# Patient Record
Sex: FEMALE | Race: WHITE
[De-identification: names, ages, dates, MRNs, and addresses within clinical notes are randomized per-mention and may not be internally consistent; named-entity substitution may affect disease eponyms.]

---

## 2017-07-23 ENCOUNTER — HOSPITAL ENCOUNTER (EMERGENCY)
Dept: HOSPITAL 47 - EC | Age: 47
Discharge: HOME | End: 2017-07-23
Payer: MEDICARE

## 2017-07-23 VITALS — TEMPERATURE: 98.3 F | DIASTOLIC BLOOD PRESSURE: 73 MMHG | HEART RATE: 60 BPM | SYSTOLIC BLOOD PRESSURE: 152 MMHG

## 2017-07-23 VITALS — RESPIRATION RATE: 18 BRPM

## 2017-07-23 DIAGNOSIS — Z88.0: ICD-10-CM

## 2017-07-23 DIAGNOSIS — Z87.891: ICD-10-CM
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
## 2017-07-23 DIAGNOSIS — S03.41XA: Primary | ICD-10-CM

## 2017-07-23 LAB — AMYLASE SERPL-CCNC: 62 U/L (ref 30–110)

## 2017-07-23 PROCEDURE — 99284 EMERGENCY DEPT VISIT MOD MDM: CPT

## 2017-07-23 PROCEDURE — 36415 COLL VENOUS BLD VENIPUNCTURE: CPT

## 2017-07-23 PROCEDURE — 83690 ASSAY OF LIPASE: CPT

## 2017-07-23 PROCEDURE — 82150 ASSAY OF AMYLASE: CPT

## 2017-07-23 NOTE — ED
General Adult HPI





- General


Chief complaint: Dental/Oral


Stated complaint: JAW PAIN


Time Seen by Provider: 17 10:36


Source: patient, family, RN notes reviewed, old records reviewed


Mode of arrival: wheelchair


Limitations: no limitations





- History of Present Illness


Initial comments: 





Chief complaint history of present illness a 47-year-old female here with 

family.  The patient reports one month ago while yawning she felt pain to her 

right TMJ.  4 days ago she was at [facility] diagnosed with discomfort in 

that same area but on a muscle relaxant.  Patient reports she still has 

discomfort.  Yawning opening her mouth wide causes discomfort to the right TMJ.

  Mild pressure over that while yawning or opening her mouth can decrease the 

discomfort.  Denies any toothache denies fever.





- Related Data


 Previous Rx's











 Medication  Instructions  Recorded


 


Hydrocodone/Acetaminophen [Norco 1 each PO Q8H PRN #10 tab 17





5-325]  


 


Ibuprofen [Motrin] 600 mg PO Q6HR PRN #20 tab 17











 Allergies











Allergy/AdvReac Type Severity Reaction Status Date / Time


 


Penicillins Allergy  Itching Verified 17 10:24














Review of Systems


ROS Statement: 


Those systems with pertinent positive or pertinent negative responses have been 

documented in the HPI.


Review of systems no headache no visual acuity changes denies any tooth area 

pain.  Discomfort located on the right parotid area specifically toward the 

right TMJ.  No bruising no significant swelling.  Palpation of the area causes 

discomfort.  Opening and closing her mouth increases pain as well.  No 

complaint of chest pain shortness breath GI/ problems.  All systems are 

reviewed.  Past medical problems significant for angina, insulin-dependent 

diabetes mellitus, hypertension, patient is on renal dialysis, also thyroid 

disorder seizure disorder and bipolar disorder.  The patient's surgeries 

include , tonsils and a fistula for dialysis on her left bicep area.  

Family history mother at unknown type right of cancer.  The patient quit 

smoking 10 years ago rarely drinks alcohol.








ROS Other: All systems not noted in ROS Statement are negative.





Past Medical History


Past Medical History: Chest Pain / Angina, Diabetes Mellitus, Hypertension, 

Renal Disease, Seizure Disorder, Thyroid Disorder


Additional Past Medical History / Comment(s): dialysis bipolar


History of Any Multi-Drug Resistant Organisms: None Reported


Past Surgical History:  Section, Tonsillectomy


Additional Past Surgical History / Comment(s): fistula


Past Psychological History: Bipolar


Smoking Status: Former smoker


Past Alcohol Use History: Occasional


Past Drug Use History: None Reported





General Exam





- General Exam Comments


Initial Comments: 





General:


The patient is awake and alert, complains of pain to the right TMJ area vital 

signs temp 97.8 pulse 82 respiratory rate 18 pulse ox 90% room air blood 

pressure 131/77.


Eye:


Pupils are equal, round and reactive to light, extra-ocular movements are intact

; there is normal conjunctiva bilaterally. No signs of icterus. 


Ears, nose, mouth and throat:


There are moist mucous membranes and no oral lesions.  Tenderness to right TMJ 

with opening and closing her mouth and palpation over the area.  Parotid not 

enlarged as compared to the left side.  Tenderness in that general area though 

when palpated.  No bruising noted.  No anterior cervical lymphadenopathy.  

Teeth appear to be intact without evidence of any dental caries or she has had 

a lot of dental work done.


Neck:


The neck is supple, there is no tenderness , no carotid bruit.


Cardiovascular:


There is a regular rate and rhythm. No murmur, rub or gallop is appreciated.


Respiratory:


Lungs are clear to auscultation, respirations are non-labored, breath sounds 

are equal. No wheezes, stridor, rales, or rhonchi.


Gastrointestinal:


Abdomen benign no nausea no vomiting no masses.


 


Skin:


Skin is warm and dry and no rashes or lesions are noted. 


Psychiatric:


History of bipolar disorder.  No complaints of depression or suicidal thoughts.





Limitations: no limitations





Course


 Vital Signs











  17





  10:19


 


Temperature 97.8 F


 


Pulse Rate 82


 


Respiratory 18





Rate 


 


Blood Pressure 131/77


 


O2 Sat by Pulse 98





Oximetry 














Medical Decision Making





- Medical Decision Making





Medical decision making; patient's amylase 62 lipase 273.





History examination suggests right TMJ discomfort.  The patient will be placed 

on some pain pills and advised to follow-up with her family physician.





- Lab Data


 Lab Results











  17 Range/Units





  11:18 


 


Amylase  62  ()  U/L


 


Lipase  273  ()  U/L














Disposition


Clinical Impression: 


 Sprain of right temporomandibular joint





Disposition: HOME SELF-CARE


Condition: Fair


Instructions:  Temporomandibular Disorder (ED), Arthralgia (ED)


Additional Instructions: 


Eat easily chewed food.  Use pain medication as directed.  Also follow-up with 

family physician.


Prescriptions: 


Hydrocodone/Acetaminophen [Norco 5-325] 1 each PO Q8H PRN #10 tab


 PRN Reason: Pain


Ibuprofen [Motrin] 600 mg PO Q6HR PRN #20 tab


 PRN Reason: Pain


Referrals: 


Saturnino Castillo MD [Primary Care Provider] - 1-2 days


Time of Disposition: 12:02

## 2018-01-22 ENCOUNTER — HOSPITAL ENCOUNTER (INPATIENT)
Dept: HOSPITAL 47 - EC | Age: 48
LOS: 3 days | Discharge: HOME | DRG: 100 | End: 2018-01-25
Payer: MEDICARE

## 2018-01-22 DIAGNOSIS — E03.9: ICD-10-CM

## 2018-01-22 DIAGNOSIS — I12.0: ICD-10-CM
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
## 2018-01-22 DIAGNOSIS — E87.1: ICD-10-CM

## 2018-01-22 DIAGNOSIS — Z87.891: ICD-10-CM

## 2018-01-22 DIAGNOSIS — S62.305A: ICD-10-CM

## 2018-01-22 DIAGNOSIS — D64.9: ICD-10-CM

## 2018-01-22 DIAGNOSIS — E11.65: ICD-10-CM

## 2018-01-22 DIAGNOSIS — F31.9: ICD-10-CM

## 2018-01-22 DIAGNOSIS — Z79.4: ICD-10-CM

## 2018-01-22 DIAGNOSIS — Z79.899: ICD-10-CM

## 2018-01-22 DIAGNOSIS — G40.409: Primary | ICD-10-CM

## 2018-01-22 DIAGNOSIS — Z99.2: ICD-10-CM

## 2018-01-22 DIAGNOSIS — N18.6: ICD-10-CM

## 2018-01-22 DIAGNOSIS — M89.9: ICD-10-CM

## 2018-01-22 DIAGNOSIS — Z79.02: ICD-10-CM

## 2018-01-22 DIAGNOSIS — E11.22: ICD-10-CM

## 2018-01-22 DIAGNOSIS — F03.90: ICD-10-CM

## 2018-01-22 PROCEDURE — 80048 BASIC METABOLIC PNL TOTAL CA: CPT

## 2018-01-22 PROCEDURE — 99285 EMERGENCY DEPT VISIT HI MDM: CPT

## 2018-01-22 PROCEDURE — 90935 HEMODIALYSIS ONE EVALUATION: CPT

## 2018-01-22 PROCEDURE — 84100 ASSAY OF PHOSPHORUS: CPT

## 2018-01-22 PROCEDURE — 85025 COMPLETE CBC W/AUTO DIFF WBC: CPT

## 2018-01-22 PROCEDURE — 95819 EEG AWAKE AND ASLEEP: CPT

## 2018-01-22 NOTE — ED
Seizure HPI





- General


Chief Complaint: Seizure


Stated Complaint: Karlo Consult


Time Seen by Provider: 18 18:20


Source: patient, RN/MD, EMS, RN notes reviewed, old records reviewed


Mode of arrival: EMS


Limitations: no limitations





- History of Present Illness


Initial Comments: 





This is a 47-year-old female with a history of seizures who was sent here from 

[facility] for evaluation by neurology for recurrent seizures.  

Patient probably did have recurrent seizures today was seen in their emergency 

department they have no neurologist on staff patient was transferred here for 

further evaluation.  Patient was transported by EMS was apparently awake but 

nonverbal she was demonstrating evidence of a postictal state.  No trauma was 

reported.  I did review the charting separately other hospital.


MD Complaint: seizure





- Related Data


 Home Medications











 Medication  Instructions  Recorded  Confirmed


 


Citalopram Hydrobromide [CeleXA] 10 mg PO DAILY 17


 


Clopidogrel [Plavix] 75 mg PO DAILY 17


 


Ergocalciferol (Vitamin D2) 50,000 unit PO Q7D 17





[Vitamin D2]   


 


Metoprolol Tartrate 25 mg PO BID 17


 


Omeprazole 20 mg PO BID 17


 


amLODIPine [Norvasc] 10 mg PO DAILY 17


 


levETIRAcetam [Keppra] 1,000 mg PO BID 17


 


Atorvastatin [Lipitor] 40 mg PO HS 18


 


Hydrocodone/Acetaminophen [Norco 1 tab PO Q4H PRN 18





5-325]   


 


INSULIN LISPRO (humaLOG) [humaLOG] See Protocol SQ ACHS 18


 


Insulin Glargine [Lantus] 15 unit SQ DAILY 18


 


Ranitidine HCl 150 mg PO BID 18


 


Sevelamer [Renvela] 1 dose PO AS DIRECTED 18











 Allergies











Allergy/AdvReac Type Severity Reaction Status Date / Time


 


Penicillins Allergy  Itching Verified 18 20:10














Review of Systems


ROS Statement: 


Those systems with pertinent positive or pertinent negative responses have been 

documented in the HPI.





Limitations: ROS unobtainable due to patients medical condition





Past Medical History


Past Medical History: Chest Pain / Angina, Diabetes Mellitus, Hypertension, 

Renal Disease, Seizure Disorder, Thyroid Disorder


Additional Past Medical History / Comment(s): dialysis bipolar


History of Any Multi-Drug Resistant Organisms: None Reported


Past Surgical History:  Section, Tonsillectomy


Additional Past Surgical History / Comment(s): fistula


Past Psychological History: Bipolar


Smoking Status: Former smoker


Past Alcohol Use History: Occasional


Past Drug Use History: None Reported





General Exam





- General Exam Comments


Initial Comments: 





This a well-developed well-nourished awake alert but lethargic female


Limitations: no limitations


General appearance: lethargic


Head exam: Present: atraumatic, normocephalic, normal inspection


Eye exam: Present: normal appearance, PERRL, EOMI.  Absent: scleral icterus, 

conjunctival injection, periorbital swelling


ENT exam: Present: normal exam, mucous membranes moist


Neck exam: Present: normal inspection.  Absent: tenderness, meningismus, 

lymphadenopathy


Respiratory exam: Present: normal lung sounds bilaterally.  Absent: respiratory 

distress, wheezes, rales, rhonchi, stridor


Cardiovascular Exam: Present: regular rate, normal rhythm, normal heart sounds.

  Absent: systolic murmur, diastolic murmur, rubs, gallop, clicks


GI/Abdominal exam: Present: soft, normal bowel sounds.  Absent: distended, 

tenderness, guarding, rebound, rigid


Extremities exam: Present: normal inspection, full ROM, normal capillary 

refill.  Absent: tenderness, pedal edema, joint swelling, calf tenderness


Back exam: Present: normal inspection


Neurological exam: Present: alert, altered, CN II-XII intact


Psychiatric exam: Present: flat affect


Skin exam: Present: warm, dry, intact, normal color.  Absent: rash





Course





 Vital Signs











  18





  18:29 19:34 20:00


 


Temperature 98.5 F  


 


Pulse Rate 91 87 87


 


Respiratory 18 18 20





Rate   


 


Blood Pressure 128/67 117/69 129/74


 


O2 Sat by Pulse 98 97 97





Oximetry   














  18





  21:00


 


Temperature 


 


Pulse Rate 97


 


Respiratory 20





Rate 


 


Blood Pressure 133/76


 


O2 Sat by Pulse 97





Oximetry 














Medical Decision Making





- Medical Decision Making





I did discuss findings with the covering service for Dr. Castillo patient will be 

admitted with neurology consultation.  Patient is more responsive and initial 

arrival.





Disposition


Clinical Impression: 


 Generalized seizure, Postictal state





Disposition: ADMITTED AS IP TO THIS HOSP


Condition: Stable


Referrals: 


Satrunino Castillo MD [Primary Care Provider] - 1-2 days

## 2018-01-23 LAB
BASOPHILS # BLD AUTO: 0.1 K/UL (ref 0–0.2)
BASOPHILS NFR BLD AUTO: 1 %
EOSINOPHIL # BLD AUTO: 0.1 K/UL (ref 0–0.7)
EOSINOPHIL NFR BLD AUTO: 1 %
ERYTHROCYTE [DISTWIDTH] IN BLOOD BY AUTOMATED COUNT: 3.69 M/UL (ref 3.8–5.4)
ERYTHROCYTE [DISTWIDTH] IN BLOOD: 12.7 % (ref 11.5–15.5)
GLUCOSE BLD-MCNC: 129 MG/DL (ref 75–99)
GLUCOSE BLD-MCNC: 138 MG/DL (ref 75–99)
GLUCOSE BLD-MCNC: 152 MG/DL (ref 75–99)
GLUCOSE BLD-MCNC: 415 MG/DL (ref 75–99)
GLUCOSE BLD-MCNC: 91 MG/DL (ref 75–99)
HCT VFR BLD AUTO: 32.2 % (ref 34–46)
HGB BLD-MCNC: 11.1 GM/DL (ref 11.4–16)
LYMPHOCYTES # SPEC AUTO: 2.9 K/UL (ref 1–4.8)
LYMPHOCYTES NFR SPEC AUTO: 34 %
MCH RBC QN AUTO: 30.1 PG (ref 25–35)
MCHC RBC AUTO-ENTMCNC: 34.5 G/DL (ref 31–37)
MCV RBC AUTO: 87.4 FL (ref 80–100)
MONOCYTES # BLD AUTO: 0.5 K/UL (ref 0–1)
MONOCYTES NFR BLD AUTO: 6 %
NEUTROPHILS # BLD AUTO: 4.9 K/UL (ref 1.3–7.7)
NEUTROPHILS NFR BLD AUTO: 57 %
PLATELET # BLD AUTO: 238 K/UL (ref 150–450)
WBC # BLD AUTO: 8.6 K/UL (ref 3.8–10.6)

## 2018-01-23 RX ADMIN — LACOSAMIDE SCH MG: 50 TABLET, FILM COATED ORAL at 21:55

## 2018-01-23 RX ADMIN — HYDROCODONE BITARTRATE AND ACETAMINOPHEN PRN EACH: 5; 325 TABLET ORAL at 12:35

## 2018-01-23 RX ADMIN — METOPROLOL TARTRATE SCH MG: 25 TABLET, FILM COATED ORAL at 08:23

## 2018-01-23 RX ADMIN — HYDROCODONE BITARTRATE AND ACETAMINOPHEN PRN EACH: 5; 325 TABLET ORAL at 17:13

## 2018-01-23 RX ADMIN — METOPROLOL TARTRATE SCH MG: 25 TABLET, FILM COATED ORAL at 21:11

## 2018-01-23 RX ADMIN — CITALOPRAM HYDROBROMIDE SCH MG: 10 TABLET ORAL at 08:24

## 2018-01-23 RX ADMIN — HYDROCODONE BITARTRATE AND ACETAMINOPHEN PRN EACH: 5; 325 TABLET ORAL at 08:22

## 2018-01-23 RX ADMIN — FAMOTIDINE SCH MG: 20 TABLET, FILM COATED ORAL at 21:12

## 2018-01-23 RX ADMIN — INSULIN ASPART SCH UNIT: 100 INJECTION, SOLUTION INTRAVENOUS; SUBCUTANEOUS at 21:12

## 2018-01-23 RX ADMIN — ATORVASTATIN CALCIUM SCH MG: 40 TABLET, FILM COATED ORAL at 21:11

## 2018-01-23 RX ADMIN — INSULIN ASPART SCH: 100 INJECTION, SOLUTION INTRAVENOUS; SUBCUTANEOUS at 17:16

## 2018-01-23 RX ADMIN — INSULIN DETEMIR SCH UNIT: 100 INJECTION, SOLUTION SUBCUTANEOUS at 08:25

## 2018-01-23 RX ADMIN — PANTOPRAZOLE SODIUM SCH MG: 40 TABLET, DELAYED RELEASE ORAL at 08:23

## 2018-01-23 RX ADMIN — PANTOPRAZOLE SODIUM SCH MG: 40 TABLET, DELAYED RELEASE ORAL at 17:14

## 2018-01-23 RX ADMIN — INSULIN ASPART SCH UNIT: 100 INJECTION, SOLUTION INTRAVENOUS; SUBCUTANEOUS at 08:21

## 2018-01-23 RX ADMIN — INSULIN ASPART SCH: 100 INJECTION, SOLUTION INTRAVENOUS; SUBCUTANEOUS at 12:24

## 2018-01-23 RX ADMIN — CLOPIDOGREL BISULFATE SCH MG: 75 TABLET ORAL at 08:22

## 2018-01-23 NOTE — P.NPCON
History of Present Illness





- Reason for Consult


end stage renal disease





- History of Present Illness





Reason for consultation: End-stage renal disease





History of present illness:


Patient is a 47-year-old female seen in consultation for end-stage renal 

disease.  She is maintained on hemodialysis on a  

schedule.  Patient has history of seizures for which she has required multiple 

hospitalizations.  Patient states she sustained a fall and was noted to have a 

left wrist fracture for which she went to [facility].  She was 

subsequently discharged and then had a seizure while at home.  She returned 

back to [facility] and was subsequently transferred to [facility] to be seen by a neurologist.  She is currently resting in bed.  

Patient states she has been compliant with her medications.  She did receive 

hemodialysis yesterday.  No further seizures.  Currently resting in bed.  

Patient does have dementia and is not a very reliable historian.  No vomiting 

or diarrhea.  No fever or chills.  Denies cough.  No edema.  Hemodynamically 

stable.





Vital signs are stable.


General: The patient appeared well nourished and normally developed. 


HEENT: Head exam is unremarkable. Neck is without jugular venous distension.


LUNGS: Lungs are clear to auscultation and percussion. Breath sounds decreased.


HEART: Rate and Rhythm are regular. First and second heart sounds normal. No 

murmurs, rubs or gallops. 


ABDOMEN: Abdominal exam reveals normal bowel sounds. Non-tender and non-

distended. No evidence of peritonitis.


EXTREMITITES: No clubbing, cyanosis, or edema.





Past Medical History


Past Medical History: Chest Pain / Angina, Diabetes Mellitus, Hypertension, 

Renal Disease, Seizure Disorder, Thyroid Disorder


Additional Past Medical History / Comment(s): dialysis bipolar


History of Any Multi-Drug Resistant Organisms: None Reported


Past Surgical History:  Section, Tonsillectomy


Additional Past Surgical History / Comment(s): fistula


Past Psychological History: Bipolar


Smoking Status: Former smoker


Past Alcohol Use History: Occasional


Past Drug Use History: None Reported





Medications and Allergies


 Home Medications











 Medication  Instructions  Recorded  Confirmed  Type


 


Citalopram Hydrobromide [CeleXA] 10 mg PO DAILY 17 History


 


Clopidogrel [Plavix] 75 mg PO DAILY 17 History


 


Ergocalciferol (Vitamin D2) 50,000 unit PO Q7D 17 History





[Vitamin D2]    


 


Metoprolol Tartrate 25 mg PO BID 17 History


 


Omeprazole 20 mg PO BID 17 History


 


amLODIPine [Norvasc] 10 mg PO DAILY 17 History


 


levETIRAcetam [Keppra] 1,000 mg PO BID 17 History


 


Atorvastatin [Lipitor] 40 mg PO HS 18 History


 


Hydrocodone/Acetaminophen [Norco 1 tab PO Q4H PRN 18 History





5-325]    


 


INSULIN LISPRO (humaLOG) [humaLOG] See Protocol SQ ACHS 18 

History


 


Insulin Glargine [Lantus] 15 unit SQ DAILY 18 History


 


Ranitidine HCl 150 mg PO BID 18 History











 Allergies











Allergy/AdvReac Type Severity Reaction Status Date / Time


 


Penicillins Allergy  Itching Verified 18 20:10














Physical Exam


Vitals: 


 Vital Signs











  Temp Pulse Pulse Resp BP BP Pulse Ox


 


 18 07:00  97.8 F   67  18   114/64  95


 


 18 00:00    79  14   


 


 18 22:00  98.2 F  89  79  14  139/79  130/72  95


 


 18 21:00   97   20  133/76   97


 


 18 20:00   87   20  129/74   97


 


 18 19:34   87   18  117/69   97


 


 18 18:29  98.5 F  91   18  128/67   98








 Intake and Output











 18





 22:59 06:59 14:59


 


Other:   


 


  # Voids  0 


 


  Weight 68.039 kg 68.039 kg 














Assessment and Plan


Plan: 





Assessment:


#1.  End-stage renal disease maintained on hemodialysis on a  schedule.


#2.  Seizures requiring multiple hospitalizations.  Patient states she's been 

compliant with her medications.


#3.  Status post fall with left wrist fracture.  Currently in a cast.


#4.  Chronic kidney disease mineral bone disease maintained on Renvela.


#5.  Insulin-dependent diabetes mellitus.





Plan:


Hemodialysis tomorrow with goal 2 liters ultrafiltration.


Check phosphorus level.


Await neurology recommendations.





Thank you for the consultation.  I will continue to follow the patient with you 

during her hospital stay.

## 2018-01-23 NOTE — P.HPIM
History of Present Illness





Patient presented to [place] [address] first seizure disorder.  Was 

transferred to [facility] emergency room for neurological evaluation..  Patient 

resting in bed at this time awaiting consultation.  Has seen Dr. Higginbotham we'll 

continue with scheduled dialysis





Review of Systems


Constitutional: Reports fatigue, Reports weakness


Musculoskeletal: left: hand pain


Neurological: Reports seizures, Reports weakness





Past Medical History


Past Medical History: Chest Pain / Angina, Diabetes Mellitus, Hypertension, 

Renal Disease, Seizure Disorder, Thyroid Disorder


Additional Past Medical History / Comment(s): dialysis bipolar


History of Any Multi-Drug Resistant Organisms: None Reported


Past Surgical History:  Section, Tonsillectomy


Additional Past Surgical History / Comment(s): fistula


Past Psychological History: Bipolar


Smoking Status: Former smoker


Past Alcohol Use History: Occasional


Past Drug Use History: None Reported





Medications and Allergies


 Home Medications











 Medication  Instructions  Recorded  Confirmed  Type


 


Citalopram Hydrobromide [CeleXA] 10 mg PO DAILY 17 History


 


Clopidogrel [Plavix] 75 mg PO DAILY 17 History


 


Ergocalciferol (Vitamin D2) 50,000 unit PO Q7D 17 History





[Vitamin D2]    


 


Metoprolol Tartrate 25 mg PO BID 17 History


 


Omeprazole 20 mg PO BID 17 History


 


amLODIPine [Norvasc] 10 mg PO DAILY 17 History


 


levETIRAcetam [Keppra] 1,000 mg PO BID 17 History


 


Atorvastatin [Lipitor] 40 mg PO HS 18 History


 


Hydrocodone/Acetaminophen [Norco 1 tab PO Q4H PRN 18 History





5-325]    


 


INSULIN LISPRO (humaLOG) [humaLOG] See Protocol SQ ACHS 18 

History


 


Insulin Glargine [Lantus] 15 unit SQ DAILY 18 History


 


Ranitidine HCl 150 mg PO BID 18 History











 Allergies











Allergy/AdvReac Type Severity Reaction Status Date / Time


 


Penicillins Allergy  Itching Verified 18 20:10














Physical Exam


Vitals: 


 Vital Signs











  Temp Pulse Pulse Resp BP BP Pulse Ox


 


 18 07:00  97.8 F   67  18   114/64  95


 


 18 00:00    79  14   


 


 18 22:00  98.2 F  89  79  14  139/79  130/72  95


 


 18 21:00   97   20  133/76   97


 


 18 20:00   87   20  129/74   97


 


 18 19:34   87   18  117/69   97


 


 18 18:29  98.5 F  91   18  128/67   98








 Intake and Output











 18





 22:59 06:59 14:59


 


Other:   


 


  # Voids  0 


 


  Weight 68.039 kg 68.039 kg 














- Constitutional


General appearance: obese





- EENT


Eyes: PERRLA


Ears: bilateral: normal





- Neck


Neck: normal ROM





- Respiratory


Respiratory: bilateral: CTA





- Cardiovascular


Rhythm: regular





- Gastrointestinal


General gastrointestinal: soft





- Integumentary





Excessive facial hair


Integumentary: normal





- Neurologic


Neurologic: CNII-XII intact





- Musculoskeletal


Musculoskeletal: generalized weakness





- Psychiatric





Patient has flat affect and short-term memory problems


Psychiatric: A&O x's 3





Results


Labs: 


 Abnormal Lab Results - Last 24 Hours (Table)











  18 Range/Units





  01:44 07:37 12:03 


 


POC Glucose (mg/dL)  129 H  152 H  415 H  (75-99)  mg/dL














Thrombosis Risk Factor Assmnt





- Choose All That Apply


Any of the Below Risk Factors Present?: Yes


Each Factor Represents 1 point: Age 41-60 years, Obesity (BMI >25)


Thrombosis Risk Factor Assessment Total Risk Factor Score: 2


Thrombosis Risk Factor Assessment Level: Low Risk





Assessment and Plan


Plan: 





Assessment


Seizure disorder post ictal state


Bipolar


Diabetes type 2


End-stage renal failure with dialysis 


Hypertension


Hypothyroidism


Left hand fracture





Plan


Consultation with Dr. Higginbotham


[facility]


Neurology consultation for seizure disorder

## 2018-01-24 LAB
ANION GAP SERPL CALC-SCNC: 18 MMOL/L
BUN SERPL-SCNC: 55 MG/DL (ref 7–17)
CALCIUM SPEC-MCNC: 9.5 MG/DL (ref 8.4–10.2)
CHLORIDE SERPL-SCNC: 91 MMOL/L (ref 98–107)
CO2 SERPL-SCNC: 25 MMOL/L (ref 22–30)
GLUCOSE BLD-MCNC: 129 MG/DL (ref 75–99)
GLUCOSE BLD-MCNC: 129 MG/DL (ref 75–99)
GLUCOSE BLD-MCNC: 135 MG/DL (ref 75–99)
GLUCOSE BLD-MCNC: 259 MG/DL (ref 75–99)
GLUCOSE BLD-MCNC: 268 MG/DL (ref 75–99)
GLUCOSE SERPL-MCNC: 232 MG/DL (ref 74–99)
POTASSIUM SERPL-SCNC: 4.4 MMOL/L (ref 3.5–5.1)
SODIUM SERPL-SCNC: 134 MMOL/L (ref 137–145)

## 2018-01-24 PROCEDURE — 5A1D70Z PERFORMANCE OF URINARY FILTRATION, INTERMITTENT, LESS THAN 6 HOURS PER DAY: ICD-10-PCS

## 2018-01-24 RX ADMIN — PANTOPRAZOLE SODIUM SCH MG: 40 TABLET, DELAYED RELEASE ORAL at 18:14

## 2018-01-24 RX ADMIN — INSULIN ASPART SCH UNIT: 100 INJECTION, SOLUTION INTRAVENOUS; SUBCUTANEOUS at 12:53

## 2018-01-24 RX ADMIN — CITALOPRAM HYDROBROMIDE SCH MG: 10 TABLET ORAL at 08:36

## 2018-01-24 RX ADMIN — HYDROCODONE BITARTRATE AND ACETAMINOPHEN PRN EACH: 5; 325 TABLET ORAL at 20:33

## 2018-01-24 RX ADMIN — ATORVASTATIN CALCIUM SCH MG: 40 TABLET, FILM COATED ORAL at 20:34

## 2018-01-24 RX ADMIN — METOPROLOL TARTRATE SCH MG: 25 TABLET, FILM COATED ORAL at 20:34

## 2018-01-24 RX ADMIN — HYDROCODONE BITARTRATE AND ACETAMINOPHEN PRN EACH: 5; 325 TABLET ORAL at 05:27

## 2018-01-24 RX ADMIN — LACOSAMIDE SCH MG: 50 TABLET, FILM COATED ORAL at 08:38

## 2018-01-24 RX ADMIN — INSULIN ASPART SCH: 100 INJECTION, SOLUTION INTRAVENOUS; SUBCUTANEOUS at 08:33

## 2018-01-24 RX ADMIN — PANTOPRAZOLE SODIUM SCH MG: 40 TABLET, DELAYED RELEASE ORAL at 08:36

## 2018-01-24 RX ADMIN — INSULIN DETEMIR SCH UNIT: 100 INJECTION, SOLUTION SUBCUTANEOUS at 08:36

## 2018-01-24 RX ADMIN — METOPROLOL TARTRATE SCH MG: 25 TABLET, FILM COATED ORAL at 08:37

## 2018-01-24 RX ADMIN — INSULIN ASPART SCH UNIT: 100 INJECTION, SOLUTION INTRAVENOUS; SUBCUTANEOUS at 20:34

## 2018-01-24 RX ADMIN — INSULIN ASPART SCH: 100 INJECTION, SOLUTION INTRAVENOUS; SUBCUTANEOUS at 17:22

## 2018-01-24 RX ADMIN — CLOPIDOGREL BISULFATE SCH MG: 75 TABLET ORAL at 08:36

## 2018-01-24 RX ADMIN — LACOSAMIDE SCH MG: 50 TABLET, FILM COATED ORAL at 20:36

## 2018-01-24 NOTE — P.CNOR
History of Present Illness





- HPI


Consult date: 18


History of present illness: 


this is a 47-year-old female admitted for seizures. Orthopedics is consulted 

due to left hand injury. Patient states that 2 days ago she fell and injured 

the left hand.  Patient states she was seen at [facility] for 

this injury and x-rays were done.  Patient states she was treated with a 

splint. Patient states that her pain is well controlled and she is tolerating 

the splint. Patient denies any numbness, weakness, or tingling.





Review of Systems


See HPI.








Past Medical History


Past Medical History: Chest Pain / Angina, Diabetes Mellitus, Hypertension, 

Renal Disease, Seizure Disorder, Thyroid Disorder


Additional Past Medical History / Comment(s): dialysis bipolar


History of Any Multi-Drug Resistant Organisms: None Reported


Past Surgical History:  Section, Tonsillectomy


Additional Past Surgical History / Comment(s): fistula


Past Psychological History: Bipolar


Smoking Status: Former smoker


Past Alcohol Use History: Occasional


Past Drug Use History: None Reported





Medications and Allergies


 Home Medications











 Medication  Instructions  Recorded  Confirmed  Type


 


Citalopram Hydrobromide [CeleXA] 10 mg PO DAILY 17 History


 


Clopidogrel [Plavix] 75 mg PO DAILY 17 History


 


Ergocalciferol (Vitamin D2) 50,000 unit PO Q7D 17 History





[Vitamin D2]    


 


Metoprolol Tartrate 25 mg PO BID 17 History


 


Omeprazole 20 mg PO BID 17 History


 


amLODIPine [Norvasc] 10 mg PO DAILY 17 History


 


levETIRAcetam [Keppra] 1,000 mg PO BID 17 History


 


Atorvastatin [Lipitor] 40 mg PO HS 18 History


 


Hydrocodone/Acetaminophen [Norco 1 tab PO Q4H PRN 18 History





5-325]    


 


INSULIN LISPRO (humaLOG) [humaLOG] See Protocol SQ ACHS 18 

History


 


Insulin Glargine [Lantus] 15 unit SQ DAILY 18 History


 


Ranitidine HCl 150 mg PO BID 18 History











 Allergies











Allergy/AdvReac Type Severity Reaction Status Date / Time


 


Penicillins Allergy  Itching Verified 18 20:10














Physical Examination


On exam patient is alert and oriented 3 and in no acute distress. Left upper 

extremity with mild swelling and ecchymosis over the lateral aspect of the left 

hand. There is tenderness over the base of the fifth metacarpal.  Patient has 

limited range of motion of the left wrist due to pain.  Patient is unable to 

make a fist due to pain and swelling in left hand.  Capillary refill is normal 

at less than 2 seconds.  Sensation intact.  Neurovascular status and 

circulatory status are intact.








Results


X-rays of the left wrist and left hand are pending.








- Labs


Labs: 


 Abnormal Lab Results - Last 24 Hours (Table)











  18 Range/Units





  12:03 12:38 17:08 


 


RBC   3.69 L   (3.80-5.40)  m/uL


 


Hgb   11.1 L   (11.4-16.0)  gm/dL


 


Hct   32.2 L   (34.0-46.0)  %


 


POC Glucose (mg/dL)  415 H   59 L  (75-99)  mg/dL














  18 Range/Units





  20:45 02:44 06:53 


 


RBC     (3.80-5.40)  m/uL


 


Hgb     (11.4-16.0)  gm/dL


 


Hct     (34.0-46.0)  %


 


POC Glucose (mg/dL)  138 H  135 H  129 H  (75-99)  mg/dL








 H & H











  18 Range/Units





  12:38 


 


Hgb  11.1 L  (11.4-16.0)  gm/dL


 


Hct  32.2 L  (34.0-46.0)  %











Result Diagrams: 


 18 12:38








Assessment and Plan


(1) Left hand pain


Current Visit: Yes   Status: Acute   Code(s): M79.642 - PAIN IN LEFT HAND   

SNOMED Code(s): 92920676


   


Plan: 


#1.  Continue splint, rest, ice and elevation of the left upper extremity.


#2.  X-rays of the left hand and wrist are pending. Further recommendations to 

follow.


#3.  Will continue to follow the patient closely.

## 2018-01-24 NOTE — EEG
ELECTROENCEPHALOGRAM REPORT



DATE OF SERVICE:

01/24/2018



REASON FOR TESTING:

Seizures.



CURRENT ANTIEPILEPTIC MEDICATIONS:

Keppra and Vimpat.



DESCRIPTION OF THE PROCEDURE:

This EEG was performed using a 21 channel digital electroencephalograph, following

international 10-20 system.



DESCRIPTION OF THE RECORDING:

From the beginning of the tracing, and with patient's eyes closed, the background

rhythm was mostly consisting of 8 Hz alpha frequency in the posterior occipital leads.

No obvious asymmetry is seen.  Photic stimulation was performed with a minimal driving

response seen.  No pathological waves were elicited.  Occasional dysregulation is seen.

Frequent muscle and movement artifacts are noticed.  Hyperventilation was not

performed.  The patient remains awake throughout the tracing.  No obvious epileptiform

discharges were seen.



INTERPRETATION:

This awake EEG is abnormal due to presence of dysregulation.  This is consistent with a

reduced seizure threshold.  Clinical correlation is recommended.





MMODL / IJN: [id number] / Job#: [id number]

## 2018-01-24 NOTE — P.PN
Subjective





Patient is seen in follow-up for end-stage renal disease.  She is maintained on 

hemodialysis on a Monday Wednesday Friday schedule.  He fistula.  Patient 

presented to the hospital after sustaining a fall and is noted to have a 

fracture of the left metacarpal.  She has a splint in place.  Patient also had 

a seizure prior to admission and is being followed by neurology.  No further 

seizure activity while in the hospital.  She was maintained on Keppra and 

Vimpat was added this admission.  Denies chest pain or shortness of breath.  

Oral intake is good.  No active complaints at this time.





Vital signs are stable.


General: The patient appeared well nourished and normally developed. 


HEENT: Head exam is unremarkable. Neck is without jugular venous distension.


LUNGS: Lungs are clear to auscultation and percussion. Breath sounds decreased.


HEART: Rate and Rhythm are regular. First and second heart sounds normal. No 

murmurs, rubs or gallops. 


ABDOMEN: Abdominal exam reveals normal bowel sounds. Non-tender and non-

distended. No evidence of peritonitis.


EXTREMITITES: No clubbing, cyanosis, or edema.





Objective





- Vital Signs


Vital signs: 


 Vital Signs











Temp  97.7 F   01/24/18 07:00


 


Pulse  72   01/24/18 07:00


 


Resp  18   01/24/18 07:00


 


BP  120/68   01/24/18 07:00


 


Pulse Ox  97   01/24/18 07:00








 Intake & Output











 01/23/18 01/24/18 01/24/18





 18:59 06:59 18:59


 


Intake Total   500


 


Balance   500


 


Intake:   


 


  Oral   500


 


Other:   


 


  Voiding Method Incontinent  Incontinent


 


  # Voids 0 2 


 


  # Bowel Movements 0 0 














- Labs


CBC & Chem 7: 


 01/23/18 12:38





Labs: 


 Abnormal Lab Results - Last 24 Hours (Table)











  01/23/18 01/23/18 01/23/18 Range/Units





  12:03 12:38 17:08 


 


RBC   3.69 L   (3.80-5.40)  m/uL


 


Hgb   11.1 L   (11.4-16.0)  gm/dL


 


Hct   32.2 L   (34.0-46.0)  %


 


POC Glucose (mg/dL)  415 H   59 L  (75-99)  mg/dL














  01/23/18 01/24/18 01/24/18 Range/Units





  20:45 02:44 06:53 


 


RBC     (3.80-5.40)  m/uL


 


Hgb     (11.4-16.0)  gm/dL


 


Hct     (34.0-46.0)  %


 


POC Glucose (mg/dL)  138 H  135 H  129 H  (75-99)  mg/dL














Assessment and Plan


Plan: 





Assessment:


#1.  End-stage renal disease maintained on hemodialysis on a Monday Wednesday Friday schedule.


#2.  Seizures requiring multiple hospitalizations.  Patient states she's been 

compliant with her medications.  Neurology following.  Maintained on Keppra and 

Vimpat added this admission.


#3.  Status post fall with left wrist fracture.  Currently in a splint.  Noted 

to have left fourth metacarpal fracture.


#4.  Chronic kidney disease mineral bone disease maintained on Renvela.


#5.  Insulin-dependent diabetes mellitus.





Plan:


Hemodialysis today with goal 2 liters ultrafiltration.


Follow-up phosphorus level.

## 2018-01-24 NOTE — CONS
CONSULTATION



DATE OF CONSULTATION:

2018.



CHIEF COMPLAINT:

Seizures.



HISTORY OF PRESENT ILLNESS:

Mrs. Thompson is a 47-year-old  female, who is being evaluated by the neurology

service per the request of Dr. Saturnino Castillo for uncontrolled seizures.  The patient has

history of seizure disorder and is on Keppra 1000 mg b.i.d. at home.  She has been

having frequent breakthrough seizures described as generalized tonic colonic seizures

over the past week.  She denies missing any doses.  She was initially taken to [facility] but then transferred to [facility]

for neurological evaluation.  According to the chart, a CT scan of the brain was done

at [facility], which showed no acute intracranial abnormalities.  Her

comprehensive metabolic profile at that institution showed mild hyponatremia at 134,

hyperglycemia at 255, and elevated BUN and creatinine at 66 and 10.1, respectively.

The patient does have history of end-stage renal disease and is on dialysis.

Nephrology has been consulted.  At the time of my evaluation, the patient is lying in

her bed and appears to be in no acute distress.  She has not had any further seizures

since her admission.  She is on seizure precautions.  Her CBC here showed anemia with a

hemoglobin of 11.1 and hematocrit of 32%.



PAST MEDICAL HISTORY:

Seizure disorder, diabetes, hypertension, end-stage renal disease, hypothyroidism,

history of , tonsillectomy, history of bipolar disorder.



SOCIAL HISTORY:

The patient is a former smoker.  She rarely drinks alcohol.  She denies any drug use.



FAMILY HISTORY:

Noncontributory.



HOME MEDICATIONS:

Reviewed in the chart.



ALLERGIES:

PENICILLIN.



REVIEW OF SYSTEM:

CONSTITUTIONAL:  Positive for fatigue.

EYES:  Negative.

ENT:  Negative.

CARDIOVASCULAR:  Negative.

RESPIRATORY:  Negative.

NEUROLOGICAL:  As mentioned above.

GASTROINTESTINAL:  Positive for occasional heartburn.

GENITOURINARY:  As mentioned above.

PSYCHIATRIC:  Positive for history of bipolar disorder.

ENDOCRINE:  Positive for diabetes.  Also positive for hypothyroidism.

DERMATOLOGICAL:  Negative.

MUSCULOSKELETAL:  Positive for occasional joint pain.



PHYSICAL EXAM:

Vital signs show a temperature of 98.2, pulse 73, respiration 16, blood pressure

113/69.

GENERAL APPEARANCE:  The patient is a well-developed  female, who appears to

be in no acute distress.

HEENT: Normocephalic, atraumatic, no facial asymmetry is seen. Facial hair is present.

NECK:  Supple with no masses felt.

CARDIOVASCULAR:  Regular rate and rhythm.

ABDOMEN:  Nontender nondistended.

Extremities showed no edema or clubbing.

NEUROLOGICAL EXAM: The patient is awake and oriented to person and place.  She answered

2001 for the year.  No facial asymmetry is seen on cranial nerve testing.  No

lateralizing weakness is noticed.  No tremors or seizure-like activity is seen.

Sensory exam showed diminished light touch sensation in bilateral distal lower

extremities.



IMPRESSION:

1. Uncontrolled seizures, generalized tonic-clonic type.

2. End-stage renal disease, on hemodialysis.

3. Anemia.

4. Diabetes.



RECOMMENDATION:

The patient has been having frequent breakthrough seizures as mentioned above.  She

denies missing any doses of Keppra.  I had a lengthy discussion with her regarding her

antiepileptic medication regimen.  Given her end-stage renal disease, she is already on

a high dose of Keppra, although there is no concern for toxicity.  The patient will

need to be placed on a second antiepileptic medication.  I will start her on Vimpat 50

mg b.i.d.  This is usually a starting dose, but given her end-stage renal disease, this

will be her maintenance dose as well.  No further adjustment of this medication will be

needed.  An EEG has been ordered.  Continue seizure precautions and neuro checks.

Nephrology has been consulted for her hemodialysis.  I will continue to follow with

you.  Further recommendations to follow.



Thank you, Dr. Castillo for allowing me to participate in the care of your patient.  If

you have any questions, please feel free to contact me.





ANDREW / TIM: [id number] / Job#: [id number]

## 2018-01-24 NOTE — XR
Left hand and left wrist

 

HISTORY: Pain, wrist fracture

 

3 views of the left and correlated to 4 views of the left wrist same date

 

There is an overlying splint present. Bone mineralization is mildly reduced. There is negative ulnar 
variance present. Alignment is maintained. Oblique fracture through the fourth metacarpal with minima
l displacement is noted.

 

IMPRESSION: Fracture in splint

## 2018-01-24 NOTE — P.PN
Subjective





Patient resting in bed no complaints at this time.  The patient more alert and 

responsive than yesterday.  Patient has  had consultation with neurology 

regarding seizures medication will be adjusted.  Patient had dialysis on this 

morning.  Patient being consult and with the orthopedics regarding left hand 

fractured to metacarpal





Objective





- Vital Signs


Vital signs: 


 Vital Signs











Temp  97.7 F   01/24/18 07:00


 


Pulse  72   01/24/18 07:00


 


Resp  18   01/24/18 07:00


 


BP  120/68   01/24/18 07:00


 


Pulse Ox  97   01/24/18 07:00








 Intake & Output











 01/23/18 01/24/18 01/24/18





 18:59 06:59 18:59


 


Intake Total   500


 


Balance   500


 


Intake:   


 


  Oral   500


 


Other:   


 


  Voiding Method Incontinent  Incontinent


 


  # Voids 0 2 


 


  # Bowel Movements 0 0 














- Constitutional


General appearance: Present: obese





- EENT


Eyes: Present: PERRLA


Ears: bilateral: normal





- Neck


Neck: Present: normal ROM





- Respiratory


Respiratory: bilateral: CTA





- Cardiovascular


Rhythm: regular





- Gastrointestinal


General gastrointestinal: Present: soft





- Integumentary


Integumentary: Present: normal





- Neurologic


Neurologic: Present: CNII-XII intact





- Musculoskeletal


Musculoskeletal: Present: generalized weakness





- Psychiatric


Psychiatric: Present: A&O x's 3, appropriate affect, intact judgment & insight





- Labs


CBC & Chem 7: 


 01/23/18 12:38





 01/24/18 09:37


Labs: 


 Abnormal Lab Results - Last 24 Hours (Table)











  01/23/18 01/23/18 01/23/18 Range/Units





  12:03 12:38 17:08 


 


RBC   3.69 L   (3.80-5.40)  m/uL


 


Hgb   11.1 L   (11.4-16.0)  gm/dL


 


Hct   32.2 L   (34.0-46.0)  %


 


Sodium     (137-145)  mmol/L


 


Chloride     ()  mmol/L


 


BUN     (7-17)  mg/dL


 


Creatinine     (0.52-1.04)  mg/dL


 


Glucose     (74-99)  mg/dL


 


POC Glucose (mg/dL)  415 H   59 L  (75-99)  mg/dL


 


Phosphorus     (2.5-4.5)  mg/dL














  01/23/18 01/24/18 01/24/18 Range/Units





  20:45 02:44 06:53 


 


RBC     (3.80-5.40)  m/uL


 


Hgb     (11.4-16.0)  gm/dL


 


Hct     (34.0-46.0)  %


 


Sodium     (137-145)  mmol/L


 


Chloride     ()  mmol/L


 


BUN     (7-17)  mg/dL


 


Creatinine     (0.52-1.04)  mg/dL


 


Glucose     (74-99)  mg/dL


 


POC Glucose (mg/dL)  138 H  135 H  129 H  (75-99)  mg/dL


 


Phosphorus     (2.5-4.5)  mg/dL














  01/24/18 Range/Units





  09:37 


 


RBC   (3.80-5.40)  m/uL


 


Hgb   (11.4-16.0)  gm/dL


 


Hct   (34.0-46.0)  %


 


Sodium  134 L  (137-145)  mmol/L


 


Chloride  91 L  ()  mmol/L


 


BUN  55 H  (7-17)  mg/dL


 


Creatinine  9.01 H*  (0.52-1.04)  mg/dL


 


Glucose  232 H  (74-99)  mg/dL


 


POC Glucose (mg/dL)   (75-99)  mg/dL


 


Phosphorus  6.4 H  (2.5-4.5)  mg/dL














Assessment and Plan


Plan: 





Assessment


Generalized seizure disorder post ictal state


Left hand fracture


Diabetes type 2


End-stage renal failure on dialysis Monday Wednesday and Friday


Hypertension


Bipolar


Hypothyroidism





Plan


Continue consultation with neurology regarding seizures


Continue consultation with  dialysis


Continue consultation with orthopedics regarding left hand fracture

## 2018-01-24 NOTE — P.PN
Subjective


Progress Note Date: 01/24/18


Principal diagnosis: 


seizure, generalized tonic








Neurology is following a 47-year-old female for uncontrolled seizure. Patient 

has a history of seizure and is on Keppra 1000 mg, bid daily. Patient 

originally presented at [facility] but was transferred to [facility] 

for neurological care and workup. Patient was placed on a second AED, Vimpat 50 

mg, bid. This is going to be her daily dose if tolerated and no adverse effects 

are reported. Patient's EEG was found to be abnormal with occasional 

dysregulation, consistent with reduced seizure threshold. Patient and nursing 

staff report no new seizure activity and the patient is adjusting to the added 

medication without complication at this time. 





On contact, the patient was AOx3, resting in bed in no acute distress.   





Objective





- Vital Signs


Vital signs: 


 Vital Signs











Temp  97.8 F   01/24/18 15:00


 


Pulse  72   01/24/18 15:00


 


Resp  18   01/24/18 15:00


 


BP  131/70   01/24/18 15:00


 


Pulse Ox  97   01/24/18 15:00








 Intake & Output











 01/24/18 01/24/18 01/25/18





 06:59 18:59 06:59


 


Intake Total  500 


 


Balance  500 


 


Intake:   


 


  Oral  500 


 


Other:   


 


  Voiding Method  Incontinent 


 


  # Voids 2 0 


 


  # Bowel Movements 0 0 














- Exam





Constitutional: AOx3, cooperative


HEENT: NC/AT, no facial asymmetry is seen. Throat: Supple, no masses


Respiratory: No increased work of breathing


Cardiac: Regular rate and Rhythm 


GI: non tender, non distended


Musculoskeletal:  strengths are equal bilaterally 5/5, Lower extremity 

strengths are equal bilaterally at 5/5. 


Neurological: CN II-XII in tact, patient was AOx3, speech and language are 

normal, no unilateralizing weakness, no seizure activity note on physical exam. 

Sensation was normal.


Integementary: no rash, no erythema


Psychiatric: mood and affect appropriate








- Labs


CBC & Chem 7: 


 01/23/18 12:38





 01/24/18 09:37


Labs: 


 Abnormal Lab Results - Last 24 Hours (Table)











  01/23/18 01/24/18 01/24/18 Range/Units





  20:45 02:44 06:53 


 


Sodium     (137-145)  mmol/L


 


Chloride     ()  mmol/L


 


BUN     (7-17)  mg/dL


 


Creatinine     (0.52-1.04)  mg/dL


 


Glucose     (74-99)  mg/dL


 


POC Glucose (mg/dL)  138 H  135 H  129 H  (75-99)  mg/dL


 


Phosphorus     (2.5-4.5)  mg/dL














  01/24/18 01/24/18 01/24/18 Range/Units





  09:37 12:17 17:06 


 


Sodium  134 L    (137-145)  mmol/L


 


Chloride  91 L    ()  mmol/L


 


BUN  55 H    (7-17)  mg/dL


 


Creatinine  9.01 H*    (0.52-1.04)  mg/dL


 


Glucose  232 H    (74-99)  mg/dL


 


POC Glucose (mg/dL)   259 H  129 H  (75-99)  mg/dL


 


Phosphorus  6.4 H    (2.5-4.5)  mg/dL














Assessment and Plan


(1) End stage renal disease


Current Visit: Yes   Status: Acute   Code(s): N18.6 - END STAGE RENAL DISEASE   

SNOMED Code(s): 88694281


   





(2) Diabetes


Current Visit: Yes   Status: Acute   Code(s): E11.9 - TYPE 2 DIABETES MELLITUS 

WITHOUT COMPLICATIONS   SNOMED Code(s): 40952980


   





(3) Generalized seizure


Current Visit: Yes   Status: Acute   Code(s): R56.9 - UNSPECIFIED CONVULSIONS   

SNOMED Code(s): 822825407


   


Plan: 





1. Seizures, generalized tonic clonic


2. Anemia


3. Diabetes





Patient appears to be well controlled with the addition of Vimpat. The EEG 

findings are consistent with reduced seizure threshold which is consistent with 

new pharmacological regimen and adding the second agent to assist with seizure 

coverage.





Based on the patient's imaging, testing and other pertinent medical information

, it does appear that the patient's seizures are now managed with the dose of 

Keppra and vimpat concurrently used in this patient. Vimpat current dose will 

be the routine dose for this patient at discharge and for home use. Keppra will 

also be continued. 





Status: Neurology will continue to follow until 1/25/18, if the patient remains 

seizure free and tolerating the medication, the patient will be cleared for 

discharge from a neurological standpoint. 





Any questions, contact our office.





Jonah Jane, MICP-C


Neurology 


For Dr. Tommie Nelson





I discussed the patient's pertinent medical information with Dr. Nelson. He 

agrees with the plan of care as implemented.

## 2018-01-25 VITALS
RESPIRATION RATE: 16 BRPM | TEMPERATURE: 98.3 F | DIASTOLIC BLOOD PRESSURE: 67 MMHG | SYSTOLIC BLOOD PRESSURE: 119 MMHG | HEART RATE: 77 BPM

## 2018-01-25 LAB
GLUCOSE BLD-MCNC: 168 MG/DL (ref 75–99)
GLUCOSE BLD-MCNC: 256 MG/DL (ref 75–99)

## 2018-01-25 RX ADMIN — LACOSAMIDE SCH MG: 50 TABLET, FILM COATED ORAL at 08:03

## 2018-01-25 RX ADMIN — METOPROLOL TARTRATE SCH MG: 25 TABLET, FILM COATED ORAL at 08:01

## 2018-01-25 RX ADMIN — FAMOTIDINE SCH MG: 20 TABLET, FILM COATED ORAL at 08:01

## 2018-01-25 RX ADMIN — CITALOPRAM HYDROBROMIDE SCH MG: 10 TABLET ORAL at 08:01

## 2018-01-25 RX ADMIN — INSULIN ASPART SCH UNIT: 100 INJECTION, SOLUTION INTRAVENOUS; SUBCUTANEOUS at 08:00

## 2018-01-25 RX ADMIN — HYDROCODONE BITARTRATE AND ACETAMINOPHEN PRN EACH: 5; 325 TABLET ORAL at 00:14

## 2018-01-25 RX ADMIN — INSULIN ASPART SCH UNIT: 100 INJECTION, SOLUTION INTRAVENOUS; SUBCUTANEOUS at 13:30

## 2018-01-25 RX ADMIN — INSULIN DETEMIR SCH UNIT: 100 INJECTION, SOLUTION SUBCUTANEOUS at 08:00

## 2018-01-25 RX ADMIN — PANTOPRAZOLE SODIUM SCH MG: 40 TABLET, DELAYED RELEASE ORAL at 08:01

## 2018-01-25 RX ADMIN — CLOPIDOGREL BISULFATE SCH MG: 75 TABLET ORAL at 08:00

## 2018-01-25 NOTE — P.PN
Subjective





Patient is seen in follow-up for end-stage renal disease.  She is maintained on 

hemodialysis on a Monday Wednesday Friday schedule via AV fistula.  Patient 

presented to the hospital after sustaining a fall and is noted to have a 

fracture of the left metacarpal.  She has a splint in place.  Patient also had 

a seizure prior to admission and is being followed by neurology.  No further 

seizure activity while in the hospital.  She was maintained on Keppra and 

Vimpat was added this admission.  Denies chest pain or shortness of breath.  

Oral intake is good.  No active complaints at this time.  Tolerated 

hemodialysis well yesterday.





Vital signs are stable.


General: The patient appeared well nourished and normally developed. 


HEENT: Head exam is unremarkable. Neck is without jugular venous distension.


LUNGS: Lungs are clear to auscultation and percussion. Breath sounds decreased.


HEART: Rate and Rhythm are regular. First and second heart sounds normal. No 

murmurs, rubs or gallops. 


ABDOMEN: Abdominal exam reveals normal bowel sounds. Non-tender and non-

distended. No evidence of peritonitis.


EXTREMITITES: No clubbing, cyanosis, or edema.





Objective





- Vital Signs


Vital signs: 


 Vital Signs











Temp  97.2 F L  01/25/18 07:00


 


Pulse  76   01/25/18 07:00


 


Resp  20   01/25/18 07:00


 


BP  140/72   01/25/18 07:00


 


Pulse Ox  97   01/25/18 07:00








 Intake & Output











 01/24/18 01/25/18 01/25/18





 18:59 06:59 18:59


 


Intake Total 500  240


 


Balance 500  240


 


Intake:   


 


  Oral 500  240


 


Other:   


 


  Voiding Method Incontinent Bedpan 


 


  # Voids 0 1 


 


  # Bowel Movements 0  














- Labs


CBC & Chem 7: 


 01/23/18 12:38





 01/24/18 09:37


Labs: 


 Abnormal Lab Results - Last 24 Hours (Table)











  01/24/18 01/24/18 01/24/18 Range/Units





  09:37 12:17 17:06 


 


Sodium  134 L    (137-145)  mmol/L


 


Chloride  91 L    ()  mmol/L


 


BUN  55 H    (7-17)  mg/dL


 


Creatinine  9.01 H*    (0.52-1.04)  mg/dL


 


Glucose  232 H    (74-99)  mg/dL


 


POC Glucose (mg/dL)   259 H  129 H  (75-99)  mg/dL


 


Phosphorus  6.4 H    (2.5-4.5)  mg/dL














  01/24/18 01/25/18 Range/Units





  20:26 07:49 


 


Sodium    (137-145)  mmol/L


 


Chloride    ()  mmol/L


 


BUN    (7-17)  mg/dL


 


Creatinine    (0.52-1.04)  mg/dL


 


Glucose    (74-99)  mg/dL


 


POC Glucose (mg/dL)  268 H  168 H  (75-99)  mg/dL


 


Phosphorus    (2.5-4.5)  mg/dL














Assessment and Plan


Plan: 





Assessment:


#1.  End-stage renal disease maintained on hemodialysis on a Monday Wednesday Friday schedule.


#2.  Seizures requiring multiple hospitalizations.  Patient states she's been 

compliant with her medications.  Neurology following.  Maintained on Keppra and 

Vimpat added this admission.


#3.  Status post fall with left wrist fracture.  Currently in a splint.  Noted 

to have left fourth metacarpal fracture.


#4.  Chronic kidney disease mineral bone disease maintained on Renvela.


#5.  Insulin-dependent diabetes mellitus.





Plan:


Hemodialysis tomorrow with goal 2 liters ultrafiltration.


Stable for discharge from nephrology standpoint.

## 2018-01-25 NOTE — P.PN
Subjective


Progress Note Date: 01/25/18


This is a 47-year-old female who was admitted for seizures.  Orthopedics is 

following the patient for fracture of the left hand.  Patient states her splint 

feels comfortable.  Patient states her pain has improved since yesterday.  

Patient denies any new complaints today.








Objective





- Vital Signs


Vital signs: 


 Vital Signs











Temp  97.2 F L  01/25/18 07:00


 


Pulse  76   01/25/18 07:00


 


Resp  20   01/25/18 07:00


 


BP  140/72   01/25/18 07:00


 


Pulse Ox  97   01/25/18 07:00








 Intake & Output











 01/24/18 01/25/18 01/25/18





 18:59 06:59 18:59


 


Intake Total 500  


 


Balance 500  


 


Intake:   


 


  Oral 500  


 


Other:   


 


  Voiding Method Incontinent Bedpan 


 


  # Voids 0 1 


 


  # Bowel Movements 0  














- Exam


Vital signs are stable.  Patient is in no acute distress and is alert and 

oriented 3.  Splint is clean, dry, and intact.  Sensation intact. 

Neurovascular status and circulatory status are intact.  








- Labs


CBC & Chem 7: 


 01/23/18 12:38





 01/24/18 09:37


Labs: 


 Abnormal Lab Results - Last 24 Hours (Table)











  01/24/18 01/24/18 01/24/18 Range/Units





  09:37 12:17 17:06 


 


Sodium  134 L    (137-145)  mmol/L


 


Chloride  91 L    ()  mmol/L


 


BUN  55 H    (7-17)  mg/dL


 


Creatinine  9.01 H*    (0.52-1.04)  mg/dL


 


Glucose  232 H    (74-99)  mg/dL


 


POC Glucose (mg/dL)   259 H  129 H  (75-99)  mg/dL


 


Phosphorus  6.4 H    (2.5-4.5)  mg/dL














  01/24/18 01/25/18 Range/Units





  20:26 07:49 


 


Sodium    (137-145)  mmol/L


 


Chloride    ()  mmol/L


 


BUN    (7-17)  mg/dL


 


Creatinine    (0.52-1.04)  mg/dL


 


Glucose    (74-99)  mg/dL


 


POC Glucose (mg/dL)  268 H  168 H  (75-99)  mg/dL


 


Phosphorus    (2.5-4.5)  mg/dL














Assessment and Plan


(1) Left hand pain


Current Visit: Yes   Status: Acute   Code(s): M79.642 - PAIN IN LEFT HAND   

SNOMED Code(s): 66412372


   





(2) Fracture of fourth metacarpal bone of left hand


Current Visit: Yes   Status: Acute   Code(s): S62.305A - UNSP FRACTURE OF 

FOURTH METACARPAL BONE, LEFT HAND, INIT   SNOMED Code(s): 063201412


   


Plan: 


#1.  Continue splint at all times.  X-rays of the left hand show nondisplaced 

fracture of the fourth metacarpal.  X-rays of left wrist are negative for any 

fracture dislocation.


#2. Rest, ice and elevate the left upper extremity.


#3.  Will continue to follow the patient closely.  Patient may follow up as an 

outpatient in 1 week.

## 2018-01-25 NOTE — P.DS
Providers


Date of admission: 


01/22/18 21:30





Expected date of discharge: 01/25/18


Attending physician: 


Saturnino Castillo





Consults: 





 





01/22/18 21:32


Consult Physician Routine 


   Consulting Provider: Tommie Nelson


   Consult Reason/Comments: Multiple seizures, postictal state


   Do you want consulting provider notified?: Yes





01/23/18 12:10


Consult Physician Urgent 


   Consulting Provider: Brennen Higginbotham


   Consult Reason/Comments: renal failure


   Do you want consulting provider notified?: Already Contacted





01/23/18 12:14


Consult Physician Urgent 


   Consulting Provider: Schuyler Garcia


   Consult Reason/Comments: left hand fx  xray at [facility]


   Do you want consulting provider notified?: Yes











Primary care physician: 


Saturnino Castillo





[facility] [place] [facility] Course: 





47-year-old female presented to the emergency room from [facility] for evaluation after recurrent seizures. Patient was evaluated by 

neurology and started on Vimpat the milligrams b.i.d. patient has stabilized. 

Patient was evaluated by nephrology and had one course of dialysis





Assessment


generally seizure postictal state


renal failure and stage with dialysis


bipolar


diabetes type II


hypertension


hypothyroidism


left hand fracture





Plan


follow up with neurology nephrology and orthopedics and family physician


Patient Condition at Discharge: Stable





Plan - Discharge Summary


New Discharge Prescriptions: 


New


   Lacosamide [Vimpat] 50 mg PO BID 30 Days #60 tablet


   Sevelamer [Renvela] 800 mg PO TID-W/MEALS  tab





Continue


   amLODIPine [Norvasc] 10 mg PO DAILY


   Metoprolol Tartrate 25 mg PO BID


   Omeprazole 20 mg PO BID


   Ergocalciferol (Vitamin D2) [Vitamin D2] 50,000 unit PO Q7D


   Clopidogrel [Plavix] 75 mg PO DAILY


   Citalopram Hydrobromide [CeleXA] 10 mg PO DAILY


   levETIRAcetam [Keppra] 1,000 mg PO BID


   Insulin Glargine [Lantus] 15 unit SQ DAILY


   INSULIN LISPRO (humaLOG) [humaLOG] See Protocol SQ ACHS


   Hydrocodone/Acetaminophen [Norco 5-325] 1 tab PO Q4H PRN


     PRN Reason: Pain


   Atorvastatin [Lipitor] 40 mg PO HS





Discontinued


   Ranitidine HCl 150 mg PO BID


Discharge Medication List





Citalopram Hydrobromide [CeleXA] 10 mg PO DAILY 07/23/17 [History]


Clopidogrel [Plavix] 75 mg PO DAILY 07/23/17 [History]


Ergocalciferol (Vitamin D2) [Vitamin D2] 50,000 unit PO Q7D 07/23/17 [History]


Metoprolol Tartrate 25 mg PO BID 07/23/17 [History]


Omeprazole 20 mg PO BID 07/23/17 [History]


amLODIPine [Norvasc] 10 mg PO DAILY 07/23/17 [History]


levETIRAcetam [Keppra] 1,000 mg PO BID 07/23/17 [History]


Atorvastatin [Lipitor] 40 mg PO HS 01/22/18 [History]


Hydrocodone/Acetaminophen [Norco 5-325] 1 tab PO Q4H PRN 01/22/18 [History]


INSULIN LISPRO (humaLOG) [humaLOG] See Protocol SQ ACHS 01/22/18 [History]


Insulin Glargine [Lantus] 15 unit SQ DAILY 01/22/18 [History]


Lacosamide [Vimpat] 50 mg PO BID 30 Days #60 tablet 01/25/18 [Rx]


Sevelamer [Renvela] 800 mg PO TID-W/MEALS  tab 01/25/18 [Rx]








Follow up Appointment(s)/Referral(s): 


Saturnino Castillo MD [Primary Care Provider] - 1-2 days


Tommie Nelson MD [STAFF PHYSICIAN] - 1 Week


Tigre Torres DO [Doctor of Osteopathic Medicine] - 1 Week


Patient Instructions/Handouts:  Recurrent Seizures in Adults (DC)


Activity/Diet/Wound Care/Special Instructions: 


Maintain splint to left upper extremity at all times. 


Rest, ice and elevate the left upper extremity.


Follow up with [facility] in one week. Any questions please call [phone].

## 2018-01-25 NOTE — P.PN
Subjective


Principal diagnosis: 


seizure, generalized tonic








Neurology is following a 47-year-old female for uncontrolled seizure. Patient 

has a history of seizure and is on Keppra 1000 mg, bid daily. Patient 

originally presented at [facility] but was transferred to [facility] 

for neurological care and workup. Patient was placed on a second AED, Vimpat 50 

mg, bid. This is going to be her daily dose if tolerated and no adverse effects 

are reported. Patient's EEG was found to be abnormal with occasional 

dysregulation, consistent with reduced seizure threshold. Patient and nursing 

staff report no new seizure activity and the patient is adjusting to the added 

medication without complication at this time. 





On contact, the patient was AOx3, resting in bed in no acute distress.   





Objective





- Vital Signs


Vital signs: 


 Vital Signs











Temp  98.3 F   01/25/18 15:00


 


Pulse  77   01/25/18 15:00


 


Resp  16   01/25/18 15:00


 


BP  119/67   01/25/18 15:00


 


Pulse Ox  98   01/25/18 15:00








 Intake & Output











 01/24/18 01/25/18 01/25/18





 18:59 06:59 18:59


 


Intake Total 500  440


 


Balance 500  440


 


Intake:   


 


  Oral 500  440


 


Other:   


 


  Voiding Method Incontinent Bedpan 


 


  # Voids 0 1 2


 


  # Bowel Movements 0  














- Exam





Constitutional: AOx3, cooperative


HEENT: NC/AT, no facial asymmetry is seen. Throat: Supple, no masses


Respiratory: No increased work of breathing


Cardiac: Regular rate and Rhythm 


GI: non tender, non distended


Musculoskeletal:  strengths are equal bilaterally 5/5, Lower extremity 

strengths are equal bilaterally at 5/5. 


Neurological: CN II-XII in tact, patient was AOx3, speech and language are 

normal, no unilateralizing weakness, no seizure activity note on physical exam. 

Sensation was normal.


Integementary: no rash, no erythema


Psychiatric: mood and affect appropriate








- Labs


CBC & Chem 7: 


 01/23/18 12:38





 01/24/18 09:37


Labs: 


 Abnormal Lab Results - Last 24 Hours (Table)











  01/24/18 01/25/18 01/25/18 Range/Units





  20:26 07:49 11:53 


 


POC Glucose (mg/dL)  268 H  168 H  256 H  (75-99)  mg/dL














Assessment and Plan


(1) End stage renal disease


Status: Acute   Code(s): N18.6 - END STAGE RENAL DISEASE   SNOMED Code(s): 

19865712


   





(2) Diabetes


Status: Acute   Code(s): E11.9 - TYPE 2 DIABETES MELLITUS WITHOUT COMPLICATIONS

   SNOMED Code(s): 73042150


   





(3) Generalized seizure


Status: Acute   Code(s): R56.9 - UNSPECIFIED CONVULSIONS   SNOMED Code(s): 

197211205


   


Plan: 





1. Seizures, generalized tonic clonic


2. Anemia


3. Diabetes





Patient appears to be well controlled with the addition of Vimpat. The EEG 

findings are consistent with reduced seizure threshold which is consistent with 

new pharmacological regimen and adding the second agent to assist with seizure 

coverage.





Based on the patient's imaging, testing and other pertinent medical information

, it does appear that the patient's seizures are now managed with the dose of 

Keppra and vimpat concurrently used in this patient. Vimpat current dose will 

be the routine dose for this patient at discharge and for home use. Keppra will 

also be continued. 





Status: Neurology will clear the patient for discharge from a neurological 

standpoint.





Any questions, contact our office.





Jonah Jane, FNP-C


Neurology 


For Dr. Tommie Nelson





I discussed the patient's pertinent medical information with Dr. Nelson. He 

agrees with the plan of care as implemented.

## 2018-01-25 NOTE — P.PN
Subjective


Principal diagnosis: 





Patient resting in bed without complaint patient had consultation with 

nephrology and neurology no further seizures.  Orthopedics plan to cast the 

left arm for metacarpal fracture





Objective





- Vital Signs


Vital signs: 


 Vital Signs











Temp  97.2 F L  01/25/18 07:00


 


Pulse  76   01/25/18 07:00


 


Resp  20   01/25/18 07:00


 


BP  140/72   01/25/18 07:00


 


Pulse Ox  97   01/25/18 07:00








 Intake & Output











 01/24/18 01/25/18 01/25/18





 18:59 06:59 18:59


 


Intake Total 500  240


 


Balance 500  240


 


Intake:   


 


  Oral 500  240


 


Other:   


 


  Voiding Method Incontinent Bedpan 


 


  # Voids 0 1 


 


  # Bowel Movements 0  














- Constitutional


General appearance: Present: obese





- EENT


Eyes: Present: PERRLA


Ears: bilateral: normal





- Neck


Neck: Present: normal ROM





- Respiratory


Respiratory: bilateral: CTA





- Cardiovascular


Rhythm: regular





- Integumentary


Integumentary: Present: normal





- Neurologic


Neurologic: Present: CNII-XII intact





- Musculoskeletal


Musculoskeletal: Present: generalized weakness





- Psychiatric


Psychiatric: Present: A&O x's 3, appropriate affect, intact judgment & insight





- Labs


CBC & Chem 7: 


 01/23/18 12:38





 01/24/18 09:37


Labs: 


 Abnormal Lab Results - Last 24 Hours (Table)











  01/24/18 01/24/18 01/24/18 Range/Units





  12:17 17:06 20:26 


 


POC Glucose (mg/dL)  259 H  129 H  268 H  (75-99)  mg/dL














  01/25/18 01/25/18 Range/Units





  07:49 11:53 


 


POC Glucose (mg/dL)  168 H  256 H  (75-99)  mg/dL














Assessment and Plan


Plan: 





Assessment generalized seizure postictal state


Renal failure end-stage with dialysis Monday Wednesday Friday


Bipolar


Diabetes type 2


Hypothyroidism


Fracture of left hand





Plan


We'll follow-up with a nephrology for dialysis


We'll follow up with neurology regarding seizures


We'll follow-up with orthopedics regarding hand fracture


1 cleared by neurology patient will be discharged home

## 2018-12-18 ENCOUNTER — HOSPITAL ENCOUNTER (OUTPATIENT)
Dept: HOSPITAL 47 - LABWHC1 | Age: 48
Discharge: HOME | End: 2018-12-18
Attending: PSYCHIATRY & NEUROLOGY
Payer: MEDICARE

## 2018-12-18 DIAGNOSIS — G40.909: Primary | ICD-10-CM

## 2018-12-18 PROCEDURE — 36415 COLL VENOUS BLD VENIPUNCTURE: CPT

## 2018-12-18 PROCEDURE — 80177 DRUG SCRN QUAN LEVETIRACETAM: CPT

## 2019-04-26 ENCOUNTER — HOSPITAL ENCOUNTER (OUTPATIENT)
Dept: HOSPITAL 47 - EC | Age: 49
Setting detail: OBSERVATION
LOS: 1 days | Discharge: OTHER INSTITUTION | End: 2019-04-27
Attending: INTERNAL MEDICINE | Admitting: INTERNAL MEDICINE
Payer: MEDICARE

## 2019-04-26 VITALS — BODY MASS INDEX: 32.9 KG/M2

## 2019-04-26 DIAGNOSIS — I12.0: ICD-10-CM

## 2019-04-26 DIAGNOSIS — N18.6: ICD-10-CM

## 2019-04-26 DIAGNOSIS — Z88.0: ICD-10-CM

## 2019-04-26 DIAGNOSIS — E11.22: ICD-10-CM

## 2019-04-26 DIAGNOSIS — G40.909: Primary | ICD-10-CM

## 2019-04-26 DIAGNOSIS — Z79.02: ICD-10-CM

## 2019-04-26 DIAGNOSIS — Z99.2: ICD-10-CM

## 2019-04-26 DIAGNOSIS — Z79.899: ICD-10-CM

## 2019-04-26 DIAGNOSIS — F31.9: ICD-10-CM

## 2019-04-26 DIAGNOSIS — E11.42: ICD-10-CM

## 2019-04-26 DIAGNOSIS — Z87.891: ICD-10-CM

## 2019-04-26 DIAGNOSIS — E03.9: ICD-10-CM

## 2019-04-26 DIAGNOSIS — E11.21: ICD-10-CM

## 2019-04-26 DIAGNOSIS — Z79.4: ICD-10-CM

## 2019-04-26 LAB
ALBUMIN SERPL-MCNC: 4.8 G/DL (ref 3.5–5)
ALP SERPL-CCNC: 115 U/L (ref 38–126)
ALT SERPL-CCNC: 22 U/L (ref 9–52)
ANION GAP SERPL CALC-SCNC: 19 MMOL/L
AST SERPL-CCNC: 22 U/L (ref 14–36)
BUN SERPL-SCNC: 13 MG/DL (ref 7–17)
CALCIUM SPEC-MCNC: 9.1 MG/DL (ref 8.4–10.2)
CHLORIDE SERPL-SCNC: 96 MMOL/L (ref 98–107)
CO2 SERPL-SCNC: 25 MMOL/L (ref 22–30)
GLUCOSE SERPL-MCNC: 173 MG/DL (ref 74–99)
POTASSIUM SERPL-SCNC: 3.4 MMOL/L (ref 3.5–5.1)
PROT SERPL-MCNC: 8.1 G/DL (ref 6.3–8.2)
SODIUM SERPL-SCNC: 140 MMOL/L (ref 137–145)

## 2019-04-26 PROCEDURE — 99285 EMERGENCY DEPT VISIT HI MDM: CPT

## 2019-04-26 PROCEDURE — 80053 COMPREHEN METABOLIC PANEL: CPT

## 2019-04-26 PROCEDURE — 96374 THER/PROPH/DIAG INJ IV PUSH: CPT

## 2019-04-26 PROCEDURE — 36415 COLL VENOUS BLD VENIPUNCTURE: CPT

## 2019-04-26 RX ADMIN — PANTOPRAZOLE SODIUM SCH: 40 TABLET, DELAYED RELEASE ORAL at 23:23

## 2019-04-26 RX ADMIN — FAMOTIDINE SCH: 20 TABLET, FILM COATED ORAL at 23:22

## 2019-04-26 RX ADMIN — METOPROLOL TARTRATE SCH: 25 TABLET, FILM COATED ORAL at 23:22

## 2019-04-26 RX ADMIN — LACOSAMIDE SCH MLS/HR: 10 INJECTION INTRAVENOUS at 23:51

## 2019-04-26 NOTE — ED
Seizure HPI





- General


Chief Complaint: Seizure


Stated Complaint: Seizure


Time Seen by Provider: 19 18:27


Source: family


Mode of arrival: ambulatory


Limitations: no limitations





- History of Present Illness


Initial Comments: 





Patient presented after having multiple seizures today.  Information is provided

by her son.  The patient has postictal state.  She does not answer questions or 

provide further details.





- Related Data


                                Home Medications











 Medication  Instructions  Recorded  Confirmed


 


Citalopram Hydrobromide [CeleXA] 10 mg PO DAILY 17


 


Clopidogrel [Plavix] 75 mg PO DAILY 17


 


Ergocalciferol (Vitamin D2) 50,000 unit PO Q7D 17





[Vitamin D2]   


 


Metoprolol Tartrate 25 mg PO BID 17


 


Omeprazole 20 mg PO BID 17


 


amLODIPine [Norvasc] 10 mg PO DAILY 17


 


Atorvastatin [Lipitor] 40 mg PO HS 18


 


INSULIN LISPRO (humaLOG) [humaLOG] See Protocol SQ ACHS 18


 


Insulin Glargine [Lantus] 15 unit SQ DAILY 18


 


Albuterol Sulfate [Proair Hfa] 2 puff INHALATION RT-Q4H PRN 19


 


Lacosamide [Vimpat] 100 mg PO AS DIRECTED 19


 


Lacosamide [Vimpat] 100 mg PO BID 19


 


Ranitidine HCl 150 mg PO BID 19


 


levETIRAcetam [Keppra] 500 mg PO AS DIRECTED 19


 


levETIRAcetam [Keppra] 500 mg PO Q12HR 19








                                  Previous Rx's











 Medication  Instructions  Recorded


 


Sevelamer [Renvela] 800 mg PO TID-W/MEALS  tab 18











                                    Allergies











Allergy/AdvReac Type Severity Reaction Status Date / Time


 


Penicillins Allergy  Itching Verified 19 19:05














Review of Systems


ROS Statement: 


Those systems with pertinent positive or pertinent negative responses have been 

documented in the HPI.





ROS Other: All systems not noted in ROS Statement are negative.





Past Medical History


Past Medical History: Chest Pain / Angina, Diabetes Mellitus, Hypertension, 

Renal Disease, Seizure Disorder, Thyroid Disorder


Additional Past Medical History / Comment(s): dialysis bipolar


History of Any Multi-Drug Resistant Organisms: None Reported


Past Surgical History:  Section, Tonsillectomy


Additional Past Surgical History / Comment(s): fistula


Past Psychological History: Bipolar


Smoking Status: Former smoker


Past Alcohol Use History: Occasional


Past Drug Use History: None Reported





General Exam


Limitations: altered mental status


General appearance: lethargic, obtunded


Head exam: Present: atraumatic


Eye exam: Present: normal appearance, EOMI


ENT exam: Present: normal exam


Neck exam: Present: normal inspection


Respiratory exam: Present: normal lung sounds bilaterally


Cardiovascular Exam: Present: regular rate, normal rhythm


GI/Abdominal exam: Present: soft.  Absent: tenderness


Extremities exam: Present: normal inspection.  Absent: tenderness


Back exam: Present: normal inspection


Neurological exam: Present: other (Altered mental status, prolonged postictal 

state)


Psychiatric exam: Present: normal affect


Skin exam: Present: warm, dry





Course





                                   Vital Signs











  19





  18:20 19:12


 


Temperature 97.9 F 


 


Pulse Rate 80 92


 


Respiratory 20 16





Rate  


 


Blood Pressure 175/87 207/113


 


O2 Sat by Pulse 99 96





Oximetry  














Medical Decision Making





- Medical Decision Making





Patient presents after a seizure.  I ordered her seizure medication.  She had 

recurrent seizures in the emerge department.  She has prolonged postictal.  She 

will be admitted to the hospital.





- Lab Data


Result diagrams: 


                                 19 18:56





                                   Lab Results











  19 Range/Units





  18:56 


 


Sodium  140  (137-145)  mmol/L


 


Potassium  3.4 L  (3.5-5.1)  mmol/L


 


Chloride  96 L  ()  mmol/L


 


Carbon Dioxide  25  (22-30)  mmol/L


 


Anion Gap  19  mmol/L


 


BUN  13  (7-17)  mg/dL


 


Creatinine  3.13 H  (0.52-1.04)  mg/dL


 


Est GFR (CKD-EPI)AfAm  19  (>60 ml/min/1.73 sqM)  


 


Est GFR (CKD-EPI)NonAf  17  (>60 ml/min/1.73 sqM)  


 


Glucose  173 H  (74-99)  mg/dL


 


Calcium  9.1  (8.4-10.2)  mg/dL


 


Total Bilirubin  0.7  (0.2-1.3)  mg/dL


 


AST  22  (14-36)  U/L


 


ALT  22  (9-52)  U/L


 


Alkaline Phosphatase  115  ()  U/L


 


Total Protein  8.1  (6.3-8.2)  g/dL


 


Albumin  4.8  (3.5-5.0)  g/dL














Disposition


Clinical Impression: 


 Epileptic seizure





Disposition: ADMITTED AS IP TO THIS HOSP


Condition: Fair


Instructions (If sedation given, give patient instructions):  Recurrent Seizures

 in Adults (ED)


Is patient prescribed a controlled substance at d/c from ED?: No


Referrals: 


Saturnino Castillo MD [Primary Care Provider] - 1-2 days

## 2019-04-27 VITALS — TEMPERATURE: 97.7 F | RESPIRATION RATE: 16 BRPM

## 2019-04-27 VITALS — HEART RATE: 60 BPM

## 2019-04-27 VITALS — SYSTOLIC BLOOD PRESSURE: 165 MMHG | DIASTOLIC BLOOD PRESSURE: 81 MMHG

## 2019-04-27 LAB
GLUCOSE BLD-MCNC: 116 MG/DL (ref 75–99)
GLUCOSE BLD-MCNC: 286 MG/DL (ref 75–99)
GLUCOSE BLD-MCNC: 345 MG/DL (ref 75–99)

## 2019-04-27 RX ADMIN — SEVELAMER CARBONATE SCH MG: 800 TABLET, FILM COATED ORAL at 13:38

## 2019-04-27 RX ADMIN — SEVELAMER CARBONATE SCH MG: 800 TABLET, FILM COATED ORAL at 08:57

## 2019-04-27 RX ADMIN — PANTOPRAZOLE SODIUM SCH MG: 40 TABLET, DELAYED RELEASE ORAL at 09:00

## 2019-04-27 RX ADMIN — METOPROLOL TARTRATE SCH MG: 25 TABLET, FILM COATED ORAL at 09:00

## 2019-04-27 RX ADMIN — LACOSAMIDE SCH MLS/HR: 10 INJECTION INTRAVENOUS at 09:43

## 2019-04-27 RX ADMIN — FAMOTIDINE SCH MG: 20 TABLET, FILM COATED ORAL at 08:56

## 2019-04-27 NOTE — P.HPIM
History of Present Illness


48-year-old female came in for multiple seizures witnessed by son.  Patient does

have history of seizures was seen recently at [facility] for similar 

complaints and it took a while for her seizures to be under control.  Patient is

presently on Vimpat and Keppra.  Patient had a vagal similar to that was placed 

recently for seizures.  In spite of which patient had multiple seizures with 

postictal confusion.  Patient had a witnesses seizure here in the hospital 

details of which are not clearly available at this time he appears to have 

postictal confusion after that patient when I evaluated is alert oriented 3 not

confused this seizure apparently happened at the 9:30 today.  Patient is end-

stage renal disease on hemodialysis denied any fever chills nausea vomiting 

dysuria no evidence of sepsis at this time.





Patient was admitted last night but unfortunately we do not have neurology 

service and had seizure management will require more expert neurological 

management because of which patient will be transferred to [facility].  

Discussed the with Q on transfer team who discussed her case with Dr. Morejon who

is agreeable for transfer and patient will be admitted there under my service





Review of Systems








REVIEW OF SYSTEMS: 


CONSTITUTIONAL: No fever, no malaise, no fatigue. 


HEENT: No recent visual problems or hearing problems. Denied any sore throat. 


CARDIOVASCULAR: No chest pain, orthopnea, PND, no palpitations, no syncope. 


PULMONARY: No shortness of breath, no cough, no hemoptysis. 


GASTROINTESTINAL: No diarrhea, no nausea, no vomiting, no abdominal pain. 


NEUROLOGICAL: No headaches, no weakness, no numbness. 


HEMATOLOGICAL: Denies any bleeding or petechiae. 


GENITOURINARY: Denies any burning micturition, frequency, or urgency. 


MUSCULOSKELETAL/RHEUMATOLOGICAL: Denies any joint pain, swelling, or any muscle 

pain. 


ENDOCRINE: Denies any polyuria or polydipsia. 





The rest of the 14-point review of systems is negative.











Past Medical History


Past Medical History: Chest Pain / Angina, Diabetes Mellitus, Hypertension, 

Renal Disease, Seizure Disorder, Thyroid Disorder


Additional Past Medical History / Comment(s): dialysis bipolar


History of Any Multi-Drug Resistant Organisms: None Reported


Past Surgical History:  Section, Tonsillectomy


Additional Past Surgical History / Comment(s): fistula


Past Psychological History: Bipolar


Smoking Status: Former smoker


Past Alcohol Use History: Occasional


Past Drug Use History: None Reported





Medications and Allergies


                                Home Medications











 Medication  Instructions  Recorded  Confirmed  Type


 


Citalopram Hydrobromide [CeleXA] 10 mg PO DAILY 17 History


 


Clopidogrel [Plavix] 75 mg PO DAILY 17 History


 


Ergocalciferol (Vitamin D2) 50,000 unit PO Q7D 17 History





[Vitamin D2]    


 


Metoprolol Tartrate 25 mg PO BID 17 History


 


Omeprazole 20 mg PO BID 17 History


 


amLODIPine [Norvasc] 10 mg PO DAILY 17 History


 


Atorvastatin [Lipitor] 40 mg PO HS 18 History


 


INSULIN LISPRO (humaLOG) [humaLOG] See Protocol SQ ACHS 18 

History


 


Insulin Glargine [Lantus] 15 unit SQ DAILY 18 History


 


Sevelamer [Renvela] 800 mg PO TID-W/MEALS  tab 18 Rx


 


Albuterol Sulfate [Proair Hfa] 2 puff INHALATION RT-Q4H PRN 19 

History


 


Lacosamide [Vimpat] 100 mg PO AS DIRECTED 19 History


 


Lacosamide [Vimpat] 100 mg PO BID 19 History


 


Ranitidine HCl 150 mg PO BID 19 History


 


levETIRAcetam [Keppra] 500 mg PO AS DIRECTED 19 History


 


levETIRAcetam [Keppra] 500 mg PO Q12HR 19 History








                                    Allergies











Allergy/AdvReac Type Severity Reaction Status Date / Time


 


Penicillins Allergy  Itching Verified 19 19:05














Physical Exam


Vitals: 


                                   Vital Signs











  Temp Pulse Pulse Resp BP BP Pulse Ox


 


 19 07:00  97.7 F   87  16   167/106  98


 


 19 22:53  98.1 F   85  20   161/81  97


 


 19 21:44  98.2 F  87   16  176/99   99


 


 19 19:12   92   16  207/113   96


 


 19 18:20  97.9 F  80   20  175/87   99








                                Intake and Output











 19





 22:59 06:59 14:59


 


Other:   


 


  # Voids  1 3


 


  # Bowel Movements   0


 


  Weight 81.647 kg  




















PHYSICAL EXAMINATION: 





GENERAL: The patient is alert and oriented x3, not in any acute distress. Well 

developed, well nourished. 


HEENT: Pupils are round and equally reacting to light. EOMI. No scleral icterus.

 No conjunctival pallor. Normocephalic, atraumatic. No pharyngeal erythema. No 

thyromegaly. 


CARDIOVASCULAR: S1 and S2 present. No murmurs, rubs, or gallops. 


PULMONARY: Chest is clear to auscultation, no wheezing or crackles. 


ABDOMEN: Soft, nontender, nondistended, normoactive bowel sounds. No palpable 

organomegaly. 


MUSCULOSKELETAL: No joint swelling or deformity.


EXTREMITIES: No cyanosis, clubbing, or pedal edema. 


NEUROLOGICAL: Gross neurological examination did not reveal any focal deficits. 


SKIN: No rashes. 














Results


CBC & Chem 7: 


                                 19 18:56


Labs: 


                  Abnormal Lab Results - Last 24 Hours (Table)











  19 Range/Units





  18:56 07:02 09:45 


 


Potassium  3.4 L    (3.5-5.1)  mmol/L


 


Chloride  96 L    ()  mmol/L


 


Creatinine  3.13 H    (0.52-1.04)  mg/dL


 


Glucose  173 H    (74-99)  mg/dL


 


POC Glucose (mg/dL)   116 H  286 H  (75-99)  mg/dL














  19 Range/Units





  11:33 


 


Potassium   (3.5-5.1)  mmol/L


 


Chloride   ()  mmol/L


 


Creatinine   (0.52-1.04)  mg/dL


 


Glucose   (74-99)  mg/dL


 


POC Glucose (mg/dL)  345 H  (75-99)  mg/dL














Thrombosis Risk Factor Assmnt





- Choose All That Apply


Any of the Below Risk Factors Present?: Yes


Each Factor Represents 1 point: Age 41-60 years


Other Risk Factors: No


Other congenital or acquired thrombophilia - If yes, enter type in comment: No


Thrombosis Risk Factor Assessment Total Risk Factor Score: 1


Thrombosis Risk Factor Assessment Level: Low Risk





Assessment and Plan


Plan: 


-Breakthrough uncontrolled seizures: Patient will be continued on present 

medication patient may need the levels of her antiseizure medications which will

 be obtained at [facility].  Patient will be a on a telemetry bed.  I 

do not have a CAT scan here.  Patient has a known seizures history was recently 

treated for this.


-End-stage renal disease, dialysis dependent


-Diabetes mellitus 2


-Hypothyroidism


-Hypertension


-Diabetic peripheral neuropathy and nephropathy





Patient will be transferred to [facility] for neurological evaluation

## 2019-04-27 NOTE — P.DS
Providers


Date of admission: 


04/26/19 20:42





Attending physician: 


Shobha Santos





Primary care physician: 


Saturnino Castillo





[facility] Course: 





Please refer to my HPI for further details


Patient Condition at Discharge: Fair





Plan - Discharge Summary


Discharge Rx Participant: Yes


New Discharge Prescriptions: 


No Action


   amLODIPine [Norvasc] 10 mg PO DAILY


   Metoprolol Tartrate 25 mg PO BID


   Omeprazole 20 mg PO BID


   Ergocalciferol (Vitamin D2) [Vitamin D2] 50,000 unit PO Q7D


   Clopidogrel [Plavix] 75 mg PO DAILY


   Citalopram Hydrobromide [CeleXA] 10 mg PO DAILY


   Insulin Glargine [Lantus] 15 unit SQ DAILY


   INSULIN LISPRO (humaLOG) [humaLOG] See Protocol SQ ACHS


   Atorvastatin [Lipitor] 40 mg PO HS


   Sevelamer [Renvela] 800 mg PO TID-W/MEALS  tab


   Lacosamide [Vimpat] 100 mg PO BID


   Ranitidine HCl 150 mg PO BID


   Albuterol Sulfate [Proair Hfa] 2 puff INHALATION RT-Q4H PRN


     PRN Reason: Shortness Of Breath


   levETIRAcetam [Keppra] 500 mg PO Q12HR


   levETIRAcetam [Keppra] 500 mg PO AS DIRECTED


   Lacosamide [Vimpat] 100 mg PO AS DIRECTED


Discharge Medication List





Citalopram Hydrobromide [CeleXA] 10 mg PO DAILY 07/23/17 [History]


Clopidogrel [Plavix] 75 mg PO DAILY 07/23/17 [History]


Ergocalciferol (Vitamin D2) [Vitamin D2] 50,000 unit PO Q7D 07/23/17 [History]


Metoprolol Tartrate 25 mg PO BID 07/23/17 [History]


Omeprazole 20 mg PO BID 07/23/17 [History]


amLODIPine [Norvasc] 10 mg PO DAILY 07/23/17 [History]


Atorvastatin [Lipitor] 40 mg PO HS 01/22/18 [History]


INSULIN LISPRO (humaLOG) [humaLOG] See Protocol SQ ACHS 01/22/18 [History]


Insulin Glargine [Lantus] 15 unit SQ DAILY 01/22/18 [History]


Sevelamer [Renvela] 800 mg PO TID-W/MEALS  tab 01/25/18 [Rx]


Albuterol Sulfate [Proair Hfa] 2 puff INHALATION RT-Q4H PRN 04/26/19 [History]


Lacosamide [Vimpat] 100 mg PO AS DIRECTED 04/26/19 [History]


Lacosamide [Vimpat] 100 mg PO BID 04/26/19 [History]


Ranitidine HCl 150 mg PO BID 04/26/19 [History]


levETIRAcetam [Keppra] 500 mg PO AS DIRECTED 04/26/19 [History]


levETIRAcetam [Keppra] 500 mg PO Q12HR 04/26/19 [History]








Follow up Appointment(s)/Referral(s): 


Saturnino Castillo MD [Primary Care Provider] - 1-2 days


Patient Instructions/Handouts:  Recurrent Seizures in Adults (ED)

## 2019-08-06 ENCOUNTER — HOSPITAL ENCOUNTER (INPATIENT)
Dept: HOSPITAL 47 - 4SSUR | Age: 49
LOS: 2 days | Discharge: HOME | DRG: 100 | End: 2019-08-08
Payer: MEDICARE

## 2019-08-06 DIAGNOSIS — E11.22: ICD-10-CM

## 2019-08-06 DIAGNOSIS — F31.9: ICD-10-CM

## 2019-08-06 DIAGNOSIS — M89.8X9: ICD-10-CM

## 2019-08-06 DIAGNOSIS — Z88.0: ICD-10-CM

## 2019-08-06 DIAGNOSIS — Z99.2: ICD-10-CM

## 2019-08-06 DIAGNOSIS — N18.6: ICD-10-CM

## 2019-08-06 DIAGNOSIS — Z87.891: ICD-10-CM

## 2019-08-06 DIAGNOSIS — Z79.899: ICD-10-CM

## 2019-08-06 DIAGNOSIS — E03.9: ICD-10-CM

## 2019-08-06 DIAGNOSIS — Z79.02: ICD-10-CM

## 2019-08-06 DIAGNOSIS — Z79.4: ICD-10-CM

## 2019-08-06 DIAGNOSIS — Z99.3: ICD-10-CM

## 2019-08-06 DIAGNOSIS — I12.0: ICD-10-CM

## 2019-08-06 DIAGNOSIS — Z98.2: ICD-10-CM

## 2019-08-06 DIAGNOSIS — E78.5: ICD-10-CM

## 2019-08-06 DIAGNOSIS — I69.354: ICD-10-CM

## 2019-08-06 DIAGNOSIS — G40.909: Primary | ICD-10-CM

## 2019-08-06 LAB
GLUCOSE BLD-MCNC: 244 MG/DL (ref 75–99)
GLUCOSE BLD-MCNC: 288 MG/DL (ref 75–99)
GLUCOSE BLD-MCNC: 373 MG/DL (ref 75–99)

## 2019-08-06 PROCEDURE — 90935 HEMODIALYSIS ONE EVALUATION: CPT

## 2019-08-06 PROCEDURE — 85025 COMPLETE CBC W/AUTO DIFF WBC: CPT

## 2019-08-06 PROCEDURE — 95816 EEG AWAKE AND DROWSY: CPT

## 2019-08-06 PROCEDURE — 80048 BASIC METABOLIC PNL TOTAL CA: CPT

## 2019-08-06 PROCEDURE — 5A1D70Z PERFORMANCE OF URINARY FILTRATION, INTERMITTENT, LESS THAN 6 HOURS PER DAY: ICD-10-PCS

## 2019-08-06 PROCEDURE — 94640 AIRWAY INHALATION TREATMENT: CPT

## 2019-08-06 RX ADMIN — SEVELAMER CARBONATE SCH MG: 800 TABLET, FILM COATED ORAL at 16:43

## 2019-08-06 RX ADMIN — METOPROLOL TARTRATE SCH MG: 25 TABLET, FILM COATED ORAL at 22:38

## 2019-08-06 RX ADMIN — LACOSAMIDE SCH MG: 50 TABLET, FILM COATED ORAL at 22:38

## 2019-08-06 RX ADMIN — INSULIN ASPART SCH UNIT: 100 INJECTION, SOLUTION INTRAVENOUS; SUBCUTANEOUS at 16:43

## 2019-08-06 RX ADMIN — ATORVASTATIN CALCIUM SCH MG: 40 TABLET, FILM COATED ORAL at 22:35

## 2019-08-06 RX ADMIN — INSULIN ASPART SCH UNIT: 100 INJECTION, SOLUTION INTRAVENOUS; SUBCUTANEOUS at 22:35

## 2019-08-06 RX ADMIN — HEPARIN SODIUM SCH UNIT: 5000 INJECTION, SOLUTION INTRAVENOUS; SUBCUTANEOUS at 22:35

## 2019-08-06 NOTE — P.NPCON
History of Present Illness





- Reason for Consult


end stage renal disease





- History of Present Illness





Reason for consultation: End-stage renal disease





History of present illness:


Patient is a 49-year-old female seen in renal consultation for end-stage renal 

disease.  She is maintained on hemodialysis on a  

schedule.  Patient has a history of seizures and has required multiple 

hospitalizations for the same.  Agents that she had another seizure yesterday 

and went to [facility].  Due to no neurology service, she was 

transferred here.  She is currently awake and alert.  Her mentation is currently

at baseline.  She denies any chest shortness of breath.  No vomiting or 

diarrhea.  No abdominal pain.  No syncopal episodes.  She did complete 

hemodialysis yesterday.





Vital signs are stable.


General: The patient appeared well nourished and normally developed. 


HEENT: Head exam is unremarkable. Neck is without jugular venous distension.


LUNGS: Lungs are clear to auscultation and percussion. Breath sounds decreased.


HEART: Rate and Rhythm are regular. First and second heart sounds normal. No 

murmurs, rubs or gallops. 


ABDOMEN: Abdominal exam reveals normal bowel sounds. Non-tender and non-

distended. No evidence of peritonitis.


EXTREMITITES: No clubbing, cyanosis, or edema.





Past Medical History


Past Medical History: Chest Pain / Angina, Diabetes Mellitus, Hypertension, 

Renal Disease, Seizure Disorder, Thyroid Disorder


Additional Past Medical History / Comment(s): dialysis bipolar


History of Any Multi-Drug Resistant Organisms: None Reported


Past Surgical History:  Section, Tonsillectomy


Additional Past Surgical History / Comment(s): fistula


Past Psychological History: Bipolar


Smoking Status: Former smoker


Past Alcohol Use History: Occasional


Past Drug Use History: None Reported





Medications and Allergies


                                Home Medications











 Medication  Instructions  Recorded  Confirmed  Type


 


Citalopram Hydrobromide [CeleXA] 10 mg PO DAILY 17 History


 


Clopidogrel [Plavix] 75 mg PO DAILY 17 History


 


Ergocalciferol (Vitamin D2) 50,000 unit PO Q7D 17 History





[Vitamin D2]    


 


Metoprolol Tartrate 25 mg PO BID 17 History


 


Omeprazole 20 mg PO BID 17 History


 


amLODIPine [Norvasc] 10 mg PO DAILY 17 History


 


Atorvastatin [Lipitor] 40 mg PO HS 18 History


 


INSULIN LISPRO (humaLOG) [humaLOG] See Protocol SQ ACHS 18 

History


 


Insulin Glargine [Lantus] 15 unit SQ DAILY 18 History


 


Sevelamer [Renvela] 800 mg PO TID-W/MEALS  tab 18 Rx


 


Albuterol Sulfate [Proair Hfa] 2 puff INHALATION RT-Q4H PRN 19 

History


 


Lacosamide [Vimpat] 100 mg PO AS DIRECTED 19 History


 


Lacosamide [Vimpat] 100 mg PO BID 19 History


 


Ranitidine HCl 150 mg PO BID 19 History


 


levETIRAcetam [Keppra] 500 mg PO AS DIRECTED 19 History


 


levETIRAcetam [Keppra] 500 mg PO Q12HR 19 History








                                    Allergies











Allergy/AdvReac Type Severity Reaction Status Date / Time


 


Penicillins Allergy  Itching Verified 19 19:05














Assessment and Plan


Plan: 





Assessment:


1.  End-stage renal disease maintained on hemodialysis on a  schedule.


2.  Seizure disorder.


3.  Chronic kidney disease mineral bone disease.


4.  Insulin-dependent diabetes mellitus.


5.  Hyperlipidemia.


6.  Hypertension with chronic kidney disease.





Plan:


Hemodialysis tomorrow.


Home medications including antihypertensives and phosphate binders to be 

resumed.


Neurology consultation pending.





Thank you for the consultation.  I will continue to follow the patient with you 

during her hospital stay.

## 2019-08-07 LAB
ANION GAP SERPL CALC-SCNC: 14 MMOL/L
BASOPHILS # BLD AUTO: 0.1 K/UL (ref 0–0.2)
BASOPHILS NFR BLD AUTO: 1 %
BUN SERPL-SCNC: 24 MG/DL (ref 7–17)
CALCIUM SPEC-MCNC: 8.7 MG/DL (ref 8.4–10.2)
CHLORIDE SERPL-SCNC: 97 MMOL/L (ref 98–107)
CO2 SERPL-SCNC: 24 MMOL/L (ref 22–30)
EOSINOPHIL # BLD AUTO: 0.3 K/UL (ref 0–0.7)
EOSINOPHIL NFR BLD AUTO: 3 %
ERYTHROCYTE [DISTWIDTH] IN BLOOD BY AUTOMATED COUNT: 4.21 M/UL (ref 3.8–5.4)
ERYTHROCYTE [DISTWIDTH] IN BLOOD: 14.1 % (ref 11.5–15.5)
GLUCOSE BLD-MCNC: 145 MG/DL (ref 75–99)
GLUCOSE BLD-MCNC: 156 MG/DL (ref 75–99)
GLUCOSE BLD-MCNC: 171 MG/DL (ref 75–99)
GLUCOSE BLD-MCNC: 198 MG/DL (ref 75–99)
GLUCOSE BLD-MCNC: 249 MG/DL (ref 75–99)
GLUCOSE SERPL-MCNC: 192 MG/DL (ref 74–99)
HCT VFR BLD AUTO: 35.8 % (ref 34–46)
HGB BLD-MCNC: 12.2 GM/DL (ref 11.4–16)
LYMPHOCYTES # SPEC AUTO: 2 K/UL (ref 1–4.8)
LYMPHOCYTES NFR SPEC AUTO: 26 %
MCH RBC QN AUTO: 29 PG (ref 25–35)
MCHC RBC AUTO-ENTMCNC: 34.1 G/DL (ref 31–37)
MCV RBC AUTO: 85.1 FL (ref 80–100)
MONOCYTES # BLD AUTO: 0.4 K/UL (ref 0–1)
MONOCYTES NFR BLD AUTO: 6 %
NEUTROPHILS # BLD AUTO: 4.9 K/UL (ref 1.3–7.7)
NEUTROPHILS NFR BLD AUTO: 64 %
PLATELET # BLD AUTO: 256 K/UL (ref 150–450)
POTASSIUM SERPL-SCNC: 3.9 MMOL/L (ref 3.5–5.1)
SODIUM SERPL-SCNC: 135 MMOL/L (ref 137–145)
WBC # BLD AUTO: 7.6 K/UL (ref 3.8–10.6)

## 2019-08-07 RX ADMIN — INSULIN ASPART SCH UNIT: 100 INJECTION, SOLUTION INTRAVENOUS; SUBCUTANEOUS at 20:38

## 2019-08-07 RX ADMIN — SEVELAMER CARBONATE SCH MG: 800 TABLET, FILM COATED ORAL at 07:19

## 2019-08-07 RX ADMIN — INSULIN ASPART SCH UNIT: 100 INJECTION, SOLUTION INTRAVENOUS; SUBCUTANEOUS at 07:15

## 2019-08-07 RX ADMIN — LACOSAMIDE SCH MG: 50 TABLET, FILM COATED ORAL at 17:23

## 2019-08-07 RX ADMIN — ATORVASTATIN CALCIUM SCH MG: 40 TABLET, FILM COATED ORAL at 20:37

## 2019-08-07 RX ADMIN — CITALOPRAM HYDROBROMIDE SCH MG: 10 TABLET ORAL at 07:18

## 2019-08-07 RX ADMIN — SEVELAMER CARBONATE SCH MG: 800 TABLET, FILM COATED ORAL at 17:23

## 2019-08-07 RX ADMIN — FAMOTIDINE SCH MG: 10 INJECTION, SOLUTION INTRAVENOUS at 20:37

## 2019-08-07 RX ADMIN — SEVELAMER CARBONATE SCH MG: 800 TABLET, FILM COATED ORAL at 13:00

## 2019-08-07 RX ADMIN — HEPARIN SODIUM SCH UNIT: 5000 INJECTION, SOLUTION INTRAVENOUS; SUBCUTANEOUS at 07:18

## 2019-08-07 RX ADMIN — LACOSAMIDE SCH MG: 50 TABLET, FILM COATED ORAL at 07:18

## 2019-08-07 RX ADMIN — INSULIN ASPART SCH UNIT: 100 INJECTION, SOLUTION INTRAVENOUS; SUBCUTANEOUS at 17:23

## 2019-08-07 RX ADMIN — PANTOPRAZOLE SODIUM SCH MG: 40 TABLET, DELAYED RELEASE ORAL at 07:18

## 2019-08-07 RX ADMIN — HEPARIN SODIUM SCH UNIT: 5000 INJECTION, SOLUTION INTRAVENOUS; SUBCUTANEOUS at 20:37

## 2019-08-07 RX ADMIN — INSULIN ASPART SCH UNIT: 100 INJECTION, SOLUTION INTRAVENOUS; SUBCUTANEOUS at 13:00

## 2019-08-07 RX ADMIN — CLOPIDOGREL BISULFATE SCH MG: 75 TABLET ORAL at 07:19

## 2019-08-07 RX ADMIN — METOPROLOL TARTRATE SCH MG: 25 TABLET, FILM COATED ORAL at 20:37

## 2019-08-07 RX ADMIN — INSULIN DETEMIR SCH: 100 INJECTION, SOLUTION SUBCUTANEOUS at 07:17

## 2019-08-07 RX ADMIN — METOPROLOL TARTRATE SCH MG: 25 TABLET, FILM COATED ORAL at 07:27

## 2019-08-07 NOTE — P.CNNES
History of Present Illness


Consult date: 19


Reason for Consult: breakthrough seizure


Chief complaint: breakthrough seizure


History of Present Illness: 





REFERRING PHYSICIAN: 


Dr. Alfredo Shepherd





HISTORY OF PRESENT ILLNESS: 


Thank you for allowing me to evaluate Ms. Norma Thompson.


Ms. Norma Thompson is a 49-year-old woman with past medical history of epilepsy s/p

VNS (placed early this year), diabetes, hypertension, end-stage renal disease, 

hypothyroidism, bipolar disorder, presenting with breakthrough seizures.  No 

family is at bedside at this time.  Patient is a poor historian as patient also 

has a history of poor memory.  She states that she had a seizure when she was in

the clinic yesterday.  Patient reports urinary incontinence and tongue biting 

during seizures.  Denies any bowel incontinence.  Patient states that she has 

seizures since she was a child.  Her mother kept the etiology of seizures hidden

from the patient.  Of note patient is adopted and does not know much of family 

or birth history.  Patient states that she saw her primary neurologist just 

about a week ago, and dose for one of her medications was increased from 750 mg 

twice a day to 1000 mg twice a day.  Patient has had any further seizures since 

admission.  Patient underwent her dialysis this morning.  Patient states that 

she actually had a stroke that caused weakness and numbness and tingling in her 

left upper and lower extremities about a month ago.  Patient was admitted at 

[facility].  Otherwise patient denies any recent sickness, 

fever, nausea, vomiting, worsening weakness, numbness or tingling.  Also denies 

diarrhea or constipation.





Spoke to her , Steve. States that her neurologist did not change her 

medication dose but changed the setting of VNS. 





PAST MEDICAL HISTORY:


epilepsy, diabetes, hypertension, end-stage renal disease, hypothyroidism, 

bipolar disorder





PAST SURGICAL HISTORY:


Tonsillectomy, C-sections





HOME MEDICATIONS: 


Amlodipine 10 daily, metoprolol 25 mg twice a day omeprazole 20 mg twice a day 

vitamin D 2 Plavix 75 mg daily citalopram 10 mg daily Lantus 15 units daily 

atorvastatin 40 mg daily at bedtime Sevelamer Keppra 500 mg (F 9am/4pm), 

vimpat 100mg ([facility] 9am/4pm)





ALLERGIES:


Penicillins





SOCIAL HISTORY:


Per previous record, patient is a former smoker.  Rarely drinks alcohol.  Denies

any drug use





FAMILY HISTORY:


Patient status she and her brother were adopted





REVIEW OF SYSTEMS: 


The 14 systems are reviewed and no additional points are identified compared to 

the review of systems documented history and physical





PHYSICAL EXAMINATION:


VITAL SIGNS: Temperature 90.9 pulse rate 79 respiratory rate 18 blood pressure 

115/65 O2 saturation 97% on nasal cannula 2 L


GEN.: NAD, pleasant and cooperative, patient with difficulty with memory.


HEENT: NCAT, sclera icterus


NECK: Supple


SKIN AND EXTREMITIES: Warm to touch, no edema





NEURO:


MENTAL STATUS: Patient alert and oriented to self, place, time.  Unable to name 

the current president.  Speech fluent, able to name and repeat, following all 

commands with time.





CRANIAL NERVES II THROUGH XII: II: Pupils are equal and reactive to light 

symmetrically.  No afferent pupillary defect.  Visual fields are intact. III, 

IV, VI:  No ptosis.  Extraocular movements full.  No nystagmus. V: Facial 

sensation intact from V1-3. VII. No clear facial asymmetry. VIII: Hearing intact

to finger rub bilaterally.  IX, X: Symmetric palate elevation. XII: Shoulder 

shrug intact. XII: Tongue midline without fasciculation or atrophy.  





MOTOR: Increased tone in the left upper and left lower extremities.  More in the

left lower extremity.  5/5 strength in right upper and lower extremities.  4/5 

in left upper extremity and 4-/5 in left lower extremity. 





SENSORY: Intact to light touch, temperature, pinprick in all 4 extremities.  

Romberg is negative.





REFLEXES: 2+ in the right brachii, triceps, patella..  Brisk in left brachii 

patella.  Toes are downgoing.  Clonus is present with small section of left 

ankle.  Adonis's is absent





COORDINATION:  Finger to nose intact.  Difficulty with heel-to-shin due to 

increased tone in the left lower extremity.  No dysmetria.





GAIT: Deferred as patient just underwent dialysis.





DIAGNOSTIC TESTING:





LABORATORY:


WBC 7.6 hemoglobin 12.2 platelets 256 sodium 135 potassium 3.9 chloride 97 BUN 

24 creatinine 4.21 glucose 192





IMAGING:


EEG 2018:


This awake EEG is abnormal due to presence of dysregulation.  This is consistent

with a reduced seizure threshold.





ASSESSMENT:


Ms. Norma Thompson is a 49-year-old woman with past medical history of epilepsy s/p

VNS, diabetes, hypertension, end-stage renal disease, hypothyroidism, bipolar 

disorder, presenting with breakthrough seizures.  Unclear etiology of seizures 

at this time.  No imaging available at this time with us at [facility]. 

Patient had a recent stroke for which patient is on Plavix and atorvastatin.  

Patient's seizure threshold can decrease if patient had a stroke involving the 

cortex.  However it appears that patient has had multiple recurrent seizures 

even before her recent stroke episode.  Patient is also on dialysis.  I will 

continue patient's home dose of antiepileptics at this time.  On EMR, patient is

on Keppra 500 mg  twice a day and Vimpat 100 mg  twice a day.  Patient reported that there was a change in her 

seizure medication.  Called  Steve Thompson at [phone]. Keppra 750mg MWF

TID TThursSatSunday BID. Vimpat 100mg MWF TID TThursSatSunday BID. Pt has 

breakthrough seizures at baseline, dependent on medication compliance as she may

sometimes forget to take it but  helps the patient.





RECOMMENDATIONS:


1. Continue her home meds. Keppra 750mg MWF TID TThursSatSunday BID. Vimpat 

100mg MWF TID TThursSatSunday BID.


2. Routine EEG obtained this morning. Do not need to wait for EEG results before

discharging patient


3. Patient needs to follow up with her primary neurologist within 2-3 weeks of 

discharge. 


4. Neurology will sign off at this time. Please feel free to contact Neurology 

again if with additional questions or concerns.





Past Medical History


Past Medical History: Chest Pain / Angina, Diabetes Mellitus, Hypertension, 

Renal Disease, Seizure Disorder, Thyroid Disorder


Additional Past Medical History / Comment(s): dialysis bipolar


History of Any Multi-Drug Resistant Organisms: None Reported


Past Surgical History:  Section, Tonsillectomy


Additional Past Surgical History / Comment(s): fistula


Past Anesthesia/Blood Transfusion Reactions: No Reported Reaction


Past Psychological History: Bipolar


Smoking Status: Former smoker


Past Alcohol Use History: Occasional


Past Drug Use History: None Reported





Medications and Allergies


                                Home Medications











 Medication  Instructions  Recorded  Confirmed  Type


 


Citalopram Hydrobromide [CeleXA] 10 mg PO DAILY 17 History


 


Clopidogrel [Plavix] 75 mg PO DAILY 17 History


 


Ergocalciferol (Vitamin D2) 50,000 unit PO CARRERA 17 History





[Vitamin D2]    


 


Metoprolol Tartrate 25 mg PO BID 17 History


 


Omeprazole 20 mg PO BID 17 History


 


amLODIPine [Norvasc] 10 mg PO DAILY 17 History


 


Atorvastatin [Lipitor] 40 mg PO HS 18 History


 


INSULIN LISPRO (humaLOG) [humaLOG] See Protocol SQ ACHS 18 

History


 


Insulin Glargine [Lantus] 15 unit SQ DAILY 18 History


 


Sevelamer [Renvela] 800 mg PO TID-W/MEALS  tab 18 Rx


 


Albuterol Sulfate [Proair Hfa] 2 puff INHALATION RT-Q4H PRN 19 

History


 


Lacosamide [Vimpat] 100 mg PO AS DIRECTED 19 History


 


Ranitidine HCl 150 mg PO BID 19 History


 


levETIRAcetam [Keppra] 500 mg PO AS DIRECTED 19 History








                                    Allergies











Allergy/AdvReac Type Severity Reaction Status Date / Time


 


Penicillins Allergy  Itching Verified 19 12:30














Physical Examination





- Vital Signs


Vital Signs: 


                                   Vital Signs











  Temp Pulse Pulse Resp BP BP Pulse Ox


 


 19 07:28       184/87 


 


 19 06:57  98.0 F  99   16  197/102   99


 


 19 23:00  96.8 F L   78  16  168/84   98


 


 19 20:34  98.3 F   79  16  169/89   100


 


 19 17:41     16   


 


 19 15:00  98.5 F   84  16  178/94   100


 


 19 11:20  98.4 F   87  16  171/73   99








                                Intake and Output











 19





 22:59 06:59 14:59


 


Intake Total 150 590 222


 


Balance 150 590 222


 


Intake:   


 


  Oral 150 590 222


 


Other:   


 


  Voiding Method Toilet  


 


  # Voids 1  














Results





- Laboratory Findings


CBC and BMP: 


                                 19 12:12





                                 19 12:12


Abnormal Lab Findings: 


                                  Abnormal Labs











  19





  11:32 15:52 21:15


 


POC Glucose (mg/dL)  373 H  288 H  244 H














  19





  03:57 06:37


 


POC Glucose (mg/dL)  156 H  198 H

## 2019-08-07 NOTE — EEG
ELECTROENCEPHALOGRAM REPORT



PROCEDURE DATE:

August 7, 2019.



ELECTROENCEPHALOGRAM (EEG) REPORT:



TECHNIQUE:

A routine 18 channel EEG was performed with video using the 10/20 international

placement system.



HISTORY:

The patient transferred from [facility] after having a had a 40
second

seizure during dialysis.



PATIENT'S MEDICAL HISTORY:

Includes end-stage renal disease, seizures.



CURRENT MEDICATIONS:

Protonix, metoprolol, Ativan, Keppra, Vimpat, Renvela.



STUDY DURATION:

25 minutes.



FINDINGS:



BACKGROUND:

The background activity consisted of unsustained 7-8 hertz rhythmic waveforms 
with

intermixed delta range slowing.



ACTIVATION:

Hyperventilation:  Not performed.

Photic stimulation:  No driving seen.

Sleep:  Drowsy.



ABNORMALITIES:

1. Frequent high voltage frontally predominant triphasic waves were seen.

2. Intermixed with the triphasic waves was frequent frontal intermittent 
rhythmic

    delta activity (FIRDA).



3. Diffuse 4-6 hertz theta range slowing was seen.



IMPRESSION:

Abnormal EEG.  The triphasic waves mentioned above are not epileptiform in 
nature.

Triphasic waves can be seen in the setting of a metabolic encephalopathy.  The 
frontal

intermittent rhythmic delta activity (FIRDA) as well as the diffuse theta range 
slowing

mentioned above is not epileptiform in nature.  In combination, these findings 
indicate

moderate diffuse cerebral dysfunction as may be seen in a toxic metabolic

encephalopathy.  No seizures were recorded.  No epileptiform activity was 
present.





MMODL / IJN: [id number] / Job#: [id number]

[facility]

## 2019-08-07 NOTE — P.PN
Subjective





Patient is seen in follow-up for end-stage renal disease.  She is maintained on 

hemodialysis on a Monday Wednesday Friday schedule.  Currently seen while 

undergoing hemodialysis.  No further seizures.  No active complaints at this 

time.





Vital signs are stable.


General: The patient appeared well nourished and normally developed. 


HEENT: Head exam is unremarkable. Neck is without jugular venous distension.


LUNGS: Lungs are clear to auscultation and percussion. Breath sounds decreased.


HEART: Rate and Rhythm are regular. First and second heart sounds normal. No 

murmurs, rubs or gallops. 


ABDOMEN: Abdominal exam reveals normal bowel sounds. Non-tender and non-

distended. No evidence of peritonitis.


EXTREMITITES: No clubbing, cyanosis, or edema.





Objective





- Vital Signs


Vital signs: 


                                   Vital Signs











Temp  98.0 F   08/07/19 06:57


 


Pulse  99   08/07/19 06:57


 


Resp  16   08/07/19 06:57


 


BP  184/87   08/07/19 07:28


 


Pulse Ox  99   08/07/19 06:57








                                 Intake & Output











 08/06/19 08/07/19 08/07/19





 18:59 06:59 18:59


 


Intake Total  740 222


 


Balance  740 222


 


Weight 80.739 kg  


 


Intake:   


 


  Oral  740 222


 


Other:   


 


  Voiding Method Toilet Toilet 





 Diaper  


 


  # Voids 2 1 














- Labs


Labs: 


                  Abnormal Lab Results - Last 24 Hours (Table)











  08/06/19 08/06/19 08/06/19 Range/Units





  11:32 15:52 21:15 


 


POC Glucose (mg/dL)  373 H  288 H  244 H  (75-99)  mg/dL














  08/07/19 08/07/19 Range/Units





  03:57 06:37 


 


POC Glucose (mg/dL)  156 H  198 H  (75-99)  mg/dL














Assessment and Plan


Plan: 





Assessment:


1.  End-stage renal disease maintained on hemodialysis on a Monday Wednesday Friday schedule.


2.  Seizure disorder.


3.  Chronic kidney disease mineral bone disease maintained on Renvela.


4.  Insulin-dependent diabetes mellitus.


5.  Hyperlipidemia.


6.  Hypertension with chronic kidney disease.  Currently controlled.





Plan:


Currently seen while undergoing hemodialysis.


Next hemodialysis on Friday.


Await neurology recommendations.


Maintain current antihypertensives.


I will add hydralazine 10 mg every 4 hours if needed for systolic blood pressure

greater than 160.

## 2019-08-07 NOTE — P.HPIM
History of Present Illness





Norma Bender a 49 y.o.femalewith past medical history of seizure disorder 

who follows up with Dr. Zavala, stroke,subdural hematoma,hypertension, diabetes 

mellitus, end-stage renal disease on dialysis. He presents because of seizure. 

Patient was on the Vimpat and Keppra at home. She was on dialysis center when 

she developed tonic-clonic seizure-like activity. Patient stated that she gets 

the seizure attacks frequently and the last time she had features seizure was 

about 3 weeks ago. She notes she takes 2 medication and her  help her 

given her medication.


Patient at baseline has left hemiparesis due to previous stroke and she uses 

wheelchair and a walker for short distance. Also patient has chronic dysarthria


On admission vitals look stable, her blood pressure slightly elevated at 168/90.

Electrolytes within normal limits, creatinine. INR is 1.0 5.7, liver enzymes not

elevated, hemoglobin A1c is 8.2. CBC is unremarkable. Sugars controlled. CT of 

the head: No acute event, age-related atrophy. EKG shows sinus tachycardia at 

100 BPM, with no significant ST-T changes. On admission patient was started on 

Ativan when necessary


Eventually patient was transferred to [facility] to be evaluated by 

neurologist.


discussed the case with the neurologist and the recommended EGD.  Patient today 

looks calm with no more seizures aren't no more new complaints.  She is getting 

hemodialysis as per nephrology recommendation.  Patient is getting EEG now.











Review of Systems





CONSTITUTIONAL: No fever, no malaise, no fatigue. 


HEENT: No recent visual problems or hearing problems. Denied any sore throat. 


CARDIOVASCULAR: No  orthopnea, PND, no palpitations, no syncope. 


PULMONARY: No shortness of breath, no cough, no hemoptysis. 


GASTROINTESTINAL: No diarrhea, no nausea, no vomiting, no abdominal pain. 

Normoactive bowel sounds. 


NEUROLOGICAL: No headaches, no weakness, no numbness. 


HEMATOLOGICAL: Denies any bleeding or petechiae. 


GENITOURINARY: Denies any burning micturition, frequency, or urgency. 


MUSCULOSKELETAL/RHEUMATOLOGICAL: Denies any joint pain, swelling, or any muscle 

pain. 


ENDOCRINE: Denies any polyuria or polydipsia.











Past Medical History


Past Medical History: Chest Pain / Angina, Diabetes Mellitus, Hypertension, Suyapa

l Disease, Seizure Disorder, Thyroid Disorder


Additional Past Medical History / Comment(s): dialysis bipolar


History of Any Multi-Drug Resistant Organisms: None Reported


Past Surgical History:  Section, Tonsillectomy


Additional Past Surgical History / Comment(s): fistula


Past Anesthesia/Blood Transfusion Reactions: No Reported Reaction


Past Psychological History: Bipolar


Smoking Status: Former smoker


Past Alcohol Use History: Occasional


Past Drug Use History: None Reported





Medications and Allergies


                                Home Medications











 Medication  Instructions  Recorded  Confirmed  Type


 


Citalopram Hydrobromide [CeleXA] 10 mg PO DAILY 17 History


 


Clopidogrel [Plavix] 75 mg PO DAILY 17 History


 


Ergocalciferol (Vitamin D2) 50,000 unit PO CARRERA 17 History





[Vitamin D2]    


 


Metoprolol Tartrate 25 mg PO BID 17 History


 


Omeprazole 20 mg PO BID 17 History


 


amLODIPine [Norvasc] 10 mg PO DAILY 17 History


 


Atorvastatin [Lipitor] 40 mg PO HS 18 History


 


INSULIN LISPRO (humaLOG) [humaLOG] See Protocol SQ ACHS 18 H

istory


 


Insulin Glargine [Lantus] 15 unit SQ DAILY 18 History


 


Sevelamer [Renvela] 800 mg PO TID-W/MEALS  tab 18 Rx


 


Albuterol Sulfate [Proair Hfa] 2 puff INHALATION RT-Q4H PRN 19 

History


 


Lacosamide [Vimpat] 100 mg PO AS DIRECTED 19 History


 


Ranitidine HCl 150 mg PO BID 19 History


 


levETIRAcetam [Keppra] 500 mg PO AS DIRECTED 19 History








                                    Allergies











Allergy/AdvReac Type Severity Reaction Status Date / Time


 


Penicillins Allergy  Itching Verified 19 12:30














Physical Exam


Vitals: 


                                   Vital Signs











  Temp Pulse Pulse Resp BP BP Pulse Ox


 


 19 07:28       184/87 


 


 19 06:57  98.0 F  99   16  197/102   99


 


 19 23:00  96.8 F L   78  16  168/84   98


 


 19 20:34  98.3 F   79  16  169/89   100


 


 19 17:41     16   


 


 19 15:00  98.5 F   84  16  178/94   100








                                Intake and Output











 19





 22:59 06:59 14:59


 


Intake Total 150 590 222


 


Balance 150 590 222


 


Intake:   


 


  Oral 150 590 222


 


Other:   


 


  Voiding Method Toilet  


 


  # Voids 1  














GENERAL: The patient is alert and oriented x3, not in any acute distress. Well 

developed, well nourished. 


HEENT: Pupils are round and equally reacting to light. EOMI. No scleral icterus.

 No conjunctival pallor. Normocephalic, atraumatic. No pharyngeal erythema. No 

thyromegaly. 


CARDIOVASCULAR: S1 and S2 present. No murmurs, rubs, or gallops. 


PULMONARY: Chest is clear to auscultation, no wheezing or crackles. 


ABDOMEN: Soft, nontender, nondistended, normoactive bowel sounds. No palpable 

organomegaly. 


MUSCULOSKELETAL: No joint swelling or deformity. 


EXTREMITIES: No cyanosis, clubbing, or pedal edema. 


NEUROLOGICAL: Gross neurological examination did not reveal any focal deficits. 


SKIN: No rashes.











Results


CBC & Chem 7: 


                                 19 12:12





Labs: 


                  Abnormal Lab Results - Last 24 Hours (Table)











  19 Range/Units





  15:52 21:15 03:57 


 


POC Glucose (mg/dL)  288 H  244 H  156 H  (75-99)  mg/dL














  19 Range/Units





  06:37 11:58 


 


POC Glucose (mg/dL)  198 H  249 H  (75-99)  mg/dL














Thrombosis Risk Factor Assmnt





- Choose All That Apply


Each Factor Represents 1 point: Age 41-60 years, Obesity (BMI >25)


Thrombosis Risk Factor Assessment Total Risk Factor Score: 2


Thrombosis Risk Factor Assessment Level: Low Risk





Assessment and Plan


Assessment: 





breakthrough seizure


History of seizure disorder


History of stroke and subdural hematoma, with residual left hemiparesis and 

dysarthria


Diabetes mellitus


Essential hypertension


End-stage renal disease on dialysis











Plan: 





This is a pleasant 49 years old female who presents with seizure activity. She 

has history of seizure. Going to consult nephrology for patient is hemodialysis.

 Continue with her seizure medication with Ativan when necessary. neurology 

consult is appreciated and we'll follow their recommendation, they recommended 

EEG..Labs and medication were reviewed, and he was same treatment, continue 

symptomatic treatment, resume home medication. Monitor lytes and vitals. DVT and

 GI prophylaxis. Further recommendation is based on the clinical course of the 

patient


DVT prophylaxis: Subcutaneous heparin


GI prophylaxis: Pepcid


Prognosis is guarded





The patient was admitted to 'Inpatient status' with anticipation of 2 or more 

midnight stay.

## 2019-08-08 VITALS
TEMPERATURE: 98.3 F | RESPIRATION RATE: 17 BRPM | SYSTOLIC BLOOD PRESSURE: 172 MMHG | HEART RATE: 78 BPM | DIASTOLIC BLOOD PRESSURE: 93 MMHG

## 2019-08-08 LAB
GLUCOSE BLD-MCNC: 191 MG/DL (ref 75–99)
GLUCOSE BLD-MCNC: 202 MG/DL (ref 75–99)
GLUCOSE BLD-MCNC: 236 MG/DL (ref 75–99)

## 2019-08-08 RX ADMIN — METOPROLOL TARTRATE SCH MG: 25 TABLET, FILM COATED ORAL at 08:52

## 2019-08-08 RX ADMIN — LACOSAMIDE SCH MG: 50 TABLET, FILM COATED ORAL at 08:54

## 2019-08-08 RX ADMIN — CLOPIDOGREL BISULFATE SCH MG: 75 TABLET ORAL at 08:52

## 2019-08-08 RX ADMIN — SEVELAMER CARBONATE SCH MG: 800 TABLET, FILM COATED ORAL at 12:35

## 2019-08-08 RX ADMIN — CITALOPRAM HYDROBROMIDE SCH MG: 10 TABLET ORAL at 08:52

## 2019-08-08 RX ADMIN — SEVELAMER CARBONATE SCH MG: 800 TABLET, FILM COATED ORAL at 08:52

## 2019-08-08 RX ADMIN — INSULIN ASPART SCH UNIT: 100 INJECTION, SOLUTION INTRAVENOUS; SUBCUTANEOUS at 12:35

## 2019-08-08 RX ADMIN — HEPARIN SODIUM SCH UNIT: 5000 INJECTION, SOLUTION INTRAVENOUS; SUBCUTANEOUS at 08:52

## 2019-08-08 RX ADMIN — LACOSAMIDE SCH MG: 50 TABLET, FILM COATED ORAL at 08:51

## 2019-08-08 RX ADMIN — INSULIN ASPART SCH UNIT: 100 INJECTION, SOLUTION INTRAVENOUS; SUBCUTANEOUS at 08:53

## 2019-08-08 RX ADMIN — INSULIN DETEMIR SCH UNIT: 100 INJECTION, SOLUTION SUBCUTANEOUS at 08:53

## 2019-08-08 RX ADMIN — FAMOTIDINE SCH MG: 10 INJECTION, SOLUTION INTRAVENOUS at 08:52

## 2019-08-08 RX ADMIN — PANTOPRAZOLE SODIUM SCH MG: 40 TABLET, DELAYED RELEASE ORAL at 08:52

## 2019-08-08 NOTE — P.PN
Subjective





Patient is seen in follow-up for end-stage renal disease.  She is maintained on 

hemodialysis on a Monday Wednesday Friday schedule.  Tolerated hemodialysis well

yesterday.  No further seizures.  No active complaints at this time.





Vital signs are stable.


General: The patient appeared well nourished and normally developed. 


HEENT: Head exam is unremarkable. Neck is without jugular venous distension.


LUNGS: Lungs are clear to auscultation and percussion. Breath sounds decreased.


HEART: Rate and Rhythm are regular. First and second heart sounds normal. No 

murmurs, rubs or gallops. 


ABDOMEN: Abdominal exam reveals normal bowel sounds. Non-tender and non-

distended. No evidence of peritonitis.


EXTREMITITES: No clubbing, cyanosis, or edema.





Objective





- Vital Signs


Vital signs: 


                                   Vital Signs











Temp  98.6 F   08/08/19 07:00


 


Pulse  80   08/08/19 12:05


 


Resp  16   08/08/19 07:00


 


BP  158/94   08/08/19 07:00


 


Pulse Ox  100   08/08/19 07:00








                                 Intake & Output











 08/07/19 08/08/19 08/08/19





 18:59 06:59 18:59


 


Intake Total 624 580 


 


Output Total 1000  


 


Balance -376 580 


 


Intake:   


 


  Oral 624 580 


 


Output:   


 


  Hemodialysis 1000  


 


Other:   


 


  Voiding Method  Toilet 


 


  # Voids 1 1 














- Labs


CBC & Chem 7: 


                                 08/07/19 12:12





                                 08/07/19 12:12


Labs: 


                  Abnormal Lab Results - Last 24 Hours (Table)











  08/07/19 08/07/19 08/07/19 Range/Units





  11:58 12:12 16:42 


 


Sodium   135 L   (137-145)  mmol/L


 


Chloride   97 L   ()  mmol/L


 


BUN   24 H   (7-17)  mg/dL


 


Creatinine   4.21 H   (0.52-1.04)  mg/dL


 


Glucose   192 H   (74-99)  mg/dL


 


POC Glucose (mg/dL)  249 H   171 H  (75-99)  mg/dL














  08/07/19 08/08/19 08/08/19 Range/Units





  20:20 02:35 06:58 


 


Sodium     (137-145)  mmol/L


 


Chloride     ()  mmol/L


 


BUN     (7-17)  mg/dL


 


Creatinine     (0.52-1.04)  mg/dL


 


Glucose     (74-99)  mg/dL


 


POC Glucose (mg/dL)  145 H  191 H  202 H  (75-99)  mg/dL














Assessment and Plan


Plan: 





Assessment:


1.  End-stage renal disease maintained on hemodialysis on a Monday Wednesday Friday schedule.


2.  Seizure disorder.  Evaluated by neurology.


3.  Chronic kidney disease mineral bone disease maintained on Renvela.


4.  Insulin-dependent diabetes mellitus.


5.  Hyperlipidemia.


6.  Hypertension with chronic kidney disease.  Currently controlled.





Plan:


Hemodialysis tomorrow.


Maintain current antihypertensives.


Stable to be discharged home from nephrology standpoint.

## 2019-12-04 ENCOUNTER — HOSPITAL ENCOUNTER (OUTPATIENT)
Dept: HOSPITAL 47 - 4SSUR | Age: 49
Setting detail: OBSERVATION
LOS: 2 days | Discharge: HOME | End: 2019-12-06
Attending: FAMILY MEDICINE | Admitting: FAMILY MEDICINE
Payer: MEDICARE

## 2019-12-04 DIAGNOSIS — E11.65: ICD-10-CM

## 2019-12-04 DIAGNOSIS — E11.22: ICD-10-CM

## 2019-12-04 DIAGNOSIS — F31.9: ICD-10-CM

## 2019-12-04 DIAGNOSIS — Z99.2: ICD-10-CM

## 2019-12-04 DIAGNOSIS — Z79.4: ICD-10-CM

## 2019-12-04 DIAGNOSIS — Z96.82: ICD-10-CM

## 2019-12-04 DIAGNOSIS — Z79.02: ICD-10-CM

## 2019-12-04 DIAGNOSIS — Z88.0: ICD-10-CM

## 2019-12-04 DIAGNOSIS — E03.9: ICD-10-CM

## 2019-12-04 DIAGNOSIS — E83.89: ICD-10-CM

## 2019-12-04 DIAGNOSIS — Z86.73: ICD-10-CM

## 2019-12-04 DIAGNOSIS — Z79.899: ICD-10-CM

## 2019-12-04 DIAGNOSIS — N18.6: ICD-10-CM

## 2019-12-04 DIAGNOSIS — E11.40: ICD-10-CM

## 2019-12-04 DIAGNOSIS — Z87.891: ICD-10-CM

## 2019-12-04 DIAGNOSIS — M89.8X9: ICD-10-CM

## 2019-12-04 DIAGNOSIS — I12.0: ICD-10-CM

## 2019-12-04 DIAGNOSIS — G40.909: Primary | ICD-10-CM

## 2019-12-04 DIAGNOSIS — Z90.89: ICD-10-CM

## 2019-12-04 DIAGNOSIS — Z98.890: ICD-10-CM

## 2019-12-04 DIAGNOSIS — Z49.31: ICD-10-CM

## 2019-12-04 LAB — GLUCOSE BLD-MCNC: 127 MG/DL (ref 75–99)

## 2019-12-04 PROCEDURE — 85025 COMPLETE CBC W/AUTO DIFF WBC: CPT

## 2019-12-04 PROCEDURE — 36410 VNPNXR 3YR/> PHY/QHP DX/THER: CPT

## 2019-12-04 PROCEDURE — 80048 BASIC METABOLIC PNL TOTAL CA: CPT

## 2019-12-04 PROCEDURE — 80177 DRUG SCRN QUAN LEVETIRACETAM: CPT

## 2019-12-04 PROCEDURE — 90935 HEMODIALYSIS ONE EVALUATION: CPT

## 2019-12-04 PROCEDURE — 76937 US GUIDE VASCULAR ACCESS: CPT

## 2019-12-05 LAB
ANION GAP SERPL CALC-SCNC: 16 MMOL/L
BASOPHILS # BLD AUTO: 0.1 K/UL (ref 0–0.2)
BASOPHILS NFR BLD AUTO: 1 %
BUN SERPL-SCNC: 43 MG/DL (ref 7–17)
CALCIUM SPEC-MCNC: 8.7 MG/DL (ref 8.4–10.2)
CHLORIDE SERPL-SCNC: 97 MMOL/L (ref 98–107)
CO2 SERPL-SCNC: 23 MMOL/L (ref 22–30)
EOSINOPHIL # BLD AUTO: 0.1 K/UL (ref 0–0.7)
EOSINOPHIL NFR BLD AUTO: 1 %
ERYTHROCYTE [DISTWIDTH] IN BLOOD BY AUTOMATED COUNT: 3.91 M/UL (ref 3.8–5.4)
ERYTHROCYTE [DISTWIDTH] IN BLOOD: 14.3 % (ref 11.5–15.5)
GLUCOSE BLD-MCNC: 111 MG/DL (ref 75–99)
GLUCOSE BLD-MCNC: 167 MG/DL (ref 75–99)
GLUCOSE BLD-MCNC: 224 MG/DL (ref 75–99)
GLUCOSE BLD-MCNC: 290 MG/DL (ref 75–99)
GLUCOSE SERPL-MCNC: 185 MG/DL (ref 74–99)
HCT VFR BLD AUTO: 35 % (ref 34–46)
HGB BLD-MCNC: 11.6 GM/DL (ref 11.4–16)
LYMPHOCYTES # SPEC AUTO: 1.5 K/UL (ref 1–4.8)
LYMPHOCYTES NFR SPEC AUTO: 19 %
MCH RBC QN AUTO: 29.6 PG (ref 25–35)
MCHC RBC AUTO-ENTMCNC: 33.1 G/DL (ref 31–37)
MCV RBC AUTO: 89.5 FL (ref 80–100)
MONOCYTES # BLD AUTO: 0.4 K/UL (ref 0–1)
MONOCYTES NFR BLD AUTO: 5 %
NEUTROPHILS # BLD AUTO: 5.6 K/UL (ref 1.3–7.7)
NEUTROPHILS NFR BLD AUTO: 73 %
PLATELET # BLD AUTO: 268 K/UL (ref 150–450)
POTASSIUM SERPL-SCNC: 4.6 MMOL/L (ref 3.5–5.1)
SODIUM SERPL-SCNC: 136 MMOL/L (ref 137–145)
WBC # BLD AUTO: 7.6 K/UL (ref 3.8–10.6)

## 2019-12-05 RX ADMIN — INSULIN ASPART SCH UNIT: 100 INJECTION, SOLUTION INTRAVENOUS; SUBCUTANEOUS at 12:06

## 2019-12-05 RX ADMIN — METOPROLOL TARTRATE SCH MG: 25 TABLET, FILM COATED ORAL at 20:38

## 2019-12-05 RX ADMIN — INSULIN ASPART SCH: 100 INJECTION, SOLUTION INTRAVENOUS; SUBCUTANEOUS at 17:31

## 2019-12-05 RX ADMIN — FAMOTIDINE SCH MG: 20 TABLET, FILM COATED ORAL at 20:38

## 2019-12-05 RX ADMIN — INSULIN ASPART SCH UNIT: 100 INJECTION, SOLUTION INTRAVENOUS; SUBCUTANEOUS at 08:00

## 2019-12-05 RX ADMIN — LACOSAMIDE SCH MG: 50 TABLET, FILM COATED ORAL at 20:38

## 2019-12-05 RX ADMIN — METOPROLOL TARTRATE SCH MG: 25 TABLET, FILM COATED ORAL at 16:12

## 2019-12-05 RX ADMIN — INSULIN ASPART SCH UNIT: 100 INJECTION, SOLUTION INTRAVENOUS; SUBCUTANEOUS at 20:37

## 2019-12-05 NOTE — P.NPCON
History of Present Illness





- Reason for Consult


end stage renal disease





- History of Present Illness





Reason for consultation: End-stage renal disease





History of present illness:


Patient is a 49-year-old female seen in renal consultation for end-stage renal 

disease.  She is maintained on hemodialysis on  schedule.

 Patient went to hemodialysis yesterday and had a grand mal seizure within 10 

minutes of the treatment.  Patient was sent to the ER and subsequently 

discharged.  Patient resumed outpatient hemodialysis and in about 1 hour had 

another episode of seizure.  She was subsequently again sent to the hospital and

is now admitted.  Patient had seizure early this morning as well.  Currently the

patient is resting in bed.  She is awake and alert.  Denies chest pain or shortn

ess of breath.  No fever or chills.  No vomiting or diarrhea.  Patient has 

history of seizures and states she has been compliant with her medications.  

Hemodynamically stable.  No edema.





Vital signs are stable.


General: The patient appeared well nourished and normally developed. 


HEENT: Head exam is unremarkable. Neck is without jugular venous distension.


LUNGS: Lungs are clear to auscultation and percussion. Breath sounds decreased.


HEART: Rate and Rhythm are regular. First and second heart sounds normal. No 

murmurs, rubs or gallops. 


ABDOMEN: Abdominal exam reveals normal bowel sounds. Non-tender and non-

distended. No evidence of peritonitis.


EXTREMITITES: No clubbing, cyanosis, or edema.





Past Medical History


Past Medical History: Chest Pain / Angina, Diabetes Mellitus, Hypertension, 

Renal Disease, Seizure Disorder, Thyroid Disorder


Additional Past Medical History / Comment(s): dialysis bipolar.  Neuropathy


History of Any Multi-Drug Resistant Organisms: None Reported


Past Surgical History:  Section, Tonsillectomy


Additional Past Surgical History / Comment(s): fistula


Past Anesthesia/Blood Transfusion Reactions: No Reported Reaction


Past Psychological History: Bipolar


Smoking Status: Former smoker


Past Alcohol Use History: Occasional


Past Drug Use History: None Reported





- Past Family History


  ** Father


Family Medical History: Unable to Obtain





Medications and Allergies


                                Home Medications











 Medication  Instructions  Recorded  Confirmed  Type


 


Citalopram Hydrobromide [CeleXA] 10 mg PO DAILY 17 History


 


Clopidogrel [Plavix] 75 mg PO DAILY 17 History


 


Ergocalciferol (Vitamin D2) 50,000 unit PO CARRERA 17 History





[Vitamin D2]    


 


Omeprazole 20 mg PO BID 17 History


 


amLODIPine [Norvasc] 10 mg PO DAILY 17 History


 


Atorvastatin [Lipitor] 40 mg PO HS 18 History


 


INSULIN LISPRO (humaLOG) [humaLOG] See Protocol SQ AC-TID 18 

History


 


Insulin Glargine [Lantus] 15 unit SQ DAILY 18 History


 


Albuterol Sulfate [Proair Hfa] 2 puff INHALATION RT-Q4H PRN 19 

History


 


Lacosamide [Vimpat] 100 mg PO BID 19 History


 


Ranitidine HCl 150 mg PO BID 19 History


 


Metoprolol Tartrate [Lopressor] 25 mg PO TID #90 tab 19 Rx


 


levETIRAcetam [Keppra] 750 mg PO BID 19 History








                                    Allergies











Allergy/AdvReac Type Severity Reaction Status Date / Time


 


Penicillins Allergy  Itching Verified 19 12:30














Physical Exam


Vitals: 


                                   Vital Signs











  Temp Pulse Resp BP Pulse Ox


 


 19 07:20  98.1 F  95  16  139/83  96


 


 19 05:58   99    96


 


 19 05:40   99  14   97


 


 19 05:30  98.3 F  100  14  138/82  93 L


 


 19 04:35  98.0 F  87  18  149/81  98


 


 19 23:30    18  


 


 19 23:20     155/95 


 


 19 22:20  98.2 F  90  16  170/84  100








                                Intake and Output











 19





 22:59 06:59 14:59


 


Intake Total  150 


 


Balance  150 


 


Intake:   


 


  Oral  150 


 


Other:   


 


  Voiding Method   Bedpan





   Diaper


 


  # Voids  1 


 


  Weight 88 kg  














Results





- Lab Results


                             Most recent lab results











Calcium  8.7 mg/dL (8.4-10.2)   19  07:35    














                                 19 07:35





                                 19 07:35





Assessment and Plan


Plan: 





Assessment:


1.  End-stage renal disease maintained on hemodialysis on  schedule.


2.  Seizure disorder.


3.  Hypertension with chronic kidney disease.  Controlled.


4.  Insulin-dependent diabetes mellitus.


5.  Chronic kidney disease mineral bone disease.





Plan:


2 hour hemodialysis treatment today.


Full treatment tomorrow per her outpatient schedule.


Neurology recommendations pending.


Seizure precautions.





Thank you for the consultation.  I will continue to follow the patient with you 

during her hospital stay.

## 2019-12-05 NOTE — P.CNNES
History of Present Illness


Consult date: 19


Reason for Consult: Seizure


Chief complaint: Seizure


History of Present Illness: 





HISTORY OF PRESENT ILLNESS: 


Thank you for allowing me to evaluate Ms. Norma Thompson.





Ms. Norma Thompson is a 49-year-old woman with past medical history of epilepsy s/p

VNS (placed early this year), diabetes, hypertension, end-stage renal disease, 

hypothyroidism, bipolar disorder, presenting with breakthrough seizures. Spoke 

to  Steve Thompson. Patient follows with Dr. Arevalo, last appointment was in 

2019.  states that patient is still having breakthrough seizures, 

cannot remember exactly how many he's had since her last admission in 2019. 

Denies any recent sickness.





From Neuro Consult note on 19:


Ms. Norma Thompson is a 49-year-old woman with past medical history of epilepsy s/p

VNS (placed early this year), diabetes, hypertension, end-stage renal disease, 

hypothyroidism, bipolar disorder, presenting with breakthrough seizures.  No 

family is at bedside at this time.  Patient is a poor historian as patient also 

has a history of poor memory.  She states that she had a seizure when she was in

the clinic yesterday.  Patient reports urinary incontinence and tongue biting d

uring seizures.  Denies any bowel incontinence.  Patient states that she has 

seizures since she was a child.  Her mother kept the etiology of seizures hidden

from the patient.  Of note patient is adopted and does not know much of family 

or birth history.  Patient states that she saw her primary neurologist just 

about a week ago, and dose for one of her medications was increased from 750 mg 

twice a day to 1000 mg twice a day.  Patient has had any further seizures since 

admission.  Patient underwent her dialysis this morning.  Patient states that 

she actually had a stroke that caused weakness and numbness and tingling in her 

left upper and lower extremities about a month ago.  Patient was admitted at 

[facility].  Otherwise patient denies any recent sickness, 

fever, nausea, vomiting, worsening weakness, numbness or tingling.  Also denies 

diarrhea or constipation.





Spoke to her , Steve. States that her neurologist did not change her 

medication dose but changed the setting of VNS. 





PAST MEDICAL HISTORY:


epilepsy, diabetes, hypertension, end-stage renal disease, hypothyroidism, 

bipolar disorder





PAST SURGICAL HISTORY:


Tonsillectomy, C-sections





HOME MEDICATIONS: 


Amlodipine 10 daily, metoprolol 25 mg twice a day omeprazole 20 mg twice a day 

vitamin D 2 Plavix 75 mg daily citalopram 10 mg daily Lantus 15 units daily 

atorvastatin 40 mg daily at bedtime Sevelamer Keppra 500 mg (MWF 9am/4pm), vim

pat 100mg (MW 9am/4pm)





ALLERGIES:


Penicillins





SOCIAL HISTORY:


Per previous record, patient is a former smoker.  Rarely drinks alcohol.  Denies

any drug use





FAMILY HISTORY:


Patient status she and her brother were adopted





REVIEW OF SYSTEMS: 


The 14 systems are reviewed and no additional points are identified compared to 

the review of systems documented history and physical





PHYSICAL EXAMINATION:


VITAL SIGNS: T 98.1 HR 95 RR 16 /83 O2 sat 96% on 2L of O2 via NC


GEN.: NAD, pleasant and cooperative, patient with difficulty with memory.


HEENT: NCAT, sclera icterus


NECK: Supple


SKIN AND EXTREMITIES: Warm to touch, no edema





NEURO:


MENTAL STATUS: Patient alert and oriented to self, place, time.  Unable to name 

the current president.  Speech fluent, able to name and repeat, following all 

commands with time.





CRANIAL NERVES II THROUGH XII: II: Pupils are equal and reactive to light 

symmetrically.  No afferent pupillary defect.  Visual fields are intact. III, 

IV, VI:  No ptosis.  Extraocular movements full.  No nystagmus. V: Facial 

sensation intact from V1-3. VII. No clear facial asymmetry. VIII: Hearing intact

to finger rub bilaterally.  IX, X: Symmetric palate elevation. XII: Shoulder 

shrug intact. XII: Tongue midline without fasciculation or atrophy.  





MOTOR: Increased tone in the left upper and left lower extremities.  More in the

left lower extremity.  5/5 strength in right upper and lower extremities.  4/5 

in left upper extremity and 4-/5 in left lower extremity. 





SENSORY: Intact to light touch, temperature, pinprick in all 4 extremities.  

Romberg is negative.





REFLEXES: 2+ in the right brachii, triceps, patella..  Brisk in left brachii p

atella.  Toes are downgoing.  Clonus is present with small section of left 

ankle.  Adonis's is absent





COORDINATION:  Finger to nose intact.  Difficulty with heel-to-shin due to 

increased tone in the left lower extremity.  No dysmetria.





GAIT: Deferred as patient just underwent dialysis.





DIAGNOSTIC TESTING:





LABORATORY:


WBC 7.6 hemoglobin 11.6 platelet 268 sodium 136 potassium 4.6 chloride 97 bicarb

23 BUN 43 creatinine 8.11 glucose 185 calcium 8.7 





IMAGING:


EEG 2018:


This awake EEG is abnormal due to presence of dysregulation.  This is consistent

with a reduced seizure threshold.





ASSESSMENT:


Ms. Norma Thompson is a 49-year-old woman with past medical history of epilepsy s/p

VNS, diabetes, hypertension, end-stage renal disease, hypothyroidism, bipolar 

disorder, presenting with breakthrough seizures.  Unclear etiology of seizures 

at this time.  No imaging available at this time with us at [facility]. 

Patient had a recent stroke for which patient is on Plavix and atorvastatin.  

Patient's seizure threshold can decrease if patient had a stroke involving the 

cortex.  However it appears that patient has had multiple recurrent seizures 

even before her recent stroke episode.  Patient is also on dialysis.  I will 

continue patient's home dose of antiepileptics at this time.  On EMR, patient is

on Keppra 500 mg  twice a day and Vimpat 100 mg  twice a day.  No changes have been made to her meds. Patient 

still taking Keppra 500mg dosing as above. Called  Steve Thompson at 

[phone]. Will increase AED dose of Keppra only: Keppra 750mg MWF TID (days 

of dialysis) TThursSatSunday BID. Continue Vimpat 100mg MWF TID TThursSatSunday 

BID. Pt has breakthrough seizures at baseline, dependent on medication 

compliance as she may sometimes forget to take it but  helps the patient.





RECOMMENDATIONS:


1. Continue her home meds. Keppra 750mg MWF TID TThursSatSunday BID. Vimpat 

100mg MWF TID TThursSatSunday BID. 


2. Patient needs to follow up with her primary neurologist within 2-3 weeks of 

discharge. 


3. Neurology will sign off at this time. Please feel free to contact Neurology 

again if with additional questions or concerns.





Past Medical History


Past Medical History: Chest Pain / Angina, Diabetes Mellitus, Hypertension, 

Renal Disease, Seizure Disorder, Thyroid Disorder


Additional Past Medical History / Comment(s): dialysis bipolar.  Neuropathy


History of Any Multi-Drug Resistant Organisms: None Reported


Past Surgical History:  Section, Tonsillectomy


Additional Past Surgical History / Comment(s): fistula


Past Anesthesia/Blood Transfusion Reactions: No Reported Reaction


Past Psychological History: Bipolar


Smoking Status: Former smoker


Past Alcohol Use History: Occasional


Past Drug Use History: None Reported





- Past Family History


  ** Father


Family Medical History: Unable to Obtain





Medications and Allergies


                                Home Medications











 Medication  Instructions  Recorded  Confirmed  Type


 


Citalopram Hydrobromide [CeleXA] 10 mg PO DAILY 17 History


 


Clopidogrel [Plavix] 75 mg PO DAILY 17 History


 


Ergocalciferol (Vitamin D2) 50,000 unit PO CARRERA 17 History





[Vitamin D2]    


 


Omeprazole 20 mg PO BID 17 History


 


amLODIPine [Norvasc] 10 mg PO DAILY 17 History


 


Atorvastatin [Lipitor] 40 mg PO HS 18 History


 


INSULIN LISPRO (humaLOG) [humaLOG] See Protocol SQ AC-TID 18 

History


 


Insulin Glargine [Lantus] 15 unit SQ HS 18 History


 


Albuterol Sulfate [Proair Hfa] 2 puff INHALATION RT-Q4H PRN 19 

History


 


Lacosamide [Vimpat] 100 mg PO BID 19 History


 


Ranitidine HCl 150 mg PO BID 19 History


 


Metoprolol Tartrate [Lopressor] 25 mg PO TID #90 tab 19 Rx


 


Lacosamide [Vimpat] 100 mg PO BID 19 History


 


levETIRAcetam [Keppra] 750 mg PO BID 19 History








                                    Allergies











Allergy/AdvReac Type Severity Reaction Status Date / Time


 


Penicillins Allergy  Itching Verified 19 12:30














Physical Examination





- Vital Signs


Vital Signs: 


                                   Vital Signs











  Temp Pulse Resp BP Pulse Ox


 


 19 07:20  98.1 F  95  16  139/83  96


 


 19 05:58   99    96


 


 19 05:40   99  14   97


 


 19 05:30  98.3 F  100  14  138/82  93 L


 


 19 04:35  98.0 F  87  18  149/81  98


 


 19 23:30    18  


 


 19 23:20     155/95 


 


 19 22:20  98.2 F  90  16  170/84  100








                                Intake and Output











 19





 22:59 06:59 14:59


 


Intake Total  150 


 


Balance  150 


 


Intake:   


 


  Oral  150 


 


Other:   


 


  # Voids  1 


 


  Weight 88 kg  














Results





- Laboratory Findings


CBC and BMP: 


                                 19 07:35





                                 19 07:35


Abnormal Lab Findings: 


                                  Abnormal Labs











  19





  22:46 06:59 07:35


 


Sodium    136 L


 


Chloride    97 L


 


BUN    43 H


 


Creatinine    8.11 H*


 


Glucose    185 H


 


POC Glucose (mg/dL)  127 H  167 H

## 2019-12-05 NOTE — P.HPIM
History of Present Illness





49-year-old female was in transferred from [place] on emergency room a direct 

admit to [facility].  Patient had seizures 2 during dialysis.  Patient has a 

history of seizure disorder renal failure on dialysis stage V diabetes type 2 

with A1c of 10 hypertension history of CVA and lites of walker.  CT the brain 

was negative at [facility].  Patient was evaluated by nephrology and dialysis 

orders provided.  Patient needs to see neurologist





Review of Systems


Constitutional: Reports weakness


Neurological: Reports seizures, Reports weakness





Past Medical History


Past Medical History: Chest Pain / Angina, Diabetes Mellitus, Hypertension, 

Renal Disease, Seizure Disorder, Thyroid Disorder


Additional Past Medical History / Comment(s): dialysis bipolar.  Neuropathy


History of Any Multi-Drug Resistant Organisms: None Reported


Past Surgical History:  Section, Tonsillectomy


Additional Past Surgical History / Comment(s): fistula


Past Anesthesia/Blood Transfusion Reactions: No Reported Reaction


Past Psychological History: Bipolar


Smoking Status: Former smoker


Past Alcohol Use History: Occasional


Past Drug Use History: None Reported





- Past Family History


  ** Father


Family Medical History: Unable to Obtain





Medications and Allergies


                                Home Medications











 Medication  Instructions  Recorded  Confirmed  Type


 


Citalopram Hydrobromide [CeleXA] 10 mg PO DAILY 17 History


 


Clopidogrel [Plavix] 75 mg PO DAILY 17 History


 


Ergocalciferol (Vitamin D2) 50,000 unit PO CARRERA 17 History





[Vitamin D2]    


 


Omeprazole 20 mg PO BID 17 History


 


amLODIPine [Norvasc] 10 mg PO DAILY 17 History


 


Atorvastatin [Lipitor] 40 mg PO HS 18 History


 


INSULIN LISPRO (humaLOG) [humaLOG] See Protocol SQ AC-TID 18 

History


 


Insulin Glargine [Lantus] 15 unit SQ DAILY 18 History


 


Albuterol Sulfate [Proair Hfa] 2 puff INHALATION RT-Q4H PRN 19 

History


 


Lacosamide [Vimpat] 100 mg PO BID 19 History


 


Ranitidine HCl 150 mg PO BID 19 History


 


Metoprolol Tartrate [Lopressor] 25 mg PO TID #90 tab 19 Rx


 


levETIRAcetam [Keppra] 750 mg PO BID 19 History








                                    Allergies











Allergy/AdvReac Type Severity Reaction Status Date / Time


 


Penicillins Allergy  Itching Verified 19 12:30














Physical Exam


Vitals: 


                                   Vital Signs











  Temp Pulse Resp BP Pulse Ox


 


 19 07:20  98.1 F  95  16  139/83  96


 


 19 05:58   99    96


 


 19 05:40   99  14   97


 


 19 05:30  98.3 F  100  14  138/82  93 L


 


 19 04:35  98.0 F  87  18  149/81  98


 


 19 23:30    18  


 


 19 23:20     155/95 


 


 19 22:20  98.2 F  90  16  170/84  100








                                Intake and Output











 19





 22:59 06:59 14:59


 


Intake Total  150 


 


Output Total   100


 


Balance  150 -100


 


Intake:   


 


  Oral  150 


 


Output:   


 


  Urine   100


 


Other:   


 


  Voiding Method   Bedpan





   Diaper


 


  # Voids  1 1


 


  Weight 88 kg  














- Constitutional


General appearance: mild distress





- EENT


Eyes: PERRLA


Ears: bilateral: normal





- Neck


Neck: normal ROM





- Respiratory


Respiratory: bilateral: CTA





- Cardiovascular


Rhythm: regular





- Gastrointestinal


General gastrointestinal: soft





- Integumentary





Facial hair


Integumentary: normal





- Neurologic


Neurologic: CNII-XII intact





- Musculoskeletal


Musculoskeletal: generalized weakness





- Psychiatric


Psychiatric: A&O x's 3, appropriate affect, intact judgment & insight





Results


CBC & Chem 7: 


                                 19 07:35





                                 19 07:35


Labs: 


                  Abnormal Lab Results - Last 24 Hours (Table)











  19 Range/Units





  22:46 06:59 07:35 


 


Sodium    136 L  (137-145)  mmol/L


 


Chloride    97 L  ()  mmol/L


 


BUN    43 H  (7-17)  mg/dL


 


Creatinine    8.11 H*  (0.52-1.04)  mg/dL


 


Glucose    185 H  (74-99)  mg/dL


 


POC Glucose (mg/dL)  127 H  167 H   (75-99)  mg/dL











CT Scan - head: report reviewed





Assessment and Plan


Plan: 





Assessment


Seizure activity 2 history of seizure disorder


Stage V renal failure on dialysis


Diabetes type 2 A1c 10


Hypertension


History of CVA


Hypothyroidism


Bipolar





Plan


Continued consultation with nephrology for dialysis


Needs neurology assessment for seizures

## 2019-12-06 VITALS
RESPIRATION RATE: 18 BRPM | TEMPERATURE: 98 F | SYSTOLIC BLOOD PRESSURE: 122 MMHG | HEART RATE: 78 BPM | DIASTOLIC BLOOD PRESSURE: 72 MMHG

## 2019-12-06 LAB
GLUCOSE BLD-MCNC: 134 MG/DL (ref 75–99)
GLUCOSE BLD-MCNC: 262 MG/DL (ref 75–99)

## 2019-12-06 RX ADMIN — FAMOTIDINE SCH MG: 20 TABLET, FILM COATED ORAL at 08:30

## 2019-12-06 RX ADMIN — METOPROLOL TARTRATE SCH MG: 25 TABLET, FILM COATED ORAL at 08:30

## 2019-12-06 RX ADMIN — INSULIN ASPART SCH UNIT: 100 INJECTION, SOLUTION INTRAVENOUS; SUBCUTANEOUS at 08:30

## 2019-12-06 RX ADMIN — INSULIN ASPART SCH UNIT: 100 INJECTION, SOLUTION INTRAVENOUS; SUBCUTANEOUS at 11:58

## 2019-12-06 RX ADMIN — LACOSAMIDE SCH MG: 50 TABLET, FILM COATED ORAL at 08:30

## 2019-12-06 NOTE — PN
PROGRESS NOTE



Patient is seen for followup for end-stage renal disease.  She is maintained on a

Monday, Wednesday, Friday schedule.  Patient was admitted to the hospital with a grand

mal seizure within 10 minutes into dialysis treatment.  She has not had any further

seizures.  She has been evaluated by Neurology.  She is due for hemodialysis today,

after which there are plans for discharge.



PHYSICAL EXAMINATION:

On examination, blood pressure is 118/80, heart rate 74 per minute. Patient is

afebrile.

EXAMINATION OF THE HEART: S1 and S2.

EXAMINATION OF LUNGS: Decreased breath sounds at bases.

ABDOMEN:  Soft, non-tender.

Examination of lower extremities shows no significant edema.

CNS exam is grossly intact.



LABS:

Potassium 4.6 yesterday. Hemoglobin 11.6 on 12/5/2019.



ASSESSMENT:

1. End-stage renal disease, on hemodialysis on a Monday, Wednesday, Friday schedule.

    Scheduled for hemodialysis today.

2. Seizures with breakthrough seizure, status post evaluation by Neurology.

3. Type 2 diabetes, insulin-dependent.

4. Chronic kidney disease mineral bone disorder.



PLAN:

Patient can be discharged from nephrology standpoint after hemodialysis today.

Continue with neurology recommendations as for antiseizure medications.





MMODL / IJN: [id number] / Job#: [id number]

## 2019-12-06 NOTE — DS
DISCHARGE SUMMARY



.



FINAL DIAGNOSES:

1. Seizure disorder and as well as breakthrough seizures.

2. Stage 5 renal failure on hemodialysis.

3. Diabetes mellitus type 2.  A1c 10 uncontrolled with hyperglycemia.

4. Hypertension.

5. History of cerebrovascular accident.

6. Hypothyroidism.

7. Bipolar.



DISCHARGE DISPOSITION:

The patient is being discharged in stable condition with guarded prognosis.



Total time taken 35 minutes.



HISTORY OF PRESENT ILLNESS:

This 49-year-old woman with a past medical history of multiple medical problems was

admitted with seizure disorder and breakthrough seizures.  Neurology saw the patient.

Medications adjusted.  Patient improved significantly.  Nephrology also saw the

patient.  The patient will be discharged in stable condition with guarded prognosis.



On exam, vitals are stable.  Cardiovascular: S1, S2.  Abdomen soft.  Nervous system: No

focal deficits.



Total time taken 35 minutes.



DISCHARGE ADVICE AND MEDICATIONS:

1. Diet is cardiac diet.

2. Activity limited until followup.

3. Continue the hemodialysis.

4. Follow with Dr. Saturnino Castillo 2-3 days.

5. Follow with Neurology as recommended.



DISCHARGE MEDICATIONS:

1. Celexa 10 mg p.o. daily.

2. Lispro a.c. t.i.d.

3. Keppra 750 p.o. b.i.d.

4. Lantus 50 units subcu q.h.s.

5. Lipitor 40 mg q.h.s.

6. Norvasc 10 mg p.o. daily.

7. Omeprazole 20 mg b.i.d.

8. Plavix 75 mg p.o. daily.

9. ProAir HFA 2 puffs q.4 p.r.n.

10.Ranitidine 150 mg p.o. b.i.d.

11.Vimpat 100 mg p.o. b.i.d.

12.Vitamin D2 50,000 p.o. Sunday.

13.Keppra 750 p.o. b.i.d.

14.Lopressor 25 mg p.o. t.i.d.





MMODL / IJN: [id number] / Job#: [id number]

## 2020-01-17 NOTE — CDI
Date: 01.17.19



CDS/ Name: Cinthya Murray

Phone: If any questions, call Ashley Falcon  at [phone]



Patient Name: Norma Thompson

Account Number: [id number]

Admit Date:  12.04.19   

Discharge Date: 12.06.19

   

ATTENTION: The [facility] Staff appreciate your assistance in clarifying 
documentation. Please respond to the clarification below the line at the bottom 
and electronically sign. The [facility] staff will review the response and 
follow-up if needed. Please note: Queries are made part of the [facility] 
Record. If you have any questions, please contact the .



Dear Dr. Sanches



In the Discharge summary and H&P it is documented that the patient has stage 5 
CKD, but in both the consults it is documented ESRD.

Please specify which stage of CKD.





Thank you for your kind consideration.

ESRD.

MTDD

## 2020-04-19 ENCOUNTER — HOSPITAL ENCOUNTER (INPATIENT)
Dept: HOSPITAL 47 - EC | Age: 50
LOS: 5 days | Discharge: HOME HEALTH | DRG: 100 | End: 2020-04-24
Attending: INTERNAL MEDICINE | Admitting: INTERNAL MEDICINE
Payer: MEDICARE

## 2020-04-19 VITALS — BODY MASS INDEX: 34.7 KG/M2

## 2020-04-19 DIAGNOSIS — Z71.3: ICD-10-CM

## 2020-04-19 DIAGNOSIS — R40.2363: ICD-10-CM

## 2020-04-19 DIAGNOSIS — Z88.0: ICD-10-CM

## 2020-04-19 DIAGNOSIS — I69.354: ICD-10-CM

## 2020-04-19 DIAGNOSIS — R32: ICD-10-CM

## 2020-04-19 DIAGNOSIS — F31.9: ICD-10-CM

## 2020-04-19 DIAGNOSIS — E11.40: ICD-10-CM

## 2020-04-19 DIAGNOSIS — G40.409: Primary | ICD-10-CM
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
## 2020-04-19 DIAGNOSIS — N18.6: ICD-10-CM

## 2020-04-19 DIAGNOSIS — R40.2253: ICD-10-CM

## 2020-04-19 DIAGNOSIS — E11.22: ICD-10-CM

## 2020-04-19 DIAGNOSIS — I69.322: ICD-10-CM

## 2020-04-19 DIAGNOSIS — E03.9: ICD-10-CM

## 2020-04-19 DIAGNOSIS — Z98.890: ICD-10-CM

## 2020-04-19 DIAGNOSIS — I12.0: ICD-10-CM

## 2020-04-19 DIAGNOSIS — E87.6: ICD-10-CM

## 2020-04-19 DIAGNOSIS — E66.01: ICD-10-CM

## 2020-04-19 DIAGNOSIS — L68.0: ICD-10-CM

## 2020-04-19 DIAGNOSIS — Z98.891: ICD-10-CM

## 2020-04-19 DIAGNOSIS — Z79.02: ICD-10-CM

## 2020-04-19 DIAGNOSIS — Z79.899: ICD-10-CM

## 2020-04-19 DIAGNOSIS — T42.76XA: ICD-10-CM

## 2020-04-19 DIAGNOSIS — R40.2143: ICD-10-CM

## 2020-04-19 DIAGNOSIS — Z99.2: ICD-10-CM

## 2020-04-19 DIAGNOSIS — Z87.891: ICD-10-CM

## 2020-04-19 DIAGNOSIS — E83.9: ICD-10-CM

## 2020-04-19 PROCEDURE — 95816 EEG AWAKE AND DROWSY: CPT

## 2020-04-19 PROCEDURE — 80235 DRUG ASSAY LACOSAMIDE: CPT

## 2020-04-19 PROCEDURE — 83036 HEMOGLOBIN GLYCOSYLATED A1C: CPT

## 2020-04-19 PROCEDURE — 99285 EMERGENCY DEPT VISIT HI MDM: CPT

## 2020-04-19 PROCEDURE — 80177 DRUG SCRN QUAN LEVETIRACETAM: CPT

## 2020-04-19 PROCEDURE — 80061 LIPID PANEL: CPT

## 2020-04-19 PROCEDURE — 83970 ASSAY OF PARATHORMONE: CPT

## 2020-04-19 PROCEDURE — 82306 VITAMIN D 25 HYDROXY: CPT

## 2020-04-19 PROCEDURE — 85025 COMPLETE CBC W/AUTO DIFF WBC: CPT

## 2020-04-19 PROCEDURE — 93270 REMOTE 30 DAY ECG REV/REPORT: CPT

## 2020-04-19 PROCEDURE — 82728 ASSAY OF FERRITIN: CPT

## 2020-04-19 PROCEDURE — 90935 HEMODIALYSIS ONE EVALUATION: CPT

## 2020-04-19 PROCEDURE — 80053 COMPREHEN METABOLIC PANEL: CPT

## 2020-04-19 PROCEDURE — 80048 BASIC METABOLIC PNL TOTAL CA: CPT

## 2020-04-19 PROCEDURE — 84443 ASSAY THYROID STIM HORMONE: CPT

## 2020-04-19 NOTE — ED
Seizure HPI





- General


Source: patient, EMS


Mode of arrival: EMS


Limitations: altered mental status





<Isaura Cordoba - Last Filed: 20 23:43>





<Asha Britton - Last Filed: 20 23:26>





- General


Chief Complaint: Seizure


Stated Complaint: Seizure


Time Seen by Provider: 20 23:11





- History of Present Illness


Initial Comments: 


49-year-old female patient with past medical history significant for seizure 

disorder, end-stage renal disease with dialysis, and CVA presents to the 

emergency department today as a transfer from [facility].  

Patient had a seizure earlier in the day and had a prolonged postictal state.  

States that she was at her emergency department for around 5 hours and was not 

improving so they sent her here for further evaluation by neurology.  Upon 

arrival here patient states she is feeling better.  She does have difficulty 

with speech but states this is residual from her stroke.  She denies any 

headache, blurred vision, double vision, nausea, or vomiting.  She denies any 

chest pain or shortness of breath.  Denies numbness or tingling to her 

extremities.   Patient denies any recent rash, fever, chills, cough, abdominal 

pain, diarrhea, constipation, back pain, dizziness, weakness, hematuria, 

dysuria, urinary urgency, urinary frequency, or any other complaints. 

(Isaura Cordoba)





- Related Data


                                Home Medications











 Medication  Instructions  Recorded  Confirmed


 


Clopidogrel [Plavix] 75 mg PO DAILY 17


 


Ergocalciferol (Vitamin D2) 50,000 unit PO Q7D 17





[Vitamin D2]   


 


Lacosamide [Vimpat] 100 mg PO BID 19


 


Calcium Acetate 2,001 mg PO TID 20








                                  Previous Rx's











 Medication  Instructions  Recorded


 


Metoprolol Tartrate [Lopressor] 25 mg PO TID #90 tab 19


 


levETIRAcetam [Keppra] 750 mg PO BID #60 tab 19











                                    Allergies











Allergy/AdvReac Type Severity Reaction Status Date / Time


 


Penicillins Allergy  Itching Verified 20 09:34














Review of Systems


ROS Other: All systems not noted in ROS Statement are negative.





<Isaura Cordoba - Last Filed: 20 23:43>


ROS Other: All systems not noted in ROS Statement are negative.





<Asha Britton EFRAIN - Last Filed: 20 23:26>


ROS Statement: 


Those systems with pertinent positive or pertinent negative responses have been 

documented in the HPI.








Past Medical History


Past Medical History: Chest Pain / Angina, Diabetes Mellitus, Hypertension, 

Renal Disease, Seizure Disorder, Thyroid Disorder


Additional Past Medical History / Comment(s): dialysis bipolar.  Neuropathy


History of Any Multi-Drug Resistant Organisms: None Reported


Past Surgical History:  Section, Tonsillectomy


Additional Past Surgical History / Comment(s): fistula


Past Anesthesia/Blood Transfusion Reactions: No Reported Reaction


Past Psychological History: Bipolar


Smoking Status: Former smoker


Past Alcohol Use History: Occasional


Past Drug Use History: None Reported





- Past Family History


  ** Father


Family Medical History: Unable to Obtain





<Isaura Cordoba - Last Filed: 20 23:43>





General Exam


Limitations: altered mental status


General appearance: alert, in no apparent distress, other (This is a well-devel

oped, well-nourished adult female patient in no acute distress.  Vital signs 

upon presentation are temperature 98.4F, pulse 91, respirations 16, blood 

pressure 166/99, pulse ox 97% on room air.)


Eye exam: Present: normal appearance, PERRL, EOMI.  Absent: scleral icterus, co

njunctival injection, periorbital swelling


ENT exam: Present: normal exam, normal oropharynx, mucous membranes moist


Respiratory exam: Present: normal lung sounds bilaterally.  Absent: respiratory 

distress, wheezes, rales, rhonchi, stridor


Cardiovascular Exam: Present: regular rate, normal rhythm, normal heart sounds. 

 Absent: systolic murmur, diastolic murmur, rubs, gallop, clicks


GI/Abdominal exam: Present: soft, normal bowel sounds.  Absent: distended, 

tenderness, guarding, rebound, rigid


Neurological exam: Present: alert, oriented X3, CN II-XII intact


Psychiatric exam: Present: normal affect, normal mood


Skin exam: Present: warm, dry, intact, normal color.  Absent: rash





<Isaura Cordoba - Last Filed: 20 23:43>





Course





                                   Vital Signs











  20





  22:50 00:00 00:22


 


Temperature 98.4 F  98.5 F


 


Pulse Rate 91 86 85


 


Respiratory 16 16 16





Rate   


 


Blood Pressure 166/99 141/78 156/96


 


O2 Sat by Pulse 97  





Oximetry   














Medical Decision Making





<Isaura Cordoba - Last Filed: 20 23:43>





- Lab Data


Result diagrams: 


                                 20 05:45





                                 20 05:45





<Asha Britton - Last Filed: 20 23:26>





- Medical Decision Making


49-year-old female patient presents to the emergency department today as a 

transfer from [facility] for neurology consultation.  Patient had 

a seizure today and had a prolonged postictal state so they sent her here for 

further evaluation.  Physical exam here was relatively unremarkable.  She does 

have difficulty with speech but states this is residual from her previous 

stroke.  I did review patient's lab work from [facility], troponin

 negative, magnesium 2.27, BUN is 35, creatinine 8.3 glucose elevated at 173, 

white blood cells are 9.4.  She did have a CT of the head without contrast which

 showed mild cerebral atrophy no mass effect or midline shift no sign of 

intracranial hemorrhage.  The calvarium is intact.  There is a 1 cm irregular 

area of hypodensity in the anterior medial left thumb is consistent with old 

lacunar infarct.  Chest x-ray showed no acute lung disease.  She'll be admitted 

to the hospital for further evaluation by neurology.  Nephrology has been 

consulted for dialysis.  Patient is in agreement with this plan. 

(Isaura Cordoba)


I was available for consultation in the emergency department.  The history and 

physical exam were done by the midlevel provider. I was consulted for this 

patients care. I reviewed the case with the midlevel provider and based on 

their presentation of the patient, I agree with the assessment, medical decision

 making and plan of care as documented. 


Chart was dictated using Dragon dictation software.  Attempts were made to 

correct any dictation errors however some typographical errors may persist. 


Patient was seen during a national state of emergency due to the Covid-19 

pandemic. 


 (Asha Britton)





Disposition


Decision to Admit Reason: Admit from EC


Decision Date: 20


Decision Time: 23:48





<Isaura Cordoba - Last Filed: 20 23:43>





<Asha Britton - Last Filed: 20 23:26>


Clinical Impression: 


 Seizure





Disposition: ADMITTED AS IP TO THIS HOSP


Condition: Serious

## 2020-04-20 LAB
ALBUMIN SERPL-MCNC: 3.4 G/DL (ref 3.5–5)
ALP SERPL-CCNC: 59 U/L (ref 38–126)
ALT SERPL-CCNC: 10 U/L (ref 4–34)
ANION GAP SERPL CALC-SCNC: 14 MMOL/L
AST SERPL-CCNC: 17 U/L (ref 14–36)
BASOPHILS # BLD AUTO: 0.1 K/UL (ref 0–0.2)
BASOPHILS NFR BLD AUTO: 1 %
BUN SERPL-SCNC: 38 MG/DL (ref 7–17)
CALCIUM SPEC-MCNC: 8.5 MG/DL (ref 8.4–10.2)
CHLORIDE SERPL-SCNC: 99 MMOL/L (ref 98–107)
CO2 SERPL-SCNC: 26 MMOL/L (ref 22–30)
EOSINOPHIL # BLD AUTO: 0.1 K/UL (ref 0–0.7)
EOSINOPHIL NFR BLD AUTO: 1 %
ERYTHROCYTE [DISTWIDTH] IN BLOOD BY AUTOMATED COUNT: 3.32 M/UL (ref 3.8–5.4)
ERYTHROCYTE [DISTWIDTH] IN BLOOD: 13.5 % (ref 11.5–15.5)
GLUCOSE BLD-MCNC: 156 MG/DL (ref 75–99)
GLUCOSE BLD-MCNC: 177 MG/DL (ref 75–99)
GLUCOSE BLD-MCNC: 213 MG/DL (ref 75–99)
GLUCOSE BLD-MCNC: 63 MG/DL (ref 75–99)
GLUCOSE BLD-MCNC: 93 MG/DL (ref 75–99)
GLUCOSE SERPL-MCNC: 65 MG/DL (ref 74–99)
HCT VFR BLD AUTO: 29.4 % (ref 34–46)
HGB BLD-MCNC: 9.9 GM/DL (ref 11.4–16)
LYMPHOCYTES # SPEC AUTO: 2.2 K/UL (ref 1–4.8)
LYMPHOCYTES NFR SPEC AUTO: 30 %
MCH RBC QN AUTO: 29.7 PG (ref 25–35)
MCHC RBC AUTO-ENTMCNC: 33.6 G/DL (ref 31–37)
MCV RBC AUTO: 88.5 FL (ref 80–100)
MONOCYTES # BLD AUTO: 0.4 K/UL (ref 0–1)
MONOCYTES NFR BLD AUTO: 5 %
NEUTROPHILS # BLD AUTO: 4.5 K/UL (ref 1.3–7.7)
NEUTROPHILS NFR BLD AUTO: 61 %
PLATELET # BLD AUTO: 258 K/UL (ref 150–450)
POTASSIUM SERPL-SCNC: 3.2 MMOL/L (ref 3.5–5.1)
PROT SERPL-MCNC: 6.2 G/DL (ref 6.3–8.2)
SODIUM SERPL-SCNC: 139 MMOL/L (ref 137–145)
WBC # BLD AUTO: 7.4 K/UL (ref 3.8–10.6)

## 2020-04-20 PROCEDURE — 5A1D70Z PERFORMANCE OF URINARY FILTRATION, INTERMITTENT, LESS THAN 6 HOURS PER DAY: ICD-10-PCS

## 2020-04-20 RX ADMIN — INSULIN ASPART SCH UNIT: 100 INJECTION, SOLUTION INTRAVENOUS; SUBCUTANEOUS at 21:20

## 2020-04-20 RX ADMIN — LACOSAMIDE SCH MLS/HR: 10 INJECTION INTRAVENOUS at 21:15

## 2020-04-20 RX ADMIN — INSULIN ASPART SCH UNIT: 100 INJECTION, SOLUTION INTRAVENOUS; SUBCUTANEOUS at 18:14

## 2020-04-20 RX ADMIN — HEPARIN SODIUM SCH UNIT: 5000 INJECTION, SOLUTION INTRAVENOUS; SUBCUTANEOUS at 16:40

## 2020-04-20 RX ADMIN — LEVETIRACETAM SCH: 100 INJECTION, SOLUTION, CONCENTRATE INTRAVENOUS at 20:25

## 2020-04-20 RX ADMIN — METOPROLOL TARTRATE SCH MG: 25 TABLET, FILM COATED ORAL at 21:16

## 2020-04-20 RX ADMIN — PANTOPRAZOLE SODIUM SCH MG: 40 TABLET, DELAYED RELEASE ORAL at 21:16

## 2020-04-20 RX ADMIN — INSULIN ASPART SCH UNIT: 100 INJECTION, SOLUTION INTRAVENOUS; SUBCUTANEOUS at 13:10

## 2020-04-20 RX ADMIN — METOPROLOL TARTRATE SCH MG: 25 TABLET, FILM COATED ORAL at 06:23

## 2020-04-20 RX ADMIN — HEPARIN SODIUM SCH UNIT: 5000 INJECTION, SOLUTION INTRAVENOUS; SUBCUTANEOUS at 23:58

## 2020-04-20 RX ADMIN — INSULIN ASPART SCH: 100 INJECTION, SOLUTION INTRAVENOUS; SUBCUTANEOUS at 07:49

## 2020-04-20 RX ADMIN — METOPROLOL TARTRATE SCH MG: 25 TABLET, FILM COATED ORAL at 16:40

## 2020-04-20 RX ADMIN — CLOPIDOGREL BISULFATE SCH MG: 75 TABLET ORAL at 08:41

## 2020-04-20 RX ADMIN — CITALOPRAM HYDROBROMIDE SCH MG: 10 TABLET ORAL at 08:41

## 2020-04-20 RX ADMIN — PANTOPRAZOLE SODIUM SCH MG: 40 TABLET, DELAYED RELEASE ORAL at 08:41

## 2020-04-20 NOTE — CONS
CONSULTATION



REASON FOR CONSULT:

End-stage renal disease.



HISTORY OF PRESENT ILLNESS:

Patient is a 49-year-old female with end-stage renal disease, on hemodialysis on a

Monday, Wednesday, Friday schedule.  She was admitted to the hospital with history of

seizure this morning.  The patient does have history of seizures with previous

breakthrough seizures.  However, this has been quite stable recently.  Patient denies

having missed any of her medications.  She has been evaluated by Neurology.  She is

maintained on her usual medications.  No further seizures since admission.  Currently

maintained on IV _____.



PAST MEDICAL HISTORY:

End-stage renal disease, CKD mineral bone disorder, type 2 diabetes, hypertension,

hypothyroidism, bipolar disorder, neuropathy.



PAST SURGICAL HISTORY:

, tonsillectomy, AV fistula.



SOCIAL HISTORY:

Patient is a former smoker.  No history of drug abuse or alcohol abuse.



MEDICATIONS:

At home prior to admission included Plavix, vitamin D, Lopressor, Keppra, _____ and

PhosLo.



ALLERGIES:

Include PENICILLIN, which causes itching.



PHYSICAL EXAMINATION:

Patient is comfortable, awake, not in any acute distress.  Alert and oriented x3.

Blood pressure this morning was 167/92, heart rate 81 per minute.

She is afebrile.  Examination of the heart S1, S2.  Examination of the lungs, bilateral

breath sounds are heard.  Abdomen is soft, nontender.  Examination of the lower

extremities shows no evidence of edema. CNS exam grossly intact.



LABS:

Show hemoglobin 9.9, sodium 139, potassium 3.2.



ASSESSMENT:

1. End-stage renal disease, on hemodialysis on a Monday, Wednesday, Friday schedule.

    We will arrange for hemodialysis today.

2. Hypokalemia.  Patient will be dialyzed on a low-potassium bath.

3. Hypertension.

4. Breakthrough seizures.

5. CKD mineral bone disorder, maintained on PhosLo.



PLAN:

Hemodialysis today.  Once patient is cleared from Neurology she can be discharged.





MMODL / IJN: [id number] / Job#: [id number]

## 2020-04-20 NOTE — P.CNNES
History of Present Illness


Consult date: 20


Reason for Consult: Break through seziures


Chief complaint: Increased seizures


History of Present Illness: 





The new neurology consult requested for further advice and recommendations for a

49-year-old female who has a known history for epilepsy she reports beginning 

during early childhood shortly after birth.  She is been followed by a 

neurologist in the community for many years and has been on multiple 

anticonvulsant medications.  The patient reports that she believes her current 

seizure breakthrough has been didn't potential triggers involving lack of sleep 

stress poor eating.





Current medication she is on include Keppra and glucose MI.  The patient also 

has known kidney disease and is currently on dialysis.  The current dose of 

Keppra of 1500 mg per day is above the recommended renal dose for he will with e

nd-stage renal disease.





Patient reports her prior anticonvulsant history included Depakote as a zachary

nager.  During this time she developed hirsutism and has never been able to 

resolve that.  Patient has also been on Dilantin, she believes both 

carbamazepine and phenobarbital as well.  The best combination has been however 

the Keppra and Vimpat.  The patient does however admit that she has been non

compliant and has been missing doses.  She reports that her  is in charge

putting her pillbox together and he may be forgetting to put the appropriate 

doses daily.





This patient does have a history of a stroke which has left her with a 

significant neurologic deficit involving left-sided upper hemiplegia of the arm 

greater than leg.





Recently the patient reports having a fall last week prior to her seizure.  She 

believes that her foot may be broken she is difficulty bearing weight on it.





The patient describes her seizures have always been generalized tonic-clonic.  

She occasionally will get an aura for some seizures but most the time will occur

without any aura.





Since admission, the patient has had one seizure breakthrough and has had her 

medications restarted.  Over the last 12 hours there have been no further report

of seizure activity.





Review of Systems





A 10 point review of systems was obtained with positive pertinent negatives 

related to history of present illness.  The patient does report she has been 

advised to have a sleep test to rule out sleep apnea.  She has not followed 

through with this recommendation.  She does have reported poor sleep continuity 

which leads to sleep deprivation.  Witnessed snoring by her .





Past Medical History


Past Medical History: Chest Pain / Angina, Diabetes Mellitus, Hypertension, 

Renal Disease, Seizure Disorder, Thyroid Disorder


Additional Past Medical History / Comment(s): dialysis bipolar.  Neuropathy


History of Any Multi-Drug Resistant Organisms: None Reported


Past Surgical History:  Section, Tonsillectomy


Additional Past Surgical History / Comment(s): fistula


Past Anesthesia/Blood Transfusion Reactions: No Reported Reaction


Past Psychological History: Bipolar


Smoking Status: Former smoker


Past Alcohol Use History: Occasional


Past Drug Use History: None Reported





- Past Family History


  ** Father


Family Medical History: Unable to Obtain





Medications and Allergies


                                Home Medications











 Medication  Instructions  Recorded  Confirmed  Type


 


Clopidogrel [Plavix] 75 mg PO DAILY 17 History


 


Ergocalciferol (Vitamin D2) 50,000 unit PO Q7D 17 History





[Vitamin D2]    


 


Metoprolol Tartrate [Lopressor] 25 mg PO TID #90 tab 19 Rx


 


Lacosamide [Vimpat] 100 mg PO BID 19 History


 


levETIRAcetam [Keppra] 750 mg PO BID #60 tab 19 Rx


 


Calcium Acetate 2,001 mg PO TID 20 History








                                    Allergies











Allergy/AdvReac Type Severity Reaction Status Date / Time


 


Penicillins Allergy  Itching Verified 20 09:34














Physical Examination





- Vital Signs


Vital Signs: 


                                   Vital Signs











  Temp Pulse Pulse Resp BP BP Pulse Ox


 


 20 05:07  97.8 F   81  16   167/92  94 L


 


 20 02:10       190/88 


 


 20 01:29       188/85 


 


 20 01:08  97.2 F L   86  16   204/100  91 L


 


 20 00:22  98.5 F  85   16  156/96  


 


 20 00:00   86   16  141/78  


 


 20 22:50  98.4 F  91   16  166/99   97








                                Intake and Output











 20





 22:59 06:59 14:59


 


Intake Total  590 


 


Balance  590 


 


Intake:   


 


  Oral  590 


 


Other:   


 


  Voiding Method  Incontinent Incontinent


 


  # Voids  3 1


 


  Weight 81.647 kg 86 kg 














General examination:


Obese no acute distress.


HEENT: Clear sclera.  Oropharynx clear.  No tongue biting noted.  Large broad-

based time.  Chantel Watkins grade 3.  Significant hirsutism over the chin and 

neck.


Pulses: Radial pedal pulses are equal and symmetric.


Extremities: No edema noted in the hands.  Mild edema noted in the right ankle. 

 Nonpitting.





Neurological examination


Mental status: Patient is drowsy but able to follow commands and provide some 

history.  She is a very poor historian overall.  Speech is mildly dysarthric.


Pupils: 2 mm equally reactive to light and accommodation.


Cranial nerves: Extraocular movements are full.  No nystagmus noted on vertical 

horizontal gaze.  Face appears symmetric.  Palate elevates symmetrically.  Gag 

reflex is intact.  V1 through V3 sensory intact.  Shoulder shrug symmetric.  

Tongue midline without fasciculations deviation.





Motor examination patient has give way weakness -5 over 5 in the right upper and

 lower extremity.  There is a dense left hemiparesis versus involving the left 

arm and hand.  There is also hemiparesis involving the left lower extremity.  

Primarily proximal weakness is noted.  No fasciculations or tremor noted.





Deep tendon reflexes are trace over biceps brachial radialis bilaterally.  

Patellar reflexes are absent bilaterally.  Ankle jerks are absent bilaterally.  

Plantar responses are withdrawal bilaterally.





Sensory examination grossly intact to light touch and pinprick throughout.





Gait examination deferred





Results





- Laboratory Findings


CBC and BMP: 


                                 20 05:45





                                 20 05:45


Abnormal Lab Findings: 


                                  Abnormal Labs











  20





  05:45 05:45 06:57


 


RBC  3.32 L  


 


Hgb  9.9 L  


 


Hct  29.4 L  


 


Potassium   3.2 L 


 


BUN   38 H 


 


Creatinine   8.54 H* 


 


Glucose   65 L 


 


POC Glucose (mg/dL)    63 L


 


Total Protein   6.2 L 


 


Albumin   3.4 L 














  20





  11:22


 


RBC 


 


Hgb 


 


Hct 


 


Potassium 


 


BUN 


 


Creatinine 


 


Glucose 


 


POC Glucose (mg/dL)  213 H


 


Total Protein 


 


Albumin 














- Diagnostic Findings


EKG: report reviewed


Chest x-ray: report reviewed





Assessment and Plan


Assessment: 





This is a 49-year-old female with a known history of epilepsy onset during 

infancy.  The patient had increased seizure breakthrough primarily due to 

noncompliance.  There is concern that her current caregiver her  has not 

been appropriately administering her doses for her.  There is also concern that 

this patient's renal function is worsening potentially due to her not being on a

 renal dosage for both glucose might and Keppra.  This patient also has a known 

history of a stroke which would warrant further evaluation Tischer this patient 

has not had any new stroke findings.





Her examination is significant for lethargy most likely due to medication 

effects from the anticonvulsants.  She also has a dense hemiparesis from her 

prior stroke.  Review of her labs also indicates this patient is at increased 

risk for anemia as well as endocrine disturbance with the profound hirsutism.


Plan: 





Recommendations:


1..  Lacosamide has been adjusted to her renal creatinine clearance to 75 mg 

every 12.  This will be given IV over the next 72 hours in order to achieve 

steady state.  The patient should receive immediately after dialysis an 

additional 75 mgIV  


     This should only occur on dialysis days.


2.  Keppra to be renally adjusted to 500 mg IV every 12.  Immediately after 

dialysis she should receive 250 mg IV.  She will continue on IV dosing for 72 

hours.


    Once the patient has been seizure-free over 72 hours we will switch her over

 to oral dosing form.


3.  MRI of the brain without contrast.


4.  EEG.


5.  Anemia workup.


6.  Case management to further investigate spouse's ability to provide adequate 

care for the patient at home.  This patient may require home health care to come

 in and properly supervise medications.


7.  On discharge this patient should have available rectal Valium 10 mg 

suppository for breakthrough seizure.  Her spouse will need to be educated on 

how to administer this during a prolonged seizure.  


     Due to this patient's significant history for epilepsy this patient is at 

increased risk for sudden death phenomena that can occur in epileptics.  


     Furthermore both of the medications she is currently on Lacosamide and 

Keppra have increased arrhythmogenic potential,  therefore this patient should 

be on Holter monitor during this admission.


8.  Aspiration precautions in place per nursing protocol


9.  Seizure precautions: Ativan 1 mg IV may be given for any prolonged seizure 

breakthrough lasting more than 3 minutes.  On call neurology should be notified 

immediately.


10.  Lipid panel in a.m.


11.  MRA of the head and neck to rule out any high-grade stenosis or large 

vessel occlusion.





This patient's prognosis remains guarded.  Further recommendations will be made 

as this case evolves.  Thank you for allowing me to participate in the care of 

your patient and for this consultation.

## 2020-04-20 NOTE — P.HPIM
History of Present Illness


Patient is a pleasant 49-year-old female with end-stage renal disease on 

hemodialysis and history of CVA in the past came in because of seizure and for 

prolonged postictal.  Patient's seizure was witnessed by the  patient has

an aura which actually to get the dog to bring her  patient didn't have 

any loss of bowel or bladder continence denied any tongue biting.  Patient had a

extensive history of seizures patient's seizures are finally under control.  I 

did discuss with neurology at length patient normally gets her antiseizure 

medications after dialysis.  We are getting the levels of Keppra and vimpat.  

Patient was started on IV Vimpat.  Patient to regular hemodialysis is  and nephrology was consulted patient will undergo hemodialysis 

today.  Patient apparently had a prolonged postictal period of 5 hours





Review of Systems








REVIEW OF SYSTEMS: 


CONSTITUTIONAL: No fever, no malaise, no fatigue. 


HEENT: No recent visual problems or hearing problems. Denied any sore throat. 


CARDIOVASCULAR: No chest pain, orthopnea, PND, no palpitations, no syncope. 


PULMONARY: No shortness of breath, no cough, no hemoptysis. 


GASTROINTESTINAL: No diarrhea, no nausea, no vomiting, no abdominal pain. 


NEUROLOGICAL: No headaches, no weakness, no numbness. 


HEMATOLOGICAL: Denies any bleeding or petechiae. 


GENITOURINARY: Denies any burning micturition, frequency, or urgency. 


MUSCULOSKELETAL/RHEUMATOLOGICAL: Denies any joint pain, swelling, or any muscle 

pain. 


ENDOCRINE: Denies any polyuria or polydipsia. 





The rest of the 14-point review of systems is negative.











Past Medical History


Past Medical History: Chest Pain / Angina, Diabetes Mellitus, Hypertension, 

Renal Disease, Seizure Disorder, Thyroid Disorder


Additional Past Medical History / Comment(s): dialysis bipolar.  Neuropathy


History of Any Multi-Drug Resistant Organisms: None Reported


Past Surgical History:  Section, Tonsillectomy


Additional Past Surgical History / Comment(s): fistula


Past Anesthesia/Blood Transfusion Reactions: No Reported Reaction


Past Psychological History: Bipolar


Smoking Status: Former smoker


Past Alcohol Use History: Occasional


Past Drug Use History: None Reported





- Past Family History


  ** Father


Family Medical History: Unable to Obtain





Medications and Allergies


                                Home Medications











 Medication  Instructions  Recorded  Confirmed  Type


 


Clopidogrel [Plavix] 75 mg PO DAILY 17 History


 


Ergocalciferol (Vitamin D2) 50,000 unit PO Q7D 17 History





[Vitamin D2]    


 


Metoprolol Tartrate [Lopressor] 25 mg PO TID #90 tab 19 Rx


 


Lacosamide [Vimpat] 100 mg PO BID 19 History


 


levETIRAcetam [Keppra] 750 mg PO BID #60 tab 19 Rx


 


Calcium Acetate 2,001 mg PO TID 20 History








                                    Allergies











Allergy/AdvReac Type Severity Reaction Status Date / Time


 


Penicillins Allergy  Itching Verified 20 09:34














Physical Exam


Vitals: 


                                   Vital Signs











  Temp Pulse Pulse Resp BP BP Pulse Ox


 


 20 11:19  96.5 F L   76  16   147/77  97


 


 20 05:07  97.8 F   81  16   167/92  94 L


 


 20 02:10       190/88 


 


 20 01:29       188/85 


 


 20 01:08  97.2 F L   86  16   204/100  91 L


 


 20 00:22  98.5 F  85   16  156/96  


 


 20 00:00   86   16  141/78  


 


 20 22:50  98.4 F  91   16  166/99   97








                                Intake and Output











 20





 22:59 06:59 14:59


 


Intake Total  590 


 


Balance  590 


 


Intake:   


 


  Oral  590 


 


Other:   


 


  Voiding Method  Incontinent Incontinent


 


  # Voids  3 1


 


  Weight 81.647 kg 86 kg 

















PHYSICAL EXAMINATION: 





GENERAL: The patient is alert and oriented x3, not in any acute distress. Well 

developed, well nourished. 


HEENT: Pupils are round and equally reacting to light. EOMI. No scleral icterus.

 No conjunctival pallor. Normocephalic, atraumatic. No pharyngeal erythema. No 

thyromegaly. 


CARDIOVASCULAR: S1 and S2 present. No murmurs, rubs, or gallops. 


PULMONARY: Chest is clear to auscultation, no wheezing or crackles. 


ABDOMEN: Soft, nontender, nondistended, normoactive bowel sounds. No palpable 

organomegaly. 


MUSCULOSKELETAL: No joint swelling or deformity.


EXTREMITIES: No cyanosis, clubbing, or pedal edema. 


NEUROLOGICAL: Gross neurological examination did not reveal any focal deficits. 


SKIN: No rashes. 

















Results


CBC & Chem 7: 


                                 20 05:45





                                 20 05:45


Labs: 


                  Abnormal Lab Results - Last 24 Hours (Table)











  20 Range/Units





  05:45 05:45 06:57 


 


RBC  3.32 L    (3.80-5.40)  m/uL


 


Hgb  9.9 L    (11.4-16.0)  gm/dL


 


Hct  29.4 L    (34.0-46.0)  %


 


Potassium   3.2 L   (3.5-5.1)  mmol/L


 


BUN   38 H   (7-17)  mg/dL


 


Creatinine   8.54 H*   (0.52-1.04)  mg/dL


 


Glucose   65 L   (74-99)  mg/dL


 


POC Glucose (mg/dL)    63 L  (75-99)  mg/dL


 


Total Protein   6.2 L   (6.3-8.2)  g/dL


 


Albumin   3.4 L   (3.5-5.0)  g/dL














  20 Range/Units





  11:22 


 


RBC   (3.80-5.40)  m/uL


 


Hgb   (11.4-16.0)  gm/dL


 


Hct   (34.0-46.0)  %


 


Potassium   (3.5-5.1)  mmol/L


 


BUN   (7-17)  mg/dL


 


Creatinine   (0.52-1.04)  mg/dL


 


Glucose   (74-99)  mg/dL


 


POC Glucose (mg/dL)  213 H  (75-99)  mg/dL


 


Total Protein   (6.3-8.2)  g/dL


 


Albumin   (3.5-5.0)  g/dL














Thrombosis Risk Factor Assmnt





- Choose All That Apply


Any of the Below Risk Factors Present?: Yes


Each Factor Represents 1 point: Age 41-60 years, Obesity (BMI >25)


Other Risk Factors: No


Other congenital or acquired thrombophilia - If yes, enter type in comment: No


Thrombosis Risk Factor Assessment Total Risk Factor Score: 2


Thrombosis Risk Factor Assessment Level: Low Risk





Assessment and Plan


Plan: 


-Breakthrough seizures most probably noncompliance with medications levels of 

her antiseizure medications are being obtained and Vimpat was switched to IV.


-End-stage renal disease, dialysis dependent secondary to diabetic nephropathy 

patient will undergo hemodialysis today nephrology was consulted


-Type 2 diabetes mellitus patient will be resumed on her home medications and 

will titrate depending on her requirements of insulin


-Hypertension 


-hypothyroidism


-Bipolar disorder


-DVT prophylaxis with subcutaneous heparin

## 2020-04-21 LAB
ANION GAP SERPL CALC-SCNC: 8 MMOL/L
BUN SERPL-SCNC: 26 MG/DL (ref 7–17)
CALCIUM SPEC-MCNC: 8.2 MG/DL (ref 8.4–10.2)
CHLORIDE SERPL-SCNC: 101 MMOL/L (ref 98–107)
CHOLEST SERPL-MCNC: 219 MG/DL (ref ?–200)
CO2 SERPL-SCNC: 24 MMOL/L (ref 22–30)
FERRITIN SERPL-MCNC: 793.8 NG/ML (ref 10–291)
GLUCOSE BLD-MCNC: 124 MG/DL (ref 75–99)
GLUCOSE BLD-MCNC: 129 MG/DL (ref 75–99)
GLUCOSE BLD-MCNC: 176 MG/DL (ref 75–99)
GLUCOSE BLD-MCNC: 182 MG/DL (ref 75–99)
GLUCOSE BLD-MCNC: 243 MG/DL (ref 75–99)
GLUCOSE BLD-MCNC: 62 MG/DL (ref 75–99)
GLUCOSE SERPL-MCNC: 69 MG/DL (ref 74–99)
HDLC SERPL-MCNC: 32 MG/DL (ref 40–60)
LDLC SERPL CALC-MCNC: 133 MG/DL (ref 0–99)
POTASSIUM SERPL-SCNC: 3.4 MMOL/L (ref 3.5–5.1)
SODIUM SERPL-SCNC: 133 MMOL/L (ref 137–145)
TRIGL SERPL-MCNC: 271 MG/DL (ref ?–150)

## 2020-04-21 RX ADMIN — PANTOPRAZOLE SODIUM SCH MG: 40 TABLET, DELAYED RELEASE ORAL at 21:03

## 2020-04-21 RX ADMIN — METOPROLOL TARTRATE SCH MG: 25 TABLET, FILM COATED ORAL at 09:35

## 2020-04-21 RX ADMIN — ATORVASTATIN CALCIUM SCH MG: 20 TABLET, FILM COATED ORAL at 21:02

## 2020-04-21 RX ADMIN — HEPARIN SODIUM SCH UNIT: 5000 INJECTION, SOLUTION INTRAVENOUS; SUBCUTANEOUS at 17:45

## 2020-04-21 RX ADMIN — INSULIN ASPART SCH: 100 INJECTION, SOLUTION INTRAVENOUS; SUBCUTANEOUS at 07:54

## 2020-04-21 RX ADMIN — INSULIN DETEMIR SCH UNIT: 100 INJECTION, SOLUTION SUBCUTANEOUS at 21:03

## 2020-04-21 RX ADMIN — CLOPIDOGREL BISULFATE SCH MG: 75 TABLET ORAL at 09:35

## 2020-04-21 RX ADMIN — INSULIN ASPART SCH UNIT: 100 INJECTION, SOLUTION INTRAVENOUS; SUBCUTANEOUS at 21:03

## 2020-04-21 RX ADMIN — PANTOPRAZOLE SODIUM SCH MG: 40 TABLET, DELAYED RELEASE ORAL at 09:35

## 2020-04-21 RX ADMIN — CITALOPRAM HYDROBROMIDE SCH MG: 10 TABLET ORAL at 09:35

## 2020-04-21 RX ADMIN — INSULIN ASPART SCH UNIT: 100 INJECTION, SOLUTION INTRAVENOUS; SUBCUTANEOUS at 12:27

## 2020-04-21 RX ADMIN — METOPROLOL TARTRATE SCH MG: 25 TABLET, FILM COATED ORAL at 22:23

## 2020-04-21 RX ADMIN — LEVETIRACETAM SCH MLS/HR: 100 INJECTION, SOLUTION, CONCENTRATE INTRAVENOUS at 09:34

## 2020-04-21 RX ADMIN — METOPROLOL TARTRATE SCH MG: 25 TABLET, FILM COATED ORAL at 17:46

## 2020-04-21 RX ADMIN — LACOSAMIDE SCH MLS/HR: 10 INJECTION INTRAVENOUS at 10:36

## 2020-04-21 RX ADMIN — LACOSAMIDE SCH MLS/HR: 10 INJECTION INTRAVENOUS at 21:03

## 2020-04-21 RX ADMIN — HEPARIN SODIUM SCH UNIT: 5000 INJECTION, SOLUTION INTRAVENOUS; SUBCUTANEOUS at 23:53

## 2020-04-21 RX ADMIN — LEVETIRACETAM SCH MLS/HR: 100 INJECTION, SOLUTION, CONCENTRATE INTRAVENOUS at 22:23

## 2020-04-21 RX ADMIN — HEPARIN SODIUM SCH UNIT: 5000 INJECTION, SOLUTION INTRAVENOUS; SUBCUTANEOUS at 09:34

## 2020-04-21 RX ADMIN — INSULIN ASPART SCH UNIT: 100 INJECTION, SOLUTION INTRAVENOUS; SUBCUTANEOUS at 17:45

## 2020-04-21 NOTE — P.PN
Subjective





From the records:


Patient is a pleasant 49-year-old female with end-stage renal disease on 

hemodialysis and history of CVA in the past came in because of seizure and for 

prolonged postictal.  Patient's seizure was witnessed by the  patient has

an aura which actually to get the dog to bring her  patient didn't have 

any loss of bowel or bladder continence denied any tongue biting.  Patient had a

extensive history of seizures patient's seizures are finally under control.  I 

did discuss with neurology at length patient normally gets her antiseizure 

medications after dialysis.  We are getting the levels of Keppra and vimpat.  

Patient was started on IV Vimpat.  Patient to regular hemodialysis is Monday Wednesday Friday and nephrology was consulted patient will undergo hemodialysis 

today.  Patient apparently had a prolonged postictal period of 5 hours





Subjective:


Patient is a pleasant 49 years old female with past medical history of end-stage

renal disease, diabetes mellitus on 15 units of insulin Levemir at bedtime and 

history of seizure medication on Keppra 750 mg twice daily and Vimpat 100 mg 

twice daily, patient presents with breakthrough seizure.  Patient is alert and 

awake today and states that maybe she missed her dose, and that her  

helped her with medication but was not sure what happened.  Also patient noticed

to be slow to respond and answer questions with possible some memory problems 

and difficulties


On admission CT of the brain was unremarkable, neurologist recommended MRI 

however could not be done because of possible nerve stent later that she has, 

we'll follow-up with the neurologist about further recommendation.  Also patient

was started on Vimpat IV 75 mg twice daily at Keppra  mg twice daily while

the levels are still pending.  Also EEG is pending


Patient herself denies other complaints this morning.  Vital stable.  Labs noted

with sodium 133 and potassium 3.4, LDL is elevated at 133.  Her sugar was 62 

this morning and her insulin lowered from 15 down to 12 units at bedtime


Her neurologist as an outpatient is Dr. Arevalo











Review of systems


CONSTITUTIONAL: No fever, no malaise, no fatigue. 


HEENT: No recent visual problems or hearing problems. Denied any sore throat. 


CARDIOVASCULAR: No  orthopnea, PND, no palpitations, no syncope. 


PULMONARY: No shortness of breath, no cough, no hemoptysis. 


GASTROINTESTINAL: No diarrhea, no nausea, no vomiting, no abdominal pain. No

rmoactive bowel sounds. 


NEUROLOGICAL: No headaches, no weakness, no numbness. 


HEMATOLOGICAL: Denies any bleeding or petechiae. 


GENITOURINARY: Denies any burning micturition, frequency, or urgency. 


MUSCULOSKELETAL/RHEUMATOLOGICAL: Denies any joint pain, swelling, or any muscle 

pain. 


ENDOCRINE: Denies any polyuria or polydipsia.





                               Active Medications











Generic Name Dose Route Start Last Admin





  Trade Name Freq  PRN Reason Stop Dose Admin


 


Acetaminophen  650 mg  04/19/20 23:34 





  Tylenol Tab  PO  





  Q6HR PRN  





  Mild Pain or Fever > 100.5  


 


Albuterol Sulfate  2.5 mg  04/20/20 06:03 





  Ventolin Nebulized  INHALATION  





  RT-Q4H PRN  





  Shortness Of Breath  


 


Amlodipine Besylate  10 mg  04/20/20 09:00  04/20/20 06:23





  Norvasc  PO   10 mg





  DAILY ALICIA   Administration


 


Atorvastatin Calcium  60 mg  04/21/20 21:00 





  Lipitor  PO  





  HS ALICIA  


 


Citalopram Hydrobromide  10 mg  04/20/20 09:00  04/20/20 08:41





  Celexa  PO   10 mg





  DAILY ALICIA   Administration


 


Clopidogrel Bisulfate  75 mg  04/20/20 09:00  04/20/20 08:41





  Plavix  PO   75 mg





  DAILY ALICIA   Administration


 


Ergocalciferol  50,000 unit  04/26/20 09:00 





  Vitamin D2  PO  





  CARRERA ALICIA  


 


Heparin Sodium (Porcine)  5,000 unit  04/20/20 16:00  04/20/20 23:58





  Heparin  SQ   5,000 unit





  Q8HR ALICIA   Administration


 


Lacosamide 75 mg/ Sodium  57.5 mls @ 115 mls/hr  04/20/20 21:00  04/20/20 21:15





  Chloride  IVPB   115 mls/hr





  BID ALICIA   Administration


 


Lacosamide 75 mg/ Sodium  57.5 mls @ 115 mls/hr  04/20/20 14:00  04/20/20 19:45





  Chloride  IVPB   Not Given





  MoWeFr@1400 ALICIA  


 


Levetiracetam 500 mg/ Sodium  105 mls @ 400 mls/hr  04/20/20 22:00  04/20/20 

20:25





  Chloride  IVPB   Not Given





  Q12H ALICIA  


 


Levetiracetam 250 mg/ Sodium  102.5 mls @ 400 mls/hr  04/20/20 15:00  04/20/20 

19:31





  Chloride  IVPB   400 mls/hr





  MoWeFr@1500 [facility]   Administration


 


Insulin Aspart  0 unit  04/20/20 07:30  04/21/20 07:54





  Novolog  SQ   Not Given





  [facility]  





  Protocol  


 


Insulin Detemir  12 unit  04/21/20 21:00 





  Levemir  SQ  





  HS [facility]  


 


Lorazepam  1 mg  04/20/20 00:32 





  Ativan  IV  





  Q4HR PRN  





  Seizures  


 


Metoprolol Tartrate  25 mg  04/20/20 09:00  04/20/20 21:16





  Lopressor  PO   25 mg





  TID [facility]   Administration


 


Naloxone HCl  0.2 mg  04/19/20 23:34 





  Narcan  IV  





  Q2M PRN  





  Opioid Reversal  


 


Ondansetron HCl  4 mg  04/19/20 23:34 





  Zofran  IVP  





  Q8HR PRN  





  Nausea And Vomiting  


 


Pantoprazole Sodium  40 mg  04/20/20 09:00  04/20/20 21:16





  Protonix  PO   40 mg





  BID [facility]   Administration














Objective





- Vital Signs


Vital signs: 


                                   Vital Signs











Temp  98.3 F   04/21/20 04:37


 


Pulse  73   04/21/20 04:37


 


Resp  18   04/21/20 04:37


 


BP  161/80   04/21/20 04:37


 


Pulse Ox  96   04/21/20 04:37








                                 Intake & Output











 04/20/20 04/21/20 04/21/20





 18:59 06:59 18:59


 


Intake Total  1050 


 


Balance  1050 


 


Intake:   


 


  Intake, IV Titration  150 





  Amount   


 


    Lacosamide IV 75 mg In  50 





    Sodium Chloride 0.9% 50   





    ml @ 115 mls/hr IVPB   





    MoWeFr@1400 [facility] Rx#:   





    [id number]   


 


    levETIRAcetam  mg  100 





    In Sodium Chloride 0.9%   





    100 ml @ 400 mls/hr IVPB   





    MoWeFr@1500 [facility] Rx#:   





    [id number]   


 


  Oral  900 


 


Other:   


 


  Voiding Method Bedpan Diaper 





 Incontinent Incontinent 


 


  # Voids 1 3 














- Exam





-GENERAL: The patient is alert and oriented x3, but she answers to slowly not in

any acute distress. Well developed, well nourished. 


HEENT: Pupils are round and equally reacting to light. EOMI. No scleral icterus.

No conjunctival pallor. Normocephalic, atraumatic. No pharyngeal erythema. No 

thyromegaly. 


CARDIOVASCULAR: S1 and S2 present. No murmurs, rubs, or gallops. 


PULMONARY: Chest is clear to auscultation, no wheezing or crackles. 


ABDOMEN: Soft, nontender, nondistended, normoactive bowel sounds. No palpable 

organomegaly. 


MUSCULOSKELETAL: No joint swelling or deformity.


EXTREMITIES: No cyanosis, clubbing, or pedal edema. 


NEUROLOGICAL: Gross neurological examination did not reveal any focal deficits. 


SKIN: No rashes. 





- Labs


CBC & Chem 7: 


                                 04/20/20 05:45





                                 04/21/20 05:16


Labs: 


                  Abnormal Lab Results - Last 24 Hours (Table)











  04/20/20 04/20/20 04/20/20 Range/Units





  08:56 11:22 17:08 


 


Sodium     (137-145)  mmol/L


 


Potassium     (3.5-5.1)  mmol/L


 


BUN     (7-17)  mg/dL


 


Creatinine     (0.52-1.04)  mg/dL


 


Glucose     (74-99)  mg/dL


 


POC Glucose (mg/dL)   213 H  177 H  (75-99)  mg/dL


 


Calcium     (8.4-10.2)  mg/dL


 


Triglycerides     (<150)  mg/dL


 


Cholesterol     (<200)  mg/dL


 


LDL Cholesterol, Calc     (0-99)  mg/dL


 


HDL Cholesterol     (40-60)  mg/dL


 


Levetiracetam  64.2 H    (3.0-60.0)  ug/mL














  04/20/20 04/21/20 04/21/20 Range/Units





  20:06 02:27 05:16 


 


Sodium    133 L  (137-145)  mmol/L


 


Potassium    3.4 L  (3.5-5.1)  mmol/L


 


BUN    26 H  (7-17)  mg/dL


 


Creatinine    6.35 H  (0.52-1.04)  mg/dL


 


Glucose    69 L  (74-99)  mg/dL


 


POC Glucose (mg/dL)  156 H  124 H   (75-99)  mg/dL


 


Calcium    8.2 L  (8.4-10.2)  mg/dL


 


Triglycerides    271 H  (<150)  mg/dL


 


Cholesterol    219 H  (<200)  mg/dL


 


LDL Cholesterol, Calc    133 H  (0-99)  mg/dL


 


HDL Cholesterol    32 L  (40-60)  mg/dL


 


Levetiracetam     (3.0-60.0)  ug/mL














  04/21/20 04/21/20 Range/Units





  07:03 07:21 


 


Sodium    (137-145)  mmol/L


 


Potassium    (3.5-5.1)  mmol/L


 


BUN    (7-17)  mg/dL


 


Creatinine    (0.52-1.04)  mg/dL


 


Glucose    (74-99)  mg/dL


 


POC Glucose (mg/dL)  62 L  129 H  (75-99)  mg/dL


 


Calcium    (8.4-10.2)  mg/dL


 


Triglycerides    (<150)  mg/dL


 


Cholesterol    (<200)  mg/dL


 


LDL Cholesterol, Calc    (0-99)  mg/dL


 


HDL Cholesterol    (40-60)  mg/dL


 


Levetiracetam    (3.0-60.0)  ug/mL














Assessment and Plan


Assessment: 





-Breakthrough seizures most probably noncompliance with medications levels of 

her antiseizure medications are being obtained and Vimpat was switched to IV.  

Follow-up Keppra and Vimpat several, follow-up EEG.   consult to 

help with her taking medication


-End-stage renal disease, dialysis dependent secondary to diabetic nephropathy 

patient will undergo hemodialysis today nephrology was consulted


-Type 2 diabetes mellitus patient will be resumed on her home medications and 

will titrate depending on her requirements of insulin.  4 insulin down to 12 

units at bedtime


-Hypertension 


-hypothyroidism


-Bipolar disorder


-DVT prophylaxis with subcutaneous heparin


Prognosis is guarded

## 2020-04-21 NOTE — EEG
ELECTROENCEPHALOGRAM REPORT



DATE OF SERVICE:

04/21/2020.



REASON FOR EEG:

Breakthrough seizures in known epileptic.



HISTORY:

This is an inpatient EEG performed on a 49-year-old  female with a 
history of

seizure onset early after birth. The patient has medically refracted epilepsy 
and has a

vagal nerve stimulator. Current medications are lacosamide and levetiracetam. No
prior

EEG is available for review.



The patient was admitted for increased breakthrough seizures. Potential risk 
factors

include noncompliance, sleep deprivation and social stress.



The patient's past medical history is also significant for a prior stroke with 
the

patient having a dense hemiparesis involving the left upper extremity. Left 
lower

extremity weakness as well.



TECHNICAL REPORT:

This is an inpatient EEG performed on the [employer] EEG monitor with electrodes 
placed

according to the international 10 - 20 system and a single EKG channel.  
Simultaneous

video EEG monitoring was performed.  This EEG was reviewed in both longitudinal

bipolar, common average referential and transverse montages. Photic stimulation 
was

performed. Hyperventilation was not performed due to the patient's prior history
of

stroke.



The recording begins with the patient awake with eyes open. A moderate- to high-

amplitude background is present with frequencies ranging from 7 to maximum 10 
Hz. Low-

amplitude beta activity is prominent over the anterior and central head regions.

Intermittently throughout the study several seconds of high-amplitude 
polymorphic delta

slowing would be noted over the left frontal central and temporal head region.



At 10:08:42, four seconds of high-amplitude bifrontal slowing was noted with 
some

extension into the temporal lobe.



The patient briefly transitions between wide wakefulness and drowsiness. 
Drowsiness is

associated with a further attenuation of the background rhythm with an increase 
in beta

activity anteriorly and centrally. Slow rolling eye movements appeared and the

background frequency slowed to 7 to 8 Hz.



During the study, the technologist asks the patient various questions, and she 
is able

to respond appropriately, except without knowing who our current president is. 
The

background remains fully awake with an 8 to maximum 10 Hz posterior-dominant 
rhythm

that appears well-modulated and symmetric.



At 10:15:13, a single high-amplitude spike and slow-wave discharge appears over 
the

midline vertex. This lasts for one second and consists of 1 cycle/second.



Photic stimulation was performed at various flash frequencies and failed to 
elicit a

consistent driving response.



At 10:15:17, again high-amplitude single spike and slow-wave discharge appears 
over the

midline vertex, and this time extended to the left central head region. When 
reviewing

this phenomena in the referential montage, maximum negativity appears to be 
bifrontal,

with maximum negativity over the left frontal head region. F3, F7 electrode 
placement.



Deeper stages of sleep were not achieved.



IMPRESSION:

This is an abnormal EEG due to the rare epileptiform discharges described that 
appear

to be primarily lateralized over the midline vertex and frontal head region.



In addition, polymorphic delta slowing was noted, which is considered an 
abnormal

finding. This again was lateralized to the frontal central and temporal head 
regions.



No abnormalities were noted in the EKG.



No abnormalities were noted during photic stimulation.



Deeper stages of sleep were not achieved.



CLINICAL CORRELATION:

This EEG does suggest the patient does have an increased risk for seizures, and 
there

appears to be a potential epileptogenic focus in the midline frontal head 
region. This

may be due to early birth trauma or encephalomalacia involving her stroke area.



Serial EEGs are recommended & if clinically indicated more prolonged overnight 
study. This EEG did not contain any active clinical or electrographic seizure 
activity.





MMODL / IJN: [id number] / Job#: [id number]

RODERICK

## 2020-04-21 NOTE — PN
PROGRESS NOTE



_____/breakthrough seizures.  Patient has not had any further seizures since admission.

She was dialyzed yesterday, tolerated her treatment well, and patient will be dialyzed

again tomorrow.  She wants to go home today.



On examination, blood pressure is 161/80, heart rate 73 per minute. She is afebrile.

Exam shows patient is euvolemic, with no evidence of edema in bilateral lower

extremities. Abdomen is soft, non-tender.



Labs show sodium 133, potassium 3.4 today. CO2 is 24.



ASSESSMENT:

1. End-stage renal disease, on hemodialysis on a Monday, Wednesday, Friday schedule.

    Patient will be dialyzed again tomorrow.

2. Hypokalemia, status post replacement.  We will dialyze her on a 3K bath tomorrow.

3. Breakthrough seizures, being followed by Neurology, status post IV lacosamide;

    maintained on IV Keppra as well.



PLAN:

Hemodialysis in a.m.





MMODL / PATRICIAN: [id number] / Job#: [id number]

## 2020-04-22 LAB
GLUCOSE BLD-MCNC: 237 MG/DL (ref 75–99)
GLUCOSE BLD-MCNC: 241 MG/DL (ref 75–99)
GLUCOSE BLD-MCNC: 279 MG/DL (ref 75–99)
GLUCOSE BLD-MCNC: 69 MG/DL (ref 75–99)
GLUCOSE BLD-MCNC: 91 MG/DL (ref 75–99)
GLUCOSE BLD-MCNC: 97 MG/DL (ref 75–99)

## 2020-04-22 RX ADMIN — HEPARIN SODIUM SCH UNIT: 5000 INJECTION, SOLUTION INTRAVENOUS; SUBCUTANEOUS at 15:59

## 2020-04-22 RX ADMIN — HEPARIN SODIUM SCH UNIT: 5000 INJECTION, SOLUTION INTRAVENOUS; SUBCUTANEOUS at 09:13

## 2020-04-22 RX ADMIN — CITALOPRAM HYDROBROMIDE SCH MG: 10 TABLET ORAL at 09:13

## 2020-04-22 RX ADMIN — METOPROLOL TARTRATE SCH MG: 25 TABLET, FILM COATED ORAL at 22:13

## 2020-04-22 RX ADMIN — LACOSAMIDE SCH MG: 50 TABLET, FILM COATED ORAL at 09:57

## 2020-04-22 RX ADMIN — METOPROLOL TARTRATE SCH MG: 25 TABLET, FILM COATED ORAL at 15:59

## 2020-04-22 RX ADMIN — CLOPIDOGREL BISULFATE SCH MG: 75 TABLET ORAL at 09:13

## 2020-04-22 RX ADMIN — LACOSAMIDE SCH MG: 50 TABLET, FILM COATED ORAL at 20:59

## 2020-04-22 RX ADMIN — INSULIN ASPART SCH UNIT: 100 INJECTION, SOLUTION INTRAVENOUS; SUBCUTANEOUS at 13:29

## 2020-04-22 RX ADMIN — INSULIN ASPART SCH: 100 INJECTION, SOLUTION INTRAVENOUS; SUBCUTANEOUS at 07:32

## 2020-04-22 RX ADMIN — LACOSAMIDE SCH MG: 50 TABLET, FILM COATED ORAL at 15:59

## 2020-04-22 RX ADMIN — PANTOPRAZOLE SODIUM SCH MG: 40 TABLET, DELAYED RELEASE ORAL at 20:59

## 2020-04-22 RX ADMIN — INSULIN ASPART SCH UNIT: 100 INJECTION, SOLUTION INTRAVENOUS; SUBCUTANEOUS at 20:59

## 2020-04-22 RX ADMIN — LACOSAMIDE SCH: 10 INJECTION INTRAVENOUS at 09:12

## 2020-04-22 RX ADMIN — METOPROLOL TARTRATE SCH MG: 25 TABLET, FILM COATED ORAL at 09:13

## 2020-04-22 RX ADMIN — ATORVASTATIN CALCIUM SCH MG: 20 TABLET, FILM COATED ORAL at 20:59

## 2020-04-22 RX ADMIN — INSULIN ASPART SCH UNIT: 100 INJECTION, SOLUTION INTRAVENOUS; SUBCUTANEOUS at 17:29

## 2020-04-22 RX ADMIN — PANTOPRAZOLE SODIUM SCH MG: 40 TABLET, DELAYED RELEASE ORAL at 09:13

## 2020-04-22 RX ADMIN — INSULIN DETEMIR SCH UNIT: 100 INJECTION, SOLUTION SUBCUTANEOUS at 20:59

## 2020-04-22 NOTE — P.PN
Subjective





From the records:


Patient is a pleasant 49-year-old female with end-stage renal disease on 

hemodialysis and history of CVA in the past came in because of seizure and for 

prolonged postictal.  Patient's seizure was witnessed by the  patient has

an aura which actually to get the dog to bring her  patient didn't have 

any loss of bowel or bladder continence denied any tongue biting.  Patient had a

extensive history of seizures patient's seizures are finally under control.  I 

did discuss with neurology at length patient normally gets her antiseizure 

medications after dialysis.  We are getting the levels of Keppra and vimpat.  

Patient was started on IV Vimpat.  Patient to regular hemodialysis is Monday Wednesday Friday and nephrology was consulted patient will undergo hemodialysis 

today.  Patient apparently had a prolonged postictal period of 5 hours





Subjective:


Patient is a pleasant 49 years old female with past medical history of end-stage

renal disease, diabetes mellitus on 15 units of insulin Levemir at bedtime and 

history of seizure medication on Keppra 750 mg twice daily and Vimpat 100 mg 

twice daily, patient presents with breakthrough seizure.  Patient is alert and 

awake today and states that maybe she missed her dose, and that her  

helped her with medication but was not sure what happened.  Also patient noticed

to be slow to respond and answer questions with possible some memory problems 

and difficulties


On admission CT of the brain was unremarkable, neurologist recommended MRI 

however could not be done because of possible nerve stent later that she has, 

we'll follow-up with the neurologist about further recommendation.  Also patient

was started on Vimpat IV 75 mg twice daily at Keppra  mg twice daily while

the levels are still pending.  Also EEG is pending


Patient herself denies other complaints this morning.  Vital stable.  Labs noted

with sodium 133 and potassium 3.4, LDL is elevated at 133.  Her sugar was 62 

this morning and her insulin lowered from 15 down to 12 units at bedtime


Her neurologist as an outpatient is Dr. Arevalo





04/22/2020


Patient is awake and alert.  No other new complaints.  No dyspnea.  Patient says

that she had a stroke about 2 weeks ago with residual left hemiparesis and 

dysarthria.  No more seizure today.  EEG showing increased risk of seizure and 

there is a potential epileptogenic focus in the midline frontal region


 Patient is followed by neurology and switched to Vimpat 150 mg daily plus extra

10 mg on Monday, Wednesday and Friday.  Also she switched to Keppra 750 mg twice

daily.  Also she is undergoing hemodialysis today


Vitals are stable.  Sugar is controlled.








Review of systems


CONSTITUTIONAL: No fever, no malaise, no fatigue. 


HEENT: No recent visual problems or hearing problems. Denied any sore throat. 


CARDIOVASCULAR: No  orthopnea, PND, no palpitations, no syncope. 


PULMONARY: No shortness of breath, no cough, no hemoptysis. 


GASTROINTESTINAL: No diarrhea, no nausea, no vomiting, no abdominal pain. 

Normoactive bowel sounds. 


NEUROLOGICAL: No headaches, no weakness, no numbness. 


HEMATOLOGICAL: Denies any bleeding or petechiae. 


GENITOURINARY: Denies any burning micturition, frequency, or urgency. 


MUSCULOSKELETAL/RHEUMATOLOGICAL: Denies any joint pain, swelling, or any muscle 

pain. 


ENDOCRINE: Denies any polyuria or polydipsia.





                               Active Medications











Generic Name Dose Route Start Last Admin





  Trade Name Freq  PRN Reason Stop Dose Admin


 


Acetaminophen  650 mg  04/19/20 23:34 





  Tylenol Tab  PO  





  Q6HR PRN  





  Mild Pain or Fever > 100.5  


 


Albuterol Sulfate  2.5 mg  04/20/20 06:03 





  Ventolin Nebulized  INHALATION  





  RT-Q4H PRN  





  Shortness Of Breath  


 


Amlodipine Besylate  10 mg  04/20/20 09:00  04/22/20 09:13





  Norvasc  PO   10 mg





  DAILY ALICIA   Administration


 


Atorvastatin Calcium  60 mg  04/21/20 21:00  04/21/20 21:02





  Lipitor  PO   60 mg





  HS ALICIA   Administration


 


Citalopram Hydrobromide  10 mg  04/20/20 09:00  04/22/20 09:13





  Celexa  PO   10 mg





  DAILY ALICIA   Administration


 


Clopidogrel Bisulfate  75 mg  04/20/20 09:00  04/22/20 09:13





  Plavix  PO   75 mg





  DAILY ALICIA   Administration


 


Ergocalciferol  50,000 unit  04/26/20 09:00 





  Vitamin D2  PO  





  CARRERA ALICIA  


 


Heparin Sodium (Porcine)  5,000 unit  04/20/20 16:00  04/22/20 09:13





  Heparin  SQ   5,000 unit





  Q8HR ALICIA   Administration


 


Insulin Aspart  0 unit  04/20/20 07:30  04/22/20 13:29





  Novolog  SQ   4 unit





  ACHS ALICIA   Administration





  Protocol  


 


Insulin Detemir  12 unit  04/21/20 21:00  04/21/20 21:03





  Levemir  SQ   12 unit





  HS ALICIA   Administration


 


Lacosamide  100 mg  04/22/20 09:30  04/22/20 09:57





  Vimpat  PO   100 mg





  DAILY ALICIA   Administration


 


Lacosamide  50 mg  04/22/20 21:00 





  Vimpat  PO  





  HS ALICIA  


 


Lacosamide  100 mg  04/22/20 14:00 





  Vimpat  PO  





  MoWeFr ALICIA  


 


Levetiracetam  500 mg  04/22/20 10:00  04/22/20 11:05





  Keppra  PO   500 mg





  Q12H ALICIA   Administration


 


Levetiracetam  250 mg  04/22/20 15:00 





  Keppra  PO  





  MoWeFr ALICIA  


 


Lorazepam  1 mg  04/20/20 00:32 





  Ativan  IV  





  Q4HR PRN  





  Seizures  


 


Metoprolol Tartrate  25 mg  04/20/20 09:00  04/22/20 09:13





  Lopressor  PO   25 mg





  TID ALICIA   Administration


 


Naloxone HCl  0.2 mg  04/19/20 23:34 





  Narcan  IV  





  Q2M PRN  





  Opioid Reversal  


 


Ondansetron HCl  4 mg  04/19/20 23:34 





  Zofran  IVP  





  Q8HR PRN  





  Nausea And Vomiting  


 


Pantoprazole Sodium  40 mg  04/20/20 09:00  04/22/20 09:13





  Protonix  PO   40 mg





  BID ALICIA   Administration














Objective





- Vital Signs


Vital signs: 


                                   Vital Signs











Temp  98.3 F   04/22/20 11:15


 


Pulse  81   04/22/20 11:15


 


Resp  16   04/22/20 11:15


 


BP  148/94   04/22/20 11:15


 


Pulse Ox  97   04/22/20 11:15








                                 Intake & Output











 04/21/20 04/22/20 04/22/20





 18:59 06:59 18:59


 


Intake Total 700 590 


 


Balance 700 590 


 


Intake:   


 


  Intake, IV Titration 150  





  Amount   


 


    Lacosamide IV 75 mg In 50  





    Sodium Chloride 0.9% 50   





    ml @ 115 mls/hr IVPB   





    [facility] Rx#:   





    [id number]   


 


    levETIRAcetam  mg 100  





    In Sodium Chloride 0.9%   





    100 ml @ 400 mls/hr IVPB   





    [facility] Rx#:   





    [id number]   


 


  Oral 550 590 


 


Other:   


 


  Voiding Method Toilet Bedside Commode Bedside Commode





 Diaper Diaper Diaper


 


  # Voids 1 2 1


 


  # Bowel Movements  1 1














- Exam





-GENERAL: The patient is alert and oriented x3, but she answers to slowly not in

any acute distress. Well developed, well nourished. 


HEENT: Pupils are round and equally reacting to light. EOMI. No scleral icterus.

No conjunctival pallor. Normocephalic, atraumatic. No pharyngeal erythema. No 

thyromegaly. 


CARDIOVASCULAR: S1 and S2 present. No murmurs, rubs, or gallops. 


PULMONARY: Chest is clear to auscultation, no wheezing or crackles. 


ABDOMEN: Soft, nontender, nondistended, normoactive bowel sounds. No palpable 

organomegaly. 


MUSCULOSKELETAL: No joint swelling or deformity.


EXTREMITIES: No cyanosis, clubbing, or pedal edema. 


NEUROLOGICAL: Gross neurological examination did not reveal any focal deficits. 


SKIN: No rashes. 





- Labs


CBC & Chem 7: 


                                 04/20/20 05:45





                                 04/21/20 05:16


Labs: 


                  Abnormal Lab Results - Last 24 Hours (Table)











  04/21/20 04/21/20 04/22/20 Range/Units





  17:02 20:11 02:24 


 


POC Glucose (mg/dL)  243 H  182 H  69 L  (75-99)  mg/dL














  04/22/20 Range/Units





  11:19 


 


POC Glucose (mg/dL)  279 H  (75-99)  mg/dL














Assessment and Plan


Assessment: 





-Breakthrough seizures most probably noncompliance with medications levels of 

her antiseizure medications are being obtained and Vimpat was switched to IV.  

Follow-up Keppra and Vimpat several, follow-up EEG.   consult to 

help with her taking medication


-End-stage renal disease, dialysis dependent secondary to diabetic nephropathy 

patient will undergo hemodialysis today nephrology was consulted


-Type 2 diabetes mellitus patient will be resumed on her home medications and 

will titrate depending on her requirements of insulin.  4 insulin down to 12 

units at bedtime


-Hypertension 


-hypothyroidism


-Bipolar disorder


-DVT prophylaxis with subcutaneous heparin


Prognosis is guarded

## 2020-04-22 NOTE — PN
PROGRESS NOTE



Patient is seen for followup for end-stage renal disease.  She is scheduled for

hemodialysis today.  Patient denies any significant complaints.  Blood pressure was

148/94, heart rate 81 per minute. She is afebrile.  There is no evidence of edema noted

in the lower extremities.

Abdomen is soft, nontender.



Labs show sodium 133 from yesterday, potassium 3.4.



ASSESSMENT:

1. End-stage renal disease, on hemodialysis on a Monday, Wednesday, Friday schedule.

    Scheduled for hemodialysis today.

2. Chronic kidney disease mineral bone disorder, currently stable.  PTH at goal.

3. Breakthrough seizures, being followed by Neurology.

4. Hypokalemia, status post replacement.



PLAN:

Hemodialysis today; goal UF 1 to 2 L.  We will use a higher potassium bath.





MMODL / IJN: [id number] / Job#: [id number]

## 2020-04-23 LAB
GLUCOSE BLD-MCNC: 182 MG/DL (ref 75–99)
GLUCOSE BLD-MCNC: 185 MG/DL (ref 75–99)
GLUCOSE BLD-MCNC: 209 MG/DL (ref 75–99)
GLUCOSE BLD-MCNC: 218 MG/DL (ref 75–99)
GLUCOSE BLD-MCNC: 233 MG/DL (ref 75–99)
HBA1C MFR BLD: 9.5 % (ref 4–6)

## 2020-04-23 RX ADMIN — INSULIN ASPART SCH UNIT: 100 INJECTION, SOLUTION INTRAVENOUS; SUBCUTANEOUS at 08:02

## 2020-04-23 RX ADMIN — PANTOPRAZOLE SODIUM SCH MG: 40 TABLET, DELAYED RELEASE ORAL at 07:59

## 2020-04-23 RX ADMIN — METOPROLOL TARTRATE SCH MG: 25 TABLET, FILM COATED ORAL at 21:06

## 2020-04-23 RX ADMIN — HEPARIN SODIUM SCH UNIT: 5000 INJECTION, SOLUTION INTRAVENOUS; SUBCUTANEOUS at 07:59

## 2020-04-23 RX ADMIN — HEPARIN SODIUM SCH UNIT: 5000 INJECTION, SOLUTION INTRAVENOUS; SUBCUTANEOUS at 21:05

## 2020-04-23 RX ADMIN — PANTOPRAZOLE SODIUM SCH MG: 40 TABLET, DELAYED RELEASE ORAL at 21:05

## 2020-04-23 RX ADMIN — INSULIN DETEMIR SCH UNIT: 100 INJECTION, SOLUTION SUBCUTANEOUS at 21:05

## 2020-04-23 RX ADMIN — INSULIN ASPART SCH UNIT: 100 INJECTION, SOLUTION INTRAVENOUS; SUBCUTANEOUS at 21:05

## 2020-04-23 RX ADMIN — INSULIN ASPART SCH UNIT: 100 INJECTION, SOLUTION INTRAVENOUS; SUBCUTANEOUS at 17:21

## 2020-04-23 RX ADMIN — LACOSAMIDE SCH MG: 50 TABLET, FILM COATED ORAL at 21:06

## 2020-04-23 RX ADMIN — LACOSAMIDE SCH MG: 50 TABLET, FILM COATED ORAL at 07:58

## 2020-04-23 RX ADMIN — CITALOPRAM HYDROBROMIDE SCH MG: 10 TABLET ORAL at 07:58

## 2020-04-23 RX ADMIN — CLOPIDOGREL BISULFATE SCH MG: 75 TABLET ORAL at 07:59

## 2020-04-23 RX ADMIN — METOPROLOL TARTRATE SCH MG: 25 TABLET, FILM COATED ORAL at 17:21

## 2020-04-23 RX ADMIN — ATORVASTATIN CALCIUM SCH MG: 20 TABLET, FILM COATED ORAL at 21:05

## 2020-04-23 RX ADMIN — METOPROLOL TARTRATE SCH MG: 25 TABLET, FILM COATED ORAL at 07:59

## 2020-04-23 RX ADMIN — INSULIN ASPART SCH UNIT: 100 INJECTION, SOLUTION INTRAVENOUS; SUBCUTANEOUS at 12:42

## 2020-04-23 NOTE — P.PN
Subjective





From the records:


Patient is a pleasant 49-year-old female with end-stage renal disease on 

hemodialysis and history of CVA in the past came in because of seizure and for 

prolonged postictal.  Patient's seizure was witnessed by the  patient has

an aura which actually to get the dog to bring her  patient didn't have 

any loss of bowel or bladder continence denied any tongue biting.  Patient had a

extensive history of seizures patient's seizures are finally under control.  I 

did discuss with neurology at length patient normally gets her antiseizure 

medications after dialysis.  We are getting the levels of Keppra and vimpat.  

Patient was started on IV Vimpat.  Patient to regular hemodialysis is Monday Wednesday Friday and nephrology was consulted patient will undergo hemodialysis 

today.  Patient apparently had a prolonged postictal period of 5 hours





Subjective:


Patient is a pleasant 49 years old female with past medical history of end-stage

renal disease, diabetes mellitus on 15 units of insulin Levemir at bedtime and 

history of seizure medication on Keppra 750 mg twice daily and Vimpat 100 mg 

twice daily, patient presents with breakthrough seizure.  Patient is alert and 

awake today and states that maybe she missed her dose, and that her  

helped her with medication but was not sure what happened.  Also patient noticed

to be slow to respond and answer questions with possible some memory problems 

and difficulties


On admission CT of the brain was unremarkable, neurologist recommended MRI 

however could not be done because of possible nerve stent later that she has, 

we'll follow-up with the neurologist about further recommendation.  Also patient

was started on Vimpat IV 75 mg twice daily at Keppra  mg twice daily while

the levels are still pending.  Also EEG is pending


Patient herself denies other complaints this morning.  Vital stable.  Labs noted

with sodium 133 and potassium 3.4, LDL is elevated at 133.  Her sugar was 62 

this morning and her insulin lowered from 15 down to 12 units at bedtime


Her neurologist as an outpatient is Dr. Arevalo





04/22/2020


Patient is awake and alert.  No other new complaints.  No dyspnea.  Patient says

that she had a stroke about 2 weeks ago with residual left hemiparesis and 

dysarthria.  No more seizure today.  EEG showing increased risk of seizure and 

there is a potential epileptogenic focus in the midline frontal region


 Patient is followed by neurology and switched to Vimpat 150 mg daily plus extra

10 mg on Monday, Wednesday and Friday.  Also she switched to Keppra 750 mg twice

daily.  Also she is undergoing hemodialysis today


Vitals are stable.  Sugar is controlled.





04/23/2020


Patient has no more seizure, neurology evaluated the patient with recommendation

for follow-up upon discharge.  Patient will be discharged on Vimpat and Keppra, 

she will need extra doses of Vimpat 100 mg and Keppra 250 mg about 20 minutes 

after hemodialysis.  Also patient will need to follow-up with her neurologist at

her primary care doctor.  Holter monitor is recommended for her as well as to 

check for sleep studies.


Home health care has been arranged for the patient.


Possible discharge in 24-48 hours





Objective





- Vital Signs


Vital signs: 


                                   Vital Signs











Temp  98.1 F   04/23/20 12:48


 


Pulse  75   04/23/20 12:48


 


Resp  16   04/23/20 12:48


 


BP  129/73   04/23/20 12:48


 


Pulse Ox  99   04/23/20 12:48








                                 Intake & Output











 04/22/20 04/23/20 04/23/20





 18:59 06:59 18:59


 


Output Total 4200  


 


Balance -4200  


 


Output:   


 


  Hemodialysis 2200  


 


  Other 2000  


 


Other:   


 


  Voiding Method Bedside Commode Bedside Commode 





 Diaper Diaper 


 


  # Voids 1 1 


 


  # Bowel Movements 1  














- Exam





-GENERAL: The patient is alert and oriented x3, but she answers to slowly not in

any acute distress. Well developed, well nourished. 


HEENT: Pupils are round and equally reacting to light. EOMI. No scleral icterus.

No conjunctival pallor. Normocephalic, atraumatic. No pharyngeal erythema. No 

thyromegaly. 


CARDIOVASCULAR: S1 and S2 present. No murmurs, rubs, or gallops. 


PULMONARY: Chest is clear to auscultation, no wheezing or crackles. 


ABDOMEN: Soft, nontender, nondistended, normoactive bowel sounds. No palpable 

organomegaly. 


MUSCULOSKELETAL: No joint swelling or deformity.


EXTREMITIES: No cyanosis, clubbing, or pedal edema. 


NEUROLOGICAL: Gross neurological examination did not reveal any focal deficits. 


SKIN: No rashes. 





- Labs


CBC & Chem 7: 


                                 04/20/20 05:45





                                 04/21/20 05:16


Labs: 


                  Abnormal Lab Results - Last 24 Hours (Table)











  04/20/20 04/22/20 04/22/20 Range/Units





  05:45 16:58 20:49 


 


POC Glucose (mg/dL)   237 H  241 H  (75-99)  mg/dL


 


Hemoglobin A1c  9.5 H    (4.0-6.0)  %














  04/23/20 04/23/20 04/23/20 Range/Units





  02:17 07:15 11:20 


 


POC Glucose (mg/dL)  233 H  182 H  209 H  (75-99)  mg/dL


 


Hemoglobin A1c     (4.0-6.0)  %














Assessment and Plan


Assessment: 





-Breakthrough seizures most probably noncompliance with medications levels of 

her antiseizure medications are being obtained and Vimpat was switched to IV.  

Follow-up Keppra and Vimpat several, follow-up EEG.   consult to 

help with her taking medication


-End-stage renal disease, dialysis dependent secondary to diabetic nephropathy 

patient will undergo hemodialysis today nephrology was consulted


-Type 2 diabetes mellitus patient will be resumed on her home medications and 

will titrate depending on her requirements of insulin.  4 insulin down to 12 

units at bedtime


-Hypertension 


-hypothyroidism


-Bipolar disorder


-DVT prophylaxis with subcutaneous heparin


Prognosis is guarded

## 2020-04-23 NOTE — PN
PROGRESS NOTE



Patient is seen for end-stage renal disease.  She has not had any further seizures.

She is comfortable. Patient denies any significant complaints.  She states she will

possibly be going home today.



PHYSICAL EXAMINATION:

On examination, blood pressure 142/64, heart rate 75 per minute. Patient is afebrile.

EXAMINATION OF THE HEART: S1 and S2.

EXAMINATION OF LUNGS: Bilateral breath sounds are heard.

ABDOMEN:  Soft, non-tender.

Examination of lower extremities shows no evidence of edema.



ASSESSMENT:

1. End-stage renal disease, on hemodialysis on a Monday, Wednesday, Friday schedule.

2. Breakthrough seizures, being followed by Neurology, status post IV lacosamide.

3. Chronic kidney disease mineral bone disorder.

4. Hypertension, currently stable.



PLAN:

Hemodialysis in a.m. if patient is still here.  Otherwise she can be dialyzed as

outpatient at her outpatient unit.





MMODL / IJN: [id number] / Job#: [id number]

## 2020-04-23 NOTE — PN
PROGRESS NOTE



NEUROLOGY PROGRESS NOTE:



DATE OF SERVICE:

04/22/2020



SUBJECTIVE:

No reports of any further seizure activity per nursing staff.  Patient has 
remained

stable today after dialysis.  Patient was unable to undergo MRI testing due to 
vagal

nerve stimulator.  Patient did not disclose that she had this on admission.



Patient reports that she is feeling better and is anxious to go home.  She 
reports

feeling better on the current dosing on her seizure medications.



OBJECTIVE:

EEG completed on April 21st showing this study was abnormal, consistent with the

patient with epilepsy.



EXAMINATION:

Patient is awake, alert, oriented to time, place, and person.  Her speech is 
fluent.

Patient is more alert compared to her admission.



Pupils are 2 mm, equally reactive to light and accommodation.



Cranial nerves:  Extraocular movements are full.  No nystagmus is noted on 
vertical or

horizontal gaze.  Face appears symmetric.  Gag reflex is intact.  Cranial nerve 
8 is

intact by clinical observation.  Shoulder shrug symmetric.



Motor examination: Dense hemiparesis of the left arm and leg.  Give-way weakness
is

present still in the right upper and lower extremity.  Pronator drift positive 
on the

left.



Coordination testing: Finger-to-nose testing on the right is intact without 
dysmetria.

Finger-to-nose testing on the left is limited due to hemiparesis.



Sensory examination:  Grossly intact to light touch throughout.



Gait examination: Deferred.



ASSESSMENT AND RECOMMENDATIONS:

This is a 49-year-old female who was admitted for breakthrough seizure activity.
 She

has a history of infancy onset epilepsy and is currently on both Vimpat and 
Keppra.

Her dosages have now been adjusted for her renal clearance since this patient is
in end-

stage renal disease on dialysis.  I have discussed this evening with the patient
the

reason for the adjustment of her doses as well as the requirement for her to 
have

immediate dosing of these medications immediately after dialysis.  We also 
discussed

the importance of home health care, helping to supervise her medication regimen.



RECOMMENDATIONS:

1. This patient can be prepared for discharge home.  The patient should have 
home

    health care in place along with proper instructions to dialysis unit for her
to

    receive immediately after dialysis 250 mg of Keppra and 100 mg of Vimpat.

2. Another potential trigger for this patient's lowered seizure threshold is 
increased

    risk for obstructive sleep apnea.  This patient is morbidly obese and does 
have a

    history of poor sleep continuity and excessive daytime sleepiness.  Please 
arrange

    for this to occur as an outpatient.

3. Patient should have arranged follow up with her neurologist, , within 
the

    next 2 weeks or sooner if there is any clinical change.

4. Anticonvulsant levels are still pending.  I will be here through next week 
and will

    follow up on these and assure that these level reports will get to her 
primary care

    doctor.

5. This patient will be going home on an EKG monitor.  The EKG monitor is 
recommended

    due to her being on 2 medications that have high potential for causing 
arrhythmias.

    With this patient's history of stroke and stroke risk factors, it would be

    beneficial to assure that these current medications are appropriate from a 
cardiac

    standpoint.  Instructions have been given to the cardiology service that the
report

    of this study will go to her primary care physician.

Thank you for allowing me to participate in the care of your patient.  
Anticipate

discharge for Thursday, April 23, 2020.





ANDREW / TIM: [id number] / Job#: [id number]

MTDD

## 2020-04-24 VITALS — HEART RATE: 73 BPM

## 2020-04-24 VITALS — TEMPERATURE: 98.3 F | RESPIRATION RATE: 20 BRPM | SYSTOLIC BLOOD PRESSURE: 123 MMHG | DIASTOLIC BLOOD PRESSURE: 57 MMHG

## 2020-04-24 LAB
GLUCOSE BLD-MCNC: 176 MG/DL (ref 75–99)
GLUCOSE BLD-MCNC: 92 MG/DL (ref 75–99)
GLUCOSE BLD-MCNC: 99 MG/DL (ref 75–99)

## 2020-04-24 RX ADMIN — INSULIN ASPART SCH: 100 INJECTION, SOLUTION INTRAVENOUS; SUBCUTANEOUS at 09:36

## 2020-04-24 RX ADMIN — METOPROLOL TARTRATE SCH MG: 25 TABLET, FILM COATED ORAL at 09:35

## 2020-04-24 RX ADMIN — PANTOPRAZOLE SODIUM SCH MG: 40 TABLET, DELAYED RELEASE ORAL at 09:36

## 2020-04-24 RX ADMIN — LACOSAMIDE SCH MG: 50 TABLET, FILM COATED ORAL at 09:35

## 2020-04-24 RX ADMIN — HEPARIN SODIUM SCH UNIT: 5000 INJECTION, SOLUTION INTRAVENOUS; SUBCUTANEOUS at 09:36

## 2020-04-24 RX ADMIN — INSULIN ASPART SCH UNIT: 100 INJECTION, SOLUTION INTRAVENOUS; SUBCUTANEOUS at 13:25

## 2020-04-24 RX ADMIN — CITALOPRAM HYDROBROMIDE SCH MG: 10 TABLET ORAL at 09:36

## 2020-04-24 RX ADMIN — LACOSAMIDE SCH MG: 50 TABLET, FILM COATED ORAL at 13:26

## 2020-04-24 RX ADMIN — CLOPIDOGREL BISULFATE SCH MG: 75 TABLET ORAL at 09:35

## 2020-04-24 NOTE — P.DS
Providers


Date of admission: 


04/19/20 23:49





Attending physician: 


Indu Multani MD





Consults: 





                                        





04/19/20 23:36


Consult Physician Routine 


   Consulting Provider: Vanessa Copeland


   Consult Reason/Comments: Seizure; prolonged post-ictal state


   Do you want consulting provider notified?: Yes


Consult Physician Routine 


   Consulting Provider: Esther Erickson


   Consult Reason/Comments: Dialysis


   Do you want consulting provider notified?: Yes











Primary care physician: 


Saturnino Castillo





Hospital Course: 





Diagnoses:


-Breakthrough seizures most probably noncompliance 


-End-stage renal disease, dialysis dependent secondary to diabetic nephropathy 

patient on hemodialysis Monday, Wednesday and Friday


-Type 2 diabetes mellitus on insulin 12 units at bedtime


-History of stroke with left-sided hemiparesis, using her walker


-Hypertension 


-hypothyroidism


-Bipolar disorder, not in active issue











Hospital course:


Patient is a pleasant 49-year-old female with end-stage renal disease on 

hemodialysis and history of CVA in the past came in because of seizure and for 

prolonged postictal.  Patient states that she forgets to take medication.  

Patient's seizure was witnessed by the .  Neurology evaluated the 

patient, EEG showing increased risk of seizure and there is a potential 

epileptogenic focus in the midline frontal region


At home she was on Keppra 750 mg twice daily and Vimpat 100 mg twice daily, this

has been adjusted per neurologist and level II Keppra 500 mg twice daily and 

Vimpat 100 mg in the morning and 50 mg at bedtime, on the top of that she was 

prescribed Keppra 250 mg extra doses after each dialysis and Vimpat 100 mg extra

doses after each hemodialysis on Monday, Wednesday and Friday.  I explained to 

the patient extensively her Medication schedule and she agrees with that


Also her sugar was on the low side and her home dose of insulin 15 units was 

adjusted to 12 units at bedtime


Patient Doing well with no more seizure activity.


Because of her history of left hemiparesis and weakness, she will need help.  

Physical therapy recommended rehab for her however she declined and she was 

adamant to go home with home health care and her  to help her To remind 

her taking her medication.


MRI/A of the brain was recommended by neurologist but it could not be done 

because patient has neuro stimulator, eventually patient has been cleared by 

neurologist for discharge with the recommendation she undergo sleep study and 

Holter monitor which was installed as an inpatient and mid to follow-up as an 

outpatient.  I called the patient PCP Dr. Castillo and discussed the case with him 

and this recommendation for sleep study, Holter monitor, and also to follow-up 

the level of Keppra and Vimpat and he kindly took note of these.  Patient 

informed and she agrees with her appointment with Dr. Castillo on 4/28


Also patient agrees with her appointment with Dr. Arevalo, her neurologist 

appointment on 4/30 states she will follow up.  I called the office for Dr. Arevalo

however he was not available to discuss the case, however I left a message with 

the staff because they told me he has excess to [facility] and he will look 

into the chart.


Patient was eager to go home for the last 2 days.  She denies any other sym

ptoms.  No chest pain or dyspnea, no abdominal pain, no nausea vomiting.  No 

fever


Patient was cleared for discharge by neurologist


Problems and management plan were discussed with the patient and he verbalized 

understanding and acceptance


Patient was found stable and can be discharged home however he needs follow-up 

as an outpatient. Patient was instructed to follow up with PCP Dr. Castillo and her

neurologist Dr. Arevalo as above and she agrees with the appointments.  I spoke 

with Dr. Castillo and discussed the case with him with recommendation for follow-up

Keppra and Vimpat, urology follow-up with Dr. Arevalo 4/29, need sleep study, 

follow-up Holter monitor and he currently took note of these.  Patient agrees 

with her appointment with Dr. Castillo on 4/28 and Dr. Arevalo on 4/30 at states she 

will follow-up.





NB: Pulse prescription could be related from recurrent pharmacy downstairs 

except for Vimpat, paper prescription is provided for the patient and I talked 

to the patient and her  and they want the patient to be discharged today 

and the  will get Vimpat pills filled up for her today.  Risks of 

noncompliance to medication were explained to the patient including but not 

limited to recurrence seizure Desi organ dysfunction and/or death and she 

verbalized understanding and acceptance





Gen: patient is a AAOx3, no distress


CVS: S1-S2, RRR, no murmur


Lungs: B/L CTA, no wheezing


Abdomen: soft, no distention, no tenderness, positive bowel sounds


Extremity: no leg edema or induration


-Neuro: Alert awake and oriented 3, chronic left hemiparesis





Time spent more than 35 minutes








Patient Condition at Discharge: Serious





Plan - Discharge Summary


Discharge Rx Participant: No


New Discharge Prescriptions: 


New


   Citalopram Hydrobromide [CeleXA] 10 mg PO DAILY #30 tab


   levETIRAcetam [Keppra] 250 mg PO MoWeFr #40 tab


   levETIRAcetam [Keppra] 500 mg PO Q12H #60 tab


   Atorvastatin [Lipitor] 60 mg PO HS #30 tab


   amLODIPine [Norvasc] 10 mg PO DAILY #30 tab


   Pantoprazole [Protonix] 40 mg PO DAILY #30 tablet.


   Acetaminophen Tab [Tylenol] 650 mg PO Q6HR PRN  tab


     PRN Reason: Mild Pain Or Fever > 100.5


   Lacosamide [Vimpat] 50 mg PO HS #30 tablet


   Lacosamide [Vimpat] 100 mg PO MoWeFr #40 tablet


   Lacosamide [Vimpat] 100 mg PO DAILY #60 tablet


   Insulin Detemir (Levemir) [Levemir] 12 unit SQ HS #1 vial





Continue


   Ergocalciferol (Vitamin D2) [Vitamin D2] 50,000 unit PO Q7D


   Clopidogrel [Plavix] 75 mg PO DAILY


   Metoprolol Tartrate [Lopressor] 25 mg PO TID #90 tab


   levETIRAcetam [Keppra] 750 mg PO BID #60 tab


   Calcium Acetate 2,001 mg PO TID





Discontinued


   Lacosamide [Vimpat] 100 mg PO BID


Discharge Medication List





Clopidogrel [Plavix] 75 mg PO DAILY 07/23/17 [History]


Ergocalciferol (Vitamin D2) [Vitamin D2] 50,000 unit PO Q7D 07/23/17 [History]


Metoprolol Tartrate [Lopressor] 25 mg PO TID #90 tab 08/08/19 [Rx]


levETIRAcetam [Keppra] 750 mg PO BID #60 tab 12/06/19 [Rx]


Calcium Acetate 2,001 mg PO TID 04/20/20 [History]


Acetaminophen Tab [Tylenol] 650 mg PO Q6HR PRN  tab 04/24/20 [Rx]


Atorvastatin [Lipitor] 60 mg PO HS #30 tab 04/24/20 [Rx]


Citalopram Hydrobromide [CeleXA] 10 mg PO DAILY #30 tab 04/24/20 [Rx]


Insulin Detemir (Levemir) [Levemir] 12 unit SQ HS #1 vial 04/24/20 [Rx]


Lacosamide [Vimpat] 50 mg PO HS #30 tablet 04/24/20 [Rx]


Lacosamide [Vimpat] 100 mg PO DAILY #60 tablet 04/24/20 [Rx]


Lacosamide [Vimpat] 100 mg PO MoWeFr #40 tablet 04/24/20 [Rx]


Pantoprazole [Protonix] 40 mg PO DAILY #30 tablet.dr 04/24/20 [Rx]


amLODIPine [Norvasc] 10 mg PO DAILY #30 tab 04/24/20 [Rx]


levETIRAcetam [Keppra] 250 mg PO MoWeFr #40 tab 04/24/20 [Rx]


levETIRAcetam [Keppra] 500 mg PO Q12H #60 tab 04/24/20 [Rx]








Follow up Appointment(s)/Referral(s): 


Saturnino Castillo MD [Primary Care Provider] - 04/28/20 1:00 pm (Telehealth via 

your cell phone .)


[facility], [NON-STAFF] - As Needed


Michele Arevalo MD [STAFF PHYSICIAN] - 04/30/20 12:10 pm


Lisa Franco MD [STAFF PHYSICIAN] - 1 Week (Make appointment with [facility] for follow-up regarding possible sleep apnea - Phone number for 

sleep center is [phone] - Address: [address] [place] [address])


Patient Instructions/Handouts:  Epilepsy (DC)


Activity/Diet/Wound Care/Special Instructions: 


call [facility] med at d/c to apply holter monitor [id number]


Discharge Disposition: HOME SELF-CARE

## 2020-04-24 NOTE — PN
PROGRESS NOTE



Patient is seen for followup for end-stage renal disease.  She is currently seen on

dialysis.  Patient is tolerating her treatment well.



Blood pressure this morning 136/64, heart rate 73 per minute, she is afebrile.

Examination shows no evidence of edema bilateral lower extremities.  Patient is awake,

alert, oriented x3.  CNS exam is grossly intact.



I do not have any current labs available.



ASSESSMENT:

1. End-stage renal disease, on hemodialysis on a Monday, Wednesday, Friday schedule.

2. Breakthrough seizures, being followed by Neurology status post IV Lacosamide.

3. CKD mineral bone disorder.



PLAN:

Patient is stable for discharge from nephrology standpoint if cleared by Neurology.





MMODL / IJN: [id number] / Job#: [id number]

## 2020-05-27 ENCOUNTER — HOSPITAL ENCOUNTER (OUTPATIENT)
Dept: HOSPITAL 47 - EC | Age: 50
Setting detail: OBSERVATION
LOS: 2 days | Discharge: HOME | End: 2020-05-29
Attending: HOSPITALIST | Admitting: HOSPITALIST
Payer: MEDICARE

## 2020-05-27 DIAGNOSIS — E11.22: ICD-10-CM

## 2020-05-27 DIAGNOSIS — F31.9: ICD-10-CM

## 2020-05-27 DIAGNOSIS — R41.82: ICD-10-CM

## 2020-05-27 DIAGNOSIS — E11.40: ICD-10-CM

## 2020-05-27 DIAGNOSIS — I69.351: ICD-10-CM

## 2020-05-27 DIAGNOSIS — I12.0: ICD-10-CM

## 2020-05-27 DIAGNOSIS — E03.9: ICD-10-CM

## 2020-05-27 DIAGNOSIS — G40.409: Primary | ICD-10-CM

## 2020-05-27 DIAGNOSIS — L68.0: ICD-10-CM

## 2020-05-27 DIAGNOSIS — Z99.2: ICD-10-CM

## 2020-05-27 DIAGNOSIS — Z03.818: ICD-10-CM

## 2020-05-27 DIAGNOSIS — N18.6: ICD-10-CM

## 2020-05-27 DIAGNOSIS — Z87.891: ICD-10-CM

## 2020-05-27 DIAGNOSIS — Z88.0: ICD-10-CM

## 2020-05-27 DIAGNOSIS — Z79.02: ICD-10-CM

## 2020-05-27 DIAGNOSIS — E66.9: ICD-10-CM

## 2020-05-27 DIAGNOSIS — Z79.4: ICD-10-CM

## 2020-05-27 DIAGNOSIS — Z79.899: ICD-10-CM

## 2020-05-27 LAB — GLUCOSE BLD-MCNC: 320 MG/DL (ref 75–99)

## 2020-05-27 PROCEDURE — 36415 COLL VENOUS BLD VENIPUNCTURE: CPT

## 2020-05-27 PROCEDURE — 85025 COMPLETE CBC W/AUTO DIFF WBC: CPT

## 2020-05-27 PROCEDURE — 80320 DRUG SCREEN QUANTALCOHOLS: CPT

## 2020-05-27 PROCEDURE — 93005 ELECTROCARDIOGRAM TRACING: CPT

## 2020-05-27 PROCEDURE — 99285 EMERGENCY DEPT VISIT HI MDM: CPT

## 2020-05-27 PROCEDURE — 80177 DRUG SCRN QUAN LEVETIRACETAM: CPT

## 2020-05-27 PROCEDURE — 96374 THER/PROPH/DIAG INJ IV PUSH: CPT

## 2020-05-27 PROCEDURE — 80053 COMPREHEN METABOLIC PANEL: CPT

## 2020-05-27 PROCEDURE — 95816 EEG AWAKE AND DROWSY: CPT

## 2020-05-27 PROCEDURE — 96372 THER/PROPH/DIAG INJ SC/IM: CPT

## 2020-05-27 PROCEDURE — 70450 CT HEAD/BRAIN W/O DYE: CPT

## 2020-05-27 NOTE — ED
General Adult HPI





- General


Stated complaint: Seizure,diabetic


Time Seen by Provider: 20 21:52


Source: patient, EMS, RN notes reviewed


Mode of arrival: EMS


Limitations: altered mental status





- History of Present Illness


Initial comments: 





Patient is a pleasant 49-year-old female presenting to the emergency Department 

with reported to seizures.  Further history is limited.  Unclear how long it 

lasted.  Patient reportedly is on Keppra for seizures.  Patient has difficulty 

providing further history.





- Related Data


                                Home Medications











 Medication  Instructions  Recorded  Confirmed


 


Clopidogrel [Plavix] 75 mg PO DAILY 17


 


Ergocalciferol (Vitamin D2) 50,000 unit PO Q7D 17





[Vitamin D2]   


 


Calcium Acetate 2,001 mg PO TID 20








                                  Previous Rx's











 Medication  Instructions  Recorded


 


Metoprolol Tartrate [Lopressor] 25 mg PO TID #90 tab 19


 


Acetaminophen Tab [Tylenol] 650 mg PO Q6HR PRN  tab 20


 


Atorvastatin [Lipitor] 60 mg PO HS #30 tab 20


 


Citalopram Hydrobromide [CeleXA] 10 mg PO DAILY #30 tab 20


 


Insulin Detemir (Levemir) [Levemir] 12 unit SQ HS #1 vial 20


 


Lacosamide [Vimpat] 50 mg PO HS #30 tablet 20


 


Lacosamide [Vimpat] 100 mg PO DAILY #60 tablet 20


 


Lacosamide [Vimpat] 100 mg PO MoWeFr #40 tablet 20


 


Pantoprazole [Protonix] 40 mg PO DAILY #30 tablet.dr 20


 


amLODIPine [Norvasc] 10 mg PO DAILY #30 tab 20


 


levETIRAcetam [Keppra] 500 mg PO Q12H #60 tab 20











                                    Allergies











Allergy/AdvReac Type Severity Reaction Status Date / Time


 


Penicillins Allergy  Itching Verified 20 22:07














Review of Systems


ROS Statement: 


Those systems with pertinent positive or pertinent negative responses have been 

documented in the HPI.





ROS Other: All systems not noted in ROS Statement are negative.


Limitations: ROS unobtainable due to patients medical condition





Past Medical History


Past Medical History: Chest Pain / Angina, Diabetes Mellitus, Hypertension, 

Renal Disease, Seizure Disorder, Thyroid Disorder


Additional Past Medical History / Comment(s): dialysis bipolar.  Neuropathy


History of Any Multi-Drug Resistant Organisms: None Reported


Past Surgical History:  Section, Tonsillectomy


Additional Past Surgical History / Comment(s): fistula


Past Anesthesia/Blood Transfusion Reactions: No Reported Reaction


Past Psychological History: Bipolar


Smoking Status: Former smoker


Past Alcohol Use History: Occasional


Past Drug Use History: None Reported





- Past Family History


  ** Father


Family Medical History: Unable to Obtain





General Exam


Limitations: altered mental status


General appearance: other (Drowsy)


Head exam: Present: atraumatic


Eye exam: Present: normal appearance, PERRL


Neck exam: Present: normal inspection


Respiratory exam: Present: normal lung sounds bilaterally


Cardiovascular Exam: Present: regular rate, normal rhythm


GI/Abdominal exam: Present: soft.  Absent: tenderness


Extremities exam: Present: normal inspection


Neurological exam: Present: other (Drowsy.)


  ** Expanded


Neurological exam: Present: protecting the airway


Patient oriented to: Present: person, place


Motor strength exam: RUE: 4, LUE: 4, RLE: 4, LLE: 4


Eye Response: (4) open spontaneously


Motor Response: (6) obeys commands


Verbal Response: (4) confused conversation


Psychiatric exam: Present: flat affect


Skin exam: Present: normal color





Course


                                   Vital Signs











  20





  22:02 02:51 04:15


 


Temperature 97.1 F L  98.1 F


 


Pulse Rate 96 77 78


 


Respiratory 18 16 19





Rate   


 


Blood Pressure 153/97 147/88 152/77


 


O2 Sat by Pulse 99 99 97





Oximetry   














  20





  06:19 07:55


 


Temperature  


 


Pulse Rate 85 84


 


Respiratory 17 18





Rate  


 


Blood Pressure 156/86 146/90


 


O2 Sat by Pulse 98 100





Oximetry  














EKG Findings





- EKG Comments:


EKG Findings:: Normal sinus rhythm 95.  .  QRS 92.  .  .  

Normal axis.  Normal QRS.  No acute ST change.





Medical Decision Making





- Lab Data


Result diagrams: 


                                 20 00:10





                                 20 00:10


                                   Lab Results











  20 Range/Units





  22:09 00:10 00:10 


 


WBC   9.0   (3.8-10.6)  k/uL


 


RBC   4.20   (3.80-5.40)  m/uL


 


Hgb   12.9  D   (11.4-16.0)  gm/dL


 


Hct   37.4   (34.0-46.0)  %


 


MCV   89.0   (80.0-100.0)  fL


 


MCH   30.6   (25.0-35.0)  pg


 


MCHC   34.4   (31.0-37.0)  g/dL


 


RDW   14.0   (11.5-15.5)  %


 


Plt Count   200   (150-450)  k/uL


 


Neutrophils %   75   %


 


Lymphocytes %   17   %


 


Monocytes %   5   %


 


Eosinophils %   1   %


 


Basophils %   1   %


 


Neutrophils #   6.8   (1.3-7.7)  k/uL


 


Lymphocytes #   1.5   (1.0-4.8)  k/uL


 


Monocytes #   0.5   (0-1.0)  k/uL


 


Eosinophils #   0.1   (0-0.7)  k/uL


 


Basophils #   0.1   (0-0.2)  k/uL


 


Sodium    133 L  (137-145)  mmol/L


 


Potassium    4.3  (3.5-5.1)  mmol/L


 


Chloride    90 L  ()  mmol/L


 


Carbon Dioxide    30  (22-30)  mmol/L


 


Anion Gap    13  mmol/L


 


BUN    16  (7-17)  mg/dL


 


Creatinine    4.65 H  (0.52-1.04)  mg/dL


 


Est GFR (CKD-EPI)AfAm    12  (>60 ml/min/1.73 sqM)  


 


Est GFR (CKD-EPI)NonAf    10  (>60 ml/min/1.73 sqM)  


 


Glucose    311 H  (74-99)  mg/dL


 


POC Glucose (mg/dL)  320 H    (75-99)  mg/dL


 


POC Glu Operater ID  Ig[place]ia Sebastian A    


 


Calcium    9.3  (8.4-10.2)  mg/dL


 


Total Bilirubin    0.6  (0.2-1.3)  mg/dL


 


AST    30  (14-36)  U/L


 


ALT    16  (4-34)  U/L


 


Alkaline Phosphatase    94  ()  U/L


 


Total Protein    7.7  (6.3-8.2)  g/dL


 


Albumin    4.5  (3.5-5.0)  g/dL


 


Levetiracetam     (3.0-60.0)  ug/mL


 


Serum Alcohol    <10  mg/dL














  20 Range/Units





  00:10 


 


WBC   (3.8-10.6)  k/uL


 


RBC   (3.80-5.40)  m/uL


 


Hgb   (11.4-16.0)  gm/dL


 


Hct   (34.0-46.0)  %


 


MCV   (80.0-100.0)  fL


 


MCH   (25.0-35.0)  pg


 


MCHC   (31.0-37.0)  g/dL


 


RDW   (11.5-15.5)  %


 


Plt Count   (150-450)  k/uL


 


Neutrophils %   %


 


Lymphocytes %   %


 


Monocytes %   %


 


Eosinophils %   %


 


Basophils %   %


 


Neutrophils #   (1.3-7.7)  k/uL


 


Lymphocytes #   (1.0-4.8)  k/uL


 


Monocytes #   (0-1.0)  k/uL


 


Eosinophils #   (0-0.7)  k/uL


 


Basophils #   (0-0.2)  k/uL


 


Sodium   (137-145)  mmol/L


 


Potassium   (3.5-5.1)  mmol/L


 


Chloride   ()  mmol/L


 


Carbon Dioxide   (22-30)  mmol/L


 


Anion Gap   mmol/L


 


BUN   (7-17)  mg/dL


 


Creatinine   (0.52-1.04)  mg/dL


 


Est GFR (CKD-EPI)AfAm   (>60 ml/min/1.73 sqM)  


 


Est GFR (CKD-EPI)NonAf   (>60 ml/min/1.73 sqM)  


 


Glucose   (74-99)  mg/dL


 


POC Glucose (mg/dL)   (75-99)  mg/dL


 


POC Glu Operater ID   


 


Calcium   (8.4-10.2)  mg/dL


 


Total Bilirubin   (0.2-1.3)  mg/dL


 


AST   (14-36)  U/L


 


ALT   (4-34)  U/L


 


Alkaline Phosphatase   ()  U/L


 


Total Protein   (6.3-8.2)  g/dL


 


Albumin   (3.5-5.0)  g/dL


 


Levetiracetam  53.4  (3.0-60.0)  ug/mL


 


Serum Alcohol   mg/dL














Disposition


Clinical Impression: 


 Seizure





Disposition: ADMITTED AS IP TO THIS HOSP


Condition: Stable

## 2020-05-28 LAB
ALBUMIN SERPL-MCNC: 4.5 G/DL (ref 3.5–5)
ALP SERPL-CCNC: 94 U/L (ref 38–126)
ALT SERPL-CCNC: 16 U/L (ref 4–34)
ANION GAP SERPL CALC-SCNC: 13 MMOL/L
AST SERPL-CCNC: 30 U/L (ref 14–36)
BASOPHILS # BLD AUTO: 0.1 K/UL (ref 0–0.2)
BASOPHILS NFR BLD AUTO: 1 %
BUN SERPL-SCNC: 16 MG/DL (ref 7–17)
CALCIUM SPEC-MCNC: 9.3 MG/DL (ref 8.4–10.2)
CHLORIDE SERPL-SCNC: 90 MMOL/L (ref 98–107)
CO2 SERPL-SCNC: 30 MMOL/L (ref 22–30)
EOSINOPHIL # BLD AUTO: 0.1 K/UL (ref 0–0.7)
EOSINOPHIL NFR BLD AUTO: 1 %
ERYTHROCYTE [DISTWIDTH] IN BLOOD BY AUTOMATED COUNT: 4.2 M/UL (ref 3.8–5.4)
ERYTHROCYTE [DISTWIDTH] IN BLOOD: 14 % (ref 11.5–15.5)
GLUCOSE BLD-MCNC: 143 MG/DL (ref 75–99)
GLUCOSE BLD-MCNC: 382 MG/DL (ref 75–99)
GLUCOSE SERPL-MCNC: 311 MG/DL (ref 74–99)
HCT VFR BLD AUTO: 37.4 % (ref 34–46)
HGB BLD-MCNC: 12.9 GM/DL (ref 11.4–16)
LYMPHOCYTES # SPEC AUTO: 1.5 K/UL (ref 1–4.8)
LYMPHOCYTES NFR SPEC AUTO: 17 %
MCH RBC QN AUTO: 30.6 PG (ref 25–35)
MCHC RBC AUTO-ENTMCNC: 34.4 G/DL (ref 31–37)
MCV RBC AUTO: 89 FL (ref 80–100)
MONOCYTES # BLD AUTO: 0.5 K/UL (ref 0–1)
MONOCYTES NFR BLD AUTO: 5 %
NEUTROPHILS # BLD AUTO: 6.8 K/UL (ref 1.3–7.7)
NEUTROPHILS NFR BLD AUTO: 75 %
PLATELET # BLD AUTO: 200 K/UL (ref 150–450)
POTASSIUM SERPL-SCNC: 4.3 MMOL/L (ref 3.5–5.1)
PROT SERPL-MCNC: 7.7 G/DL (ref 6.3–8.2)
SODIUM SERPL-SCNC: 133 MMOL/L (ref 137–145)
WBC # BLD AUTO: 9 K/UL (ref 3.8–10.6)

## 2020-05-28 RX ADMIN — Medication SCH MG: at 08:40

## 2020-05-28 RX ADMIN — PANTOPRAZOLE SODIUM SCH MG: 40 TABLET, DELAYED RELEASE ORAL at 08:41

## 2020-05-28 RX ADMIN — DIVALPROEX SODIUM SCH MG: 500 TABLET, FILM COATED, EXTENDED RELEASE ORAL at 23:49

## 2020-05-28 RX ADMIN — METOPROLOL TARTRATE SCH MG: 25 TABLET, FILM COATED ORAL at 21:08

## 2020-05-28 RX ADMIN — Medication SCH MG: at 17:01

## 2020-05-28 RX ADMIN — INSULIN ASPART SCH UNIT: 100 INJECTION, SOLUTION INTRAVENOUS; SUBCUTANEOUS at 21:08

## 2020-05-28 RX ADMIN — METOPROLOL TARTRATE SCH MG: 25 TABLET, FILM COATED ORAL at 17:01

## 2020-05-28 RX ADMIN — METOPROLOL TARTRATE SCH MG: 25 TABLET, FILM COATED ORAL at 08:41

## 2020-05-28 RX ADMIN — CEFAZOLIN SCH MLS/HR: 330 INJECTION, POWDER, FOR SOLUTION INTRAMUSCULAR; INTRAVENOUS at 06:17

## 2020-05-28 RX ADMIN — HEPARIN SODIUM SCH UNIT: 5000 INJECTION, SOLUTION INTRAVENOUS; SUBCUTANEOUS at 20:20

## 2020-05-28 RX ADMIN — INSULIN ASPART SCH UNIT: 100 INJECTION, SOLUTION INTRAVENOUS; SUBCUTANEOUS at 17:00

## 2020-05-28 RX ADMIN — CEFAZOLIN SCH: 330 INJECTION, POWDER, FOR SOLUTION INTRAMUSCULAR; INTRAVENOUS at 13:27

## 2020-05-28 RX ADMIN — LACOSAMIDE SCH MG: 50 TABLET, FILM COATED ORAL at 08:51

## 2020-05-28 RX ADMIN — Medication SCH: at 14:14

## 2020-05-28 RX ADMIN — CLOPIDOGREL BISULFATE SCH MG: 75 TABLET ORAL at 08:41

## 2020-05-28 NOTE — EEG
ELECTROENCEPHALOGRAM REPORT



DATE OF STUDY:

05/28/2020.



HISTORY:

This is an inpatient EEG performed on a 49-year-old female with a known history of

childhood-onset epilepsy who again was admitted for frequent breakthrough seizures.

She was just last hospitalized in April, at which time her Keppra dose was lowered due

to her elevated creatinine.  This patient is a hemodialysis patient.



This patient's EEGs are primarily generalized tonic-clonic seizures.  Current

medications are Vimpat and Keppra.



TECHNICAL REPORT:

This is an inpatient EEG performed on the Mult[employer]ig EEG monitor with electrodes placed

according to the international 10-20 system and a single EKG channel.  Simultaneous

video EEG monitoring was performed.  This EEG was reviewed in both longitudinal bipolar

common average referential and transverse montages.  Photic stimulation was performed.

Hyperventilation was not performed.



The recording begins with the patient drowsy.  Frequently throughout the recording she

waxes and wanes between quiet wakefulness which is associated with a low voltage 8-9 Hz

posterior-dominant rhythm with low amplitude beta activity anteriorly and centrally.

Drowsiness is associated with an attenuation of the background rhythm and rare vertex

waves.  The patient did achieve stage II sleep, which was associated with sleep

spindles and frequent vertex sharp transients.  Both during wakefulness, drowsiness and

sleep, there were frequent bursts of 2-3 seconds of generalized high-amplitude

polyspike and single-spike and slow wave discharges.  These occurred at a rate of 1-2

cycles per second.  They were not associated with any clinical movement.



Photic stimulation was performed at various flash frequencies and failed to elicit a

consistent driving response.



IMPRESSION:

This is an abnormal EEG due to the frequent epileptiform activity noted occurring both

during wakefulness and drowsiness.  This EEG is not unsimilar to prior EEG studies.  No

clinical seizures occurred.  No abnormalities were noted in the EKG.



Further medical management is recommended along serial EEGs and/or if clinically

indicated, a more prolonged overnight study.





MMODL / IJN: [id number] / Job#: [id number]

## 2020-05-28 NOTE — P.HPIM
History of Present Illness


Patient is a very pleasant 57-year-old female with the history of refractory 

epilepsy in the end-stage renal disease and right-sided hemiplegia came in with 

complaints of seizure patient has multiple last physician for breakthrough s

eizures multiple medication changes were made patient follows up with the 

neurologist as as an outpatient hasn't been following with him for last 6 

months.  Patient the Prior dose was readjusted and patient and it is getting 

wrong dose of Keppra and taking that medication.  Patient the is hemodialysis 

dependent makes her situation even more complicated leading to breakthrough 

seizures as some of the antiseizure medications either accumulate are 

dialyzable.  Neurology evaluated the patient and that repeating EEG and cutting 

down the Keppra dose planning on weaning this off completely and starting the 

patient on valproic acid. 








Review of Systems











REVIEW OF SYSTEMS: 


CONSTITUTIONAL: No fever, no malaise, no fatigue. 


HEENT: No recent visual problems or hearing problems. Denied any sore throat. 


CARDIOVASCULAR: No chest pain, orthopnea, PND, no palpitations, no syncope. 


PULMONARY: No shortness of breath, no cough, no hemoptysis. 


GASTROINTESTINAL: No diarrhea, no nausea, no vomiting, no abdominal pain. 


NEUROLOGICAL: No headaches, no weakness, no numbness. 


HEMATOLOGICAL: Denies any bleeding or petechiae. 


GENITOURINARY: Denies any burning micturition, frequency, or urgency. 


MUSCULOSKELETAL/RHEUMATOLOGICAL: Denies any joint pain, swelling, or any muscle 

pain. 


ENDOCRINE: Denies any polyuria or polydipsia. 





The rest of the 14-point review of systems is negative.








Past Medical History


Past Medical History: Chest Pain / Angina, Diabetes Mellitus, Hypertension, 

Renal Disease, Seizure Disorder, Thyroid Disorder


Additional Past Medical History / Comment(s): dialysis bipolar.  Neuropathy


History of Any Multi-Drug Resistant Organisms: None Reported


Past Surgical History:  Section, Tonsillectomy


Additional Past Surgical History / Comment(s): fistula


Past Anesthesia/Blood Transfusion Reactions: No Reported Reaction


Past Psychological History: Bipolar


Smoking Status: Former smoker


Past Alcohol Use History: Occasional


Past Drug Use History: None Reported





- Past Family History


  ** Father


Family Medical History: Unable to Obtain





Medications and Allergies


                                Home Medications











 Medication  Instructions  Recorded  Confirmed  Type


 


Clopidogrel [Plavix] 75 mg PO DAILY 17 History


 


Ergocalciferol (Vitamin D2) 50,000 unit PO Q7D 17 History





[Vitamin D2]    


 


Metoprolol Tartrate [Lopressor] 25 mg PO TID #90 tab 19 Rx


 


levETIRAcetam [Keppra] 750 mg PO BID #60 tab 19 Rx


 


Calcium Acetate 2,001 mg PO TID 20 History


 


Acetaminophen Tab [Tylenol] 650 mg PO Q6HR PRN  tab 20  Rx


 


Atorvastatin [Lipitor] 60 mg PO HS #30 tab 20  Rx


 


Citalopram Hydrobromide [CeleXA] 10 mg PO DAILY #30 tab 20  Rx


 


Insulin Detemir (Levemir) [Levemir] 12 unit SQ HS #1 vial 20  Rx


 


Lacosamide [Vimpat] 50 mg PO HS #30 tablet 20  Rx


 


Lacosamide [Vimpat] 100 mg PO DAILY #60 tablet 20  Rx


 


Lacosamide [Vimpat] 100 mg PO MoWeFr #40 tablet 20  Rx


 


Pantoprazole [Protonix] 40 mg PO DAILY #30 tablet.dr 20  Rx


 


amLODIPine [Norvasc] 10 mg PO DAILY #30 tab 20  Rx


 


levETIRAcetam [Keppra] 250 mg PO MoWeFr #40 tab 20  Rx


 


levETIRAcetam [Keppra] 500 mg PO Q12H #60 tab 20  Rx








                                    Allergies











Allergy/AdvReac Type Severity Reaction Status Date / Time


 


Penicillins Allergy  Itching Verified 20 22:07














Physical Exam


Vitals: 


                                   Vital Signs











  Temp Pulse Pulse Resp BP BP Pulse Ox


 


 20 11:11  98 F   79  18   171/73  99


 


 20 09:33  98.1 F  84   18  146/90   100


 


 20 07:55   84   18  146/90   100


 


 20 06:19   85   17  156/86   98


 


 20 04:15  98.1 F  78   19  152/77   97


 


 20 02:51   77   16  147/88   99


 


 20 22:02  97.1 F L  96   18  153/97   99








                                Intake and Output











 20





 22:59 06:59 14:59


 


Intake Total   0


 


Output Total   0


 


Balance   0


 


Intake:   


 


  Blood Product   0


 


Output:   


 


  Urine   0


 


Other:   


 


  Weight 72.575 kg  72.575 kg




















PHYSICAL EXAMINATION: 





GENERAL: The patient is alert and oriented x3, not in any acute distress. Well 

developed, well nourished.  sHe does have hirsutism


HEENT: Pupils are round and equally reacting to light. EOMI. No scleral icterus.

 No conjunctival pallor. Normocephalic, atraumatic. No pharyngeal erythema. No 

thyromegaly. 


CARDIOVASCULAR: S1 and S2 present. No murmurs, rubs, or gallops. 


PULMONARY: Chest is clear to auscultation, no wheezing or crackles. 


ABDOMEN: Soft, nontender, nondistended, normoactive bowel sounds. No palpable 

organomegaly. 


MUSCULOSKELETAL: No joint swelling or deformity.


EXTREMITIES: No cyanosis, clubbing, or pedal edema. 


NEUROLOGICAL: Gross neurological examination did not reveal any focal deficits. 


SKIN: No rashes. 














Results


CBC & Chem 7: 


                                 20 00:10





                                 20 00:10


Labs: 


                  Abnormal Lab Results - Last 24 Hours (Table)











  20 Range/Units





  22:09 00:10 


 


Sodium   133 L  (137-145)  mmol/L


 


Chloride   90 L  ()  mmol/L


 


Creatinine   4.65 H  (0.52-1.04)  mg/dL


 


Glucose   311 H  (74-99)  mg/dL


 


POC Glucose (mg/dL)  320 H   (75-99)  mg/dL














Thrombosis Risk Factor Assmnt





- Choose All That Apply


Any of the Below Risk Factors Present?: Yes


Each Factor Represents 1 point: Age 41-60 years, Obesity (BMI >25)


Other Risk Factors: No


Thrombosis Risk Factor Assessment Total Risk Factor Score: 2


Thrombosis Risk Factor Assessment Level: Low Risk





Assessment and Plan


Plan: 


Breakthrough seizures: Patient will be continued on Vimpat valproic acid is 

being added and patient is being weaned weaned off Right this time.


-End-stage renal disease hemodialysis dependent ration to will undergo hemodialy

sis as scheduled


-Type 2 diabetes mellitus


-Hypertension


-Hypothyroidism


-Bipolar disorder


-DVT prophylaxis with subcutaneous heparin

## 2020-05-28 NOTE — P.CNNES
History of Present Illness


Consult date: 20


Reason for Consult: Breakthrough seizures


Chief complaint: Increasing seizure activity


History of Present Illness: 





This is a new neurology consult for Norma a 49-year-old female with a history 

of childhood onset medically refractory epilepsy and history of stroke involving

residual deficits: Dense left upper extremity hemiplegia and hemiparesis of the 

left leg.  The patient presented with increasing seizure activity.  The patient 

was recently admitted here and had medications adjusted.  She is again back on a

older dosage of Keppra 750 twice a day.  This was adjusted to 500 twice a day 

prior to her last discharged due to her kidney function.  As seen again her 

kidney function is elevated creatinine 4.65.  Patient is on hemodialysis.  We 

are not sure why she ended up going back to this higher dosage but pharmacy was 

contacted today and we readjusted the Keppra back to her 500 every 12.  Patient 

is also on Vimpat as well.  This patient has multifactorial reasons for lowered 

seizure threshold.  Patient has significant stroke risk factors and is currently

on Plavix vitamin D3 metoprolol and atorvastatin.  She also has an underlying 

endocrine disorder and thyroid disorder.  She has the body habitus charac

teristic of polycystic ovary disease.  Obesity significant hirsutism.  To date 

because of the hand MA she still has not been able to be tested for obstructive 

sleep apnea.  We suspected on her last visit that obstructive sleep apnea could 

be lowering her seizure threshold leading to chronic sleep deprivation.





I was at the bedside reviewing preliminary recording of her EEG area the patient

appears to have generalized polyspike and slow-wave discharges.  This pattern is

characteristic for primary generalized epilepsy.





The patient currently follows Dr. Arevalo outpatient neurologist.  I will plan to 

be in contact with him today to discuss valproic acid is a better medication can

Keppra due to her kidney function.  Valproic acid is only clear to the liver and

is an excellent anticonvulsant for primary generalized epilepsy.  This is also 

used frequently and bipolar disease which this patient has a history of.





The patient denies having any recent falls or strokelike symptoms.  She has been

very tired and sleep deprived which again probably lowered her seizure 

threshold.





On admission her sodium was noted to be low along with her chloride next





Computed tomography scan of the head shows hypodensities with multiple 

cerebellar infarcts up to 12 mm.





Past Medical History


Past Medical History: Chest Pain / Angina, Diabetes Mellitus, Hypertension, Carlos

al Disease, Seizure Disorder, Thyroid Disorder


Additional Past Medical History / Comment(s): dialysis bipolar.  Neuropathy


History of Any Multi-Drug Resistant Organisms: None Reported


Past Surgical History:  Section, Tonsillectomy


Additional Past Surgical History / Comment(s): fistula


Past Anesthesia/Blood Transfusion Reactions: No Reported Reaction


Past Psychological History: Bipolar


Smoking Status: Former smoker


Past Alcohol Use History: Occasional


Past Drug Use History: None Reported





- Past Family History


  ** Father


Family Medical History: Unable to Obtain





Medications and Allergies


                                Home Medications











 Medication  Instructions  Recorded  Confirmed  Type


 


Clopidogrel [Plavix] 75 mg PO DAILY 17 History


 


Ergocalciferol (Vitamin D2) 50,000 unit PO Q7D 17 History





[Vitamin D2]    


 


Metoprolol Tartrate [Lopressor] 25 mg PO TID #90 tab 19 Rx


 


levETIRAcetam [Keppra] 750 mg PO BID #60 tab 19 Rx


 


Calcium Acetate 2,001 mg PO TID 20 History


 


Acetaminophen Tab [Tylenol] 650 mg PO Q6HR PRN  tab 20  Rx


 


Atorvastatin [Lipitor] 60 mg PO HS #30 tab 20  Rx


 


Citalopram Hydrobromide [CeleXA] 10 mg PO DAILY #30 tab 20  Rx


 


Insulin Detemir (Levemir) [Levemir] 12 unit SQ HS #1 vial 20  Rx


 


Lacosamide [Vimpat] 50 mg PO HS #30 tablet 20  Rx


 


Lacosamide [Vimpat] 100 mg PO DAILY #60 tablet 20  Rx


 


Lacosamide [Vimpat] 100 mg PO MoWeFr #40 tablet 20  Rx


 


Pantoprazole [Protonix] 40 mg PO DAILY #30 tablet.dr 20  Rx


 


amLODIPine [Norvasc] 10 mg PO DAILY #30 tab 20  Rx


 


levETIRAcetam [Keppra] 250 mg PO MoWeFr #40 tab 20  Rx


 


levETIRAcetam [Keppra] 500 mg PO Q12H #60 tab 20  Rx








                                    Allergies











Allergy/AdvReac Type Severity Reaction Status Date / Time


 


Penicillins Allergy  Itching Verified 20 22:07














Physical Examination





- Vital Signs


Vital Signs: 


                                   Vital Signs











  Temp Pulse Pulse Resp BP BP Pulse Ox


 


 20 11:11  98 F   79  18   171/73  99


 


 20 09:33  98.1 F  84   18  146/90   100


 


 20 07:55   84   18  146/90   100


 


 20 06:19   85   17  156/86   98


 


 20 04:15  98.1 F  78   19  152/77   97


 


 20 02:51   77   16  147/88   99


 


 20 22:02  97.1 F L  96   18  153/97   99








                                Intake and Output











 20





 22:59 06:59 14:59


 


Other:   


 


  Weight 72.575 kg  72.575 kg














Gen. exam: Patient drowsy no acute distress next line HEENT clear sclera clear 

oropharynx neck supple.


Pulses radial pedal pulses are equal and symmetric


Extremities no edema noted in the hands or feet 








neurologic exam mental status: Awake speech is slow but fluent.  Patient is 

oriented to time place person and situation.


Pupils: 2 mm equally react to light and accommodation


Cranial nerve exam: Cranial nerves III through XII are intact.


Motor examination: Strength is 5 out of 5 in the right upper and lower 

extremity.  There is left upper extremity hemiparesis and left lower extremity 

hemiparesis.


Sensory examination grossly intact to light touch throughout.


Coordination testing: Intact to finger to nose testing on the right hand.  

Patient has significant hemiparesis of left hand that she is unable to execute 

Movement.





Gait examination deferred





Results





- Laboratory Findings


CBC and BMP: 


                                 20 00:10





                                 20 00:10


Abnormal Lab Findings: 


                                  Abnormal Labs











  20





  22:09 00:10


 


Sodium   133 L


 


Chloride   90 L


 


Creatinine   4.65 H


 


Glucose   311 H


 


POC Glucose (mg/dL)  320 H 














Assessment and Plan


Assessment: 





Assessment: This is a 49-year-old female who has had frequent admissions to the 

hospital this past year for breakthrough seizures.  She has complex medical 

history that involves prior stroke, childhood onset medically refractive 

epilepsy, hypertension diabetes end-stage renal disease on hemodialysis and 

thyroid disease.





On her last admission her medications were adjusted to lower her Keppra down to 

an appropriate dose for a renal patient on dialysis and Vimpat was adjusted as 

well.  This patient accidentally got put back on the higher dose of Keppra which

 is not appropriate for patient with end-stage renal disease.  This is now been 

adjusted with pharmacy back to 500 every 12.  A brief preliminary review of her 

EEG shows that while she is awake she has 2-3 second bursts of generalized 

polyspike and slow-wave discharges.  This is seen commonly in primary epilepsy. 

 I will be reaching out to her neurologist today to discuss starting valproic 

acid which is cleared by the liver.  I strongly suggest that I he works with her

 tapering her off Keppra.  At this stage of her renal disease she should not be 

on Keppra.





Plan


1.  Follow-up with EEG completed today


2.  Adjust Keppra back to 500 twice a day.  Patient will receive only 250 mg 

tonight since she artery receive 750 mg this morning.


3.  Reach out to Dr. Arevalo neurologist.


4.  Continue with home meds


5.  Maintain aspiration precautions and seizure precautions in place.


6.  Ativan 1 mg IV for any seizure lasting more than 3 minutes.  Contact 

hospitalist and neurologist if this occurs.


7.  No need for MRI studies as this patient has had prior studies on prior 

admissions.


8.  Continue with Vimpat at current home doesn't dosing.  Follow-up with glucose

 a mild level.





This patient's prognosis remains guarded.  Further recommendations will be made 

as this case evolves.  Please note there will not be neurology coverage on 

Saturday or .  If there is any anticipation for need for transfer please 

contact Dr. Bacon discussed this as soon as possible.

## 2020-05-28 NOTE — CT
EXAMINATION TYPE: CT brain wo con

 

DATE OF EXAM: 5/28/2020

 

COMPARISON: 4/3/2013 and 8/5/2019

 

HISTORY: Seizure.

 

CT DLP: 1106.4 mGycm

Automated exposure control for dose reduction was used.

 

Exam limited slightly by motion. There is no mass effect nor midline shift. There is no sign of intra
cranial hemorrhage. Calvarium appears intact. The skull base is intact. There is no evidence of cereb
ral edema. There are multiple areas of hypodensity in the cortex in both cerebellar hemispheres.

 

IMPRESSION:

Hypodensities consistent with multiple cerebellar infarcts that measure up to 12 mm. No acute intracr
anial abnormality. No change.

## 2020-05-29 VITALS — DIASTOLIC BLOOD PRESSURE: 79 MMHG | TEMPERATURE: 98.1 F | SYSTOLIC BLOOD PRESSURE: 149 MMHG

## 2020-05-29 VITALS — HEART RATE: 73 BPM

## 2020-05-29 VITALS — RESPIRATION RATE: 18 BRPM

## 2020-05-29 LAB
GLUCOSE BLD-MCNC: 103 MG/DL (ref 75–99)
GLUCOSE BLD-MCNC: 303 MG/DL (ref 75–99)

## 2020-05-29 RX ADMIN — DIVALPROEX SODIUM SCH: 500 TABLET, FILM COATED, EXTENDED RELEASE ORAL at 08:44

## 2020-05-29 RX ADMIN — HEPARIN SODIUM SCH UNIT: 5000 INJECTION, SOLUTION INTRAVENOUS; SUBCUTANEOUS at 08:28

## 2020-05-29 RX ADMIN — Medication SCH MG: at 11:55

## 2020-05-29 RX ADMIN — LACOSAMIDE SCH MG: 50 TABLET, FILM COATED ORAL at 08:28

## 2020-05-29 RX ADMIN — METOPROLOL TARTRATE SCH MG: 25 TABLET, FILM COATED ORAL at 08:28

## 2020-05-29 RX ADMIN — Medication SCH MG: at 08:27

## 2020-05-29 RX ADMIN — CLOPIDOGREL BISULFATE SCH MG: 75 TABLET ORAL at 08:28

## 2020-05-29 RX ADMIN — INSULIN ASPART SCH: 100 INJECTION, SOLUTION INTRAVENOUS; SUBCUTANEOUS at 08:20

## 2020-05-29 RX ADMIN — PANTOPRAZOLE SODIUM SCH MG: 40 TABLET, DELAYED RELEASE ORAL at 08:28

## 2020-05-29 RX ADMIN — INSULIN ASPART SCH UNIT: 100 INJECTION, SOLUTION INTRAVENOUS; SUBCUTANEOUS at 13:07

## 2020-05-29 NOTE — P.DS
Providers


Date of admission: 


05/28/20 01:48





Attending physician: 


Gabby Sanches





Consults: 





                                        





05/28/20 01:49


Consult Physician Routine 


   Consulting Provider: Leigh Bacon


   Consult Reason/Comments: Seizure with prolonged post-ictal period


   Do you want consulting provider notified?: Yes











Primary care physician: 


Saturnino Castillo





[facility] Course: 


57-year-old female with the history of refractory epilepsy in the end-stage 

renal disease and right-sided hemiplegia came in with complaints of seizure 

patient has multiple last physician for breakthrough seizures multiple 

medication changes were made patient follows up with the neurologist as as an 

outpatient hasn't been following with him for last 6 months.  Patient the Prior 

dose was readjusted and patient and it is getting wrong dose of Keppra and 

taking that medication.  Patient the is hemodialysis dependent makes her 

situation even more complicated leading to breakthrough seizures as some of the 

antiseizure medications either accumulate are dialyzable.  Neurology evaluated 

the patient and that repeating EEG and cutting down the Keppra dose planning on 

weaning this off completely and starting the patient on valproic acid. 





05/29/2020


Patient is seizure free neurologist to try to convince her to take valproic acid

patient is not agreeable to take valproic acid and patient is willing to take 

rectal Valium as needed for seizures patient will follow up closely with her 

neurologist Dr. Vasquez and will be discharged today.





PHYSICAL EXAMINATION: 





GENERAL: The patient is alert and oriented x3, not in any acute distress. Well 

developed, well nourished.  sHe does have hirsutism


HEENT: Pupils are round and equally reacting to light. EOMI. No scleral icterus.

No conjunctival pallor. Normocephalic, atraumatic. No pharyngeal erythema. No 

thyromegaly. 


CARDIOVASCULAR: S1 and S2 present. No murmurs, rubs, or gallops. 


PULMONARY: Chest is clear to auscultation, no wheezing or crackles. 


ABDOMEN: Soft, nontender, nondistended, normoactive bowel sounds. No palpable 

organomegaly. 


MUSCULOSKELETAL: No joint swelling or deformity.


EXTREMITIES: No cyanosis, clubbing, or pedal edema. 


NEUROLOGICAL: Gross neurological examination did not reveal any focal deficits. 


SKIN: No rashes. 





Assessment and Plan


Plan: 


Breakthrough seizures: Management as mentioned above


-End-stage renal disease hemodialysis dependent ration to will undergo 

hemodialysis as scheduled


-Type 2 diabetes mellitus


-Hypertension


-Hypothyroidism


-Bipolar disorder


-DVT prophylaxis with subcutaneous heparin





Patient Condition at Discharge: Stable





Plan - Discharge Summary


Discharge Rx Participant: No


New Discharge Prescriptions: 


Continue


   Ergocalciferol (Vitamin D2) [Vitamin D2] 50,000 unit PO Q7D


   Clopidogrel [Plavix] 75 mg PO DAILY


   Metoprolol Tartrate [Lopressor] 25 mg PO TID #90 tab


   Calcium Acetate 2,001 mg PO TID


   Citalopram Hydrobromide [CeleXA] 10 mg PO DAILY #30 tab


   levETIRAcetam [Keppra] 500 mg PO Q12H #60 tab


   Atorvastatin [Lipitor] 60 mg PO HS #30 tab


   amLODIPine [Norvasc] 10 mg PO DAILY #30 tab


   Pantoprazole [Protonix] 40 mg PO DAILY #30 tablet.


   Acetaminophen Tab [Tylenol] 650 mg PO Q6HR PRN  tab


     PRN Reason: Mild Pain Or Fever > 100.5


   Lacosamide [Vimpat] 50 mg PO HS #30 tablet


   Lacosamide [Vimpat] 100 mg PO MoWeFr #40 tablet


   Lacosamide [Vimpat] 100 mg PO DAILY #60 tablet


   Insulin Detemir (Levemir) [Levemir] 12 unit SQ HS #1 vial





Discontinued


   levETIRAcetam [Keppra] 750 mg PO BID #60 tab


   levETIRAcetam [Keppra] 250 mg PO MoWeFr #40 tab


Discharge Medication List





Clopidogrel [Plavix] 75 mg PO DAILY 07/23/17 [History]


Ergocalciferol (Vitamin D2) [Vitamin D2] 50,000 unit PO Q7D 07/23/17 [History]


Metoprolol Tartrate [Lopressor] 25 mg PO TID #90 tab 08/08/19 [Rx]


Calcium Acetate 2,001 mg PO TID 04/20/20 [History]


Acetaminophen Tab [Tylenol] 650 mg PO Q6HR PRN  tab 04/24/20 [Rx]


Atorvastatin [Lipitor] 60 mg PO HS #30 tab 04/24/20 [Rx]


Citalopram Hydrobromide [CeleXA] 10 mg PO DAILY #30 tab 04/24/20 [Rx]


Insulin Detemir (Levemir) [Levemir] 12 unit SQ HS #1 vial 04/24/20 [Rx]


Lacosamide [Vimpat] 50 mg PO HS #30 tablet 04/24/20 [Rx]


Lacosamide [Vimpat] 100 mg PO DAILY #60 tablet 04/24/20 [Rx]


Lacosamide [Vimpat] 100 mg PO MoWeFr #40 tablet 04/24/20 [Rx]


Pantoprazole [Protonix] 40 mg PO DAILY #30 tablet. 04/24/20 [Rx]


amLODIPine [Norvasc] 10 mg PO DAILY #30 tab 04/24/20 [Rx]


levETIRAcetam [Keppra] 500 mg PO Q12H #60 tab 04/24/20 [Rx]








Follow up Appointment(s)/Referral(s): 


Saturnino Castillo MD [Primary Care Provider] - 06/03/20 10:30 am


Michele Arevalo MD [STAFF PHYSICIAN] - 06/02/20 1:20 pm


Activity/Diet/Wound Care/Special Instructions: 


Please make sure to keep follow up appointment with Dr. Arevalo. He will see you 

next week. However, per him, if appointment is NOT kept, patient will be 

discharged from physicians practice.


Discharge Disposition: HOME SELF-CARE

## 2020-05-29 NOTE — P.CEMON
Event monitor shows sinus rhythm


Sinus tachycardia


Occasional premature beats


No bradycardia arrhythmias


tachyarrhythmias


No specific symptoms reported

## 2020-06-02 NOTE — CDI
Outpatient Documentation Clarification Form





Date:  6/2/20



CDS/ Name: Lubna Cruz

Phone: If any questions, call Ashley Falcon  at [phone]



Patient Name:  Norma Thompson

Account Number:  [id number]

Admit Date:  5/28/20

Discharge: Date 5/29/20

   

ATTENTION: The [facility] Coding Staff appreciate your assistance in clarifying 
documentation. Please respond to the clarification below the line at the bottom 
and electronically sign. The [facility] Coding staff will review the response and 
follow-up if needed. Please note: Queries are made part of the [employer] 
Record. If you have any questions, please contact the .



Dear Dr. Santos:



Please provide clarification as to the laterality of the patient's hemiplegia. H
& P states right-sided hemiplegia and Consult documents left-sided hemplegia. In
order to code to the most specificity, please clarify. 





Thank you for your kind consideration.  
________________________________________________________________________________

______________

No residual weakness

MTDD

## 2020-06-04 ENCOUNTER — HOSPITAL ENCOUNTER (INPATIENT)
Dept: HOSPITAL 47 - EC | Age: 50
LOS: 2 days | Discharge: HOME | DRG: 100 | End: 2020-06-06
Attending: INTERNAL MEDICINE | Admitting: INTERNAL MEDICINE
Payer: MEDICARE

## 2020-06-04 DIAGNOSIS — Z91.14: ICD-10-CM

## 2020-06-04 DIAGNOSIS — Z79.899: ICD-10-CM

## 2020-06-04 DIAGNOSIS — E87.5: ICD-10-CM

## 2020-06-04 DIAGNOSIS — E87.6: ICD-10-CM

## 2020-06-04 DIAGNOSIS — E11.22: ICD-10-CM

## 2020-06-04 DIAGNOSIS — Z11.59: ICD-10-CM

## 2020-06-04 DIAGNOSIS — K21.9: ICD-10-CM

## 2020-06-04 DIAGNOSIS — Z79.02: ICD-10-CM

## 2020-06-04 DIAGNOSIS — I69.354: ICD-10-CM

## 2020-06-04 DIAGNOSIS — Z79.4: ICD-10-CM

## 2020-06-04 DIAGNOSIS — E83.9: ICD-10-CM

## 2020-06-04 DIAGNOSIS — Z88.0: ICD-10-CM

## 2020-06-04 DIAGNOSIS — R15.9: ICD-10-CM

## 2020-06-04 DIAGNOSIS — Z87.891: ICD-10-CM

## 2020-06-04 DIAGNOSIS — F31.9: ICD-10-CM

## 2020-06-04 DIAGNOSIS — R32: ICD-10-CM

## 2020-06-04 DIAGNOSIS — I12.0: ICD-10-CM

## 2020-06-04 DIAGNOSIS — G40.919: Primary | ICD-10-CM

## 2020-06-04 DIAGNOSIS — Z99.2: ICD-10-CM

## 2020-06-04 DIAGNOSIS — N18.6: ICD-10-CM

## 2020-06-04 DIAGNOSIS — E78.5: ICD-10-CM

## 2020-06-04 DIAGNOSIS — E03.9: ICD-10-CM

## 2020-06-04 LAB
ALBUMIN SERPL-MCNC: 4.4 G/DL (ref 3.5–5)
ALP SERPL-CCNC: 88 U/L (ref 38–126)
ALT SERPL-CCNC: 19 U/L (ref 4–34)
ANION GAP SERPL CALC-SCNC: 11 MMOL/L
AST SERPL-CCNC: 25 U/L (ref 14–36)
BASOPHILS # BLD AUTO: 0.1 K/UL (ref 0–0.2)
BASOPHILS NFR BLD AUTO: 1 %
BUN SERPL-SCNC: 29 MG/DL (ref 7–17)
CALCIUM SPEC-MCNC: 9.9 MG/DL (ref 8.4–10.2)
CHLORIDE SERPL-SCNC: 95 MMOL/L (ref 98–107)
CO2 SERPL-SCNC: 29 MMOL/L (ref 22–30)
EOSINOPHIL # BLD AUTO: 0 K/UL (ref 0–0.7)
EOSINOPHIL NFR BLD AUTO: 1 %
ERYTHROCYTE [DISTWIDTH] IN BLOOD BY AUTOMATED COUNT: 4.11 M/UL (ref 3.8–5.4)
ERYTHROCYTE [DISTWIDTH] IN BLOOD: 13.4 % (ref 11.5–15.5)
GLUCOSE BLD-MCNC: 162 MG/DL (ref 75–99)
GLUCOSE BLD-MCNC: 232 MG/DL (ref 75–99)
GLUCOSE SERPL-MCNC: 211 MG/DL (ref 74–99)
HCT VFR BLD AUTO: 36.9 % (ref 34–46)
HGB BLD-MCNC: 12.5 GM/DL (ref 11.4–16)
LYMPHOCYTES # SPEC AUTO: 1.3 K/UL (ref 1–4.8)
LYMPHOCYTES NFR SPEC AUTO: 28 %
MCH RBC QN AUTO: 30.3 PG (ref 25–35)
MCHC RBC AUTO-ENTMCNC: 33.8 G/DL (ref 31–37)
MCV RBC AUTO: 89.7 FL (ref 80–100)
MONOCYTES # BLD AUTO: 0.2 K/UL (ref 0–1)
MONOCYTES NFR BLD AUTO: 5 %
NEUTROPHILS # BLD AUTO: 3 K/UL (ref 1.3–7.7)
NEUTROPHILS NFR BLD AUTO: 64 %
PLATELET # BLD AUTO: 217 K/UL (ref 150–450)
POTASSIUM SERPL-SCNC: 6 MMOL/L (ref 3.5–5.1)
PROT SERPL-MCNC: 7.5 G/DL (ref 6.3–8.2)
SODIUM SERPL-SCNC: 135 MMOL/L (ref 137–145)
WBC # BLD AUTO: 4.7 K/UL (ref 3.8–10.6)

## 2020-06-04 PROCEDURE — 94640 AIRWAY INHALATION TREATMENT: CPT

## 2020-06-04 PROCEDURE — 93005 ELECTROCARDIOGRAM TRACING: CPT

## 2020-06-04 PROCEDURE — 80053 COMPREHEN METABOLIC PANEL: CPT

## 2020-06-04 PROCEDURE — 96375 TX/PRO/DX INJ NEW DRUG ADDON: CPT

## 2020-06-04 PROCEDURE — 99285 EMERGENCY DEPT VISIT HI MDM: CPT

## 2020-06-04 PROCEDURE — 90935 HEMODIALYSIS ONE EVALUATION: CPT

## 2020-06-04 PROCEDURE — 80177 DRUG SCRN QUAN LEVETIRACETAM: CPT

## 2020-06-04 PROCEDURE — 85025 COMPLETE CBC W/AUTO DIFF WBC: CPT

## 2020-06-04 PROCEDURE — 36415 COLL VENOUS BLD VENIPUNCTURE: CPT

## 2020-06-04 PROCEDURE — 96374 THER/PROPH/DIAG INJ IV PUSH: CPT

## 2020-06-04 PROCEDURE — 80048 BASIC METABOLIC PNL TOTAL CA: CPT

## 2020-06-04 PROCEDURE — 80235 DRUG ASSAY LACOSAMIDE: CPT

## 2020-06-04 RX ADMIN — LACOSAMIDE SCH MG: 50 TABLET, FILM COATED ORAL at 21:30

## 2020-06-04 RX ADMIN — METOPROLOL TARTRATE SCH MG: 25 TABLET, FILM COATED ORAL at 21:30

## 2020-06-04 RX ADMIN — METOPROLOL TARTRATE SCH: 25 TABLET, FILM COATED ORAL at 22:53

## 2020-06-04 RX ADMIN — HEPARIN SODIUM SCH UNIT: 5000 INJECTION, SOLUTION INTRAVENOUS; SUBCUTANEOUS at 23:33

## 2020-06-04 RX ADMIN — SODIUM POLYSTYRENE SULFONATE ONE GM: 15 SUSPENSION ORAL; RECTAL at 16:21

## 2020-06-04 RX ADMIN — INSULIN ASPART SCH UNIT: 100 INJECTION, SOLUTION INTRAVENOUS; SUBCUTANEOUS at 21:30

## 2020-06-04 RX ADMIN — CEFAZOLIN SCH: 330 INJECTION, POWDER, FOR SOLUTION INTRAMUSCULAR; INTRAVENOUS at 19:22

## 2020-06-04 RX ADMIN — INSULIN DETEMIR SCH UNIT: 100 INJECTION, SOLUTION SUBCUTANEOUS at 21:31

## 2020-06-04 RX ADMIN — Medication SCH: at 21:03

## 2020-06-04 RX ADMIN — SODIUM POLYSTYRENE SULFONATE ONE: 15 SUSPENSION ORAL; RECTAL at 16:30

## 2020-06-04 RX ADMIN — INSULIN ASPART SCH UNIT: 100 INJECTION, SOLUTION INTRAVENOUS; SUBCUTANEOUS at 18:08

## 2020-06-04 RX ADMIN — LEVETIRACETAM SCH MLS/HR: 100 INJECTION, SOLUTION, CONCENTRATE INTRAVENOUS at 23:33

## 2020-06-04 RX ADMIN — ATORVASTATIN CALCIUM SCH MG: 20 TABLET, FILM COATED ORAL at 21:30

## 2020-06-04 NOTE — P.HPIM
History of Present Illness


patient is a pleasant 49-year-old female well-known to me from her previous 

hospitalization came in with an episode of seizure patient is still appears to 

be post ictal patient did not have any loss of bowel or bladder incontinence p

atient has multiple breakthrough seizures in the past multiple medication 

changes were made patient was suggested to have valproic acid in the past but 

patient the wanted to follow with PCP.  Patient is presently on Vimpat and 

Keppra.she is hemodialysis dependent which makes thr timing of antiseizure 

medications even more complicated patient is hypokalemic today.she is unable to 

provide much of history from me as patient appears to be still the postictal 

state





Review of Systems





unable to obtain due to her clinical condition





Past Medical History


Past Medical History: Chest Pain / Angina, Diabetes Mellitus, Hypertension, 

Renal Disease, Seizure Disorder, Thyroid Disorder


Additional Past Medical History / Comment(s): dialysis bipolar.  Neuropathy


History of Any Multi-Drug Resistant Organisms: None Reported


Past Surgical History:  Section, Tonsillectomy


Additional Past Surgical History / Comment(s): fistula


Past Anesthesia/Blood Transfusion Reactions: No Reported Reaction


Past Psychological History: Bipolar


Smoking Status: Former smoker


Past Alcohol Use History: Occasional


Past Drug Use History: None Reported





- Past Family History


  ** Father


Family Medical History: Unable to Obtain





Medications and Allergies


                                Home Medications











 Medication  Instructions  Recorded  Confirmed  Type


 


Clopidogrel [Plavix] 75 mg PO DAILY 17 History


 


Ergocalciferol (Vitamin D2) 50,000 unit PO Q7D 17 History





[Vitamin D2]    


 


Metoprolol Tartrate [Lopressor] 25 mg PO TID #90 tab 19 Rx


 


Calcium Acetate 2,001 mg PO TID 20 History


 


Acetaminophen Tab [Tylenol] 650 mg PO Q6HR PRN  tab 20  Rx


 


Atorvastatin [Lipitor] 60 mg PO HS #30 tab 20  Rx


 


Citalopram Hydrobromide [CeleXA] 10 mg PO DAILY #30 tab 20  Rx


 


Insulin Detemir (Levemir) [Levemir] 12 unit SQ HS #1 vial 20  Rx


 


Lacosamide [Vimpat] 50 mg PO HS #30 tablet 20  Rx


 


Lacosamide [Vimpat] 100 mg PO DAILY #60 tablet 20  Rx


 


Lacosamide [Vimpat] 100 mg PO MoWeFr #40 tablet 20  Rx


 


Pantoprazole [Protonix] 40 mg PO DAILY #30 tablet. 20  Rx


 


amLODIPine [Norvasc] 10 mg PO DAILY #30 tab 20  Rx


 


levETIRAcetam [Keppra] 500 mg PO Q12H #60 tab 20  Rx








                                    Allergies











Allergy/AdvReac Type Severity Reaction Status Date / Time


 


Penicillins Allergy  Itching Verified 20 11:54














Physical Exam


Vitals: 


                                   Vital Signs











  Temp Pulse Resp BP Pulse Ox


 


 20 16:52  97.5 F L  93  18  138/89  96


 


 20 15:50   93   


 


 20 15:37   82   


 


 20 13:00  98.6 F  87  15  152/96  98


 


 20 11:40  97.0 F L  106 H  15  161/100  99








                                Intake and Output











 20





 06:59 14:59 22:59


 


Other:   


 


  Weight  72.57 kg 

















PHYSICAL EXAMINATION: 





GENERAL:  is drowsy lethargic not in any acute distress. Well developed, 

well nourished. 


HEENT: Pupils are round and equally reacting to light. EOMI. No scleral icterus.

 No conjunctival pallor. Normocephalic, atraumatic. No pharyngeal erythema. No 

thyromegaly. 


CARDIOVASCULAR: S1 and S2 present. No murmurs, rubs, or gallops. 


PULMONARY: Chest is clear to auscultation, no wheezing or crackles. 


ABDOMEN: Soft, nontender, nondistended, normoactive bowel sounds. No palpable 

organomegaly. 


MUSCULOSKELETAL: No joint swelling or deformity.


EXTREMITIES: No cyanosis, clubbing, or pedal edema. 


NEUROLOGICAL: unable to assess due to her clinical condition patient is bit 

drowsy lethargic able to answer questions but significantly so slowly appears to

 be postictal


SKIN: No rashes. 

















Results


CBC & Chem 7: 


                                 20 12:39





                                 20 12:39


Labs: 


                  Abnormal Lab Results - Last 24 Hours (Table)











  20 Range/Units





  12:39 


 


Sodium  135 L  (137-145)  mmol/L


 


Potassium  6.0 H  (3.5-5.1)  mmol/L


 


Chloride  95 L  ()  mmol/L


 


BUN  29 H  (7-17)  mg/dL


 


Creatinine  6.74 H  (0.52-1.04)  mg/dL


 


Glucose  211 H  (74-99)  mg/dL














Assessment and Plan


Plan: 


-breakthrough seizures: Patient has a complicated history of uncontrolled s

eizures and patient is an end-stage renal disease which complicates the timing 

of antiseizure medication patient will be resumed on home when antiseizure 

medication neurology will be consulted patient is recently discharged from the 

hospital patient was advised to use valproic acid and discontinue Keppra but 

patient declined to follow the recommendation at that time


-End-stage renal disease secondary to diabetic nephropathy: Patient the will 

continue her outpatient hemodialysis schedule as an inpatient here nephrology 

will be consulted patient appears to be  hemodialysis


-Type 2 diabetes mellitus patient resumed on home regimen along with sliding 

scale insulin


-Hypertension


-Hyperlipidemia


-Gastro- esophageal reflux disease


-hypothyroidism





For above-mentioned chronic renal problems patient will be resumed on 

appropriate home medications


DVT prophylaxis with subcutaneous heparin

## 2020-06-04 NOTE — ED
General Adult HPI





- General


Source: RN notes reviewed





<Chuck Sheth - Last Filed: 20 16:02>





<Asha Britton - Last Filed: 20 16:32>





- General


Stated complaint: Seizure


Time Seen by Provider: 20 11:42





- History of Present Illness


Initial comments: 





49-year-old female with a past medical history of diabetes mellitus, dialysis, 

type or disorder, hypertension, seizure disorder presents to the emergency 

department for a chief complaint of seizures.  Patient has a past medical 

history of seizures and currently takes Keppra and Vimpat.  According doesn't 

patient has been taking these as directed.  Patient was at her primary care 

provider's office when she had a seizure.  A neurostimulator was used to open 

the seizure.  PCP reported to EMS the patient usually remains post ictal from 

anywhere between 2-4 hours.Patient has no other complaints at this time 

including shortness of breath, chest pain, abdominal pain, nausea or vomiting, 

headache, or visual changes. (Chuck Sheth)





- Related Data


                                Home Medications











 Medication  Instructions  Recorded  Confirmed


 


Clopidogrel [Plavix] 75 mg PO DAILY 17


 


Ergocalciferol (Vitamin D2) 50,000 unit PO Q7D 17





[Vitamin D2]   


 


Calcium Acetate 2,001 mg PO AC-TID 20


 


Atorvastatin [Lipitor] 20 mg PO PC-LUNCH 20


 


Atorvastatin [Lipitor] 40 mg PO HS 20


 


Insulin Glargine [Lantus] 12 unit INJ HS 20


 


Metoprolol Tartrate 25 mg PO BID 20








                                  Previous Rx's











 Medication  Instructions  Recorded


 


Acetaminophen Tab [Tylenol] 650 mg PO Q6HR PRN  tab 20


 


Citalopram Hydrobromide [CeleXA] 10 mg PO DAILY #30 tab 20


 


Pantoprazole [Protonix] 40 mg PO DAILY #30 tablet. 20


 


amLODIPine [Norvasc] 10 mg PO DAILY #30 tab 20


 


Lacosamide [Vimpat] 100 mg PO BID  tablet 20


 


Lacosamide [Vimpat] 100 mg PO MoWeFr@1400  tablet 20


 


levETIRAcetam [Keppra] 250 mg PO MoWeFr@1400  tab 20


 


levETIRAcetam [Keppra] 500 mg PO Q12HR  tab 20











                                    Allergies











Allergy/AdvReac Type Severity Reaction Status Date / Time


 


Penicillins Allergy  Itching Verified 20 22:57














Review of Systems


ROS Other: All systems not noted in ROS Statement are negative.





<Chuck Sheth P - Last Filed: 20 16:02>


ROS Other: All systems not noted in ROS Statement are negative.





<Asha Britton - Last Filed: 20 16:32>


ROS Statement: 


Those systems with pertinent positive or pertinent negative responses have been 

documented in the HPI.








Past Medical History


Past Medical History: Chest Pain / Angina, Diabetes Mellitus, Hypertension, 

Renal Disease, Seizure Disorder, Thyroid Disorder


Additional Past Medical History / Comment(s): dialysis bipolar.  Neuropathy


History of Any Multi-Drug Resistant Organisms: None Reported


Past Surgical History:  Section, Tonsillectomy


Additional Past Surgical History / Comment(s): fistula


Past Anesthesia/Blood Transfusion Reactions: No Reported Reaction


Past Psychological History: Bipolar


Smoking Status: Former smoker


Past Alcohol Use History: Occasional


Past Drug Use History: None Reported





- Past Family History


  ** Father


Family Medical History: Unable to Obtain





<Chuck Sheth P - Last Filed: 20 16:02>





General Exam


General appearance: alert, in no apparent distress


Head exam: Present: atraumatic, normocephalic, normal inspection


Eye exam: Present: normal appearance, PERRL, EOMI.  Absent: scleral icterus, 

conjunctival injection, periorbital swelling


ENT exam: Present: normal exam, mucous membranes moist


Neck exam: Present: normal inspection, full ROM.  Absent: tenderness, 

meningismus, lymphadenopathy


Respiratory exam: Present: normal lung sounds bilaterally.  Absent: respiratory 

distress, wheezes, rales, rhonchi, stridor


Cardiovascular Exam: Present: regular rate, normal rhythm, normal heart sounds. 

Absent: systolic murmur, diastolic murmur, rubs, gallop, clicks


GI/Abdominal exam: Present: soft, normal bowel sounds.  Absent: distended, 

tenderness, guarding, rebound, rigid


Neurological exam: Present: alert





<Chuck Sheth - Last Filed: 20 16:02>





Course





                                   Vital Signs











  20





  11:40 13:00 15:37


 


Temperature 97.0 F L 98.6 F 


 


Pulse Rate 106 H 87 82


 


Respiratory 15 15 





Rate   


 


Blood Pressure 161/100 152/96 


 


O2 Sat by Pulse 99 98 





Oximetry   














  20





  15:50 16:52


 


Temperature  97.5 F L


 


Pulse Rate 93 93


 


Respiratory  18





Rate  


 


Blood Pressure  138/89


 


O2 Sat by Pulse  96





Oximetry  














EKG Findings





- EKG Comments:


EKG Findings:: Normal sinus rhythm, ventricular rate 88, NE interval 166, QTc 

462





<Chuck Sheth - Last Filed: 20 16:02>





Medical Decision Making





- Lab Data


Result diagrams: 


                                 20 12:39





                                 20 12:39





<Chuck Sheth - Last Filed: 20 16:02>





- Lab Data


Result diagrams: 


                                 20 09:30





                                 20 06:07





<Asha Britton - Last Filed: 20 16:32>





- Medical Decision Making





Patient was recently admitted for refractory epilepsy.  Keppra dosing was 

changed to 500 mg twice a day.  No need for MRI studies at that time as patient 

has had prior studies on admission. Patient presents for seizure activity.  

Patient was found to have hyperkalemia of 6.0 with increase in creatinine.  No 

EKG changes.  Patient was given insulin, dextrose, albuterol, Kayexalate.  P

atient was also given a gram of Keppra for seizure activity.  She did not 

continue to have any seizures in the hospital.  Patient will be admitted for 

hyperkalemia and dialysis.  Nephrology will be consulted. (Chuck Sheth)


I was available for consultation in the emergency department.  The history and 

physical exam were done by the midlevel provider. I was consulted for this 

patients care. I reviewed the case with the midlevel provider and based on 

their presentation of the patient, I agree with the assessment, medical decision

making and plan of care as documented. Patient evaluated by myself. Patient was 

a difficult stick therefore I did perform ultrasound guided IV placement on the 

patient. 


Chart was dictated using Dragon dictation software.  Attempts were made to 

correct any dictation errors however some typographical errors may persist. 


Patient was seen during a national state of emergency due to the Covid-19 

pandemic. 


 (Asha Britton)





- Lab Data





                                   Lab Results











  20 Range/Units





  12:39 12:39 12:39 


 


WBC  4.7    (3.8-10.6)  k/uL


 


RBC  4.11    (3.80-5.40)  m/uL


 


Hgb  12.5    (11.4-16.0)  gm/dL


 


Hct  36.9    (34.0-46.0)  %


 


MCV  89.7    (80.0-100.0)  fL


 


MCH  30.3    (25.0-35.0)  pg


 


MCHC  33.8    (31.0-37.0)  g/dL


 


RDW  13.4    (11.5-15.5)  %


 


Plt Count  217    (150-450)  k/uL


 


Neutrophils %  64    %


 


Lymphocytes %  28    %


 


Monocytes %  5    %


 


Eosinophils %  1    %


 


Basophils %  1    %


 


Neutrophils #  3.0    (1.3-7.7)  k/uL


 


Lymphocytes #  1.3    (1.0-4.8)  k/uL


 


Monocytes #  0.2    (0-1.0)  k/uL


 


Eosinophils #  0.0    (0-0.7)  k/uL


 


Basophils #  0.1    (0-0.2)  k/uL


 


Sodium   135 L   (137-145)  mmol/L


 


Potassium   6.0 H   (3.5-5.1)  mmol/L


 


Chloride   95 L   ()  mmol/L


 


Carbon Dioxide   29   (22-30)  mmol/L


 


Anion Gap   11   mmol/L


 


BUN   29 H   (7-17)  mg/dL


 


Creatinine   6.74 H   (0.52-1.04)  mg/dL


 


Est GFR (CKD-EPI)AfAm   8   (>60 ml/min/1.73 sqM)  


 


Est GFR (CKD-EPI)NonAf   7   (>60 ml/min/1.73 sqM)  


 


Glucose   211 H   (74-99)  mg/dL


 


Calcium   9.9   (8.4-10.2)  mg/dL


 


Total Bilirubin   0.5   (0.2-1.3)  mg/dL


 


AST   25   (14-36)  U/L


 


ALT   19   (4-34)  U/L


 


Alkaline Phosphatase   88   ()  U/L


 


Total Protein   7.5   (6.3-8.2)  g/dL


 


Albumin   4.4   (3.5-5.0)  g/dL


 


Levetiracetam    62.5 H  (3.0-60.0)  ug/mL














Disposition


Is patient prescribed a controlled substance at d/c from ED?: No


Time of Disposition: 16:06





<Chuck Sheth P - Last Filed: 20 16:02>





<Asha Britton - Last Filed: 20 16:32>


Clinical Impression: 


 Intractable seizure disorder, Hyperkalemia, Chronic renal failure





Disposition: ADMITTED AS IP TO THIS HOSP


Condition: Fair

## 2020-06-05 LAB
ANION GAP SERPL CALC-SCNC: 11 MMOL/L
BASOPHILS # BLD AUTO: 0 K/UL (ref 0–0.2)
BASOPHILS NFR BLD AUTO: 1 %
BUN SERPL-SCNC: 37 MG/DL (ref 7–17)
CALCIUM SPEC-MCNC: 9.4 MG/DL (ref 8.4–10.2)
CHLORIDE SERPL-SCNC: 98 MMOL/L (ref 98–107)
CO2 SERPL-SCNC: 28 MMOL/L (ref 22–30)
EOSINOPHIL # BLD AUTO: 0.1 K/UL (ref 0–0.7)
EOSINOPHIL NFR BLD AUTO: 1 %
ERYTHROCYTE [DISTWIDTH] IN BLOOD BY AUTOMATED COUNT: 4.03 M/UL (ref 3.8–5.4)
ERYTHROCYTE [DISTWIDTH] IN BLOOD: 13.6 % (ref 11.5–15.5)
GLUCOSE BLD-MCNC: 130 MG/DL (ref 75–99)
GLUCOSE BLD-MCNC: 143 MG/DL (ref 75–99)
GLUCOSE BLD-MCNC: 263 MG/DL (ref 75–99)
GLUCOSE BLD-MCNC: 97 MG/DL (ref 75–99)
GLUCOSE SERPL-MCNC: 82 MG/DL (ref 74–99)
HCT VFR BLD AUTO: 36.1 % (ref 34–46)
HGB BLD-MCNC: 12.1 GM/DL (ref 11.4–16)
LYMPHOCYTES # SPEC AUTO: 1.5 K/UL (ref 1–4.8)
LYMPHOCYTES NFR SPEC AUTO: 29 %
MCH RBC QN AUTO: 30.1 PG (ref 25–35)
MCHC RBC AUTO-ENTMCNC: 33.6 G/DL (ref 31–37)
MCV RBC AUTO: 89.6 FL (ref 80–100)
MONOCYTES # BLD AUTO: 0.3 K/UL (ref 0–1)
MONOCYTES NFR BLD AUTO: 5 %
NEUTROPHILS # BLD AUTO: 3.2 K/UL (ref 1.3–7.7)
NEUTROPHILS NFR BLD AUTO: 63 %
PLATELET # BLD AUTO: 174 K/UL (ref 150–450)
POTASSIUM SERPL-SCNC: 4.2 MMOL/L (ref 3.5–5.1)
SODIUM SERPL-SCNC: 137 MMOL/L (ref 137–145)
WBC # BLD AUTO: 5.1 K/UL (ref 3.8–10.6)

## 2020-06-05 RX ADMIN — PANTOPRAZOLE SODIUM SCH MG: 40 TABLET, DELAYED RELEASE ORAL at 08:38

## 2020-06-05 RX ADMIN — HEPARIN SODIUM SCH UNIT: 5000 INJECTION, SOLUTION INTRAVENOUS; SUBCUTANEOUS at 23:46

## 2020-06-05 RX ADMIN — Medication SCH MG: at 18:12

## 2020-06-05 RX ADMIN — LACOSAMIDE SCH MG: 50 TABLET, FILM COATED ORAL at 22:18

## 2020-06-05 RX ADMIN — INSULIN DETEMIR SCH UNIT: 100 INJECTION, SOLUTION SUBCUTANEOUS at 22:19

## 2020-06-05 RX ADMIN — HEPARIN SODIUM SCH UNIT: 5000 INJECTION, SOLUTION INTRAVENOUS; SUBCUTANEOUS at 18:12

## 2020-06-05 RX ADMIN — CEFAZOLIN SCH: 330 INJECTION, POWDER, FOR SOLUTION INTRAMUSCULAR; INTRAVENOUS at 19:26

## 2020-06-05 RX ADMIN — INSULIN ASPART SCH: 100 INJECTION, SOLUTION INTRAVENOUS; SUBCUTANEOUS at 12:19

## 2020-06-05 RX ADMIN — METOPROLOL TARTRATE SCH MG: 25 TABLET, FILM COATED ORAL at 22:18

## 2020-06-05 RX ADMIN — CLOPIDOGREL BISULFATE SCH MG: 75 TABLET ORAL at 08:38

## 2020-06-05 RX ADMIN — Medication SCH MG: at 08:37

## 2020-06-05 RX ADMIN — Medication SCH: at 22:22

## 2020-06-05 RX ADMIN — INSULIN ASPART SCH: 100 INJECTION, SOLUTION INTRAVENOUS; SUBCUTANEOUS at 08:04

## 2020-06-05 RX ADMIN — HEPARIN SODIUM SCH UNIT: 5000 INJECTION, SOLUTION INTRAVENOUS; SUBCUTANEOUS at 08:38

## 2020-06-05 RX ADMIN — ATORVASTATIN CALCIUM SCH MG: 20 TABLET, FILM COATED ORAL at 22:18

## 2020-06-05 RX ADMIN — INSULIN ASPART SCH: 100 INJECTION, SOLUTION INTRAVENOUS; SUBCUTANEOUS at 22:19

## 2020-06-05 RX ADMIN — CITALOPRAM HYDROBROMIDE SCH MG: 10 TABLET ORAL at 08:38

## 2020-06-05 RX ADMIN — METOPROLOL TARTRATE SCH MG: 25 TABLET, FILM COATED ORAL at 08:38

## 2020-06-05 RX ADMIN — INSULIN ASPART SCH UNIT: 100 INJECTION, SOLUTION INTRAVENOUS; SUBCUTANEOUS at 18:12

## 2020-06-05 RX ADMIN — LEVETIRACETAM SCH MLS/HR: 100 INJECTION, SOLUTION, CONCENTRATE INTRAVENOUS at 22:15

## 2020-06-05 RX ADMIN — LEVETIRACETAM SCH MLS/HR: 100 INJECTION, SOLUTION, CONCENTRATE INTRAVENOUS at 08:37

## 2020-06-05 NOTE — CONS
CONSULTATION



REASON FOR CONSULT:

End-stage renal disease.



HISTORY OF PRESENT ILLNESS:

Patient is a 49-year-old female with end-stage renal disease, on hemodialysis on a

Monday, Wednesday, Friday schedule.  She also has a history of seizures and was

admitted to the hospital with a seizure at home.  The patient states that she had been

taking her medications regularly and does not remember having missed any of her

medications.  She is being followed by Neurology as an outpatient.  No further seizures

since admission.



PAST MEDICAL HISTORY:

Type 2 diabetes, hypertension, end-stage renal disease, seizure disorder,

hypothyroidism.



PAST SURGICAL HISTORY:

, tonsillectomy, AV fistula in left arm, history of bipolar disorder.



SOCIAL HISTORY:

Positive for patient being a former smoker.  No history of drug abuse or alcohol abuse.



MEDICATIONS:

Medications at home prior to admission included Plavix, vitamin D2, Lopressor, PhosLo,

Tylenol, Lipitor, insulin, Celexa, lacosamide, Protonix, Norvasc, Keppra.



ALLERGIES:

ALLERGIES include PENICILLIN, which causes itching.



REVIEW OF SYSTEMS:

As per HPI.  Other systems negative.



PHYSICAL EXAMINATION:

Patient is comfortable, awake. She is not in any acute distress.  Alert and oriented

x3, although poor historian.

EXAMINATION OF THE HEART: S1 and S2.

EXAMINATION OF LUNGS: Bilateral breath sounds are heard.

ABDOMEN:  Soft, non-tender.

Examination of lower extremities shows no evidence of edema.

CNS exam is grossly intact.



LABS:

Labs show sodium 137, potassium 4.2, chloride 98, BUN 37, serum creatinine 7.76,

hemoglobin 12.1 g/dL.



ASSESSMENT:

1. End-stage renal disease, on hemodialysis on a Monday, Wednesday, Friday schedule.

    We will arrange for hemodialysis today.

2. Breakthrough seizures in a patient with history of seizures.  Patient has been

    evaluated by Neurology several times.  Timing of her anti-seizure medications with

    dialysis has been discussed as well.  At this time she states that her medications

    are usually set up by her , and she takes them regularly.

3. Type 2 diabetes.

4. Chronic kidney disease mineral bone disorder.



PLAN:

Discontinue IV fluids.  Hemodialysis today.  Awaiting neurology input.



Thank you for this consultation.  Will continue to follow the patient with you during

her hospitalization.





MMODL / PATRICIAN: [id number] / Job#: [id number]

## 2020-06-05 NOTE — P.PN
Subjective


Progress Note Date: 06/05/20


Principal diagnosis: 





patient is a pleasant 49-year-old female well-known to me from her previous 

hospitalization came in with an episode of seizure patient is still appears to 

be post ictal patient did not have any loss of bowel or bladder incontinence 

patient has multiple breakthrough seizures in the past multiple medication 

changes were made patient was suggested to have valproic acid in the past but 

patient the wanted to follow with PCP.  Patient is presently on Vimpat and 

Keppra.she is hemodialysis dependent which makes the timing of antiseizure 

medications even more complicated patient is hypokalemic today.she is unable to 

provide much of history from me as patient appears to be still the postictal 

state





06/05/2020


Patient is seen and evaluated in follow-up currently receiving hemodialysis as 

she normally does on Monday/Wednesday/Friday.  Creatinine today was 7.76.  

Neurology consulted for seizures and currently pending at this time.  Patient 

was to follow-up with Dr. Arevalo in the outpatient setting and has not done so.  

Patient is presently on Vimpat and Keppra although being a dialysis patient 

Keppra is contraindicated.  Currently no reports of chest pain, shortness of 

breath, or palpitations.  Patient is afebrile.  No reports of nausea or vomiting

and patient is tolerating diet.








Objective





- Vital Signs


Vital signs: 


                                   Vital Signs











Temp  99.2 F   06/05/20 14:12


 


Pulse  80   06/05/20 14:12


 


Resp  16   06/05/20 14:12


 


BP  136/81   06/05/20 14:12


 


Pulse Ox  98   06/05/20 14:12








                                 Intake & Output











 06/04/20 06/05/20 06/05/20





 18:59 06:59 18:59


 


Intake Total   650


 


Output Total   1118


 


Balance   -468


 


Weight 72.57 kg  


 


Intake:   


 


  Intake, IV Titration   100





  Amount   


 


    levETIRAcetam  mg   100





    In Sodium Chloride 0.9%   





    100 ml @ 400 mls/hr IVPB   





    Q12HR [facility] Rx#:[id number]   


 


  Oral   550


 


Output:   


 


  Hemodialysis   1118


 


Other:   


 


  Voiding Method  Diaper Diaper





  Incontinent Incontinent


 


  # Voids  3 3














- Exam





GENERAL: Patient is lethargic although arousable, alert and oriented 3.  not in

any acute distress. Well developed, well nourished. 


HEENT: Pupils are round and equally reacting to light. EOMI. No scleral icterus.

No conjunctival pallor. Normocephalic, atraumatic. No pharyngeal erythema. No 

thyromegaly. 


CARDIOVASCULAR: S1 and S2 present. No murmurs, rubs, or gallops. 


PULMONARY: Chest is clear to auscultation, no wheezing or crackles. 


ABDOMEN: Soft, nontender, nondistended, normoactive bowel sounds. No palpable 

organomegaly. 


MUSCULOSKELETAL: No joint swelling or deformity.


EXTREMITIES: No cyanosis, clubbing, or pedal edema. 


NEUROLOGICAL: Continues to be slow to respond always responding to questions 

appropriately, fatigues easily


SKIN: No rashes. 





- Labs


CBC & Chem 7: 


                                 06/05/20 09:30





                                 06/05/20 06:07


Labs: 


                  Abnormal Lab Results - Last 24 Hours (Table)











  06/04/20 06/04/20 06/04/20 Range/Units





  12:39 17:48 20:37 


 


BUN     (7-17)  mg/dL


 


Creatinine     (0.52-1.04)  mg/dL


 


POC Glucose (mg/dL)   162 H  232 H  (75-99)  mg/dL


 


Levetiracetam  62.5 H    (3.0-60.0)  ug/mL














  06/05/20 06/05/20 Range/Units





  06:07 12:06 


 


BUN  37 H   (7-17)  mg/dL


 


Creatinine  7.76 H*   (0.52-1.04)  mg/dL


 


POC Glucose (mg/dL)   143 H  (75-99)  mg/dL


 


Levetiracetam    (3.0-60.0)  ug/mL














Assessment and Plan


Assessment: 





-breakthrough seizures: Patient has a complicated history of uncontrolled 

seizures and patient is also end-stage renal disease which complicates the 

timing of antiseizure medication patient will be resumed on home  antiseizure 

medication,  patient is recently discharged from the hospital patient was 

advised to use valproic acid and discontinue Keppra but patient declined to 

follow the recommendation at that time.  Patient also admits to never following 

up with Dr. Arevalo in the outpatient setting, neurology consult pending at this 

time.


-End-stage renal disease secondary to diabetic nephropathy: Patient the will 

continue her outpatient hemodialysis schedule, nephrology following.  To 

maintain Monday/Wednesday/Friday schedule


-Type 2 diabetes mellitus patient resumed on home regimen along with sliding 

scale insulin


-Hypertension


-Hyperlipidemia


-Gastroesophageal reflux disease


-hypothyroidism


-DVT prophylaxis: Subq heparin

## 2020-06-06 VITALS — DIASTOLIC BLOOD PRESSURE: 79 MMHG | HEART RATE: 76 BPM | SYSTOLIC BLOOD PRESSURE: 150 MMHG

## 2020-06-06 VITALS — TEMPERATURE: 98.2 F

## 2020-06-06 VITALS — RESPIRATION RATE: 16 BRPM

## 2020-06-06 LAB
GLUCOSE BLD-MCNC: 102 MG/DL (ref 75–99)
GLUCOSE BLD-MCNC: 251 MG/DL (ref 75–99)

## 2020-06-06 RX ADMIN — Medication SCH MG: at 08:01

## 2020-06-06 RX ADMIN — METOPROLOL TARTRATE SCH MG: 25 TABLET, FILM COATED ORAL at 08:02

## 2020-06-06 RX ADMIN — Medication SCH MG: at 12:16

## 2020-06-06 RX ADMIN — HEPARIN SODIUM SCH UNIT: 5000 INJECTION, SOLUTION INTRAVENOUS; SUBCUTANEOUS at 08:03

## 2020-06-06 RX ADMIN — CLOPIDOGREL BISULFATE SCH MG: 75 TABLET ORAL at 08:02

## 2020-06-06 RX ADMIN — INSULIN ASPART SCH UNIT: 100 INJECTION, SOLUTION INTRAVENOUS; SUBCUTANEOUS at 12:15

## 2020-06-06 RX ADMIN — CITALOPRAM HYDROBROMIDE SCH MG: 10 TABLET ORAL at 08:02

## 2020-06-06 RX ADMIN — HEPARIN SODIUM SCH: 5000 INJECTION, SOLUTION INTRAVENOUS; SUBCUTANEOUS at 16:26

## 2020-06-06 RX ADMIN — PANTOPRAZOLE SODIUM SCH MG: 40 TABLET, DELAYED RELEASE ORAL at 08:02

## 2020-06-06 RX ADMIN — INSULIN ASPART SCH: 100 INJECTION, SOLUTION INTRAVENOUS; SUBCUTANEOUS at 07:34

## 2020-06-06 NOTE — P.DS
Providers


Date of admission: 


06/04/20 16:05





Attending physician: 


Shobha Santos





Consults: 





                                        





06/04/20 16:05


Consult Physician Urgent 


   Consulting Provider: Dominik Fischer


   Consult Reason/Comments: breakthrough seizure


   Do you want consulting provider notified?: Yes





06/04/20 16:07


Consult Physician Routine 


   Consulting Provider: Esther Erickson


   Consult Reason/Comments: hyper kalemia, dialysis


   Do you want consulting provider notified?: Yes











Primary care physician: 


Saturnino Castillo





Hospital Course: 





49-year-old female well-known to me from her previous hospitalization came in 

with an episode of seizure patient is still appears to be post ictal patient did

not have any loss of bowel or bladder incontinence patient has multiple 

breakthrough seizures in the past multiple medication changes were made patient 

was suggested to have valproic acid in the past but patient the wanted to follow

with PCP.  Patient is presently on Vimpat and Keppra.she is hemodialysis 

dependent which makes the timing of antiseizure medications even more 

complicated patient is hypokalemic today.she is unable to provide much of 

history from me as patient appears to be still the postictal state





06/05/2020


Patient is seen and evaluated in follow-up currently receiving hemodialysis as 

she normally does on Monday/Wednesday/Friday.  Creatinine today was 7.76.  

Neurology consulted for seizures and currently pending at this time.  Patient 

was to follow-up with Dr. Arevalo in the outpatient setting and has not done so.  

Patient is presently on Vimpat and Keppra although being a dialysis patient 

Keppra is contraindicated.  Currently no reports of chest pain, shortness of 

breath, or palpitations.  Patient is afebrile.  No reports of nausea or vomiting

and patient is tolerating diet.





06/06/2020


Patient is a fever 2 days discussed with the neurologist at length recommended 

the Vimpat 100 mg twice a day every day along with an additional dose of 100 mg 

on nondialysis day after dialysis and Keppra find it twice a day every day and 

on dialysis days she need to use Keppra 250 after hemodialysis.  Patient will 

follow-up with her neurologist Dr. Arevalo.











PHYSICAL EXAMINATION: 





GENERAL: The patient is alert and oriented x3, not in any acute distress. Well 

developed, well nourished. 


HEENT: Pupils are round and equally reacting to light. EOMI. No scleral icterus.

No conjunctival pallor. Normocephalic, atraumatic. No pharyngeal erythema. No 

thyromegaly. 


CARDIOVASCULAR: S1 and S2 present. No murmurs, rubs, or gallops. 


PULMONARY: Chest is clear to auscultation, no wheezing or crackles. 


ABDOMEN: Soft, nontender, nondistended, normoactive bowel sounds. No palpable 

organomegaly. 


MUSCULOSKELETAL: No joint swelling or deformity.


EXTREMITIES: No cyanosis, clubbing, or pedal edema. 


NEUROLOGICAL: Gross neurological examination did not reveal any focal deficits. 


SKIN: No rashes. 














Assessment and Plan


Assessment: 





-breakthrough seizures: 


-End-stage renal disease secondary to diabetic nephropathy: 

Monday/Wednesday/Friday schedule for dialysis


-Type 2 diabetes mellitus patient resumed on home regimen along with sliding 

scale insulin


-Hypertension


-Hyperlipidemia


-Gastroesophageal reflux disease


-hypothyroidism





Patient Condition at Discharge: Fair





Plan - Discharge Summary


New Discharge Prescriptions: 


New


   levETIRAcetam [Keppra] 250 mg PO MoWeFr@1400  tab


   levETIRAcetam [Keppra] 500 mg PO Q12HR  tab


   Lacosamide [Vimpat] 100 mg PO BID  tablet


   Lacosamide [Vimpat] 100 mg PO MoWeFr@1400  tablet





Continue


   Ergocalciferol (Vitamin D2) [Vitamin D2] 50,000 unit PO Q7D


   Clopidogrel [Plavix] 75 mg PO DAILY


   Calcium Acetate 2,001 mg PO AC-TID


   Citalopram Hydrobromide [CeleXA] 10 mg PO DAILY #30 tab


   amLODIPine [Norvasc] 10 mg PO DAILY #30 tab


   Pantoprazole [Protonix] 40 mg PO DAILY #30 tablet.


   Acetaminophen Tab [Tylenol] 650 mg PO Q6HR PRN  tab


     PRN Reason: Mild Pain Or Fever > 100.5


   Atorvastatin [Lipitor] 40 mg PO HS


   Atorvastatin [Lipitor] 20 mg PO PC-LUNCH


   Insulin Glargine [Lantus] 12 unit INJ HS


   Metoprolol Tartrate 25 mg PO BID





Discontinued


   levETIRAcetam [Keppra] 500 mg PO Q12H #60 tab


   Lacosamide [Vimpat] 50 mg PO HS #30 tablet


   Lacosamide [Vimpat] 100 mg PO MOWEFR


   Lacosamide [Vimpat] 100 mg PO DAILY


   levETIRAcetam [Keppra] 500 mg PO MOWEFR


Discharge Medication List





Clopidogrel [Plavix] 75 mg PO DAILY 07/23/17 [History]


Ergocalciferol (Vitamin D2) [Vitamin D2] 50,000 unit PO Q7D 07/23/17 [History]


Calcium Acetate 2,001 mg PO AC-TID 04/20/20 [History]


Acetaminophen Tab [Tylenol] 650 mg PO Q6HR PRN  tab 04/24/20 [Rx]


Citalopram Hydrobromide [CeleXA] 10 mg PO DAILY #30 tab 04/24/20 [Rx]


Pantoprazole [Protonix] 40 mg PO DAILY #30 tablet.dr 04/24/20 [Rx]


amLODIPine [Norvasc] 10 mg PO DAILY #30 tab 04/24/20 [Rx]


Atorvastatin [Lipitor] 20 mg PO PC-LUNCH 06/04/20 [History]


Atorvastatin [Lipitor] 40 mg PO HS 06/04/20 [History]


Insulin Glargine [Lantus] 12 unit INJ HS 06/04/20 [History]


Metoprolol Tartrate 25 mg PO BID 06/05/20 [History]


Lacosamide [Vimpat] 100 mg PO BID  tablet 06/06/20 [Rx]


Lacosamide [Vimpat] 100 mg PO MoWeFr@1400  tablet 06/06/20 [Rx]


levETIRAcetam [Keppra] 250 mg PO MoWeFr@1400  tab 06/06/20 [Rx]


levETIRAcetam [Keppra] 500 mg PO Q12HR  tab 06/06/20 [Rx]








Follow up Appointment(s)/Referral(s): 


[facility], [NON-STAFF] - 


Saturnino Castillo MD [Primary Care Provider] - 3 Days


Discharge Disposition: HOME SELF-CARE

## 2020-06-06 NOTE — P.CNNES
History of Present Illness


Consult date: 20


Requesting physician: Asha Britton


Reason for Consult: Breakthrough seizure


History of Present Illness: 





Patient is a 49-year-old female with history of epilepsy, status post VNS, 

diabetes, hypertension, ESRD on hemodialysis, previous CVA, hypothyroidism, 

bipolar disorder, some report of noncompliance with medication came to the 

hospital because of breakthrough seizures.  Patient has been seen by 

neurologists recently multiple times.  Reviewed their notes.  Patient was at her

primary care physician office when she had a seizure.  Patient had a prolonged 

postictal state.  She was transferred to the hospital.  Patient is a dialysis 

patient.  Patient had a seizure last night also.  VNS magnet was swiped and she 

did not have as long postictal state.  The nurse witnessed the seizure, in which

patient was taking her medication, when she suddenly stared off in space.  

Brought her hands close to the chest and had slight clonic activity.  She was 

foaming from the mouth.  No tongue bite.





Patient is currently on Vimpat 100 mg daily, and an extra 100 mg every  postdialysis.  She is also on Keppra 500 mg every 12 hours, and

Keppra 500 mg every  and Friday postdialysis.  Patient is also 

on Plavix 75 mg daily.


EKG shows normal sinus rhythm.  Patient's previous EEG from 2020 showed 

abnormal EEG due to frequent epileptiform activity noted occurring both during 

wakefulness and drowsiness.  This consisted of frequent burst of 2-3 seconds of 

generalized high amplitude polyspike and single spike and slow wave discharges. 

These occurred at a rate of 1-2 cps.  They were not associated with any clinical

movement.  This EEG is not a similar to prior EEG studies.  No clinical seizure 

occurred.





Patient has history of seizure disorder since early childhood shortly after 

birth.  She follows up with Dr. Michele Arevalo.  Patient previously has been on 

Depakote, Tegretol, phenobarbital and Dilantin.  Patient has history of stroke 

with left-sided weakness in the past.





Review of Systems





Patient denies headache any problem with vision.  Patient has very slow 

mentation.  Patient denies any chest pain shortness of breath wheezing or cough.

 Patient has left hemiparesis.  Denies neck or back pain.  All other review of 

systems unremarkable except as per HPI.





Past Medical History


Past Medical History: Chest Pain / Angina, CVA/TIA, Diabetes Mellitus, 

Hypertension, Renal Disease, Seizure Disorder, Thyroid Disorder


Additional Past Medical History / Comment(s): dialysis bipolar.  Neuropathy


History of Any Multi-Drug Resistant Organisms: None Reported


Past Surgical History:  Section, Tonsillectomy


Additional Past Surgical History / Comment(s): fistula


Past Anesthesia/Blood Transfusion Reactions: No Reported Reaction


Past Psychological History: Bipolar


Smoking Status: Former smoker


Past Alcohol Use History: Occasional


Past Drug Use History: None Reported





- Past Family History


  ** Father


Family Medical History: Unable to Obtain





Medications and Allergies


                                Home Medications











 Medication  Instructions  Recorded  Confirmed  Type


 


Clopidogrel [Plavix] 75 mg PO DAILY 17 History


 


Ergocalciferol (Vitamin D2) 50,000 unit PO Q7D 17 History





[Vitamin D2]    


 


Calcium Acetate 2,001 mg PO AC-TID 20 History


 


Acetaminophen Tab [Tylenol] 650 mg PO Q6HR PRN  tab 20 Rx


 


Citalopram Hydrobromide [CeleXA] 10 mg PO DAILY #30 tab 20 Rx


 


Lacosamide [Vimpat] 50 mg PO HS #30 tablet 20 Rx


 


Pantoprazole [Protonix] 40 mg PO DAILY #30 tablet. 20 Rx


 


amLODIPine [Norvasc] 10 mg PO DAILY #30 tab 20 Rx


 


levETIRAcetam [Keppra] 500 mg PO Q12H #60 tab 20 Rx


 


Atorvastatin [Lipitor] 20 mg PO PC-LUNCH 20 History


 


Atorvastatin [Lipitor] 40 mg PO HS 20 History


 


Insulin Glargine [Lantus] 12 unit INJ HS 20 History


 


Lacosamide [Vimpat] 100 mg PO DAILY 20 History


 


Lacosamide [Vimpat] 100 mg PO MOWEFR 20 History


 


levETIRAcetam [Keppra] 500 mg PO MOWEFR 20 History


 


Metoprolol Tartrate 25 mg PO BID 20 History








                                    Allergies











Allergy/AdvReac Type Severity Reaction Status Date / Time


 


Penicillins Allergy  Itching Verified 20 22:57














Physical Examination





- Vital Signs


Vital Signs: 


                                   Vital Signs











  Temp Pulse Pulse Pulse Resp BP BP


 


 20 13:01  98.4 F    87  18   128/66


 


 20 08:00    87   16  


 


 20 07:13  98.4 F   87   16   163/82


 


 20 04:14      16  


 


 20 01:55  98.3 F   97   16   137/86


 


 20 23:33      18  


 


 20 21:57    107 H     154/84


 


 20 19:38        194/76


 


 20 19:12      18  


 


 20 18:50  98.6 F   103 H   16   156/91


 


 20 16:52  97.5 F L  93    18  138/89 


 


 20 15:50   93     


 


 20 15:37   82     














  Pulse Ox


 


 20 13:01 


 


 20 08:00 


 


 20 07:13  99


 


 20 04:14 


 


 20 01:55  98


 


 20 23:33 


 


 20 21:57  98


 


 20 19:38 


 


 20 19:12 


 


 20 18:50  98


 


 20 16:52  96


 


 20 15:50 


 


 20 15:37 








                                Intake and Output











 20





 22:59 06:59 14:59


 


Output Total   1118


 


Balance   -1118


 


Output:   


 


  Hemodialysis   1118


 


Other:   


 


  Voiding Method Diaper Diaper Diaper





 Incontinent Incontinent Incontinent


 


  # Voids 1 3 3


 


  Weight 72.57 kg  














On examination patient is a middle aged  female, who appears older than

 her stated age.  She is laying comfortably in the bed.  Her speech and language

 functions appears normal.  Mild dysarthria.  Patient has very slow mentation, 

prolonged latency time to answer questions.  Patient knows her name, date of 

birth and that it is , could not tell the current year or what hospital she 

is in.  She has extremely slow mentation.  On cranial exam her pupils are round 

and reacting to light, visual fields are full, face has mild left facial 

asymmetry.  Tongue protrudes the midline.  Palatal elevation sensation normal.  

Hearing is slightly decreased.  Shoulder shrug normal.  On muscle strength 

testing the strength is normal in the right arm and right leg.  On the left 

side, her left upper extremity is diffuse 5-.  Her left lower extremity hip fl

exion is about 3+ to 4-, ankle dorsiflexion 2.  Patient has brisk reflexes on 

the left.  Babinski bilaterally.





Results





Patient's hemoglobin A1c is 9.5 on 2020, total cholesterol 219, , 

HDL 32, triglycerides 271.  TSH is normal.





- Laboratory Findings


CBC and BMP: 


                                 20 09:30





                                 20 06:07


Abnormal Lab Findings: 


                                  Abnormal Labs











  20





  12:39 12:39 17:48


 


Sodium  135 L  


 


Potassium  6.0 H  


 


Chloride  95 L  


 


BUN  29 H  


 


Creatinine  6.74 H  


 


Glucose  211 H  


 


POC Glucose (mg/dL)    162 H


 


Levetiracetam   62.5 H 














  20





  20:37 06:07 12:06


 


Sodium   


 


Potassium   


 


Chloride   


 


BUN   37 H 


 


Creatinine   7.76 H* 


 


Glucose   


 


POC Glucose (mg/dL)  232 H   143 H


 


Levetiracetam   














Assessment and Plan


Assessment: 





* Long-standing history of seizure disorder, came with breakthrough seizures.


* History of CVA with left hemiparesis.


* ESRD on hemodialysis


* Diabetes


* Hypertension


Plan: 





* We will increase her dose of Vimpat to 100 mg twice a day.  She will take an 

  extra 100 mg post dialysis every .


* Patient will continue Keppra 500 mg twice a day.  She will be given Keppra 250

   mg post dialysis every .


* Follow-up with her neurologist.


* Continue Plavix 75 mg and Lipitor 60 mg for stroke prevention.


* Neurology coverage not available on the weekend.  Dr. Beavers will follow up 

  on Monday.

## 2020-07-19 ENCOUNTER — HOSPITAL ENCOUNTER (EMERGENCY)
Dept: HOSPITAL 47 - EC | Age: 50
Discharge: HOME | End: 2020-07-19
Payer: MEDICARE

## 2020-07-19 VITALS — DIASTOLIC BLOOD PRESSURE: 74 MMHG | SYSTOLIC BLOOD PRESSURE: 151 MMHG | TEMPERATURE: 98.3 F | HEART RATE: 78 BPM

## 2020-07-19 VITALS — RESPIRATION RATE: 17 BRPM
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
## 2020-07-19 DIAGNOSIS — I25.2: ICD-10-CM

## 2020-07-19 DIAGNOSIS — I10: ICD-10-CM

## 2020-07-19 DIAGNOSIS — M54.5: ICD-10-CM
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
## 2020-07-19 DIAGNOSIS — Z79.899: ICD-10-CM
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
## 2020-07-19 DIAGNOSIS — Z86.73: ICD-10-CM

## 2020-07-19 DIAGNOSIS — Z79.4: ICD-10-CM

## 2020-07-19 DIAGNOSIS — E11.40: ICD-10-CM

## 2020-07-19 DIAGNOSIS — Z79.02: ICD-10-CM

## 2020-07-19 DIAGNOSIS — S70.01XA: Primary | ICD-10-CM

## 2020-07-19 DIAGNOSIS — Z88.0: ICD-10-CM

## 2020-07-19 PROCEDURE — 99284 EMERGENCY DEPT VISIT MOD MDM: CPT

## 2020-07-19 PROCEDURE — 96372 THER/PROPH/DIAG INJ SC/IM: CPT

## 2020-07-19 PROCEDURE — 73590 X-RAY EXAM OF LOWER LEG: CPT

## 2020-07-19 PROCEDURE — 73502 X-RAY EXAM HIP UNI 2-3 VIEWS: CPT

## 2020-07-19 PROCEDURE — 72110 X-RAY EXAM L-2 SPINE 4/>VWS: CPT

## 2020-07-19 NOTE — ED
Fall HPI





- General


Chief Complaint: Fall


Stated Complaint: Leg And Back Pain


Time Seen by Provider: 20 19:08


Source: patient, EMS


Mode of arrival: EMS





- History of Present Illness


Initial Comments: 


50-year-old female patient presents to the emergency department today for 

evaluation of right leg pain after a fall.  Patient states approximately an hour

prior to arrival she was walking back from the bathroom to her wheelchair when 

she lost her balance and fell landing on the right side.  Patient states she is 

having pain in her right hip and all down her right leg.  She is also reporting 

increased low back pain.  She denies numbness or tingling to the lower 

extremities.  Denies saddle anesthesia or loss of bowel or bladder control.  

Patient states she is unable to get herself up so she called ambulance to bring 

her in.  Patient does have extensive past medical history with multiple 

comorbidities and has difficulty ambulating.  States she uses a wheelchair 

mostly.  Patient denies any headache, neck pain, chest pain, shortness of 

breath, dizziness, weakness, abdominal pain, nausea, vomiting, or difficulties 

with bowel movements or urination.





- Related Data


                                Home Medications











 Medication  Instructions  Recorded  Confirmed


 


Clopidogrel [Plavix] 75 mg PO DAILY 17


 


Ergocalciferol (Vitamin D2) 50,000 unit PO Q7D 17





[Vitamin D2]   


 


Calcium Acetate 2,001 mg PO AC-TID 20


 


Atorvastatin [Lipitor] 20 mg PO PC-LUNCH 20


 


Atorvastatin [Lipitor] 40 mg PO HS 20


 


Insulin Glargine [Lantus] 12 unit INJ HS 20


 


Metoprolol Tartrate 25 mg PO BID 20








                                  Previous Rx's











 Medication  Instructions  Recorded


 


Acetaminophen Tab [Tylenol] 650 mg PO Q6HR PRN  tab 20


 


Citalopram Hydrobromide [CeleXA] 10 mg PO DAILY #30 tab 20


 


Pantoprazole [Protonix] 40 mg PO DAILY #30 tablet. 20


 


amLODIPine [Norvasc] 10 mg PO DAILY #30 tab 20


 


Lacosamide [Vimpat] 100 mg PO BID  tablet 20


 


Lacosamide [Vimpat] 100 mg PO MoWeFr@1400  tablet 20


 


levETIRAcetam [Keppra] 250 mg PO MoWeFr@1400  tab 20


 


levETIRAcetam [Keppra] 500 mg PO Q12HR  tab 20











                                    Allergies











Allergy/AdvReac Type Severity Reaction Status Date / Time


 


Penicillins Allergy  Itching Verified 20 22:57














Review of Systems


ROS Statement: 


Those systems with pertinent positive or pertinent negative responses have been 

documented in the HPI.





ROS Other: All systems not noted in ROS Statement are negative.





Past Medical History


Past Medical History: Chest Pain / Angina, CVA/TIA, Diabetes Mellitus, 

Hypertension, Renal Disease, Seizure Disorder, Thyroid Disorder


Additional Past Medical History / Comment(s): dialysis bipolar.  Neuropathy


History of Any Multi-Drug Resistant Organisms: None Reported


Past Surgical History:  Section, Tonsillectomy


Additional Past Surgical History / Comment(s): fistula


Past Anesthesia/Blood Transfusion Reactions: No Reported Reaction


Past Psychological History: Bipolar


Past Alcohol Use History: Occasional


Past Drug Use History: None Reported





- Past Family History


  ** Father


Family Medical History: Unable to Obtain





General Exam


Limitations: no limitations


General appearance: alert, in no apparent distress, other (This is a well-

developed, well-nourished adult female patient in no acute distress.  Vital 

signs upon presentation are temperature 98.2F, pulse 80, respirations 17, blood

 pressure 167/95, pulse ox 97% on room air.)


Eye exam: Present: normal appearance, PERRL, EOMI.  Absent: scleral icterus, 

conjunctival injection, periorbital swelling


ENT exam: Present: normal exam, normal oropharynx, mucous membranes moist


Neck exam: Present: normal inspection, full ROM, other (Nontender, no step-off, 

no deformity to firm midline palpation of the posterior cervical spine.  Full 

range of motion without pain or limitation.).  Absent: tenderness, meningismus, 

lymphadenopathy


Respiratory exam: Present: normal lung sounds bilaterally.  Absent: respiratory 

distress, wheezes, rales, rhonchi, stridor


Cardiovascular Exam: Present: regular rate, normal rhythm, normal heart sounds. 

 Absent: systolic murmur, diastolic murmur, rubs, gallop, clicks


GI/Abdominal exam: Present: soft, normal bowel sounds.  Absent: distended, 

tenderness, guarding, rebound, rigid


Extremities exam: Present: full ROM, tenderness (Right lateral hip, right mid 

tib-fib), normal capillary refill, other (There is ecchymosis and soft tissue 

swelling noted to the right lateral hip.  There is tenderness over the mid tib-

fib region.  Skin is otherwise pink, warm, dry.  Cap refills less than 3 

seconds.  Pedal pulses 1+.).  Absent: normal inspection, pedal edema, joint 

swelling, calf tenderness


Back exam: Present: normal inspection, vertebral tenderness (Lumbar vertebral 

tenderness)


Neurological exam: Present: alert, oriented X3, CN II-XII intact


Psychiatric exam: Present: normal affect, normal mood


Skin exam: Present: warm, dry, intact, normal color.  Absent: rash





Course


                                   Vital Signs











  20





  19:00


 


Temperature 98.2 F


 


Pulse Rate 80


 


Respiratory 17





Rate 


 


Blood Pressure 167/95


 


O2 Sat by Pulse 97





Oximetry 














Medical Decision Making





- Medical Decision Making


50-year-old female patient presents to the emergency department today for eval

uation of right leg pain after fall.  Physical examination did reveal contusion 

to the right hip with tenderness over the right hip and right mid tib-fib.  

Neurovascular status intact of the right leg.  She denied hitting her head or 

losing consciousness.  X-rays of the lumbar spine, right hip and pelvis, and 

tib-fib are negative.  I did discuss findings results with the patient.  She'll 

be discharged follow-up with her primary care physician for recheck in 1-2 days.

  Return parameters discussed in detail.  She verbalizes understanding and 

agrees with this plan.





- Radiology Data


Radiology results: report reviewed, image reviewed


5 views of lumbosacral spine are obtained.  Report reviewed in its entirety.  

Impression by Dr. De La Torre shows left L5 mari-sacralization.  Slight degenerative 

levoconvex curvature of the lumbar spine.  Past arthropathy mid to lower lumbar 

spine with multilevel mild degenerative disc disease, more mild to moderate L3 

to 4.  No vertebral compression collapse or malalignment.





2 views of the right hip and one view of the AP pelvis was obtained.  Report was

 reviewed in its entirety.  Impression by Dr. De La Torre shows osteopenia.  No 

displaced fracture seen.  Incidental left L5 hemisacralization.  Mild bilateral 

hip OA.





2 views of the right tib-fib are obtained.  Report was reviewed in its entirety.

  Impression by Dr. De La Torre shows distal Achilles thickening suggesting 

tendinopathy.  Concern for acute injury to the Achilles tendon, MRI could be 

performed.  Otherwise, tib-fib without acute osseous abnormalities seen.





Disposition


Clinical Impression: 


 Contusion of right hip, Right leg pain





Disposition: HOME SELF-CARE


Condition: Good


Instructions (If sedation given, give patient instructions):  Fall Prevention 

for Older Adults (ED), Contusion in Adults (ED)


Additional Instructions: 


Apply ice to the painful areas.  Follow-up through primary care physician for 

recheck in 1-2 days.  Return to the emergency department immediately for any 

new, worsening, or concerning symptoms.


Is patient prescribed a controlled substance at d/c from ED?: No


Referrals: 


Saturnino Castillo MD [Primary Care Provider] - 1-2 days


Time of Disposition: 20:44

## 2020-07-19 NOTE — XR
EXAMINATION TYPE: XR lumbosacral spine min 4V

 

DATE OF EXAM: 7/19/2020

 

COMPARISON: NONE

 

HISTORY: 50-year-old female fall and low back pain

 

TECHNIQUE: 5 views

 

FINDINGS: 

Small T12 ribs. Left L5 hemisacralization. Slight degenerative levoconvex curvature. Facet arthropath
y mid to lower lumbar spine. Mild to moderate endplate spondylosis at L3-L4 and mild degenerative dis
c disease at additional levels. Vertebral body heights are preserved and alignment is maintained. 

 

IMPRESSION: 

1. Left L5 hemisacralization. Slight degenerative levoconvex curvature of the lumbar spine.

2. Facet arthropathy mid to lower lumbar spine with multilevel mild degenerative disc disease, more m
ild to moderate at L3-L4.

3. No vertebral compression collapse or malalignment.

## 2020-07-19 NOTE — XR
EXAMINATION TYPE: AP view pelvis and 2 views right hip

 

DATE OF EXAM: 7/19/2020

 

COMPARISON: NONE

 

HISTORY: 50-year-old female with fall and right leg pain

 

 

FINDINGS: 

There is mild degenerative spurring of both hips. SI joints and pubic symphysis appear symmetric and 
intact. There is left L5 hemisacralization incidentally noted. No acute fracture, subluxation, disloc
ation. Mild osteopenia.

 

IMPRESSION:

Osteopenia. No displaced fracture seen. Incidental left L5 hemisacralization. Mild bilateral hip OA.

## 2020-07-19 NOTE — XR
EXAMINATION TYPE: XR tibia fibula RT

 

DATE OF EXAM: 7/19/2020

 

COMPARISON: NONE

 

HISTORY: 50-year-old female with fall and right leg pain

 

TECHNIQUE: 2 views

 

FINDINGS: 

No acute fracture. Knee and ankle articulations appear grossly intact. There is thickening of the Ach
illes insertion and some chronic-appearing fragmentation of the posterior calcaneus. Small plantar ca
lcaneal spur. Os supra naviculare. Vascular calcifications.

 

IMPRESSION: 

1. Distal Achilles thickening suggests tendinopathy. If concern for acute injury to the Achilles tend
on, MRI can be performed.

2. Otherwise, tibia/fibula without acute osseous abnormality seen.

## 2020-07-30 ENCOUNTER — HOSPITAL ENCOUNTER (EMERGENCY)
Dept: HOSPITAL 47 - EC | Age: 50
Discharge: HOME | End: 2020-07-30
Payer: MEDICARE

## 2020-07-30 VITALS
SYSTOLIC BLOOD PRESSURE: 131 MMHG | RESPIRATION RATE: 20 BRPM | TEMPERATURE: 98.9 F | DIASTOLIC BLOOD PRESSURE: 82 MMHG | HEART RATE: 87 BPM

## 2020-07-30 DIAGNOSIS — M25.552: ICD-10-CM

## 2020-07-30 DIAGNOSIS — Z79.82: ICD-10-CM

## 2020-07-30 DIAGNOSIS — M25.551: ICD-10-CM

## 2020-07-30 DIAGNOSIS — Z79.899: ICD-10-CM

## 2020-07-30 DIAGNOSIS — Z99.2: ICD-10-CM

## 2020-07-30 DIAGNOSIS — E07.9: ICD-10-CM

## 2020-07-30 DIAGNOSIS — E11.40: ICD-10-CM

## 2020-07-30 DIAGNOSIS — I25.10: ICD-10-CM

## 2020-07-30 DIAGNOSIS — G40.909: ICD-10-CM

## 2020-07-30 DIAGNOSIS — I10: ICD-10-CM

## 2020-07-30 DIAGNOSIS — Z79.02: ICD-10-CM

## 2020-07-30 DIAGNOSIS — Z88.0: ICD-10-CM

## 2020-07-30 DIAGNOSIS — M47.816: Primary | ICD-10-CM

## 2020-07-30 DIAGNOSIS — F31.9: ICD-10-CM

## 2020-07-30 DIAGNOSIS — Z79.4: ICD-10-CM

## 2020-07-30 LAB
ALBUMIN SERPL-MCNC: 4.1 G/DL (ref 3.5–5)
ALP SERPL-CCNC: 122 U/L (ref 38–126)
ALT SERPL-CCNC: 15 U/L (ref 4–34)
ANION GAP SERPL CALC-SCNC: 12 MMOL/L
APTT BLD: 22.6 SEC (ref 22–30)
AST SERPL-CCNC: 19 U/L (ref 14–36)
BASOPHILS # BLD AUTO: 0.1 K/UL (ref 0–0.2)
BASOPHILS NFR BLD AUTO: 1 %
BUN SERPL-SCNC: 22 MG/DL (ref 7–17)
CALCIUM SPEC-MCNC: 9.5 MG/DL (ref 8.4–10.2)
CHLORIDE SERPL-SCNC: 92 MMOL/L (ref 98–107)
CO2 SERPL-SCNC: 31 MMOL/L (ref 22–30)
EOSINOPHIL # BLD AUTO: 0.2 K/UL (ref 0–0.7)
EOSINOPHIL NFR BLD AUTO: 3 %
ERYTHROCYTE [DISTWIDTH] IN BLOOD BY AUTOMATED COUNT: 3.76 M/UL (ref 3.8–5.4)
ERYTHROCYTE [DISTWIDTH] IN BLOOD: 14.6 % (ref 11.5–15.5)
GLUCOSE SERPL-MCNC: 127 MG/DL (ref 74–99)
HCT VFR BLD AUTO: 33 % (ref 34–46)
HGB BLD-MCNC: 11.6 GM/DL (ref 11.4–16)
INR PPP: 0.9 (ref ?–1.2)
LYMPHOCYTES # SPEC AUTO: 3.1 K/UL (ref 1–4.8)
LYMPHOCYTES NFR SPEC AUTO: 42 %
MCH RBC QN AUTO: 31 PG (ref 25–35)
MCHC RBC AUTO-ENTMCNC: 35.3 G/DL (ref 31–37)
MCV RBC AUTO: 87.8 FL (ref 80–100)
MONOCYTES # BLD AUTO: 0.4 K/UL (ref 0–1)
MONOCYTES NFR BLD AUTO: 6 %
NEUTROPHILS # BLD AUTO: 3.3 K/UL (ref 1.3–7.7)
NEUTROPHILS NFR BLD AUTO: 46 %
PLATELET # BLD AUTO: 241 K/UL (ref 150–450)
POTASSIUM SERPL-SCNC: 4 MMOL/L (ref 3.5–5.1)
PROT SERPL-MCNC: 7 G/DL (ref 6.3–8.2)
PT BLD: 9.8 SEC (ref 9–12)
SODIUM SERPL-SCNC: 135 MMOL/L (ref 137–145)
WBC # BLD AUTO: 7.2 K/UL (ref 3.8–10.6)

## 2020-07-30 PROCEDURE — 85025 COMPLETE CBC W/AUTO DIFF WBC: CPT

## 2020-07-30 PROCEDURE — 99284 EMERGENCY DEPT VISIT MOD MDM: CPT

## 2020-07-30 PROCEDURE — 74176 CT ABD & PELVIS W/O CONTRAST: CPT

## 2020-07-30 PROCEDURE — 85730 THROMBOPLASTIN TIME PARTIAL: CPT

## 2020-07-30 PROCEDURE — 85610 PROTHROMBIN TIME: CPT

## 2020-07-30 PROCEDURE — 80053 COMPREHEN METABOLIC PANEL: CPT

## 2020-07-30 PROCEDURE — 36415 COLL VENOUS BLD VENIPUNCTURE: CPT

## 2020-07-30 NOTE — ED
General Adult HPI





- General


Chief complaint: Extremity Injury, Lower


Stated complaint: Fall


Time Seen by Provider: 20 16:06


Source: patient, EMS, RN notes reviewed, old records reviewed


Mode of arrival: EMS


Limitations: no limitations





- History of Present Illness


Initial comments: 


50-year-old female patient hemodialysis procedure complaint of fall and hip 

pain.  Patient reports that she has very weak legs at baseline uses a wheelchair

to get around.  Reports that she was putting her shoes on when she fell forward 

fell on her left side.  Patient complaint is bilateral hip pain.  Patient has 

chronic right lower extremity pain which is unchanged.  Patient does report that

she has some bruising on her anterior abdomen from a fall a few days ago.  

Denies any trauma to her head or neck.  Denies any other complaints.





Systemic: Pt denies fatigue, fever/chills, rash. Pt denies weakness, night 

sweats, weight loss. 


Neuro: Pt denies headache, visual disturbances, syncope or pre-syncope.


HEENT: Pt denies ocular discharge or irritation, otalgia, rhinorrhea, 

pharyngitis or notable lymphadenopathy. 


Cardiopulmonary: Pt denies chest pain, SOB, heart palpitations, dyspnea on 

exertion.  


Abdominal/GI: Pt denies abdominal pain, n/v/d. 


: Pt denies dysuria, burning w/ urination, frequency/urgency. Denies new onset

urinary or bowel incontinence.  


Neuro: Pt denies new onset weakness, paresthesias. 











- Related Data


                                Home Medications











 Medication  Instructions  Recorded  Confirmed


 


Clopidogrel [Plavix] 75 mg PO DAILY 17


 


Ergocalciferol (Vitamin D2) 50,000 unit PO Q7D 17





[Vitamin D2]   


 


Calcium Acetate 1,334 mg PO AC-TID 20


 


Atorvastatin [Lipitor] 20 mg PO DAILY 20


 


Insulin Glargine [Lantus] 12 unit SQ HS 20


 


Metoprolol Tartrate 25 mg PO BID 20


 


Aspirin EC [Ecotrin Low Dose] 81 mg PO DAILY 20


 


Lacosamide [Vimpat] 100 mg PO MOWEFR 20


 


Lacosamide [Vimpat] 100 mg PO SUTUTHSA 20


 


cloBAZam [Clobazam] 10 mg PO BID 20


 


levETIRAcetam [Keppra] 500 mg PO MOWEFR 20


 


levETIRAcetam [Keppra] 500 mg PO SUTUTHSA 20








                                  Previous Rx's











 Medication  Instructions  Recorded


 


Acetaminophen Tab [Tylenol] 650 mg PO Q6HR PRN  tab 20


 


Citalopram Hydrobromide [CeleXA] 10 mg PO DAILY #30 tab 20


 


Pantoprazole [Protonix] 40 mg PO DAILY #30 tablet. 20


 


amLODIPine [Norvasc] 10 mg PO DAILY #30 tab 20











                                    Allergies











Allergy/AdvReac Type Severity Reaction Status Date / Time


 


Penicillins Allergy  Itching Verified 20 18:12














Review of Systems


ROS Statement: 


Those systems with pertinent positive or pertinent negative responses have been 

documented in the HPI.





ROS Other: All systems not noted in ROS Statement are negative.





Past Medical History


Past Medical History: Chest Pain / Angina, CVA/TIA, Diabetes Mellitus, 

Hypertension, Renal Disease, Seizure Disorder, Thyroid Disorder


Additional Past Medical History / Comment(s): dialysis bipolar.  Neuropathy


History of Any Multi-Drug Resistant Organisms: None Reported


Past Surgical History:  Section, Tonsillectomy


Additional Past Surgical History / Comment(s): fistula


Past Anesthesia/Blood Transfusion Reactions: No Reported Reaction


Past Psychological History: Bipolar


Past Alcohol Use History: Occasional


Past Drug Use History: None Reported





- Past Family History


  ** Father


Family Medical History: Unable to Obtain





General Exam





- General Exam Comments


Initial Comments: 








Constitutional: NAD, AOX3, Pt has pleasant affect. 


HEENT: NC/AT, trachea midline, neck supple, no lymphadenopathy. Posterior 

pharynx non erythematous, without exudates. External ears appear normal, without

 discharge. Mucous membranes moist. Eyes PERRLA, EOM intact. There is no scleral

 icterus. No pallor noted. 


Cardiopulmonary: RRR, no murmurs, rubs or gallops, no JVD noted. Lungs CTAB in 

anterior and posterior fields. No peripheral edema. 


Abdominal exam: Abdomen soft and non-distended. Abdomen non-tender to palpation 

in all 4 quadrants. Bowel sounds active in LLQ.  Mild amount of bruising noted 

left lower quadrant region.


Neuro: CN II-XII grossly intact. No nuchal rigidity. No raccon eyes, no monsalve 

sign, no hemotympanum. No cervical spinal tenderness. 


MSK: Bilateral hips are nontender palpation.  Right lower extremity is mildly 

tender which is unchanged from patient's baseline pain.  Left lower extremity 

nontender with exception of left hip.  Neurovascularly intact.











Limitations: no limitations





Course





                                   Vital Signs











  20





  16:00


 


Temperature 98.7 F


 


Pulse Rate 76


 


Respiratory 18





Rate 


 


Blood Pressure 124/71


 


O2 Sat by Pulse 98





Oximetry 














Medical Decision Making





- Medical Decision Making





50-year-old female patient procedure complaint of fall.  Denies any trauma to 

head or neck.  Chief complaint bilateral hip pain.  Physical exam doesn't 

display bilateral hip tenderness.  Laboratory investigations are obtained and do

 display of a creatinine which is consistent with patient's CKD.  Potassium is 

4.0.  CT abdomen and pelvis displayed advanced multilevlel lumbar spondylosis.  

Negative for fracture or other sequela of trauma.  Patient discharged to follow 

up with primary care provider as well as orthopedic consult will return here if 

condition worsens.  Case discussed with Dr. Garcia. 














- Lab Data


Result diagrams: 


                                 20 17:43





                                 20 17:43





                                   Lab Results











  20 Range/Units





  17:43 17:43 17:43 


 


WBC  7.2    (3.8-10.6)  k/uL


 


RBC  3.76 L    (3.80-5.40)  m/uL


 


Hgb  11.6    (11.4-16.0)  gm/dL


 


Hct  33.0 L    (34.0-46.0)  %


 


MCV  87.8    (80.0-100.0)  fL


 


MCH  31.0    (25.0-35.0)  pg


 


MCHC  35.3    (31.0-37.0)  g/dL


 


RDW  14.6    (11.5-15.5)  %


 


Plt Count  241    (150-450)  k/uL


 


Neutrophils %  46    %


 


Lymphocytes %  42    %


 


Monocytes %  6    %


 


Eosinophils %  3    %


 


Basophils %  1    %


 


Neutrophils #  3.3    (1.3-7.7)  k/uL


 


Lymphocytes #  3.1    (1.0-4.8)  k/uL


 


Monocytes #  0.4    (0-1.0)  k/uL


 


Eosinophils #  0.2    (0-0.7)  k/uL


 


Basophils #  0.1    (0-0.2)  k/uL


 


PT   9.8   (9.0-12.0)  sec


 


INR   0.9   (<1.2)  


 


APTT   22.6   (22.0-30.0)  sec


 


Sodium    135 L  (137-145)  mmol/L


 


Potassium    4.0  (3.5-5.1)  mmol/L


 


Chloride    92 L  ()  mmol/L


 


Carbon Dioxide    31 H  (22-30)  mmol/L


 


Anion Gap    12  mmol/L


 


BUN    22 H  (7-17)  mg/dL


 


Creatinine    7.44 H*  (0.52-1.04)  mg/dL


 


Est GFR (CKD-EPI)AfAm    7  (>60 ml/min/1.73 sqM)  


 


Est GFR (CKD-EPI)NonAf    6  (>60 ml/min/1.73 sqM)  


 


Glucose    127 H  (74-99)  mg/dL


 


Calcium    9.5  (8.4-10.2)  mg/dL


 


Total Bilirubin    0.5  (0.2-1.3)  mg/dL


 


AST    19  (14-36)  U/L


 


ALT    15  (4-34)  U/L


 


Alkaline Phosphatase    122  ()  U/L


 


Total Protein    7.0  (6.3-8.2)  g/dL


 


Albumin    4.1  (3.5-5.0)  g/dL














Disposition


Clinical Impression: 


 Fall, Hip pain





Disposition: HOME SELF-CARE


Condition: Stable


Instructions (If sedation given, give patient instructions):  Hip Pain (ED)


Additional Instructions: 


Follow-up with primary care provider tomorrow. Follow up with orthopedic consult

 if symptoms persist.  Return to ER if condition worsens.


Is patient prescribed a controlled substance at d/c from ED?: No


Referrals: 


Saturnino Castillo MD [Primary Care Provider] - 1-2 days


Marlo Lorenzo DO [Doctor of Osteopathic Medicine] - 1-2 days

## 2020-07-30 NOTE — CT
EXAMINATION TYPE: CT abdomen pelvis wo con

 

DATE OF EXAM: 7/30/2020

 

COMPARISON: 9/17/2012 CT

 

HISTORY: Fall, frequent. Pelvic pain, pt on dialysis

 

TECHNIQUE:  Helical acquisition of images was performed from the lung bases through the pelvis. Autom
ated exposure control for dose reduction was used.

CT DLP: 785.8 mGycm

 

FINDINGS: Within the limits of noncontrast CT the following observations are made.

 

LUNG BASES: No acute findings. Coronary calcifications noted.

 

LIVER/GB: No significant abnormality is appreciated.

 

PANCREAS: No significant abnormality is seen.

 

SPLEEN: No significant abnormality is seen.

 

ADRENALS: No significant abnormality is seen.

 

KIDNEYS: No significant abnormality is seen.

 

FREE AIR:  No free air is visualized

 

RETROPERITONEAL ADENOPATHY:  None visualized

 

REPRODUCTIVE ORGANS: No significant abnormality is seen

 

URINARY BLADDER:  No significant abnormality is seen.

 

PELVIC ADENOPATHY:  None visualized.

 

OSSEOUS STRUCTURES:  No definite acute process. Advanced multifocal lumbar spondylosis changes are no
tatiana, markedly so at the L3-4 level where there is vacuum disc phenomenon. These degenerative spine ch
anges can be further characterized with MRI if clinically indicated.

 

BOWEL:  No significant abnormality is seen.

 

IMPRESSION: 

1.  NEGATIVE FOR FRACTURE OR OTHER SEQUELA OF TRAUMA. 

 

2.  ADVANCED MULTILEVEL LUMBAR SPONDYLOSIS, MOST SEVERE AT L3-4 AS DISCUSSED.

## 2020-08-02 ENCOUNTER — HOSPITAL ENCOUNTER (EMERGENCY)
Dept: HOSPITAL 47 - EC | Age: 50
Discharge: OTHER INSTITUTION | End: 2020-08-02
Payer: MEDICARE

## 2020-08-02 VITALS — HEART RATE: 64 BPM | RESPIRATION RATE: 15 BRPM

## 2020-08-02 VITALS — DIASTOLIC BLOOD PRESSURE: 78 MMHG | SYSTOLIC BLOOD PRESSURE: 125 MMHG

## 2020-08-02 VITALS — TEMPERATURE: 98 F

## 2020-08-02 DIAGNOSIS — Z79.899: ICD-10-CM

## 2020-08-02 DIAGNOSIS — R41.82: Primary | ICD-10-CM

## 2020-08-02 DIAGNOSIS — Z79.4: ICD-10-CM

## 2020-08-02 DIAGNOSIS — Z79.82: ICD-10-CM

## 2020-08-02 DIAGNOSIS — Z87.891: ICD-10-CM

## 2020-08-02 DIAGNOSIS — I10: ICD-10-CM

## 2020-08-02 DIAGNOSIS — E11.40: ICD-10-CM

## 2020-08-02 DIAGNOSIS — E11.65: ICD-10-CM

## 2020-08-02 DIAGNOSIS — G40.909: ICD-10-CM

## 2020-08-02 DIAGNOSIS — Z20.828: ICD-10-CM

## 2020-08-02 DIAGNOSIS — Z79.01: ICD-10-CM

## 2020-08-02 DIAGNOSIS — Z99.2: ICD-10-CM

## 2020-08-02 DIAGNOSIS — I20.9: ICD-10-CM

## 2020-08-02 DIAGNOSIS — Z88.0: ICD-10-CM

## 2020-08-02 LAB
ALBUMIN SERPL-MCNC: 4.7 G/DL (ref 3.5–5)
ALP SERPL-CCNC: 142 U/L (ref 38–126)
ALT SERPL-CCNC: 17 U/L (ref 4–34)
ANION GAP SERPL CALC-SCNC: 17 MMOL/L
APAP SPEC-MCNC: <10 UG/ML
APTT BLD: 22.1 SEC (ref 22–30)
AST SERPL-CCNC: 22 U/L (ref 14–36)
BASOPHILS # BLD AUTO: 0.1 K/UL (ref 0–0.2)
BASOPHILS NFR BLD AUTO: 1 %
BILIRUB INDIRECT SERPL-MCNC: 0 MG/DL (ref 0–1.1)
BILIRUBIN DIRECT+TOT PNL SERPL-MCNC: 0.5 MG/DL (ref 0–0.2)
BUN SERPL-SCNC: 40 MG/DL (ref 7–17)
CALCIUM SPEC-MCNC: 10.1 MG/DL (ref 8.4–10.2)
CHLORIDE SERPL-SCNC: 95 MMOL/L (ref 98–107)
CO2 SERPL-SCNC: 28 MMOL/L (ref 22–30)
EOSINOPHIL # BLD AUTO: 0.2 K/UL (ref 0–0.7)
EOSINOPHIL NFR BLD AUTO: 3 %
ERYTHROCYTE [DISTWIDTH] IN BLOOD BY AUTOMATED COUNT: 3.96 M/UL (ref 3.8–5.4)
ERYTHROCYTE [DISTWIDTH] IN BLOOD: 14.8 % (ref 11.5–15.5)
GLUCOSE BLD-MCNC: 127 MG/DL (ref 75–99)
GLUCOSE SERPL-MCNC: 212 MG/DL (ref 74–99)
HCO3 BLDV-SCNC: 32 MMOL/L (ref 24–28)
HCT VFR BLD AUTO: 35.7 % (ref 34–46)
HGB BLD-MCNC: 11.9 GM/DL (ref 11.4–16)
INR PPP: 0.9 (ref ?–1.2)
LACTATE BLDV-SCNC: 1.4 MMOL/L (ref 0.7–2)
LYMPHOCYTES # SPEC AUTO: 2.8 K/UL (ref 1–4.8)
LYMPHOCYTES NFR SPEC AUTO: 37 %
MAGNESIUM SPEC-SCNC: 2.6 MG/DL (ref 1.6–2.3)
MCH RBC QN AUTO: 30.1 PG (ref 25–35)
MCHC RBC AUTO-ENTMCNC: 33.4 G/DL (ref 31–37)
MCV RBC AUTO: 90.1 FL (ref 80–100)
MONOCYTES # BLD AUTO: 0.3 K/UL (ref 0–1)
MONOCYTES NFR BLD AUTO: 4 %
NEUTROPHILS # BLD AUTO: 4.1 K/UL (ref 1.3–7.7)
NEUTROPHILS NFR BLD AUTO: 55 %
PCO2 BLDV: 62 MMHG (ref 37–51)
PH BLDV: 7.31 [PH] (ref 7.31–7.41)
PLATELET # BLD AUTO: 259 K/UL (ref 150–450)
POTASSIUM SERPL-SCNC: 4.3 MMOL/L (ref 3.5–5.1)
PROT SERPL-MCNC: 8 G/DL (ref 6.3–8.2)
PT BLD: 9.7 SEC (ref 9–12)
SALICYLATES SERPL-MCNC: <1 MG/DL
SODIUM SERPL-SCNC: 140 MMOL/L (ref 137–145)
WBC # BLD AUTO: 7.6 K/UL (ref 3.8–10.6)

## 2020-08-02 PROCEDURE — 93005 ELECTROCARDIOGRAM TRACING: CPT

## 2020-08-02 PROCEDURE — 86140 C-REACTIVE PROTEIN: CPT

## 2020-08-02 PROCEDURE — 87040 BLOOD CULTURE FOR BACTERIA: CPT

## 2020-08-02 PROCEDURE — 85610 PROTHROMBIN TIME: CPT

## 2020-08-02 PROCEDURE — 82803 BLOOD GASES ANY COMBINATION: CPT

## 2020-08-02 PROCEDURE — 82009 KETONE BODYS QUAL: CPT

## 2020-08-02 PROCEDURE — 70450 CT HEAD/BRAIN W/O DYE: CPT

## 2020-08-02 PROCEDURE — 83735 ASSAY OF MAGNESIUM: CPT

## 2020-08-02 PROCEDURE — 96360 HYDRATION IV INFUSION INIT: CPT

## 2020-08-02 PROCEDURE — 71045 X-RAY EXAM CHEST 1 VIEW: CPT

## 2020-08-02 PROCEDURE — 83520 IMMUNOASSAY QUANT NOS NONAB: CPT

## 2020-08-02 PROCEDURE — 82330 ASSAY OF CALCIUM: CPT

## 2020-08-02 PROCEDURE — 85730 THROMBOPLASTIN TIME PARTIAL: CPT

## 2020-08-02 PROCEDURE — 84484 ASSAY OF TROPONIN QUANT: CPT

## 2020-08-02 PROCEDURE — 83605 ASSAY OF LACTIC ACID: CPT

## 2020-08-02 PROCEDURE — 99285 EMERGENCY DEPT VISIT HI MDM: CPT

## 2020-08-02 PROCEDURE — 80329 ANALGESICS NON-OPIOID 1 OR 2: CPT

## 2020-08-02 PROCEDURE — 80053 COMPREHEN METABOLIC PANEL: CPT

## 2020-08-02 PROCEDURE — 82248 BILIRUBIN DIRECT: CPT

## 2020-08-02 PROCEDURE — 84443 ASSAY THYROID STIM HORMONE: CPT

## 2020-08-02 PROCEDURE — 83690 ASSAY OF LIPASE: CPT

## 2020-08-02 PROCEDURE — 85025 COMPLETE CBC W/AUTO DIFF WBC: CPT

## 2020-08-02 PROCEDURE — 82140 ASSAY OF AMMONIA: CPT

## 2020-08-02 PROCEDURE — 80320 DRUG SCREEN QUANTALCOHOLS: CPT

## 2020-08-02 PROCEDURE — 36415 COLL VENOUS BLD VENIPUNCTURE: CPT

## 2020-08-02 NOTE — CT
EXAMINATION TYPE: CT brain wo con

 

DATE OF EXAM: 8/2/2020

 

COMPARISON: 5/28/2020

 

HISTORY: ams

 

CT DLP: 1188.4 mGycm

Automated exposure control for dose reduction was used.

 

There is 10 mm hypodense focus in the anterior left thalamus. There is cerebral cortical atrophy. The
re is no mass effect nor midline shift. There is no sign of intracranial hemorrhage. Calvarium is int
act. There is normal aeration of the temporal bones. There are 1.5 cm bilateral hypodense cortical fo
ci in the cerebellar hemispheres consistent with old cortical infarcts.

 

IMPRESSION:

Old cerebellar and left thalamic infarcts. No acute intracranial abnormality. No change compared to o
ld exam.

## 2020-08-02 NOTE — XR
EXAMINATION TYPE: XR chest 1V portable

 

DATE OF EXAM: 8/2/2020

 

COMPARISON: 4/19/2020

 

HISTORY: Altered mental status

 

TECHNIQUE: Single view

 

FINDINGS: Heart and mediastinum are normal. Lungs are clear. Diaphragm is normal. There is left axill
raheel pacemaker. There are chest leads. There is no pleural effusion.

 

IMPRESSION: No active cardiopulmonary disease. No change.

## 2020-08-02 NOTE — ED
General Adult HPI





- General


Chief complaint: Weakness


Stated complaint: Weakness


Time Seen by Provider: 20 14:56


Source: patient


Mode of arrival: EMS


Limitations: no limitations





- History of Present Illness


Initial comments: 


Dictation was produced using dragon dictation software. please excuse any 

grammatical, word or spelling errors. 





This patient was cared for during a federal and state declared state of 

emergency secondary to Covid 19





Chief Complaint: 50-year-old female presents with altered mental status and 

hypoglycemia.





History of Present Illness: Patient is a poor historian.  Patient is a 

50-year-old female presents via EMS for hyperglycemia.  According to nursing 

received report from EMS patient was found to have elevated blood sugars.  

Allegedly her blood sugars were above what could be red with a home glucometer. 

 supposedly gave patient some insulin and EMS was called.  EMS is not 

present for assisting in history of present illness.  Patient is not a reliable 

historian.  She is very lethargic.  She has history of CVAs with left-sided 

weakness worse in the leg and arm.  Patient has history of end-stage renal 

disease.  She gets dialysis .  She reports that she last 

got dialysis on Friday and had a full treatment.








Unable to obtain secondary to mental status.





PHYSICAL EXAM:


General Impression: Alert and oriented x3, lethargic


HEENT: Normocephalic atraumatic, extra-ocular movements intact, pupils equal and

reactive to light bilaterally, dry mucous membranes


Cardiovascular: Heart regular rate and rhythm


Chest: Slow to speak, no retractions, no tachypnea, bilateral breath sounds


Abdomen: abdomen soft, non-tender, non-distended, no organomegaly


Musculoskeletal: Pulses present and equal in all extremities, no peripheral 

edema, dialysis axis on the left upper extremity with palpable thrill and 

audible bruit


Motor: Flaccid paralysis of left lower extremity, weakness of the left upper 

tremor compared to the right upper extremity.  3+ weakness to the right lower ex

tremity


Neurological: CN II-XII grossly intact, diminished sensation to painful stimuli 

of the left lower extremity


Skin: Intact with no visualized rashes








ED course: 50-year-old female multiple comorbidities presents with 

hyperglycemia.  I bedside patient is altered and seems rather lethargic.  Vital 

signs upon arrival are within acceptable limits.  EKG does not show any acute 

findings.  Patient is weak on the left side more than the right.  She has a 

history of CVA however it's unclear what patient's residual deficits from a 

stroke or.  States that weakness in the leg has been ongoing for months.  Chart 

review was performed.  Patient was in emergency department on 2020.  She 

was evaluated by physician assistant status post fall and hip pain.  She was 

discharged at that time in stable medical condition.  Further EMR review shows 

that patient was admitted in  of this year for seizure.


Laboratory evaluation obtained.  CBC is unremarkable.  Coag panel is negative.  

Venous blood gas shows pH of 7.31 pCO2 of 62 and a bicarb of 32.  This likely 

reflects a mild respiratory acidosis.  Metabolic panel shows mild anion gap is 

no acidosis.  Creatinine is 9.36.  Patient does have yesterday.  Glucose is 212.

 Rest metabolic panel is grossly unremarkable.  Abdominal labs are negative.  

Toxicology labs are negative.  Negative acetone.  Computed tomography scan of 

the brain shows no acute processes however there is old cerebellar and left 

thalamic infarcts.  At this point it is unclear what is causing patient's mental

status changes.  Furthermore, is unclear whether patient's left-sided deficits 

are acute, chronic or acute on chronic.  Medications are reviewed.  Patient does

have prescription for benzodiazepines.  Patient's lethargy could be related to 

benzodiazepines toxicity.  Patient has history of seizure, perhaps she is 

postictal however there is no history or physical examination suggest patient 

had a seizure.  She has not had any tonic-clonic activity while in our emergency

department.  Case was discussed with .  We do not have neurology today 

however according to our  we will have neurology tomorrow.  Case was 

discussed with medicine who recommends that we discuss disposition options with 

.  In particular, we asked whether he would like patient to be admitted 

to our hospital with neurology consultation tomorrow or to be transferred to 

[facility] where they have current neurology coverage.   requested 

patient be transferred to [facility].  Case was discussed with Dr. Wyatt 

and [facility] emergency room who is willing to accept patient for ER to ER 

transfer.





EKG interpretation: Ventricular rate 66, normal sinus rhythm,.  Interval to 4, 

QRS 80, QTc 459. No OR prolongation, no QTC prolongation, no ST or T-wave 

changes noted.  EKG compared to 2020 showing no changes.  Overall, this 

EKG is unremarkable











- Related Data


                                Home Medications











 Medication  Instructions  Recorded  Confirmed


 


Clopidogrel [Plavix] 75 mg PO DAILY 17


 


Ergocalciferol (Vitamin D2) 50,000 unit PO Q7D 17





[Vitamin D2]   


 


Calcium Acetate 1,334 mg PO AC-TID 20


 


Atorvastatin [Lipitor] 20 mg PO DAILY 20


 


Insulin Glargine [Lantus] 12 unit SQ HS 20


 


Metoprolol Tartrate 25 mg PO BID 20


 


Aspirin EC [Ecotrin Low Dose] 81 mg PO DAILY 20


 


Lacosamide [Vimpat] 100 mg PO AS DIRECTED 20


 


cloBAZam [Clobazam] 10 mg PO BID 20


 


levETIRAcetam [Keppra] 500 mg PO AS DIRECTED 20


 


Acetaminophen Tab [Tylenol Tab] 500 - 1,000 mg PO Q6H PRN 20








                                  Previous Rx's











 Medication  Instructions  Recorded


 


Citalopram Hydrobromide [CeleXA] 10 mg PO DAILY #30 tab 20


 


amLODIPine [Norvasc] 10 mg PO DAILY #30 tab 20











                                    Allergies











Allergy/AdvReac Type Severity Reaction Status Date / Time


 


Penicillins Allergy  Itching Verified 20 14:57














Review of Systems


ROS Statement: 


Those systems with pertinent positive or pertinent negative responses have been 

documented in the HPI.





ROS Other: All systems not noted in ROS Statement are negative.





Past Medical History


Past Medical History: Chest Pain / Angina, CVA/TIA, Diabetes Mellitus, 

Hypertension, Renal Disease, Seizure Disorder, Thyroid Disorder


Additional Past Medical History / Comment(s): dialysis bipolar.  Neuropathy


History of Any Multi-Drug Resistant Organisms: None Reported


Past Surgical History:  Section, Tonsillectomy


Additional Past Surgical History / Comment(s): fistula


Past Anesthesia/Blood Transfusion Reactions: No Reported Reaction


Past Psychological History: Bipolar


Smoking Status: Former smoker


Past Alcohol Use History: Occasional


Past Drug Use History: None Reported





- Past Family History


  ** Father


Family Medical History: Unable to Obtain





General Exam


Limitations: no limitations





Course


                                   Vital Signs











  20





  14:53 15:00 16:00


 


Temperature 98.4 F  


 


Pulse Rate 67 67 64


 


Respiratory 16 15 15





Rate   


 


Blood Pressure 129/76 129/76 118/74


 


O2 Sat by Pulse 98 98 97





Oximetry   














Medical Decision Making





- Lab Data


Result diagrams: 


                                 20 15:20





                                 20 15:20


                                   Lab Results











  20 Range/Units





  15:20 15:20 15:20 


 


WBC  7.6    (3.8-10.6)  k/uL


 


RBC  3.96    (3.80-5.40)  m/uL


 


Hgb  11.9    (11.4-16.0)  gm/dL


 


Hct  35.7    (34.0-46.0)  %


 


MCV  90.1    (80.0-100.0)  fL


 


MCH  30.1    (25.0-35.0)  pg


 


MCHC  33.4    (31.0-37.0)  g/dL


 


RDW  14.8    (11.5-15.5)  %


 


Plt Count  259    (150-450)  k/uL


 


Neutrophils %  55    %


 


Lymphocytes %  37    %


 


Monocytes %  4    %


 


Eosinophils %  3    %


 


Basophils %  1    %


 


Neutrophils #  4.1    (1.3-7.7)  k/uL


 


Lymphocytes #  2.8    (1.0-4.8)  k/uL


 


Monocytes #  0.3    (0-1.0)  k/uL


 


Eosinophils #  0.2    (0-0.7)  k/uL


 


Basophils #  0.1    (0-0.2)  k/uL


 


PT     (9.0-12.0)  sec


 


INR     (<1.2)  


 


APTT     (22.0-30.0)  sec


 


VBG pH     (7.31-7.41)  


 


VBG pCO2     (37-51)  mmHg


 


VBG HCO3     (24-28)  mmol/L


 


Sodium   140   (137-145)  mmol/L


 


Potassium   4.3   (3.5-5.1)  mmol/L


 


Chloride   95 L   ()  mmol/L


 


Carbon Dioxide   28   (22-30)  mmol/L


 


Anion Gap   17   mmol/L


 


BUN   40 H   (7-17)  mg/dL


 


Creatinine   9.36 H*   (0.52-1.04)  mg/dL


 


Est GFR (CKD-EPI)AfAm   5   (>60 ml/min/1.73 sqM)  


 


Est GFR (CKD-EPI)NonAf   4   (>60 ml/min/1.73 sqM)  


 


Glucose   212 H   (74-99)  mg/dL


 


Plasma Lactic Acid Stan    1.4  (0.7-2.0)  mmol/L


 


Calcium   10.1   (8.4-10.2)  mg/dL


 


Ionized Calcium Will   5.0   (4.5-5.3)  mg/dL


 


Magnesium   2.6 H   (1.6-2.3)  mg/dL


 


Total Bilirubin   0.5   (0.2-1.3)  mg/dL


 


Conjugated Bilirubin   0.0   (0.0-0.3)  mg/dL


 


Unconjugated Bilirubin   0.0   (0.0-1.1)  mg/dL


 


Delta Bilirubin   0.5 H   (0.0-0.2)  mg/dL


 


AST   22   (14-36)  U/L


 


ALT   17   (4-34)  U/L


 


Alkaline Phosphatase   142 H   ()  U/L


 


Ammonia    <9  (<30)  umol/L


 


Troponin I     (0.000-0.034)  ng/mL


 


C-Reactive Protein   9.5   (<10.0)  mg/L


 


Total Protein   8.0   (6.3-8.2)  g/dL


 


Albumin   4.7   (3.5-5.0)  g/dL


 


Lipase   248   ()  U/L


 


TSH   0.730   (0.465-4.680)  mIU/L


 


Salicylates   <1.0   mg/dL


 


Acetaminophen   <10.0   ug/mL


 


Serum Alcohol   <10   mg/dL


 


Acetone, Qual   Negative   (Negative)  














  20 Range/Units





  15:20 15:20 15:20 


 


WBC     (3.8-10.6)  k/uL


 


RBC     (3.80-5.40)  m/uL


 


Hgb     (11.4-16.0)  gm/dL


 


Hct     (34.0-46.0)  %


 


MCV     (80.0-100.0)  fL


 


MCH     (25.0-35.0)  pg


 


MCHC     (31.0-37.0)  g/dL


 


RDW     (11.5-15.5)  %


 


Plt Count     (150-450)  k/uL


 


Neutrophils %     %


 


Lymphocytes %     %


 


Monocytes %     %


 


Eosinophils %     %


 


Basophils %     %


 


Neutrophils #     (1.3-7.7)  k/uL


 


Lymphocytes #     (1.0-4.8)  k/uL


 


Monocytes #     (0-1.0)  k/uL


 


Eosinophils #     (0-0.7)  k/uL


 


Basophils #     (0-0.2)  k/uL


 


PT  9.7    (9.0-12.0)  sec


 


INR  0.9    (<1.2)  


 


APTT  22.1    (22.0-30.0)  sec


 


VBG pH    7.31  (7.31-7.41)  


 


VBG pCO2    62 H  (37-51)  mmHg


 


VBG HCO3    32 H  (24-28)  mmol/L


 


Sodium     (137-145)  mmol/L


 


Potassium     (3.5-5.1)  mmol/L


 


Chloride     ()  mmol/L


 


Carbon Dioxide     (22-30)  mmol/L


 


Anion Gap     mmol/L


 


BUN     (7-17)  mg/dL


 


Creatinine     (0.52-1.04)  mg/dL


 


Est GFR (CKD-EPI)AfAm     (>60 ml/min/1.73 sqM)  


 


Est GFR (CKD-EPI)NonAf     (>60 ml/min/1.73 sqM)  


 


Glucose     (74-99)  mg/dL


 


Plasma Lactic Acid Stan     (0.7-2.0)  mmol/L


 


Calcium     (8.4-10.2)  mg/dL


 


Ionized Calcium Will     (4.5-5.3)  mg/dL


 


Magnesium     (1.6-2.3)  mg/dL


 


Total Bilirubin     (0.2-1.3)  mg/dL


 


Conjugated Bilirubin     (0.0-0.3)  mg/dL


 


Unconjugated Bilirubin     (0.0-1.1)  mg/dL


 


Delta Bilirubin     (0.0-0.2)  mg/dL


 


AST     (14-36)  U/L


 


ALT     (4-34)  U/L


 


Alkaline Phosphatase     ()  U/L


 


Ammonia     (<30)  umol/L


 


Troponin I   <0.012   (0.000-0.034)  ng/mL


 


C-Reactive Protein     (<10.0)  mg/L


 


Total Protein     (6.3-8.2)  g/dL


 


Albumin     (3.5-5.0)  g/dL


 


Lipase     ()  U/L


 


TSH     (0.465-4.680)  mIU/L


 


Salicylates     mg/dL


 


Acetaminophen     ug/mL


 


Serum Alcohol     mg/dL


 


Acetone, Qual     (Negative)  














Disposition


Clinical Impression: 


 Altered mental status





Disposition: OTHER INSTITUTION NOT DEFINED


Condition: Fair


Referrals: 


Saturnino Castillo MD [Primary Care Provider] - 1-2 days


Time of Disposition: 17:05





- Out of Hospital Transfer - Req. Specs


Out of Hospital Transfer - Requested Specifics: Other [facility] ([facility]

## 2020-09-09 ENCOUNTER — HOSPITAL ENCOUNTER (INPATIENT)
Dept: HOSPITAL 47 - EC | Age: 50
LOS: 1 days | Discharge: HOME HEALTH | DRG: 100 | End: 2020-09-10
Attending: INTERNAL MEDICINE | Admitting: INTERNAL MEDICINE
Payer: MEDICARE

## 2020-09-09 DIAGNOSIS — E11.42: ICD-10-CM

## 2020-09-09 DIAGNOSIS — Z91.14: ICD-10-CM

## 2020-09-09 DIAGNOSIS — Z87.891: ICD-10-CM

## 2020-09-09 DIAGNOSIS — Z91.15: ICD-10-CM

## 2020-09-09 DIAGNOSIS — E11.22: ICD-10-CM

## 2020-09-09 DIAGNOSIS — N18.6: ICD-10-CM

## 2020-09-09 DIAGNOSIS — M89.8X9: ICD-10-CM

## 2020-09-09 DIAGNOSIS — E78.5: ICD-10-CM

## 2020-09-09 DIAGNOSIS — E03.9: ICD-10-CM

## 2020-09-09 DIAGNOSIS — I10: ICD-10-CM

## 2020-09-09 DIAGNOSIS — Z79.82: ICD-10-CM

## 2020-09-09 DIAGNOSIS — Z79.02: ICD-10-CM

## 2020-09-09 DIAGNOSIS — Z79.4: ICD-10-CM

## 2020-09-09 DIAGNOSIS — Z99.2: ICD-10-CM

## 2020-09-09 DIAGNOSIS — I69.354: ICD-10-CM

## 2020-09-09 DIAGNOSIS — D63.1: ICD-10-CM

## 2020-09-09 DIAGNOSIS — K21.9: ICD-10-CM

## 2020-09-09 DIAGNOSIS — Z79.899: ICD-10-CM

## 2020-09-09 DIAGNOSIS — G40.909: Primary | ICD-10-CM

## 2020-09-09 LAB
ALBUMIN SERPL-MCNC: 4.6 G/DL (ref 3.5–5)
ALP SERPL-CCNC: 78 U/L (ref 38–126)
ALT SERPL-CCNC: 13 U/L (ref 4–34)
ANION GAP SERPL CALC-SCNC: 15 MMOL/L
AST SERPL-CCNC: 23 U/L (ref 14–36)
BASOPHILS # BLD AUTO: 0.1 K/UL (ref 0–0.2)
BASOPHILS NFR BLD AUTO: 1 %
BUN SERPL-SCNC: 44 MG/DL (ref 7–17)
CALCIUM SPEC-MCNC: 9.7 MG/DL (ref 8.4–10.2)
CHLORIDE SERPL-SCNC: 99 MMOL/L (ref 98–107)
CO2 SERPL-SCNC: 27 MMOL/L (ref 22–30)
EOSINOPHIL # BLD AUTO: 0.1 K/UL (ref 0–0.7)
EOSINOPHIL NFR BLD AUTO: 1 %
ERYTHROCYTE [DISTWIDTH] IN BLOOD BY AUTOMATED COUNT: 2.9 M/UL (ref 3.8–5.4)
ERYTHROCYTE [DISTWIDTH] IN BLOOD: 13.7 % (ref 11.5–15.5)
GLUCOSE BLD-MCNC: 143 MG/DL (ref 75–99)
GLUCOSE BLD-MCNC: 94 MG/DL (ref 75–99)
GLUCOSE SERPL-MCNC: 157 MG/DL (ref 74–99)
HCT VFR BLD AUTO: 26.6 % (ref 34–46)
HGB BLD-MCNC: 8.8 GM/DL (ref 11.4–16)
LYMPHOCYTES # SPEC AUTO: 1.6 K/UL (ref 1–4.8)
LYMPHOCYTES NFR SPEC AUTO: 25 %
MCH RBC QN AUTO: 30.2 PG (ref 25–35)
MCHC RBC AUTO-ENTMCNC: 32.9 G/DL (ref 31–37)
MCV RBC AUTO: 91.8 FL (ref 80–100)
MONOCYTES # BLD AUTO: 0.2 K/UL (ref 0–1)
MONOCYTES NFR BLD AUTO: 4 %
NEUTROPHILS # BLD AUTO: 4.2 K/UL (ref 1.3–7.7)
NEUTROPHILS NFR BLD AUTO: 67 %
PLATELET # BLD AUTO: 262 K/UL (ref 150–450)
POTASSIUM SERPL-SCNC: 4.5 MMOL/L (ref 3.5–5.1)
PROT SERPL-MCNC: 7.6 G/DL (ref 6.3–8.2)
SODIUM SERPL-SCNC: 141 MMOL/L (ref 137–145)
WBC # BLD AUTO: 6.3 K/UL (ref 3.8–10.6)

## 2020-09-09 PROCEDURE — 93005 ELECTROCARDIOGRAM TRACING: CPT

## 2020-09-09 PROCEDURE — 36415 COLL VENOUS BLD VENIPUNCTURE: CPT

## 2020-09-09 PROCEDURE — 80053 COMPREHEN METABOLIC PANEL: CPT

## 2020-09-09 PROCEDURE — 90935 HEMODIALYSIS ONE EVALUATION: CPT

## 2020-09-09 PROCEDURE — 5A1D70Z PERFORMANCE OF URINARY FILTRATION, INTERMITTENT, LESS THAN 6 HOURS PER DAY: ICD-10-PCS

## 2020-09-09 PROCEDURE — 99285 EMERGENCY DEPT VISIT HI MDM: CPT

## 2020-09-09 PROCEDURE — 85025 COMPLETE CBC W/AUTO DIFF WBC: CPT

## 2020-09-09 PROCEDURE — 96360 HYDRATION IV INFUSION INIT: CPT

## 2020-09-09 RX ADMIN — SEVELAMER CARBONATE SCH: 800 TABLET, FILM COATED ORAL at 17:33

## 2020-09-09 RX ADMIN — INSULIN ASPART SCH: 100 INJECTION, SOLUTION INTRAVENOUS; SUBCUTANEOUS at 17:08

## 2020-09-09 RX ADMIN — INSULIN ASPART SCH UNIT: 100 INJECTION, SOLUTION INTRAVENOUS; SUBCUTANEOUS at 20:36

## 2020-09-09 RX ADMIN — HEPARIN SODIUM SCH UNIT: 5000 INJECTION, SOLUTION INTRAVENOUS; SUBCUTANEOUS at 23:19

## 2020-09-09 RX ADMIN — Medication SCH: at 18:14

## 2020-09-09 RX ADMIN — ASPIRIN SCH: 325 TABLET ORAL at 20:37

## 2020-09-09 RX ADMIN — METOPROLOL TARTRATE SCH MG: 25 TABLET, FILM COATED ORAL at 20:36

## 2020-09-09 RX ADMIN — LEVETIRACETAM SCH MLS/HR: 100 INJECTION, SOLUTION, CONCENTRATE INTRAVENOUS at 20:37

## 2020-09-09 NOTE — ED
Seizure HPI





- General


Source: patient, EMS, RN notes reviewed


Mode of arrival: EMS


Limitations: no limitations





<Tigre Brand - Last Filed: 20 14:01>





<Jason Lopez - Last Filed: 20 14:06>





- General


Chief Complaint: Seizure


Stated Complaint: seizures


Time Seen by Provider: 20 11:17





- History of Present Illness


Initial Comments: 





50-year-old female presents emergency department via EMS from home chief 

complaint seizure.  Patient has long history of seizures patient states then.  

Information is limited from patient as she mildly postictal at this time.  

Patient is awake and alert.  Patient also has a history of renal failure on 

dialysis.  Patient denies missing any appointments.  Patient denies chest pain, 

shortness breath, headache or dizziness. (Tigre Brand)





- Related Data


                                Home Medications











 Medication  Instructions  Recorded  Confirmed


 


Clopidogrel [Plavix] 75 mg PO DAILY 17


 


Ergocalciferol (Vitamin D2) 50,000 unit PO Q7D 17





[Vitamin D2]   


 


Calcium Acetate 1,334 mg PO AC-TID 20


 


Atorvastatin [Lipitor] 20 mg PO DAILY 20


 


Insulin Glargine [Lantus] 12 unit SQ HS 20


 


Metoprolol Tartrate 25 mg PO BID 20


 


Aspirin EC [Ecotrin Low Dose] 81 mg PO DAILY 20


 


Lacosamide [Vimpat] 100 mg PO AS DIRECTED 20


 


cloBAZam [Clobazam] 10 mg PO BID 20


 


levETIRAcetam [Keppra] 500 mg PO AS DIRECTED 20


 


Acetaminophen Tab [Tylenol Tab] 500 - 1,000 mg PO Q6H PRN 20








                                  Previous Rx's











 Medication  Instructions  Recorded


 


Citalopram Hydrobromide [CeleXA] 10 mg PO DAILY #30 tab 20


 


amLODIPine [Norvasc] 10 mg PO DAILY #30 tab 20











                                    Allergies











Allergy/AdvReac Type Severity Reaction Status Date / Time


 


Penicillins Allergy  Itching Verified 20 14:57














Review of Systems


ROS Other: All systems not noted in ROS Statement are negative.





<Tigre Brand - Last Filed: 20 14:01>


ROS Other: All systems not noted in ROS Statement are negative.





<Jason Lopez - Last Filed: 20 14:06>


ROS Statement: 


Those systems with pertinent positive or pertinent negative responses have been 

documented in the HPI.








Past Medical History


Past Medical History: Chest Pain / Angina, CVA/TIA, Diabetes Mellitus, 

Hypertension, Renal Disease, Seizure Disorder, Thyroid Disorder


Additional Past Medical History / Comment(s): dialysis bipolar.  Neuropathy


History of Any Multi-Drug Resistant Organisms: None Reported


Past Surgical History:  Section, Tonsillectomy


Additional Past Surgical History / Comment(s): fistula


Past Anesthesia/Blood Transfusion Reactions: No Reported Reaction


Past Psychological History: Bipolar


Smoking Status: Former smoker


Past Alcohol Use History: Occasional


Past Drug Use History: None Reported





- Past Family History


  ** Father


Family Medical History: Unable to Obtain





<Tigre Brand - Last Filed: 20 14:01>





General Exam


Limitations: altered mental status


General appearance: alert, in no apparent distress


Head exam: Present: atraumatic, normocephalic, normal inspection


Eye exam: Present: normal appearance, PERRL, EOMI.  Absent: scleral icterus, 

conjunctival injection, periorbital swelling


ENT exam: Present: normal exam, normal oropharynx, mucous membranes moist


Neck exam: Present: normal inspection, full ROM.  Absent: tenderness, menin

gismus, lymphadenopathy


Respiratory exam: Present: normal lung sounds bilaterally.  Absent: respiratory 

distress, wheezes, rales, rhonchi, stridor


Cardiovascular Exam: Present: regular rate, normal rhythm, normal heart sounds. 

 Absent: systolic murmur, diastolic murmur, rubs, gallop, clicks


Extremities exam: Present: other (Fistula left arm)


Neurological exam: Present: reflexes normal.  Absent: motor sensory deficit


Skin exam: Present: warm, dry, intact, normal color.  Absent: rash





<Tigre Brand - Last Filed: 20 14:01>





Course





<Jason Lopez - Last Filed: 20 14:06>





                                   Vital Signs











  20





  11:15 13:42


 


Temperature 98.8 F 


 


Pulse Rate 91 93


 


Respiratory 19 18





Rate  


 


Blood Pressure 157/80 186/89


 


O2 Sat by Pulse 99 97





Oximetry  














- Reevaluation(s)


Reevaluation #1: 





20 14:06


I did review the case.  Patient will be admitted she still demonstrates a 

prolonged postictal state after seizure.  He does have a known history of 

seizure disorder.  She has chronic renal failure and is to have dialysis today. 

 Dr. Higginbotham is are ready been notified I did discuss case with Dr. Santos. (Jason Walker)





Medical Decision Making





- Lab Data


Result diagrams: 


                                 20 11:45





                                 20 11:45





<Tigre Brand - Last Filed: 20 14:01>





- Lab Data


Result diagrams: 


                                 20 11:45





                                 20 11:45





<Jason Lopez - Last Filed: 20 14:06>





- Medical Decision Making





50-year-old presented for seizure.  Patient has a history of seizures.  Patient 

is awake and alert she does have some slight drowsiness which patient has been 

admitted several times for prolonged postictal state.  Patient also missed 

dialysis today.  Patient nephrologist wants patient to have dialysis. 

(Tigre Brand)





- Lab Data





                                   Lab Results











  20 Range/Units





  11:45 11:45 


 


WBC  6.3   (3.8-10.6)  k/uL


 


RBC  2.90 L   (3.80-5.40)  m/uL


 


Hgb  8.8 L   (11.4-16.0)  gm/dL


 


Hct  26.6 L   (34.0-46.0)  %


 


MCV  91.8   (80.0-100.0)  fL


 


MCH  30.2   (25.0-35.0)  pg


 


MCHC  32.9   (31.0-37.0)  g/dL


 


RDW  13.7   (11.5-15.5)  %


 


Plt Count  262   (150-450)  k/uL


 


Neutrophils %  67   %


 


Lymphocytes %  25   %


 


Monocytes %  4   %


 


Eosinophils %  1   %


 


Basophils %  1   %


 


Neutrophils #  4.2   (1.3-7.7)  k/uL


 


Lymphocytes #  1.6   (1.0-4.8)  k/uL


 


Monocytes #  0.2   (0-1.0)  k/uL


 


Eosinophils #  0.1   (0-0.7)  k/uL


 


Basophils #  0.1   (0-0.2)  k/uL


 


Sodium   141  (137-145)  mmol/L


 


Potassium   4.5  (3.5-5.1)  mmol/L


 


Chloride   99  ()  mmol/L


 


Carbon Dioxide   27  (22-30)  mmol/L


 


Anion Gap   15  mmol/L


 


BUN   44 H  (7-17)  mg/dL


 


Creatinine   7.14 H*  (0.52-1.04)  mg/dL


 


Est GFR (CKD-EPI)AfAm   7  (>60 ml/min/1.73 sqM)  


 


Est GFR (CKD-EPI)NonAf   6  (>60 ml/min/1.73 sqM)  


 


Glucose   157 H  (74-99)  mg/dL


 


Calcium   9.7  (8.4-10.2)  mg/dL


 


Total Bilirubin   0.5  (0.2-1.3)  mg/dL


 


AST   23  (14-36)  U/L


 


ALT   13  (4-34)  U/L


 


Alkaline Phosphatase   78  ()  U/L


 


Total Protein   7.6  (6.3-8.2)  g/dL


 


Albumin   4.6  (3.5-5.0)  g/dL














Disposition





<Tigre Brand - Last Filed: 20 14:01>





<Jason Lopez - Last Filed: 20 14:06>


Clinical Impression: 


 Generalized seizure, Renal failure





Disposition: ADMITTED AS IP TO THIS HOSP


Referrals: 


Saturnino Castillo MD [Primary Care Provider] - 1-2 days

## 2020-09-09 NOTE — P.CNNES
History of Present Illness


Consult date: 20


Requesting physician: Shobha Santos


Reason for Consult: Breakthrough seizure


History of Present Illness: 


This is a 50-year-old female with history of epilepsy, status post VNS, 

diabetes, hypertension, end-stage renal disease on hemodialysis, previous CVA, 

hypertelorism, bipolar, some report of medication noncompliance who presented to

the emergency department on 2020 for breakthrough seizure.  I contacted 

e patient's  via phone and he stated that the around 9:00 or 10:00 in the

morning patient was having "quivering then she was shaken on the right arm or 

leg and minimally on the left side.  The episode lasted less than 1 minute.  

Then she was post ictal.  He does not recall if she did have gaze deviation and 

if she did he doesn't know which side it was on.  He felt like she did not have 

that her usual grand mal seizure.  EMS was called and they brought her to the 

hospital.  Upon arrival to the hospital she remained to be post ictal.  Her 

 usually she is post ictal for 12 hours up to 24 hours.  She follows up 

with Dr. Symone hedrick at [facility].  He stated that he added her 

on clonazepam 10 mg twice a day and that was about 2 month ago.  Otherwise she 

is on the Keppra 500 mg 1 tablet twice a day and gets an additional dose of 500 

on  and Friday.  She is also on Vimpat 100 mg twice a day and an

additional dose on  after dialysis.  She hasn't missed 

her medications.  Her last seizure prior to today.  Her  was on the 

2020 and she was taken to [facility].  


Around 4:55pm per the dialysis tech she noted that the patient had the shaken on

the left side and the the body was shifted to the right lasting for 20 seconds. 

Then the patient received 1 mg Ativan around 17:03.





Her initial vitals were blood pressure of 157/80, heart rate of 91, temperature 

of 98.7 Fahrenheit and pulse ox of 99 at room air.


Workup in the hospital consisted of an EKG and the blood workup.


EKG was reported as normal sinus rhythm.  Prolonged QT.  Ventricular rate of 90.


Her creatinine was 7.14, glucose of 157.  Calcium of 9.7.





Patient was a last seen by our neuro-hospitalist (Dr. Fischer) on 2020 for 

breakthrough seizure.  She was seen by our neuro hospitalist at multiple times 

twice in 2019 and 3 times in  so far.


Per Dr. Fischer's Note patient was on Vimpat 100 mg daily and additional 100 mg 

every  and Friday postdialysis.  She is also on Keppra 500mg 

every 12hrs and additional Keppra 500 every  and Friday 

postdialysis.  She is also on Plavix 75mg for secondary stroke prophylaxis.


Patient had the previous EEGs last one on 2020 which was reported as 

abnormal EEG due to frequent epileptiform activity noted occurring both during 

wakefulness and drowsiness.  This consisted of frequent burst of 2-3 seconds of 

generalized high amplitude polyspike and single spike and slow wave discharges. 

These occurred at a rate of 1-2.  There were not associate with any clinical 

movement.  This EEG is not unsimilar to prior EEG studies.  No clinical seizures

occurred. 


Dr. Fischer recommended to increase Vimpat 100 mg twice a day as well as to take 

an extra 100 mg post dialysis on  and Friday.





Patient has a history of seizure disorder since early childhood shortly after 

birth.  She follows up with Dr. Symone hedrick at [facility].  Patient 

peripherally has been on Depakote, Tegretol, phenobarbital and Dilantin.  She 

has a history of stroke with left-sided weakness in the past.








Review of Systems


Limited but the pertinent positive and negative as per HPI.





Past Medical History


Past Medical History: Chest Pain / Angina, CVA/TIA, Diabetes Mellitus, 

Hypertension, Renal Disease, Seizure Disorder, Thyroid Disorder


Additional Past Medical History / Comment(s): dialysis, bipolar, neuropathy,  

baseline orientation person/place, drop foot left, only able to walk short 

distances with walker or wheelchair


History of Any Multi-Drug Resistant Organisms: None Reported


Past Surgical History:  Section, Tonsillectomy


Additional Past Surgical History / Comment(s): left arm fistula, loop recorder, 

vagus nerve stimulator


Past Anesthesia/Blood Transfusion Reactions: No Reported Reaction


Past Psychological History: Bipolar


Smoking Status: Former smoker


Past Alcohol Use History: Occasional


Past Drug Use History: None Reported





- Past Family History


  ** Father


Family Medical History: Unable to Obtain


Additional Family Medical History / Comment(s): adopted





Medications and Allergies


                                Home Medications











 Medication  Instructions  Recorded  Confirmed  Type


 


Clopidogrel [Plavix] 75 mg PO DAILY 17 History


 


Ergocalciferol (Vitamin D2) 50,000 unit PO Q7D 17 History





[Vitamin D2]    


 


Calcium Acetate 667 mg PO AC-TID 20 History


 


Citalopram Hydrobromide [CeleXA] 10 mg PO DAILY #30 tab 20 Rx


 


Atorvastatin [Lipitor] 20 mg PO DAILY 20 History


 


Insulin Glargine [Lantus] 12 unit SQ HS 20 History


 


Metoprolol Tartrate 25 mg PO BID 20 History


 


Lacosamide [Vimpat] 100 mg PO AS DIRECTED 20 History


 


cloBAZam [Clobazam] 10 mg PO BID 20 History


 


levETIRAcetam [Keppra] 500 mg PO AS DIRECTED 20 History


 


Acetaminophen Tab [Tylenol Tab] 500 - 1,000 mg PO Q6H PRN 20 

History


 


Famotidine [Pepcid] 20 mg PO DAILY 20 History


 


Heparin Sodium,Porcine [Heparin 5,000 unit SQ Q8HR 20 History





Sodium]    


 


Pantoprazole Sodium [Protonix] 40 mg PO DAILY 20 History


 


Sevelamer [Renvela] 800 mg PO TID-W/MEALS 20 History


 


amLODIPine [Norvasc] 5 mg PO DAILY 20 History


 


polyethylene glycoL 3350 [Miralax] 17 gm PO DAILY 20 History








                                    Allergies











Allergy/AdvReac Type Severity Reaction Status Date / Time


 


Penicillins Allergy  Itching Verified 20 16:24














Physical Examination





- Vital Signs


Vital Signs: 


                                   Vital Signs











  Temp Pulse Pulse Resp BP BP Pulse Ox


 


 20 16:00  98.3 F   88  18   181/80  96


 


 20 13:42   93   18  186/89   97


 


 20 11:15  98.8 F  91   19  157/80   99








                                Intake and Output











 20





 06:59 14:59 22:59


 


Other:   


 


  Weight  72.575 kg 72.575 kg











GENERAL: The patient is lying in bed, did not seem in distress and currently 

getting dialysis.


CHEST: The heart rate is regular rate rhythm.   No murmurs to auscultation.  


LUNG: Clear to auscultation bilaterally no wheezing noted throughout.  Not 

labored breathing.


ABDOMEN/GI: Bowel sounds present in all 4 quadrants. No tenderness to palpation 

throughout.





NEUROLOGICAL:


Higher mental function: The patient is awake, alert, not verbally responsive or 

following commands.  


Cranial nerves: The pupils are round, equal and reactive to light.  Primary gaze

 is midline laterally.  Visual fields are full to threat throughout.  Patient is

 tracking in the knee throughout the room and no nystagmus was appreciated.  No 

facial weakness noted.


Motor: Strength could not be assessed because of the patient condition.  But 

that she was able to lift up right upper extremity and the at one time above 

gravity.  .  


Sensation: Could not be assessed because of condition.


Reflexes (right/left): Patient has brisk reflexes over the left.


Plantars are upgoing bilaterally.








Results


AST of 23, ALTs of 13.








- Laboratory Findings


CBC and BMP: 


                                 20 11:45





                                 20 11:45


Abnormal Lab Findings: 


                                  Abnormal Labs











  20





  11:45 11:45


 


RBC  2.90 L 


 


Hgb  8.8 L 


 


Hct  26.6 L 


 


BUN   44 H


 


Creatinine   7.14 H*


 


Glucose   157 H














Assessment and Plan


Assessment: 


Long-standing history of seizure disorder, comes with a breakthrough seizure.


History of CVA with left hemiparesis


End-stage renal disease on hemodialysis


Diabetes


Hypertension





Plan: 





Since the patient was post ictal him given patient 200 mg for today.


then I increased the Vimpat, 100 mg twice a day to 150mg, 3 times a day with the

 continuation of for additional 100 mg on  and Friday 

postdialysis.


Continue Keppra 500 mg twice a day with an additional 500 mg on 

 and Friday postdialysis.


We'll continue clonazepam 10 mg twice a day.


Regarding her seizure medication I placed the patient IV since the patient 

unable to swallow because of her condition.  We'll resume the by mouth 

medication once the patient is back to baseline.





Continue Plavix 75 mg daily as well as Lipitor 20mg for secondary stroke 

prophylaxis.





The plan was discussed with the patient's .





Thank you for the consult.





Tomorrow Dr. Fischer will start coverage.





Michele Merritt M.D.


Neuro-hospitalist





Time with Patient: Greater than 30

## 2020-09-09 NOTE — P.HPIM
History of Present Illness


50-year-old the female well-known to me from her previous physician patient had 

multiple hospital physician for breakthrough seizures patient is presently on 

Vimpat and Keppra.  Patient usually will take an additional Keppra dose after 

the dialysis.  Unable to often any History from the patient patient evidently 

presented with seizures patient appears to be postictal at this time patient is 

also undergoing hemodialysis at this time.  Patient is awake but nonverbal and 

unable to provide any history to me.








Review of Systems





Unable to obtain due to her clinical condition





Past Medical History


Past Medical History: Chest Pain / Angina, CVA/TIA, Diabetes Mellitus, 

Hypertension, Renal Disease, Seizure Disorder, Thyroid Disorder


Additional Past Medical History / Comment(s): dialysis, bipolar, neuropathy,  

baseline orientation person/place, drop foot left, only able to walk short 

distances with walker or wheelchair


History of Any Multi-Drug Resistant Organisms: None Reported


Past Surgical History:  Section, Tonsillectomy


Additional Past Surgical History / Comment(s): left arm fistula, loop recorder, 

vagus nerve stimulator


Past Anesthesia/Blood Transfusion Reactions: No Reported Reaction


Past Psychological History: Bipolar


Smoking Status: Former smoker


Past Alcohol Use History: Occasional


Past Drug Use History: None Reported





- Past Family History


  ** Father


Family Medical History: Unable to Obtain


Additional Family Medical History / Comment(s): adopted





Medications and Allergies


                                Home Medications











 Medication  Instructions  Recorded  Confirmed  Type


 


Clopidogrel [Plavix] 75 mg PO DAILY 17 History


 


Ergocalciferol (Vitamin D2) 50,000 unit PO Q7D 17 History





[Vitamin D2]    


 


Calcium Acetate 667 mg PO AC-TID 20 History


 


Citalopram Hydrobromide [CeleXA] 10 mg PO DAILY #30 tab 20 Rx


 


Atorvastatin [Lipitor] 20 mg PO DAILY 20 History


 


Insulin Glargine [Lantus] 12 unit SQ HS 20 History


 


Metoprolol Tartrate 25 mg PO BID 20 History


 


Lacosamide [Vimpat] 100 mg PO AS DIRECTED 20 History


 


cloBAZam [Clobazam] 10 mg PO BID 20 History


 


levETIRAcetam [Keppra] 500 mg PO AS DIRECTED 20 History


 


Acetaminophen Tab [Tylenol Tab] 500 - 1,000 mg PO Q6H PRN 20 

History


 


Famotidine [Pepcid] 20 mg PO DAILY 20 History


 


Heparin Sodium,Porcine [Heparin 5,000 unit SQ Q8HR 20 History





Sodium]    


 


Pantoprazole Sodium [Protonix] 40 mg PO DAILY 20 History


 


Sevelamer [Renvela] 800 mg PO TID-W/MEALS 20 History


 


amLODIPine [Norvasc] 5 mg PO DAILY 20 History


 


polyethylene glycoL 3350 [Miralax] 17 gm PO DAILY 20 History








                                    Allergies











Allergy/AdvReac Type Severity Reaction Status Date / Time


 


Penicillins Allergy  Itching Verified 20 16:24














Physical Exam


Vitals: 


                                   Vital Signs











  Temp Pulse Pulse Resp BP BP Pulse Ox


 


 20 16:00  98.3 F   88  18   181/80  96


 


 20 13:42   93   18  186/89   97


 


 20 11:15  98.8 F  91   19  157/80   99








                                Intake and Output











 20





 06:59 14:59 22:59


 


Other:   


 


  Weight  72.575 kg 72.575 kg

















PHYSICAL EXAMINATION: 





GENERAL: The patient is awake alert and able to assess his orientation patient 

is nonverbal, not in any acute distress. Well developed, well nourished. 


HEENT: Pupils are round and equally reacting to light. EOMI. No scleral icterus.

 No conjunctival pallor. Normocephalic, atraumatic. No pharyngeal erythema. No 

thyromegaly. 


CARDIOVASCULAR: S1 and S2 present. No murmurs, rubs, or gallops. 


PULMONARY: Chest is clear to auscultation, no wheezing or crackles. 


ABDOMEN: Soft, nontender, nondistended, normoactive bowel sounds. No palpable 

organomegaly. 


MUSCULOSKELETAL: No joint swelling or deformity.


EXTREMITIES: No cyanosis, clubbing, or pedal edema. 


NEUROLOGICAL: Unable to assess patient is also is awake and alert and is up.  To

 understand the conversation but nonverbal


SKIN: No rashes. 

















Results


CBC & Chem 7: 


                                 20 11:45





                                 20 11:45


Labs: 


                  Abnormal Lab Results - Last 24 Hours (Table)











  20 Range/Units





  11:45 11:45 


 


RBC  2.90 L   (3.80-5.40)  m/uL


 


Hgb  8.8 L   (11.4-16.0)  gm/dL


 


Hct  26.6 L   (34.0-46.0)  %


 


BUN   44 H  (7-17)  mg/dL


 


Creatinine   7.14 H*  (0.52-1.04)  mg/dL


 


Glucose   157 H  (74-99)  mg/dL














Thrombosis Risk Factor Assmnt





- Choose All That Apply


Each Factor Represents 1 point: Age 41-60 years


Other Risk Factors:  (SQ heparin for CVA)


Thrombosis Risk Factor Assessment Total Risk Factor Score: 1


Thrombosis Risk Factor Assessment Level: Low Risk





Assessment and Plan


Plan: 


-Breakthrough seizures: Patient has multiple hospitalization for similar 

complaint neurology will be consulted for leave the decision of further workup 

and antiseizure medications to neurology due to the complexity of her seizure 

disorder and because of end-stage renal disease.


-End-stage renal disease secondary to diabetic nephropathy.  Patient is 

undergoing hemodialysis at this time


-type 2 diabetes mellitus 


-hypertension


-hyperlipidemia


-Gastroesophageal reflux disease


-Hypothyroidism


-Anemia of chronic kidney disease





For above-mentioned chronic medical problems once the medications are verified 

patient will be resumed on those medications

## 2020-09-10 VITALS
TEMPERATURE: 96.6 F | RESPIRATION RATE: 18 BRPM | DIASTOLIC BLOOD PRESSURE: 63 MMHG | SYSTOLIC BLOOD PRESSURE: 154 MMHG | HEART RATE: 78 BPM

## 2020-09-10 LAB
GLUCOSE BLD-MCNC: 116 MG/DL (ref 75–99)
GLUCOSE BLD-MCNC: 143 MG/DL (ref 75–99)

## 2020-09-10 RX ADMIN — LEVETIRACETAM SCH MLS/HR: 100 INJECTION, SOLUTION, CONCENTRATE INTRAVENOUS at 08:24

## 2020-09-10 RX ADMIN — SEVELAMER CARBONATE SCH MG: 800 TABLET, FILM COATED ORAL at 12:35

## 2020-09-10 RX ADMIN — INSULIN ASPART SCH: 100 INJECTION, SOLUTION INTRAVENOUS; SUBCUTANEOUS at 07:20

## 2020-09-10 RX ADMIN — METOPROLOL TARTRATE SCH MG: 25 TABLET, FILM COATED ORAL at 08:30

## 2020-09-10 RX ADMIN — ASPIRIN SCH: 325 TABLET ORAL at 08:34

## 2020-09-10 RX ADMIN — HEPARIN SODIUM SCH UNIT: 5000 INJECTION, SOLUTION INTRAVENOUS; SUBCUTANEOUS at 08:38

## 2020-09-10 RX ADMIN — INSULIN ASPART SCH UNIT: 100 INJECTION, SOLUTION INTRAVENOUS; SUBCUTANEOUS at 12:37

## 2020-09-10 RX ADMIN — HEPARIN SODIUM SCH: 5000 INJECTION, SOLUTION INTRAVENOUS; SUBCUTANEOUS at 15:42

## 2020-09-10 RX ADMIN — Medication SCH MG: at 12:35

## 2020-09-10 RX ADMIN — Medication SCH MG: at 07:26

## 2020-09-10 RX ADMIN — SEVELAMER CARBONATE SCH MG: 800 TABLET, FILM COATED ORAL at 07:27

## 2020-09-10 NOTE — P.PN
Subjective


Progress Note Date: 09/10/20


Principal diagnosis: 





50-year-old the female well-known to me from her previous physician patient had 

multiple hospital physician for breakthrough seizures patient is presently on V

impat and Keppra.  Patient usually will take an additional Keppra dose after the

dialysis.  Unable to often any History from the patient patient evidently 

presented with seizures patient appears to be postictal at this time patient is 

also undergoing hemodialysis at this time.  Patient is awake but nonverbal and 

unable to provide any history to me.





09/10/2020


Patient is seen and evaluated and follow-up for breakthrough seizures and 

neurology along with nephrology following.  Patient is maintained on 

Monday/Wednesday/Friday hemodialysis and received dialysis yesterday.  Patient 

will continue with his dialysis treatment in the morning.  Neurology evaluated 

the patient recommending Vimpat and 50 mg 3 times daily with an additional dose 

of 100 mg on dialysis days, and Keppra 500 mg twice daily with an additional 500

mg on dialysis days status post dialysis.  Patient continues to be lethargic 

although is much more awake and alert today.  Patient is able to answer 

questions and respond appropriately.





Review of systems:


Constitutional: Reports fatigue, no reports of fever, or chills


Cardiovascular: No reports of chest pain or palpitations


Respiratory: No reports of shortness of breath or cough


GI: No reports of nausea, vomiting, or diarrhea


: No reports of dysuria or retention


Neurovascular: Reports weakness with no reports of  numbness





All medications have been reviewed








Objective





- Vital Signs


Vital signs: 


                                   Vital Signs











Temp  98.1 F   09/10/20 13:03


 


Pulse  80   09/10/20 13:03


 


Resp  16   09/10/20 13:03


 


BP  140/66   09/10/20 13:03


 


Pulse Ox  98   09/10/20 13:03








                                 Intake & Output











 09/09/20 09/10/20 09/10/20





 18:59 06:59 18:59


 


Intake Total  200 


 


Output Total 2500  


 


Balance -2500 200 


 


Weight 72.575 kg  


 


Intake:   


 


  Intake, IV Titration  200 





  Amount   


 


    levETIRAcetam  mg  200 





    In Sodium Chloride 0.9%   





    100 ml @ 400 mls/hr IVPB   





    Q12HR [facility] Rx#:[id number]   


 


Output:   


 


  Hemodialysis 2500  


 


Other:   


 


  Voiding Method Diaper  


 


  # Voids  2 














- Exam





GENERAL: The patient is lethargic although arousable, alert and able to assess  

orientation, not in any acute distress. Well developed, well nourished. 


HEENT: Pupils are round and equally reacting to light. EOMI. No scleral icterus.

No conjunctival pallor. Normocephalic, atraumatic. No pharyngeal erythema. No 

thyromegaly. 


CARDIOVASCULAR: S1 and S2 present. No murmurs, rubs, or gallops. 


PULMONARY: Chest is clear to auscultation, no wheezing or crackles. 


ABDOMEN: Soft, nontender, nondistended, normoactive bowel sounds. No palpable 

organomegaly. 


MUSCULOSKELETAL: No joint swelling or deformity.


EXTREMITIES: No cyanosis, clubbing, or pedal edema. 


NEUROLOGICAL: Alert 2-3,   Lethargic but responds to questions and commands 

appropriately


SKIN: No rashes. 








- Labs


CBC & Chem 7: 


                                 09/09/20 11:45





                                 09/09/20 11:45


Labs: 


                  Abnormal Lab Results - Last 24 Hours (Table)











  09/09/20 09/09/20 09/09/20 Range/Units





  11:45 11:45 20:18 


 


RBC  2.90 L    (3.80-5.40)  m/uL


 


Hgb  8.8 L    (11.4-16.0)  gm/dL


 


Hct  26.6 L    (34.0-46.0)  %


 


BUN   44 H   (7-17)  mg/dL


 


Creatinine   7.14 H*   (0.52-1.04)  mg/dL


 


Glucose   157 H   (74-99)  mg/dL


 


POC Glucose (mg/dL)    143 H  (75-99)  mg/dL














  09/10/20 09/10/20 Range/Units





  06:57 11:39 


 


RBC    (3.80-5.40)  m/uL


 


Hgb    (11.4-16.0)  gm/dL


 


Hct    (34.0-46.0)  %


 


BUN    (7-17)  mg/dL


 


Creatinine    (0.52-1.04)  mg/dL


 


Glucose    (74-99)  mg/dL


 


POC Glucose (mg/dL)  116 H  143 H  (75-99)  mg/dL














Assessment and Plan


Assessment: 





-Breakthrough seizures: Patient has multiple hospitalization for similar 

complaint, neurology following.


-End-stage renal disease secondary to diabetic nephropathy.  Patient is 

undergoing hemodialysis and nephrology following.  Patient will continue with M

onday/Wednesday/Friday schedule and is to receive hemodialysis tomorrow.


-type 2 diabetes mellitus 


-hypertension


-hyperlipidemia


-Gastroesophageal reflux disease


-Hypothyroidism


-Anemia of chronic kidney disease





Plan:


Continue current medications, management, and symptomatic treatment.  Nephrology

along with neurology following and medication adjustments have been made.  

Nephrology also following and patient will be receiving dialysis in the morning.

 Patient did receive dialysis yesterday and will continue on 

Monday/Wednesday/Friday schedule.  Case management and social work following and

patient will continue with home care in the outpatient setting.  Will repeat 

a.m. labs.  Further recommendations to follow.  Anticipate discharge in 24 

hours.

## 2020-09-10 NOTE — P.PN
Subjective


Progress Note Date: 09/10/20





Patient was seen for a follow-up.  Initial consultation performed by Dr. Michele Merritt.  Please refer to his note.  





Patient with history of seizure disorder since childhood, also has end-stage 

renal disease on hemodialysis, came with breakthrough seizures.  





Objective





- Vital Signs


Vital signs: 


                                   Vital Signs











Temp  96.6 F L  09/10/20 14:39


 


Pulse  78   09/10/20 14:39


 


Resp  18   09/10/20 14:39


 


BP  154/63   09/10/20 14:39


 


Pulse Ox  97   09/10/20 14:39








                                 Intake & Output











 09/09/20 09/10/20 09/10/20





 18:59 06:59 18:59


 


Intake Total  200 540


 


Output Total 2500  


 


Balance -2500 200 540


 


Weight 72.575 kg  


 


Intake:   


 


  Intake, IV Titration  200 





  Amount   


 


    levETIRAcetam  mg  200 





    In Sodium Chloride 0.9%   





    100 ml @ 400 mls/hr IVPB   





    Q12HR [facility] Rx#:[id number]   


 


  Oral   540


 


Output:   


 


  Hemodialysis 2500  


 


Other:   


 


  Voiding Method Diaper  


 


  # Voids  2 1














- Exam





Patient is laying comfortably in the bed.  Very pleasant, in no distress.  Her 

mentation appears stable.  Strength is slightly decreased on the left.  Detail 

examination deferred.





- Labs


CBC & Chem 7: 


                                 09/09/20 11:45





                                 09/09/20 11:45


Labs: 


                  Abnormal Lab Results - Last 24 Hours (Table)











  09/09/20 09/10/20 09/10/20 Range/Units





  20:18 06:57 11:39 


 


POC Glucose (mg/dL)  143 H  116 H  143 H  (75-99)  mg/dL














Assessment and Plan


Assessment: 





* Long-standing history of seizure disorder, came with breakthrough seizure.


* History of CVA with left hemiparesis.


* End-stage renal disease on hemodialysis


* Diabetes


* Hypertension


Plan: 





* Patient now on Vimpat 150 mg twice a day, with an extra 100 mg post dialysis 

  on Monday Wednesday and Friday.


* Continue Keppra 500 mg twice a day with an additional 500 mg on Monday Wednesday and Friday post dialysis.


* Continue Plavix 75 mg daily and Lipitor 20 mg for secondary stroke 

  prophylaxis.


* Neurologically clear for discharge.  Recommend patient to follow up with her 

  neurologist in 1-2 weeks.

## 2020-09-10 NOTE — P.NPCON
History of Present Illness





- Reason for Consult


end stage renal disease





- History of Present Illness





Reason for consultation: End-stage renal disease





History of present illness:


Patient is a 50-year-old female seen in renal consultation for end-stage renal 

disease.  She is maintained on hemodialysis on  schedule.

 Patient presented to the hospital due to seizures.  She was dialyzed in the 

hospital yesterday.  She did have a seizure during dialysis and again overnight.

 She is currently maintained on IV Keppra as well as lacosamide.  She is 

currently awake and alert.  Denies chest pain or shortness of breath.  No 

vomiting or diarrhea.  No fever or chills.  Patient has history of seizure 

disorder and has frequent seizures.  No chest pain or shortness of breath.  No 

edema. blood pressure stable.  No other complaints.  She wants to go home.





Vital signs are stable.


General: The patient appeared well nourished and normally developed. 


HEENT: Head exam is unremarkable. Neck is without jugular venous distension.


LUNGS: Lungs are clear to auscultation and percussion. Breath sounds decreased.


HEART: Rate and Rhythm are regular. First and second heart sounds normal. No 

murmurs, rubs or gallops. 


ABDOMEN: soft, nontender.


EXTREMITITES: No clubbing, cyanosis, or edema.





Past Medical History


Past Medical History: Chest Pain / Angina, CVA/TIA, Diabetes Mellitus, 

Hypertension, Renal Disease, Seizure Disorder, Thyroid Disorder


Additional Past Medical History / Comment(s): dialysis, bipolar, neuropathy,  

baseline orientation person/place, drop foot left, only able to walk short 

distances with walker or wheelchair


History of Any Multi-Drug Resistant Organisms: None Reported


Past Surgical History:  Section, Tonsillectomy


Additional Past Surgical History / Comment(s): left arm fistula, loop recorder, 

vagus nerve stimulator


Past Anesthesia/Blood Transfusion Reactions: No Reported Reaction


Past Psychological History: Bipolar


Smoking Status: Former smoker


Past Alcohol Use History: Occasional


Past Drug Use History: None Reported





- Past Family History


  ** Father


Family Medical History: Unable to Obtain


Additional Family Medical History / Comment(s): adopted





Medications and Allergies


                                Home Medications











 Medication  Instructions  Recorded  Confirmed  Type


 


Clopidogrel [Plavix] 75 mg PO DAILY 17 History


 


Ergocalciferol (Vitamin D2) 50,000 unit PO Q7D 17 History





[Vitamin D2]    


 


Calcium Acetate 667 mg PO AC-TID 20 History


 


Citalopram Hydrobromide [CeleXA] 10 mg PO DAILY #30 tab 20 Rx


 


Atorvastatin [Lipitor] 20 mg PO DAILY 20 History


 


Insulin Glargine [Lantus] 12 unit SQ HS 20 History


 


Metoprolol Tartrate 25 mg PO BID 20 History


 


Lacosamide [Vimpat] 100 mg PO AS DIRECTED 20 History


 


cloBAZam [Clobazam] 10 mg PO BID 20 History


 


levETIRAcetam [Keppra] 500 mg PO AS DIRECTED 20 History


 


Acetaminophen Tab [Tylenol Tab] 500 - 1,000 mg PO Q6H PRN 20 

History


 


Famotidine [Pepcid] 20 mg PO DAILY 20 History


 


Heparin Sodium,Porcine [Heparin 5,000 unit SQ Q8HR 20 History





Sodium]    


 


Pantoprazole Sodium [Protonix] 40 mg PO DAILY 20 History


 


Sevelamer [Renvela] 800 mg PO TID-W/MEALS 20 History


 


amLODIPine [Norvasc] 5 mg PO DAILY 20 History


 


polyethylene glycoL 3350 [Miralax] 17 gm PO DAILY 20 History








                                    Allergies











Allergy/AdvReac Type Severity Reaction Status Date / Time


 


Penicillins Allergy  Itching Verified 20 16:24














Physical Exam


Vitals: 


                                   Vital Signs











  Temp Pulse Pulse Resp BP BP Pulse Ox


 


 09/10/20 08:06  97.8 F   86  16   153/74  97


 


 09/10/20 07:00  98.7 F   81  16   149/81  100


 


 09/10/20 04:00     20   


 


 09/10/20 00:45  99 F   122 H  20   140/72  94 L


 


 20 23:50     18   


 


 09/09/20 19:35  98.3 F   107 H  20   120/67  100


 


 20 18:50    107 H  20   


 


 20 17:55  98.2 F   109 H  18   150/87 


 


 20 17:12       156/86 


 


 20 16:55       190/110 


 


 20 16:00  98.3 F   88  18   181/80  96


 


 20 13:42   93   18  186/89   97


 


 20 11:15  98.8 F  91   19  157/80   99








                                Intake and Output











 09/09/20 09/10/20 09/10/20





 22:59 06:59 14:59


 


Intake Total  200 


 


Output Total 2500  


 


Balance -2500 200 


 


Intake:   


 


  Intake, IV Titration  200 





  Amount   


 


    levETIRAcetam  mg  200 





    In Sodium Chloride 0.9%   





    100 ml @ 400 mls/hr IVPB   





    Q12HR [facility] Rx#:[id number]   


 


Output:   


 


  Hemodialysis 2500  


 


Other:   


 


  Voiding Method Diaper  


 


  # Voids 2 2 


 


  Weight 72.575 kg  














Results





- Lab Results


                             Most recent lab results











Calcium  9.7 mg/dL (8.4-10.2)   20  11:45    














                                 20 11:45





                                 20 11:45





Assessment and Plan


Plan: 





assessment:


1.  End-stage renal disease maintained on hemodialysis on  schedule.


2.  Seizure disorder maintained on antiseizure medications. neurology following.


3.  Chronic kidney disease mineral bone disease maintained on phosphate binders.


4.  Hypertension with chronic kidney disease.  Controlled.


5.  Diabetes mellitus.


6.  Anemia of chronic kidney disease.  Rule out iron deficiency.





Plan:


Hemodialysis tomorrow.


Check iron studies.





Thank you for the consultation.  I will continue to follow patient with you 

during her hospital stay.

## 2020-09-11 NOTE — P.DS
Providers


Date of admission: 


09/09/20 14:01





Expected date of discharge: 09/10/20


Attending physician: 


Shobha Santos





Consults: 





                                        





09/09/20 14:05


Consult Physician Urgent 


   Consulting Provider: Brennen Higginbotham


   Consult Reason/Comments: Dialysis


   Do you want consulting provider notified?: Yes





09/09/20 16:30


Consult Physician Routine 


   Consulting Provider: Michele Merritt


   Consult Reason/Comments: Breakthrough seizures


   Do you want consulting provider notified?: Yes











Primary care physician: 


Saturnino Castillo





Hospital Course: 





Final diagnosis


-Breakthrough seizures: Patient has multiple hospitalization for similar 

complaint, neurology following.


-End-stage renal disease secondary to diabetic nephropathy


-type 2 diabetes mellitus 


-hypertension


-hyperlipidemia


-Gastroesophageal reflux disease


-Hypothyroidism


-Anemia of chronic kidney disease








Discharge disposition


Patient is being discharged in a stable condition with guarded prognosis to 

home.  She will continue with home care in the outpatient setting.  Patient will

follow-up with Dr. Castillo in the outpatient setting upon discharge.  Patient also

instructed to follow-up with neurologist in the outpatient setting in 1-2 weeks.

 Patient to continue with hemodialysis on Monday/Wednesday/Friday.   Total time 

taken is greater than 35 minutes.





History of present illness 


This is a 50-year-old female who was recently admitted with breakthrough 

seizures and was being closely monitored.  Patient is also hemodialysis 

dependent and was evaluated and followed by nephrology.  Patient underwent 

hemodialysis yesterday and will continue with her Monday/Wednesday/Friday 

treatments.  Patient was also seen by neurology recommending an increase in 

Vimpat 250 mg 3 times daily with an additional dose of 100 mg status post 

dialysis days along with Keppra 500 mg twice daily and additional dose of 500 mg

on Monday/Wednesday/Friday status post dialysis.  Patient instructed to follow-

up with her primary care provider along with neurology in the next week.  

Patient's mentation improved and is at baseline and is requesting to go home 

today.  Patient will continue with home care in the outpatient setting and 

 will be taking her home.  Patient does have a history of multiple 

hospitalizations due to breakthrough seizures and possible noncompliance with 

medications and follow-up in the outpatient setting.  Currently no reports of 

chest pain, shortness of breath, or palpitations.  Patient is afebrile.  No 

reports of nausea or vomiting and patient is tolerating diet.  Patient will be 

discharged home today.  Guarded prognosis.





On exam vital signs are stable.  Temp is 98.1F, pulse is 78, respirations are 

18, blood pressure is 154/63, oxygen saturation is 97% on 2 L via nasal cannula.

 Cardio S1, S2 are muffled.  Respiratory system shows diminished breath sounds 

at the bases with no wheezing or rhonchi noted.  Abdomen is soft and nontender. 

Nervous system shows no focal deficits.





Please refer to medication reconciliation sheet for a list of medications.


Patient Condition at Discharge: Fair





Plan - Discharge Summary


Discharge Rx Participant: No


New Discharge Prescriptions: 


Changed


   levETIRAcetam [Keppra] 500 mg PO BID #90 cap


   Lacosamide [Vimpat] 150 mg PO TID #90 cap





No Action


   Ergocalciferol (Vitamin D2) [Vitamin D2] 50,000 unit PO Q7D


   Clopidogrel [Plavix] 75 mg PO DAILY


   Calcium Acetate 667 mg PO AC-TID


   Citalopram Hydrobromide [CeleXA] 10 mg PO DAILY #30 tab


   Atorvastatin [Lipitor] 20 mg PO DAILY


   Insulin Glargine [Lantus] 12 unit SQ HS


   Metoprolol Tartrate 25 mg PO BID


   cloBAZam [Clobazam] 10 mg PO BID


   Acetaminophen Tab [Tylenol] 500 - 1,000 mg PO Q6H PRN


     PRN Reason: Fever And/ Or Pain


   Heparin Sodium,Porcine [Heparin Sodium] 5,000 unit SQ Q8HR


   amLODIPine [Norvasc] 5 mg PO DAILY


   Sevelamer [Renvela] 800 mg PO TID-W/MEALS


   Pantoprazole Sodium [Protonix] 40 mg PO DAILY


   Famotidine [Pepcid] 20 mg PO DAILY


   polyethylene glycoL 3350 [Miralax] 17 gm PO DAILY


Discharge Medication List





Clopidogrel [Plavix] 75 mg PO DAILY 07/23/17 [History]


Ergocalciferol (Vitamin D2) [Vitamin D2] 50,000 unit PO Q7D 07/23/17 [History]


Calcium Acetate 667 mg PO AC-TID 04/20/20 [History]


Citalopram Hydrobromide [CeleXA] 10 mg PO DAILY #30 tab 04/24/20 [Rx]


Atorvastatin [Lipitor] 20 mg PO DAILY 06/04/20 [History]


Insulin Glargine [Lantus] 12 unit SQ HS 06/04/20 [History]


Metoprolol Tartrate 25 mg PO BID 06/05/20 [History]


cloBAZam [Clobazam] 10 mg PO BID 07/30/20 [History]


Acetaminophen Tab [Tylenol] 500 - 1,000 mg PO Q6H PRN 08/02/20 [History]


Famotidine [Pepcid] 20 mg PO DAILY 09/09/20 [History]


Heparin Sodium,Porcine [Heparin Sodium] 5,000 unit SQ Q8HR 09/09/20 [History]


Pantoprazole Sodium [Protonix] 40 mg PO DAILY 09/09/20 [History]


Sevelamer [Renvela] 800 mg PO TID-W/MEALS 09/09/20 [History]


amLODIPine [Norvasc] 5 mg PO DAILY 09/09/20 [History]


polyethylene glycoL 3350 [Miralax] 17 gm PO DAILY 09/09/20 [History]


Lacosamide [Vimpat] 150 mg PO TID #90 cap 09/10/20 [Rx]


levETIRAcetam [Keppra] 500 mg PO BID #90 cap 09/10/20 [Rx]








Follow up Appointment(s)/Referral(s): 


Saturnino Castillo MD [Primary Care Provider] - 09/16/20 1:30 pm


[facility], [NON-STAFF] - 


Activity/Diet/Wound Care/Special Instructions: 


Activity Limited until follow-up


Follow-up with primary care provider upon discharge


follow up with neurology in the outpatient setting


Continue with home care


Continue dialysis Monday/Wednesday/Friday


Continue taking Vimpat 150 mg 3 times daily with an additional 100 mg on 

dialysis days after dialysis


Continue taking Keppra 500 mg twice daily with an additional 500 mg on dialysis 

days after dialysis


Continue to monitor blood sugars before meals at bedtime and keep a diary for 

primary  follow-up








Discharge Disposition: HOME WITH HOME HEALTH SERVICES

## 2020-11-21 ENCOUNTER — HOSPITAL ENCOUNTER (EMERGENCY)
Dept: HOSPITAL 47 - EC | Age: 50
Discharge: HOME | End: 2020-11-21
Payer: MEDICARE

## 2020-11-21 VITALS — DIASTOLIC BLOOD PRESSURE: 83 MMHG | SYSTOLIC BLOOD PRESSURE: 146 MMHG | HEART RATE: 74 BPM

## 2020-11-21 VITALS — TEMPERATURE: 98.5 F | RESPIRATION RATE: 16 BRPM

## 2020-11-21 DIAGNOSIS — Z79.02: ICD-10-CM

## 2020-11-21 DIAGNOSIS — N18.6: ICD-10-CM

## 2020-11-21 DIAGNOSIS — Z86.73: ICD-10-CM

## 2020-11-21 DIAGNOSIS — F31.9: ICD-10-CM

## 2020-11-21 DIAGNOSIS — Z79.4: ICD-10-CM

## 2020-11-21 DIAGNOSIS — Z79.899: ICD-10-CM

## 2020-11-21 DIAGNOSIS — G40.909: Primary | ICD-10-CM

## 2020-11-21 DIAGNOSIS — Z99.2: ICD-10-CM

## 2020-11-21 DIAGNOSIS — I12.0: ICD-10-CM

## 2020-11-21 DIAGNOSIS — Z88.0: ICD-10-CM

## 2020-11-21 DIAGNOSIS — E11.22: ICD-10-CM

## 2020-11-21 LAB
ALBUMIN SERPL-MCNC: 4.1 G/DL (ref 3.5–5)
ALP SERPL-CCNC: 107 U/L (ref 38–126)
ALT SERPL-CCNC: 13 U/L (ref 4–34)
ANION GAP SERPL CALC-SCNC: 14 MMOL/L
AST SERPL-CCNC: 19 U/L (ref 14–36)
BASOPHILS # BLD AUTO: 0.1 K/UL (ref 0–0.2)
BASOPHILS NFR BLD AUTO: 1 %
BUN SERPL-SCNC: 31 MG/DL (ref 7–17)
CALCIUM SPEC-MCNC: 9.3 MG/DL (ref 8.4–10.2)
CHLORIDE SERPL-SCNC: 96 MMOL/L (ref 98–107)
CO2 SERPL-SCNC: 27 MMOL/L (ref 22–30)
EOSINOPHIL # BLD AUTO: 0.2 K/UL (ref 0–0.7)
EOSINOPHIL NFR BLD AUTO: 3 %
ERYTHROCYTE [DISTWIDTH] IN BLOOD BY AUTOMATED COUNT: 3.85 M/UL (ref 3.8–5.4)
ERYTHROCYTE [DISTWIDTH] IN BLOOD: 14.7 % (ref 11.5–15.5)
GLUCOSE SERPL-MCNC: 199 MG/DL (ref 74–99)
HCT VFR BLD AUTO: 34 % (ref 34–46)
HGB BLD-MCNC: 11.6 GM/DL (ref 11.4–16)
LYMPHOCYTES # SPEC AUTO: 2.3 K/UL (ref 1–4.8)
LYMPHOCYTES NFR SPEC AUTO: 39 %
MCH RBC QN AUTO: 30 PG (ref 25–35)
MCHC RBC AUTO-ENTMCNC: 34 G/DL (ref 31–37)
MCV RBC AUTO: 88.1 FL (ref 80–100)
MONOCYTES # BLD AUTO: 0.4 K/UL (ref 0–1)
MONOCYTES NFR BLD AUTO: 7 %
NEUTROPHILS # BLD AUTO: 2.9 K/UL (ref 1.3–7.7)
NEUTROPHILS NFR BLD AUTO: 49 %
PLATELET # BLD AUTO: 218 K/UL (ref 150–450)
POTASSIUM SERPL-SCNC: 4.7 MMOL/L (ref 3.5–5.1)
PROT SERPL-MCNC: 7.1 G/DL (ref 6.3–8.2)
SODIUM SERPL-SCNC: 137 MMOL/L (ref 137–145)
WBC # BLD AUTO: 6 K/UL (ref 3.8–10.6)

## 2020-11-21 PROCEDURE — 70450 CT HEAD/BRAIN W/O DYE: CPT

## 2020-11-21 PROCEDURE — 80053 COMPREHEN METABOLIC PANEL: CPT

## 2020-11-21 PROCEDURE — 36415 COLL VENOUS BLD VENIPUNCTURE: CPT

## 2020-11-21 PROCEDURE — 85025 COMPLETE CBC W/AUTO DIFF WBC: CPT

## 2020-11-21 PROCEDURE — 93005 ELECTROCARDIOGRAM TRACING: CPT

## 2020-11-21 PROCEDURE — 80177 DRUG SCRN QUAN LEVETIRACETAM: CPT

## 2020-11-21 PROCEDURE — 99285 EMERGENCY DEPT VISIT HI MDM: CPT

## 2020-11-21 NOTE — CT
EXAMINATION TYPE: CT brain wo con

 

DATE OF EXAM: 11/21/2020

 

COMPARISON: 8/2/2020.

 

HISTORY: Seizure activity.

 

CT DLP: 1115.4 mGycm

Automated exposure control for dose reduction was used.

 

FINDINGS: 

There is no acute intracranial hemorrhage, mass effect, midline shift or hydrocephalus. There is mild
 white matter disease and parenchymal volume loss. Old left lacunar and cerebellar infarcts. The calv
arium is intact. The paranasal sinuses and mastoid air cells are adequately aerated.

 

IMPRESSION: 

NO ACUTE INTRACRANIAL ABNORMALITY.

## 2020-11-21 NOTE — ED
General Adult HPI





- General


Chief complaint: Neuro Symptoms/Deficit


Stated complaint: neuro issues


Time Seen by Provider: 20 15:13


Source: patient, EMS, RN notes reviewed, old records reviewed


Mode of arrival: EMS


Limitations: no limitations, altered mental status





- History of Present Illness


Initial comments: 





50-year-old female presenting for concern that the patient may be about to have 

a seizure.  She has a seizure disorder and apparently [facility] the patient had

become somewhat glassy eyed and  called EMS with concern that the patient

would have a seizure.  She is alert and oriented to time my evaluation.  She has

no headache, no physical complaints.  No nausea vomiting.  No fever.  She is on 

hemodialysis with end-stage renal disease.  She states she had dialysis 

yesterday which was Friday.  No dyspnea.  She is on Keppra and states she has 

been compliant.





- Related Data


                                Home Medications











 Medication  Instructions  Recorded  Confirmed


 


Clopidogrel [Plavix] 75 mg PO DAILY 17


 


Ergocalciferol (Vitamin D2) 50,000 unit PO Q7D 17





[Vitamin D2]   


 


Calcium Acetate 667 mg PO AC-TID 20


 


Atorvastatin [Lipitor] 20 mg PO DAILY 20


 


Insulin Glargine [Lantus] 12 unit SQ HS 20


 


Metoprolol Tartrate 25 mg PO BID 20


 


cloBAZam [Clobazam] 10 mg PO BID 20


 


Acetaminophen Tab [Tylenol] 500 - 1,000 mg PO Q6H PRN 20


 


Famotidine [Pepcid] 20 mg PO DAILY 20


 


Heparin Sodium,Porcine [Heparin 5,000 unit SQ Q8HR 20





Sodium]   


 


Pantoprazole Sodium [Protonix] 40 mg PO DAILY 20


 


Sevelamer [Renvela] 800 mg PO TID-W/MEALS 20


 


amLODIPine [Norvasc] 5 mg PO DAILY 20


 


polyethylene glycoL 3350 [Miralax] 17 gm PO DAILY 20








                                  Previous Rx's











 Medication  Instructions  Recorded


 


Citalopram Hydrobromide [CeleXA] 10 mg PO DAILY #30 tab 20


 


Lacosamide [Vimpat] 150 mg PO TID #90 cap 09/10/20


 


levETIRAcetam [Keppra] 500 mg PO BID #90 cap 09/10/20


 


Acetaminophen-Codeine 300-30mg 1 tab PO Q6H PRN 3 Days #12 tablet 20





[Tylenol w/codeine #3]  











                                    Allergies











Allergy/AdvReac Type Severity Reaction Status Date / Time


 


Penicillins Allergy  Itching Verified 20 16:24














Review of Systems


ROS Statement: 


Those systems with pertinent positive or pertinent negative responses have been 

documented in the HPI.





ROS Other: All systems not noted in ROS Statement are negative.





Past Medical History


Past Medical History: Chest Pain / Angina, CVA/TIA, Diabetes Mellitus, 

Hypertension, Renal Disease, Seizure Disorder, Thyroid Disorder


Additional Past Medical History / Comment(s): dialysis, bipolar, neuropathy,  

baseline orientation person/place, drop foot left, only able to walk short 

distances with walker or wheelchair


History of Any Multi-Drug Resistant Organisms: None Reported


Past Surgical History:  Section, Tonsillectomy


Additional Past Surgical History / Comment(s): left arm fistula, loop recorder, 

vagus nerve stimulator


Past Anesthesia/Blood Transfusion Reactions: No Reported Reaction


Past Psychological History: Bipolar


Smoking Status: Former smoker


Past Alcohol Use History: Occasional


Past Drug Use History: None Reported





- Past Family History


  ** Father


Family Medical History: Unable to Obtain


Additional Family Medical History / Comment(s): adopted





General Exam


Limitations: no limitations, altered mental status


General appearance: alert, in no apparent distress


Head exam: Present: atraumatic, normocephalic


Eye exam: Present: normal appearance, PERRL


ENT exam: Present: normal exam


Neck exam: Present: normal inspection.  Absent: tenderness, meningismus


Respiratory exam: Present: normal lung sounds bilaterally.  Absent: respiratory 

distress, wheezes


Cardiovascular Exam: Present: regular rate, normal rhythm


GI/Abdominal exam: Present: soft.  Absent: distended, tenderness, guarding


Extremities exam: Present: normal inspection, normal capillary refill.  Absent: 

pedal edema


Back exam: Present: normal inspection


Neurological exam: Present: alert, oriented X3, CN II-XII intact.  Absent: motor

sensory deficit


Psychiatric exam: Present: normal affect, normal mood


Skin exam: Present: warm, dry, intact.  Absent: cyanosis, diaphoretic





Course


                                   Vital Signs











  20





  15:16 16:20


 


Temperature 98.5 F 


 


Pulse Rate 84 77


 


Respiratory 16 16





Rate  


 


Blood Pressure 159/81 155/85


 


O2 Sat by Pulse 100 99





Oximetry  














EKG Findings





- EKG Comments:


EKG Findings:: EKG: Sinus rhythm, rate 75, ID interval 174, QRS duration 84, QTC

473, no ST segment elevation.





Medical Decision Making





- Medical Decision Making





50-year-old female presenting for evaluation of the possibility of a seizure.  

Patient is slow to respond, she is able to answer questions.  She is alert and 

oriented 3.  She has no new focal findings.  Workup is initiated, normal CBC, 

CMP showing chronic renal failure with no other acute findings.  Keppra level 

has been ordered and is pending.  Head CT is performed which is negative for 

intracranial hemorrhage or mass effect.  I did discuss her presentation with her

 who states that she is at baseline.  She does have good outpatient 

follow-up and will follow-up with her neurologist.  There has been no witnessed 

seizure activity.





- Lab Data


Result diagrams: 


                                 20 15:46





                                 20 15:46


                                   Lab Results











  20 Range/Units





  15:46 15:46 


 


WBC  6.0   (3.8-10.6)  k/uL


 


RBC  3.85   (3.80-5.40)  m/uL


 


Hgb  11.6   (11.4-16.0)  gm/dL


 


Hct  34.0   (34.0-46.0)  %


 


MCV  88.1   (80.0-100.0)  fL


 


MCH  30.0   (25.0-35.0)  pg


 


MCHC  34.0   (31.0-37.0)  g/dL


 


RDW  14.7   (11.5-15.5)  %


 


Plt Count  218   (150-450)  k/uL


 


MPV  8.4   


 


Neutrophils %  49   %


 


Lymphocytes %  39   %


 


Monocytes %  7   %


 


Eosinophils %  3   %


 


Basophils %  1   %


 


Neutrophils #  2.9   (1.3-7.7)  k/uL


 


Lymphocytes #  2.3   (1.0-4.8)  k/uL


 


Monocytes #  0.4   (0-1.0)  k/uL


 


Eosinophils #  0.2   (0-0.7)  k/uL


 


Basophils #  0.1   (0-0.2)  k/uL


 


Sodium   137  (137-145)  mmol/L


 


Potassium   4.7  (3.5-5.1)  mmol/L


 


Chloride   96 L  ()  mmol/L


 


Carbon Dioxide   27  (22-30)  mmol/L


 


Anion Gap   14  mmol/L


 


BUN   31 H  (7-17)  mg/dL


 


Creatinine   7.75 H*  (0.52-1.04)  mg/dL


 


Est GFR (CKD-EPI)AfAm   6  (>60 ml/min/1.73 sqM)  


 


Est GFR (CKD-EPI)NonAf   5  (>60 ml/min/1.73 sqM)  


 


Glucose   199 H  (74-99)  mg/dL


 


Calcium   9.3  (8.4-10.2)  mg/dL


 


Total Bilirubin   0.4  (0.2-1.3)  mg/dL


 


AST   19  (14-36)  U/L


 


ALT   13  (4-34)  U/L


 


Alkaline Phosphatase   107  ()  U/L


 


Total Protein   7.1  (6.3-8.2)  g/dL


 


Albumin   4.1  (3.5-5.0)  g/dL














Disposition


Clinical Impression: 


 Seizure disorder





Disposition: HOME SELF-CARE


Condition: Fair


Instructions (If sedation given, give patient instructions):  Recurrent Seizures

in Adults (ED)


Prescriptions: 


Acetaminophen-Codeine 300-30mg [Tylenol w/codeine #3] 1 tab PO Q6H PRN 3 Days 

#12 tablet


 PRN Reason: Pain


Is patient prescribed a controlled substance at d/c from ED?: No


Referrals: 


Saturnino Castillo MD [Primary Care Provider] - 1-2 days


Time of Disposition: 17:19

## 2021-02-12 ENCOUNTER — HOSPITAL ENCOUNTER (INPATIENT)
Dept: HOSPITAL 47 - EC | Age: 51
LOS: 1 days | Discharge: HOME | DRG: 100 | End: 2021-02-13
Attending: FAMILY MEDICINE | Admitting: FAMILY MEDICINE
Payer: MEDICARE

## 2021-02-12 VITALS — RESPIRATION RATE: 18 BRPM

## 2021-02-12 DIAGNOSIS — F31.9: ICD-10-CM

## 2021-02-12 DIAGNOSIS — G40.409: Primary | ICD-10-CM

## 2021-02-12 DIAGNOSIS — I12.0: ICD-10-CM

## 2021-02-12 DIAGNOSIS — Z87.891: ICD-10-CM

## 2021-02-12 DIAGNOSIS — Z86.73: ICD-10-CM

## 2021-02-12 DIAGNOSIS — E78.5: ICD-10-CM

## 2021-02-12 DIAGNOSIS — E83.89: ICD-10-CM

## 2021-02-12 DIAGNOSIS — Z79.02: ICD-10-CM

## 2021-02-12 DIAGNOSIS — E11.22: ICD-10-CM

## 2021-02-12 DIAGNOSIS — E03.9: ICD-10-CM

## 2021-02-12 DIAGNOSIS — Z99.2: ICD-10-CM

## 2021-02-12 DIAGNOSIS — N17.9: ICD-10-CM

## 2021-02-12 DIAGNOSIS — Z20.822: ICD-10-CM

## 2021-02-12 DIAGNOSIS — Z79.4: ICD-10-CM

## 2021-02-12 DIAGNOSIS — M21.372: ICD-10-CM

## 2021-02-12 DIAGNOSIS — Z79.899: ICD-10-CM

## 2021-02-12 DIAGNOSIS — Z79.82: ICD-10-CM

## 2021-02-12 DIAGNOSIS — N18.6: ICD-10-CM

## 2021-02-12 LAB
ALBUMIN SERPL-MCNC: 4.9 G/DL (ref 3.5–5)
ALP SERPL-CCNC: 108 U/L (ref 38–126)
ALT SERPL-CCNC: 16 U/L (ref 4–34)
ANION GAP SERPL CALC-SCNC: 22 MMOL/L
AST SERPL-CCNC: 20 U/L (ref 14–36)
BASOPHILS # BLD AUTO: 0.1 K/UL (ref 0–0.2)
BASOPHILS NFR BLD AUTO: 1 %
BUN SERPL-SCNC: 54 MG/DL (ref 7–17)
CALCIUM SPEC-MCNC: 9.6 MG/DL (ref 8.4–10.2)
CHLORIDE SERPL-SCNC: 95 MMOL/L (ref 98–107)
CK SERPL-CCNC: 166 U/L (ref 30–135)
CO2 SERPL-SCNC: 22 MMOL/L (ref 22–30)
EOSINOPHIL # BLD AUTO: 0.2 K/UL (ref 0–0.7)
EOSINOPHIL NFR BLD AUTO: 2 %
ERYTHROCYTE [DISTWIDTH] IN BLOOD BY AUTOMATED COUNT: 4.29 M/UL (ref 3.8–5.4)
ERYTHROCYTE [DISTWIDTH] IN BLOOD: 14.5 % (ref 11.5–15.5)
GLUCOSE BLD-MCNC: 131 MG/DL (ref 75–99)
GLUCOSE BLD-MCNC: 131 MG/DL (ref 75–99)
GLUCOSE BLD-MCNC: 146 MG/DL (ref 75–99)
GLUCOSE BLD-MCNC: 183 MG/DL (ref 75–99)
GLUCOSE SERPL-MCNC: 145 MG/DL (ref 74–99)
HCT VFR BLD AUTO: 38.8 % (ref 34–46)
HGB BLD-MCNC: 12.9 GM/DL (ref 11.4–16)
LYMPHOCYTES # SPEC AUTO: 4 K/UL (ref 1–4.8)
LYMPHOCYTES NFR SPEC AUTO: 42 %
MAGNESIUM SPEC-SCNC: 2.6 MG/DL (ref 1.6–2.3)
MCH RBC QN AUTO: 30.1 PG (ref 25–35)
MCHC RBC AUTO-ENTMCNC: 33.3 G/DL (ref 31–37)
MCV RBC AUTO: 90.5 FL (ref 80–100)
MONOCYTES # BLD AUTO: 0.5 K/UL (ref 0–1)
MONOCYTES NFR BLD AUTO: 5 %
NEUTROPHILS # BLD AUTO: 4.7 K/UL (ref 1.3–7.7)
NEUTROPHILS NFR BLD AUTO: 49 %
PLATELET # BLD AUTO: 258 K/UL (ref 150–450)
POTASSIUM SERPL-SCNC: 4.2 MMOL/L (ref 3.5–5.1)
PROT SERPL-MCNC: 8.6 G/DL (ref 6.3–8.2)
SODIUM SERPL-SCNC: 139 MMOL/L (ref 137–145)
WBC # BLD AUTO: 9.6 K/UL (ref 3.8–10.6)

## 2021-02-12 PROCEDURE — 84484 ASSAY OF TROPONIN QUANT: CPT

## 2021-02-12 PROCEDURE — 85025 COMPLETE CBC W/AUTO DIFF WBC: CPT

## 2021-02-12 PROCEDURE — 96365 THER/PROPH/DIAG IV INF INIT: CPT

## 2021-02-12 PROCEDURE — 84100 ASSAY OF PHOSPHORUS: CPT

## 2021-02-12 PROCEDURE — 5A1D70Z PERFORMANCE OF URINARY FILTRATION, INTERMITTENT, LESS THAN 6 HOURS PER DAY: ICD-10-PCS

## 2021-02-12 PROCEDURE — 96375 TX/PRO/DX INJ NEW DRUG ADDON: CPT

## 2021-02-12 PROCEDURE — 80053 COMPREHEN METABOLIC PANEL: CPT

## 2021-02-12 PROCEDURE — 99291 CRITICAL CARE FIRST HOUR: CPT

## 2021-02-12 PROCEDURE — 82550 ASSAY OF CK (CPK): CPT

## 2021-02-12 PROCEDURE — 70450 CT HEAD/BRAIN W/O DYE: CPT

## 2021-02-12 PROCEDURE — 83735 ASSAY OF MAGNESIUM: CPT

## 2021-02-12 PROCEDURE — 87635 SARS-COV-2 COVID-19 AMP PRB: CPT

## 2021-02-12 PROCEDURE — 36415 COLL VENOUS BLD VENIPUNCTURE: CPT

## 2021-02-12 PROCEDURE — 82607 VITAMIN B-12: CPT

## 2021-02-12 PROCEDURE — 71045 X-RAY EXAM CHEST 1 VIEW: CPT

## 2021-02-12 PROCEDURE — 93005 ELECTROCARDIOGRAM TRACING: CPT

## 2021-02-12 PROCEDURE — 80177 DRUG SCRN QUAN LEVETIRACETAM: CPT

## 2021-02-12 PROCEDURE — 84443 ASSAY THYROID STIM HORMONE: CPT

## 2021-02-12 PROCEDURE — 82746 ASSAY OF FOLIC ACID SERUM: CPT

## 2021-02-12 RX ADMIN — PANTOPRAZOLE SODIUM SCH MG: 40 INJECTION, POWDER, FOR SOLUTION INTRAVENOUS at 23:22

## 2021-02-12 RX ADMIN — INSULIN ASPART SCH: 100 INJECTION, SOLUTION INTRAVENOUS; SUBCUTANEOUS at 23:18

## 2021-02-12 RX ADMIN — CEFAZOLIN SCH: 330 INJECTION, POWDER, FOR SOLUTION INTRAMUSCULAR; INTRAVENOUS at 16:55

## 2021-02-12 RX ADMIN — INSULIN ASPART SCH: 100 INJECTION, SOLUTION INTRAVENOUS; SUBCUTANEOUS at 19:00

## 2021-02-12 RX ADMIN — SEVELAMER CARBONATE SCH: 800 TABLET, FILM COATED ORAL at 19:00

## 2021-02-12 RX ADMIN — CEFAZOLIN SCH: 330 INJECTION, POWDER, FOR SOLUTION INTRAMUSCULAR; INTRAVENOUS at 23:23

## 2021-02-12 RX ADMIN — METOPROLOL TARTRATE SCH MG: 25 TABLET, FILM COATED ORAL at 23:23

## 2021-02-12 NOTE — CT
EXAMINATION TYPE: CT brain wo con

 

DATE OF EXAM: 2/12/2021

 

COMPARISON: 11/21/2020

 

HISTORY: altered mental status

 

CT DLP: 1098.4 mGycm

Automated exposure control for dose reduction was used.

 

FINDINGS: 

There is an area of low attenuation within the left basal ganglia medially which is retrospectively s
table from the previous exam consistent with remote ischemia. Mild to moderate generalized degenerati
ve change seen and there are faint areas of low attenuation throughout the white matter bilaterally.

 

No midline shift, mass effect or acute hemorrhage.

Calvarium intact. Craniocervical junction maintained. Sella turcica has a normal appearance. Intracra
nial atherosclerotic changes noted.

 

 

IMPRESSION:

1. No acute hemorrhage or mass effect.

2. Degenerative and remote multiple ischemic change as discussed above.

## 2021-02-12 NOTE — P.HPIM
History of Present Illness


H&P Date: 21


Chief Complaint: Seizure activity


50-year-old female, was brought to the emergency department via EMS due to a 

seizure lasting 1 minute, with a prolonged postictal state.  During emergency 

room visit patient noted to have second seizure tonic-clonic in nature lasting 

less than 1 minute she received 1 mg of IV Ativan and also received loading dose

of Keppra. patient has significant history of seizures maintain on Keppra and 

Vimpat.  Patient has significant comorbidities of CVA/TIA, angina, hypertension,

hyperlipidemia, hypothyroidism, bipolar disorder, renal failuredialysis 

dependent, left foot drop, and memory impairment with baseline orientation to 

person and place.  Upon evaluation of the patient in the emergency department 

patient drowsy, able to answer questions to person and place; patient recalls 

the first seizure event [facility] or trauma.  Patient denies fever, 

chills, chest pain, palpitations, shortness of breath, abdominal pain, nausea, 

vomiting, or diarrhea.  Seizures precautions in place .CT of the head no acute 

changes, chest x-ray no acute changes, EKG no acute changes, significant 

increase and BUN/creatinine from baseline with dialysis.








Review of Systems


Constitutional: Reports fatigue


Cardiovascular: Reports as per HPI


Respiratory: Reports as per HPI


Gastrointestinal: Reports as per HPI


Genitourinary: Reports as per HPI


Musculoskeletal: Reports limitation of motion


Neurological: Reports balance difficulties, Reports lack of coordination, 

Reports weakness


Psychiatric: Reports confusion (At baseline)


Endocrine: Reports as per HPI


Allergic/Immunologic: Reports as per HPI





Past Medical History


Past Medical History: Chest Pain / Angina, CVA/TIA, Diabetes Mellitus, 

Hypertension, Renal Disease, Seizure Disorder, Thyroid Disorder


Additional Past Medical History / Comment(s): dialysis, bipolar, neuropathy,  

baseline orientation person/place, drop foot left, only able to walk short 

distances with walker or wheelchair


History of Any Multi-Drug Resistant Organisms: None Reported


Past Surgical History:  Section, Tonsillectomy


Additional Past Surgical History / Comment(s): left arm fistula, loop recorder, 

vagus nerve stimulator


Past Anesthesia/Blood Transfusion Reactions: No Reported Reaction


Past Psychological History: Bipolar


Smoking Status: Former smoker


Past Alcohol Use History: Occasional


Past Drug Use History: None Reported





- Past Family History


  ** Father


Family Medical History: Unable to Obtain


Additional Family Medical History / Comment(s): adopted





Medications and Allergies


Home Medications and Allergies Comment(s): 





Medications and ALLERGIES reviewed


                                Home Medications











 Medication  Instructions  Recorded  Confirmed  Type


 


Clopidogrel [Plavix] 75 mg PO DAILY 17 History


 


Citalopram Hydrobromide [CeleXA] 10 mg PO DAILY #30 tab 20 Rx


 


Atorvastatin [Lipitor] 20 mg PO DAILY 20 History


 


Insulin Glargine [Lantus] 15 unit SQ HS 20 History


 


Metoprolol Tartrate 25 mg PO BID 20 History


 


Sevelamer [Renvela] 800 mg PO AC-TID 20 History


 


Aspirin EC [Ecotrin] 325 mg PO DAILY 21 History


 


Lacosamide [Vimpat] 100 mg PO AS DIRECTED 21 History


 


amLODIPine [Norvasc] 10 mg PO DAILY 21 History


 


levETIRAcetam [Keppra] 1,000 mg PO BID 21 History








                                    Allergies











Allergy/AdvReac Type Severity Reaction Status Date / Time


 


Penicillins Allergy  Itching Verified 21 13:28














Physical Exam


Vitals: 


                                   Vital Signs











  Temp Pulse Resp BP Pulse Ox


 


 21 16:57    18  


 


 21 14:30    18  


 


 21 13:28   82  19  124/65  95


 


 21 11:39   83  17  100/66  96


 


 21 10:43   93  20  136/84  97


 


 21 10:04  97.8 F  84  18  144/89  98








                                Intake and Output











 21





 06:59 14:59 22:59


 


Other:   


 


  Weight  85 kg 














- Constitutional


General appearance: mild distress





- EENT


Eyes: EOMI


ENT: normal oropharynx





- Respiratory


Respiratory: bilateral: diminished (Anterior and posterior lung fields)





- Cardiovascular





Normal sinus rhythm


Heart rate: 98


Rhythm: regular


Heart sounds: normal: S1, S2


  ** radial pulse


Peripheral Pulses: bilateral: Normal





  ** dorsalis pedis


Peripheral Pulses: bilateral: Normal





- Gastrointestinal


General gastrointestinal: normal bowel sounds





- Neurologic


Neurologic: CNII-XII intact





- Musculoskeletal


Musculoskeletal: generalized weakness





- Psychiatric





Alert to person and place neurological baseline





Results


CBC & Chem 7: 


                                 21 10:56





                                 21 10:56


Labs: 


                  Abnormal Lab Results - Last 24 Hours (Table)











  21 Range/Units





  10:38 10:56 


 


Chloride   95 L  ()  mmol/L


 


BUN   54 H  (7-17)  mg/dL


 


Creatinine   10.49 H*  (0.52-1.04)  mg/dL


 


Glucose   145 H  (74-99)  mg/dL


 


POC Glucose (mg/dL)  131 H   (75-99)  mg/dL


 


Magnesium   2.6 H  (1.6-2.3)  mg/dL


 


Creatine Kinase   166 H  ()  U/L


 


Total Protein   8.6 H  (6.3-8.2)  g/dL











Chest x-ray: report reviewed





Thrombosis Risk Factor Assmnt





- Choose All That Apply


Each Factor Represents 1 point: Age 41-60 years, Medical pt on bed rest, Obesity

 (BMI >25)


Thrombosis Risk Factor Assessment Total Risk Factor Score: 3


Thrombosis Risk Factor Assessment Level: Moderate Risk





Assessment and Plan


Assessment: 


Epileptic seizurecontinue Keppra/Vimpat, consultation with neurology for 

recommendations and treatment plan


Acute on chronic renal failureconsultation with nephrology for treatment plan 

and recommendations


Diabetes mellituscontinue home medication/sliding scale


Bipolar disorder


Hyperlipidemia


Hypertension


History of CVA/TIA


Memory impairment


Foot drop


Continue seizure precautions


Continue home medications


Continue medical management


Continue to monitor diagnostics and vital signs


Full code

## 2021-02-12 NOTE — ED
Seizure HPI





- General


Chief Complaint: Seizure


Stated Complaint: Seizure


Time Seen by Provider: 21 10:02


Source: EMS, RN notes reviewed, old records reviewed


Mode of arrival: EMS


Limitations: altered mental status





- History of Present Illness


Initial Comments: 





this is a 50-year-old female with a history of multiple medical problems who is 

brought in today by EMS after suffering a seizure which lasted approximately a 

minute with a apparent prolonged postictal state.  No trauma reported no fevers 

chills nausea vomiting or other symptoms.  Patient is unable to supply any 

history


MD Complaint: seizure





- Related Data


                                Home Medications











 Medication  Instructions  Recorded  Confirmed


 


Clopidogrel [Plavix] 75 mg PO DAILY 17


 


Ergocalciferol (Vitamin D2) 50,000 unit PO Q7D 17





[Vitamin D2]   


 


Calcium Acetate 667 mg PO AC-TID 20


 


Atorvastatin [Lipitor] 20 mg PO DAILY 20


 


Insulin Glargine [Lantus] 12 unit SQ HS 20


 


Metoprolol Tartrate 25 mg PO BID 20


 


cloBAZam [Clobazam] 10 mg PO BID 20


 


Acetaminophen Tab [Tylenol] 500 - 1,000 mg PO Q6H PRN 20


 


Famotidine [Pepcid] 20 mg PO DAILY 20


 


Heparin Sodium,Porcine [Heparin 5,000 unit SQ Q8HR 20





Sodium]   


 


Pantoprazole Sodium [Protonix] 40 mg PO DAILY 20


 


Sevelamer [Renvela] 800 mg PO TID-W/MEALS 20


 


amLODIPine [Norvasc] 5 mg PO DAILY 20


 


polyethylene glycoL 3350 [Miralax] 17 gm PO DAILY 20








                                  Previous Rx's











 Medication  Instructions  Recorded


 


Citalopram Hydrobromide [CeleXA] 10 mg PO DAILY #30 tab 20


 


Lacosamide [Vimpat] 150 mg PO TID #90 cap 09/10/20


 


levETIRAcetam [Keppra] 500 mg PO BID #90 cap 09/10/20


 


Acetaminophen-Codeine 300-30mg 1 tab PO Q6H PRN 3 Days #12 tablet 20





[Tylenol w/codeine #3]  











                                    Allergies











Allergy/AdvReac Type Severity Reaction Status Date / Time


 


Penicillins Allergy  Itching Verified 21 10:09














Review of Systems


ROS Statement: 


Those systems with pertinent positive or pertinent negative responses have been 

documented in the HPI.





ROS Other: All systems not noted in ROS Statement are negative.


Limitations: ROS unobtainable due to patients medical condition





Past Medical History


Past Medical History: Chest Pain / Angina, CVA/TIA, Diabetes Mellitus, 

Hypertension, Renal Disease, Seizure Disorder, Thyroid Disorder


Additional Past Medical History / Comment(s): dialysis, bipolar, neuropathy,  

baseline orientation person/place, drop foot left, only able to walk short 

distances with walker or wheelchair


History of Any Multi-Drug Resistant Organisms: None Reported


Past Surgical History:  Section, Tonsillectomy


Additional Past Surgical History / Comment(s): left arm fistula, loop recorder, 

vagus nerve stimulator


Past Anesthesia/Blood Transfusion Reactions: No Reported Reaction


Past Psychological History: Bipolar


Smoking Status: Former smoker


Past Alcohol Use History: Occasional


Past Drug Use History: None Reported





- Past Family History


  ** Father


Family Medical History: Unable to Obtain


Additional Family Medical History / Comment(s): adopted





General Exam





- General Exam Comments


Initial Comments: 





this is a well-developed well-nourished female who does awaken to stimulation 

otherwise is noncommunicative


Limitations: altered mental status


General appearance: lethargic


Head exam: Present: atraumatic, normocephalic, normal inspection


Eye exam: Present: normal appearance, PERRL, EOMI.  Absent: scleral icterus, 

conjunctival injection, periorbital swelling


ENT exam: Present: normal exam, mucous membranes moist


Neck exam: Present: normal inspection.  Absent: tenderness, meningismus, 

lymphadenopathy


Respiratory exam: Present: normal lung sounds bilaterally.  Absent: respiratory 

distress, wheezes, rales, rhonchi, stridor


Cardiovascular Exam: Present: regular rate, normal rhythm, normal heart sounds. 

Absent: systolic murmur, diastolic murmur, rubs, gallop, clicks


GI/Abdominal exam: Present: soft, normal bowel sounds.  Absent: distended, 

tenderness, guarding, rebound, rigid


Extremities exam: Present: normal inspection, full ROM, normal capillary refill.

 Absent: tenderness, pedal edema, joint swelling, calf tenderness


Back exam: Present: normal inspection


Neurological exam: Present: altered, CN II-XII intact


Psychiatric exam: Present: other (able to evaluate)


Skin exam: Present: warm, dry, intact, normal color.  Absent: rash





Course


                                   Vital Signs











  21





  10:04 10:43 11:39


 


Temperature 97.8 F  


 


Pulse Rate 84 93 83


 


Respiratory 18 20 17





Rate   


 


Blood Pressure 144/89 136/84 100/66


 


O2 Sat by Pulse 98 97 96





Oximetry   














- Reevaluation(s)


Reevaluation #1: 





21 13:21


the patient was noted have tonic-clonic activity lasting less than a minute.  

She did receive IV Ativan.  She also received IV


Reevaluation #2: 





21 13:21


also evaluations the patient revealed her postictal state with stable vital 

signs.  CAT scan was negative for acute findings as well as the x-ray.





Medical Decision Making





- Lab Data


Result diagrams: 


                                 21 10:56





                                 21 10:56


                                   Lab Results











  21 Range/Units





  10:38 10:56 10:56 


 


WBC   9.6   (3.8-10.6)  k/uL


 


RBC   4.29   (3.80-5.40)  m/uL


 


Hgb   12.9   (11.4-16.0)  gm/dL


 


Hct   38.8   (34.0-46.0)  %


 


MCV   90.5   (80.0-100.0)  fL


 


MCH   30.1   (25.0-35.0)  pg


 


MCHC   33.3   (31.0-37.0)  g/dL


 


RDW   14.5   (11.5-15.5)  %


 


Plt Count   258   (150-450)  k/uL


 


MPV   8.1   


 


Neutrophils %   49   %


 


Lymphocytes %   42   %


 


Monocytes %   5   %


 


Eosinophils %   2   %


 


Basophils %   1   %


 


Neutrophils #   4.7   (1.3-7.7)  k/uL


 


Lymphocytes #   4.0   (1.0-4.8)  k/uL


 


Monocytes #   0.5   (0-1.0)  k/uL


 


Eosinophils #   0.2   (0-0.7)  k/uL


 


Basophils #   0.1   (0-0.2)  k/uL


 


Sodium    139  (137-145)  mmol/L


 


Potassium    4.2  (3.5-5.1)  mmol/L


 


Chloride    95 L  ()  mmol/L


 


Carbon Dioxide    22  (22-30)  mmol/L


 


Anion Gap    22  mmol/L


 


BUN    54 H  (7-17)  mg/dL


 


Creatinine    10.49 H*  (0.52-1.04)  mg/dL


 


Est GFR (CKD-EPI)AfAm    4  (>60 ml/min/1.73 sqM)  


 


Est GFR (CKD-EPI)NonAf    4  (>60 ml/min/1.73 sqM)  


 


Glucose    145 H  (74-99)  mg/dL


 


POC Glucose (mg/dL)  131 H    (75-99)  mg/dL


 


POC Glu Operater ID  Melissa Flores    


 


Calcium    9.6  (8.4-10.2)  mg/dL


 


Magnesium    2.6 H  (1.6-2.3)  mg/dL


 


Total Bilirubin    0.6  (0.2-1.3)  mg/dL


 


AST    20  (14-36)  U/L


 


ALT    16  (4-34)  U/L


 


Alkaline Phosphatase    108  ()  U/L


 


Creatine Kinase    166 H  ()  U/L


 


Troponin I     (0.000-0.034)  ng/mL


 


Total Protein    8.6 H  (6.3-8.2)  g/dL


 


Albumin    4.9  (3.5-5.0)  g/dL


 


TSH    0.917  (0.465-4.680)  mIU/L














  21 Range/Units





  10:56 


 


WBC   (3.8-10.6)  k/uL


 


RBC   (3.80-5.40)  m/uL


 


Hgb   (11.4-16.0)  gm/dL


 


Hct   (34.0-46.0)  %


 


MCV   (80.0-100.0)  fL


 


MCH   (25.0-35.0)  pg


 


MCHC   (31.0-37.0)  g/dL


 


RDW   (11.5-15.5)  %


 


Plt Count   (150-450)  k/uL


 


MPV   


 


Neutrophils %   %


 


Lymphocytes %   %


 


Monocytes %   %


 


Eosinophils %   %


 


Basophils %   %


 


Neutrophils #   (1.3-7.7)  k/uL


 


Lymphocytes #   (1.0-4.8)  k/uL


 


Monocytes #   (0-1.0)  k/uL


 


Eosinophils #   (0-0.7)  k/uL


 


Basophils #   (0-0.2)  k/uL


 


Sodium   (137-145)  mmol/L


 


Potassium   (3.5-5.1)  mmol/L


 


Chloride   ()  mmol/L


 


Carbon Dioxide   (22-30)  mmol/L


 


Anion Gap   mmol/L


 


BUN   (7-17)  mg/dL


 


Creatinine   (0.52-1.04)  mg/dL


 


Est GFR (CKD-EPI)AfAm   (>60 ml/min/1.73 sqM)  


 


Est GFR (CKD-EPI)NonAf   (>60 ml/min/1.73 sqM)  


 


Glucose   (74-99)  mg/dL


 


POC Glucose (mg/dL)   (75-99)  mg/dL


 


POC Glu Operater ID   


 


Calcium   (8.4-10.2)  mg/dL


 


Magnesium   (1.6-2.3)  mg/dL


 


Total Bilirubin   (0.2-1.3)  mg/dL


 


AST   (14-36)  U/L


 


ALT   (4-34)  U/L


 


Alkaline Phosphatase   ()  U/L


 


Creatine Kinase   ()  U/L


 


Troponin I  <0.012  (0.000-0.034)  ng/mL


 


Total Protein   (6.3-8.2)  g/dL


 


Albumin   (3.5-5.0)  g/dL


 


TSH   (0.465-4.680)  mIU/L














- EKG Data


-: EKG Interpreted by Me


EKG shows normal: sinus rhythm


EKG Comments: 





Neuro sinus rhythm 80.  Interval 190 QRS duration 84 QT since /495 

prolonged QT st-t wave changes





Critical Care Time


Critical Care Time: Yes


Total Critical Care Time: 37


Critical Care Time: 





this included initial presentation with history physical labs x-rays multiple 

reevaluation the patient.  Discussed with paramedics upon arrival review old 

charting was available discussed with the admitting office, Kaz from Dr. Castillo's office and documentation the above.





Disposition


Clinical Impression: 


 Epileptic seizure, generalized, Post-ictal confusion, Chronic renal failure 

syndrome





Disposition: ADMITTED AS IP TO THIS HOSP


Condition: Fair


Referrals: 


Saturnino Castillo MD [Primary Care Provider] - 1-2 days

## 2021-02-12 NOTE — XR
EXAMINATION TYPE: XR chest 1V portable

 

DATE OF EXAM: 2/12/2021

 

COMPARISON: 8/2/2020

 

HISTORY: Seizure

 

TECHNIQUE: Single frontal view of the chest is obtained.

 

FINDINGS:  There is no focal air space opacity, pleural effusion, or pneumothorax seen.  The cardiac 
silhouette size is within normal limits.   The osseous structures are intact. Heart is enlarged and t
here is a metallic device with the catheter or lead extending cephalad toward the soft tissues of the
 neck. No overt failure. Heart size stable.

 

IMPRESSION:  No acute process.

## 2021-02-13 VITALS — TEMPERATURE: 98.1 F

## 2021-02-13 VITALS — DIASTOLIC BLOOD PRESSURE: 74 MMHG | HEART RATE: 79 BPM | SYSTOLIC BLOOD PRESSURE: 121 MMHG

## 2021-02-13 LAB
ALBUMIN SERPL-MCNC: 3.7 G/DL (ref 3.5–5)
ALP SERPL-CCNC: 84 U/L (ref 38–126)
ALT SERPL-CCNC: 12 U/L (ref 4–34)
ANION GAP SERPL CALC-SCNC: 9 MMOL/L
AST SERPL-CCNC: 17 U/L (ref 14–36)
BASOPHILS # BLD AUTO: 0 K/UL (ref 0–0.2)
BASOPHILS NFR BLD AUTO: 1 %
BUN SERPL-SCNC: 37 MG/DL (ref 7–17)
CALCIUM SPEC-MCNC: 9 MG/DL (ref 8.4–10.2)
CHLORIDE SERPL-SCNC: 106 MMOL/L (ref 98–107)
CO2 SERPL-SCNC: 25 MMOL/L (ref 22–30)
EOSINOPHIL # BLD AUTO: 0.1 K/UL (ref 0–0.7)
EOSINOPHIL NFR BLD AUTO: 3 %
ERYTHROCYTE [DISTWIDTH] IN BLOOD BY AUTOMATED COUNT: 3.56 M/UL (ref 3.8–5.4)
ERYTHROCYTE [DISTWIDTH] IN BLOOD: 14.6 % (ref 11.5–15.5)
GLUCOSE BLD-MCNC: 224 MG/DL (ref 75–99)
GLUCOSE BLD-MCNC: 224 MG/DL (ref 75–99)
GLUCOSE SERPL-MCNC: 199 MG/DL (ref 74–99)
HCT VFR BLD AUTO: 32.1 % (ref 34–46)
HGB BLD-MCNC: 10.6 GM/DL (ref 11.4–16)
LYMPHOCYTES # SPEC AUTO: 1.2 K/UL (ref 1–4.8)
LYMPHOCYTES NFR SPEC AUTO: 26 %
MAGNESIUM SPEC-SCNC: 2.4 MG/DL (ref 1.6–2.3)
MCH RBC QN AUTO: 29.9 PG (ref 25–35)
MCHC RBC AUTO-ENTMCNC: 33.2 G/DL (ref 31–37)
MCV RBC AUTO: 90.2 FL (ref 80–100)
MONOCYTES # BLD AUTO: 0.3 K/UL (ref 0–1)
MONOCYTES NFR BLD AUTO: 6 %
NEUTROPHILS # BLD AUTO: 2.9 K/UL (ref 1.3–7.7)
NEUTROPHILS NFR BLD AUTO: 63 %
PLATELET # BLD AUTO: 202 K/UL (ref 150–450)
POTASSIUM SERPL-SCNC: 4.6 MMOL/L (ref 3.5–5.1)
PROT SERPL-MCNC: 6.7 G/DL (ref 6.3–8.2)
SODIUM SERPL-SCNC: 140 MMOL/L (ref 137–145)
VIT B12 SERPL-MCNC: 324 PG/ML (ref 200–944)
WBC # BLD AUTO: 4.6 K/UL (ref 3.8–10.6)

## 2021-02-13 RX ADMIN — SEVELAMER CARBONATE SCH MG: 800 TABLET, FILM COATED ORAL at 12:37

## 2021-02-13 RX ADMIN — METOPROLOL TARTRATE SCH MG: 25 TABLET, FILM COATED ORAL at 08:05

## 2021-02-13 RX ADMIN — INSULIN ASPART SCH UNIT: 100 INJECTION, SOLUTION INTRAVENOUS; SUBCUTANEOUS at 12:37

## 2021-02-13 RX ADMIN — CEFAZOLIN SCH: 330 INJECTION, POWDER, FOR SOLUTION INTRAMUSCULAR; INTRAVENOUS at 08:06

## 2021-02-13 RX ADMIN — PANTOPRAZOLE SODIUM SCH MG: 40 INJECTION, POWDER, FOR SOLUTION INTRAVENOUS at 08:05

## 2021-02-13 RX ADMIN — SEVELAMER CARBONATE SCH MG: 800 TABLET, FILM COATED ORAL at 06:38

## 2021-02-13 RX ADMIN — INSULIN ASPART SCH UNIT: 100 INJECTION, SOLUTION INTRAVENOUS; SUBCUTANEOUS at 06:37

## 2021-02-13 NOTE — P.NPCON
History of Present Illness





- Reason for Consult


end stage renal disease





- History of Present Illness





Reason for consultation: End-stage renal disease





History of present illness:


Patient is a 50-year-old female seen in renal consultation for end-stage renal 

disease.  She is maintained on hemodialysis on  schedule.

 Patient had a seizure at home which lasted about a minute and was subsequently 

brought to the hospital.  Patient is currently awake and alert.  Denies chest 

pain or shortness of breath.  Denies any seizures overnight.  She did receive 

hemodialysis yesterday in the hospital.  No vomiting or diarrhea.  Appetite is 

fair.  Hemodynamically stable.  She wants to go home.  No fever or chills.  

Hemodynamically stable.





Vital signs are stable.


General: The patient appeared well nourished and normally developed. 


HEENT: Head exam is unremarkable. Neck is without jugular venous distension.


LUNGS: Breath sounds decreased.


HEART: Rate and Rhythm are regular.


ABDOMEN: Soft, nontender.


EXTREMITITES: No edema.





Past Medical History


Past Medical History: Chest Pain / Angina, CVA/TIA, Diabetes Mellitus, 

Hypertension, Renal Disease, Seizure Disorder, Thyroid Disorder


Additional Past Medical History / Comment(s): dialysis, bipolar, neuropathy,  

baseline orientation person/place, drop foot left, only able to walk short 

distances with walker or wheelchair


History of Any Multi-Drug Resistant Organisms: None Reported


Past Surgical History:  Section, Tonsillectomy


Additional Past Surgical History / Comment(s): left arm fistula, loop recorder, 

vagus nerve stimulator


Past Anesthesia/Blood Transfusion Reactions: No Reported Reaction


Past Psychological History: Bipolar


Smoking Status: Former smoker


Past Alcohol Use History: Occasional


Past Drug Use History: None Reported





- Past Family History


  ** Father


Family Medical History: Unable to Obtain


Additional Family Medical History / Comment(s): adopted





Medications and Allergies


                                Home Medications











 Medication  Instructions  Recorded  Confirmed  Type


 


Clopidogrel [Plavix] 75 mg PO DAILY 17 History


 


Citalopram Hydrobromide [CeleXA] 10 mg PO DAILY #30 tab 20 Rx


 


Atorvastatin [Lipitor] 20 mg PO DAILY 20 History


 


Insulin Glargine [Lantus] 12 unit SQ HS 20 History


 


Metoprolol Tartrate 25 mg PO BID 20 History


 


Sevelamer [Renvela] 800 mg PO AC-TID 20 History


 


Aspirin EC [Ecotrin] 325 mg PO DAILY 21 History


 


INSULIN LISPRO (humaLOG) [humaLOG] See Protocol SQ ACHS 21 

History


 


Lacosamide [Vimpat] 100 mg PO AS DIRECTED 21 History


 


amLODIPine [Norvasc] 10 mg PO DAILY 21 History


 


levETIRAcetam [Keppra] 1,000 mg PO BID 21 History








                                    Allergies











Allergy/AdvReac Type Severity Reaction Status Date / Time


 


Penicillins Allergy  Itching Verified 21 13:28














Physical Exam


Vitals: 


                                   Vital Signs











  Temp Pulse Pulse Resp BP BP Pulse Ox


 


 21 08:00  98.1 F   82  18   136/72  100


 


 21 04:00  98.2 F   85  18   135/61  97


 


 21 02:00    76  18   


 


 21 23:36  97.4 F L   76  18   119/65  97


 


 21 20:00  97.8 F   83  18   136/78  100


 


 21 17:30  97.6 F   86  16   153/77  100


 


 21 16:57     18   


 


 21 14:30     18   


 


 21 13:28   82   19  124/65   95


 


 21 11:39   83   17  100/66   96


 


 21 10:43   93   20  136/84   97


 


 21 10:04  97.8 F  84   18  144/89   98








                                Intake and Output











 21





 22:59 06:59 14:59


 


Intake Total  10 


 


Balance  10 


 


Intake:   


 


  IV  10 


 


    0.9  10 


 


Other:   


 


  # Voids  0 


 


  Weight 85 kg 91 kg 














Results





- Lab Results


                             Most recent lab results











Calcium  9.0 mg/dL (8.4-10.2)   21  07:03    


 


Phosphorus  5.5 mg/dL (2.5-4.5)  H  21  07:03    


 


Magnesium  2.4 mg/dL (1.6-2.3)  H  21  07:03    














                                 21 07:03





                                 21 07:03





Assessment and Plan


Plan: 





Assessment:


1.  End-stage renal disease maintained on hemodialysis on  schedule.


2.  Seizure disorder.  Neurology consulted.


3.  Diabetes mellitus.


4.  Hypertension with chronic kidney disease.  Controlled.


5.  Chronic kidney disease mineral bone disease maintained on Renvela.





Plan:


Hemodialysis on Monday.


Hep-Lock IV fluids.


Await neurology recommendations.





Thank you for the consultation.  I will continue to follow the patient with you 

during her hospital stay.

## 2021-02-14 ENCOUNTER — HOSPITAL ENCOUNTER (INPATIENT)
Dept: HOSPITAL 47 - EC | Age: 51
LOS: 4 days | Discharge: HOME | DRG: 100 | End: 2021-02-18
Attending: FAMILY MEDICINE | Admitting: FAMILY MEDICINE
Payer: MEDICARE

## 2021-02-14 DIAGNOSIS — E87.2: ICD-10-CM

## 2021-02-14 DIAGNOSIS — Z79.02: ICD-10-CM

## 2021-02-14 DIAGNOSIS — Z79.82: ICD-10-CM

## 2021-02-14 DIAGNOSIS — Z88.0: ICD-10-CM

## 2021-02-14 DIAGNOSIS — T50.906A: ICD-10-CM

## 2021-02-14 DIAGNOSIS — N18.6: ICD-10-CM

## 2021-02-14 DIAGNOSIS — F31.9: ICD-10-CM

## 2021-02-14 DIAGNOSIS — Z79.4: ICD-10-CM

## 2021-02-14 DIAGNOSIS — M21.372: ICD-10-CM

## 2021-02-14 DIAGNOSIS — Z96.82: ICD-10-CM

## 2021-02-14 DIAGNOSIS — Z98.891: ICD-10-CM

## 2021-02-14 DIAGNOSIS — Z99.2: ICD-10-CM

## 2021-02-14 DIAGNOSIS — H02.401: ICD-10-CM

## 2021-02-14 DIAGNOSIS — R53.81: ICD-10-CM
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
## 2021-02-14 DIAGNOSIS — E83.9: ICD-10-CM

## 2021-02-14 DIAGNOSIS — Z20.822: ICD-10-CM

## 2021-02-14 DIAGNOSIS — Z90.89: ICD-10-CM

## 2021-02-14 DIAGNOSIS — Z91.128: ICD-10-CM

## 2021-02-14 DIAGNOSIS — I69.354: ICD-10-CM

## 2021-02-14 DIAGNOSIS — E03.9: ICD-10-CM

## 2021-02-14 DIAGNOSIS — I12.0: ICD-10-CM

## 2021-02-14 DIAGNOSIS — G40.909: Primary | ICD-10-CM

## 2021-02-14 DIAGNOSIS — E11.40: ICD-10-CM

## 2021-02-14 DIAGNOSIS — E11.22: ICD-10-CM

## 2021-02-14 DIAGNOSIS — K21.9: ICD-10-CM

## 2021-02-14 DIAGNOSIS — Z79.899: ICD-10-CM

## 2021-02-14 DIAGNOSIS — Z87.891: ICD-10-CM

## 2021-02-14 LAB
ALBUMIN SERPL-MCNC: 3.4 G/DL (ref 3.5–5)
ALP SERPL-CCNC: 79 U/L (ref 38–126)
ALT SERPL-CCNC: 10 U/L (ref 4–34)
ANION GAP SERPL CALC-SCNC: 12 MMOL/L
AST SERPL-CCNC: 17 U/L (ref 14–36)
BASOPHILS # BLD AUTO: 0.1 K/UL (ref 0–0.2)
BASOPHILS NFR BLD AUTO: 1 %
BUN SERPL-SCNC: 57 MG/DL (ref 7–17)
CALCIUM SPEC-MCNC: 8.6 MG/DL (ref 8.4–10.2)
CHLORIDE SERPL-SCNC: 108 MMOL/L (ref 98–107)
CO2 SERPL-SCNC: 19 MMOL/L (ref 22–30)
EOSINOPHIL # BLD AUTO: 0.3 K/UL (ref 0–0.7)
EOSINOPHIL NFR BLD AUTO: 4 %
ERYTHROCYTE [DISTWIDTH] IN BLOOD BY AUTOMATED COUNT: 3.29 M/UL (ref 3.8–5.4)
ERYTHROCYTE [DISTWIDTH] IN BLOOD: 14.8 % (ref 11.5–15.5)
GLUCOSE SERPL-MCNC: 105 MG/DL (ref 74–99)
GLUCOSE UR QL: (no result)
HCT VFR BLD AUTO: 29.1 % (ref 34–46)
HGB BLD-MCNC: 9.6 GM/DL (ref 11.4–16)
LYMPHOCYTES # SPEC AUTO: 2.8 K/UL (ref 1–4.8)
LYMPHOCYTES NFR SPEC AUTO: 40 %
MCH RBC QN AUTO: 29.1 PG (ref 25–35)
MCHC RBC AUTO-ENTMCNC: 32.8 G/DL (ref 31–37)
MCV RBC AUTO: 88.7 FL (ref 80–100)
MONOCYTES # BLD AUTO: 0.5 K/UL (ref 0–1)
MONOCYTES NFR BLD AUTO: 7 %
NEUTROPHILS # BLD AUTO: 3.2 K/UL (ref 1.3–7.7)
NEUTROPHILS NFR BLD AUTO: 47 %
PH UR: 5.5 [PH] (ref 5–8)
PLATELET # BLD AUTO: 244 K/UL (ref 150–450)
POTASSIUM SERPL-SCNC: 5 MMOL/L (ref 3.5–5.1)
PROT SERPL-MCNC: 6.3 G/DL (ref 6.3–8.2)
PROT UR QL: (no result)
RBC UR QL: <1 /HPF (ref 0–5)
SODIUM SERPL-SCNC: 139 MMOL/L (ref 137–145)
SP GR UR: 1.01 (ref 1–1.03)
SQUAMOUS UR QL AUTO: <1 /HPF (ref 0–4)
UROBILINOGEN UR QL STRIP: <2 MG/DL (ref ?–2)
WBC # BLD AUTO: 6.9 K/UL (ref 3.8–10.6)
WBC # UR AUTO: 3 /HPF (ref 0–5)

## 2021-02-14 PROCEDURE — 85025 COMPLETE CBC W/AUTO DIFF WBC: CPT

## 2021-02-14 PROCEDURE — 90935 HEMODIALYSIS ONE EVALUATION: CPT

## 2021-02-14 PROCEDURE — 87635 SARS-COV-2 COVID-19 AMP PRB: CPT

## 2021-02-14 PROCEDURE — 93005 ELECTROCARDIOGRAM TRACING: CPT

## 2021-02-14 PROCEDURE — 80177 DRUG SCRN QUAN LEVETIRACETAM: CPT

## 2021-02-14 PROCEDURE — 83036 HEMOGLOBIN GLYCOSYLATED A1C: CPT

## 2021-02-14 PROCEDURE — 71045 X-RAY EXAM CHEST 1 VIEW: CPT

## 2021-02-14 PROCEDURE — 81001 URINALYSIS AUTO W/SCOPE: CPT

## 2021-02-14 PROCEDURE — 83735 ASSAY OF MAGNESIUM: CPT

## 2021-02-14 PROCEDURE — 96361 HYDRATE IV INFUSION ADD-ON: CPT

## 2021-02-14 PROCEDURE — 99285 EMERGENCY DEPT VISIT HI MDM: CPT

## 2021-02-14 PROCEDURE — 80053 COMPREHEN METABOLIC PANEL: CPT

## 2021-02-14 PROCEDURE — 84100 ASSAY OF PHOSPHORUS: CPT

## 2021-02-14 PROCEDURE — 96374 THER/PROPH/DIAG INJ IV PUSH: CPT

## 2021-02-14 PROCEDURE — 71046 X-RAY EXAM CHEST 2 VIEWS: CPT

## 2021-02-14 PROCEDURE — 36415 COLL VENOUS BLD VENIPUNCTURE: CPT

## 2021-02-14 PROCEDURE — 51701 INSERT BLADDER CATHETER: CPT

## 2021-02-14 NOTE — ED
General Adult HPI





- General


Chief complaint: Seizure


Stated complaint: Seizure


Time Seen by Provider: 21 19:08


Source: EMS


Mode of arrival: EMS


Limitations: altered mental status





- History of Present Illness


Initial comments: 


50 year-old female patient with past history of seizures was admitted on the 

 for persistent seizure activity. Patient apparently had a seizure tonight. 

Family did not give more information to the EMS crew. EMS crew states that she 

was post-ictal when they arrived. Patient is drowsy, answers questions with one 

word yes or no. She denies any discomfort at this time. Follows commands. 








- Related Data


                                Home Medications











 Medication  Instructions  Recorded  Confirmed


 


Insulin Glargine [Lantus] 12 unit SQ HS 20


 


INSULIN LISPRO (humaLOG) [humaLOG] See Protocol SQ ACHS 21


 


Lacosamide [Vimpat] 100 mg PO AS DIRECTED 21








                                  Previous Rx's











 Medication  Instructions  Recorded


 


Aspirin [Adult Low Dose Aspirin EC] 81 mg PO DAILY #30 tablet. 21


 


Atorvastatin [Lipitor] 20 mg PO DAILY #30 tab 21


 


Citalopram Hydrobromide [CeleXA] 10 mg PO DAILY #30 tab 21


 


Clopidogrel [Plavix] 75 mg PO DAILY #30 tab 21


 


Metoprolol Tartrate 25 mg PO BID #60 tab 21


 


Pantoprazole [Protonix] 40 mg PO AC-BRKFST #30 tablet. 21


 


Sevelamer [Renvela] 800 mg PO AC-TID #90 tab 21


 


amLODIPine [Norvasc] 10 mg PO DAILY #30 tab 21


 


levETIRAcetam [Keppra] 1,000 mg PO BID #60 tab 21











                                    Allergies











Allergy/AdvReac Type Severity Reaction Status Date / Time


 


Penicillins Allergy  Itching Verified 21 21:42














Review of Systems


ROS Statement: 


Those systems with pertinent positive or pertinent negative responses have been 

documented in the HPI.





ROS Other: All systems not noted in ROS Statement are negative.





Past Medical History


Past Medical History: Chest Pain / Angina, CVA/TIA, Diabetes Mellitus, 

Hypertension, Renal Disease, Seizure Disorder, Thyroid Disorder


Additional Past Medical History / Comment(s): dialysis, bipolar, neuropathy,  

baseline orientation person/place, drop foot left, only able to walk short d

istances with walker or wheelchair


History of Any Multi-Drug Resistant Organisms: None Reported


Past Surgical History:  Section, Tonsillectomy


Additional Past Surgical History / Comment(s): left arm fistula, loop recorder, 

vagus nerve stimulator


Past Anesthesia/Blood Transfusion Reactions: No Reported Reaction


Past Psychological History: Bipolar


Smoking Status: Former smoker


Past Alcohol Use History: Occasional


Past Drug Use History: None Reported





- Past Family History


  ** Father


Family Medical History: Unable to Obtain


Additional Family Medical History / Comment(s): adopted





General Exam


Limitations: altered mental status


General appearance: in no apparent distress, other (This is a well developed, 

well nourished adult female patient in no acute distress. Vital signs upon 

presentation are temperature 97.2F, pulse 85, respirations 14, blood pressure 

156/84, pulse ox 96% on room air.)


Eye exam: Present: normal appearance, PERRL, EOMI.  Absent: scleral icterus, 

conjunctival injection, periorbital swelling


ENT exam: Present: normal exam, normal oropharynx, mucous membranes moist


Respiratory exam: Present: normal lung sounds bilaterally.  Absent: respiratory 

distress, wheezes, rales, rhonchi, stridor


Cardiovascular Exam: Present: regular rate, normal rhythm, normal heart sounds. 

 Absent: systolic murmur, diastolic murmur, rubs, gallop, clicks


GI/Abdominal exam: Present: soft, normal bowel sounds.  Absent: distended, 

tenderness, guarding, rebound, rigid


Neurological exam: Present: CN II-XII intact.  Absent: alert (drowsy), oriented 

X3 (oriented x1)


Psychiatric exam: Present: normal affect, normal mood


Skin exam: Present: warm, dry, intact, normal color.  Absent: rash





Course


                                   Vital Signs











  21





  19:03 19:36 20:40


 


Temperature 97.2 F L  


 


Pulse Rate 85 93 80


 


Respiratory 14 16 18





Rate   


 


Blood Pressure 156/84 169/96 157/87


 


O2 Sat by Pulse 96 100 100





Oximetry   














  21





  21:59 22:39 23:52


 


Temperature   97.1 F L


 


Pulse Rate  72 70


 


Respiratory  18 16





Rate   


 


Blood Pressure  120/83 125/73


 


O2 Sat by Pulse 100 100 100





Oximetry   














- Reevaluation(s)


Reevaluation #1: 





21 19:37


Patient exhibiting tonic clonic seizure like activity lasted approximately 30 

seconds, eyes open remains non responsive, occasional right sided facial spasm 

and right arm twitching. Was given 2mg Ativan. Seizure activity stopped, patient

 sleeping. Snoring respirations but 100% on NRB. Will monitor closely.





EKG Findings





- EKG Comments:


EKG Findings:: EKG obtained at 1907 shows normal sinus rhythm with a ventricular

 rate of 84, WI interval 182, QRS duration 80, , .  No evidence of 

ST elevation or depression.





Medical Decision Making





- Medical Decision Making


50-year-old female patient presented to the emergency department today for 

evaluation after having a seizure at home.  Patient is have a history of 

recurrent seizures and generally has a prolonged postictal state.  She is also a

 dialysis patient it is unknown when she had her last dialysis as family did not

 present with her to the emergency department.  Patient did have a short seizure

 lasting approximately 30 seconds while here in the department.  She was given 

Ativan.  Patient has been very drowsy and sleepy since then.  She does arouse to

 verbal stimuli and light touch.  Vital signs within normal ranges. Patient was 

a difficult IV start and difficult lab draw. Dr. Britton was in to perform femoral

 stick for Labs. She has a working IV in her right foot. Labs reviewed and did 

reveal elevated BUN and creatinine consistent with her history.  Patient 

exhibited no further seizure activity while here.  She will be admitted to the 

hospital for further evaluation by neurology.  Patient is agreeable with this 

plan. Case discussed with my attending Dr. Robertson.








- Lab Data


Result diagrams: 


                                 21 22:33





                                 21 22:33


                                   Lab Results











  21 Range/Units





  20:00 22:33 22:33 


 


WBC   6.9   (3.8-10.6)  k/uL


 


RBC   3.29 L   (3.80-5.40)  m/uL


 


Hgb   9.6 L   (11.4-16.0)  gm/dL


 


Hct   29.1 L   (34.0-46.0)  %


 


MCV   88.7   (80.0-100.0)  fL


 


MCH   29.1   (25.0-35.0)  pg


 


MCHC   32.8   (31.0-37.0)  g/dL


 


RDW   14.8   (11.5-15.5)  %


 


Plt Count   244   (150-450)  k/uL


 


MPV   8.1   


 


Neutrophils %   47   %


 


Lymphocytes %   40   %


 


Monocytes %   7   %


 


Eosinophils %   4   %


 


Basophils %   1   %


 


Neutrophils #   3.2   (1.3-7.7)  k/uL


 


Lymphocytes #   2.8   (1.0-4.8)  k/uL


 


Monocytes #   0.5   (0-1.0)  k/uL


 


Eosinophils #   0.3   (0-0.7)  k/uL


 


Basophils #   0.1   (0-0.2)  k/uL


 


Sodium    139  (137-145)  mmol/L


 


Potassium    5.0  (3.5-5.1)  mmol/L


 


Chloride    108 H  ()  mmol/L


 


Carbon Dioxide    19 L  (22-30)  mmol/L


 


Anion Gap    12  mmol/L


 


BUN    57 H  (7-17)  mg/dL


 


Creatinine    10.19 H*  (0.52-1.04)  mg/dL


 


Est GFR (CKD-EPI)AfAm    5  (>60 ml/min/1.73 sqM)  


 


Est GFR (CKD-EPI)NonAf    4  (>60 ml/min/1.73 sqM)  


 


Glucose    105 H  (74-99)  mg/dL


 


Calcium    8.6  (8.4-10.2)  mg/dL


 


Total Bilirubin    0.4  (0.2-1.3)  mg/dL


 


AST    17  (14-36)  U/L


 


ALT    10  (4-34)  U/L


 


Alkaline Phosphatase    79  ()  U/L


 


Total Protein    6.3  (6.3-8.2)  g/dL


 


Albumin    3.4 L  (3.5-5.0)  g/dL


 


Urine Color  Light Yellow    


 


Urine Appearance  Clear    (Clear)  


 


Urine pH  5.5    (5.0-8.0)  


 


Ur Specific Gravity  1.013    (1.001-1.035)  


 


Urine Protein  1+ H    (Negative)  


 


Urine Glucose (UA)  1+ H    (Negative)  


 


Urine Ketones  Negative    (Negative)  


 


Urine Blood  Trace H    (Negative)  


 


Urine Nitrite  Negative    (Negative)  


 


Urine Bilirubin  Negative    (Negative)  


 


Urine Urobilinogen  <2.0    (<2.0)  mg/dL


 


Ur Leukocyte Esterase  Trace H    (Negative)  


 


Urine RBC  <1    (0-5)  /hpf


 


Urine WBC  3    (0-5)  /hpf


 


Ur Squamous Epith Cells  <1    (0-4)  /hpf


 


Amorphous Sediment  Rare H    (None)  /hpf


 


Urine Bacteria  Occasional H    (None)  /hpf


 


Coronavirus (PCR)     (Not Detectd)  














  21 Range/Units





  22:36 


 


WBC   (3.8-10.6)  k/uL


 


RBC   (3.80-5.40)  m/uL


 


Hgb   (11.4-16.0)  gm/dL


 


Hct   (34.0-46.0)  %


 


MCV   (80.0-100.0)  fL


 


MCH   (25.0-35.0)  pg


 


MCHC   (31.0-37.0)  g/dL


 


RDW   (11.5-15.5)  %


 


Plt Count   (150-450)  k/uL


 


MPV   


 


Neutrophils %   %


 


Lymphocytes %   %


 


Monocytes %   %


 


Eosinophils %   %


 


Basophils %   %


 


Neutrophils #   (1.3-7.7)  k/uL


 


Lymphocytes #   (1.0-4.8)  k/uL


 


Monocytes #   (0-1.0)  k/uL


 


Eosinophils #   (0-0.7)  k/uL


 


Basophils #   (0-0.2)  k/uL


 


Sodium   (137-145)  mmol/L


 


Potassium   (3.5-5.1)  mmol/L


 


Chloride   ()  mmol/L


 


Carbon Dioxide   (22-30)  mmol/L


 


Anion Gap   mmol/L


 


BUN   (7-17)  mg/dL


 


Creatinine   (0.52-1.04)  mg/dL


 


Est GFR (CKD-EPI)AfAm   (>60 ml/min/1.73 sqM)  


 


Est GFR (CKD-EPI)NonAf   (>60 ml/min/1.73 sqM)  


 


Glucose   (74-99)  mg/dL


 


Calcium   (8.4-10.2)  mg/dL


 


Total Bilirubin   (0.2-1.3)  mg/dL


 


AST   (14-36)  U/L


 


ALT   (4-34)  U/L


 


Alkaline Phosphatase   ()  U/L


 


Total Protein   (6.3-8.2)  g/dL


 


Albumin   (3.5-5.0)  g/dL


 


Urine Color   


 


Urine Appearance   (Clear)  


 


Urine pH   (5.0-8.0)  


 


Ur Specific Gravity   (1.001-1.035)  


 


Urine Protein   (Negative)  


 


Urine Glucose (UA)   (Negative)  


 


Urine Ketones   (Negative)  


 


Urine Blood   (Negative)  


 


Urine Nitrite   (Negative)  


 


Urine Bilirubin   (Negative)  


 


Urine Urobilinogen   (<2.0)  mg/dL


 


Ur Leukocyte Esterase   (Negative)  


 


Urine RBC   (0-5)  /hpf


 


Urine WBC   (0-5)  /hpf


 


Ur Squamous Epith Cells   (0-4)  /hpf


 


Amorphous Sediment   (None)  /hpf


 


Urine Bacteria   (None)  /hpf


 


Coronavirus (PCR)  Not Detected  (Not Detectd)  














Disposition


Clinical Impression: 


 Seizure, Post-ictal state





Disposition: ADMITTED AS IP TO THIS \[facility]\""


Condition: Serious


Referrals: 


Saturnino Castillo MD [Primary Care Provider] - 1-2 days


Decision to Admit Reason: Admit from EC


Decision Date: 21


Decision Time: 23:59

## 2021-02-15 LAB
GLUCOSE BLD-MCNC: 153 MG/DL (ref 75–99)
GLUCOSE BLD-MCNC: 156 MG/DL (ref 75–99)
GLUCOSE BLD-MCNC: 187 MG/DL (ref 75–99)
GLUCOSE BLD-MCNC: 192 MG/DL (ref 75–99)

## 2021-02-15 PROCEDURE — 5A1D70Z PERFORMANCE OF URINARY FILTRATION, INTERMITTENT, LESS THAN 6 HOURS PER DAY: ICD-10-PCS

## 2021-02-15 RX ADMIN — INSULIN ASPART SCH UNIT: 100 INJECTION, SOLUTION INTRAVENOUS; SUBCUTANEOUS at 20:27

## 2021-02-15 RX ADMIN — METOPROLOL TARTRATE SCH: 25 TABLET, FILM COATED ORAL at 20:28

## 2021-02-15 RX ADMIN — HEPARIN SODIUM SCH UNIT: 5000 INJECTION, SOLUTION INTRAVENOUS; SUBCUTANEOUS at 16:01

## 2021-02-15 RX ADMIN — INSULIN DETEMIR SCH UNIT: 100 INJECTION, SOLUTION SUBCUTANEOUS at 20:27

## 2021-02-15 RX ADMIN — ATORVASTATIN CALCIUM SCH MG: 20 TABLET, FILM COATED ORAL at 20:28

## 2021-02-15 RX ADMIN — CLOPIDOGREL BISULFATE SCH MG: 75 TABLET ORAL at 12:14

## 2021-02-15 RX ADMIN — INSULIN ASPART SCH UNIT: 100 INJECTION, SOLUTION INTRAVENOUS; SUBCUTANEOUS at 08:34

## 2021-02-15 RX ADMIN — METOPROLOL TARTRATE SCH MG: 25 TABLET, FILM COATED ORAL at 12:14

## 2021-02-15 RX ADMIN — SEVELAMER CARBONATE SCH MG: 800 TABLET, FILM COATED ORAL at 17:30

## 2021-02-15 RX ADMIN — INSULIN ASPART SCH UNIT: 100 INJECTION, SOLUTION INTRAVENOUS; SUBCUTANEOUS at 12:15

## 2021-02-15 RX ADMIN — INSULIN ASPART SCH UNIT: 100 INJECTION, SOLUTION INTRAVENOUS; SUBCUTANEOUS at 17:30

## 2021-02-15 RX ADMIN — SEVELAMER CARBONATE SCH MG: 800 TABLET, FILM COATED ORAL at 12:14

## 2021-02-15 RX ADMIN — HEPARIN SODIUM SCH UNIT: 5000 INJECTION, SOLUTION INTRAVENOUS; SUBCUTANEOUS at 23:18

## 2021-02-15 RX ADMIN — LACOSAMIDE SCH MG: 50 TABLET, FILM COATED ORAL at 20:28

## 2021-02-15 NOTE — P.CNNES
History of Present Illness


Consult date: 21


Chief complaint: seizure


History of Present Illness: 





The patient is a 50-year-old  female who is seen in neurologic 

consultation on 2021, via teleneurology.  This consultation is 

being redictated on February 15, 2021, because I'm unable to find the original 

dictation in the record.


The patient reports "the other day I had a seizure" prior to this most recent 

seizure, her last seizure was about 1 month ago.  The patient reports seeing her

neurologist or proximally 2 months ago.  At which time she had an EEG.  She is 

not aware of the results of the EEG.  Also at the time of that visit, the 

patient was started on Vimpat 100 mg and Keppra 1000 mg both medications are 

being taken twice daily.  Prior to the initiation of these medications, the 

patient had been on several other antiepileptic medications.  All of these were 

ineffective.


In regards to this most recent seizure, the patient reports "I fell on the 

floor" when EMS arrived, "I went out of it".  The patient denies tongue biting 

and loss of bowel and bladder control.  She does report being mildly confused 

following this episode as well as tired.  She does note that she missed a dose 

of her medication last week however denies missing any doses this week.  She 

denies missing dialysis sessions.  The patient reports a history of having 4 

strokes.





Past Medical History


Past Medical History: Chest Pain / Angina, CVA/TIA, Diabetes Mellitus, 

Hypertension, Renal Disease, Seizure Disorder, Thyroid Disorder


Additional Past Medical History / Comment(s): Dialysis, bipolar, neuropathy, 

baseline orientation to person/place, drop foot left, only able to walk short 

distances with walker or wheelchair


History of Any Multi-Drug Resistant Organisms: None Reported


Past Surgical History:  Section, Tonsillectomy


Additional Past Surgical History / Comment(s): left arm fistula, loop recorder, 

vagus nerve stimulator


Past Anesthesia/Blood Transfusion Reactions: No Reported Reaction


Past Psychological History: Bipolar


Smoking Status: Former smoker


Past Alcohol Use History: Occasional


Past Drug Use History: None Reported





- Past Family History


  ** Father


Family Medical History: Unable to Obtain


Additional Family Medical History / Comment(s): adopted





Medications and Allergies


                                Home Medications











 Medication  Instructions  Recorded  Confirmed  Type


 


Insulin Glargine [Lantus] 12 unit SQ HS 20 History


 


INSULIN LISPRO (humaLOG) [humaLOG] See Protocol SQ ACHS 21 

History


 


Lacosamide [Vimpat] 100 mg PO AS DIRECTED 21 History


 


Aspirin [Adult Low Dose Aspirin EC] 81 mg PO DAILY #30 tablet. 21 Rx


 


Atorvastatin [Lipitor] 20 mg PO DAILY #30 tab 21 Rx


 


Citalopram Hydrobromide [CeleXA] 10 mg PO DAILY #30 tab 21 Rx


 


Clopidogrel [Plavix] 75 mg PO DAILY #30 tab 21 Rx


 


Metoprolol Tartrate 25 mg PO BID #60 tab 21 Rx


 


Pantoprazole [Protonix] 40 mg PO AC-BRKFST #30 tablet. 21 Rx


 


Sevelamer [Renvela] 800 mg PO AC-TID #90 tab 21 Rx


 


amLODIPine [Norvasc] 10 mg PO DAILY #30 tab 21 Rx


 


levETIRAcetam [Keppra] 1,000 mg PO BID #60 tab 21 Rx








                                    Allergies











Allergy/AdvReac Type Severity Reaction Status Date / Time


 


Penicillins Allergy  Itching Verified 21 21:42














Physical Examination





- Vital Signs


Vital Signs: 


                                   Vital Signs











  Temp Pulse Pulse Resp BP BP Pulse Ox


 


 02/15/21 07:29  97.4 F L   78  18   134/72  100


 


 02/15/21 02:30   68   20  120/69   100


 


 02/15/21 02:00  97.5 F L   75  18   141/79  100


 


 21 23:52  97.1 F L  70   16  125/73   100


 


 21 22:39   72   18  120/83   100


 


 21 21:59        100


 


 21 20:40   80   18  157/87   100


 


 21 19:36   93   16  169/96   100


 


 21 19:03  97.2 F L  85   14  156/84   96








                                Intake and Output











 02/14/21 02/15/21 02/15/21





 22:59 06:59 14:59


 


Other:   


 


  # Voids  0 


 


  Weight 81.647 kg 81.647 kg 














Gen.: The patient is reclining in the bed.  She is in no acute distress.


HEENT: Head is atraumatic, normocephalic.  Fundus not visualized.  There is no 

scleral icterus.  Mucous membranes are moist.


Neck: Without bruits


Heart: Regular rate and rhythm


Extremities: Without edema


Neurological examination


Mental status: Patient is awake, alert and oriented 3.  Her speech is slightly 

slurred.


Cranial nerves: Pupils are equal at 2 mm and reactive.  There is a questionable 

right visual field loss.  Extraocular movements are intact.  There is no 

nystagmus.  Facial sensation is intact.  There is right ptosis.  Smile is 

symmetric.  Hearing is grossly intact.  Uvula and palate are midline.  Shoulder 

shrug is symmetric.  Tongue protrudes midline.


Motor:  strength is 5/5 bilaterally.  Bilateral biceps and triceps strength 

5/5.  Left hip flexor 4/5 left ankle dorsi and plantar flexor 3-/5.  Right hip 

flexor, ankle plantar and dorsiflexors 5/5.


Sensation: Grossly intact to light touch throughout.  There is no extinction on 

double simultaneous stimulation.


[id number]: There is mild left-sided ataxia with finger-nose-finger and finger 

to nose testing.  The patient is unable to perform left-sided heel-to-shin 

testing.  Right heel to shin testing is intact.


Deep tendon reflexes: 2+/4+ at the left biceps and brachial radialis.  Left 

triceps 2-3+/4+.  Right upper extremity reflexes 1+/4+.  Bilateral patellar 

reflexes 2+/4+.  Right Achilles 2+/4+.  Left Achilles 0-1+/4+.





Results





- Laboratory Findings


CBC and BMP: 


                                 21 22:33





                                 21 22:33


Abnormal Lab Findings: 


                                  Abnormal Labs











  21





  20:00 22:33 22:33


 


RBC   3.29 L 


 


Hgb   9.6 L 


 


Hct   29.1 L 


 


Chloride    108 H


 


Carbon Dioxide    19 L


 


BUN    57 H


 


Creatinine    10.19 H*


 


Glucose    105 H


 


POC Glucose (mg/dL)   


 


Albumin    3.4 L


 


Urine Protein  1+ H  


 


Urine Glucose (UA)  1+ H  


 


Urine Blood  Trace H  


 


Ur Leukocyte Esterase  Trace H  


 


Amorphous Sediment  Rare H  


 


Urine Bacteria  Occasional H  














  02/15/21





  06:54


 


RBC 


 


Hgb 


 


Hct 


 


Chloride 


 


Carbon Dioxide 


 


BUN 


 


Creatinine 


 


Glucose 


 


POC Glucose (mg/dL)  156 H


 


Albumin 


 


Urine Protein 


 


Urine Glucose (UA) 


 


Urine Blood 


 


Ur Leukocyte Esterase 


 


Amorphous Sediment 


 


Urine Bacteria 














Assessment and Plan


Assessment: 





1.  Breakthrough seizure


2.  Reported history of strokes


3.  Chronic renal failure on hemodialysis


Plan: 





1.  Will not change any seizure medication dosing at this time as the patient 

reports having an appointment scheduled with her outpatient neurologist.


2.  Advised the patient to call her neurologist and perhaps get in to see him 

sooner


Time with Patient: Greater than 30 (spent 45 minutes with patient via 

teleneurology)

## 2021-02-15 NOTE — P.HPIM
History of Present Illness


Patient is pleasant 50-year-old female came in with a couple episodes of 

seizures.  Patient had multiple such episodes in the past with multiple regimen 

changes.  Patient is presently on Keppra clobazam and Vimpat.  Patient is still 

drowsy.  Patient had 2 episodes since yesterday.  Patient received 4 total 4 mg 

of Ativan in ER.  Patient was evaluated by neurology and change the dosing so 

for above-mentioned medications.  Patient is  

hemodialysis and patient will get the Keppra 500 mg post hemodialysis.  Patient 

is on lower dose of Keppra because of her end-stage renal disease.  Patient 

denied any fever chills nausea vomiting photophobia.  Patient denied any 

headache.





Review of Systems











REVIEW OF SYSTEMS: 


CONSTITUTIONAL: No fever, no malaise, no fatigue. 


HEENT: No recent visual problems or hearing problems. Denied any sore throat. 


CARDIOVASCULAR: No chest pain, orthopnea, PND, no palpitations, no syncope. 


PULMONARY: No shortness of breath, no cough, no hemoptysis. 


GASTROINTESTINAL: No diarrhea, no nausea, no vomiting, no abdominal pain. 


NEUROLOGICAL: No headaches, no weakness, no numbness. 


HEMATOLOGICAL: Denies any bleeding or petechiae. 


GENITOURINARY: Denies any burning micturition, frequency, or urgency. 


MUSCULOSKELETAL/RHEUMATOLOGICAL: Denies any joint pain, swelling, or any muscle 

pain. 


ENDOCRINE: Denies any polyuria or polydipsia. 





The rest of the 14-point review of systems is negative.








Past Medical History


Past Medical History: Chest Pain / Angina, CVA/TIA, Diabetes Mellitus, 

Hypertension, Renal Disease, Seizure Disorder, Thyroid Disorder


Additional Past Medical History / Comment(s): Dialysis, bipolar, neuropathy, 

baseline orientation to person/place, drop foot left, only able to walk short 

distances with walker or wheelchair


History of Any Multi-Drug Resistant Organisms: None Reported


Past Surgical History:  Section, Tonsillectomy


Additional Past Surgical History / Comment(s): left arm fistula, loop recorder, 

vagus nerve stimulator


Past Anesthesia/Blood Transfusion Reactions: No Reported Reaction


Past Psychological History: Bipolar


Smoking Status: Former smoker


Past Alcohol Use History: Occasional


Past Drug Use History: None Reported





- Past Family History


  ** Father


Family Medical History: Unable to Obtain


Additional Family Medical History / Comment(s): adopted





Medications and Allergies


                                Home Medications











 Medication  Instructions  Recorded  Confirmed  Type


 


Insulin Glargine [Lantus] 12 unit SQ HS 20 History


 


INSULIN LISPRO (humaLOG) [humaLOG] See Protocol SQ ACHS 21 

History


 


Lacosamide [Vimpat] 100 mg PO AS DIRECTED 21 History


 


Aspirin [Adult Low Dose Aspirin EC] 81 mg PO DAILY #30 tablet. 21 Rx


 


Atorvastatin [Lipitor] 20 mg PO DAILY #30 tab 21 Rx


 


Citalopram Hydrobromide [CeleXA] 10 mg PO DAILY #30 tab 21 Rx


 


Clopidogrel [Plavix] 75 mg PO DAILY #30 tab 21 Rx


 


Metoprolol Tartrate 25 mg PO BID #60 tab 21 Rx


 


Pantoprazole [Protonix] 40 mg PO AC-BRKFST #30 tablet. 21 Rx


 


Sevelamer [Renvela] 800 mg PO AC-TID #90 tab 21 Rx


 


amLODIPine [Norvasc] 10 mg PO DAILY #30 tab 21 Rx


 


levETIRAcetam [Keppra] 1,000 mg PO BID #60 tab 21 Rx


 


cloBAZam [Clobazam] 20 mg PO BID 02/15/21 02/15/21 History








                                    Allergies











Allergy/AdvReac Type Severity Reaction Status Date / Time


 


Penicillins Allergy  Itching Verified 21 21:42














Physical Exam


Vitals: 


                                   Vital Signs











  Temp Pulse Pulse Resp BP BP Pulse Ox


 


 02/15/21 07:29  97.4 F L   78  18   134/72  100


 


 02/15/21 02:30   68   20  120/69   100


 


 02/15/21 02:00  97.5 F L   75  18   141/79  100


 


 21 23:52  97.1 F L  70   16  125/73   100


 


 21 22:39   72   18  120/83   100


 


 21 21:59        100


 


 21 20:40   80   18  157/87   100


 


 21 19:36   93   16  169/96   100


 


 21 19:03  97.2 F L  85   14  156/84   96








                                Intake and Output











 02/14/21 02/15/21 02/15/21





 22:59 06:59 14:59


 


Intake Total   150


 


Balance   150


 


Intake:   


 


  IV   150


 


    Lacosamide  mg In   50





    Sodium Chloride 0.9% 50   





    ml @ 100 mls/hr IVPB ONCE   





    STA Rx#:[id number]   


 


    levETIRAcetam IV 1,000 mg   100





    In Saline 1 100ml.bag @   





    400 mls/hr IVPB ONCE STA   





    Rx#:[id number]   


 


Other:   


 


  # Voids  0 


 


  Weight 81.647 kg 81.647 kg 

















PHYSICAL EXAMINATION: 





GENERAL: The patient is  oriented x3, not in any acute distress. Well developed,

 well nourished.  She is bit drowsy.


HEENT: Pupils are round and equally reacting to light. EOMI. No scleral icterus.

 No conjunctival pallor. Normocephalic, atraumatic. No pharyngeal erythema. No 

thyromegaly. 


CARDIOVASCULAR: S1 and S2 present. No murmurs, rubs, or gallops. 


PULMONARY: Chest is clear to auscultation, no wheezing or crackles. 


ABDOMEN: Soft, nontender, nondistended, normoactive bowel sounds. No palpable 

organomegaly. 


MUSCULOSKELETAL: No joint swelling or deformity.


EXTREMITIES: No cyanosis, clubbing, or pedal edema. 


NEUROLOGICAL: Gross neurological examination did not reveal any focal deficits. 


SKIN: No rashes. 

















Results


CBC & Chem 7: 


                                 21 22:33





                                 21 22:33


Labs: 


                  Abnormal Lab Results - Last 24 Hours (Table)











  21 Range/Units





  20:00 22:33 22:33 


 


RBC   3.29 L   (3.80-5.40)  m/uL


 


Hgb   9.6 L   (11.4-16.0)  gm/dL


 


Hct   29.1 L   (34.0-46.0)  %


 


Chloride    108 H  ()  mmol/L


 


Carbon Dioxide    19 L  (22-30)  mmol/L


 


BUN    57 H  (7-17)  mg/dL


 


Creatinine    10.19 H*  (0.52-1.04)  mg/dL


 


Glucose    105 H  (74-99)  mg/dL


 


POC Glucose (mg/dL)     (75-99)  mg/dL


 


Albumin    3.4 L  (3.5-5.0)  g/dL


 


Urine Protein  1+ H    (Negative)  


 


Urine Glucose (UA)  1+ H    (Negative)  


 


Urine Blood  Trace H    (Negative)  


 


Ur Leukocyte Esterase  Trace H    (Negative)  


 


Amorphous Sediment  Rare H    (None)  /hpf


 


Urine Bacteria  Occasional H    (None)  /hpf














  02/15/21 02/15/21 Range/Units





  06:54 11:32 


 


RBC    (3.80-5.40)  m/uL


 


Hgb    (11.4-16.0)  gm/dL


 


Hct    (34.0-46.0)  %


 


Chloride    ()  mmol/L


 


Carbon Dioxide    (22-30)  mmol/L


 


BUN    (7-17)  mg/dL


 


Creatinine    (0.52-1.04)  mg/dL


 


Glucose    (74-99)  mg/dL


 


POC Glucose (mg/dL)  156 H  192 H  (75-99)  mg/dL


 


Albumin    (3.5-5.0)  g/dL


 


Urine Protein    (Negative)  


 


Urine Glucose (UA)    (Negative)  


 


Urine Blood    (Negative)  


 


Ur Leukocyte Esterase    (Negative)  


 


Amorphous Sediment    (None)  /hpf


 


Urine Bacteria    (None)  /hpf














Thrombosis Risk Factor Assmnt





- Choose All That Apply


Any of the Below Risk Factors Present?: Yes


Each Factor Represents 1 point: Age 41-60 years


Other Risk Factors: No


Other congenital or acquired thrombophilia - If yes, enter type in comment: No


Thrombosis Risk Factor Assessment Total Risk Factor Score: 1


Thrombosis Risk Factor Assessment Level: Low Risk





Assessment and Plan


Plan: 


-Breakthrough seizures: Continue with the above-mentioned medications titration 

of those medications as per neurology


-History of CVA with slight left-sided hemiparesis in the past


-History is doing disease hemodialysis secondary to diabetic nephropathy.  

Patient scheduled hemodialysis is  and patient will 

undergo hemodialysis today.


-Type 2 diabetes mellitus 


hypertension


-Hypothyroidism


-Gastroesophageal reflux disease.





For above-mentioned chronic medical problems patient will be resumed on 

appropriate home medications


-DVT prophylaxis with subcutaneous heparin

## 2021-02-15 NOTE — P.CNNES
History of Present Illness


Consult date: 02/15/21


Requesting physician: Isaura Cordoba


Reason for Consult: break-through seizure with prolonged post-ictal state.


History of Present Illness: 


This is a 50-year-old woman with history of epilepsy, status post VNS, diabetes,

hypertension, end-stage renal disease on hemodialysis, previous CVA with left-

sided hemiparesis, bipolar and some reports of medication noncompliance that 

presented to the emergency department on 2021 at 18:59 for a seizure.  

Seems that the patient had also a seizure on 2021 and she also presented 

emergency department.  Dr. Copeland (Neuro-Hospitalist) evaluate the patient's on 

2021 and recommended for no change of any seizure medication dosing at 

this time and recommended for the patient to follow-up with her neurologist as 

an outpatient.  The patient received a total of 4 mg of Ativan in the ED.





I personally spoke with the patient's  (Steve) and he stated that patient

continues to see Dr. Symone hedrick at [facility].  He could not tell me 

when was last follow-up with her neurologist.  He stated the patient has not 

missed her medication.  He stated she is on Keppra 1gm bid with an additional 

dose of Keppra 1gm post-dialysis (M, W, F).  He stated she is on Vimpat 100mg 

bid with post dialysis 100mg (M,W, F).  The patient is on Clobazam 20mg 1 tab 

bid upon checking with Niecy sanderson ( did not know).  He does not know

all medications she is on (he stated all the bottle that are with her he showed 

EMS/ED) but Vimpat is not one of them neither is Clobazam.





The patient was discharged and presented back for seizure episode.  The patient 

was seen by different neuro-hospitalist in our facility for seizure.  I 

personally saw the patient on 2020 for breakthrough seizure.


The patient had multiple EEGs in our facility.  Please her review my notes on 

2020 as well as Dr. Malik's note on 2020 for further details.


On that visit I recommended to the patient to be on Vimpat 150 mg twice a day 

with an additional 100 mg on  and Friday postdialysis.  I also 

recommended to continue Keppra 500mg bid with an additional on postdialysis 

which is Monday, Wednesday and Friday.  And to continue clonazepam 10 mg twice a

day but it seems she is on Clobazam not Clonazepam.  Continue Plavix 75 mg daily

and Lipitor 20 mg a daily for stroke prophylaxis.


Patient follows up with Dr. Symone hedrick at [facility]





Workup in the hospital consisted of:


Patient had CT of the head on 2021 is reported as no acute hemorrhage or 

mass effect.  Degenerative and remote multiple ischemic change.


White blood cell is 6.9 which is normal.


Sodium is 139.  Creatinine is at some 0.199.


AST of 17 and ALT of 10.  The serum glucose is 105.


Urinalysis does not seem impressive for urinary tract infection.


Corona virus PCR was not detected.








Review of Systems


Review of system is limited but the per positive and negative as per HPI.








Past Medical History


Past Medical History: Chest Pain / Angina, CVA/TIA, Diabetes Mellitus, 

Hypertension, Renal Disease, Seizure Disorder, Thyroid Disorder


Additional Past Medical History / Comment(s): Dialysis, bipolar, neuropathy, 

baseline orientation to person/place, drop foot left, only able to walk short 

distances with walker or wheelchair


History of Any Multi-Drug Resistant Organisms: None Reported


Past Surgical History:  Section, Tonsillectomy


Additional Past Surgical History / Comment(s): left arm fistula, loop recorder, 

vagus nerve stimulator


Past Anesthesia/Blood Transfusion Reactions: No Reported Reaction


Past Psychological History: Bipolar


Smoking Status: Former smoker


Past Alcohol Use History: Occasional


Past Drug Use History: None Reported





- Past Family History


  ** Father


Family Medical History: Unable to Obtain


Additional Family Medical History / Comment(s): adopted





Medications and Allergies


                                Home Medications











 Medication  Instructions  Recorded  Confirmed  Type


 


Insulin Glargine [Lantus] 12 unit SQ HS 20 History


 


INSULIN LISPRO (humaLOG) [humaLOG] See Protocol SQ ACHS 21 

History


 


Lacosamide [Vimpat] 100 mg PO AS DIRECTED 21 History


 


Aspirin [Adult Low Dose Aspirin EC] 81 mg PO DAILY #30 tablet. 21 Rx


 


Atorvastatin [Lipitor] 20 mg PO DAILY #30 tab 21 Rx


 


Citalopram Hydrobromide [CeleXA] 10 mg PO DAILY #30 tab 21 Rx


 


Clopidogrel [Plavix] 75 mg PO DAILY #30 tab 21 Rx


 


Metoprolol Tartrate 25 mg PO BID #60 tab 21 Rx


 


Pantoprazole [Protonix] 40 mg PO AC-BRKFST #30 tablet.dr 21 Rx


 


Sevelamer [Renvela] 800 mg PO AC-TID #90 tab 21 Rx


 


amLODIPine [Norvasc] 10 mg PO DAILY #30 tab 21 Rx


 


levETIRAcetam [Keppra] 1,000 mg PO BID #60 tab 21 Rx


 


cloBAZam [Clobazam] 20 mg PO BID 02/15/21 02/15/21 History








                                    Allergies











Allergy/AdvReac Type Severity Reaction Status Date / Time


 


Penicillins Allergy  Itching Verified 21 21:42














Physical Examination





- Vital Signs


Vital Signs: 


                                   Vital Signs











  Temp Pulse Pulse Resp BP BP Pulse Ox


 


 02/15/21 07:29  97.4 F L   78  18   134/72  100


 


 02/15/21 02:30   68   20  120/69   100


 


 02/15/21 02:00  97.5 F L   75  18   141/79  100


 


 21 23:52  97.1 F L  70   16  125/73   100


 


 21 22:39   72   18  120/83   100


 


 21 21:59        100


 


 21 20:40   80   18  157/87   100


 


 21 19:36   93   16  169/96   100


 


 21 19:03  97.2 F L  85   14  156/84   96








                                Intake and Output











 02/14/21 02/15/21 02/15/21





 22:59 06:59 14:59


 


Other:   


 


  # Voids  0 


 


  Weight 81.647 kg 81.647 kg 











GENERAL: The patient is lying in bed and is not in acute distress.


CHEST: The heart rate is regular rate rhythm.   No murmurs to auscultation.  No 

carotid bruit bilaterally.


LUNG: Clear to auscultation bilaterally no wheezing noted throughout.  Not 

labored breathing.


ABDOMEN/GI: Bowel sounds present in all 4 quadrants. No tenderness to palpation 

throughout.





NEUROLOGICAL:


Higher mental function: The patient is awake, alert, oriented to self, place and

 time.  Patient is following commands.  No aphasia and no neglect.


Cranial nerves: The pupils are round, equal and reactive to light and 

accommodation.  Visual fields are full to confrontation throughout.  Extraocular

 movement is intact no nystagmus is noted.  Facial sensation is normal to touch 

throughout.  The facial strength is normal throughout.  Hearing is normal 

bilaterally to hand rub.  Tongue is midline and moved side-to-side without any 

difficulty.  No dysarthria is noted.  


Motor: Gait is deferred.  The strength is 4+ over left upper extremity and 3+ 

over the left lower extremity otherwise 5/5 over the right.    .


Sensation: Sensation is decreased over the left side to light touch.  


Reflexes (right/left): 3+ over the left side while 2+ over the right side.


Plantars is upgoin over the left and mute over the right.   








Results





- Laboratory Findings


CBC and BMP: 


                                 21 22:33





                                 21 22:33


Abnormal Lab Findings: 


                                  Abnormal Labs











  21





  20:00 22:33 22:33


 


RBC   3.29 L 


 


Hgb   9.6 L 


 


Hct   29.1 L 


 


Chloride    108 H


 


Carbon Dioxide    19 L


 


BUN    57 H


 


Creatinine    10.19 H*


 


Glucose    105 H


 


POC Glucose (mg/dL)   


 


Albumin    3.4 L


 


Urine Protein  1+ H  


 


Urine Glucose (UA)  1+ H  


 


Urine Blood  Trace H  


 


Ur Leukocyte Esterase  Trace H  


 


Amorphous Sediment  Rare H  


 


Urine Bacteria  Occasional H  














  02/15/21





  06:54


 


RBC 


 


Hgb 


 


Hct 


 


Chloride 


 


Carbon Dioxide 


 


BUN 


 


Creatinine 


 


Glucose 


 


POC Glucose (mg/dL)  156 H


 


Albumin 


 


Urine Protein 


 


Urine Glucose (UA) 


 


Urine Blood 


 


Ur Leukocyte Esterase 


 


Amorphous Sediment 


 


Urine Bacteria 














Assessment and Plan


Assessment: 


50-year-old woman with history of epilepsy, status post VNS that presents for 

break-through seizure on 2021 then was discharged and presents back on 

2021 for seizure.





Long-standing history of epilepsy, with a breakthrough seizure


History of CVA with left hemiparesis


End-stage renal disease on hemodialysis


Diabetes


Hypertension








Plan: 


I ordered one time Vimpat 150mg and Keppra 1gm Now.


I will decrease Keppra from 1000mg to 500mg 1 tab bid with post dialysis 500mg 

(M,W,F) since it seems high dose for ESRD and this was verfied with inpatient 

pharmacy as well.


I increased Vimpat from 100mg 1 tab bid to 150mg 1 tab bid with post dialysis 

100mg (M,W,F) 


To continue home dose Clobazam 20mg 1 tab bid (we don't have medication 

inpatient.  So will  try to talk to  to bring medication in).


Will try to verify with rest of home medications with her pharmacy since 

 is not exactly sure.


Keppra level is ordered and is pending.


An EEG is not warranted since patient is back to baseline and has known history 

of epilepsy and had EEG in our facility in past.


Continue seizure pre-caution.





Continue Plavix 75 mg daily and Lipitor 20 mg a daily for stroke prophylaxis.





If the patient is stable, she can be discharge tomorrow.





Patient to  follow-up with her neurologist within a week as outpatient ( Dr. As

sad over at [facility]).





The plan was discussed with the patient's nurse and her .





Thank you for the consultation.





The plan was discussed with the patients nurse





Michele Merritt MD


Neuro-Hospitalist. 














Time with Patient: Greater than 30

## 2021-02-15 NOTE — P.NPCON
History of Present Illness





- Reason for Consult


end stage renal disease





- History of Present Illness





Reason for consultation: End-stage renal disease





History of present illness:


Patient is a 50-year-old female seen in the consultation for end-stage renal 

disease.  She is maintained on hemodialysis on  schedule.

 Patient was recently discharged due to seizures 2 days ago and had another 

seizure last night.  She was brought to the hospital by the EMS.  Patient is not

a very reliable historian.  Currently awake.  No chest pain or shortness of 

breath.  No edema.  Blood pressure stable.  She is being followed by neurology 

and is currently on IV Keppra.  She is on multiple anticonvulsants.  No fever or

chills.  No vomiting or diarrhea.  No chest pain or shortness breath.





Vital signs are stable.


General: The patient appeared well nourished and normally developed. 


HEENT: Head exam is unremarkable. Neck is without jugular venous distension.


LUNGS: Breath sounds decreased.


HEART: Rate and Rhythm are regular. 


ABDOMEN: Soft, nontender.


EXTREMITITES: No edema.





Past Medical History


Past Medical History: Chest Pain / Angina, CVA/TIA, Diabetes Mellitus, 

Hypertension, Renal Disease, Seizure Disorder, Thyroid Disorder


Additional Past Medical History / Comment(s): Dialysis, bipolar, neuropathy, 

baseline orientation to person/place, drop foot left, only able to walk short 

distances with walker or wheelchair


History of Any Multi-Drug Resistant Organisms: None Reported


Past Surgical History:  Section, Tonsillectomy


Additional Past Surgical History / Comment(s): left arm fistula, loop recorder, 

vagus nerve stimulator


Past Anesthesia/Blood Transfusion Reactions: No Reported Reaction


Past Psychological History: Bipolar


Smoking Status: Former smoker


Past Alcohol Use History: Occasional


Past Drug Use History: None Reported





- Past Family History


  ** Father


Family Medical History: Unable to Obtain


Additional Family Medical History / Comment(s): adopted





Medications and Allergies


                                Home Medications











 Medication  Instructions  Recorded  Confirmed  Type


 


Insulin Glargine [Lantus] 12 unit SQ HS 20 History


 


INSULIN LISPRO (humaLOG) [humaLOG] See Protocol SQ ACHS 21 

History


 


Lacosamide [Vimpat] 100 mg PO AS DIRECTED 21 History


 


Aspirin [Adult Low Dose Aspirin EC] 81 mg PO DAILY #30 tablet. 21 Rx


 


Atorvastatin [Lipitor] 20 mg PO DAILY #30 tab 21 Rx


 


Citalopram Hydrobromide [CeleXA] 10 mg PO DAILY #30 tab 21 Rx


 


Clopidogrel [Plavix] 75 mg PO DAILY #30 tab 21 Rx


 


Metoprolol Tartrate 25 mg PO BID #60 tab 21 Rx


 


Pantoprazole [Protonix] 40 mg PO AC-BRKFST #30 tablet. 21 Rx


 


Sevelamer [Renvela] 800 mg PO AC-TID #90 tab 21 Rx


 


amLODIPine [Norvasc] 10 mg PO DAILY #30 tab 21 Rx


 


levETIRAcetam [Keppra] 1,000 mg PO BID #60 tab 21 Rx








                                    Allergies











Allergy/AdvReac Type Severity Reaction Status Date / Time


 


Penicillins Allergy  Itching Verified 21 21:42














Physical Exam


Vitals: 


                                   Vital Signs











  Temp Pulse Pulse Resp BP BP Pulse Ox


 


 02/15/21 07:29  97.4 F L   78  18   134/72  100


 


 02/15/21 02:30   68   20  120/69   100


 


 02/15/21 02:00  97.5 F L   75  18   141/79  100


 


 21 23:52  97.1 F L  70   16  125/73   100


 


 21 22:39   72   18  120/83   100


 


 21 21:59        100


 


 21 20:40   80   18  157/87   100


 


 21 19:36   93   16  169/96   100


 


 21 19:03  97.2 F L  85   14  156/84   96








                                Intake and Output











 02/14/21 02/15/21 02/15/21





 22:59 06:59 14:59


 


Intake Total   150


 


Balance   150


 


Intake:   


 


  IV   150


 


    Lacosamide  mg In   50





    Sodium Chloride 0.9% 50   





    ml @ 100 mls/hr IVPB ONCE   





    STA Rx#:[id number]   


 


    levETIRAcetam IV 1,000 mg   100





    In Saline 1 100ml.bag @   





    400 mls/hr IVPB ONCE STA   





    Rx#:[id number]   


 


Other:   


 


  # Voids  0 


 


  Weight 81.647 kg 81.647 kg 














Results





- Lab Results


                             Most recent lab results











Calcium  8.6 mg/dL (8.4-10.2)   21  22:33    














                                 21 22:33





                                 21 22:33





Assessment and Plan


Plan: 





Assessment:


1.  End-stage renal disease maintained on hemodialysis on  schedule.


2.  Seizure disorder.  Neurology following.  


3.  Metabolic acidosis secondary to chronic kidney disease.  Expect improvement 

postdialysis.


4.  Chronic kidney disease mineral bone disease maintained on Renvela.


5.  Diabetes mellitus.


6.  Hypertension with chronic kidney disease.  Stable.





Plan:


Hemodialysis today.





Thank you for the consultation.  I will continue to follow the patient with you 

during her hospital stay.

## 2021-02-16 LAB
ALBUMIN SERPL-MCNC: 3.4 G/DL (ref 3.5–5)
ALBUMIN/GLOB SERPL: 1.2 {RATIO}
ALP SERPL-CCNC: 83 U/L (ref 38–126)
ALT SERPL-CCNC: 11 U/L (ref 4–34)
ANION GAP SERPL CALC-SCNC: 11 MMOL/L
AST SERPL-CCNC: 18 U/L (ref 14–36)
BASOPHILS # BLD AUTO: 0.1 K/UL (ref 0–0.2)
BASOPHILS NFR BLD AUTO: 1 %
BUN SERPL-SCNC: 39 MG/DL (ref 7–17)
CALCIUM SPEC-MCNC: 8.4 MG/DL (ref 8.4–10.2)
CHLORIDE SERPL-SCNC: 103 MMOL/L (ref 98–107)
CO2 SERPL-SCNC: 22 MMOL/L (ref 22–30)
EOSINOPHIL # BLD AUTO: 0.3 K/UL (ref 0–0.7)
EOSINOPHIL NFR BLD AUTO: 4 %
ERYTHROCYTE [DISTWIDTH] IN BLOOD BY AUTOMATED COUNT: 3.87 M/UL (ref 3.8–5.4)
ERYTHROCYTE [DISTWIDTH] IN BLOOD: 14.9 % (ref 11.5–15.5)
GLOBULIN SER CALC-MCNC: 2.9 G/DL
GLUCOSE BLD-MCNC: 110 MG/DL (ref 75–99)
GLUCOSE BLD-MCNC: 193 MG/DL (ref 75–99)
GLUCOSE BLD-MCNC: 208 MG/DL (ref 75–99)
GLUCOSE BLD-MCNC: 97 MG/DL (ref 75–99)
GLUCOSE SERPL-MCNC: 95 MG/DL (ref 74–99)
HBA1C MFR BLD: 7 % (ref 4–6)
HCT VFR BLD AUTO: 34.4 % (ref 34–46)
HGB BLD-MCNC: 11.3 GM/DL (ref 11.4–16)
LYMPHOCYTES # SPEC AUTO: 2.8 K/UL (ref 1–4.8)
LYMPHOCYTES NFR SPEC AUTO: 43 %
MAGNESIUM SPEC-SCNC: 2 MG/DL (ref 1.6–2.3)
MCH RBC QN AUTO: 29.2 PG (ref 25–35)
MCHC RBC AUTO-ENTMCNC: 32.9 G/DL (ref 31–37)
MCV RBC AUTO: 88.7 FL (ref 80–100)
MONOCYTES # BLD AUTO: 0.4 K/UL (ref 0–1)
MONOCYTES NFR BLD AUTO: 6 %
NEUTROPHILS # BLD AUTO: 2.9 K/UL (ref 1.3–7.7)
NEUTROPHILS NFR BLD AUTO: 44 %
PLATELET # BLD AUTO: 160 K/UL (ref 150–450)
POTASSIUM SERPL-SCNC: 4.5 MMOL/L (ref 3.5–5.1)
PROT SERPL-MCNC: 6.3 G/DL (ref 6.3–8.2)
SODIUM SERPL-SCNC: 136 MMOL/L (ref 137–145)
WBC # BLD AUTO: 6.6 K/UL (ref 3.8–10.6)

## 2021-02-16 RX ADMIN — SEVELAMER CARBONATE SCH MG: 800 TABLET, FILM COATED ORAL at 12:04

## 2021-02-16 RX ADMIN — SEVELAMER CARBONATE SCH MG: 800 TABLET, FILM COATED ORAL at 17:39

## 2021-02-16 RX ADMIN — LACOSAMIDE SCH MG: 150 TABLET, FILM COATED ORAL at 22:27

## 2021-02-16 RX ADMIN — INSULIN DETEMIR SCH UNIT: 100 INJECTION, SOLUTION SUBCUTANEOUS at 22:26

## 2021-02-16 RX ADMIN — CITALOPRAM HYDROBROMIDE SCH MG: 10 TABLET ORAL at 08:01

## 2021-02-16 RX ADMIN — HEPARIN SODIUM SCH UNIT: 5000 INJECTION, SOLUTION INTRAVENOUS; SUBCUTANEOUS at 08:01

## 2021-02-16 RX ADMIN — METOPROLOL TARTRATE SCH MG: 25 TABLET, FILM COATED ORAL at 08:01

## 2021-02-16 RX ADMIN — LACOSAMIDE SCH MG: 150 TABLET, FILM COATED ORAL at 08:02

## 2021-02-16 RX ADMIN — INSULIN ASPART SCH UNIT: 100 INJECTION, SOLUTION INTRAVENOUS; SUBCUTANEOUS at 12:04

## 2021-02-16 RX ADMIN — HEPARIN SODIUM SCH UNIT: 5000 INJECTION, SOLUTION INTRAVENOUS; SUBCUTANEOUS at 22:27

## 2021-02-16 RX ADMIN — METOPROLOL TARTRATE SCH MG: 25 TABLET, FILM COATED ORAL at 22:27

## 2021-02-16 RX ADMIN — INSULIN ASPART SCH UNIT: 100 INJECTION, SOLUTION INTRAVENOUS; SUBCUTANEOUS at 17:39

## 2021-02-16 RX ADMIN — HEPARIN SODIUM SCH UNIT: 5000 INJECTION, SOLUTION INTRAVENOUS; SUBCUTANEOUS at 17:37

## 2021-02-16 RX ADMIN — INSULIN ASPART SCH: 100 INJECTION, SOLUTION INTRAVENOUS; SUBCUTANEOUS at 22:23

## 2021-02-16 RX ADMIN — ASPIRIN 81 MG CHEWABLE TABLET SCH MG: 81 TABLET CHEWABLE at 08:00

## 2021-02-16 RX ADMIN — CLOPIDOGREL BISULFATE SCH MG: 75 TABLET ORAL at 08:01

## 2021-02-16 RX ADMIN — INSULIN ASPART SCH: 100 INJECTION, SOLUTION INTRAVENOUS; SUBCUTANEOUS at 07:23

## 2021-02-16 RX ADMIN — ATORVASTATIN CALCIUM SCH MG: 20 TABLET, FILM COATED ORAL at 22:27

## 2021-02-16 RX ADMIN — SEVELAMER CARBONATE SCH MG: 800 TABLET, FILM COATED ORAL at 08:01

## 2021-02-16 RX ADMIN — PANTOPRAZOLE SODIUM SCH MG: 40 TABLET, DELAYED RELEASE ORAL at 08:01

## 2021-02-16 NOTE — P.PN
Subjective


Progress Note Date: 02/16/21


Patient was seen at bedside and the patient has not had any further seizure-like

episodes per the patient's nurse.  According to the patient she's doing well and

she feels a stable.











Objective





- Vital Signs


Vital signs: 


                                   Vital Signs











Temp  98.6 F   02/16/21 13:27


 


Pulse  79   02/16/21 13:27


 


Resp  17   02/16/21 13:27


 


BP  105/61   02/16/21 13:27


 


Pulse Ox  98   02/16/21 13:27








                                 Intake & Output











 02/15/21 02/16/21 02/16/21





 18:59 06:59 18:59


 


Intake Total 372  


 


Output Total  2200 


 


Balance 372 -2200 


 


Intake:   


 


    


 


    Lacosamide  mg In 50  





    Sodium Chloride 0.9% 50   





    ml @ 100 mls/hr IVPB ONCE   





    STA Rx#:[id number]   


 


    levETIRAcetam IV 1,000 mg 100  





    In Saline 1 100ml.bag @   





    400 mls/hr IVPB ONCE STA   





    Rx#:[id number]   


 


  Oral 222  


 


Output:   


 


  Urine  200 


 


  Hemodialysis  2000 


 


Other:   


 


  Voiding Method  External Catheter External Catheter














- Exam


GENERAL: The patient is lying in bed and is not in acute distress.





NEUROLOGICAL:


Higher mental function: The patient is awake, alert, oriented to self, place and

time.  Patient is following commands.  No aphasia and no neglect.


Cranial nerves: The pupils are round, equal and reactive to light and 

accommodation.  Visual fields are full to confrontation throughout.  Extraocular

movement is intact no nystagmus is noted.  Facial sensation is normal to touch 

throughout.  The facial strength is normal throughout.  Hearing is normal 

bilaterally to hand rub.  Tongue is midline and moved side-to-side without any 

difficulty.  No dysarthria is noted.  


Motor: Gait is deferred.  The strength is 4+ over left upper extremity and 3+ 

over the left lower extremity otherwise 5/5 over the right.    .


Sensation: Sensation is decreased over the left side to light touch.  


Reflexes (right/left): 3+ over the left side while 2+ over the right side.


Plantars is upgoin over the left and mute over the right.   








- Labs


CBC & Chem 7: 


                                 02/16/21 06:32





                                 02/16/21 06:32


Labs: 


                  Abnormal Lab Results - Last 24 Hours (Table)











  02/15/21 02/15/21 02/16/21 Range/Units





  16:49 20:15 06:32 


 


Hgb    11.3 L  (11.4-16.0)  gm/dL


 


Sodium     (137-145)  mmol/L


 


BUN     (7-17)  mg/dL


 


Creatinine     (0.52-1.04)  mg/dL


 


POC Glucose (mg/dL)  187 H  153 H   (75-99)  mg/dL


 


Phosphorus     (2.5-4.5)  mg/dL


 


Albumin     (3.5-5.0)  g/dL














  02/16/21 02/16/21 Range/Units





  06:32 11:24 


 


Hgb    (11.4-16.0)  gm/dL


 


Sodium  136 L   (137-145)  mmol/L


 


BUN  39 H   (7-17)  mg/dL


 


Creatinine  7.24 H*   (0.52-1.04)  mg/dL


 


POC Glucose (mg/dL)   193 H  (75-99)  mg/dL


 


Phosphorus  5.4 H   (2.5-4.5)  mg/dL


 


Albumin  3.4 L   (3.5-5.0)  g/dL














Assessment and Plan


Assessment: 


50-year-old woman with history of epilepsy, status post VNS that presents for 

break-through seizure on 02/12/2021 then was discharged and presents back on 

02/14/2021 for seizure.





Long-standing history of epilepsy, with a breakthrough seizure


History of CVA with left hemiparesis


End-stage renal disease on hemodialysis


Diabetes


Hypertension








Plan: 





* I decreased Keppra from 1000mg to 500mg 1 tab bid with post dialysis 500mg 

  (M,W,F) since it seems high dose for ESRD and this was verfied with inpatient 

  pharmacy as well (was modified on 02/15/21 by myself).


* Continue 150mg 1 tab bid (increased from 100mg 1 tab bid) with post dialysis 

  100mg (M,W,F) 


* To continue home dose Clobazam 20mg 1 tab bid (we don't have medication 

  inpatient.  


* Per the patient  he stated that the what ever the bottles that the the 

  patient came in with R the medication she is on.  Upon checking her bag 

  mentioned that she is on Keppra 1 THOUSAND milligrams 1 tablet twice a day 

  other than that there is no other seizure medication at.  Upon asking the 

  patient's the patient's  if she was on Vimpat he stated yes and said 

  she is on 100 mg 1 tablet twice a day and upon asking her if she was on 

  clobazam he said yes but he didn't know the dose.  I felt the patient the 

   does not know exactly what the patient's medication or and her doses 

  are not sure of the patient was missing any medications at home or not.


* Keppra level is 40 (therapeutic) on 02-14-21


* An EEG is not warranted since patient is back to baseline and has known 

  history of epilepsy and had EEG in our facility in past.


* Continue seizure pre-caution.


* Continue Plavix 75 mg daily and Lipitor 20 mg a daily for stroke prophylaxis.


* Patient to  follow-up with her neurologist within a week as outpatient ( Dr. Symone hedrick at [facility]).








UPDATE:


I contacted the [facility] neurology Department and I spoke with a

 regarding the the patient and they stated that the patient was seen

in the hospital in October 2020 and the last time the patient is seen in the 

office was in July 2020.  On her last hospital visit in October 2020 she was the

discharged on Vimpat 100 mg 1 tablet twice a day, Keppra 500 mg 1 tablet twice a

day with an additional dose of 500 mg after dialysis on Monday was in Friday and

on for the 20 mg 1 tablet twice a day.


It is a possibility that the patient had an increase of medication that was 

directly sent to the pharmacy.





The patient is clear for discharge.





The plan was discussed with the patient's primary team and her nurse.








Michele Merritt MD


Neuro-Hospitalist. 














Time with Patient: Less than 30

## 2021-02-16 NOTE — XR
EXAMINATION TYPE: XR chest 1V

 

DATE OF EXAM: 2/16/2021

 

COMPARISON: 2/12/2021

 

HISTORY: Shortness of breath

 

TECHNIQUE: Single frontal view of the chest is obtained.

 

FINDINGS:  There is no focal air space opacity, pleural effusion, or pneumothorax seen.  The cardiac 
silhouette size is within normal limits.   The osseous structures are intact. Cardiac device seen. No
 central lead. Limited inspiration with elevated right hemidiaphragm.

 

IMPRESSION:  Cardiomegaly with no definite consolidative process. Mild prominence of the central inte
rstitium may be related to poor inspiration rather than bronchitis or interstitial pneumonitis. Corre
late clinically

## 2021-02-16 NOTE — P.PN
Subjective


Progress Note Date: 02/16/21


Principal diagnosis: 


Seizures





Evaluated 50-year-old female this a.m., resting comfortably in bed.  Patient 

recently admitted for seizures and then discharged home, came back to the 

emergency department for continuous episodes of seizures.  Patient on regimen of

Keppra, Vimpat, and clobazam.  Patient had extensive diagnostic workup in the 

emergency department revealing breakthrough seizures.  Patient answering 

questions appropriately at baseline.  No seizure activity noted upon evaluation 

this a.m.  Patient denies fever, chills, chest pain, palpitations, shortness of 

breath,, pain, nausea, vomiting or diarrhea at this time.  Patient currently 

under seizure precautions








Objective





- Vital Signs


Vital signs: 


                                   Vital Signs











Temp  98.7 F   02/16/21 06:27


 


Pulse  82   02/16/21 06:27


 


Resp  16   02/16/21 06:27


 


BP  152/82   02/16/21 06:27


 


Pulse Ox  100   02/16/21 06:27








                                 Intake & Output











 02/15/21 02/16/21 02/16/21





 18:59 06:59 18:59


 


Intake Total 372  


 


Output Total  2200 


 


Balance 372 -2200 


 


Intake:   


 


    


 


    Lacosamide  mg In 50  





    Sodium Chloride 0.9% 50   





    ml @ 100 mls/hr IVPB ONCE   





    STA Rx#:[id number]   


 


    levETIRAcetam IV 1,000 mg 100  





    In Saline 1 100ml.bag @   





    400 mls/hr IVPB ONCE STA   





    Rx#:[id number]   


 


  Oral 222  


 


Output:   


 


  Urine  200 


 


  Hemodialysis  2000 


 


Other:   


 


  Voiding Method  External Catheter 














- Constitutional


General appearance: Present: cooperative





- EENT


Eyes: Present: edentulous, PERRLA


ENT: Present: hard of hearing


Ears: bilateral: normal





- Neck


Carotids: bilateral: upstroke normal


Thyroid: bilateral: normal size





- Respiratory


Respiratory: bilateral: CTA (Anterior lung fields), diminished (Posterior lung 

fields)





- Cardiovascular


Details: 





Normal sinus rhythm


Heart rate: 88


Rhythm: regular


Heart sounds: normal: S1, S2





- Peripheral edema


  ** foot


Peripheral Edema: bilateral: Trace





- Gastrointestinal


General gastrointestinal: Present: normal bowel sounds





- Neurologic


Neurologic: Present: CNII-XII intact





- Musculoskeletal


Musculoskeletal: Present: left sided weakness





- Psychiatric


Psychiatric Comment(s): 





Alert to person and place





- Allied health notes


Allied health notes reviewed: nursing





- Labs


CBC & Chem 7: 


                                 02/16/21 06:32





                                 02/16/21 06:32


Labs: 


                  Abnormal Lab Results - Last 24 Hours (Table)











  02/15/21 02/15/21 02/15/21 Range/Units





  11:32 16:49 20:15 


 


Hgb     (11.4-16.0)  gm/dL


 


Sodium     (137-145)  mmol/L


 


BUN     (7-17)  mg/dL


 


Creatinine     (0.52-1.04)  mg/dL


 


POC Glucose (mg/dL)  192 H  187 H  153 H  (75-99)  mg/dL


 


Phosphorus     (2.5-4.5)  mg/dL


 


Albumin     (3.5-5.0)  g/dL














  02/16/21 02/16/21 Range/Units





  06:32 06:32 


 


Hgb  11.3 L   (11.4-16.0)  gm/dL


 


Sodium   136 L  (137-145)  mmol/L


 


BUN   39 H  (7-17)  mg/dL


 


Creatinine   7.24 H*  (0.52-1.04)  mg/dL


 


POC Glucose (mg/dL)    (75-99)  mg/dL


 


Phosphorus   5.4 H  (2.5-4.5)  mg/dL


 


Albumin   3.4 L  (3.5-5.0)  g/dL














- Imaging and Cardiology


Chest x-ray: pending





Assessment and Plan


Assessment: 


Seizure disordercontinue home medications; continue recommendations and 

treatment plan from neurology


End-stage kidney disease dialysis dependent Monday Wednesday Fridaycontinue 

consultation with nephrology for recommendations and treatment plan


History of CVA in the past with left-sided hemiparesis


Diabetes mellitus type 2


Hypertension


Hypothyroidism


GERD


Bipolar


Diabetic neuropathy


Continue home medications and recommendations from neurology for seizures


Consultation with nephrology for management of dialysis Monday Wednesday and Fr

thuyy


Continue medical management





Time with Patient: Greater than 30

## 2021-02-16 NOTE — P.PN
Subjective





Patient is seen in follow-up for end-stage renal disease.  She is maintained on 

hemodialysis on Monday Wednesday Friday schedule.  Currently resting in bed.  No

seizures overnight.  No chest pain or shortness of breath.





Vital signs are stable.


General: The patient appeared well nourished and normally developed. 


HEENT: Head exam is unremarkable. Neck is without jugular venous distension.


LUNGS: Breath sounds decreased.


HEART: Rate and Rhythm are regular. 


ABDOMEN: Soft, nontender.  


EXTREMITITES: No edema.





Objective





- Vital Signs


Vital signs: 


                                   Vital Signs











Temp  98.6 F   02/16/21 13:27


 


Pulse  79   02/16/21 13:27


 


Resp  17   02/16/21 13:27


 


BP  105/61   02/16/21 13:27


 


Pulse Ox  98   02/16/21 13:27








                                 Intake & Output











 02/15/21 02/16/21 02/16/21





 18:59 06:59 18:59


 


Intake Total 372  


 


Output Total  2200 


 


Balance 372 -2200 


 


Intake:   


 


    


 


    Lacosamide  mg In 50  





    Sodium Chloride 0.9% 50   





    ml @ 100 mls/hr IVPB ONCE   





    STA Rx#:[id number]   


 


    levETIRAcetam IV 1,000 mg 100  





    In Saline 1 100ml.bag @   





    400 mls/hr IVPB ONCE STA   





    Rx#:[id number]   


 


  Oral 222  


 


Output:   


 


  Urine  200 


 


  Hemodialysis  2000 


 


Other:   


 


  Voiding Method  External Catheter External Catheter














- Labs


CBC & Chem 7: 


                                 02/16/21 06:32





                                 02/16/21 06:32


Labs: 


                  Abnormal Lab Results - Last 24 Hours (Table)











  02/15/21 02/15/21 02/16/21 Range/Units





  16:49 20:15 06:32 


 


Hgb    11.3 L  (11.4-16.0)  gm/dL


 


Sodium     (137-145)  mmol/L


 


BUN     (7-17)  mg/dL


 


Creatinine     (0.52-1.04)  mg/dL


 


POC Glucose (mg/dL)  187 H  153 H   (75-99)  mg/dL


 


Phosphorus     (2.5-4.5)  mg/dL


 


Albumin     (3.5-5.0)  g/dL














  02/16/21 02/16/21 Range/Units





  06:32 11:24 


 


Hgb    (11.4-16.0)  gm/dL


 


Sodium  136 L   (137-145)  mmol/L


 


BUN  39 H   (7-17)  mg/dL


 


Creatinine  7.24 H*   (0.52-1.04)  mg/dL


 


POC Glucose (mg/dL)   193 H  (75-99)  mg/dL


 


Phosphorus  5.4 H   (2.5-4.5)  mg/dL


 


Albumin  3.4 L   (3.5-5.0)  g/dL














Assessment and Plan


Plan: 





Assessment:


1.  End-stage renal disease maintained on hemodialysis on Monday Wednesday Friday schedule.


2.  Seizure disorder.  Neurology following.  


3.  Metabolic acidosis secondary to chronic kidney disease.  Improved 

postdialysis.


4.  Chronic kidney disease mineral bone disease maintained on Renvela.  

Phosphorus 5.4.


5.  Diabetes mellitus.


6.  Hypertension with chronic kidney disease.  Stable.





Plan:


Hemodialysis tomorrow.


Anticipate discharge soon.

## 2021-02-17 LAB
ALBUMIN SERPL-MCNC: 3.6 G/DL (ref 3.5–5)
ALBUMIN/GLOB SERPL: 1.2 {RATIO}
ALP SERPL-CCNC: 76 U/L (ref 38–126)
ALT SERPL-CCNC: 10 U/L (ref 4–34)
ANION GAP SERPL CALC-SCNC: 14 MMOL/L
AST SERPL-CCNC: 15 U/L (ref 14–36)
BASOPHILS # BLD AUTO: 0.1 K/UL (ref 0–0.2)
BASOPHILS NFR BLD AUTO: 1 %
BUN SERPL-SCNC: 50 MG/DL (ref 7–17)
CALCIUM SPEC-MCNC: 9.1 MG/DL (ref 8.4–10.2)
CHLORIDE SERPL-SCNC: 103 MMOL/L (ref 98–107)
CO2 SERPL-SCNC: 19 MMOL/L (ref 22–30)
EOSINOPHIL # BLD AUTO: 0.2 K/UL (ref 0–0.7)
EOSINOPHIL NFR BLD AUTO: 4 %
ERYTHROCYTE [DISTWIDTH] IN BLOOD BY AUTOMATED COUNT: 3.44 M/UL (ref 3.8–5.4)
ERYTHROCYTE [DISTWIDTH] IN BLOOD: 14.4 % (ref 11.5–15.5)
GLOBULIN SER CALC-MCNC: 3 G/DL
GLUCOSE BLD-MCNC: 165 MG/DL (ref 75–99)
GLUCOSE BLD-MCNC: 170 MG/DL (ref 75–99)
GLUCOSE BLD-MCNC: 399 MG/DL (ref 75–99)
GLUCOSE BLD-MCNC: 80 MG/DL (ref 75–99)
GLUCOSE SERPL-MCNC: 78 MG/DL (ref 74–99)
HCT VFR BLD AUTO: 31.1 % (ref 34–46)
HGB BLD-MCNC: 10.3 GM/DL (ref 11.4–16)
LYMPHOCYTES # SPEC AUTO: 2.6 K/UL (ref 1–4.8)
LYMPHOCYTES NFR SPEC AUTO: 43 %
MAGNESIUM SPEC-SCNC: 2.2 MG/DL (ref 1.6–2.3)
MCH RBC QN AUTO: 29.9 PG (ref 25–35)
MCHC RBC AUTO-ENTMCNC: 33.1 G/DL (ref 31–37)
MCV RBC AUTO: 90.4 FL (ref 80–100)
MONOCYTES # BLD AUTO: 0.3 K/UL (ref 0–1)
MONOCYTES NFR BLD AUTO: 5 %
NEUTROPHILS # BLD AUTO: 2.7 K/UL (ref 1.3–7.7)
NEUTROPHILS NFR BLD AUTO: 45 %
PLATELET # BLD AUTO: 246 K/UL (ref 150–450)
POTASSIUM SERPL-SCNC: 4.8 MMOL/L (ref 3.5–5.1)
PROT SERPL-MCNC: 6.6 G/DL (ref 6.3–8.2)
SODIUM SERPL-SCNC: 136 MMOL/L (ref 137–145)
WBC # BLD AUTO: 6.1 K/UL (ref 3.8–10.6)

## 2021-02-17 RX ADMIN — INSULIN ASPART SCH: 100 INJECTION, SOLUTION INTRAVENOUS; SUBCUTANEOUS at 08:05

## 2021-02-17 RX ADMIN — SEVELAMER CARBONATE SCH MG: 800 TABLET, FILM COATED ORAL at 17:17

## 2021-02-17 RX ADMIN — ASPIRIN 81 MG CHEWABLE TABLET SCH MG: 81 TABLET CHEWABLE at 08:53

## 2021-02-17 RX ADMIN — CLOPIDOGREL BISULFATE SCH MG: 75 TABLET ORAL at 08:53

## 2021-02-17 RX ADMIN — LACOSAMIDE SCH MG: 150 TABLET, FILM COATED ORAL at 21:10

## 2021-02-17 RX ADMIN — INSULIN ASPART SCH UNIT: 100 INJECTION, SOLUTION INTRAVENOUS; SUBCUTANEOUS at 12:30

## 2021-02-17 RX ADMIN — HEPARIN SODIUM SCH UNIT: 5000 INJECTION, SOLUTION INTRAVENOUS; SUBCUTANEOUS at 15:56

## 2021-02-17 RX ADMIN — LACOSAMIDE SCH MG: 150 TABLET, FILM COATED ORAL at 08:54

## 2021-02-17 RX ADMIN — ATORVASTATIN CALCIUM SCH MG: 20 TABLET, FILM COATED ORAL at 21:10

## 2021-02-17 RX ADMIN — METOPROLOL TARTRATE SCH MG: 25 TABLET, FILM COATED ORAL at 21:10

## 2021-02-17 RX ADMIN — PANTOPRAZOLE SODIUM SCH MG: 40 TABLET, DELAYED RELEASE ORAL at 08:53

## 2021-02-17 RX ADMIN — INSULIN DETEMIR SCH UNIT: 100 INJECTION, SOLUTION SUBCUTANEOUS at 21:09

## 2021-02-17 RX ADMIN — METOPROLOL TARTRATE SCH MG: 25 TABLET, FILM COATED ORAL at 08:53

## 2021-02-17 RX ADMIN — SEVELAMER CARBONATE SCH MG: 800 TABLET, FILM COATED ORAL at 12:30

## 2021-02-17 RX ADMIN — INSULIN ASPART SCH UNIT: 100 INJECTION, SOLUTION INTRAVENOUS; SUBCUTANEOUS at 21:09

## 2021-02-17 RX ADMIN — CITALOPRAM HYDROBROMIDE SCH MG: 10 TABLET ORAL at 08:54

## 2021-02-17 RX ADMIN — HEPARIN SODIUM SCH UNIT: 5000 INJECTION, SOLUTION INTRAVENOUS; SUBCUTANEOUS at 08:53

## 2021-02-17 RX ADMIN — HEPARIN SODIUM SCH UNIT: 5000 INJECTION, SOLUTION INTRAVENOUS; SUBCUTANEOUS at 23:52

## 2021-02-17 RX ADMIN — SEVELAMER CARBONATE SCH MG: 800 TABLET, FILM COATED ORAL at 08:53

## 2021-02-17 RX ADMIN — INSULIN ASPART SCH UNIT: 100 INJECTION, SOLUTION INTRAVENOUS; SUBCUTANEOUS at 17:17

## 2021-02-17 RX ADMIN — LACOSAMIDE SCH MG: 50 TABLET, FILM COATED ORAL at 21:10

## 2021-02-17 NOTE — P.PN
Subjective


Progress Note Date: 02/17/21


Principal diagnosis: 


Seizures








Evaluated 50-year-old female this a.m., resting comfortably in bed. Patient 

complaint of dyspnea on exertion, generalized malaise and fatigue. Patient 

denies fever, chills, chest pain, palpitations, abdominal pain, nausea, 

vomiting, and diarrhea.Patient on regimen of Keppra, Vimpat, and clobazam.














Objective





- Vital Signs


Vital signs: 


                                   Vital Signs











Temp  97.9 F   02/17/21 08:00


 


Pulse  79   02/17/21 08:00


 


Resp  18   02/17/21 08:00


 


BP  148/71   02/17/21 08:00


 


Pulse Ox  99   02/17/21 08:00








                                 Intake & Output











 02/16/21 02/17/21 02/17/21





 18:59 06:59 18:59


 


Output Total 450 151 


 


Balance -450 -151 


 


Output:   


 


  Urine 450 150 


 


  Stool  1 


 


Other:   


 


  Voiding Method External Catheter External Catheter 














- Constitutional


General appearance: Present: cooperative





- EENT


Eyes: Present: EOMI, PERRLA


ENT: Present: normal oropharynx


Ears: bilateral: normal





- Neck


Neck: Present: normal ROM





- Respiratory


Respiratory: bilateral: diminished (Posterior lung fields), rhonchi (Anterior 

lung fields)





- Cardiovascular


Details: 





Normal sinus rhythm


Heart rate: 78


Rhythm: regular


Heart sounds: normal: S1, S2





- Peripheral pulses


  ** radial pulse


Peripheral Pulses: bilateral: Normal





  ** dorsalis pedis


Peripheral Pulses: bilateral: Normal





- Gastrointestinal


General gastrointestinal: Present: normal bowel sounds





- Integumentary


Integumentary: Present: pale





- Neurologic


Neurologic: Present: CNII-XII intact





- Musculoskeletal


Musculoskeletal: Present: left sided weakness





- Psychiatric


Psychiatric Comment(s): 





Alert to person and place neurological baseline





- Allied health notes


Allied health notes reviewed: nursing





- Labs


CBC & Chem 7: 


                                 02/17/21 06:53





                                 02/17/21 06:53


Labs: 


                  Abnormal Lab Results - Last 24 Hours (Table)











  02/16/21 02/16/21 02/16/21 Range/Units





  06:32 11:24 16:39 


 


RBC     (3.80-5.40)  m/uL


 


Hgb     (11.4-16.0)  gm/dL


 


Hct     (34.0-46.0)  %


 


Sodium     (137-145)  mmol/L


 


Carbon Dioxide     (22-30)  mmol/L


 


BUN     (7-17)  mg/dL


 


Creatinine     (0.52-1.04)  mg/dL


 


POC Glucose (mg/dL)   193 H  208 H  (75-99)  mg/dL


 


Hemoglobin A1c  7.0 H    (4.0-6.0)  %


 


Phosphorus     (2.5-4.5)  mg/dL














  02/16/21 02/17/21 02/17/21 Range/Units





  20:58 06:53 06:53 


 


RBC   3.44 L   (3.80-5.40)  m/uL


 


Hgb   10.3 L   (11.4-16.0)  gm/dL


 


Hct   31.1 L   (34.0-46.0)  %


 


Sodium    136 L  (137-145)  mmol/L


 


Carbon Dioxide    19 L  (22-30)  mmol/L


 


BUN    50 H  (7-17)  mg/dL


 


Creatinine    9.63 H*  (0.52-1.04)  mg/dL


 


POC Glucose (mg/dL)  110 H    (75-99)  mg/dL


 


Hemoglobin A1c     (4.0-6.0)  %


 


Phosphorus    6.9 H  (2.5-4.5)  mg/dL














Assessment and Plan


Assessment: 


Seizure disordercontinue home medications; continue recommendations and 

treatment plan from neurology


End-stage kidney disease dialysis dependent Monday Wednesday Fridaycontinue 

consultation with nephrology for recommendations and treatment plan


History of CVA in the past with left-sided hemiparesis


Diabetes mellitus type 2


Hypertension


Hypothyroidism


GERD


Bipolar


Diabetic neuropathy


Continue home medications and recommendations from neurology for seizures


Consultation with nephrology for management of dialysis Monday Wednesday and 

Friday


Continue medical management





Time with Patient: Greater than 30

## 2021-02-17 NOTE — P.PN
Subjective


Progress Note Date: 02/17/21


Patient was seen at bedside and per the patient nurse the patient has been more 

fatigued but other than that the patient's neurological exam has been the same 

and she is oriented at her baseline.


Also per the patient's nurse no clinical seizure-like activity witnessed


Upon seeing the patient that she stated that the she feels about the same today 

compared to yesterday.  She just feels a little bit tired throughout her body.


Today the patient had dialysis.





Objective





- Vital Signs


Vital signs: 


                                   Vital Signs











Temp  97.8 F   02/17/21 15:33


 


Pulse  79   02/17/21 15:33


 


Resp  18   02/17/21 15:33


 


BP  126/65   02/17/21 15:33


 


Pulse Ox  99   02/17/21 08:00








                                 Intake & Output











 02/16/21 02/17/21 02/17/21





 18:59 06:59 18:59


 


Output Total 


 


Balance -450 -151 -1000


 


Output:   


 


  Urine 450 150 


 


  Stool  1 


 


  Hemodialysis   1000


 


Other:   


 


  Voiding Method External Catheter External Catheter External Catheter














- Exam


GENERAL: The patient is lying in bed and is not in acute distress.  Seems more 

fatigue today.





NEUROLOGICAL:


Higher mental function: The patient is awake, alert, oriented to self, place and

time.  Patient is following simple commands but at few times there is delay .  

No aphasia and no neglect.


Cranial nerves: The pupils are round, equal and reactive to light and 

accommodation.  Visual fields are full to confrontation throughout.  Extraocular

movement is intact no nystagmus is noted.  Facial sensation is normal to touch 

throughout.  The facial strength is normal throughout.  Hearing is normal 

bilaterally to hand rub.  Tongue is midline and moved side-to-side without any 

difficulty.  No dysarthria is noted.  


Motor: Gait is deferred.  The strength is 4+ over left upper extremity and 3+ 

over the left lower extremity otherwise 5/5 over the right.    .


Sensation: Sensation is decreased over the left side to light touch.  


Reflexes (right/left): 3+ over the left side while 2+ over the right side.


Plantars is upgoin over the left and mute over the right.   








- Labs


CBC & Chem 7: 


                                 02/17/21 06:53





                                 02/17/21 06:53


Labs: 


                  Abnormal Lab Results - Last 24 Hours (Table)











  02/16/21 02/17/21 02/17/21 Range/Units





  20:58 06:53 06:53 


 


RBC   3.44 L   (3.80-5.40)  m/uL


 


Hgb   10.3 L   (11.4-16.0)  gm/dL


 


Hct   31.1 L   (34.0-46.0)  %


 


Sodium    136 L  (137-145)  mmol/L


 


Carbon Dioxide    19 L  (22-30)  mmol/L


 


BUN    50 H  (7-17)  mg/dL


 


Creatinine    9.63 H*  (0.52-1.04)  mg/dL


 


POC Glucose (mg/dL)  110 H    (75-99)  mg/dL


 


Phosphorus    6.9 H  (2.5-4.5)  mg/dL














  02/17/21 02/17/21 Range/Units





  11:29 16:50 


 


RBC    (3.80-5.40)  m/uL


 


Hgb    (11.4-16.0)  gm/dL


 


Hct    (34.0-46.0)  %


 


Sodium    (137-145)  mmol/L


 


Carbon Dioxide    (22-30)  mmol/L


 


BUN    (7-17)  mg/dL


 


Creatinine    (0.52-1.04)  mg/dL


 


POC Glucose (mg/dL)  399 H  170 H  (75-99)  mg/dL


 


Phosphorus    (2.5-4.5)  mg/dL














Assessment and Plan


Assessment: 


50-year-old woman with history of epilepsy, status post VNS that presents for br

eak-through seizure on 02/12/2021 then was discharged and presents back on 

02/14/2021 for seizure.





Long-standing history of epilepsy, with a breakthrough seizure


History of CVA with left hemiparesis


End-stage renal disease on hemodialysis


Diabetes


Hypertension








Plan: 





* I decreased Keppra from 1000mg to 500mg 1 tab bid with post dialysis 500mg 

  (M,W,F) since it seems high dose for ESRD and this was verfied with inpatient 

  pharmacy as well (was modified on 02/15/21 by myself).


* Continue 150mg 1 tab bid (increased from 100mg 1 tab bid) with post dialysis 

  100mg (M,W,F) 


* To continue home dose Clobazam 20mg 1 tab bid (we don't have medication 

  inpatient.  I asked the nurse to contact the patient's  to see if he 

  can get her Clobazam medication to the hospital.


* Per the patient  he stated that the what ever the bottles that the the 

  patient came in with R the medication she is on.  Upon checking her bag 

  mentioned that she is on Keppra 1 THOUSAND milligrams 1 tablet twice a day 

  other than that there is no other seizure medication at.  Upon asking the 

  patient's the patient's  if she was on Vimpat he stated yes and said sh

  e is on 100 mg 1 tablet twice a day and upon asking her if she was on clobazam

  he said yes but he didn't know the dose.  I felt the patient the  does 

  not know exactly what the patient's medication or and her doses are not sure 

  of the patient was missing any medications at home or not.


* Keppra level is 40 (therapeutic) on 02-14-21


* An EEG is not warranted since patient is back to baseline and has known 

  history of epilepsy and had EEG in our facility in past.


* Continue seizure pre-caution.


* Continue Plavix 75 mg daily and Lipitor 20 mg a daily for stroke prophylaxis.


* Of note I contacted [facility] neurology Department on 02/16/21 

  and I spoke with a  regarding the the patient and they stated that

  the patient was seen in the hospital in October 2020 and the last time the 

  patient is seen in the office was in July 2020.  On her last hospital visit in

  October 2020 she was the discharged on Vimpat 100 mg 1 tablet twice a day, 

  Keppra 500 mg 1 tablet twice a day with an additional dose of 500 mg after 

  dialysis on Monday was in Friday and on for the 20 mg 1 tablet twice a day.  

  It is a possibility that the patient had an increase of medication that was 

  directly sent to the pharmacy.


* Patient to  follow-up with her neurologist within a week as outpatient ( Dr. Symone hedrick at [facility]).





The plan was discussed with the patient's nurse.





Michele Merritt MD


Neuro-Hospitalist. 














Time with Patient: Less than 30

## 2021-02-17 NOTE — P.PN
Subjective





Patient is seen in follow-up for end-stage renal disease.  She is maintained on 

hemodialysis on Monday Wednesday Friday schedule.  Currently resting in bed.  

Per nurse, vision was more lethargic yesterday but appears back to baseline this

morning.





Vital signs are stable.


General: The patient appeared well nourished and normally developed. 


HEENT: Head exam is unremarkable. Neck is without jugular venous distension.


LUNGS: Breath sounds decreased.


HEART: Rate and Rhythm are regular. 


ABDOMEN: Soft, nontender.  


EXTREMITITES: No edema.





Objective





- Vital Signs


Vital signs: 


                                   Vital Signs











Temp  97.9 F   02/17/21 08:00


 


Pulse  79   02/17/21 08:00


 


Resp  18   02/17/21 08:00


 


BP  148/71   02/17/21 08:00


 


Pulse Ox  99   02/17/21 08:00








                                 Intake & Output











 02/16/21 02/17/21 02/17/21





 18:59 06:59 18:59


 


Output Total 450 151 


 


Balance -450 -151 


 


Output:   


 


  Urine 450 150 


 


  Stool  1 


 


Other:   


 


  Voiding Method External Catheter External Catheter 














- Labs


CBC & Chem 7: 


                                 02/17/21 06:53





                                 02/17/21 06:53


Labs: 


                  Abnormal Lab Results - Last 24 Hours (Table)











  02/16/21 02/16/21 02/16/21 Range/Units





  06:32 11:24 16:39 


 


RBC     (3.80-5.40)  m/uL


 


Hgb     (11.4-16.0)  gm/dL


 


Hct     (34.0-46.0)  %


 


Sodium     (137-145)  mmol/L


 


Carbon Dioxide     (22-30)  mmol/L


 


BUN     (7-17)  mg/dL


 


Creatinine     (0.52-1.04)  mg/dL


 


POC Glucose (mg/dL)   193 H  208 H  (75-99)  mg/dL


 


Hemoglobin A1c  7.0 H    (4.0-6.0)  %


 


Phosphorus     (2.5-4.5)  mg/dL














  02/16/21 02/17/21 02/17/21 Range/Units





  20:58 06:53 06:53 


 


RBC   3.44 L   (3.80-5.40)  m/uL


 


Hgb   10.3 L   (11.4-16.0)  gm/dL


 


Hct   31.1 L   (34.0-46.0)  %


 


Sodium    136 L  (137-145)  mmol/L


 


Carbon Dioxide    19 L  (22-30)  mmol/L


 


BUN    50 H  (7-17)  mg/dL


 


Creatinine    9.63 H*  (0.52-1.04)  mg/dL


 


POC Glucose (mg/dL)  110 H    (75-99)  mg/dL


 


Hemoglobin A1c     (4.0-6.0)  %


 


Phosphorus    6.9 H  (2.5-4.5)  mg/dL














Assessment and Plan


Plan: 





Assessment:


1.  End-stage renal disease maintained on hemodialysis on Monday Wednesday Friday schedule.


2.  Seizure disorder.  Neurology following.  


3.  Metabolic acidosis secondary to chronic kidney disease.  Expect improvement 

postdialysis.


4.  Chronic kidney disease mineral bone disease maintained on Renvela.  P

hosphorus 5.4.


5.  Diabetes mellitus.


6.  Hypertension with chronic kidney disease.  Stable.





Plan:


Hemodialysis today.

## 2021-02-17 NOTE — XR
EXAMINATION TYPE: XR chest 2V

 

DATE OF EXAM: 2/17/2021

 

COMPARISON: 2/16/2021

 

HISTORY: Shortness of breath

 

TECHNIQUE:  Frontal and lateral views of the chest are obtained.

 

FINDINGS:

 

Scattered senescent parenchymal changes noted. 

 

No evidence for infiltrate. No evidence for atelectasis.

 

Heart size is stable.

 

Mediastinal structures are stable and grossly unremarkable.

 

No evidence for hilar prominence.

 

Degenerative changes dorsal spine. 

 

IMPRESSION:

1. No evidence for acute pulmonary disease.

## 2021-02-18 VITALS
SYSTOLIC BLOOD PRESSURE: 138 MMHG | HEART RATE: 87 BPM | DIASTOLIC BLOOD PRESSURE: 74 MMHG | TEMPERATURE: 99.1 F | RESPIRATION RATE: 18 BRPM

## 2021-02-18 LAB
ALBUMIN SERPL-MCNC: 3.4 G/DL (ref 3.5–5)
ALBUMIN/GLOB SERPL: 1.2 {RATIO}
ALP SERPL-CCNC: 74 U/L (ref 38–126)
ALT SERPL-CCNC: 10 U/L (ref 4–34)
ANION GAP SERPL CALC-SCNC: 11 MMOL/L
AST SERPL-CCNC: 16 U/L (ref 14–36)
BASOPHILS # BLD AUTO: 0.1 K/UL (ref 0–0.2)
BASOPHILS NFR BLD AUTO: 1 %
BUN SERPL-SCNC: 28 MG/DL (ref 7–17)
CALCIUM SPEC-MCNC: 9.3 MG/DL (ref 8.4–10.2)
CHLORIDE SERPL-SCNC: 103 MMOL/L (ref 98–107)
CO2 SERPL-SCNC: 23 MMOL/L (ref 22–30)
EOSINOPHIL # BLD AUTO: 0.2 K/UL (ref 0–0.7)
EOSINOPHIL NFR BLD AUTO: 4 %
ERYTHROCYTE [DISTWIDTH] IN BLOOD BY AUTOMATED COUNT: 3.26 M/UL (ref 3.8–5.4)
ERYTHROCYTE [DISTWIDTH] IN BLOOD: 14.3 % (ref 11.5–15.5)
GLOBULIN SER CALC-MCNC: 2.9 G/DL
GLUCOSE BLD-MCNC: 114 MG/DL (ref 75–99)
GLUCOSE BLD-MCNC: 185 MG/DL (ref 75–99)
GLUCOSE SERPL-MCNC: 95 MG/DL (ref 74–99)
HCT VFR BLD AUTO: 29.7 % (ref 34–46)
HGB BLD-MCNC: 10 GM/DL (ref 11.4–16)
LYMPHOCYTES # SPEC AUTO: 2.5 K/UL (ref 1–4.8)
LYMPHOCYTES NFR SPEC AUTO: 43 %
MAGNESIUM SPEC-SCNC: 2 MG/DL (ref 1.6–2.3)
MCH RBC QN AUTO: 30.6 PG (ref 25–35)
MCHC RBC AUTO-ENTMCNC: 33.6 G/DL (ref 31–37)
MCV RBC AUTO: 91.3 FL (ref 80–100)
MONOCYTES # BLD AUTO: 0.4 K/UL (ref 0–1)
MONOCYTES NFR BLD AUTO: 6 %
NEUTROPHILS # BLD AUTO: 2.6 K/UL (ref 1.3–7.7)
NEUTROPHILS NFR BLD AUTO: 45 %
PLATELET # BLD AUTO: 213 K/UL (ref 150–450)
POTASSIUM SERPL-SCNC: 4.4 MMOL/L (ref 3.5–5.1)
PROT SERPL-MCNC: 6.3 G/DL (ref 6.3–8.2)
SODIUM SERPL-SCNC: 137 MMOL/L (ref 137–145)
WBC # BLD AUTO: 5.9 K/UL (ref 3.8–10.6)

## 2021-02-18 RX ADMIN — LACOSAMIDE SCH MG: 150 TABLET, FILM COATED ORAL at 09:21

## 2021-02-18 RX ADMIN — SEVELAMER CARBONATE SCH MG: 800 TABLET, FILM COATED ORAL at 07:26

## 2021-02-18 RX ADMIN — SEVELAMER CARBONATE SCH MG: 800 TABLET, FILM COATED ORAL at 12:34

## 2021-02-18 RX ADMIN — INSULIN ASPART SCH: 100 INJECTION, SOLUTION INTRAVENOUS; SUBCUTANEOUS at 07:19

## 2021-02-18 RX ADMIN — HEPARIN SODIUM SCH: 5000 INJECTION, SOLUTION INTRAVENOUS; SUBCUTANEOUS at 15:02

## 2021-02-18 RX ADMIN — HEPARIN SODIUM SCH UNIT: 5000 INJECTION, SOLUTION INTRAVENOUS; SUBCUTANEOUS at 07:26

## 2021-02-18 RX ADMIN — CLOPIDOGREL BISULFATE SCH MG: 75 TABLET ORAL at 09:22

## 2021-02-18 RX ADMIN — ASPIRIN 81 MG CHEWABLE TABLET SCH MG: 81 TABLET CHEWABLE at 09:21

## 2021-02-18 RX ADMIN — PANTOPRAZOLE SODIUM SCH MG: 40 TABLET, DELAYED RELEASE ORAL at 07:26

## 2021-02-18 RX ADMIN — METOPROLOL TARTRATE SCH MG: 25 TABLET, FILM COATED ORAL at 09:21

## 2021-02-18 RX ADMIN — CITALOPRAM HYDROBROMIDE SCH MG: 10 TABLET ORAL at 09:21

## 2021-02-18 RX ADMIN — INSULIN ASPART SCH UNIT: 100 INJECTION, SOLUTION INTRAVENOUS; SUBCUTANEOUS at 12:34

## 2021-02-18 NOTE — P.PN
Subjective


Progress Note Date: 02/18/21


The patient was seen at bedside and the she stated that she's feeling about the 

same today as the last couple days.  No further seizures witnessed by nursing 

staff or any episodes of unresponsiveness.








Objective





- Vital Signs


Vital signs: 


                                   Vital Signs











Temp  99.1 F   02/18/21 07:15


 


Pulse  87   02/18/21 07:15


 


Resp  18   02/18/21 07:15


 


BP  138/74   02/18/21 07:15


 


Pulse Ox  99   02/18/21 07:15








                                 Intake & Output











 02/17/21 02/18/21 02/18/21





 18:59 06:59 18:59


 


Output Total 1200 200 


 


Balance -1200 -200 


 


Output:   


 


  Urine 200 200 


 


  Hemodialysis 1000  


 


Other:   


 


  Voiding Method External Catheter  


 


  # Bowel Movements 1 1 














- Exam


GENERAL: The patient is lying in bed and is not in acute distress.  Seems more 

fatigue today.





NEUROLOGICAL:


Higher mental function: The patient is awake, alert, oriented to self, place and

time.  Patient is following simple commands but at few times there is delay .  

No aphasia and no neglect.


Cranial nerves: The pupils are round, equal and reactive to light and 

accommodation.  Visual fields are full to confrontation throughout.  Extraocular

movement is intact no nystagmus is noted.  Facial sensation is normal to touch 

throughout.  The facial strength is normal throughout.  Hearing is normal 

bilaterally to hand rub.  Tongue is midline and moved side-to-side without any 

difficulty.  No dysarthria is noted.  


Motor: Gait is deferred.  The strength is 4+ over left upper extremity and 3+ 

over the left lower extremity otherwise 5/5 over the right.    .


Sensation: Sensation is decreased over the left side to light touch.  


Reflexes (right/left): 3+ over the left side while 2+ over the right side.


Plantars is upgoin over the left and mute over the right.   








- Labs


CBC & Chem 7: 


                                 02/18/21 05:26





                                 02/18/21 05:26


Labs: 


                  Abnormal Lab Results - Last 24 Hours (Table)











  02/17/21 02/17/21 02/18/21 Range/Units





  16:50 20:27 05:26 


 


RBC    3.26 L  (3.80-5.40)  m/uL


 


Hgb    10.0 L  (11.4-16.0)  gm/dL


 


Hct    29.7 L  (34.0-46.0)  %


 


BUN     (7-17)  mg/dL


 


Creatinine     (0.52-1.04)  mg/dL


 


POC Glucose (mg/dL)  170 H  165 H   (75-99)  mg/dL


 


Phosphorus     (2.5-4.5)  mg/dL


 


Albumin     (3.5-5.0)  g/dL














  02/18/21 02/18/21 02/18/21 Range/Units





  05:26 06:48 11:27 


 


RBC     (3.80-5.40)  m/uL


 


Hgb     (11.4-16.0)  gm/dL


 


Hct     (34.0-46.0)  %


 


BUN  28 H    (7-17)  mg/dL


 


Creatinine  6.07 H    (0.52-1.04)  mg/dL


 


POC Glucose (mg/dL)   114 H  185 H  (75-99)  mg/dL


 


Phosphorus  5.4 H    (2.5-4.5)  mg/dL


 


Albumin  3.4 L    (3.5-5.0)  g/dL














Assessment and Plan


Assessment: 


50-year-old woman with history of epilepsy, status post VNS that presents for 

break-through seizure on 02/12/2021 then was discharged and presents back on 

02/14/2021 for seizure.





Long-standing history of epilepsy, with a breakthrough seizure


History of CVA with left hemiparesis


End-stage renal disease on hemodialysis


Diabetes


Hypertension








Plan: 





* I decreased Keppra from 1000mg to 500mg 1 tab bid with post dialysis 500mg 

  (M,W,F) since it seems high dose for ESRD and this was verfied with inpatient 

  pharmacy as well (was modified on 02/15/21 by myself).


* Continue 150mg 1 tab bid (increased from 100mg 1 tab bid) with post dialysis 

  100mg (M,W,F) 


* To continue home dose Clobazam 20mg 1 tab bid (we don't have medication 

  inpatient.  I asked the nurse to contact the patient's  to see if he 

  can get her Clobazam medication to the hospital.


* Per the patient  he stated that the what ever the bottles that the the 

  patient came in with R the medication she is on.  Upon checking her bag 

  mentioned that she is on Keppra 1 THOUSAND milligrams 1 tablet twice a day 

  other than that there is no other seizure medication at.  Upon asking the 

  patient's the patient's  if she was on Vimpat he stated yes and said 

  she is on 100 mg 1 tablet twice a day and upon asking her if she was on 

  clobazam he said yes but he didn't know the dose.  I felt the patient the 

   does not know exactly what the patient's medication or and her doses 

  are not sure of the patient was missing any medications at home or not.


* Keppra level is 40 (therapeutic) on 02-14-21


* An EEG is not warranted since patient is back to baseline and has known 

  history of epilepsy and had EEG in our facility in past.


* Continue seizure pre-caution.


* Continue Plavix 75 mg daily and Lipitor 20 mg a daily for stroke prophylaxis.


* Of note I contacted [facility] neurology Department on 02/16/21 

  and I spoke with a  regarding the the patient and they stated that

  the patient was seen in the hospital in October 2020 and the last time the 

  patient is seen in the office was in July 2020.  On her last hospital visit in

  October 2020 she was the discharged on Vimpat 100 mg 1 tablet twice a day, 

  Keppra 500 mg 1 tablet twice a day with an additional dose of 500 mg after 

  dialysis on Monday was in Friday and on for the 20 mg 1 tablet twice a day.  

  It is a possibility that the patient had an increase of medication that was 

  directly sent to the pharmacy.


* Patient to  follow-up with her neurologist within a week as outpatient ( Dr. Symone hedrick at [facility]).





No further neurological work-up. 





The plan was discussed with the patient's nurse.





Michele Merritt MD


Neuro-Hospitalist. 














Time with Patient: Less than 30

## 2021-02-18 NOTE — P.PN
Subjective





Patient is seen in follow-up for end-stage renal disease.  She is maintained on 

hemodialysis on Monday Wednesday Friday schedule.  Mentation appears to be at 

baseline.  No seizures yesterday her overnight.  No problems with dialysis 

yesterday.





Vital signs are stable.


General: The patient appeared well nourished and normally developed. 


HEENT: Head exam is unremarkable. Neck is without jugular venous distension.


LUNGS: Breath sounds decreased.


HEART: Rate and Rhythm are regular. 


ABDOMEN: Soft, nontender.  


EXTREMITITES: No edema.





Objective





- Vital Signs


Vital signs: 


                                   Vital Signs











Temp  99.1 F   02/18/21 07:15


 


Pulse  87   02/18/21 07:15


 


Resp  18   02/18/21 07:15


 


BP  138/74   02/18/21 07:15


 


Pulse Ox  99   02/18/21 07:15








                                 Intake & Output











 02/17/21 02/18/21 02/18/21





 18:59 06:59 18:59


 


Output Total 1200 200 


 


Balance -1200 -200 


 


Output:   


 


  Urine 200 200 


 


  Hemodialysis 1000  


 


Other:   


 


  Voiding Method External Catheter  


 


  # Bowel Movements 1 1 














- Labs


CBC & Chem 7: 


                                 02/18/21 05:26





                                 02/18/21 05:26


Labs: 


                  Abnormal Lab Results - Last 24 Hours (Table)











  02/17/21 02/17/21 02/17/21 Range/Units





  11:29 16:50 20:27 


 


RBC     (3.80-5.40)  m/uL


 


Hgb     (11.4-16.0)  gm/dL


 


Hct     (34.0-46.0)  %


 


BUN     (7-17)  mg/dL


 


Creatinine     (0.52-1.04)  mg/dL


 


POC Glucose (mg/dL)  399 H  170 H  165 H  (75-99)  mg/dL


 


Phosphorus     (2.5-4.5)  mg/dL


 


Albumin     (3.5-5.0)  g/dL














  02/18/21 02/18/21 02/18/21 Range/Units





  05:26 05:26 06:48 


 


RBC  3.26 L    (3.80-5.40)  m/uL


 


Hgb  10.0 L    (11.4-16.0)  gm/dL


 


Hct  29.7 L    (34.0-46.0)  %


 


BUN   28 H   (7-17)  mg/dL


 


Creatinine   6.07 H   (0.52-1.04)  mg/dL


 


POC Glucose (mg/dL)    114 H  (75-99)  mg/dL


 


Phosphorus   5.4 H   (2.5-4.5)  mg/dL


 


Albumin   3.4 L   (3.5-5.0)  g/dL














Assessment and Plan


Plan: 





Assessment:


1.  End-stage renal disease maintained on hemodialysis on Monday Wednesday Friday schedule.


2.  Seizure disorder.  Neurology following.  


3.  Metabolic acidosis secondary to chronic kidney disease.  Improved 

postdialysis.


4.  Chronic kidney disease mineral bone disease maintained on Renvela.  

Phosphorus 5.4.


5.  Diabetes mellitus.


6.  Hypertension with chronic kidney disease.  Stable.





Plan:


Hemodialysis tomorrow.


Anticipate discharge soon.

## 2021-02-18 NOTE — P.DS
Providers


Date of admission: 


02/16/21 17:38





Expected date of discharge: 02/18/21


Attending physician: 


Saturnino Castillo





Consults: 





                                        





02/14/21 23:57


Consult Physician Routine 


   Consulting Provider: Vanessa Copeland


   Consult Reason/Comments: Seizures, prolonged post-ictal state


   Do you want consulting provider notified?: Yes


Consult Physician Routine 


   Consulting Provider: Esther Erickson


   Consult Reason/Comments: Dialysis


   Do you want consulting provider notified?: Yes











Primary care physician: 


Saturnino Castillo





Hospital Course: 





50-year-old  female was admitted to the hospital for recurrent seizures

patient received antiepileptics and benzodiazepines for breakthrough seizures in

the emergency department. Consultation with neurology regarding seizures and 

comorbidity of kidney failure with dialysis  adjustments to antiepileptics have

been made per neurology's recommendations. Patient was at neurological baseline 

and discharge, able to ambulate with two assist. Patient underwent two rounds of

dialysis on Monday and Wednesday  with improvement of BUN and creatinine. 

Patient discharged with guarded prognosis to follow up with primary care one to 

two days, neurology within one to two weeks, and nephrology within one week.


Assessment: 


Seizures


history of chest pain/angina


history of CVA/TIA left-sided weakness


diabetes mellitus type II non-insulin-dependent


hypertension


end-stage renal failure dialysis Monday Wednesday Friday


hypothyroidism


bipolar disorder


diabetic neuropathy


drop foot left


former smoker


full code


Health Concerns: 


Neurological baseline


noncompliance with medication therapy


Pertinent Studies: 


Serial chest x-rays  no acute changes





Procedures: 





None noted


Patient Condition at Discharge: Serious





Plan - Discharge Summary


Discharge Rx Participant: Yes


New Discharge Prescriptions: 


New


   levETIRAcetam [Keppra] 1,000 mg PO BID  tab


   levETIRAcetam [Keppra] 1,000 mg PO MoWeFr  tab


   Lacosamide [Vimpat] 100 mg PO MoWeFr #20 tablet


   Lacosamide [Vimpat] 150 mg PO BID #60 tablet





Continue


   Insulin Glargine [Lantus] 12 unit SQ HS


   INSULIN LISPRO (humaLOG) [humaLOG] See Protocol SQ ACHS


   Aspirin [Adult Low Dose Aspirin EC] 81 mg PO DAILY #30 tablet.


   Pantoprazole [Protonix] 40 mg PO AC-BRKFST #30 tablet.


   Citalopram Hydrobromide [CeleXA] 10 mg PO DAILY #30 tab


   Atorvastatin [Lipitor] 20 mg PO DAILY #30 tab


   Metoprolol Tartrate 25 mg PO BID #60 tab


   amLODIPine [Norvasc] 10 mg PO DAILY #30 tab


   Clopidogrel [Plavix] 75 mg PO DAILY #30 tab


   Sevelamer [Renvela] 800 mg PO AC-TID #90 tab


   cloBAZam [Clobazam] 20 mg PO BID





Discontinued


   Lacosamide [Vimpat] 100 mg PO AS DIRECTED


   levETIRAcetam [Keppra] 1,000 mg PO BID #60 tab


Discharge Medication List





Insulin Glargine [Lantus] 12 unit SQ HS 06/04/20 [History]


INSULIN LISPRO (humaLOG) [humaLOG] See Protocol SQ ACHS 02/12/21 [History]


Aspirin [Adult Low Dose Aspirin EC] 81 mg PO DAILY #30 tablet. 02/13/21 [Rx]


Atorvastatin [Lipitor] 20 mg PO DAILY #30 tab 02/13/21 [Rx]


Citalopram Hydrobromide [CeleXA] 10 mg PO DAILY #30 tab 02/13/21 [Rx]


Clopidogrel [Plavix] 75 mg PO DAILY #30 tab 02/13/21 [Rx]


Metoprolol Tartrate 25 mg PO BID #60 tab 02/13/21 [Rx]


Pantoprazole [Protonix] 40 mg PO AC-BRKFST #30 tablet. 02/13/21 [Rx]


Sevelamer [Renvela] 800 mg PO AC-TID #90 tab 02/13/21 [Rx]


amLODIPine [Norvasc] 10 mg PO DAILY #30 tab 02/13/21 [Rx]


cloBAZam [Clobazam] 20 mg PO BID 02/15/21 [History]


Lacosamide [Vimpat] 100 mg PO MoWeFr #20 tablet 02/16/21 [Rx]


Lacosamide [Vimpat] 150 mg PO BID #60 tablet 02/16/21 [Rx]


levETIRAcetam [Keppra] 1,000 mg PO BID  tab 02/16/21 [Rx]


levETIRAcetam [Keppra] 1,000 mg PO MoWeFr  tab 02/16/21 [Rx]








Follow up Appointment(s)/Referral(s): 


Saturnino Castillo MD [Primary Care Provider] - 02/22/21 1:00 pm (with Melba)


Patient Instructions/Handouts:  Dialysis Diet (DC), Recurrent Seizures in Adults

(DC), End Stage Kidney Disease (DC), Hemodialysis (DC)


Discharge Disposition: HOME SELF-CARE

## 2021-02-18 NOTE — P.PN
Subjective


Progress Note Date: 02/18/21


Principal diagnosis: 


Seizures








Evaluated 50-year-old female this a.m., resting comfortably in bed. Patient 

denies fever, chills, chest pain, palpitations, abdominal pain, nausea, v

omiting, and diarrhea.Patient on regimen of Keppra, Vimpat, and clobazam.














Objective





- Vital Signs


Vital signs: 


                                   Vital Signs











Temp  99.1 F   02/18/21 07:15


 


Pulse  87   02/18/21 07:15


 


Resp  18   02/18/21 07:15


 


BP  138/74   02/18/21 07:15


 


Pulse Ox  99   02/18/21 07:15








                                 Intake & Output











 02/17/21 02/18/21 02/18/21





 18:59 06:59 18:59


 


Output Total 1200 200 


 


Balance -1200 -200 


 


Output:   


 


  Urine 200 200 


 


  Hemodialysis 1000  


 


Other:   


 


  Voiding Method External Catheter  


 


  # Bowel Movements 1 1 














- Constitutional


General appearance: Present: cooperative





- EENT


Eyes: Present: PERRLA


Ears: bilateral: normal





- Neck


Neck: Present: normal ROM


Carotids: bilateral: upstroke normal


Thyroid: bilateral: normal size





- Respiratory


Respiratory: bilateral: CTA (Anterior and posterior lung fields)





- Cardiovascular


Details: 





Normal sinus rhythm





Heart rate: 78


Rhythm: regular


Heart sounds: normal: S1, S2





- Peripheral pulses


  ** radial pulse


Peripheral Pulses: bilateral: Normal





  ** dorsalis pedis


Peripheral Pulses: bilateral: Normal





- Gastrointestinal


General gastrointestinal: Present: normal bowel sounds





- Integumentary


Integumentary: Present: normal turgor





- Neurologic


Neurologic: Present: CNII-XII intact





- Musculoskeletal


Musculoskeletal: Present: left sided weakness





- Psychiatric


Psychiatric: Present: A&O x's 3, appropriate affect, intact judgment & insight





- Allied health notes


Allied health notes reviewed: nursing





- Labs


CBC & Chem 7: 


                                 02/18/21 05:26





                                 02/18/21 05:26


Labs: 


                  Abnormal Lab Results - Last 24 Hours (Table)











  02/17/21 02/17/21 02/17/21 Range/Units





  11:29 16:50 20:27 


 


RBC     (3.80-5.40)  m/uL


 


Hgb     (11.4-16.0)  gm/dL


 


Hct     (34.0-46.0)  %


 


BUN     (7-17)  mg/dL


 


Creatinine     (0.52-1.04)  mg/dL


 


POC Glucose (mg/dL)  399 H  170 H  165 H  (75-99)  mg/dL


 


Phosphorus     (2.5-4.5)  mg/dL


 


Albumin     (3.5-5.0)  g/dL














  02/18/21 02/18/21 02/18/21 Range/Units





  05:26 05:26 06:48 


 


RBC  3.26 L    (3.80-5.40)  m/uL


 


Hgb  10.0 L    (11.4-16.0)  gm/dL


 


Hct  29.7 L    (34.0-46.0)  %


 


BUN   28 H   (7-17)  mg/dL


 


Creatinine   6.07 H   (0.52-1.04)  mg/dL


 


POC Glucose (mg/dL)    114 H  (75-99)  mg/dL


 


Phosphorus   5.4 H   (2.5-4.5)  mg/dL


 


Albumin   3.4 L   (3.5-5.0)  g/dL














Assessment and Plan


Assessment: 


Seizure disordercontinue home medications; continue recommendations and 

treatment plan from neurology


End-stage kidney disease dialysis dependent Monday Wednesday Fridaycontinue 

consultation with nephrology for recommendations and treatment plan


History of CVA in the past with left-sided hemiparesis


Diabetes mellitus type 2


Hypertension


Hypothyroidism


GERD


Bipolar


Diabetic neuropathy


Continue home medications and recommendations from neurology for seizures


Consultation with nephrology for management of dialysis Monday Wednesday and 

Friday


Continue medical management


Hopeful discharge today


Time with Patient: Greater than 30

## 2021-04-08 ENCOUNTER — HOSPITAL ENCOUNTER (EMERGENCY)
Dept: HOSPITAL 47 - EC | Age: 51
Discharge: HOME | End: 2021-04-08
Payer: MEDICARE

## 2021-04-08 VITALS
DIASTOLIC BLOOD PRESSURE: 64 MMHG | SYSTOLIC BLOOD PRESSURE: 135 MMHG | RESPIRATION RATE: 19 BRPM | HEART RATE: 88 BPM | TEMPERATURE: 98.2 F

## 2021-04-08 DIAGNOSIS — G40.909: Primary | ICD-10-CM

## 2021-04-08 DIAGNOSIS — I12.9: ICD-10-CM

## 2021-04-08 DIAGNOSIS — F31.9: ICD-10-CM

## 2021-04-08 DIAGNOSIS — Z86.73: ICD-10-CM

## 2021-04-08 DIAGNOSIS — Z79.899: ICD-10-CM

## 2021-04-08 DIAGNOSIS — E11.22: ICD-10-CM

## 2021-04-08 DIAGNOSIS — N18.9: ICD-10-CM

## 2021-04-08 DIAGNOSIS — E11.40: ICD-10-CM

## 2021-04-08 DIAGNOSIS — R74.02: ICD-10-CM

## 2021-04-08 DIAGNOSIS — Z87.891: ICD-10-CM

## 2021-04-08 DIAGNOSIS — Z79.4: ICD-10-CM

## 2021-04-08 DIAGNOSIS — Z88.0: ICD-10-CM

## 2021-04-08 DIAGNOSIS — Z99.2: ICD-10-CM

## 2021-04-08 LAB
ALBUMIN SERPL-MCNC: 4.3 G/DL (ref 3.5–5)
ALP SERPL-CCNC: 122 U/L (ref 38–126)
ALT SERPL-CCNC: 13 U/L (ref 4–34)
ANION GAP SERPL CALC-SCNC: 12 MMOL/L
AST SERPL-CCNC: 26 U/L (ref 14–36)
BASOPHILS # BLD AUTO: 0 K/UL (ref 0–0.2)
BASOPHILS NFR BLD AUTO: 1 %
BUN SERPL-SCNC: 14 MG/DL (ref 7–17)
CALCIUM SPEC-MCNC: 9.8 MG/DL (ref 8.4–10.2)
CHLORIDE SERPL-SCNC: 93 MMOL/L (ref 98–107)
CO2 SERPL-SCNC: 29 MMOL/L (ref 22–30)
EOSINOPHIL # BLD AUTO: 0.1 K/UL (ref 0–0.7)
EOSINOPHIL NFR BLD AUTO: 1 %
ERYTHROCYTE [DISTWIDTH] IN BLOOD BY AUTOMATED COUNT: 4.01 M/UL (ref 3.8–5.4)
ERYTHROCYTE [DISTWIDTH] IN BLOOD: 14.6 % (ref 11.5–15.5)
GLUCOSE BLD-MCNC: 170 MG/DL (ref 75–99)
GLUCOSE SERPL-MCNC: 168 MG/DL (ref 74–99)
HCT VFR BLD AUTO: 35.2 % (ref 34–46)
HGB BLD-MCNC: 12.2 GM/DL (ref 11.4–16)
LYMPHOCYTES # SPEC AUTO: 1.1 K/UL (ref 1–4.8)
LYMPHOCYTES NFR SPEC AUTO: 17 %
MAGNESIUM SPEC-SCNC: 2.1 MG/DL (ref 1.6–2.3)
MCH RBC QN AUTO: 30.3 PG (ref 25–35)
MCHC RBC AUTO-ENTMCNC: 34.6 G/DL (ref 31–37)
MCV RBC AUTO: 87.7 FL (ref 80–100)
MONOCYTES # BLD AUTO: 0.4 K/UL (ref 0–1)
MONOCYTES NFR BLD AUTO: 7 %
NEUTROPHILS # BLD AUTO: 5 K/UL (ref 1.3–7.7)
NEUTROPHILS NFR BLD AUTO: 74 %
PLATELET # BLD AUTO: 260 K/UL (ref 150–450)
POTASSIUM SERPL-SCNC: 3.9 MMOL/L (ref 3.5–5.1)
PROT SERPL-MCNC: 7.2 G/DL (ref 6.3–8.2)
SODIUM SERPL-SCNC: 134 MMOL/L (ref 137–145)
WBC # BLD AUTO: 6.7 K/UL (ref 3.8–10.6)

## 2021-04-08 PROCEDURE — 80177 DRUG SCRN QUAN LEVETIRACETAM: CPT

## 2021-04-08 PROCEDURE — 36415 COLL VENOUS BLD VENIPUNCTURE: CPT

## 2021-04-08 PROCEDURE — 96374 THER/PROPH/DIAG INJ IV PUSH: CPT

## 2021-04-08 PROCEDURE — 84100 ASSAY OF PHOSPHORUS: CPT

## 2021-04-08 PROCEDURE — 83605 ASSAY OF LACTIC ACID: CPT

## 2021-04-08 PROCEDURE — 96375 TX/PRO/DX INJ NEW DRUG ADDON: CPT

## 2021-04-08 PROCEDURE — 96372 THER/PROPH/DIAG INJ SC/IM: CPT

## 2021-04-08 PROCEDURE — 96361 HYDRATE IV INFUSION ADD-ON: CPT

## 2021-04-08 PROCEDURE — 85025 COMPLETE CBC W/AUTO DIFF WBC: CPT

## 2021-04-08 PROCEDURE — 83735 ASSAY OF MAGNESIUM: CPT

## 2021-04-08 PROCEDURE — 99284 EMERGENCY DEPT VISIT MOD MDM: CPT

## 2021-04-08 PROCEDURE — 80053 COMPREHEN METABOLIC PANEL: CPT

## 2021-04-08 PROCEDURE — 93005 ELECTROCARDIOGRAM TRACING: CPT

## 2021-04-08 NOTE — ED
Seizure HPI





- General


Chief Complaint: Seizure


Stated Complaint: Seizure


Time Seen by Provider: 21 06:07


Source: patient, EMS


Mode of arrival: EMS


Limitations: no limitations





- History of Present Illness


Initial Comments: 


50-year-old female with hx of CKD (MWF dialysis) and epilepsy presenting to the 

emergency department with a chief complaint of seizure.  I spoke with the 

patient's  over the phone.  States the patient has been vomiting since 

Saturday and supposedly had 2 seizures earlier this morning.   states 

that he noticed second seizure where she made "seizure like sounds" while they 

were sleeping in bed.  Afterwards the patient developed tonic-clonic movements 

in bilateral upper and lower extremities.   states this lasted for 

approximately 10 seconds followed by postictal state.  States that he took her 

to another emergency department several days ago for evaluation and she was 

discharged home.








- Related Data


                                Home Medications











 Medication  Instructions  Recorded  Confirmed


 


Insulin Glargine [Lantus] 12 unit SQ HS 20


 


INSULIN LISPRO (humaLOG) [humaLOG] See Protocol SQ ACHS 21


 


cloBAZam [Clobazam] 20 mg PO BID 02/15/21 02/15/21








                                  Previous Rx's











 Medication  Instructions  Recorded


 


Aspirin [Adult Low Dose Aspirin EC] 81 mg PO DAILY #30 tablet. 21


 


Atorvastatin [Lipitor] 20 mg PO DAILY #30 tab 21


 


Citalopram Hydrobromide [CeleXA] 10 mg PO DAILY #30 tab 21


 


Clopidogrel [Plavix] 75 mg PO DAILY #30 tab 21


 


Metoprolol Tartrate 25 mg PO BID #60 tab 21


 


Pantoprazole [Protonix] 40 mg PO AC-BRKFST #30 tablet. 21


 


Sevelamer [Renvela] 800 mg PO AC-TID #90 tab 21


 


amLODIPine [Norvasc] 10 mg PO DAILY #30 tab 21


 


Lacosamide [Vimpat] 100 mg PO MoWeFr #20 tablet 21


 


Lacosamide [Vimpat] 150 mg PO BID #60 tablet 21


 


levETIRAcetam [Keppra] 1,000 mg PO BID  tab 21


 


levETIRAcetam [Keppra] 1,000 mg PO MoWeFr  tab 21











                                    Allergies











Allergy/AdvReac Type Severity Reaction Status Date / Time


 


Penicillins Allergy  Itching Verified 21 05:56














Review of Systems


ROS Statement: 


Those systems with pertinent positive or pertinent negative responses have been 

documented in the HPI.





ROS Other: All systems not noted in ROS Statement are negative.





Past Medical History


Past Medical History: Chest Pain / Angina, CVA/TIA, Diabetes Mellitus, 

Hypertension, Renal Disease, Seizure Disorder, Thyroid Disorder


Additional Past Medical History / Comment(s): Dialysis, bipolar, neuropathy, 

baseline orientation to person/place, drop foot left, only able to walk short 

distances with walker or wheelchair


History of Any Multi-Drug Resistant Organisms: None Reported


Past Surgical History:  Section, Tonsillectomy


Additional Past Surgical History / Comment(s): left arm fistula, loop recorder, 

vagus nerve stimulator


Past Anesthesia/Blood Transfusion Reactions: No Reported Reaction


Past Psychological History: Bipolar


Smoking Status: Former smoker


Past Alcohol Use History: Occasional


Past Drug Use History: None Reported





- Past Family History


  ** Father


Family Medical History: Unable to Obtain


Additional Family Medical History / Comment(s): adopted





General Exam


Limitations: no limitations


General appearance: alert, in no apparent distress


Head exam: Present: atraumatic, normocephalic, normal inspection


Eye exam: Present: normal appearance, PERRL, EOMI


Pupils: Present: normal accommodation


ENT exam: Present: normal exam, normal oropharynx (Biting noted on the lateral 

aspect of the tongue), mucous membranes moist, TM's normal bilaterally, normal 

external ear exam


Neck exam: Present: normal inspection, full ROM.  Absent: tenderness


Respiratory exam: Present: normal lung sounds bilaterally.  Absent: respiratory 

distress


Cardiovascular Exam: Present: regular rate, normal rhythm, normal heart sounds


GI/Abdominal exam: Present: soft.  Absent: distended, tenderness, guarding, 

rebound


Extremities exam: Present: normal inspection, full ROM, normal capillary refill.

  Absent: tenderness


Back exam: Present: normal inspection, full ROM.  Absent: tenderness, CVA 

tenderness (R), CVA tenderness (L)


Neurological exam: Present: alert, oriented X3, normal gait


Psychiatric exam: Present: normal affect, normal mood


Skin exam: Present: warm, dry, intact, normal color





Course


                                   Vital Signs











  21





  05:53


 


Temperature 98.7 F


 


Pulse Rate 87


 


Respiratory 18





Rate 


 


Blood Pressure 174/110


 


O2 Sat by Pulse 98





Oximetry 














Medical Decision Making





- Medical Decision Making


50-year-old female with history of epilepsy presents to emergency Department 

with a chief complaint of a seizure.  On physical examination, patient is still 

able to answer questions.  Initially spoke with the  then he also came to

 the emergency department.  There was some biting of the tongue noted likely 

suggesting an actual seizure.  She also had an elevated lactic of 2.2.  CBC 

unremarkable.  CMP reveals a creatinine level of 5.22.  Patient has chronic 

kidney disease and goes to dialysis .  Electrodes are within 

normal limits.  Patient was given antiemetics and IV fluids in the emergency 

department.  Patient has been evaluated multiple times in this emergency 

department.  According to the , the patient has a neurologist out of [facility] in [place].  He states they're currently trying to see him for 

evaluation.  At this time patient can be discharged home.  Case discussed with 

Dr. Shepherd.





- Lab Data


Result diagrams: 


                                 21 06:40





                                 21 06:40


                                   Lab Results











  21 Range/Units





  06:26 06:40 06:40 


 


WBC   6.7   (3.8-10.6)  k/uL


 


RBC   4.01   (3.80-5.40)  m/uL


 


Hgb   12.2   (11.4-16.0)  gm/dL


 


Hct   35.2   (34.0-46.0)  %


 


MCV   87.7   (80.0-100.0)  fL


 


MCH   30.3   (25.0-35.0)  pg


 


MCHC   34.6   (31.0-37.0)  g/dL


 


RDW   14.6   (11.5-15.5)  %


 


Plt Count   260   (150-450)  k/uL


 


MPV   8.5   


 


Neutrophils %   74   %


 


Lymphocytes %   17   %


 


Monocytes %   7   %


 


Eosinophils %   1   %


 


Basophils %   1   %


 


Neutrophils #   5.0   (1.3-7.7)  k/uL


 


Lymphocytes #   1.1   (1.0-4.8)  k/uL


 


Monocytes #   0.4   (0-1.0)  k/uL


 


Eosinophils #   0.1   (0-0.7)  k/uL


 


Basophils #   0.0   (0-0.2)  k/uL


 


Sodium    134 L  (137-145)  mmol/L


 


Potassium    3.9  (3.5-5.1)  mmol/L


 


Chloride    93 L  ()  mmol/L


 


Carbon Dioxide    29  (22-30)  mmol/L


 


Anion Gap    12  mmol/L


 


BUN    14  (7-17)  mg/dL


 


Creatinine    5.22 H  (0.52-1.04)  mg/dL


 


Est GFR (CKD-EPI)AfAm    10  (>60 ml/min/1.73 sqM)  


 


Est GFR (CKD-EPI)NonAf    9  (>60 ml/min/1.73 sqM)  


 


Glucose    168 H  (74-99)  mg/dL


 


POC Glucose (mg/dL)  170 H    (75-99)  mg/dL


 


POC Glu Operater ID  Raz Harp    


 


Plasma Lactic Acid Stan     (0.7-2.0)  mmol/L


 


Calcium    9.8  (8.4-10.2)  mg/dL


 


Phosphorus    3.9  (2.5-4.5)  mg/dL


 


Magnesium    2.1  (1.6-2.3)  mg/dL


 


Total Bilirubin    0.7  (0.2-1.3)  mg/dL


 


AST    26  (14-36)  U/L


 


ALT    13  (4-34)  U/L


 


Alkaline Phosphatase    122  ()  U/L


 


Total Protein    7.2  (6.3-8.2)  g/dL


 


Albumin    4.3  (3.5-5.0)  g/dL














  21 Range/Units





  06:40 


 


WBC   (3.8-10.6)  k/uL


 


RBC   (3.80-5.40)  m/uL


 


Hgb   (11.4-16.0)  gm/dL


 


Hct   (34.0-46.0)  %


 


MCV   (80.0-100.0)  fL


 


MCH   (25.0-35.0)  pg


 


MCHC   (31.0-37.0)  g/dL


 


RDW   (11.5-15.5)  %


 


Plt Count   (150-450)  k/uL


 


MPV   


 


Neutrophils %   %


 


Lymphocytes %   %


 


Monocytes %   %


 


Eosinophils %   %


 


Basophils %   %


 


Neutrophils #   (1.3-7.7)  k/uL


 


Lymphocytes #   (1.0-4.8)  k/uL


 


Monocytes #   (0-1.0)  k/uL


 


Eosinophils #   (0-0.7)  k/uL


 


Basophils #   (0-0.2)  k/uL


 


Sodium   (137-145)  mmol/L


 


Potassium   (3.5-5.1)  mmol/L


 


Chloride   ()  mmol/L


 


Carbon Dioxide   (22-30)  mmol/L


 


Anion Gap   mmol/L


 


BUN   (7-17)  mg/dL


 


Creatinine   (0.52-1.04)  mg/dL


 


Est GFR (CKD-EPI)AfAm   (>60 ml/min/1.73 sqM)  


 


Est GFR (CKD-EPI)NonAf   (>60 ml/min/1.73 sqM)  


 


Glucose   (74-99)  mg/dL


 


POC Glucose (mg/dL)   (75-99)  mg/dL


 


POC Glu Operater ID   


 


Plasma Lactic Acid Stan  2.2 H*  (0.7-2.0)  mmol/L


 


Calcium   (8.4-10.2)  mg/dL


 


Phosphorus   (2.5-4.5)  mg/dL


 


Magnesium   (1.6-2.3)  mg/dL


 


Total Bilirubin   (0.2-1.3)  mg/dL


 


AST   (14-36)  U/L


 


ALT   (4-34)  U/L


 


Alkaline Phosphatase   ()  U/L


 


Total Protein   (6.3-8.2)  g/dL


 


Albumin   (3.5-5.0)  g/dL














Disposition


Clinical Impression: 


 Seizure





Disposition: HOME SELF-CARE


Condition: Stable


Instructions (If sedation given, give patient instructions):  Recurrent Seizures

 in Adults (ED)


Additional Instructions: 


Follow-up with the neurologist.  Return to emergency department if symptoms 

worsen.


Is patient prescribed a controlled substance at d/c from ED?: No


Referrals: 


Saturnino Castillo MD [Primary Care Provider] - 1-2 days


Time of Disposition: 08:44

## 2021-06-09 ENCOUNTER — HOSPITAL ENCOUNTER (EMERGENCY)
Dept: HOSPITAL 47 - EC | Age: 51
Discharge: HOME | End: 2021-06-09
Payer: COMMERCIAL

## 2021-06-09 VITALS — HEART RATE: 76 BPM | SYSTOLIC BLOOD PRESSURE: 158 MMHG | TEMPERATURE: 98.1 F | DIASTOLIC BLOOD PRESSURE: 93 MMHG

## 2021-06-09 VITALS — RESPIRATION RATE: 18 BRPM

## 2021-06-09 DIAGNOSIS — I10: ICD-10-CM

## 2021-06-09 DIAGNOSIS — F31.9: ICD-10-CM

## 2021-06-09 DIAGNOSIS — Z79.4: ICD-10-CM

## 2021-06-09 DIAGNOSIS — Z79.82: ICD-10-CM

## 2021-06-09 DIAGNOSIS — Z79.899: ICD-10-CM

## 2021-06-09 DIAGNOSIS — Z87.891: ICD-10-CM

## 2021-06-09 DIAGNOSIS — Z86.73: ICD-10-CM

## 2021-06-09 DIAGNOSIS — Z88.0: ICD-10-CM

## 2021-06-09 DIAGNOSIS — E11.40: ICD-10-CM

## 2021-06-09 DIAGNOSIS — G40.909: Primary | ICD-10-CM

## 2021-06-09 LAB
ALBUMIN SERPL-MCNC: 4.5 G/DL (ref 3.5–5)
ALP SERPL-CCNC: 115 U/L (ref 38–126)
ALT SERPL-CCNC: 20 U/L (ref 4–34)
ANION GAP SERPL CALC-SCNC: 12 MMOL/L
AST SERPL-CCNC: 35 U/L (ref 14–36)
BASOPHILS # BLD AUTO: 0.1 K/UL (ref 0–0.2)
BASOPHILS NFR BLD AUTO: 1 %
BUN SERPL-SCNC: 16 MG/DL (ref 7–17)
CALCIUM SPEC-MCNC: 9.4 MG/DL (ref 8.4–10.2)
CHLORIDE SERPL-SCNC: 95 MMOL/L (ref 98–107)
CO2 SERPL-SCNC: 30 MMOL/L (ref 22–30)
EOSINOPHIL # BLD AUTO: 0.1 K/UL (ref 0–0.7)
EOSINOPHIL NFR BLD AUTO: 2 %
ERYTHROCYTE [DISTWIDTH] IN BLOOD BY AUTOMATED COUNT: 4.32 M/UL (ref 3.8–5.4)
ERYTHROCYTE [DISTWIDTH] IN BLOOD: 14 % (ref 11.5–15.5)
GLUCOSE SERPL-MCNC: 164 MG/DL (ref 74–99)
HCT VFR BLD AUTO: 36.4 % (ref 34–46)
HGB BLD-MCNC: 12.4 GM/DL (ref 11.4–16)
LYMPHOCYTES # SPEC AUTO: 1.5 K/UL (ref 1–4.8)
LYMPHOCYTES NFR SPEC AUTO: 24 %
MAGNESIUM SPEC-SCNC: 2 MG/DL (ref 1.6–2.3)
MCH RBC QN AUTO: 28.7 PG (ref 25–35)
MCHC RBC AUTO-ENTMCNC: 34 G/DL (ref 31–37)
MCV RBC AUTO: 84.3 FL (ref 80–100)
MONOCYTES # BLD AUTO: 0.3 K/UL (ref 0–1)
MONOCYTES NFR BLD AUTO: 5 %
NEUTROPHILS # BLD AUTO: 4.4 K/UL (ref 1.3–7.7)
NEUTROPHILS NFR BLD AUTO: 68 %
PLATELET # BLD AUTO: 213 K/UL (ref 150–450)
POTASSIUM SERPL-SCNC: 3.9 MMOL/L (ref 3.5–5.1)
PROT SERPL-MCNC: 7.5 G/DL (ref 6.3–8.2)
SODIUM SERPL-SCNC: 137 MMOL/L (ref 137–145)
WBC # BLD AUTO: 6.4 K/UL (ref 3.8–10.6)

## 2021-06-09 PROCEDURE — 85025 COMPLETE CBC W/AUTO DIFF WBC: CPT

## 2021-06-09 PROCEDURE — 96372 THER/PROPH/DIAG INJ SC/IM: CPT

## 2021-06-09 PROCEDURE — 80177 DRUG SCRN QUAN LEVETIRACETAM: CPT

## 2021-06-09 PROCEDURE — 99284 EMERGENCY DEPT VISIT MOD MDM: CPT

## 2021-06-09 PROCEDURE — 83735 ASSAY OF MAGNESIUM: CPT

## 2021-06-09 PROCEDURE — 80053 COMPREHEN METABOLIC PANEL: CPT

## 2021-06-09 PROCEDURE — 36415 COLL VENOUS BLD VENIPUNCTURE: CPT

## 2021-06-09 PROCEDURE — 96374 THER/PROPH/DIAG INJ IV PUSH: CPT

## 2021-06-09 PROCEDURE — 93005 ELECTROCARDIOGRAM TRACING: CPT

## 2021-06-09 NOTE — ED
General Adult HPI





- General


Chief complaint: Seizure


Stated complaint: Seizure


Time Seen by Provider: 21 14:35


Source: patient, EMS, RN notes reviewed, old records reviewed


Mode of arrival: EMS


Limitations: physical limitation





- History of Present Illness


Initial comments: 





This is a 50-year-old female presents emergency department after having had a 

seizure while she was being dialyzed.  Patient states that this is occurred 

multiple times in the past.  Patient denies having taken her Keppra she usually 

waits until after dialysis.  Patient has no current complaints.  Patient denies 

any fever chills or cough per patient denies chest pain palpitations difficulty 

breathing shortness of breath.  Patient denies any abdominal pain patient denies

nausea vomiting diarrhea.  According to EMS reports the seizure was only a 

couple minutes long and then after that she was post ictal.





- Related Data


                                Home Medications











 Medication  Instructions  Recorded  Confirmed


 


Insulin Glargine [Lantus] 12 unit SQ HS 20


 


INSULIN LISPRO (humaLOG) [humaLOG] See Protocol SQ ACHS 21


 


cloBAZam [Clobazam] 20 mg PO BID 02/15/21 02/15/21








                                  Previous Rx's











 Medication  Instructions  Recorded


 


Aspirin [Adult Low Dose Aspirin EC] 81 mg PO DAILY #30 tablet. 21


 


Atorvastatin [Lipitor] 20 mg PO DAILY #30 tab 21


 


Citalopram Hydrobromide [CeleXA] 10 mg PO DAILY #30 tab 21


 


Clopidogrel [Plavix] 75 mg PO DAILY #30 tab 21


 


Metoprolol Tartrate 25 mg PO BID #60 tab 21


 


Pantoprazole [Protonix] 40 mg PO AC-BRKFST #30 tablet. 21


 


Sevelamer [Renvela] 800 mg PO AC-TID #90 tab 21


 


amLODIPine [Norvasc] 10 mg PO DAILY #30 tab 21


 


Lacosamide [Vimpat] 100 mg PO MoWeFr #20 tablet 21


 


Lacosamide [Vimpat] 150 mg PO BID #60 tablet 21


 


levETIRAcetam [Keppra] 1,000 mg PO BID  tab 21


 


levETIRAcetam [Keppra] 1,000 mg PO MoWeFr  tab 21











                                    Allergies











Allergy/AdvReac Type Severity Reaction Status Date / Time


 


Penicillins Allergy  Itching Verified 21 14:42














Review of Systems


ROS Statement: 


Those systems with pertinent positive or pertinent negative responses have been 

documented in the HPI.





ROS Other: All systems not noted in ROS Statement are negative.





Past Medical History


Past Medical History: Chest Pain / Angina, CVA/TIA, Diabetes Mellitus, 

Hypertension, Renal Disease, Seizure Disorder, Thyroid Disorder


Additional Past Medical History / Comment(s): Dialysis, bipolar, neuropathy, 

baseline orientation to person/place, drop foot left, only able to walk short 

distances with walker or wheelchair


History of Any Multi-Drug Resistant Organisms: None Reported


Past Surgical History:  Section, Tonsillectomy


Additional Past Surgical History / Comment(s): left arm fistula, loop recorder, 

vagus nerve stimulator


Past Anesthesia/Blood Transfusion Reactions: No Reported Reaction


Past Psychological History: Bipolar


Smoking Status: Former smoker


Past Alcohol Use History: Occasional


Past Drug Use History: None Reported





- Past Family History


  ** Father


Family Medical History: Unable to Obtain


Additional Family Medical History / Comment(s): adopted





General Exam





- General Exam Comments


Initial Comments: 





GENERAL:


Patient is well-developed and well-nourished.  Patient is nontoxic and well-

hydrated and is in no acute distress.





ENT:


Neck is soft and supple.  No significant lymphadenopathy is noted.  Oropharynx 

is clear.  Moist mucous membranes.  Neck has full range of motion without 

eliciting any pain. 





EYES:


The sclera were anicteric and conjunctiva were pink and moist.  Extraocular 

movements were intact and pupils were equal round and reactive to light.  

Eyelids were unremarkable.





PULMONARY:


Unlabored respirations.  Good breath sounds bilaterally.  No audible rales 

rhonchi or wheezing was noted.





CARDIOVASCULAR:


There is a regular rate and rhythm without any murmurs gallops or rubs.  





ABDOMEN:


Soft and nontender with normal bowel sounds. 





SKIN:


Skin is clear with no lesions or rashes and otherwise unremarkable.





NEUROLOGIC:


Patient was initially slow to respond but shortly thereafter she was much 

improved.  Patient is alert and oriented x3.  Cranial nerves II through XII are 

grossly intact.  Motor and sensory are also intact.  Normal speech, volume and 

content.  Symmetrical smile. 





MUSCULOSKELETAL:


Normal extremities with adequate strength and full range of motion.  





LYMPHATICS:


No significant lymphadenopathy is noted





PSYCHIATRIC:


Normal psychiatric evaluation. 


Limitations: physical limitation





Course


                                   Vital Signs











  21





  14:34 14:41 15:41


 


Temperature 98 F  


 


Pulse Rate 86 85 95


 


Respiratory 18 18 20





Rate   


 


Blood Pressure 132/79 137/84 142/84


 


O2 Sat by Pulse 100 99 97





Oximetry   














  21





  16:00


 


Temperature 


 


Pulse Rate 89


 


Respiratory 18





Rate 


 


Blood Pressure 143/81


 


O2 Sat by Pulse 95





Oximetry 














Medical Decision Making





- Medical Decision Making





EKG shows normal sinus rhythm at 80 bpm NV interval 288 QRS is 90 QT interval is

 424 QTC is 513.  Patient's EKG shows no ST segment elevation or depression.





Patient had one seizure in the emergency department received Ativan IM.





Patient was also loaded with Keppra 500 mg.





Patient was postictal for a while but was able to get up and walk around 

eventually and felt back to her baseline and safe to go home.  With the PA for 

Dr. Castillo and he was in agreement to follow are palpation.





- Lab Data


Result diagrams: 


                                 21 14:59





                                 21 14:59


                                   Lab Results











  21 Range/Units





  14:59 14:59 


 


WBC  6.4   (3.8-10.6)  k/uL


 


RBC  4.32   (3.80-5.40)  m/uL


 


Hgb  12.4   (11.4-16.0)  gm/dL


 


Hct  36.4   (34.0-46.0)  %


 


MCV  84.3   (80.0-100.0)  fL


 


MCH  28.7   (25.0-35.0)  pg


 


MCHC  34.0   (31.0-37.0)  g/dL


 


RDW  14.0   (11.5-15.5)  %


 


Plt Count  213   (150-450)  k/uL


 


MPV  9.4   


 


Neutrophils %  68   %


 


Lymphocytes %  24   %


 


Monocytes %  5   %


 


Eosinophils %  2   %


 


Basophils %  1   %


 


Neutrophils #  4.4   (1.3-7.7)  k/uL


 


Lymphocytes #  1.5   (1.0-4.8)  k/uL


 


Monocytes #  0.3   (0-1.0)  k/uL


 


Eosinophils #  0.1   (0-0.7)  k/uL


 


Basophils #  0.1   (0-0.2)  k/uL


 


Sodium   137  (137-145)  mmol/L


 


Potassium   3.9  (3.5-5.1)  mmol/L


 


Chloride   95 L  ()  mmol/L


 


Carbon Dioxide   30  (22-30)  mmol/L


 


Anion Gap   12  mmol/L


 


BUN   16  (7-17)  mg/dL


 


Creatinine   3.94 H  (0.52-1.04)  mg/dL


 


Est GFR (CKD-EPI)AfAm   14  (>60 ml/min/1.73 sqM)  


 


Est GFR (CKD-EPI)NonAf   12  (>60 ml/min/1.73 sqM)  


 


Glucose   164 H  (74-99)  mg/dL


 


Calcium   9.4  (8.4-10.2)  mg/dL


 


Magnesium   2.0  (1.6-2.3)  mg/dL


 


Total Bilirubin   0.8  (0.2-1.3)  mg/dL


 


AST   35  (14-36)  U/L


 


ALT   20  (4-34)  U/L


 


Alkaline Phosphatase   115  ()  U/L


 


Total Protein   7.5  (6.3-8.2)  g/dL


 


Albumin   4.5  (3.5-5.0)  g/dL














Disposition


Clinical Impression: 


 Generalized seizure





Disposition: HOME SELF-CARE


Instructions (If sedation given, give patient instructions):  Seizure/Epilepsy 

Discharge Instructions & Follow-Up


Additional Instructions: 


Patient should take her medications as prescribed


Is patient prescribed a controlled substance at d/c from ED?: No


Referrals: 


Saturnino Castillo MD [Primary Care Provider] - 1-2 days


Time of Disposition: 18:17

## 2021-08-31 ENCOUNTER — HOSPITAL ENCOUNTER (EMERGENCY)
Dept: HOSPITAL 47 - EC | Age: 51
Discharge: HOME | End: 2021-08-31
Payer: MEDICARE

## 2021-08-31 VITALS — HEART RATE: 84 BPM

## 2021-08-31 VITALS — RESPIRATION RATE: 19 BRPM | SYSTOLIC BLOOD PRESSURE: 152 MMHG | TEMPERATURE: 97.8 F | DIASTOLIC BLOOD PRESSURE: 82 MMHG

## 2021-08-31 DIAGNOSIS — Z86.73: ICD-10-CM

## 2021-08-31 DIAGNOSIS — F31.9: ICD-10-CM

## 2021-08-31 DIAGNOSIS — Z87.891: ICD-10-CM

## 2021-08-31 DIAGNOSIS — Z99.2: ICD-10-CM

## 2021-08-31 DIAGNOSIS — E07.9: ICD-10-CM

## 2021-08-31 DIAGNOSIS — Z79.02: ICD-10-CM

## 2021-08-31 DIAGNOSIS — R07.9: Primary | ICD-10-CM

## 2021-08-31 DIAGNOSIS — E87.5: ICD-10-CM

## 2021-08-31 DIAGNOSIS — Z90.89: ICD-10-CM

## 2021-08-31 DIAGNOSIS — N18.6: ICD-10-CM

## 2021-08-31 DIAGNOSIS — E11.22: ICD-10-CM

## 2021-08-31 DIAGNOSIS — I12.0: ICD-10-CM

## 2021-08-31 DIAGNOSIS — Z88.0: ICD-10-CM

## 2021-08-31 LAB
ALBUMIN SERPL-MCNC: 4.4 G/DL (ref 3.5–5)
ALP SERPL-CCNC: 125 U/L (ref 38–126)
ALT SERPL-CCNC: 12 U/L (ref 4–34)
ANION GAP SERPL CALC-SCNC: 18 MMOL/L
APTT BLD: 20.4 SEC (ref 22–30)
AST SERPL-CCNC: 19 U/L (ref 14–36)
BASOPHILS # BLD AUTO: 0.1 K/UL (ref 0–0.2)
BASOPHILS NFR BLD AUTO: 1 %
BUN SERPL-SCNC: 57 MG/DL (ref 7–17)
CALCIUM SPEC-MCNC: 9.2 MG/DL (ref 8.4–10.2)
CHLORIDE SERPL-SCNC: 97 MMOL/L (ref 98–107)
CO2 SERPL-SCNC: 23 MMOL/L (ref 22–30)
EOSINOPHIL # BLD AUTO: 0.1 K/UL (ref 0–0.7)
EOSINOPHIL NFR BLD AUTO: 1 %
ERYTHROCYTE [DISTWIDTH] IN BLOOD BY AUTOMATED COUNT: 3.98 M/UL (ref 3.8–5.4)
ERYTHROCYTE [DISTWIDTH] IN BLOOD: 15.8 % (ref 11.5–15.5)
GLUCOSE BLD-MCNC: 198 MG/DL (ref 75–99)
GLUCOSE SERPL-MCNC: 91 MG/DL (ref 74–99)
HCT VFR BLD AUTO: 36.8 % (ref 34–46)
HGB BLD-MCNC: 11.9 GM/DL (ref 11.4–16)
INR PPP: 1 (ref ?–1.2)
LYMPHOCYTES # SPEC AUTO: 2.5 K/UL (ref 1–4.8)
LYMPHOCYTES NFR SPEC AUTO: 31 %
MAGNESIUM SPEC-SCNC: 2.3 MG/DL (ref 1.6–2.3)
MCH RBC QN AUTO: 29.9 PG (ref 25–35)
MCHC RBC AUTO-ENTMCNC: 32.4 G/DL (ref 31–37)
MCV RBC AUTO: 92.4 FL (ref 80–100)
MONOCYTES # BLD AUTO: 0.3 K/UL (ref 0–1)
MONOCYTES NFR BLD AUTO: 4 %
NEUTROPHILS # BLD AUTO: 4.8 K/UL (ref 1.3–7.7)
NEUTROPHILS NFR BLD AUTO: 61 %
PH UR: 8.5 [PH] (ref 5–8)
PLATELET # BLD AUTO: 331 K/UL (ref 150–450)
POTASSIUM SERPL-SCNC: 5.7 MMOL/L (ref 3.5–5.1)
PROT SERPL-MCNC: 7.2 G/DL (ref 6.3–8.2)
PROT UR QL: (no result)
PT BLD: 10.6 SEC (ref 9–12)
RBC UR QL: 1 /HPF (ref 0–5)
SODIUM SERPL-SCNC: 138 MMOL/L (ref 137–145)
SP GR UR: 1.01 (ref 1–1.03)
SQUAMOUS UR QL AUTO: 3 /HPF (ref 0–4)
UROBILINOGEN UR QL STRIP: <2 MG/DL (ref ?–2)
WBC # BLD AUTO: 7.9 K/UL (ref 3.8–10.6)
WBC # UR AUTO: 8 /HPF (ref 0–5)

## 2021-08-31 PROCEDURE — 84484 ASSAY OF TROPONIN QUANT: CPT

## 2021-08-31 PROCEDURE — 83735 ASSAY OF MAGNESIUM: CPT

## 2021-08-31 PROCEDURE — 81001 URINALYSIS AUTO W/SCOPE: CPT

## 2021-08-31 PROCEDURE — 71046 X-RAY EXAM CHEST 2 VIEWS: CPT

## 2021-08-31 PROCEDURE — 85610 PROTHROMBIN TIME: CPT

## 2021-08-31 PROCEDURE — 36415 COLL VENOUS BLD VENIPUNCTURE: CPT

## 2021-08-31 PROCEDURE — 99285 EMERGENCY DEPT VISIT HI MDM: CPT

## 2021-08-31 PROCEDURE — 80053 COMPREHEN METABOLIC PANEL: CPT

## 2021-08-31 PROCEDURE — 96374 THER/PROPH/DIAG INJ IV PUSH: CPT

## 2021-08-31 PROCEDURE — 93005 ELECTROCARDIOGRAM TRACING: CPT

## 2021-08-31 PROCEDURE — 85025 COMPLETE CBC W/AUTO DIFF WBC: CPT

## 2021-08-31 PROCEDURE — 85730 THROMBOPLASTIN TIME PARTIAL: CPT

## 2021-08-31 NOTE — XR
EXAMINATION TYPE: XR chest 2V

 

DATE OF EXAM: 8/31/2021

 

COMPARISON: 2/17/2021

 

HISTORY: Shortness of breath

 

TECHNIQUE:  Frontal and lateral views of the chest are obtained.

 

FINDINGS:

 

Scattered senescent parenchymal changes noted. Hyperinflation compatible with COPD. 

 

No evidence for infiltrate. No evidence for atelectasis.

 

Heart size is stable.

 

Mediastinal structures are stable and grossly unremarkable.

 

No evidence for hilar prominence.

 

Degenerative changes dorsal spine. 

 

IMPRESSION:

1. No evidence for acute pulmonary disease.

## 2021-08-31 NOTE — ED
General Adult HPI





- General


Chief complaint: Chest Pain


Stated complaint: chest pain


Time Seen by Provider: 21 12:43


Source: patient, RN notes reviewed, old records reviewed


Mode of arrival: ambulatory





- History of Present Illness


Initial comments: 





Patient is a 51-year-old female with past medical history remarkable for angina,

ESRD on hemodialysis Monday, Wednesday, Friday day who missed her Monday 

appointment, prior CVA with residual left-sided weakness, hypertension, 

diabetes, thyroid disorder with a left arm AV fistula  complaining of one-day 

history of substernal chest pain.  She states it began while she was sitting in 

a chair.  It is achy in nature.  She denies any dyspnea, abdominal pain, nausea,

vomiting.  She denies any headache, new onset weakness, numbness.  She denies 

any fevers, chills, cough.  She denies any sick contacts.  She endorses no acute

0.7, chest pain which has been more or less the same since yesterday.  When 

asked if this is typical for anginal pain, she is unable to answer as she is 

uncertain.  She denies any illicit drug use.  She denies any alcohol use.





- Related Data


                                Home Medications











 Medication  Instructions  Recorded  Confirmed


 


Atorvastatin [Lipitor] 40 mg PO DAILY 21


 


Dulaglutide [Trulicity] 3 mg SQ MO 21


 


Ergocalciferol [Vitamin D2 (1250 1,250 mcg PO TU 21





Mcg = 24428 Iu)]   


 


Lacosamide [Vimpat] 150 mg PO MOWETH@07,12,21


 


Lacosamide [Vimpat] 150 mg PO SUTUFRSA@0700,1900 21


 


Metoprolol Succinate (ER) [Toprol 25 mg PO DAILY 21





Xl]   


 


Sevelamer [Renvela] 1,600 mg PO AC-TID 21


 


Vascepa 1gm 2 gm PO BID 21


 


levETIRAcetam [Keppra] 750 mg PO MOWETH@07,12,21


 


levETIRAcetam [Keppra] 750 mg PO BUCK@0700,1900 21








                                  Previous Rx's











 Medication  Instructions  Recorded


 


Citalopram Hydrobromide [CeleXA] 10 mg PO DAILY #30 tab 21


 


Clopidogrel [Plavix] 75 mg PO DAILY #30 tab 21


 


amLODIPine [Norvasc] 10 mg PO DAILY #30 tab 21











                                    Allergies











Allergy/AdvReac Type Severity Reaction Status Date / Time


 


Penicillins Allergy  Itching Verified 21 13:55














Review of Systems


ROS Statement: 


Those systems with pertinent positive or pertinent negative responses have been 

documented in the HPI.


Review of Systems:


CONST: Denies fever 


EYES: Denies blurry vision 


ENT: Denies nasal congestion  


C/V: Endorses chest pain


RESP: Denies shortness of breath 


GI: Denies abdominal pain 


: States she still produces urine.


SKIN: Denies rash.


MSK: Denies joint pain.


NEURO: Denies headache 


ROS Other: All systems not noted in ROS Statement are negative.





Past Medical History


Past Medical History: Chest Pain / Angina, CVA/TIA, Diabetes Mellitus, 

Hypertension, Renal Disease, Seizure Disorder, Thyroid Disorder


Additional Past Medical History / Comment(s): Dialysis, bipolar, neuropathy, 

baseline orientation to person/place, drop foot left, only able to walk short 

distances with walker or wheelchair


History of Any Multi-Drug Resistant Organisms: None Reported


Past Surgical History:  Section, Tonsillectomy


Additional Past Surgical History / Comment(s): left arm fistula, loop recorder, 

vagus nerve stimulator


Past Anesthesia/Blood Transfusion Reactions: No Reported Reaction


Past Psychological History: Bipolar


Smoking Status: Former smoker


Past Alcohol Use History: Occasional


Past Drug Use History: None Reported





- Past Family History


  ** Father


Family Medical History: Unable to Obtain


Additional Family Medical History / Comment(s): adopted





General Exam





- General Exam Comments


Initial Comments: 





General: Appears in no acute distress.


HEAD:  Normal with no signs of head trauma.


EYES:  PERRLA, EOMI, conjunctiva normal, no discharge.


ENT:  Hearing grossly intact, normal oropharynx.


RESPIRATORY:  Clear breath sounds bilaterally.  No wheezes, rales, or rhonchi.  


C/V: Regular rate and rhythm.  S1 and S2 auscultated.  No peripheral edema.  

Peripheral pulses are 2+ and intact throughout.  Patient has a left upper 

extremity AV fistula with a palpable thrill and audible bruit.


ABD:  Abd is soft, nontender, nondistended


EXT: Normal range of motion, no obvious deformity


SKIN:  No rashes or lesions observed on exposed skin.


NEURO: Alert and oriented 4.  Residual left-sided weakness 4 out of 5.  No 

other acute neurological findings at this time.





Course


                                   Vital Signs











  21





  12:46 12:50 13:00


 


Temperature  98.6 F 


 


Pulse Rate  92 


 


Pulse Rate [   84





Cardiac Monitor   





]   


 


Respiratory  18 





Rate   


 


Blood Pressure 168/102 162/102 163/93


 


O2 Sat by Pulse 98 97 





Oximetry   














  21





  14:00 15:00 15:02


 


Temperature   


 


Pulse Rate 88 86 87


 


Pulse Rate [   





Cardiac Monitor   





]   


 


Respiratory 57 H 20 18





Rate   


 


Blood Pressure 163/93 163/93 156/88


 


O2 Sat by Pulse 99 95 99





Oximetry   














  21





  16:00 17:00


 


Temperature  97.8 F


 


Pulse Rate 84 85


 


Pulse Rate [  





Cardiac Monitor  





]  


 


Respiratory 15 19





Rate  


 


Blood Pressure 156/88 152/82


 


O2 Sat by Pulse 98 97





Oximetry  














Medical Decision Making





- Medical Decision Making





Based on the patient's presentation and physical exam, she is having substernal 

chest pain with a history of angina.  We will obtain a cardiac workup including 

troponin, EKG, chest x-ray.  Also obtain basic laboratory studies to ensure she 

does not require dialysis as she did miss her last run on Monday.  She is 

uncertain the name of her nephrologist patient patient was in agreement with 

this plan.  She'll be given an aspirin.  She will be connected to continuous 

cardiac monitoring while she is here in the department.





EKG showed no acute signs of ischemia.  She does have first-degree AV block 

which is old and seen on prior EKGs.Patient's chest x-ray revealed no acute 

cardiopulmonary process.  Laboratory studies are remarkable for elevated 

creatinine in sending of ESRD on hemodialysis.  Patient's potassium is elevated 

5.7 but there are no EKG changes.  Troponin is negative.  Urinalysis is 

unremarkable.  It is a contaminated catch.  The remainder of her labs are 

unremarkable.





On reevaluation come patient's chest pain is improved.  She is at baseline.  I 

discussed with her that we'll speak with nephrology to see if she requires 

urgent dialysis on this admission.  She was in agreement with the plan.  I spoke

with the nephrologist on call, Dr. Higginbotham, who stated the patient does not 

require admission to the hospital for her hyperkalemia.  I will shif her and 

have her follow-up with her normal dialysis appointment the morning.  The 

patient was in agreement with this plan.  She was given IV insulin, D50, as well

as a dose of Lasix that she produces urine.  Repeat sugar was within normal 

limits.  Patient will be discharged home in stable condition at this time.





 I instructed the patient to follow up with their PCP in the next 3 days.  .  I 

explained that the patient should return to the emergency department if they 

experience any worsening symptoms. Strict return precautions were discussed with

the patient. The patient expressed understanding of these instructions. I 

answered all questions that the patient had. The patient was discharged home in 

fair condition with their prescriptions and follow up information.





- Lab Data


Result diagrams: 


                                 21 14:28





                                 21 14:28


                                   Lab Results











  21 Range/Units





  14:28 14:28 14:28 


 


WBC  7.9    (3.8-10.6)  k/uL


 


RBC  3.98    (3.80-5.40)  m/uL


 


Hgb  11.9    (11.4-16.0)  gm/dL


 


Hct  36.8    (34.0-46.0)  %


 


MCV  92.4    (80.0-100.0)  fL


 


MCH  29.9    (25.0-35.0)  pg


 


MCHC  32.4    (31.0-37.0)  g/dL


 


RDW  15.8 H    (11.5-15.5)  %


 


Plt Count  331    (150-450)  k/uL


 


MPV  8.2    


 


Neutrophils %  61    %


 


Lymphocytes %  31    %


 


Monocytes %  4    %


 


Eosinophils %  1    %


 


Basophils %  1    %


 


Neutrophils #  4.8    (1.3-7.7)  k/uL


 


Lymphocytes #  2.5    (1.0-4.8)  k/uL


 


Monocytes #  0.3    (0-1.0)  k/uL


 


Eosinophils #  0.1    (0-0.7)  k/uL


 


Basophils #  0.1    (0-0.2)  k/uL


 


Hypochromasia  Slight    


 


PT   10.6   (9.0-12.0)  sec


 


INR   1.0   (<1.2)  


 


APTT   20.4 L   (22.0-30.0)  sec


 


Sodium    138  (137-145)  mmol/L


 


Potassium    5.7 H  (3.5-5.1)  mmol/L


 


Chloride    97 L  ()  mmol/L


 


Carbon Dioxide    23  (22-30)  mmol/L


 


Anion Gap    18  mmol/L


 


BUN    57 H  (7-17)  mg/dL


 


Creatinine    12.11 H*  (0.52-1.04)  mg/dL


 


Est GFR (CKD-EPI)AfAm    4  (>60 ml/min/1.73 sqM)  


 


Est GFR (CKD-EPI)NonAf    3  (>60 ml/min/1.73 sqM)  


 


Glucose    91  (74-99)  mg/dL


 


POC Glucose (mg/dL)     (75-99)  mg/dL


 


POC Glu Operater ID     


 


Calcium    9.2  (8.4-10.2)  mg/dL


 


Magnesium    2.3  (1.6-2.3)  mg/dL


 


Total Bilirubin    0.3  (0.2-1.3)  mg/dL


 


AST    19  (14-36)  U/L


 


ALT    12  (4-34)  U/L


 


Alkaline Phosphatase    125  ()  U/L


 


Troponin I     (0.000-0.034)  ng/mL


 


Total Protein    7.2  (6.3-8.2)  g/dL


 


Albumin    4.4  (3.5-5.0)  g/dL


 


Urine Color     


 


Urine Appearance     (Clear)  


 


Urine pH     (5.0-8.0)  


 


Ur Specific Gravity     (1.001-1.035)  


 


Urine Protein     (Negative)  


 


Urine Glucose (UA)     (Negative)  


 


Urine Ketones     (Negative)  


 


Urine Blood     (Negative)  


 


Urine Nitrite     (Negative)  


 


Urine Bilirubin     (Negative)  


 


Urine Urobilinogen     (<2.0)  mg/dL


 


Ur Leukocyte Esterase     (Negative)  


 


Urine RBC     (0-5)  /hpf


 


Urine WBC     (0-5)  /hpf


 


Ur Squamous Epith Cells     (0-4)  /hpf


 


Urine Bacteria     (None)  /hpf














  21 Range/Units





  14:28 15:03 17:05 


 


WBC     (3.8-10.6)  k/uL


 


RBC     (3.80-5.40)  m/uL


 


Hgb     (11.4-16.0)  gm/dL


 


Hct     (34.0-46.0)  %


 


MCV     (80.0-100.0)  fL


 


MCH     (25.0-35.0)  pg


 


MCHC     (31.0-37.0)  g/dL


 


RDW     (11.5-15.5)  %


 


Plt Count     (150-450)  k/uL


 


MPV     


 


Neutrophils %     %


 


Lymphocytes %     %


 


Monocytes %     %


 


Eosinophils %     %


 


Basophils %     %


 


Neutrophils #     (1.3-7.7)  k/uL


 


Lymphocytes #     (1.0-4.8)  k/uL


 


Monocytes #     (0-1.0)  k/uL


 


Eosinophils #     (0-0.7)  k/uL


 


Basophils #     (0-0.2)  k/uL


 


Hypochromasia     


 


PT     (9.0-12.0)  sec


 


INR     (<1.2)  


 


APTT     (22.0-30.0)  sec


 


Sodium     (137-145)  mmol/L


 


Potassium     (3.5-5.1)  mmol/L


 


Chloride     ()  mmol/L


 


Carbon Dioxide     (22-30)  mmol/L


 


Anion Gap     mmol/L


 


BUN     (7-17)  mg/dL


 


Creatinine     (0.52-1.04)  mg/dL


 


Est GFR (CKD-EPI)AfAm     (>60 ml/min/1.73 sqM)  


 


Est GFR (CKD-EPI)NonAf     (>60 ml/min/1.73 sqM)  


 


Glucose     (74-99)  mg/dL


 


POC Glucose (mg/dL)    198 H  (75-99)  mg/dL


 


POC Glu Operater ID    Celena Pardo  


 


Calcium     (8.4-10.2)  mg/dL


 


Magnesium     (1.6-2.3)  mg/dL


 


Total Bilirubin     (0.2-1.3)  mg/dL


 


AST     (14-36)  U/L


 


ALT     (4-34)  U/L


 


Alkaline Phosphatase     ()  U/L


 


Troponin I  <0.012    (0.000-0.034)  ng/mL


 


Total Protein     (6.3-8.2)  g/dL


 


Albumin     (3.5-5.0)  g/dL


 


Urine Color   Light Yellow   


 


Urine Appearance   Cloudy H   (Clear)  


 


Urine pH   8.5 H   (5.0-8.0)  


 


Ur Specific Gravity   1.015   (1.001-1.035)  


 


Urine Protein   2+ H   (Negative)  


 


Urine Glucose (UA)   Negative   (Negative)  


 


Urine Ketones   Negative   (Negative)  


 


Urine Blood   Small H   (Negative)  


 


Urine Nitrite   Negative   (Negative)  


 


Urine Bilirubin   Negative   (Negative)  


 


Urine Urobilinogen   <2.0   (<2.0)  mg/dL


 


Ur Leukocyte Esterase   Trace H   (Negative)  


 


Urine RBC   1   (0-5)  /hpf


 


Urine WBC   8 H   (0-5)  /hpf


 


Ur Squamous Epith Cells   3   (0-4)  /hpf


 


Urine Bacteria   Rare H   (None)  /hpf














- EKG Data


-: EKG Interpreted by Me


EKG Comments: 





12-lead Electrocardiogram Interpretation Note





EKG was reviewed and interpreted by myself. 12-lead ECG performed at 1250 is 

interpreted by me as revealing normal sinus rhythm at a rate of 92 beats per 

minute.  Axis is normal.  LA intervals 232 ms remarkable for first-degree AV 

block.  QRS duration is 92 no seconds, QTC is 484 to seconds..  There were no ST

or T wave abnormalities to suggest myocardial ischemia or injury. R wave 

progression across the precordium was satisfactory. By my interpretation this 

EKG is non-diagnostic for acute ischemia.  When compared with prior EKGs, there 

are no acute changes.





Disposition


Clinical Impression: 


 Chest pain of unknown etiology, Hyperkalemia, Missed dialysis





Disposition: HOME SELF-CARE


Condition: Fair


Instructions (If sedation given, give patient instructions):  Chest Pain (ED)


Is patient prescribed a controlled substance at d/c from ED?: No


Referrals: 


Saturnino Castillo MD [Primary Care Provider] - 1-2 days


Esther Erickson MD [STAFF PHYSICIAN] - 1-2 days

## 2021-09-14 ENCOUNTER — HOSPITAL ENCOUNTER (EMERGENCY)
Dept: HOSPITAL 47 - EC | Age: 51
Discharge: HOME | End: 2021-09-14
Payer: MEDICARE

## 2021-09-14 VITALS — TEMPERATURE: 98.3 F

## 2021-09-14 VITALS — RESPIRATION RATE: 16 BRPM | HEART RATE: 92 BPM | SYSTOLIC BLOOD PRESSURE: 132 MMHG | DIASTOLIC BLOOD PRESSURE: 81 MMHG

## 2021-09-14 DIAGNOSIS — R56.9: Primary | ICD-10-CM

## 2021-09-14 DIAGNOSIS — E11.40: ICD-10-CM

## 2021-09-14 DIAGNOSIS — E11.22: ICD-10-CM

## 2021-09-14 DIAGNOSIS — F31.9: ICD-10-CM

## 2021-09-14 DIAGNOSIS — Z87.891: ICD-10-CM

## 2021-09-14 DIAGNOSIS — Z86.73: ICD-10-CM

## 2021-09-14 DIAGNOSIS — Z79.4: ICD-10-CM

## 2021-09-14 DIAGNOSIS — Z99.2: ICD-10-CM

## 2021-09-14 DIAGNOSIS — E87.5: ICD-10-CM

## 2021-09-14 DIAGNOSIS — N18.6: ICD-10-CM

## 2021-09-14 DIAGNOSIS — Z88.0: ICD-10-CM

## 2021-09-14 DIAGNOSIS — I12.0: ICD-10-CM

## 2021-09-14 LAB
ALBUMIN SERPL-MCNC: 4.7 G/DL (ref 3.5–5)
ALP SERPL-CCNC: 119 U/L (ref 38–126)
ALT SERPL-CCNC: 18 U/L (ref 4–34)
ANION GAP SERPL CALC-SCNC: 16 MMOL/L
AST SERPL-CCNC: 39 U/L (ref 14–36)
BASOPHILS # BLD AUTO: 0.1 K/UL (ref 0–0.2)
BASOPHILS NFR BLD AUTO: 1 %
BUN SERPL-SCNC: 43 MG/DL (ref 7–17)
CALCIUM SPEC-MCNC: 9.3 MG/DL (ref 8.4–10.2)
CHLORIDE SERPL-SCNC: 95 MMOL/L (ref 98–107)
CO2 SERPL-SCNC: 25 MMOL/L (ref 22–30)
EOSINOPHIL # BLD AUTO: 0.1 K/UL (ref 0–0.7)
EOSINOPHIL NFR BLD AUTO: 1 %
ERYTHROCYTE [DISTWIDTH] IN BLOOD BY AUTOMATED COUNT: 4.48 M/UL (ref 3.8–5.4)
ERYTHROCYTE [DISTWIDTH] IN BLOOD: 14.6 % (ref 11.5–15.5)
GLUCOSE BLD-MCNC: 197 MG/DL (ref 75–99)
GLUCOSE SERPL-MCNC: 196 MG/DL (ref 74–99)
HCT VFR BLD AUTO: 38.2 % (ref 34–46)
HGB BLD-MCNC: 13.6 GM/DL (ref 11.4–16)
LYMPHOCYTES # SPEC AUTO: 1.8 K/UL (ref 1–4.8)
LYMPHOCYTES NFR SPEC AUTO: 15 %
MAGNESIUM SPEC-SCNC: 2.2 MG/DL (ref 1.6–2.3)
MCH RBC QN AUTO: 30.3 PG (ref 25–35)
MCHC RBC AUTO-ENTMCNC: 35.6 G/DL (ref 31–37)
MCV RBC AUTO: 85.3 FL (ref 80–100)
MONOCYTES # BLD AUTO: 0.5 K/UL (ref 0–1)
MONOCYTES NFR BLD AUTO: 4 %
NEUTROPHILS # BLD AUTO: 9.3 K/UL (ref 1.3–7.7)
NEUTROPHILS NFR BLD AUTO: 78 %
PLATELET # BLD AUTO: 276 K/UL (ref 150–450)
POTASSIUM SERPL-SCNC: 5.8 MMOL/L (ref 3.5–5.1)
PROT SERPL-MCNC: 8 G/DL (ref 6.3–8.2)
SODIUM SERPL-SCNC: 136 MMOL/L (ref 137–145)
WBC # BLD AUTO: 11.9 K/UL (ref 3.8–10.6)

## 2021-09-14 PROCEDURE — 93005 ELECTROCARDIOGRAM TRACING: CPT

## 2021-09-14 PROCEDURE — 85025 COMPLETE CBC W/AUTO DIFF WBC: CPT

## 2021-09-14 PROCEDURE — 80053 COMPREHEN METABOLIC PANEL: CPT

## 2021-09-14 PROCEDURE — 99285 EMERGENCY DEPT VISIT HI MDM: CPT

## 2021-09-14 PROCEDURE — 36415 COLL VENOUS BLD VENIPUNCTURE: CPT

## 2021-09-14 PROCEDURE — 83735 ASSAY OF MAGNESIUM: CPT

## 2021-09-14 NOTE — ED
Seizure HPI





- General


Chief Complaint: Seizure


Stated Complaint: seizure


Time Seen by Provider: 21 14:54


Source: patient, family


Mode of arrival: wheelchair


Limitations: no limitations





- History of Present Illness


Initial Comments: 





Patient is a 51-year-old female with a history of seizures and CT renal disease 

and other comorbidities presenting after having a seizure today.  Family member 

notes that patient ran out of [facility] Saturday and has since been without a.  

They note they went to her primary care today trying to refills but have not got

ten them yet.  Family notes the patient has baseline while in the ER.  Patient 

was alert and oriented drink question exam.  She denied any pain or issues at 

this time.  She was otherwise a well-appearing 51-year-old female.  She denied 

any chest pain shortness of breath headache nausea vomiting diarrhea 

constipation fever fatigue chills.





- Related Data


                                Home Medications











 Medication  Instructions  Recorded  Confirmed


 


Atorvastatin [Lipitor] 40 mg PO DAILY 21


 


Dulaglutide [Trulicity] 3 mg SQ WE 21


 


Ergocalciferol [Vitamin D2 (1250 1,250 mcg PO CARRERA 21





Mcg = 29402 Iu)]   


 


Lacosamide [Vimpat] 150 mg PO MOWETH@,,21


 


Lacosamide [Vimpat] 150 mg PO SUTUFRSA@0700,1900 21


 


Metoprolol Succinate (ER) [Toprol 25 mg PO DAILY 21





Xl]   


 


Sevelamer [Renvela] 1,600 mg PO AC-TID 21


 


Vascepa 1gm 2 gm PO BID 21


 


levETIRAcetam [Keppra] 750 mg PO MOWETH@07,12,21


 


levETIRAcetam [Keppra] 750 mg PO SUTUFRSA@0700,1900 21


 


Insulin Glargine [Lantus Vial] 8 unit SQ BID 21








                                  Previous Rx's











 Medication  Instructions  Recorded


 


Citalopram Hydrobromide [CeleXA] 10 mg PO DAILY #30 tab 21


 


Clopidogrel [Plavix] 75 mg PO DAILY #30 tab 21


 


amLODIPine [Norvasc] 10 mg PO DAILY #30 tab 21


 


Lacosamide [Vimpat] 150 mg PO BID 3 Days #6 tab 21











                                    Allergies











Allergy/AdvReac Type Severity Reaction Status Date / Time


 


Penicillins Allergy  Itching Verified 21 16:41














Review of Systems


ROS Statement: 


Those systems with pertinent positive or pertinent negative responses have been 

documented in the HPI.





ROS Other: All systems not noted in ROS Statement are negative.





Past Medical History


Past Medical History: Chest Pain / Angina, CVA/TIA, Diabetes Mellitus, 

Hypertension, Renal Disease, Seizure Disorder, Thyroid Disorder


Additional Past Medical History / Comment(s): Dialysis, bipolar, neuropathy, 

baseline orientation to person/place, drop foot left, only able to walk short 

distances with walker or wheelchair


History of Any Multi-Drug Resistant Organisms: None Reported


Past Surgical History:  Section, Tonsillectomy


Additional Past Surgical History / Comment(s): left arm fistula, loop recorder, 

vagus nerve stimulator


Past Anesthesia/Blood Transfusion Reactions: No Reported Reaction


Past Psychological History: Bipolar


Smoking Status: Former smoker


Past Alcohol Use History: Occasional


Past Drug Use History: None Reported





- Past Family History


  ** Father


Family Medical History: Unable to Obtain


Additional Family Medical History / Comment(s): adopted





General Exam


Limitations: no limitations


General appearance: alert, in no apparent distress


Head exam: Present: atraumatic, normocephalic, normal inspection


Eye exam: Present: normal appearance, PERRL, EOMI.  Absent: scleral icterus, 

conjunctival injection, periorbital swelling


Neck exam: Present: normal inspection


Respiratory exam: Present: normal lung sounds bilaterally.  Absent: respiratory 

distress, wheezes, rales, rhonchi, stridor


Cardiovascular Exam: Present: regular rate, normal rhythm, normal heart sounds. 

 Absent: systolic murmur, diastolic murmur, rubs, gallop, clicks


GI/Abdominal exam: Present: soft, normal bowel sounds.  Absent: distended, 

tenderness, guarding, rebound, rigid


Extremities exam: Present: normal inspection, full ROM, normal capillary refill.

  Absent: tenderness, pedal edema, joint swelling, calf tenderness


Neurological exam: Present: alert, oriented X3


Psychiatric exam: Present: normal affect, normal mood


Skin exam: Present: warm, dry, intact, normal color.  Absent: rash





Course





                                   Vital Signs











  21





  14:02


 


Temperature 98.3 F


 


Pulse Rate 99


 


Respiratory 20





Rate 


 


Blood Pressure 116/79


 


O2 Sat by Pulse 100





Oximetry 














Medical Decision Making





- Medical Decision Making





51-year-old female with history of seizures presenting today after having a 

seizure.


Labs, EKG, cardiac monitor liter normal saline ordered.


Labs:White blood cells 11.9, creatinine 8.5 to patient does have end-stage renal

 disease and is on dialysis.  Rest are similar to baseline.


150 mg of Vimpat ordered.


Case discussed with Dr. Shepherd, patient discharge home with follow-up to her 

primary care and seizure clinic.





- Lab Data


Result diagrams: 


                                 21 16:20





                                 21 16:20





                                   Lab Results











  21 Range/Units





  15:41 16:20 16:20 


 


WBC   11.9 H   (3.8-10.6)  k/uL


 


RBC   4.48   (3.80-5.40)  m/uL


 


Hgb   13.6   (11.4-16.0)  gm/dL


 


Hct   38.2   (34.0-46.0)  %


 


MCV   85.3  D   (80.0-100.0)  fL


 


MCH   30.3   (25.0-35.0)  pg


 


MCHC   35.6   (31.0-37.0)  g/dL


 


RDW   14.6   (11.5-15.5)  %


 


Plt Count   276   (150-450)  k/uL


 


MPV   9.2   


 


Neutrophils %   78   %


 


Lymphocytes %   15   %


 


Monocytes %   4   %


 


Eosinophils %   1   %


 


Basophils %   1   %


 


Neutrophils #   9.3 H   (1.3-7.7)  k/uL


 


Lymphocytes #   1.8   (1.0-4.8)  k/uL


 


Monocytes #   0.5   (0-1.0)  k/uL


 


Eosinophils #   0.1   (0-0.7)  k/uL


 


Basophils #   0.1   (0-0.2)  k/uL


 


Hyperchromasia   Slight   


 


Sodium    136 L  (137-145)  mmol/L


 


Potassium    5.8 H  (3.5-5.1)  mmol/L


 


Chloride    95 L  ()  mmol/L


 


Carbon Dioxide    25  (22-30)  mmol/L


 


Anion Gap    16  mmol/L


 


BUN    43 H  (7-17)  mg/dL


 


Creatinine    8.52 H*  (0.52-1.04)  mg/dL


 


Est GFR (CKD-EPI)AfAm    6  (>60 ml/min/1.73 sqM)  


 


Est GFR (CKD-EPI)NonAf    5  (>60 ml/min/1.73 sqM)  


 


Glucose    196 H  (74-99)  mg/dL


 


POC Glucose (mg/dL)  197 H    (75-99)  mg/dL


 


POC Glu Operater ID  Maxine Venegas    


 


Calcium    9.3  (8.4-10.2)  mg/dL


 


Magnesium    2.2  (1.6-2.3)  mg/dL


 


Total Bilirubin    0.8  (0.2-1.3)  mg/dL


 


AST    39 H  (14-36)  U/L


 


ALT    18  (4-34)  U/L


 


Alkaline Phosphatase    119  ()  U/L


 


Total Protein    8.0  (6.3-8.2)  g/dL


 


Albumin    4.7  (3.5-5.0)  g/dL














- EKG Data


-: EKG Interpreted by Me


EKG shows normal: sinus rhythm


Rate: normal


EKG Comments: 





Ventricular rate 94 bpm, VA interval 192 ms, QRS duration 74 ms,  ms, 

PRT axes 50/95/69.  Normal sinus rhythm, rightward axis, nonspecific ST 

abnormality, prolonged QT, abnormal ECG.





Disposition


Clinical Impression: 


 Seizure, Chronic renal failure, Hyperkalemia





Disposition: HOME SELF-CARE


Condition: Stable


Instructions (If sedation given, give patient instructions):  Recurrent Seizures

 in Adults (ED), Seizure/Epilepsy Discharge Instructions & Follow-Up


Additional Instructions: 


Please return to the Emergency Department if symptoms worsen or any other 

concerns.


Follow-up primary care 1-2 days.


Take medications as prescribed.





Is patient prescribed a controlled substance at d/c from ED?: No


Referrals: 


Saturnino Castillo MD [Primary Care Provider] - 1-2 days


Time of Disposition: 18:15

## 2022-01-14 ENCOUNTER — HOSPITAL ENCOUNTER (OUTPATIENT)
Dept: HOSPITAL 47 - CATHCVL | Age: 52
Discharge: HOME | End: 2022-01-14
Attending: SURGERY
Payer: MEDICARE

## 2022-01-14 VITALS — TEMPERATURE: 98.2 F | RESPIRATION RATE: 16 BRPM

## 2022-01-14 VITALS — BODY MASS INDEX: 31.4 KG/M2

## 2022-01-14 VITALS — SYSTOLIC BLOOD PRESSURE: 164 MMHG | HEART RATE: 86 BPM | DIASTOLIC BLOOD PRESSURE: 82 MMHG

## 2022-01-14 DIAGNOSIS — E11.22: ICD-10-CM

## 2022-01-14 DIAGNOSIS — R56.9: ICD-10-CM

## 2022-01-14 DIAGNOSIS — Z79.02: ICD-10-CM

## 2022-01-14 DIAGNOSIS — Z86.73: ICD-10-CM

## 2022-01-14 DIAGNOSIS — T85.828A: Primary | ICD-10-CM

## 2022-01-14 DIAGNOSIS — Z20.822: ICD-10-CM

## 2022-01-14 DIAGNOSIS — N18.6: ICD-10-CM

## 2022-01-14 DIAGNOSIS — Z98.891: ICD-10-CM

## 2022-01-14 DIAGNOSIS — Z79.899: ICD-10-CM

## 2022-01-14 DIAGNOSIS — F31.9: ICD-10-CM

## 2022-01-14 DIAGNOSIS — Z79.4: ICD-10-CM

## 2022-01-14 LAB — GLUCOSE BLD-MCNC: 169 MG/DL (ref 75–99)

## 2022-01-14 PROCEDURE — 87635 SARS-COV-2 COVID-19 AMP PRB: CPT

## 2022-01-14 PROCEDURE — 84703 CHORIONIC GONADOTROPIN ASSAY: CPT

## 2022-01-14 PROCEDURE — 36902 INTRO CATH DIALYSIS CIRCUIT: CPT

## 2022-01-14 RX ADMIN — MIDAZOLAM ONE MG: 1 INJECTION INTRAMUSCULAR; INTRAVENOUS at 10:46

## 2022-01-14 RX ADMIN — MIDAZOLAM ONE MG: 1 INJECTION INTRAMUSCULAR; INTRAVENOUS at 10:13

## 2022-01-14 RX ADMIN — LIDOCAINE HYDROCHLORIDE ONE ML: 10 INJECTION, SOLUTION INFILTRATION; PERINEURAL at 10:49

## 2022-01-14 RX ADMIN — LIDOCAINE HYDROCHLORIDE ONE ML: 10 INJECTION, SOLUTION INFILTRATION; PERINEURAL at 10:13

## 2022-01-14 NOTE — IR
EXAMINATION TYPE: IR fistula/abscess/sinus tract

 

DATE OF EXAM: 1/14/2022

 

COMPARISON: NONE

 

HISTORY: Fluoroscopy time.

 

Fluoroscopy was provided to the referring clinician.

## 2022-01-14 NOTE — P.OP
Date of Procedure: 01/14/22


Description of Procedure: 


Date: 01/14/2022





Preoperative diagnosis: Dysfunctional left upper extremity arteriovenous 

fistula, end-stage renal disease on hemodialysis


Postoperative diagnosis: Same, outflow stenosis


Procedure: Left Upper extremity fistulogram with ultrasound guided access, 

percutaneous transluminal balloon angioplasty of the outflow stenosis with 9 x 

60 mm drug-eluting balloon


Surgeon: Shashank Ramirez DO


Anesthesia : Local


Estimated blood loss: Minimal 


Complications: None


Condition: Stable


Disposition: Palpable thrill left upper extremity arteriovenous fistula





Indications: 51-year-old female with history of incisional disease on 

hemodialysis via left upper extremity to venous fistula presented to the office 

secondary to decreased flow seen in dialysis.  She states they have stopped her 

short a couple of times during dialysis.  She underwent ultrasound which 

demonstrated some increased flows in the fistula itself but it is widely patent.

 She presents today for fistulogram and possible intervention.





Operative narrative: After written informed consent was obtained the patient all

risks benefits competitions were described the patient is brought to the Cath 

Lab and laid in a supine position with their left arm outstretched on an 

armboard.  The area of the arm was prepped and draped in usual sterile fashion. 

Utilizing local anesthetic the fistula was accessed under ultrasound guidance 

and a 6-Vincentian sheath was placed.  Fistulogram was then obtained demonstrating 

stenosis and tortuosity within the fistula at the midportion extending to the 

distal aspect.  Central venogram demonstrated no evidence of stenosis with good 

brisk flow.  The area of stenosis was at the access site and therefore another 

access was performed more distally towards the arterial anastomosis utilizing 

ultrasound guidance.  Due to the stenotic areas and tortuosity guidewire was 

placed across these lesions and balloon angioplasty was performed first with an 

8 x 40 mm balloon followed by a 9 x 60 mm drug-eluting balloon.  Final fistula 

gram was obtained demonstrating improved flow through this area.  There was 

still scarring noted and tortuosity and patient may benefit from interposition 

CryoVein graft placement in the future if continued issues during dialysis.  She

will go home today in follow-up in 2 weeks.  





Plan - Discharge Summary


Discharge Rx Participant: No


New Discharge Prescriptions: 


No Action


   Citalopram Hydrobromide [CeleXA] 10 mg PO DAILY #30 tab


   amLODIPine [Norvasc] 10 mg PO DAILY #30 tab


   Clopidogrel [Plavix] 75 mg PO DAILY #30 tab


   Ergocalciferol [Vitamin D2 (1250 Mcg = 05555 Iu)] 1,250 mcg PO CARRERA


   levETIRAcetam [Keppra] 750 mg PO SUTUTHSA@0700,1900


   Metoprolol Succinate (ER) [Toprol Xl] 25 mg PO DAILY


   Atorvastatin [Lipitor] 40 mg PO DAILY


   Vascepa 1gm 2 gm PO BID


   Sevelamer [Renvela] 1,600 mg PO AC-TID


   Lacosamide [Vimpat] 150 mg PO SUTUTHSA@0700,1900


   Insulin Glargine [Lantus Vial] 8 unit SQ BID


   levETIRAcetam [Keppra] 750 mg PO MOWEFR@07,12,19


   Dulaglutide [Trulicity] 3 mg SQ MO


   Lacosamide [Vimpat] 150 mg PO MOWEFR@07,12,19


Discharge Medication List





Citalopram Hydrobromide [CeleXA] 10 mg PO DAILY #30 tab 02/13/21 [Rx]


Clopidogrel [Plavix] 75 mg PO DAILY #30 tab 02/13/21 [Rx]


amLODIPine [Norvasc] 10 mg PO DAILY #30 tab 02/13/21 [Rx]


Atorvastatin [Lipitor] 40 mg PO DAILY 08/31/21 [History]


Dulaglutide [Trulicity] 3 mg SQ MO 08/31/21 [History]


Ergocalciferol [Vitamin D2 (1250 Mcg = 44096 Iu)] 1,250 mcg PO CARRERA 08/31/21 

[History]


Lacosamide [Vimpat] 150 mg PO MOWEFR@07,12,19 08/31/21 [History]


Lacosamide [Vimpat] 150 mg PO SUTUTHSA@0700,1900 08/31/21 [History]


Metoprolol Succinate (ER) [Toprol Xl] 25 mg PO DAILY 08/31/21 [History]


Sevelamer [Renvela] 1,600 mg PO AC-TID 08/31/21 [History]


Vascepa 1gm 2 gm PO BID 08/31/21 [History]


levETIRAcetam [Keppra] 750 mg PO MOWEFR@07,12,19 08/31/21 [History]


levETIRAcetam [Keppra] 750 mg PO SUTUTHSA@0700,1900 08/31/21 [History]


Insulin Glargine [Lantus Vial] 8 unit SQ BID 09/14/21 [History]








Follow up Appointment(s)/Referral(s): 


Shashank Ramirez DO [STAFF PHYSICIAN] - 2 Weeks (follow up appt post fistulagram 

-- Patient to make appt, office was closed on friday. )


Patient Instructions/Handouts:  Peripheral Vascular Angioplasty (DC), Procedural

Sedation (ED)


Activity/Diet/Wound Care/Special Instructions: 


Keep sutures clean and dry, notify doctor if any changes to incision sites IE: 

rash, drainage, swelling, very painful to touch.

## 2022-08-05 ENCOUNTER — HOSPITAL ENCOUNTER (INPATIENT)
Dept: HOSPITAL 47 - EC | Age: 52
LOS: 3 days | Discharge: HOME | DRG: 100 | End: 2022-08-08
Attending: INTERNAL MEDICINE | Admitting: INTERNAL MEDICINE
Payer: MEDICARE

## 2022-08-05 DIAGNOSIS — F03.90: ICD-10-CM

## 2022-08-05 DIAGNOSIS — I12.0: ICD-10-CM

## 2022-08-05 DIAGNOSIS — Z96.82: ICD-10-CM

## 2022-08-05 DIAGNOSIS — K21.9: ICD-10-CM

## 2022-08-05 DIAGNOSIS — Z87.891: ICD-10-CM

## 2022-08-05 DIAGNOSIS — M89.9: ICD-10-CM

## 2022-08-05 DIAGNOSIS — Z99.2: ICD-10-CM

## 2022-08-05 DIAGNOSIS — I69.354: ICD-10-CM

## 2022-08-05 DIAGNOSIS — Z79.84: ICD-10-CM

## 2022-08-05 DIAGNOSIS — Z79.899: ICD-10-CM

## 2022-08-05 DIAGNOSIS — Z79.02: ICD-10-CM

## 2022-08-05 DIAGNOSIS — E11.22: ICD-10-CM

## 2022-08-05 DIAGNOSIS — M19.90: ICD-10-CM

## 2022-08-05 DIAGNOSIS — G40.909: Primary | ICD-10-CM

## 2022-08-05 DIAGNOSIS — Z95.818: ICD-10-CM

## 2022-08-05 DIAGNOSIS — I25.10: ICD-10-CM

## 2022-08-05 DIAGNOSIS — E78.5: ICD-10-CM

## 2022-08-05 DIAGNOSIS — Z88.0: ICD-10-CM

## 2022-08-05 DIAGNOSIS — N18.6: ICD-10-CM

## 2022-08-05 LAB
ALBUMIN SERPL-MCNC: 4.4 G/DL (ref 3.5–5)
ALP SERPL-CCNC: 110 U/L (ref 38–126)
ALT SERPL-CCNC: 17 U/L (ref 4–34)
ANION GAP SERPL CALC-SCNC: 12 MMOL/L
AST SERPL-CCNC: 34 U/L (ref 14–36)
BASOPHILS # BLD AUTO: 0.1 K/UL (ref 0–0.2)
BASOPHILS NFR BLD AUTO: 1 %
BUN SERPL-SCNC: 15 MG/DL (ref 7–17)
CALCIUM SPEC-MCNC: 9.2 MG/DL (ref 8.4–10.2)
CHLORIDE SERPL-SCNC: 97 MMOL/L (ref 98–107)
CO2 SERPL-SCNC: 29 MMOL/L (ref 22–30)
EOSINOPHIL # BLD AUTO: 0.1 K/UL (ref 0–0.7)
EOSINOPHIL NFR BLD AUTO: 1 %
ERYTHROCYTE [DISTWIDTH] IN BLOOD BY AUTOMATED COUNT: 4.19 M/UL (ref 3.8–5.4)
ERYTHROCYTE [DISTWIDTH] IN BLOOD: 13.3 % (ref 11.5–15.5)
GLUCOSE SERPL-MCNC: 165 MG/DL (ref 74–99)
HCT VFR BLD AUTO: 36.5 % (ref 34–46)
HGB BLD-MCNC: 12.1 GM/DL (ref 11.4–16)
LYMPHOCYTES # SPEC AUTO: 2.2 K/UL (ref 1–4.8)
LYMPHOCYTES NFR SPEC AUTO: 30 %
MAGNESIUM SPEC-SCNC: 1.9 MG/DL (ref 1.6–2.3)
MCH RBC QN AUTO: 28.9 PG (ref 25–35)
MCHC RBC AUTO-ENTMCNC: 33.1 G/DL (ref 31–37)
MCV RBC AUTO: 87.1 FL (ref 80–100)
MONOCYTES # BLD AUTO: 0.4 K/UL (ref 0–1)
MONOCYTES NFR BLD AUTO: 5 %
NEUTROPHILS # BLD AUTO: 4.5 K/UL (ref 1.3–7.7)
NEUTROPHILS NFR BLD AUTO: 61 %
PLATELET # BLD AUTO: 199 K/UL (ref 150–450)
POTASSIUM SERPL-SCNC: 4.8 MMOL/L (ref 3.5–5.1)
PROT SERPL-MCNC: 7.7 G/DL (ref 6.3–8.2)
SODIUM SERPL-SCNC: 138 MMOL/L (ref 137–145)
WBC # BLD AUTO: 7.3 K/UL (ref 3.8–10.6)

## 2022-08-05 PROCEDURE — 90935 HEMODIALYSIS ONE EVALUATION: CPT

## 2022-08-05 PROCEDURE — 83735 ASSAY OF MAGNESIUM: CPT

## 2022-08-05 PROCEDURE — 99285 EMERGENCY DEPT VISIT HI MDM: CPT

## 2022-08-05 PROCEDURE — 80048 BASIC METABOLIC PNL TOTAL CA: CPT

## 2022-08-05 PROCEDURE — 36415 COLL VENOUS BLD VENIPUNCTURE: CPT

## 2022-08-05 PROCEDURE — 70450 CT HEAD/BRAIN W/O DYE: CPT

## 2022-08-05 PROCEDURE — 96365 THER/PROPH/DIAG IV INF INIT: CPT

## 2022-08-05 PROCEDURE — 80320 DRUG SCREEN QUANTALCOHOLS: CPT

## 2022-08-05 PROCEDURE — 80053 COMPREHEN METABOLIC PANEL: CPT

## 2022-08-05 PROCEDURE — 93005 ELECTROCARDIOGRAM TRACING: CPT

## 2022-08-05 PROCEDURE — 85025 COMPLETE CBC W/AUTO DIFF WBC: CPT

## 2022-08-05 PROCEDURE — 96375 TX/PRO/DX INJ NEW DRUG ADDON: CPT

## 2022-08-05 RX ADMIN — HEPARIN SODIUM SCH UNIT: 5000 INJECTION INTRAVENOUS; SUBCUTANEOUS at 22:45

## 2022-08-05 RX ADMIN — METOPROLOL TARTRATE SCH MG: 25 TABLET, FILM COATED ORAL at 22:41

## 2022-08-05 RX ADMIN — LEVETIRACETAM SCH MLS/HR: 100 INJECTION, SOLUTION, CONCENTRATE INTRAVENOUS at 23:41

## 2022-08-05 RX ADMIN — POTASSIUM CHLORIDE SCH MEQ: 20 TABLET, EXTENDED RELEASE ORAL at 22:41

## 2022-08-05 NOTE — CT
EXAMINATION TYPE: CT brain wo con

 

DATE OF EXAM: 8/5/2022

 

COMPARISON: None

 

HISTORY: seizure activity

 

CT DLP: 1146.4 mGycm

Automated exposure control for dose reduction was used.

 

Images of the brain obtained with no contrast.

 

Ventricles and sulci appear normal. There is no mass effect or midline shift. No sign of intracranial
 hemorrhage. The calvarium is intact. No evidence of cerebral edema. There is normal aeration of the 
mastoid sinuses. There is a 1 cm hypodensity in the anterior left thalamus consistent with old lacuna
r infarct. There is hypodensity in the inferior cerebellar hemispheres bilaterally.

 

IMPRESSION:

No acute intracranial abnormality. Old left thalamic lacunar infarct without change. Bilateral old in
ferior cerebellar cortical infarcts.

## 2022-08-05 NOTE — ED
General Adult HPI





- General


Chief complaint: Seizure


Stated complaint: Seizure


Time Seen by Provider: 22 16:26


Source: EMS, RN notes reviewed, old records reviewed


Mode of arrival: EMS


Limitations: no limitations, altered mental status





- History of Present Illness


Initial comments: 





Patient is a 52-year-old female with past medical history remarkable for ESRD on

hemodialysis, prior CVA, chest pain, hypertension, memory impairment, seizure 

disorder presents emergency Department following a seizure.  Was seen last night

with similar complaint.  Discharge home.  Was at dialysis today and had another 

seizure.  Presents postictal following that seizure.  Currently still somewhat 

confused.  Has no acute complaints at this time.  States she completed her 

hemodialysis.  Presents for further evaluation at this time.





- Related Data


                                Home Medications











 Medication  Instructions  Recorded  Confirmed


 


Atorvastatin [Lipitor] 40 mg PO DAILY 21


 


Dulaglutide [Trulicity] 3 mg SQ Q7D 21


 


Lacosamide [Vimpat] 150 mg PO AS DIRECTED 21


 


Lacosamide [Vimpat] 150 mg PO AS DIRECTED 21


 


Sevelamer [Renvela] 800 - 1,600 mg PO AS DIRECTED 21


 


Vascepa 1gm 1 gm PO BID 21


 


Calcium Acetate 2,001 mg PO TID-W/MEALS 22


 


Metoprolol Tartrate [Lopressor] 25 mg PO BID 22


 


Pantoprazole [Protonix] 40 mg PO DAILY 22


 


Potassium Chloride ER [K-Dur 20] 20 meq PO BID 22


 


levETIRAcetam [Keppra] 500 mg PO BID 22








                                  Previous Rx's











 Medication  Instructions  Recorded


 


Clopidogrel [Plavix] 75 mg PO DAILY #30 tab 21


 


amLODIPine [Norvasc] 10 mg PO DAILY #30 tab 21











                                    Allergies











Allergy/AdvReac Type Severity Reaction Status Date / Time


 


Penicillins Allergy  Itching Verified 22 14:55














Review of Systems


ROS Statement: 


Those systems with pertinent positive or pertinent negative responses have been 

documented in the HPI.


Review of Systems:


CONST: Denies fever 


EYES: Denies blurry vision 


ENT: Denies nasal congestion  


C/V:  Denies Chest pain


RESP: Denies shortness of breath 


GI: Denies abdominal pain 


: Denies dysuria  


SKIN: Denies rash.


MSK: Denies joint pain.


NEURO: Denies headache 


ROS Other: All systems not noted in ROS Statement are negative.





Past Medical History


Past Medical History: Chest Pain / Angina, CVA/TIA, Diabetes Mellitus, 

GERD/Reflux, Hyperlipidemia, Hypertension, Memory Impairment, Osteoarthritis 

(OA), Renal Disease, Seizure Disorder, Thyroid Disorder


Additional Past Medical History / Comment(s): Last seizure approximately one 

month ago. Spouse states she used to have seizures 4X per week until she had 

vagal nerve stimulator placed, now has seizures approximately once a week to 

once a month. Dialysis MOWEFR. Neuropathy, left drop foot, only able to walk 

short distances, otherwise uses wheelchair. 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 

ago, with residual memory impairment. Never diagnosed with Sleep Apnea but 

spouse states she does stop breathing through the night and he has to shake her 

to start breathing again. Varicose veins.


History of Any Multi-Drug Resistant Organisms: None Reported


Past Surgical History:  Section, Tonsillectomy, Uterine Ablation


Additional Past Surgical History / Comment(s): Left arm fistula, loop recorder, 

vagus nerve stimulator.


Past Anesthesia/Blood Transfusion Reactions: Motion Sickness


Additional Past Anesthesia/Blood Transfusion Reaction / Comment(s): Family hx 

unknown, pt adopted.


Past Psychological History: Bipolar


Smoking Status: Former smoker


Past Alcohol Use History: None Reported


Additional Past Alcohol Use History / Comment(s): Quit smoking in .


Past Drug Use History: None Reported





- Past Family History


  ** Father


Family Medical History: Unable to Obtain


Additional Family Medical History / Comment(s): Pt adopted.





General Exam





- General Exam Comments


Initial Comments: 





General: Appears in no acute distress.


HEAD:  Normal with no signs of head trauma.


EYES:  PERRLA, EOMI, conjunctiva normal, no discharge.  Pupils 3mm and equal 

bilaterally.


ENT:  Hearing grossly intact, normal oropharynx.


RESPIRATORY:  Clear breath sounds bilaterally.  No wheezes, rales, or rhonchi.  


C/V:  Regular rate and rhythm. S1 and S2 auscultated, peripheral pulses 2+ and 

intact throughout


ABD:  Abd is soft, nontender, nondistended


EXT: Normal range of motion, no obvious deformity


SKIN:  No rashes or lesions observed on exposed skin.


NEURO: Alert and oriented 2.  Still appears somewhat confused and post ictal.  

No obvious focal deficits.  Appears postictal.


Limitations: no limitations, altered mental status





Course


                                   Vital Signs











  22





  16:15 17:24 21:14


 


Temperature 98.0 F  


 


Pulse Rate 106 H 104 H 84


 


Respiratory 18 20 





Rate   


 


Blood Pressure 177/103 180/95 125/78


 


O2 Sat by Pulse 97 97 





Oximetry   














Medical Decision Making





- Medical Decision Making





Based on The patient's presentation and physical exam, I'm concerned for 

continued postictal state as well as another breakthrough seizure.  Did recommen

d we obtain basic laboratory studies as well as obtain a CT brain is the second 

seizure in one day.  She was in agreement this plan.  We will provide her with a

 dose of IV didn't have as well as Keppra.





EKG shows no signs of acute ischemia.  Prior to return of laboratory studies, 

the patient experiencing another seizure.  It stopped with 2 mg IV Ativan.  

We'll continue to monitor.





CT brain shows no acute intracranial process.  There are old infarct present.  

Laboratory studies are remarkable for elevated creatinine in the setting of ESRD

 on hemodialysis.  A call level is undetectable.  Remainder of the labs are 

unremarkable.





On reevaluation come patient remains postictal at this time.  As she had a third

 total breakthrough seizure today, I did recommend that we admit her to the 

hospital.  Neurology will be consulted.  I spoke with the admitting team under 

Dr. Multani accepted the patient.  Patient was admitted in stable condition.





Vital Signs remain within normal limits. He remains postictal on reevaluation, 

as well as sedated from the Ativan.  We'll continue to monitor.





On re-evaluation again, patient remains somewhat tired, however was arousable to

 shaking.  We'll continue to monitor on an inpatient basis.  Home antiepileptics

 were restarted in IV form.





- Lab Data


Result diagrams: 


                                 22 17:03





                                 22 17:03


                                   Lab Results











  22 Range/Units





  17:03 17:03 


 


WBC  7.3   (3.8-10.6)  k/uL


 


RBC  4.19   (3.80-5.40)  m/uL


 


Hgb  12.1   (11.4-16.0)  gm/dL


 


Hct  36.5   (34.0-46.0)  %


 


MCV  87.1   (80.0-100.0)  fL


 


MCH  28.9   (25.0-35.0)  pg


 


MCHC  33.1   (31.0-37.0)  g/dL


 


RDW  13.3   (11.5-15.5)  %


 


Plt Count  199   (150-450)  k/uL


 


MPV  8.9   


 


Neutrophils %  61   %


 


Lymphocytes %  30   %


 


Monocytes %  5   %


 


Eosinophils %  1   %


 


Basophils %  1   %


 


Neutrophils #  4.5   (1.3-7.7)  k/uL


 


Lymphocytes #  2.2   (1.0-4.8)  k/uL


 


Monocytes #  0.4   (0-1.0)  k/uL


 


Eosinophils #  0.1   (0-0.7)  k/uL


 


Basophils #  0.1   (0-0.2)  k/uL


 


Sodium   138  (137-145)  mmol/L


 


Potassium   4.8  (3.5-5.1)  mmol/L


 


Chloride   97 L  ()  mmol/L


 


Carbon Dioxide   29  (22-30)  mmol/L


 


Anion Gap   12  mmol/L


 


BUN   15  (7-17)  mg/dL


 


Creatinine   4.10 H  (0.52-1.04)  mg/dL


 


Est GFR (CKD-EPI)AfAm   13  (>60 ml/min/1.73 sqM)  


 


Est GFR (CKD-EPI)NonAf   12  (>60 ml/min/1.73 sqM)  


 


Glucose   165 H  (74-99)  mg/dL


 


Calcium   9.2  (8.4-10.2)  mg/dL


 


Magnesium   1.9  (1.6-2.3)  mg/dL


 


Total Bilirubin   0.9  (0.2-1.3)  mg/dL


 


AST   34  (14-36)  U/L


 


ALT   17  (4-34)  U/L


 


Alkaline Phosphatase   110  ()  U/L


 


Total Protein   7.7  (6.3-8.2)  g/dL


 


Albumin   4.4  (3.5-5.0)  g/dL


 


Serum Alcohol   <10  mg/dL














- EKG Data


-: EKG Interpreted by Me


EKG Comments: 





12-lead Electrocardiogram Interpretation Note





EKG was reviewed and interpreted by myself. 12-lead ECG performed at 1752 is 

interpreted by me as revealing sinus tachycardia at a rate of 100 beats per 

minute.  Axis is normal.  LA interval is 169 ms, QRS duration is 89 ms, QTc is 

435 ms..  There were no ST or T wave abnormalities to suggest myocardial 

ischemia or injury. R wave progression across the precordium was satisfactory. 

By my interpretation this EKG is non-diagnostic for acute ischemia.





Disposition


Clinical Impression: 


 Breakthrough seizure, Post-ictal state





Disposition: ADMITTED AS IP TO THIS HOSP


Condition: Stable


Time of Disposition: 18:45

## 2022-08-06 LAB
ANION GAP SERPL CALC-SCNC: 13.2 MMOL/L (ref 10–18)
BASOPHILS # BLD AUTO: 0.07 X 10*3/UL (ref 0–0.1)
BASOPHILS NFR BLD AUTO: 1 %
BUN SERPL-SCNC: 18.7 MG/DL (ref 9–27)
BUN/CREAT SERPL: 3.22 RATIO (ref 12–20)
CALCIUM SPEC-MCNC: 8.9 MG/DL (ref 8.7–10.3)
CHLORIDE SERPL-SCNC: 99 MMOL/L (ref 96–109)
CO2 SERPL-SCNC: 27.8 MMOL/L (ref 20–27.5)
EOSINOPHIL # BLD AUTO: 0.08 X 10*3/UL (ref 0.04–0.35)
EOSINOPHIL NFR BLD AUTO: 1.1 %
ERYTHROCYTE [DISTWIDTH] IN BLOOD BY AUTOMATED COUNT: 3.53 X 10*6/UL (ref 4.1–5.2)
ERYTHROCYTE [DISTWIDTH] IN BLOOD: 13.1 % (ref 11.5–14.5)
GLUCOSE SERPL-MCNC: 127 MG/DL (ref 70–110)
HCT VFR BLD AUTO: 31.6 % (ref 37.2–46.3)
HGB BLD-MCNC: 10 G/DL (ref 12–15)
IMM GRANULOCYTES BLD QL AUTO: 0.3 %
LYMPHOCYTES # SPEC AUTO: 2.12 X 10*3/UL (ref 0.9–5)
LYMPHOCYTES NFR SPEC AUTO: 29.9 %
MCH RBC QN AUTO: 28.3 PG (ref 27–32)
MCHC RBC AUTO-ENTMCNC: 31.6 G/DL (ref 32–37)
MCV RBC AUTO: 89.5 FL (ref 80–97)
MONOCYTES # BLD AUTO: 0.58 X 10*3/UL (ref 0.2–1)
MONOCYTES NFR BLD AUTO: 8.2 %
NEUTROPHILS # BLD AUTO: 4.23 X 10*3/UL (ref 1.8–7.7)
NEUTROPHILS NFR BLD AUTO: 59.5 %
NRBC BLD AUTO-RTO: 0 /100 WBCS (ref 0–0)
PLATELET # BLD AUTO: 163 X 10*3/UL (ref 140–440)
POTASSIUM SERPL-SCNC: 6.9 MMOL/L (ref 3.5–5.5)
SODIUM SERPL-SCNC: 140 MMOL/L (ref 135–145)
WBC # BLD AUTO: 7.1 X 10*3/UL (ref 4.5–10)

## 2022-08-06 PROCEDURE — 5A1D70Z PERFORMANCE OF URINARY FILTRATION, INTERMITTENT, LESS THAN 6 HOURS PER DAY: ICD-10-PCS

## 2022-08-06 RX ADMIN — Medication SCH: at 13:50

## 2022-08-06 RX ADMIN — METOPROLOL TARTRATE SCH MG: 25 TABLET, FILM COATED ORAL at 21:19

## 2022-08-06 RX ADMIN — LEVETIRACETAM SCH MLS/HR: 100 INJECTION, SOLUTION, CONCENTRATE INTRAVENOUS at 22:10

## 2022-08-06 RX ADMIN — HEPARIN SODIUM SCH UNIT: 5000 INJECTION INTRAVENOUS; SUBCUTANEOUS at 23:30

## 2022-08-06 RX ADMIN — DIVALPROEX SODIUM SCH: 250 TABLET, FILM COATED, EXTENDED RELEASE ORAL at 17:06

## 2022-08-06 RX ADMIN — PANTOPRAZOLE SODIUM SCH MG: 40 TABLET, DELAYED RELEASE ORAL at 08:14

## 2022-08-06 RX ADMIN — HEPARIN SODIUM SCH UNIT: 5000 INJECTION INTRAVENOUS; SUBCUTANEOUS at 08:15

## 2022-08-06 RX ADMIN — POTASSIUM CHLORIDE SCH: 20 TABLET, EXTENDED RELEASE ORAL at 13:51

## 2022-08-06 RX ADMIN — DIVALPROEX SODIUM SCH MG: 250 TABLET, FILM COATED, EXTENDED RELEASE ORAL at 21:20

## 2022-08-06 RX ADMIN — LACOSAMIDE SCH: 10 INJECTION INTRAVENOUS at 15:42

## 2022-08-06 RX ADMIN — LACOSAMIDE SCH MLS/HR: 10 INJECTION INTRAVENOUS at 05:55

## 2022-08-06 RX ADMIN — Medication SCH: at 18:45

## 2022-08-06 RX ADMIN — POTASSIUM CHLORIDE SCH MEQ: 20 TABLET, EXTENDED RELEASE ORAL at 21:19

## 2022-08-06 RX ADMIN — Medication SCH MG: at 08:14

## 2022-08-06 RX ADMIN — LACOSAMIDE SCH MLS/HR: 10 INJECTION INTRAVENOUS at 21:44

## 2022-08-06 RX ADMIN — LEVETIRACETAM SCH: 100 INJECTION, SOLUTION, CONCENTRATE INTRAVENOUS at 17:07

## 2022-08-06 RX ADMIN — HEPARIN SODIUM SCH: 5000 INJECTION INTRAVENOUS; SUBCUTANEOUS at 17:07

## 2022-08-06 RX ADMIN — METOPROLOL TARTRATE SCH MG: 25 TABLET, FILM COATED ORAL at 08:14

## 2022-08-06 RX ADMIN — LEVETIRACETAM SCH MLS/HR: 100 INJECTION, SOLUTION, CONCENTRATE INTRAVENOUS at 08:23

## 2022-08-06 RX ADMIN — CLOPIDOGREL BISULFATE SCH MG: 75 TABLET ORAL at 08:14

## 2022-08-06 RX ADMIN — POTASSIUM CHLORIDE SCH MEQ: 20 TABLET, EXTENDED RELEASE ORAL at 08:14

## 2022-08-06 RX ADMIN — ATORVASTATIN CALCIUM SCH MG: 40 TABLET, FILM COATED ORAL at 08:14

## 2022-08-06 NOTE — P.PN
Subjective


Progress Note Date: 08/06/22





52-year-old female with past medical history remarkable for ESRD on 

hemodialysis, prior CVA, chest pain, hypertension, memory impairment, seizure 

disorder presents emergency Department following a seizure.  Was seen last night

with similar complaint.  Discharge home.  Was at dialysis today and had another 

seizure.  Presents postictal following that seizure.  Currently still somewhat 

confused.  Has no acute complaints at this time.  States she completed her 

hemodialysis.  Presents for further evaluation at this time.


CBC with differential is unremarkable


Chemistry panel is creatinine is 4.10, glucose is 165 the and a chloride is 97 

otherwise source of his Chemisty panel is unremarkable


CT of the head is reported as no acute intracranial abnormality.  Old left 

dominant lacunar infarct without change.  Bilateral ordered inferior cerebellar 

cortical infarct.  





Objective





- Vital Signs


Vital signs: 


                                   Vital Signs











Temp  97.7 F   08/06/22 08:00


 


Pulse  79   08/06/22 08:00


 


Resp  18   08/06/22 02:00


 


BP  150/84   08/06/22 08:00


 


Pulse Ox  100   08/06/22 08:00


 


FiO2      








                                 Intake & Output











 08/05/22 08/06/22 08/06/22





 18:59 06:59 18:59


 


Output Total  0 


 


Balance  0 


 


Weight 68.039 kg 68.039 kg 


 


Output:   


 


  Urine  0 














- Exam











PHYSICAL EXAMINATION: 





GENERAL: The patient is alert and oriented x3, not in any acute distress. Well 

developed, well nourished. 


HEENT: Pupils are round and equally reacting to light. EOMI. No scleral icterus.

No conjunctival pallor. Normocephalic, atraumatic. No pharyngeal erythema. No 

thyromegaly. 


CARDIOVASCULAR: S1 and S2 present. No murmurs, rubs, or gallops. 


PULMONARY: Chest is clear to auscultation, no wheezing or crackles. 


ABDOMEN: Soft, nontender, nondistended, normoactive bowel sounds. No palpable 

organomegaly. 


MUSCULOSKELETAL: No joint swelling or deformity.


EXTREMITIES: No cyanosis, clubbing, or pedal edema. 


NEUROLOGICAL: Gross neurological examination did not reveal any focal deficits. 


SKIN: No rashes. 











- Labs


CBC & Chem 7: 


                                 08/06/22 08:08





                                 08/06/22 08:08


Labs: 


                  Abnormal Lab Results - Last 24 Hours (Table)











  08/05/22 08/06/22 Range/Units





  17:03 08:08 


 


RBC   3.53 L  (4.10-5.20)  X 10*6/uL


 


Hgb   10.0 L  (12.0-15.0)  g/dL


 


Hct   31.6 L  (37.2-46.3)  %


 


MCHC   31.6 L  (32.0-37.0)  g/dL


 


Chloride  97 L   ()  mmol/L


 


Creatinine  4.10 H   (0.52-1.04)  mg/dL


 


Glucose  165 H   (74-99)  mg/dL














Assessment and Plan


Assessment: 





1.  Breakthrough seizures


- Patient has a long-standing history of epilepsy


In the ED the patient was given Ativan 2 mg once when the and was given a 

loading dose of Keppra 2 g once.  As well as it appears she got a total of 300 

mg of Vimpat yesterday in the ED


Will restart the patient home medication of Keppra 500mg 1 tab bid, Vimpat 150mg

1 tab bid with additional Vimpat on her dialysis days (M,W,F).  I will also add 

Keppra 500mg 1 tab post dialysis day.  I highly recommend to restart patient on 

Onfi that she was on in past by her former neurologist to assess if that was 

helping but it seems was discontinue with current neurology team.  In the mean 

team, I will start on Depakote 250mg 1 tab bi.  If Onfi is started as outpatient

and is helping with her seizures then can discontinue Depakote (since has a lot 

of side-effects).


Patient is on seizure precaution and seizure pads


Continue neuro checks





2.  Diabetes mellitus; monitor Accu-Cheks before meals and at bedtime with 

insulin sliding scale; Trulicity 3 mg subcu every 7 days





3.  Hypertension; metoprolol 25 mg twice a day; Norvasc 10 mg daily





4.  End-stage renal disease/HD; consult nephrology for hemodialysis; continue 

with home dose of PhosLo





5.  CAD; stable on aspirin, statins, beta blockers and Plavix





6.  Hyperlipidemia; Lipitor 40 mg by mouth daily at bedtime





DVT prophylaxis; SCDs/subcu heparin


CODE STATUS; full code

## 2022-08-06 NOTE — P.HPIM
History of Present Illness


Chief Complaint: Altered mental status/seizures





52-year-old female with past medical history remarkable for ESRD on hem

odialysis, prior CVA, chest pain, hypertension, memory impairment, seizure 

disorder presents emergency Department following a seizure.  Was seen last night

with similar complaint.  Discharge home.  Was at dialysis today and had another 

seizure.  Presents postictal following that seizure.  Currently still somewhat 

confused.  Has no acute complaints at this time.  States she completed her 

hemodialysis.  Presents for further evaluation at this time.


CBC with differential is unremarkable


Chemistry panel is creatinine is 4.10, glucose is 165 the and a chloride is 97 

otherwise source of his Chemisty panel is unremarkable


CT of the head is reported as no acute intracranial abnormality.  Old left do

minant lacunar infarct without change.  Bilateral ordered inferior cerebellar 

cortical infarct.  I personally reviewed the CT of the head and I agree with the

report








Review of Systems











REVIEW OF SYSTEMS: 


CONSTITUTIONAL: No fever, no malaise, no fatigue. 


HEENT: No recent visual problems or hearing problems. Denied any sore throat. 


CARDIOVASCULAR: No chest pain, orthopnea, PND, no palpitations, no syncope. 


PULMONARY: No shortness of breath, no cough, no hemoptysis. 


GASTROINTESTINAL: No diarrhea, no nausea, no vomiting, no abdominal pain. 


NEUROLOGICAL: No headaches, no weakness, no numbness. 


HEMATOLOGICAL: Denies any bleeding or petechiae. 


GENITOURINARY: Denies any burning micturition, frequency, or urgency. 


MUSCULOSKELETAL/RHEUMATOLOGICAL: Denies any joint pain, swelling, or any muscle 

pain. 


ENDOCRINE: Denies any polyuria or polydipsia. 





The rest of the 14-point review of systems is negative.





Past Medical History


Past Medical History: Chest Pain / Angina, CVA/TIA, Diabetes Mellitus, 

GERD/Reflux, Hyperlipidemia, Hypertension, Memory Impairment, Osteoarthritis 

(OA), Renal Disease, Seizure Disorder, Thyroid Disorder


Additional Past Medical History / Comment(s): Last seizure approximately one 

month ago. Spouse states she used to have seizures 4X per week until she had 

vagal nerve stimulator placed, now has seizures approximately once a week to on

ce a month. Dialysis MOWEFR. Neuropathy, left drop foot, only able to walk short

distances, otherwise uses wheelchair. Hx CVAs and TIAs, starting 12 yrs ago, 

with residual memory impairment. Never diagnosed with Sleep Apnea but spouse 

states she does stop breathing through the night and he has to shake her to 

start breathing again. Varicose veins.


History of Any Multi-Drug Resistant Organisms: None Reported


Past Surgical History:  Section, Tonsillectomy, Uterine Ablation


Additional Past Surgical History / Comment(s): Left arm fistula, loop recorder, 

vagus nerve stimulator.


Past Anesthesia/Blood Transfusion Reactions: Motion Sickness


Additional Past Anesthesia/Blood Transfusion Reaction / Comment(s): Family hx 

unknown, pt adopted.


Past Psychological History: Bipolar


Smoking Status: Former smoker


Past Alcohol Use History: None Reported


Additional Past Alcohol Use History / Comment(s): Quit smoking in .


Past Drug Use History: None Reported





- Past Family History


  ** Father


Family Medical History: Unable to Obtain


Additional Family Medical History / Comment(s): Pt adopted.





Medications and Allergies


                                Home Medications











 Medication  Instructions  Recorded  Confirmed  Type


 


Clopidogrel [Plavix] 75 mg PO DAILY #30 tab 21 Rx


 


amLODIPine [Norvasc] 10 mg PO DAILY #30 tab 21 Rx


 


Atorvastatin [Lipitor] 40 mg PO DAILY 21 History


 


Dulaglutide [Trulicity] 3 mg SQ Q7D 21 History


 


Lacosamide [Vimpat] 150 mg PO AS DIRECTED 21 History


 


Lacosamide [Vimpat] 150 mg PO AS DIRECTED 21 History


 


Sevelamer [Renvela] 800 - 1,600 mg PO AS DIRECTED 21 History


 


Vascepa 1gm 1 gm PO BID 21 History


 


Calcium Acetate 2,001 mg PO TID-W/MEALS 22 History


 


Metoprolol Tartrate [Lopressor] 25 mg PO BID 22 History


 


Pantoprazole [Protonix] 40 mg PO DAILY 22 History


 


Potassium Chloride ER [K-Dur 20] 20 meq PO BID 22 History


 


levETIRAcetam [Keppra] 500 mg PO BID 22 History








                                    Allergies











Allergy/AdvReac Type Severity Reaction Status Date / Time


 


Penicillins Allergy  Itching Verified 22 14:55














Physical Exam


Vitals: 


                                   Vital Signs











  Temp Pulse Resp BP Pulse Ox


 


 22 17:24   104 H  20  180/95  97


 


 22 16:15  98.0 F  106 H  18  177/103  97








                                Intake and Output











 22





 06:59 14:59 22:59


 


Other:   


 


  Weight   68.039 kg




















PHYSICAL EXAMINATION: 





GENERAL: The patient is alert and oriented x3, not in any acute distress. Well 

developed, well nourished. 


HEENT: Pupils are round and equally reacting to light. EOMI. No scleral icterus.

No conjunctival pallor. Normocephalic, atraumatic. No pharyngeal erythema. No 

thyromegaly. 


CARDIOVASCULAR: S1 and S2 present. No murmurs, rubs, or gallops. 


PULMONARY: Chest is clear to auscultation, no wheezing or crackles. 


ABDOMEN: Soft, nontender, nondistended, normoactive bowel sounds. No palpable 

organomegaly. 


MUSCULOSKELETAL: No joint swelling or deformity.


EXTREMITIES: No cyanosis, clubbing, or pedal edema. 


NEUROLOGICAL: Gross neurological examination did not reveal any focal deficits. 


SKIN: No rashes. 











Results


CBC & Chem 7: 


                                 22 08:08





                                 22 08:08


Labs: 


                  Abnormal Lab Results - Last 24 Hours (Table)











  22 Range/Units





  17:03 


 


Chloride  97 L  ()  mmol/L


 


Creatinine  4.10 H  (0.52-1.04)  mg/dL


 


Glucose  165 H  (74-99)  mg/dL














Assessment and Plan


Assessment: 





1.  Breakthrough seizures


- Patient has a long-standing history of epilepsy


In the ED the patient was given Ativan 2 mg once when the and was given a 

loading dose of Keppra 2 g once.  As well as it appears she got a total of 300 

mg of Vimpat yesterday in the ED


Will restart the patient home medication of Keppra 500mg 1 tab bid, Vimpat 150mg

1 tab bid with additional Vimpat on her dialysis days (M,W,F).  I will also add 

Keppra 500mg 1 tab post dialysis day.  I highly recommend to restart patient on 

Onfi that she was on in past by her former neurologist to assess if that was 

helping but it seems was discontinue with current neurology team.  In the mean 

team, I will start on Depakote 250mg 1 tab bi.  If Onfi is started as outpatient

and is helping with her seizures then can discontinue Depakote (since has a lot 

of side-effects).


Patient is on seizure precaution and seizure pads


Continue neuro checks





2.  Diabetes mellitus; monitor Accu-Cheks before meals and at bedtime with 

insulin sliding scale; Trulicity 3 mg subcu every 7 days





3.  Hypertension; metoprolol 25 mg twice a day; Norvasc 10 mg daily





4.  End-stage renal disease/HD; consult nephrology for hemodialysis; continue 

with home dose of PhosLo





5.  CAD; stable on aspirin, statins, beta blockers and Plavix





6.  Hyperlipidemia; Lipitor 40 mg by mouth daily at bedtime





DVT prophylaxis; SCDs/subcu heparin


CODE STATUS; full code

## 2022-08-06 NOTE — P.CNNES
History of Present Illness


Consult date: 22


Requesting physician: Jaiden Curtis


Reason for Consult: multiple breakthrough seizure, postictal


History of Present Illness: 


This is a 52-year-old woman with medical history of long standing epilepsy on 

VNS, CVA with left hemiparesis, end-stage renal disease on dialysis, diabetes, 

hypertension who presented to our emergency department on 2022 for 

breakthrough seizure.  


Patient is known to our neurology service.  Seems the patient was a here 2 days 

ago in our ED for seizure and was discharged home and had dialysis yesterday and

had another seizure and was in post ictal state.  It seems of the patient's co

mplete her hemodialysis per the ED team.  I spoke with the patient's  and

he stated the patient is following-up with Dr. Nelson's team rather Dr. Ashby 

(Neurologist at [facility]) since wanted something closer and patient has been 

having her VNS modified and not longer haver her on Onfi.  Her home 

antiepileptic medications are Keppra 500 mg 1 tablet twice a day, Vimpat 150 mg 

1 tab bid.  But post dialysis Vimpat 150mg additional dose but not Keppra and 

not sure why not.


Per the nurse she has been doing better since in our facility and no further 

seizures.





I personally saw the patient last in our facility on 2021 for her 

breakthrough seizure.  Please refer to our notes for further details.





Some other workup during this hospital visit consisted of: 


Patient has been afebrile so far


CBC with differential is unremarkable


Chemistry panel is creatinine is 4.10, glucose is 165 the and a chloride is 97 

otherwise source of his Chemisty panel is unremarkable


CT of the head is reported as no acute intracranial abnormality.  Old left 

dominant lacunar infarct without change.  Bilateral ordered inferior cerebellar 

cortical infarct.  I personally reviewed the CT of the head and I agree with the

report








Review of Systems


Review of system:  The 12 point system was reviewed and apparent positive and 

negative per HPI.








Past Medical History


Past Medical History: Chest Pain / Angina, CVA/TIA, Diabetes Mellitus, 

GERD/Reflux, Hyperlipidemia, Hypertension, Memory Impairment, Osteoarthritis 

(OA), Renal Disease, Seizure Disorder, Thyroid Disorder


Additional Past Medical History / Comment(s): Last seizure approximately one 

month ago. Spouse states she used to have seizures 4X per week until she had 

vagal nerve stimulator placed, now has seizures approximately once a week to 

once a month. Dialysis MOWEFR. Neuropathy, left drop foot, only able to walk 

short distances, otherwise uses wheelchair. 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 

ago, with residual memory impairment. Never diagnosed with Sleep Apnea but 

spouse states she does stop breathing through the night and he has to shake her 

to start breathing again. Varicose veins.


History of Any Multi-Drug Resistant Organisms: None Reported


Past Surgical History:  Section, Tonsillectomy, Uterine Ablation


Additional Past Surgical History / Comment(s): Left arm fistula, loop recorder, 

vagus nerve stimulator.


Past Anesthesia/Blood Transfusion Reactions: Motion Sickness


Additional Past Anesthesia/Blood Transfusion Reaction / Comment(s): Family hx 

unknown, pt adopted.


Smoking Status: Former smoker





- Past Family History


  ** Father


Family Medical History: Unable to Obtain


Additional Family Medical History / Comment(s): Pt adopted.





Medications and Allergies


                                Home Medications











 Medication  Instructions  Recorded  Confirmed  Type


 


Clopidogrel [Plavix] 75 mg PO DAILY #30 tab 21 Rx


 


amLODIPine [Norvasc] 10 mg PO DAILY #30 tab 21 Rx


 


Atorvastatin [Lipitor] 40 mg PO DAILY 21 History


 


Dulaglutide [Trulicity] 3 mg SQ Q7D 21 History


 


Lacosamide [Vimpat] 150 mg PO AS DIRECTED 21 History


 


Lacosamide [Vimpat] 150 mg PO AS DIRECTED 21 History


 


Sevelamer [Renvela] 800 - 1,600 mg PO AS DIRECTED 21 History


 


Vascepa 1gm 1 gm PO BID 21 History


 


Calcium Acetate 2,001 mg PO TID-W/MEALS 22 History


 


Metoprolol Tartrate [Lopressor] 25 mg PO BID 22 History


 


Pantoprazole [Protonix] 40 mg PO DAILY 22 History


 


Potassium Chloride ER [K-Dur 20] 20 meq PO BID 22 History


 


levETIRAcetam [Keppra] 500 mg PO BID 22 History








                                    Allergies











Allergy/AdvReac Type Severity Reaction Status Date / Time


 


Penicillins Allergy  Itching Verified 22 14:55














Physical Examination





- Vital Signs


Vital Signs: 


                                   Vital Signs











  Temp Pulse Pulse Resp BP BP Pulse Ox


 


 22 08:00  97.7 F   79    150/84  100


 


 22 02:00  98.4 F   84  18   145/88  100


 


 22 23:43    84    141/87 


 


 22 22:29    83    155/94 


 


 22 22:14  98.4 F   80  18   172/95  100


 


 22 21:14   84    125/78  


 


 22 17:24   104 H   20  180/95   97


 


 22 16:15  98.0 F  106 H   18  177/103   97








                                Intake and Output











 22





 22:59 06:59 14:59


 


Output Total  0 


 


Balance  0 


 


Output:   


 


  Urine  0 


 


Other:   


 


  Weight 68.039 kg 68.039 kg 











GENERAL: The patient is lying in bed and is not in acute distress.


CHEST: The heart rate is regular rate rhythm.   No murmurs to auscultation.  


LUNG: Clear to auscultation bilaterally no wheezing noted throughout.  Not 

labored breathing.


ABDOMEN/GI: Bowel sounds present in all 4 quadrants. No tenderness to palpation 

throughout.





NEUROLOGICAL:


Higher mental function: The patient is slightly drowsy.  She is oriented to 

self, stated the year is  and she was in the hospital.  She is following 

simple commands.  Some delay in responding.    No aphasia and no neglect.


Cranial nerves: The pupils are round, equal and reactive to light.  Visual 

fields are full to confrontation throughout.  Extraocular movement is intact no 

nystagmus is noted.  Facial sensation is normal to touch throughout.  The facial

 strength is normal throughout.  Hearing is mildly decreased bilaterally to hand

 rub.  Tongue is midline and moved side-to-side without any difficulty.  No 

dysarthria is noted.  Shoulder shrug is normal bilaterally.


Motor: The strength is left sided is 2 (old) while right side is lifting above 

gravity and normal.  Slightly decrease tone over the left.  Normal bulk.  


Cerebellum: Normal finger to nose over the right.


Sensation: Sensation is normal to touch throughout.


Reflexes (right/left): 1+.


Plantars are mute bilaterally. 








Results





- Laboratory Findings


CBC and BMP: 


                                 22 08:08





                                 22 17:03


Abnormal Lab Findings: 


                                  Abnormal Labs











  22





  17:03


 


Chloride  97 L


 


Creatinine  4.10 H


 


Glucose  165 H














Assessment and Plan


Assessment: 





Breakthrough seizure


Long history of epilepsy and is on VNS


History of stroke with left hemiparesis 


Diabetes mellitus


End-stage renal disease on dialysis


Hypertension





Plan: 





In the ED the patient was given Ativan 2 mg once when the and was given a 

loading dose of Keppra 2 g once.  As well as it appears she got a total of 300 

mg of Vimpat yesterday in the ED


Will restart the patient home medication of Keppra 500mg 1 tab bid, Vimpat 150mg

 1 tab bid with additional Vimpat on her dialysis days (M,W,F).  I will also add

 Keppra 500mg 1 tab post dialysis day.  I highly recommend to restart patient on

 Onfi that she was on in past by her former neurologist to assess if that was 

helping but it seems was discontinue with current neurology team.  In the mean 

team, I will start on Depakote 250mg 1 tab bi.  If Onfi is started as outpatient

 and is helping with her seizures then can discontinue Depakote (since has a lot

 of side-effects).


Patient is on seizure precaution and seizure pads


Continue neuro checks


Defer the rest of the medical management to primary team


Per the [facility], patient is to avoid driving for 6 month until seizure-

free, avoid heights, swimming unassisted and the use any heavy machinery.


Upon discharge the patient needs to follow-up with her neurologist as an 

outpatient for 1-2 weeks regarding her seizures.





The plan is discussed with patient's  (via phone) and nurse.


Thank you for the consultation.





If the patient continues to be doing well by tomorrow then clear for discharge 

from neurological perspective.





Michele Merritt M.D.


Neuro-hospitalist








Time with Patient: Greater than 30

## 2022-08-07 RX ADMIN — HEPARIN SODIUM SCH: 5000 INJECTION INTRAVENOUS; SUBCUTANEOUS at 08:52

## 2022-08-07 RX ADMIN — LACOSAMIDE SCH MG: 150 TABLET, FILM COATED ORAL at 08:48

## 2022-08-07 RX ADMIN — PANTOPRAZOLE SODIUM SCH MG: 40 TABLET, DELAYED RELEASE ORAL at 08:48

## 2022-08-07 RX ADMIN — DIVALPROEX SODIUM SCH MG: 250 TABLET, FILM COATED, EXTENDED RELEASE ORAL at 08:49

## 2022-08-07 RX ADMIN — POTASSIUM CHLORIDE SCH: 20 TABLET, EXTENDED RELEASE ORAL at 09:38

## 2022-08-07 RX ADMIN — HEPARIN SODIUM SCH UNIT: 5000 INJECTION INTRAVENOUS; SUBCUTANEOUS at 23:24

## 2022-08-07 RX ADMIN — DIVALPROEX SODIUM SCH MG: 250 TABLET, FILM COATED, EXTENDED RELEASE ORAL at 21:55

## 2022-08-07 RX ADMIN — CLOPIDOGREL BISULFATE SCH MG: 75 TABLET ORAL at 08:48

## 2022-08-07 RX ADMIN — HEPARIN SODIUM SCH: 5000 INJECTION INTRAVENOUS; SUBCUTANEOUS at 16:18

## 2022-08-07 RX ADMIN — METOPROLOL TARTRATE SCH MG: 25 TABLET, FILM COATED ORAL at 08:48

## 2022-08-07 RX ADMIN — Medication SCH: at 21:57

## 2022-08-07 RX ADMIN — Medication SCH MG: at 12:19

## 2022-08-07 RX ADMIN — LACOSAMIDE SCH MG: 150 TABLET, FILM COATED ORAL at 21:55

## 2022-08-07 RX ADMIN — METOPROLOL TARTRATE SCH MG: 25 TABLET, FILM COATED ORAL at 21:57

## 2022-08-07 RX ADMIN — ATORVASTATIN CALCIUM SCH MG: 40 TABLET, FILM COATED ORAL at 08:48

## 2022-08-07 RX ADMIN — Medication SCH MG: at 08:48

## 2022-08-07 NOTE — P.PN
Subjective


Progress Note Date: 08/07/22


The patient is seen at bedside and states that she's doing better.  Per the 

patient's nurse no further seizures.








Objective





- Vital Signs


Vital signs: 


                                   Vital Signs











Temp  98.4 F   08/07/22 08:00


 


Pulse  81   08/07/22 08:00


 


Resp  17   08/07/22 02:00


 


BP  149/73   08/07/22 08:00


 


Pulse Ox  100   08/07/22 08:00


 


FiO2      








                                 Intake & Output











 08/06/22 08/07/22 08/07/22





 18:59 06:59 18:59


 


Intake Total 300  


 


Output Total 800  


 


Balance -500  


 


Intake:   


 


  Hemodialysis 300  


 


Output:   


 


  Hemodialysis 800  


 


Other:   


 


  # Voids 1 2 


 


  # Bowel Movements 1  














- Exam





GENERAL: The patient is lying in bed and is not in acute distress.








NEUROLOGICAL:


Higher mental function: The patient is awake, alert, oriented to self, stated 

she was in hospital.    She is following simple commands.  Some delay in 

responding.    No aphasia and no neglect.


Cranial nerves: The pupils are round, equal and reactive to light.  Visual 

fields are full to confrontation throughout.  Extraocular movement is intact no 

nystagmus is noted.  Facial sensation is normal to touch throughout.  The facial

strength is normal throughout.  Hearing is mildly decreased bilaterally to hand 

rub.  Tongue is midline and moved side-to-side without any difficulty.  No 

dysarthria is noted.  Shoulder shrug is normal bilaterally.


Motor: The strength is left sided is 3 (old) while right side is lifting above 

gravity and normal.  Slightly decrease tone over the left.  Normal bulk.  


Cerebellum: Normal finger to nose over the right.


Sensation: Sensation is normal to touch throughout.


Reflexes (right/left): 1+.


Plantars are mute bilaterally. 








- Labs


CBC & Chem 7: 


                                 08/06/22 08:08





                                 08/06/22 08:08


Labs: 


                  Abnormal Lab Results - Last 24 Hours (Table)











  08/06/22 Range/Units





  08:08 


 


Potassium  6.9 H*  (3.5-5.5)  mmol/L


 


Carbon Dioxide  27.8 H  (20.0-27.5)  mmol/L


 


Creatinine  5.8 H  (0.6-1.5)  mg/dL


 


Est GFR (CKD-EPI)AfAm  8.8 L  (60.0-200.0)   


 


Est GFR (CKD-EPI)NonAf  7.6 L  (60.0-200.0)   


 


BUN/Creatinine Ratio  3.22 L  (12.00-20.00)  Ratio


 


Glucose  127 H  ()  mg/dL














Assessment and Plan


Assessment: 





Breakthrough seizure


Long history of epilepsy and is on VNS


History of stroke with left hemiparesis 


Diabetes mellitus


End-stage renal disease on dialysis


Hypertension





Plan: 





Continue home medication of Keppra 500mg 1 tab bid, Vimpat 150mg 1 tab bid with 

additional Vimpat on her dialysis days (M,W,F).  I will also add Keppra 500mg 1 

tab post dialysis day.  I highly recommend to restart patient on Onfi (which we 

don not have in formulary in hospital. She was on in past by her former 

neurologist to assess if that was helping but it seems was discontinue with 

current neurology team).  In the mean team, I started on Depakote 250mg 1 tab 

bi.  If Onfi is started as outpatient and is helping with her seizures then can 

discontinue Depakote (since has a lot of side-effects).


Patient is on seizure precaution and seizure pads


Continue neuro checks


Defer the rest of the medical management to primary team


Per the [facility], patient is to avoid driving for 6 month until seizure-

free, avoid heights, swimming unassisted and the use any heavy machinery.


Upon discharge the patient needs to follow-up with her neurologist (Dr. Nelson's team) as an outpatient for 1-2 weeks regarding her seizures.





The plan is discussed with patient's  (via phone) and nurse.


No additional work-up needed. Will sign off. Please reconsult if any further 

concerns.





Michele Merritt M.D.


Neuro-hospitalist








Time with Patient: Less than 30

## 2022-08-08 VITALS — HEART RATE: 84 BPM | TEMPERATURE: 98.9 F | DIASTOLIC BLOOD PRESSURE: 76 MMHG | SYSTOLIC BLOOD PRESSURE: 143 MMHG

## 2022-08-08 VITALS — RESPIRATION RATE: 16 BRPM

## 2022-08-08 RX ADMIN — HEPARIN SODIUM SCH UNIT: 5000 INJECTION INTRAVENOUS; SUBCUTANEOUS at 16:16

## 2022-08-08 RX ADMIN — PANTOPRAZOLE SODIUM SCH MG: 40 TABLET, DELAYED RELEASE ORAL at 08:18

## 2022-08-08 RX ADMIN — Medication SCH MG: at 08:18

## 2022-08-08 RX ADMIN — Medication SCH MG: at 16:16

## 2022-08-08 RX ADMIN — Medication SCH MG: at 13:44

## 2022-08-08 RX ADMIN — DIVALPROEX SODIUM SCH MG: 250 TABLET, FILM COATED, EXTENDED RELEASE ORAL at 08:19

## 2022-08-08 RX ADMIN — ATORVASTATIN CALCIUM SCH MG: 40 TABLET, FILM COATED ORAL at 08:18

## 2022-08-08 RX ADMIN — LACOSAMIDE SCH MG: 150 TABLET, FILM COATED ORAL at 08:18

## 2022-08-08 RX ADMIN — CLOPIDOGREL BISULFATE SCH MG: 75 TABLET ORAL at 08:18

## 2022-08-08 RX ADMIN — HEPARIN SODIUM SCH UNIT: 5000 INJECTION INTRAVENOUS; SUBCUTANEOUS at 08:18

## 2022-08-08 RX ADMIN — METOPROLOL TARTRATE SCH MG: 25 TABLET, FILM COATED ORAL at 08:18

## 2022-08-08 NOTE — P.DS
Providers


Date of admission: 


08/05/22 18:55





Expected date of discharge: 08/08/22


Attending physician: 


Indu Multani MD





Consults: 





                                        





08/05/22 18:53


Consult Physician Routine 


   Consulting Provider: Michele Merritt


   Consult Reason/Comments: multiple breakthrough seizures, post ictal


   Do you want consulting provider notified?: Yes, Notify in am





08/05/22 19:46


Consult Physician Routine 


   Consulting Provider: Brennen Higginbotham


   Consult Reason/Comments: ESRD/HD


   Do you want consulting provider notified?: Yes











Primary care physician: 


Stated None





Hospital Course: 





52-year-old female with past medical history remarkable for ESRD on 

hemodialysis, prior CVA, chest pain, hypertension, memory impairment, seizure 

disorder presents emergency Department following a seizure.  Was seen last night

with similar complaint.  Discharge home.  Was at dialysis today and had another 

seizure.  Presents postictal following that seizure.  Currently still somewhat 

confused.  Has no acute complaints at this time.  States she completed her 

hemodialysis.  Presents for further evaluation at this time.


CBC with differential is unremarkable


Chemistry panel is creatinine is 4.10, glucose is 165 the and a chloride is 97 

otherwise source of his Chemisty panel is unremarkable


CT of the head is reported as no acute intracranial abnormality.  Old left 

dominant lacunar infarct without change.  Bilateral ordered inferior cerebellar 

cortical infarct.  





08/07/2022


Patient has been evaluated by neurology and is recommended to continue with 

current anti-epileptic therapy regimen


--- Continue home medication of Keppra 500mg 1 tab bid, Vimpat 150mg 1 tab bid 

with additional Vimpat on her dialysis days (M,W,F).  Neurology also added 

Keppra 500mg 1 tab post dialysis day and highly recommend to restart patient on 

Onfi (which we don not have in formulary in hospital. She was on in past by her 

former neurologist to assess if that was helping but it seems was discontinue 

with current neurology team).  In the mean team, patient is started on Depakote 

250mg 1 tab bi.  If Onfi is started as outpatient and is helping with her 

seizures then can discontinue Depakote (since has a lot of side-effects).


Patient is on seizure precaution and seizure pads


Continue neuro checks


Nephrology on board and patient is scheduled for hemodialysis tomorrow morning





08/08/2022


Patient is stable for discharge after hemodialysis


Patient Condition at Discharge: Stable





Plan - Discharge Summary


Discharge Rx Participant: No


New Discharge Prescriptions: 


New


   Divalproex ER [Depakote ER] 250 mg PO BID 30 Days #60 tab


   levETIRAcetam [Keppra] 500 mg PO MoWeFr@1600 30 Days #15 tab





Continue


   amLODIPine [Norvasc] 10 mg PO DAILY #30 tab


   Clopidogrel [Plavix] 75 mg PO DAILY #30 tab


   Atorvastatin [Lipitor] 40 mg PO DAILY


   Vascepa 1gm 1 gm PO BID


   Sevelamer [Renvela] 800 - 1,600 mg PO AS DIRECTED


   Lacosamide [Vimpat] 150 mg PO AS DIRECTED


   Calcium Acetate 2,001 mg PO TID-W/MEALS


   Pantoprazole [Protonix] 40 mg PO DAILY


   Potassium Chloride ER [K-Dur 20] 20 meq PO BID


   Dulaglutide [Trulicity] 3 mg SQ Q7D


   Lacosamide [Vimpat] 150 mg PO AS DIRECTED


   levETIRAcetam [Keppra] 500 mg PO BID


   Metoprolol Tartrate [Lopressor] 25 mg PO BID


Discharge Medication List





Clopidogrel [Plavix] 75 mg PO DAILY #30 tab 02/13/21 [Rx]


amLODIPine [Norvasc] 10 mg PO DAILY #30 tab 02/13/21 [Rx]


Atorvastatin [Lipitor] 40 mg PO DAILY 08/31/21 [History]


Dulaglutide [Trulicity] 3 mg SQ Q7D 08/31/21 [History]


Lacosamide [Vimpat] 150 mg PO AS DIRECTED 08/31/21 [History]


Lacosamide [Vimpat] 150 mg PO AS DIRECTED 08/31/21 [History]


Sevelamer [Renvela] 800 - 1,600 mg PO AS DIRECTED 08/31/21 [History]


Vascepa 1gm 1 gm PO BID 08/31/21 [History]


Calcium Acetate 2,001 mg PO TID-W/MEALS 08/05/22 [History]


Metoprolol Tartrate [Lopressor] 25 mg PO BID 08/05/22 [History]


Pantoprazole [Protonix] 40 mg PO DAILY 08/05/22 [History]


Potassium Chloride ER [K-Dur 20] 20 meq PO BID 08/05/22 [History]


levETIRAcetam [Keppra] 500 mg PO BID 08/05/22 [History]


Divalproex ER [Depakote ER] 250 mg PO BID 30 Days #60 tab 08/08/22 [Rx]


levETIRAcetam [Keppra] 500 mg PO MoWeFr@1600 30 Days #15 tab 08/08/22 [Rx]








Follow up Appointment(s)/Referral(s): 


None,Stated [Primary Care Provider] - 1 Week


Patient Instructions/Handouts:  Seizure/Epilepsy Discharge Instructions & 

Follow-Up


Discharge Disposition: HOME SELF-CARE

## 2022-08-08 NOTE — P.PN
Subjective





Patient is seen for follow-up for end-stage renal disease he ate she is 

maintained on a Monday Wednesday Friday schedule for hemodialysis.


Patient is scheduled for dialysis today.


She was admitted with breakthrough seizures post dialysis.  Medications have 

again been adjusted.  Patient is being followed by neurology.  She does get an 

extra dose with dialysis.





No significant complaints today.





Objective





- Vital Signs


Vital signs: 


                                   Vital Signs











Temp  98.3 F   08/08/22 08:00


 


Pulse  82   08/08/22 08:00


 


Resp  14   08/08/22 08:00


 


BP  159/70   08/08/22 08:00


 


Pulse Ox  100   08/08/22 08:00


 


FiO2      








                                 Intake & Output











 08/07/22 08/08/22 08/08/22





 18:59 06:59 18:59


 


Output Total 300 100 


 


Balance -300 -100 


 


Output:   


 


  Urine 300 100 


 


Other:   


 


  # Voids 2 2 


 


  # Bowel Movements 1 1 














- Exam





Patient is awake alert oriented 3


Examination of the heart S1 and S2


Examination lungs bilateral breath sounds are heard


Abdomen is soft nontender


Examination of the lower extremities shows no evidence of edema exam shows left 

sided weakness in upper and lower extremities.





- Labs


CBC & Chem 7: 


                                 08/06/22 08:08





                                 08/06/22 08:08





Assessment and Plan


Assessment: 





1.  End-stage renal disease on hemodialysis on a Monday Wednesday Friday 

schedule


2.  History of CVA with left-sided weakness 


3.  Seizure disorder with breakthrough seizures being followed by neurology


4. CK D mineral bone disorder


Plan: 





Hemodialysis today 


continue antiseizure medications as per neurology

## 2022-08-08 NOTE — P.PN
Subjective


Progress Note Date: 08/07/22


Principal diagnosis: 





Breakthrough seizures


End-stage renal disease/HD





52-year-old female with past medical history remarkable for ESRD on 

hemodialysis, prior CVA, chest pain, hypertension, memory impairment, seizure 

disorder presents emergency Department following a seizure.  Was seen last night

with similar complaint.  Discharge home.  Was at dialysis today and had another 

seizure.  Presents postictal following that seizure.  Currently still somewhat 

confused.  Has no acute complaints at this time.  States she completed her 

hemodialysis.  Presents for further evaluation at this time.


CBC with differential is unremarkable


Chemistry panel is creatinine is 4.10, glucose is 165 the and a chloride is 97 

otherwise source of his Chemisty panel is unremarkable


CT of the head is reported as no acute intracranial abnormality.  Old left 

dominant lacunar infarct without change.  Bilateral ordered inferior cerebellar 

cortical infarct.  





08/07/2022


Patient has been evaluated by neurology and is recommended to continue with cu

rrent anti-epileptic therapy regimen


--- Continue home medication of Keppra 500mg 1 tab bid, Vimpat 150mg 1 tab bid 

with additional Vimpat on her dialysis days (M,W,F).  Neurology also added 

Keppra 500mg 1 tab post dialysis day and highly recommend to restart patient on 

Onfi (which we don not have in formulary in hospital. She was on in past by her 

former neurologist to assess if that was helping but it seems was discontinue 

with current neurology team).  In the mean team, patient is started on Depakote 

250mg 1 tab bi.  If Onfi is started as outpatient and is helping with her 

seizures then can discontinue Depakote (since has a lot of side-effects).


Patient is on seizure precaution and seizure pads


Continue neuro checks


Nephrology on board and patient is scheduled for hemodialysis tomorrow morning








Objective





- Vital Signs


Vital signs: 


                                   Vital Signs











Temp  98.4 F   08/07/22 08:00


 


Pulse  81   08/07/22 08:00


 


Resp  17   08/07/22 02:00


 


BP  149/73   08/07/22 08:00


 


Pulse Ox  100   08/07/22 08:00


 


FiO2      








                                 Intake & Output











 08/06/22 08/07/22 08/07/22





 18:59 06:59 18:59


 


Intake Total 300  


 


Output Total 800  


 


Balance -500  


 


Intake:   


 


  Hemodialysis 300  


 


Output:   


 


  Hemodialysis 800  


 


Other:   


 


  # Voids 1 2 


 


  # Bowel Movements 1  














- Exam











PHYSICAL EXAMINATION: 





GENERAL: The patient is alert and oriented x3, not in any acute distress. Well 

developed, well nourished. 


HEENT: Pupils are round and equally reacting to light. EOMI. No scleral icterus.

No conjunctival pallor. Normocephalic, atraumatic. No pharyngeal erythema. No 

thyromegaly. 


CARDIOVASCULAR: S1 and S2 present. No murmurs, rubs, or gallops. 


PULMONARY: Chest is clear to auscultation, no wheezing or crackles. 


ABDOMEN: Soft, nontender, nondistended, normoactive bowel sounds. No palpable 

organomegaly. 


MUSCULOSKELETAL: No joint swelling or deformity.


EXTREMITIES: No cyanosis, clubbing, or pedal edema. 


NEUROLOGICAL: Gross neurological examination did not reveal any focal deficits. 


SKIN: No rashes. 











- Labs


CBC & Chem 7: 


                                 08/06/22 08:08





                                 08/06/22 08:08


Labs: 


                  Abnormal Lab Results - Last 24 Hours (Table)











  08/06/22 Range/Units





  08:08 


 


Potassium  6.9 H*  (3.5-5.5)  mmol/L


 


Carbon Dioxide  27.8 H  (20.0-27.5)  mmol/L


 


Creatinine  5.8 H  (0.6-1.5)  mg/dL


 


Est GFR (CKD-EPI)AfAm  8.8 L  (60.0-200.0)   


 


Est GFR (CKD-EPI)NonAf  7.6 L  (60.0-200.0)   


 


BUN/Creatinine Ratio  3.22 L  (12.00-20.00)  Ratio


 


Glucose  127 H  ()  mg/dL














Assessment and Plan


Assessment: 





1.  Breakthrough seizures


- Patient has a long-standing history of epilepsy


In the ED the patient was given Ativan 2 mg once when the and was given a load

ing dose of Keppra 2 g once.  As well as it appears she got a total of 300 mg of

Vimpat yesterday in the ED


Will restart the patient home medication of Keppra 500mg 1 tab bid, Vimpat 150mg

1 tab bid with additional Vimpat on her dialysis days (M,W,F).  I will also add 

Keppra 500mg 1 tab post dialysis day.  I highly recommend to restart patient on 

Onfi that she was on in past by her former neurologist to assess if that was 

helping but it seems was discontinue with current neurology team.  In the mean 

team, I will start on Depakote 250mg 1 tab bi.  If Onfi is started as outpatient

and is helping with her seizures then can discontinue Depakote (since has a lot 

of side-effects).


Patient is on seizure precaution and seizure pads


Continue neuro checks





2.  Diabetes mellitus; monitor Accu-Cheks before meals and at bedtime with 

insulin sliding scale; Trulicity 3 mg subcu every 7 days





3.  Hypertension; metoprolol 25 mg twice a day; Norvasc 10 mg daily





4.  End-stage renal disease/HD; consult nephrology for hemodialysis; continue 

with home dose of PhosLo





5.  CAD; stable on aspirin, statins, beta blockers and Plavix





6.  Hyperlipidemia; Lipitor 40 mg by mouth daily at bedtime





DVT prophylaxis; SCDs/subcu heparin


CODE STATUS; full code

## 2022-10-07 ENCOUNTER — HOSPITAL ENCOUNTER (OUTPATIENT)
Dept: HOSPITAL 47 - EC | Age: 52
Setting detail: OBSERVATION
LOS: 1 days | Discharge: LEFT BEFORE DISCHARGE | End: 2022-10-08
Attending: HOSPITALIST | Admitting: HOSPITALIST
Payer: MEDICARE

## 2022-10-07 VITALS — TEMPERATURE: 98.1 F | RESPIRATION RATE: 18 BRPM

## 2022-10-07 DIAGNOSIS — Z98.891: ICD-10-CM

## 2022-10-07 DIAGNOSIS — G40.909: Primary | ICD-10-CM

## 2022-10-07 DIAGNOSIS — Z79.85: ICD-10-CM

## 2022-10-07 DIAGNOSIS — I12.0: ICD-10-CM

## 2022-10-07 DIAGNOSIS — I83.90: ICD-10-CM

## 2022-10-07 DIAGNOSIS — E11.42: ICD-10-CM

## 2022-10-07 DIAGNOSIS — E11.22: ICD-10-CM

## 2022-10-07 DIAGNOSIS — N18.6: ICD-10-CM

## 2022-10-07 DIAGNOSIS — K21.9: ICD-10-CM

## 2022-10-07 DIAGNOSIS — E78.5: ICD-10-CM

## 2022-10-07 DIAGNOSIS — Z98.890: ICD-10-CM

## 2022-10-07 DIAGNOSIS — I69.311: ICD-10-CM

## 2022-10-07 DIAGNOSIS — M21.372: ICD-10-CM

## 2022-10-07 DIAGNOSIS — R26.2: ICD-10-CM

## 2022-10-07 DIAGNOSIS — M19.90: ICD-10-CM

## 2022-10-07 DIAGNOSIS — Z79.02: ICD-10-CM

## 2022-10-07 DIAGNOSIS — Z87.891: ICD-10-CM

## 2022-10-07 DIAGNOSIS — Z99.2: ICD-10-CM

## 2022-10-07 DIAGNOSIS — Z96.82: ICD-10-CM

## 2022-10-07 DIAGNOSIS — Z79.899: ICD-10-CM

## 2022-10-07 DIAGNOSIS — F31.9: ICD-10-CM

## 2022-10-07 DIAGNOSIS — Z88.0: ICD-10-CM

## 2022-10-07 DIAGNOSIS — I69.354: ICD-10-CM

## 2022-10-07 DIAGNOSIS — Z53.29: ICD-10-CM

## 2022-10-07 LAB
ALBUMIN SERPL-MCNC: 4.9 G/DL (ref 3.5–5)
ALP SERPL-CCNC: 187 U/L (ref 38–126)
ALT SERPL-CCNC: 15 U/L (ref 4–34)
ANION GAP SERPL CALC-SCNC: 16 MMOL/L
AST SERPL-CCNC: 18 U/L (ref 14–36)
BASOPHILS # BLD AUTO: 0.1 K/UL (ref 0–0.2)
BASOPHILS NFR BLD AUTO: 1 %
BUN SERPL-SCNC: 21 MG/DL (ref 7–17)
CALCIUM SPEC-MCNC: 9.8 MG/DL (ref 8.4–10.2)
CHLORIDE SERPL-SCNC: 90 MMOL/L (ref 98–107)
CO2 SERPL-SCNC: 32 MMOL/L (ref 22–30)
EOSINOPHIL # BLD AUTO: 0.1 K/UL (ref 0–0.7)
EOSINOPHIL NFR BLD AUTO: 1 %
ERYTHROCYTE [DISTWIDTH] IN BLOOD BY AUTOMATED COUNT: 4.46 M/UL (ref 3.8–5.4)
ERYTHROCYTE [DISTWIDTH] IN BLOOD: 14.6 % (ref 11.5–15.5)
GLUCOSE SERPL-MCNC: 240 MG/DL (ref 74–99)
HCT VFR BLD AUTO: 37.4 % (ref 34–46)
HGB BLD-MCNC: 12.6 GM/DL (ref 11.4–16)
LYMPHOCYTES # SPEC AUTO: 1.1 K/UL (ref 1–4.8)
LYMPHOCYTES NFR SPEC AUTO: 12 %
MAGNESIUM SPEC-SCNC: 2.2 MG/DL (ref 1.6–2.3)
MCH RBC QN AUTO: 28.3 PG (ref 25–35)
MCHC RBC AUTO-ENTMCNC: 33.6 G/DL (ref 31–37)
MCV RBC AUTO: 84.1 FL (ref 80–100)
MONOCYTES # BLD AUTO: 0.4 K/UL (ref 0–1)
MONOCYTES NFR BLD AUTO: 4 %
NEUTROPHILS # BLD AUTO: 7.8 K/UL (ref 1.3–7.7)
NEUTROPHILS NFR BLD AUTO: 82 %
PLATELET # BLD AUTO: 224 K/UL (ref 150–450)
POTASSIUM SERPL-SCNC: 3.6 MMOL/L (ref 3.5–5.1)
PROT SERPL-MCNC: 8.2 G/DL (ref 6.3–8.2)
SODIUM SERPL-SCNC: 138 MMOL/L (ref 137–145)
WBC # BLD AUTO: 9.6 K/UL (ref 3.8–10.6)

## 2022-10-07 PROCEDURE — 80164 ASSAY DIPROPYLACETIC ACD TOT: CPT

## 2022-10-07 PROCEDURE — 80177 DRUG SCRN QUAN LEVETIRACETAM: CPT

## 2022-10-07 PROCEDURE — 80053 COMPREHEN METABOLIC PANEL: CPT

## 2022-10-07 PROCEDURE — 83735 ASSAY OF MAGNESIUM: CPT

## 2022-10-07 PROCEDURE — 96374 THER/PROPH/DIAG INJ IV PUSH: CPT

## 2022-10-07 PROCEDURE — 80235 DRUG ASSAY LACOSAMIDE: CPT

## 2022-10-07 PROCEDURE — 36415 COLL VENOUS BLD VENIPUNCTURE: CPT

## 2022-10-07 PROCEDURE — 99285 EMERGENCY DEPT VISIT HI MDM: CPT

## 2022-10-07 PROCEDURE — 70450 CT HEAD/BRAIN W/O DYE: CPT

## 2022-10-07 PROCEDURE — 96372 THER/PROPH/DIAG INJ SC/IM: CPT

## 2022-10-07 PROCEDURE — 93005 ELECTROCARDIOGRAM TRACING: CPT

## 2022-10-07 PROCEDURE — 85025 COMPLETE CBC W/AUTO DIFF WBC: CPT

## 2022-10-07 NOTE — ED
General Adult HPI





- General


Chief complaint: Seizure


Stated complaint: Seizure


Time Seen by Provider: 10/07/22 16:06


Source: patient, EMS, RN notes reviewed


Mode of arrival: EMS


Limitations: altered mental status





- History of Present Illness


Initial comments: 





Patient is a pleasant 52-year-old female presenting to the emergency department 

for reported seizure.  Patient states she does get frequent seizures, couple per

week.  Patient states she feels tired and fatigued at this time, similar to 

previous seizures.  Patient denies missing medications or recent illness.  

Patient did have dialysis today.  She denies any injury





- Related Data


                                Home Medications











 Medication  Instructions  Recorded  Confirmed


 


Atorvastatin [Lipitor] 40 mg PO DAILY 21


 


Dulaglutide [Trulicity] 3 mg SQ Q7D 21


 


Lacosamide [Vimpat] 150 mg PO AS DIRECTED 21


 


Lacosamide [Vimpat] 150 mg PO AS DIRECTED 21


 


Sevelamer [Renvela] 800 - 1,600 mg PO AS DIRECTED 21


 


Vascepa 1gm 1 gm PO BID 21


 


Calcium Acetate 2,001 mg PO TID-W/MEALS 22


 


Metoprolol Tartrate [Lopressor] 25 mg PO BID 22


 


Pantoprazole [Protonix] 40 mg PO DAILY 22


 


Potassium Chloride ER [K-Dur 20] 20 meq PO BID 22


 


levETIRAcetam [Keppra] 500 mg PO BID 22








                                  Previous Rx's











 Medication  Instructions  Recorded


 


Clopidogrel [Plavix] 75 mg PO DAILY #30 tab 21


 


amLODIPine [Norvasc] 10 mg PO DAILY #30 tab 21


 


Divalproex ER [Depakote ER] 250 mg PO BID 30 Days #60 tab 22


 


levETIRAcetam [Keppra] 500 mg PO MoWeFr@1600 30 Days #15 22





 tab 











                                    Allergies











Allergy/AdvReac Type Severity Reaction Status Date / Time


 


Penicillins Allergy  Itching Verified 10/07/22 16:06














Review of Systems


ROS Statement: 


Those systems with pertinent positive or pertinent negative responses have been 

documented in the HPI.





ROS Other: All systems not noted in ROS Statement are negative.


Constitutional: Denies: fever


Eyes: Denies: eye pain


ENT: Denies: ear pain


Respiratory: Denies: cough


Cardiovascular: Denies: chest pain


Endocrine: Denies: fatigue


Gastrointestinal: Denies: abdominal pain


Genitourinary: Denies: dysuria


Musculoskeletal: Denies: back pain


Skin: Denies: rash


Neurological: Denies: headache





Past Medical History


Past Medical History: Chest Pain / Angina, CVA/TIA, Diabetes Mellitus, 

GERD/Reflux, Hyperlipidemia, Hypertension, Memory Impairment, Osteoarthritis 

(OA), Renal Disease, Seizure Disorder, Thyroid Disorder


Additional Past Medical History / Comment(s): Last seizure approximately one 

month ago. Spouse states she used to have seizures 4X per week until she had 

vagal nerve stimulator placed, now has seizures approximately once a week to 

once a month. Dialysis MOWEFR. Neuropathy, left drop foot, only able to walk 

short distances, otherwise uses wheelchair. 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 

ago, with residual memory impairment. Never diagnosed with Sleep Apnea but 

spouse states she does stop breathing through the night and he has to shake her 

to start breathing again. Varicose veins.


History of Any Multi-Drug Resistant Organisms: None Reported


Past Surgical History:  Section, Tonsillectomy, Uterine Ablation


Additional Past Surgical History / Comment(s): Left arm fistula, loop recorder, 

vagus nerve stimulator.


Past Anesthesia/Blood Transfusion Reactions: Motion Sickness


Additional Past Anesthesia/Blood Transfusion Reaction / Comment(s): Family hx 

unknown, pt adopted.


Past Psychological History: Bipolar


Smoking Status: Former smoker


Past Alcohol Use History: None Reported


Past Drug Use History: None Reported





- Past Family History


  ** Father


Family Medical History: Unable to Obtain


Additional Family Medical History / Comment(s): Pt adopted.





General Exam


Limitations: no limitations


General appearance: alert, in no apparent distress


Head exam: Present: normocephalic


Eye exam: Present: normal appearance, PERRL, EOMI


ENT exam: Present: normal oropharynx


Neck exam: Present: normal inspection.  Absent: tenderness, meningismus


Respiratory exam: Present: normal lung sounds bilaterally


Cardiovascular Exam: Present: regular rate, normal rhythm


GI/Abdominal exam: Present: soft.  Absent: tenderness


Neurological exam: Present: alert, CN II-XII intact.  Absent: motor sensory 

deficit


  ** Expanded


Neurological exam: Present: protecting the airway


Patient oriented to: Present: person.  Absent: place, time


Motor strength exam: RUE: 5, LUE: 5, RLE: 5, LLE: 5


Eye Response: (4) open spontaneously


Motor Response: (6) obeys commands


Verbal Response: (4) confused conversation


Psychiatric exam: Present: flat affect


Skin exam: Present: normal color





Course


                                   Vital Signs











  10/07/22





  15:59


 


Temperature 98.1 F


 


Pulse Rate 110 H


 


Respiratory 18





Rate 


 


Blood Pressure 174/84


 


O2 Sat by Pulse 96





Oximetry 














EKG Findings





- EKG Comments:


EKG Findings:: Sinus rhythm 92.  .  QRS 94.  .  .  Normal 

axis.  Normal QRS.  No acute ST change.





Medical Decision Making





- Medical Decision Making





Patient reevaluated and still postictal.  Limited speech and unable to walk.  

Case discussed with Dr. jaimes, who will admit covering hospital observation call





- Lab Data


Result diagrams: 


                                 10/07/22 16:21





                                 10/07/22 16:21


                                   Lab Results











  10/07/22 10/07/22 Range/Units





  16:21 16:21 


 


WBC  9.6   (3.8-10.6)  k/uL


 


RBC  4.46   (3.80-5.40)  m/uL


 


Hgb  12.6   (11.4-16.0)  gm/dL


 


Hct  37.4   (34.0-46.0)  %


 


MCV  84.1   (80.0-100.0)  fL


 


MCH  28.3   (25.0-35.0)  pg


 


MCHC  33.6   (31.0-37.0)  g/dL


 


RDW  14.6   (11.5-15.5)  %


 


Plt Count  224   (150-450)  k/uL


 


MPV  9.4   


 


Neutrophils %  82   %


 


Lymphocytes %  12   %


 


Monocytes %  4   %


 


Eosinophils %  1   %


 


Basophils %  1   %


 


Neutrophils #  7.8 H   (1.3-7.7)  k/uL


 


Lymphocytes #  1.1   (1.0-4.8)  k/uL


 


Monocytes #  0.4   (0-1.0)  k/uL


 


Eosinophils #  0.1   (0-0.7)  k/uL


 


Basophils #  0.1   (0-0.2)  k/uL


 


Sodium   138  (137-145)  mmol/L


 


Potassium   3.6  (3.5-5.1)  mmol/L


 


Chloride   90 L  ()  mmol/L


 


Carbon Dioxide   32 H  (22-30)  mmol/L


 


Anion Gap   16  mmol/L


 


BUN   21 H  (7-17)  mg/dL


 


Creatinine   4.14 H  (0.52-1.04)  mg/dL


 


Est GFR (CKD-EPI)AfAm   13  (>60 ml/min/1.73 sqM)  


 


Est GFR (CKD-EPI)NonAf   12  (>60 ml/min/1.73 sqM)  


 


Glucose   240 H  (74-99)  mg/dL


 


Calcium   9.8  (8.4-10.2)  mg/dL


 


Magnesium   2.2  (1.6-2.3)  mg/dL


 


Total Bilirubin   0.5  (0.2-1.3)  mg/dL


 


AST   18  (14-36)  U/L


 


ALT   15  (4-34)  U/L


 


Alkaline Phosphatase   187 H  ()  U/L


 


Total Protein   8.2  (6.3-8.2)  g/dL


 


Albumin   4.9  (3.5-5.0)  g/dL


 


Valproic Acid   <10.0  ug/mL














Disposition


Clinical Impression: 


 Seizure, Postictal state





Disposition: ADMITTED AS IP TO THIS HOSP


Is patient prescribed a controlled substance at d/c from ED?: No


Referrals: 


None,Stated [Primary Care Provider] - 1-2 days


Time of Disposition: 20:47

## 2022-10-08 VITALS — SYSTOLIC BLOOD PRESSURE: 156 MMHG | DIASTOLIC BLOOD PRESSURE: 86 MMHG | HEART RATE: 78 BPM

## 2022-10-08 LAB — GLUCOSE BLD-MCNC: 134 MG/DL (ref 70–110)

## 2022-10-08 NOTE — CT
EXAMINATION TYPE: CT brain wo con

 

DATE OF EXAM: 10/8/2022

 

COMPARISON: 8/5/2022

 

HISTORY: seizure, ams

 

CT DLP: 1113.9 mGycm

Automated exposure control for dose reduction was used.

 

Images of the brain obtained with no contrast.

 

There is some mild cerebral atrophy. There is 1.5 cm irregular area of decreased density in the anter
ior left thalamus and consistent with old lacunar infarct. There is no mass effect or midline shift. 
No sign of intracranial hemorrhage. Calvarium is intact. The skull base is intact. There is normal ae
ration of the mastoid sinuses. There is 2 cm areas of decreased cortical density in the inferior late
ral cerebellar hemispheres.

 

IMPRESSION:

Cerebral atrophy. Old lacunar infarct anterior left thalamus. Old bilateral cerebellar cortical infar
cts. No acute intracranial abnormality. No change.

## 2022-10-08 NOTE — P.DS
Providers


Date of admission: 


10/07/22 20:48





Expected date of discharge: 10/08/22


Attending physician: 


Ora Perez MD





Consults: 





                                        





10/07/22 20:48


Consult Physician Routine 


   Consulting Provider: Dominik Fischer


   Consult Reason/Comments: seizure, post ictal


   Do you want consulting provider notified?: Yes











Primary care physician: 


Stated None





Hospital Course: 





Discharge Diagnosis:





Seizure, suspect secondary to noncompliance


Type II DM


 ESRD


 hypertension


 hyperlipidemia





Hospital Course: 





Patient is a 52-year-old female with an extensive PMH including type II DM, ESRD

on hemodialysis, CVA, hypertension, hyperlipidemia, and seizure disorder who had

presented to the emergency room after reported seizure.  History supplemented by

the ED physician and the chart due to patient's postictal state.  The patient 

reports that she routinely has seizures a few times a week, and that she had one

earlier today.  Does not recall the events prior to the episode but states that 

her  activated EMS after he found her on the ground.  She reported having

her dialysis as per usual earlier today.  The patient reports compliance with 

her antiseizure medications, although the history was limited.  Attempted to 

contact the  via the phone number on the chart with no response.  The 

patient reported feeling tired at the time of interview but denied any 

additional complaints patient is not experiencing headaches, visual 

disturbances, nausea, vomiting.  Denied fever, chills, cough.  Laboratory 

evaluation in the emergency room revealed a BUN of 21, creatinine 4.14, glucose 

of 40, and a valproic acid level of less than 10.


The patient was admitted and he neurology service were consulted although she 

continued to request to be discharged we talked with her about safety concerns 

regarding discharge without being seen by the neurologist and she continued to 

insist on leaving AMA. 














Patient seen and examined at bedside.








Vital signs reviewed and stable. 





General: [nontoxic], [no distress], [appears at stated age]





Derm: [warm], [dry]





Head: [atraumatic], [normocephalic], [symmetric]





Eyes: [EOMI], [no lid lag], [anicteric sclera]





Mouth: [no lip lesion], [mucus membranes moist]





Cardiovascular: [S1S2 reg], [no murmur]





Lungs: [CTA bilateral], [no rhonchi, no rales] , [no accessory muscle use]





Abdominal: [soft], [ nontender to palpation], [no guarding], [no appreciable 

organomegaly]





Ext: [no gross muscle atrophy], [no edema], [no contractures]





Neuro: [ CN II-XI grossly intact], [no focal neuro deficits]





Psych: [Alert], [oriented], [appropriate affect] 











A total of 50 minutes of time were spent preparing this complex discharge 

summary.


 








Patient Condition at Discharge: Undetermined





Plan - Discharge Summary


New Discharge Prescriptions: 


Continue


   amLODIPine [Norvasc] 10 mg PO DAILY #30 tab


   Clopidogrel [Plavix] 75 mg PO DAILY #30 tab


   Atorvastatin [Lipitor] 40 mg PO HS


   Vascepa 1gm 1 gm PO BID


   Sevelamer [Renvela] 800 mg PO BID-W/MEALS


   Lacosamide [Vimpat] 150 mg PO SUTUFRSA@0700,2100


   Calcium Acetate 2,001 mg PO TID-W/MEALS


   Pantoprazole [Protonix] 40 mg PO DAILY


   Potassium Chloride ER [K-Dur 20] 20 meq PO BID


   Sevelamer [Renvela] 1,600 mg PO W/SUPPER


   Dulaglutide [Trulicity] 3 mg SQ Q7D


   Lacosamide [Vimpat] 150 mg PO MOWEFR@07,14,21


   levETIRAcetam [Keppra] 500 mg PO BID@0700,2100


   Metoprolol Tartrate [Lopressor] 25 mg PO BID


   Divalproex ER [Depakote ER] 250 mg PO BID 30 Days #60 tab


   levETIRAcetam [Keppra] 500 mg PO MoWeFr@1600 30 Days #15 tab


   hydrALAZINE HCL [Apresoline] 50 mg PO BID


Discharge Medication List





Clopidogrel [Plavix] 75 mg PO DAILY #30 tab 02/13/21 [Rx]


amLODIPine [Norvasc] 10 mg PO DAILY #30 tab 02/13/21 [Rx]


Atorvastatin [Lipitor] 40 mg PO HS 08/31/21 [History]


Dulaglutide [Trulicity] 3 mg SQ Q7D 08/31/21 [History]


Lacosamide [Vimpat] 150 mg PO MOWEFR@07,14,21 08/31/21 [History]


Lacosamide [Vimpat] 150 mg PO SUTUFRSA@0700,2100 08/31/21 [History]


Sevelamer [Renvela] 800 mg PO BID-W/MEALS 08/31/21 [History]


Vascepa 1gm 1 gm PO BID 08/31/21 [History]


Calcium Acetate 2,001 mg PO TID-W/MEALS 08/05/22 [History]


Metoprolol Tartrate [Lopressor] 25 mg PO BID 08/05/22 [History]


Pantoprazole [Protonix] 40 mg PO DAILY 08/05/22 [History]


Potassium Chloride ER [K-Dur 20] 20 meq PO BID 08/05/22 [History]


levETIRAcetam [Keppra] 500 mg PO BID@0700,2100 08/05/22 [History]


Divalproex ER [Depakote ER] 250 mg PO BID 30 Days #60 tab 08/08/22 [Rx]


levETIRAcetam [Keppra] 500 mg PO MoWeFr@1600 30 Days #15 tab 08/08/22 [Rx]


Sevelamer [Renvela] 1,600 mg PO W/SUPPER 10/07/22 [History]


hydrALAZINE HCL [Apresoline] 50 mg PO BID 10/07/22 [History]








Follow up Appointment(s)/Referral(s): 


None,Stated [Primary Care Provider] - 1-2 days


Discharge Disposition: Left Against Medical Advice

## 2022-10-08 NOTE — P.HPIM
History of Present Illness


H&P Date: 10/07/22





Patient is a 52-year-old female with an extensive PMH including type II DM, ESRD

on hemodialysis, CVA, hypertension, hyperlipidemia, and seizure disorder who had

presented to the emergency room after reported seizure.  History supplemented by

the ED physician and the chart due to patient's postictal state.  The patient 

reports that she routinely has seizures a few times a week, and that she had one

earlier today.  Does not recall the events prior to the episode but states that 

her  activated EMS after he found her on the ground.  She reported having

her dialysis as per usual earlier today.  The patient reports compliance with 

her antiseizure medications, although the history was limited.  Attempted to 

contact the  via the phone number on the chart with no response.  The 

patient reported feeling tired at the time of interview but denied any 

additional complaints patient is not experiencing headaches, visual 

disturbances, nausea, vomiting.  Denied fever, chills, cough.  Laboratory 

evaluation in the emergency room revealed a BUN of 21, creatinine 4.14, glucose 

of 40, and a valproic acid level of less than 10.





Review of systems:


Pertinent positives and negatives as discussed in HPI, a complete review of 

systems was performed and all other systems are negative.





Physical examination:


General: non toxic, no distress, appears at stated age, overweight


Derm: no unusual rashes/lesions, warm


Head: atraumatic, normocephalic, symmetric


Eyes: EOMI, no lid lag, anicteric sclera, pupils equal round reactive to light


ENT: Nose and ears atraumatic


Neck: No cervical lymphadenopathy, trachea midline, supple


Mouth: no lip lesion, mucus membranes moist


Cardiovascular: S1S2 reg, no murmur, positive dorsalis pedis pulse bilateral, no

edema, left antecubital fossa dialysis graft noted


Lungs: CTA bilateral, no rhonchi, no rales, no accessory muscle use


Abdominal: soft,  nontender to palpation, no guarding


Ext: muscle strength 4 out of 5 in all 4 extremities grossly, no gross muscle 

atrophy, no contractures, 


Neuro:  CN II-XI grossly intact, no gross focal neuro deficits


Psych: Slow to respond, oriented to person and place only, not oriented to time





Assessment/plan





Seizure, suspect secondary to noncompliance


-Patient's Depakote level was undetectable


-Obtain Keppra levels


-Continue with home antiepileptics


-Neurology consulted


-Fall precautions





Chronic conditions: Type II DM, ESRD, hypertension, hyperlipidemia


-Patient's next dialysis session due on Monday


-Insulin sliding scale and blood glucose monitoring


-Continue the remaining home medications





DVT prophylaxis


-Heparin subcu





The patient is admitted with an anticipated less than 2 midnight stay for 

evaluation of seizure.


CODE STATUS: Full Code


Discussed with: Patient


Anticipated discharge date: In the a.m.


Anticipated discharge place: Home








Past Medical History


Past Medical History: Chest Pain / Angina, CVA/TIA, Diabetes Mellitus, GERD/Ref

lux, Hyperlipidemia, Hypertension, Memory Impairment, Osteoarthritis (OA), Renal

Disease, Seizure Disorder, Thyroid Disorder


Additional Past Medical History / Comment(s): Last seizure approximately one 

month ago. Spouse states she used to have seizures 4X per week until she had 

vagal nerve stimulator placed, now has seizures approximately once a week to 

once a month. Dialysis MOWEFR. Neuropathy, left drop foot, only able to walk 

short distances, otherwise uses wheelchair. 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 

ago, with residual memory impairment. Never diagnosed with Sleep Apnea but 

spouse states she does stop breathing through the night and he has to shake her 

to start breathing again. Varicose veins.


History of Any Multi-Drug Resistant Organisms: None Reported


Past Surgical History:  Section, Tonsillectomy, Uterine Ablation


Additional Past Surgical History / Comment(s): Left arm fistula, loop recorder, 

vagus nerve stimulator.


Past Anesthesia/Blood Transfusion Reactions: Motion Sickness


Additional Past Anesthesia/Blood Transfusion Reaction / Comment(s): Family hx 

unknown, pt adopted.


Past Psychological History: Bipolar


Smoking Status: Former smoker


Past Alcohol Use History: None Reported


Past Drug Use History: None Reported





- Past Family History


  ** Father


Family Medical History: Unable to Obtain


Additional Family Medical History / Comment(s): Pt adopted.





Medications and Allergies


                                Home Medications











 Medication  Instructions  Recorded  Confirmed  Type


 


Clopidogrel [Plavix] 75 mg PO DAILY #30 tab 02/13/21 10/07/22 Rx


 


amLODIPine [Norvasc] 10 mg PO DAILY #30 tab 02/13/21 10/07/22 Rx


 


Atorvastatin [Lipitor] 40 mg PO HS 08/31/21 10/07/22 History


 


Dulaglutide [Trulicity] 3 mg SQ Q7D 08/31/21 10/07/22 History


 


Lacosamide [Vimpat] 150 mg PO MOWEFR@07,14,21 08/31/21 10/07/22 History


 


Lacosamide [Vimpat] 150 mg PO SUTUFRSA@0700,2100 08/31/21 10/07/22 History


 


Sevelamer [Renvela] 800 mg PO BID-W/MEALS 08/31/21 10/07/22 History


 


Vascepa 1gm 1 gm PO BID 08/31/21 10/07/22 History


 


Calcium Acetate 2,001 mg PO TID-W/MEALS 08/05/22 10/07/22 History


 


Metoprolol Tartrate [Lopressor] 25 mg PO BID 08/05/22 10/07/22 History


 


Pantoprazole [Protonix] 40 mg PO DAILY 08/05/22 10/07/22 History


 


Potassium Chloride ER [K-Dur 20] 20 meq PO BID 08/05/22 10/07/22 History


 


levETIRAcetam [Keppra] 500 mg PO BID@0700,2100 08/05/22 10/07/22 History


 


Divalproex ER [Depakote ER] 250 mg PO BID 30 Days #60 tab 08/08/22 10/07/22 Rx


 


levETIRAcetam [Keppra] 500 mg PO MoWeFr@1600 30 Days #15 08/08/22 10/07/22 Rx





 tab   


 


Sevelamer [Renvela] 1,600 mg PO W/SUPPER 10/07/22 10/07/22 History


 


hydrALAZINE HCL [Apresoline] 50 mg PO BID 10/07/22 10/07/22 History








                                    Allergies











Allergy/AdvReac Type Severity Reaction Status Date / Time


 


Penicillins Allergy  Itching Verified 10/07/22 16:06














Physical Exam


Vitals: 


                                   Vital Signs











  Temp Pulse Resp BP Pulse Ox


 


 10/07/22 15:59  98.1 F  110 H  18  174/84  96








                                Intake and Output











 10/07/22 10/07/22 10/07/22





 06:59 14:59 22:59


 


Other:   


 


  Weight   74.843 kg














Results


CBC & Chem 7: 


                                 10/07/22 16:21





                                 10/07/22 16:21


Labs: 


                  Abnormal Lab Results - Last 24 Hours (Table)











  10/07/22 10/07/22 Range/Units





  16:21 16:21 


 


Neutrophils #  7.8 H   (1.3-7.7)  k/uL


 


Chloride   90 L  ()  mmol/L


 


Carbon Dioxide   32 H  (22-30)  mmol/L


 


BUN   21 H  (7-17)  mg/dL


 


Creatinine   4.14 H  (0.52-1.04)  mg/dL


 


Glucose   240 H  (74-99)  mg/dL


 


Alkaline Phosphatase   187 H  ()  U/L

## 2022-10-08 NOTE — P.CNNES
History of Present Illness


Consult date: 10/08/22


Requesting physician: Randal Dukes


Reason for Consult: Seizure, postictal


History of Present Illness: 





Patient is a 52-year-old female came to the hospital by ambulance yesterday at 

3:57 PM





Per EMS flow sheet, it was mentioned that patient completed treatment for 

dialysis and was being discharged when she getting onto the bus and she appeared

to have an approximate 1 minute grand mal seizure.  Patient was taken back into 

dialysis Center and vitals were obtained.  Patient was postictal at the time of 

EMS arrival.  Patient started becoming more alert.  No trauma noted.  Patient's 

blood pressure was 164/85, pulse rate 109, respirations 17, saturation 98%.  

Blood sugar 160.





Vital signs on arrival blood pressure 174/84 pulse rate 110 and temperature 

98.1.  Blood test shows normal CBC, normal electrolytes, BUN 21, creatinine 

4.14.  Hepatic panel is normal, Depakote level <10.  CT head showed cerebral 

atrophy.  Old lacunar infarct anterior left thalamus.  Old bilateral cerebellar 

cortical infarcts.  No acute intracranial abnormality.  No change.  I personally

reviewed CT head, agree with the findings except the left sided lacune involves 

the left internal capsule rather than thalamus.  EKG shows sinus rhythm.





Patient has been seen by neurology service multiple times, and by myself on 

2020.  She has long-standing history of seizure disorder and had presented

with breakthrough seizure at that time as well.  She has history of CVA with 

left hemiparesis.  She is on hemodialysis for ESRD, diabetes hypertension.  At 

that time she was taking Vimpat 100 mg twice a day with extra 100 mg post 

dialysis .  Patient was also on Keppra 500 mg twice a 

day.  She was supposed to take extra: 50 mg post dialysis every .  Patient was on Plavix and Lipitor.  Patient's most recently seen by Dr. Michele Merritt on 2022 for multiple breakthrough seizures, postictal period.  

It appears patient was on Vimpat 150 mg twice a day, Keppra 500 mg twice a day 

and extra one tablet postdialysis .  Dr. Merritt started 

her on Depakote 250 mg twice a day.  Her current home medication list states the

same as above.  Dr. Merritt has recommended for patient to be started on Onfi as 

an outpatient.





Past Medical History


Past Medical History: Chest Pain / Angina, CVA/TIA, Diabetes Mellitus, 

GERD/Reflux, Hyperlipidemia, Hypertension, Memory Impairment, Osteoarthritis 

(OA), Renal Disease, Seizure Disorder, Thyroid Disorder


Additional Past Medical History / Comment(s): Last seizure approximately one 

month ago. Spouse states she used to have seizures 4X per week until she had 

vagal nerve stimulator placed, now has seizures approximately once a week to 

once a month. Dialysis MOWEFR. Neuropathy, left drop foot, only able to walk 

short distances, otherwise uses wheelchair. 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 

ago, with residual memory impairment. Never diagnosed with Sleep Apnea but 

spouse states she does stop breathing through the night and he has to shake her 

to start breathing again. Varicose veins.


History of Any Multi-Drug Resistant Organisms: None Reported


Past Surgical History:  Section, Tonsillectomy, Uterine Ablation


Additional Past Surgical History / Comment(s): Left arm fistula, loop recorder, 

vagus nerve stimulator.


Past Anesthesia/Blood Transfusion Reactions: Motion Sickness


Additional Past Anesthesia/Blood Transfusion Reaction / Comment(s): Family hx 

unknown, pt adopted.


Past Psychological History: Bipolar


Smoking Status: Former smoker


Past Alcohol Use History: None Reported


Past Drug Use History: None Reported





- Past Family History


  ** Father


Family Medical History: Unable to Obtain


Additional Family Medical History / Comment(s): Pt adopted.





Medications and Allergies


                                Home Medications











 Medication  Instructions  Recorded  Confirmed  Type


 


Clopidogrel [Plavix] 75 mg PO DAILY #30 tab 02/13/21 10/07/22 Rx


 


amLODIPine [Norvasc] 10 mg PO DAILY #30 tab 02/13/21 10/07/22 Rx


 


Atorvastatin [Lipitor] 40 mg PO HS 08/31/21 10/07/22 History


 


Dulaglutide [Trulicity] 3 mg SQ Q7D 08/31/21 10/07/22 History


 


Lacosamide [Vimpat] 150 mg PO MOWEFR@07,14,21 08/31/21 10/07/22 History


 


Lacosamide [Vimpat] 150 mg PO SUTUFRSA@0700,2100 08/31/21 10/07/22 History


 


Sevelamer [Renvela] 800 mg PO BID-W/MEALS 08/31/21 10/07/22 History


 


Vascepa 1gm 1 gm PO BID 08/31/21 10/07/22 History


 


Calcium Acetate 2,001 mg PO TID-W/MEALS 08/05/22 10/07/22 History


 


Metoprolol Tartrate [Lopressor] 25 mg PO BID 08/05/22 10/07/22 History


 


Pantoprazole [Protonix] 40 mg PO DAILY 08/05/22 10/07/22 History


 


Potassium Chloride ER [K-Dur 20] 20 meq PO BID 08/05/22 10/07/22 History


 


levETIRAcetam [Keppra] 500 mg PO BID@0700,2100 08/05/22 10/07/22 History


 


Divalproex ER [Depakote ER] 250 mg PO BID 30 Days #60 tab 08/08/22 10/07/22 Rx


 


levETIRAcetam [Keppra] 500 mg PO MoWeFr@1600 30 Days #15 08/08/22 10/07/22 Rx





 tab   


 


Sevelamer [Renvela] 1,600 mg PO W/SUPPER 10/07/22 10/07/22 History


 


hydrALAZINE HCL [Apresoline] 50 mg PO BID 10/07/22 10/07/22 History








                                    Allergies











Allergy/AdvReac Type Severity Reaction Status Date / Time


 


Penicillins Allergy  Itching Verified 10/07/22 16:06














Physical Examination





- Vital Signs


Vital Signs: 


                                   Vital Signs











  Temp Pulse Resp BP Pulse Ox


 


 10/08/22 08:20   78  18  156/86  99


 


 10/07/22 15:59  98.1 F  110 H  18  174/84  96








                                Intake and Output











 10/07/22 10/08/22 10/08/22





 22:59 06:59 14:59


 


Other:   


 


  Weight 74.843 kg  














Results





- Laboratory Findings


CBC and BMP: 


                                 10/07/22 16:21





                                 10/07/22 16:21


Abnormal Lab Findings: 


                                  Abnormal Labs











  10/07/22 10/07/22 10/08/22





  16:21 16:21 07:32


 


Neutrophils #  7.8 H  


 


Chloride   90 L 


 


Carbon Dioxide   32 H 


 


BUN   21 H 


 


Creatinine   4.14 H 


 


Glucose   240 H 


 


POC Glucose (mg/dL)    134 H


 


Alkaline Phosphatase   187 H 














Assessment and Plan


Plan: 





Came to see patient after reviewing chart as above.  Patient apparently has 

signed out AMA before she could be seen.  Consult canceled.

## 2022-10-26 ENCOUNTER — HOSPITAL ENCOUNTER (OUTPATIENT)
Dept: HOSPITAL 47 - EC | Age: 52
Setting detail: OBSERVATION
LOS: 1 days | Discharge: HOME | End: 2022-10-27
Attending: HOSPITALIST | Admitting: HOSPITALIST
Payer: MEDICARE

## 2022-10-26 DIAGNOSIS — D63.1: ICD-10-CM

## 2022-10-26 DIAGNOSIS — N18.6: ICD-10-CM

## 2022-10-26 DIAGNOSIS — G40.909: Primary | ICD-10-CM

## 2022-10-26 DIAGNOSIS — K21.9: ICD-10-CM

## 2022-10-26 DIAGNOSIS — Z99.3: ICD-10-CM

## 2022-10-26 DIAGNOSIS — F31.9: ICD-10-CM

## 2022-10-26 DIAGNOSIS — E11.22: ICD-10-CM

## 2022-10-26 DIAGNOSIS — Z87.891: ICD-10-CM

## 2022-10-26 DIAGNOSIS — I12.0: ICD-10-CM

## 2022-10-26 DIAGNOSIS — I69.354: ICD-10-CM

## 2022-10-26 DIAGNOSIS — Z79.899: ICD-10-CM

## 2022-10-26 DIAGNOSIS — E78.5: ICD-10-CM

## 2022-10-26 DIAGNOSIS — M89.8X9: ICD-10-CM

## 2022-10-26 DIAGNOSIS — F03.90: ICD-10-CM

## 2022-10-26 DIAGNOSIS — E11.40: ICD-10-CM

## 2022-10-26 DIAGNOSIS — Z79.02: ICD-10-CM

## 2022-10-26 DIAGNOSIS — Z99.2: ICD-10-CM

## 2022-10-26 DIAGNOSIS — Z88.0: ICD-10-CM

## 2022-10-26 LAB
ALBUMIN SERPL-MCNC: 4.3 G/DL (ref 3.5–5)
ALP SERPL-CCNC: 131 U/L (ref 38–126)
ALT SERPL-CCNC: 15 U/L (ref 4–34)
ANION GAP SERPL CALC-SCNC: 13 MMOL/L
AST SERPL-CCNC: 19 U/L (ref 14–36)
BASOPHILS # BLD AUTO: 0.1 K/UL (ref 0–0.2)
BASOPHILS NFR BLD AUTO: 1 %
BUN SERPL-SCNC: 18 MG/DL (ref 7–17)
CALCIUM SPEC-MCNC: 8.8 MG/DL (ref 8.4–10.2)
CHLORIDE SERPL-SCNC: 93 MMOL/L (ref 98–107)
CO2 SERPL-SCNC: 31 MMOL/L (ref 22–30)
EOSINOPHIL # BLD AUTO: 0.1 K/UL (ref 0–0.7)
EOSINOPHIL NFR BLD AUTO: 1 %
ERYTHROCYTE [DISTWIDTH] IN BLOOD BY AUTOMATED COUNT: 3.68 M/UL (ref 3.8–5.4)
ERYTHROCYTE [DISTWIDTH] IN BLOOD: 14.6 % (ref 11.5–15.5)
GLUCOSE SERPL-MCNC: 190 MG/DL (ref 74–99)
HCT VFR BLD AUTO: 32.4 % (ref 34–46)
HGB BLD-MCNC: 11.1 GM/DL (ref 11.4–16)
LYMPHOCYTES # SPEC AUTO: 1.1 K/UL (ref 1–4.8)
LYMPHOCYTES NFR SPEC AUTO: 13 %
MAGNESIUM SPEC-SCNC: 2.1 MG/DL (ref 1.6–2.3)
MCH RBC QN AUTO: 30.1 PG (ref 25–35)
MCHC RBC AUTO-ENTMCNC: 34.3 G/DL (ref 31–37)
MCV RBC AUTO: 87.8 FL (ref 80–100)
MONOCYTES # BLD AUTO: 0.3 K/UL (ref 0–1)
MONOCYTES NFR BLD AUTO: 4 %
NEUTROPHILS # BLD AUTO: 6.6 K/UL (ref 1.3–7.7)
NEUTROPHILS NFR BLD AUTO: 80 %
PLATELET # BLD AUTO: 225 K/UL (ref 150–450)
POTASSIUM SERPL-SCNC: 3.6 MMOL/L (ref 3.5–5.1)
PROT SERPL-MCNC: 7.1 G/DL (ref 6.3–8.2)
SODIUM SERPL-SCNC: 137 MMOL/L (ref 137–145)
WBC # BLD AUTO: 8.2 K/UL (ref 3.8–10.6)

## 2022-10-26 PROCEDURE — 80048 BASIC METABOLIC PNL TOTAL CA: CPT

## 2022-10-26 PROCEDURE — 83735 ASSAY OF MAGNESIUM: CPT

## 2022-10-26 PROCEDURE — 80235 DRUG ASSAY LACOSAMIDE: CPT

## 2022-10-26 PROCEDURE — 80177 DRUG SCRN QUAN LEVETIRACETAM: CPT

## 2022-10-26 PROCEDURE — 80164 ASSAY DIPROPYLACETIC ACD TOT: CPT

## 2022-10-26 PROCEDURE — 85025 COMPLETE CBC W/AUTO DIFF WBC: CPT

## 2022-10-26 PROCEDURE — 72125 CT NECK SPINE W/O DYE: CPT

## 2022-10-26 PROCEDURE — 96365 THER/PROPH/DIAG IV INF INIT: CPT

## 2022-10-26 PROCEDURE — 36415 COLL VENOUS BLD VENIPUNCTURE: CPT

## 2022-10-26 PROCEDURE — 99285 EMERGENCY DEPT VISIT HI MDM: CPT

## 2022-10-26 PROCEDURE — 93005 ELECTROCARDIOGRAM TRACING: CPT

## 2022-10-26 PROCEDURE — 70450 CT HEAD/BRAIN W/O DYE: CPT

## 2022-10-26 PROCEDURE — 96372 THER/PROPH/DIAG INJ SC/IM: CPT

## 2022-10-26 PROCEDURE — 80320 DRUG SCREEN QUANTALCOHOLS: CPT

## 2022-10-26 PROCEDURE — 80053 COMPREHEN METABOLIC PANEL: CPT

## 2022-10-26 PROCEDURE — 96361 HYDRATE IV INFUSION ADD-ON: CPT

## 2022-10-26 NOTE — CT
EXAMINATION TYPE: CT brain maverick parmar con

 

DATE OF EXAM: 10/26/2022

 

COMPARISON: CT brain 10/8/2022

 

HISTORY: seziure, found face down

 

CT DLP: 1394.3 mGycm

Automated exposure control for dose reduction was used.

 

There is mild cerebral cortical atrophy. There is 1.5 cm area of hypodensity in the anterior left gilberto
lamus and consistent with an old lacunar infarct. There is no mass effect or midline shift. No sign o
f intracranial hemorrhage. The calvarium is intact. Skull base is intact. There is normal aeration of
 the mastoid sinuses.

 

The cervical vertebra have normal alignment. There is slight narrowing of the C5-6 disc space. No com
pression fracture. Facet joints are intact. Prevertebral soft tissues are intact. There is occipital 
scalp hematoma in the midline.

 

IMPRESSION:

Mild atrophy. Old left thalamic lacunar infarct. Brain is not changed compared to the old exam.

 

No acute abnormality of the cervical spine.

 

Midline occipital scalp hematoma.

## 2022-10-26 NOTE — ED
General Adult HPI





- General


Chief complaint: Seizure


Stated complaint: Seizure


Time Seen by Provider: 10/26/22 21:05


Source: EMS


Mode of arrival: EMS


Limitations: altered mental status





- History of Present Illness


Initial comments: 





52-year-old female with past medical history of end-stage renal disease on 

hemodialysis, diabetes, CVA, seizure disorder who presents to the emergency 

department after she had a seizure.  EMS provided history as there is no family 

at bedside.  States that the patient had a breakthrough seizure today.  Family 

members were in the other room.  They went to the bathroom where the patient was

attempting to use the restroom.  On the patient on the floor seizing.  Patient 

was having a tonic-clonic seizure.  Unknown how long she had been seizing for.  

Ported in the patient's history that she previously had breakthrough seizures 

once a month.  This have become more frequent.  Patient has had been seen in the

hospital 3 times this month for breakthrough seizure.  Reports that there is 

concern for noncompliance.  Patient was recently started on Vimpat for 

increasing breakthrough seizures.  Patient cannot provide any history to me at 

this time and no family is available for consultation.





- Related Data


                                Home Medications











 Medication  Instructions  Recorded  Confirmed


 


Atorvastatin [Lipitor] 40 mg PO HS 08/31/21 10/27/22


 


Dulaglutide [Trulicity] 3 mg SQ Q7D 08/31/21 10/27/22


 


Lacosamide [Vimpat] 150 mg PO MOWEFR@07,14,21 08/31/21 10/27/22


 


Sevelamer [Renvela] 800 mg PO BID-W/MEALS 08/31/21 10/27/22


 


Vascepa 1gm 1 gm PO BID 08/31/21 10/27/22


 


Calcium Acetate 667 mg PO TID-W/MEALS 08/05/22 10/27/22


 


Metoprolol Tartrate [Lopressor] 25 mg PO BID 08/05/22 10/27/22


 


Pantoprazole [Protonix] 40 mg PO DAILY 08/05/22 10/27/22


 


Potassium Chloride ER [K-Dur 20] 20 meq PO BID 08/05/22 10/27/22


 


levETIRAcetam [Keppra] 500 mg PO BID@0700,2100 08/05/22 10/27/22


 


Sevelamer [Renvela] 1,600 mg PO W/SUPPER 10/07/22 10/27/22


 


hydrALAZINE HCL [Apresoline] 50 mg PO BID 10/07/22 10/27/22








                                  Previous Rx's











 Medication  Instructions  Recorded


 


Clopidogrel [Plavix] 75 mg PO DAILY #30 tab 21


 


amLODIPine [Norvasc] 10 mg PO DAILY #30 tab 21


 


Divalproex ER [Depakote ER] 250 mg PO BID 30 Days #60 tab 22


 


levETIRAcetam [Keppra] 500 mg PO MoWeFr@1600 30 Days #15 22





 tab 


 


Lacosamide [Vimpat] 150 mg PO BID@0700,1900 #0 10/27/22











                                    Allergies











Allergy/AdvReac Type Severity Reaction Status Date / Time


 


Penicillins Allergy  Itching Verified 10/27/22 10:33














Review of Systems


ROS Statement: 


Those systems with pertinent positive or pertinent negative responses have been 

documented in the HPI.





ROS Other: All systems not noted in ROS Statement are negative.





Past Medical History


Past Medical History: Chest Pain / Angina, CVA/TIA, Diabetes Mellitus, 

GERD/Reflux, Hyperlipidemia, Hypertension, Memory Impairment, Osteoarthritis 

(OA), Renal Disease, Seizure Disorder, Thyroid Disorder


Additional Past Medical History / Comment(s): Last seizure approximately one 

month ago. Spouse states she used to have seizures 4X per week until she had 

vagal nerve stimulator placed, now has seizures approximately once a week to 

once a month. Dialysis MOWEFR. Neuropathy, left drop foot, only able to walk 

short distances, otherwise uses wheelchair. 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 

ago, with residual memory impairment. Never diagnosed with Sleep Apnea but 

spouse states she does stop breathing through the night and he has to shake her 

to start breathing again. Varicose veins.


History of Any Multi-Drug Resistant Organisms: None Reported


Past Surgical History:  Section, Tonsillectomy, Uterine Ablation


Additional Past Surgical History / Comment(s): Left arm fistula, loop recorder, 

vagus nerve stimulator.


Past Anesthesia/Blood Transfusion Reactions: Motion Sickness


Additional Past Anesthesia/Blood Transfusion Reaction / Comment(s): Family hx 

unknown, pt adopted.


Past Psychological History: Bipolar


Smoking Status: Former smoker


Past Alcohol Use History: None Reported


Past Drug Use History: None Reported





- Past Family History


  ** Father


Family Medical History: Unable to Obtain


Additional Family Medical History / Comment(s): Pt adopted.





General Exam


Limitations: altered mental status


General appearance: in no apparent distress, lethargic, other (post ictal)


Head exam: Present: other (abraions right forearm)


Eye exam: Present: normal appearance, PERRL, EOMI.  Absent: scleral icterus, 

conjunctival injection, periorbital swelling


ENT exam: Present: normal exam, mucous membranes moist


Neck exam: Present: normal inspection.  Absent: tenderness, meningismus, 

lymphadenopathy


Respiratory exam: Present: normal lung sounds bilaterally.  Absent: respiratory 

distress, wheezes, rales, rhonchi, stridor


Cardiovascular Exam: Present: regular rate, normal rhythm, normal heart sounds. 

 Absent: systolic murmur, diastolic murmur, rubs, gallop, clicks


Neurological exam: Present: altered, other (will answer yes/no questions. 

postictal. follow commands)





Course


                                   Vital Signs











  10/26/22 10/27/22 10/27/22





  20:42 02:46 05:52


 


Temperature   


 


Pulse Rate 94 84 70


 


Respiratory 16 16 16





Rate   


 


Blood Pressure 144/76  150/70


 


O2 Sat by Pulse 99 95 97





Oximetry   














  10/27/22





  06:38


 


Temperature 97.6 F


 


Pulse Rate 


 


Respiratory 





Rate 


 


Blood Pressure 


 


O2 Sat by Pulse 





Oximetry 














EKG Findings





- EKG Comments:


EKG Findings:: EKG demonstrates a sinus rhythm with a rate of 92. UT interval 

188.  QRS 84.  QTC of 421.  Some baseline artifact in the lateral leads.  No 

acute ST segment elevations or depressions





Medical Decision Making





- Medical Decision Making





Arrival patient's placed into room 10.  Thorough history and physical exam was 

performed.  Patient is post ictal and therefore cannot provide history.  

Extensive review of the patient's chart demonstrates that she has been here 

twice this month for breakthrough seizures.  Patient has medications appetite 

however does not appear that the patient is taking the medications regularly.  

Patient does have prolonged postictal state.  Laboratory studies are conducted 

and reviewed.  Patient will be admitted to [facility] physicians. Accepted by Dr. Perez





- Lab Data


Result diagrams: 


                                 10/27/22 04:16





                                 10/27/22 04:16


                                   Lab Results











  10/26/22 10/26/22 10/26/22 Range/Units





  23:14 23:14 23:14 


 


WBC  8.2    (3.8-10.6)  k/uL


 


RBC  3.68 L    (3.80-5.40)  m/uL


 


Hgb  11.1 L    (11.4-16.0)  gm/dL


 


Hct  32.4 L    (34.0-46.0)  %


 


MCV  87.8    (80.0-100.0)  fL


 


MCH  30.1    (25.0-35.0)  pg


 


MCHC  34.3    (31.0-37.0)  g/dL


 


RDW  14.6    (11.5-15.5)  %


 


Plt Count  225    (150-450)  k/uL


 


MPV  8.7    


 


Neutrophils %  80    %


 


Lymphocytes %  13    %


 


Monocytes %  4    %


 


Eosinophils %  1    %


 


Basophils %  1    %


 


Neutrophils #  6.6    (1.3-7.7)  k/uL


 


Lymphocytes #  1.1    (1.0-4.8)  k/uL


 


Monocytes #  0.3    (0-1.0)  k/uL


 


Eosinophils #  0.1    (0-0.7)  k/uL


 


Basophils #  0.1    (0-0.2)  k/uL


 


Sodium    137  (137-145)  mmol/L


 


Potassium    3.6  (3.5-5.1)  mmol/L


 


Chloride    93 L  ()  mmol/L


 


Carbon Dioxide    31 H  (22-30)  mmol/L


 


Anion Gap    13  mmol/L


 


BUN    18 H  (7-17)  mg/dL


 


Creatinine    4.81 H  (0.52-1.04)  mg/dL


 


Est GFR (CKD-EPI)AfAm    11  (>60 ml/min/1.73 sqM)  


 


Est GFR (CKD-EPI)NonAf    10  (>60 ml/min/1.73 sqM)  


 


Glucose    190 H  (74-99)  mg/dL


 


Calcium    8.8  (8.4-10.2)  mg/dL


 


Magnesium    2.1  (1.6-2.3)  mg/dL


 


Total Bilirubin    0.4  (0.2-1.3)  mg/dL


 


AST    19  (14-36)  U/L


 


ALT    15  (4-34)  U/L


 


Alkaline Phosphatase    131 H  ()  U/L


 


Total Protein    7.1  (6.3-8.2)  g/dL


 


Albumin    4.3  (3.5-5.0)  g/dL


 


Valproic Acid    <10.0  ug/mL


 


Levetiracetam   24.3   (3.0-60.0)  ug/mL


 


Serum Alcohol    <10  mg/dL














Disposition


Clinical Impression: 


 Breakthrough seizure, Post-ictal confusion, End stage renal disease





Disposition: ADMITTED AS IP TO THIS HOSP


Condition: Stable


Is patient prescribed a controlled substance at d/c from ED?: No


Time of Disposition: 23:51


Decision to Admit Reason: Admit from EC


Decision Date: 10/26/22


Decision Time: 23:51

## 2022-10-27 VITALS — SYSTOLIC BLOOD PRESSURE: 150 MMHG | TEMPERATURE: 97.9 F | DIASTOLIC BLOOD PRESSURE: 80 MMHG

## 2022-10-27 VITALS — RESPIRATION RATE: 17 BRPM | HEART RATE: 91 BPM

## 2022-10-27 LAB
ANION GAP SERPL CALC-SCNC: 16 MMOL/L
BASOPHILS # BLD AUTO: 0.1 K/UL (ref 0–0.2)
BASOPHILS NFR BLD AUTO: 1 %
BUN SERPL-SCNC: 22 MG/DL (ref 7–17)
CALCIUM SPEC-MCNC: 8.9 MG/DL (ref 8.4–10.2)
CHLORIDE SERPL-SCNC: 95 MMOL/L (ref 98–107)
CO2 SERPL-SCNC: 27 MMOL/L (ref 22–30)
EOSINOPHIL # BLD AUTO: 0.1 K/UL (ref 0–0.7)
EOSINOPHIL NFR BLD AUTO: 1 %
ERYTHROCYTE [DISTWIDTH] IN BLOOD BY AUTOMATED COUNT: 3.8 M/UL (ref 3.8–5.4)
ERYTHROCYTE [DISTWIDTH] IN BLOOD: 15 % (ref 11.5–15.5)
GLUCOSE BLD-MCNC: 111 MG/DL (ref 70–110)
GLUCOSE BLD-MCNC: 117 MG/DL (ref 70–110)
GLUCOSE SERPL-MCNC: 119 MG/DL (ref 74–99)
HCT VFR BLD AUTO: 33.4 % (ref 34–46)
HGB BLD-MCNC: 11 GM/DL (ref 11.4–16)
LYMPHOCYTES # SPEC AUTO: 3.2 K/UL (ref 1–4.8)
LYMPHOCYTES NFR SPEC AUTO: 37 %
MCH RBC QN AUTO: 29 PG (ref 25–35)
MCHC RBC AUTO-ENTMCNC: 33 G/DL (ref 31–37)
MCV RBC AUTO: 87.9 FL (ref 80–100)
MONOCYTES # BLD AUTO: 0.6 K/UL (ref 0–1)
MONOCYTES NFR BLD AUTO: 7 %
NEUTROPHILS # BLD AUTO: 4.6 K/UL (ref 1.3–7.7)
NEUTROPHILS NFR BLD AUTO: 53 %
PLATELET # BLD AUTO: 220 K/UL (ref 150–450)
POTASSIUM SERPL-SCNC: 4.3 MMOL/L (ref 3.5–5.1)
SODIUM SERPL-SCNC: 138 MMOL/L (ref 137–145)
WBC # BLD AUTO: 8.7 K/UL (ref 3.8–10.6)

## 2022-10-27 RX ADMIN — HEPARIN SODIUM SCH: 5000 INJECTION INTRAVENOUS; SUBCUTANEOUS at 17:33

## 2022-10-27 RX ADMIN — INSULIN ASPART SCH: 100 INJECTION, SOLUTION INTRAVENOUS; SUBCUTANEOUS at 09:29

## 2022-10-27 RX ADMIN — HEPARIN SODIUM SCH UNIT: 5000 INJECTION INTRAVENOUS; SUBCUTANEOUS at 09:55

## 2022-10-27 RX ADMIN — INSULIN ASPART SCH: 100 INJECTION, SOLUTION INTRAVENOUS; SUBCUTANEOUS at 13:32

## 2022-10-27 NOTE — P.NPCON
History of Present Illness





- Reason for Consult


end stage renal disease





- History of Present Illness





Reason for consultation: End-stage renal disease





History of present illness:


Patient is a 89-year-old female seen in renal consultation for end-stage renal 

disease.  She is maintained on hemodialysis on  schedule.

 Complete hemodialysis treatment yesterday.  Patient presented to the hospital 

after she has seizure at home.  Patient was found seizing on the floor at home. 

Patient has history of seizures and has been hospitalized multiple times for the

same.  She is currently on room air.  Patient has a history of baseline dementia

and dysarthria.  No fever or chills.  No nausea vomiting or diarrhea.  She is 

being followed by neurology.  She is maintained on multiple anticonvulsants.  

Patient did sustain a fall is noted to have a bruise on her forehead.  CT showed

occipital scalp hematoma and old CVA.





Vital signs are stable.


General: Awake.  No acute distress.


HEENT: Head exam is unremarkable. 


LUNGS: Breath sounds decreased.


HEART: Rate and Rhythm are regular.


ABDOMEN: Soft, no distention.


EXTREMITITES: No edema.





Past Medical History


Past Medical History: Chest Pain / Angina, CVA/TIA, Diabetes Mellitus, 

GERD/Reflux, Hyperlipidemia, Hypertension, Memory Impairment, Osteoarthritis 

(OA), Renal Disease, Seizure Disorder, Thyroid Disorder


Additional Past Medical History / Comment(s): Last seizure approximately one 

month ago. Spouse states she used to have seizures 4X per week until she had 

vagal nerve stimulator placed, now has seizures approximately once a week to 

once a month. Dialysis MOWEFR. Neuropathy, left drop foot, only able to walk 

short distances, otherwise uses wheelchair. 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 

ago, with residual memory impairment. Never diagnosed with Sleep Apnea but s

pouse states she does stop breathing through the night and he has to shake her 

to start breathing again. Varicose veins.


History of Any Multi-Drug Resistant Organisms: None Reported


Past Surgical History:  Section, Tonsillectomy, Uterine Ablation


Additional Past Surgical History / Comment(s): Left arm fistula, loop recorder, 

vagus nerve stimulator.


Past Anesthesia/Blood Transfusion Reactions: Motion Sickness


Additional Past Anesthesia/Blood Transfusion Reaction / Comment(s): Family hx 

unknown, pt adopted.


Past Psychological History: Bipolar


Smoking Status: Former smoker


Past Alcohol Use History: None Reported


Past Drug Use History: None Reported





- Past Family History


  ** Father


Family Medical History: Unable to Obtain


Additional Family Medical History / Comment(s): Pt adopted.





Medications and Allergies


                                Home Medications











 Medication  Instructions  Recorded  Confirmed  Type


 


Clopidogrel [Plavix] 75 mg PO DAILY #30 tab 02/13/21 10/27/22 Rx


 


amLODIPine [Norvasc] 10 mg PO DAILY #30 tab 02/13/21 10/27/22 Rx


 


Atorvastatin [Lipitor] 40 mg PO HS 08/31/21 10/27/22 History


 


Dulaglutide [Trulicity] 3 mg SQ Q7D 08/31/21 10/27/22 History


 


Lacosamide [Vimpat] 150 mg PO MOWEFR@07,14,21 08/31/21 10/27/22 History


 


Lacosamide [Vimpat] 150 mg PO SUTUFRSA@0700,2100 08/31/21 10/27/22 History


 


Sevelamer [Renvela] 800 mg PO BID-W/MEALS 08/31/21 10/27/22 History


 


Vascepa 1gm 1 gm PO BID 08/31/21 10/27/22 History


 


Calcium Acetate 667 mg PO TID-W/MEALS 08/05/22 10/27/22 History


 


Metoprolol Tartrate [Lopressor] 25 mg PO BID 08/05/22 10/27/22 History


 


Pantoprazole [Protonix] 40 mg PO DAILY 08/05/22 10/27/22 History


 


Potassium Chloride ER [K-Dur 20] 20 meq PO BID 08/05/22 10/27/22 History


 


levETIRAcetam [Keppra] 500 mg PO BID@0700,2100 08/05/22 10/27/22 History


 


Divalproex ER [Depakote ER] 250 mg PO BID 30 Days #60 tab 08/08/22 10/27/22 Rx


 


levETIRAcetam [Keppra] 500 mg PO MoWeFr@1600 30 Days #15 08/08/22 10/27/22 Rx





 tab   


 


Sevelamer [Renvela] 1,600 mg PO W/SUPPER 10/07/22 10/27/22 History


 


hydrALAZINE HCL [Apresoline] 50 mg PO BID 10/07/22 10/27/22 History








                                    Allergies











Allergy/AdvReac Type Severity Reaction Status Date / Time


 


Penicillins Allergy  Itching Verified 10/27/22 10:33














Physical Exam


Vitals: 


                                   Vital Signs











  Temp Pulse Pulse Resp BP BP Pulse Ox


 


 10/27/22 07:40  98.3 F   91  17   168/69  99


 


 10/27/22 06:38  97.6 F      


 


 10/27/22 05:52   70   16  150/70   97


 


 10/27/22 02:46   84   16    95


 


 10/26/22 20:42   94   16  144/76   99








                                Intake and Output











 10/26/22 10/27/22 10/27/22





 22:59 06:59 14:59


 


Other:   


 


  Weight 81.647 kg  














Results





- Lab Results


                             Most recent lab results











Calcium  8.9 mg/dL (8.4-10.2)   10/27/22  04:16    


 


Magnesium  2.1 mg/dL (1.6-2.3)   10/26/22  23:14    














                                 10/27/22 04:16





                                 10/27/22 04:16





Assessment and Plan


Plan: 





Assessment:


1.  End-stage renal disease maintained on hemodialysis on  schedule.


2.  Seizure disorder.


3.  Hypertension with chronic kidney disease.


4.  Chronic kidney disease mineral bone disease maintained on phosphate binders.


5.  Hypertension with chronic kidney disease.





Plan:


Hemodialysis tomorrow.


Home antihypertensives resumed.


If blood pressures seem persistently above 140/90, will increase dose of 

hydralazine.





Thank you for the consultation.  I will continue to follow the patient with you 

during her hospital stay.

## 2022-10-27 NOTE — P.DS
Providers


Date of admission: 


10/26/22 23:54





Expected date of discharge: 10/27/22


Attending physician: 


Ora Perez MD





Consults: 





                                        





10/26/22 23:52


Consult Physician Urgent 


   Consulting Provider: Michele Merritt


   Consult Reason/Comments: breakthrough seizure


   Do you want consulting provider notified?: Yes





10/27/22 08:10


Consult Physician Routine 


   Consulting Provider: Brennen Higginbotham


   Consult Reason/Comments: dialysis


   Do you want consulting provider notified?: Yes











Primary care physician: 


Stated None





Hospital Course: 





The patient is a 52-year-old female with a PMH of ESRD on hemodialysis, history 

of CVA with residual left-sided weakness, seizure disorder, hypertension, 

hyperlipidemia, and type II DM who was brought into the emergency room via EMS 

after a seizure.  The history is largely obtained from ED physician and from the

chart as the patient was lethargic at the time of interview and was not 

answering all questions appropriately.  The patient reportedly had a 

breakthrough seizure earlier today, witnessed by family members, tonic-clonic in

nature.  The patient was reportedly found on the bathroom floor having a seizure

after family heard a thump.  The patient states that she has been compliant with

her antiepileptics.  Of note however, the patient has had multiple recent 

hospitalizations for breakthrough seizures.  During her prior hospitalization, 

neurology could not fully evaluate the patient as she had signed out AMA.  At 

time of interview, the patient reported feeling tired.  She denied any 

additional complaints.  History although limited as stated above due to altered 

mentation.  Head/cervical spine CT in the emergency room revealed a midline neck

supple scalp hematoma with EKG showing sinus rhythm at 92 bpm with no ST/T-wave 

changes noted as reviewed by me.  Laboratory evaluation was remarkable for BUN 

of 18, creatinine 4.1, and CO2 of 31 with glucose 190.  Valproic acid level was 

less than 10 with serum alcohol level also less than 10.





The patient received Depakote 150 mg and Keppra thousand milligrams in the ED.  

Neurology was consulted and recommended no EEG.  Neurology recommended 

continuing Keppra 500 mg 1 tablet twice a day with an additional dose post 

dialysis, Vimpat 150 mg 1 tablet twice a day with an additional dose post 

dialysis, Depakote 250 mg by mouth twice a day, Onfi 5 mg 1 tablet twice a day. 

Onfi was presecribed by Neurologist Dr. Basha.  Patient is advised to follow-up 

with her PCP within 1-2 days of discharge.  Patient is advised to follow-up with

a neurologist for further titration of her antiepileptic medication.  Neurology 

cleared the patient for discharge.





Patient was seen and examined earlier this morning.  Patient reported complete 

resolution of her symptoms.  Stated that she felt at baseline.





Pertinent studies include a head CT, CT C-spine, EKG





General: Somewhat chronically ill-appearing female, no distress, appears older 

than stated age, overweight 


Derm: Facial hair in hirsutism pattern noted, no unusual rashes/lesions, warm


Head: atraumatic, normocephalic, symmetric


Eyes: EOMI, no lid lag, anicteric sclera


ENT: Nose and ears atraumatic


Neck: No cervical lymphadenopathy, trachea midline, supple


Mouth: no lip lesion, mucus membranes moist


Cardiovascular: S1S2 reg, no murmur, positive dorsalis pedis pulse bilateral, no

edema


Lungs: CTA bilateral, no rhonchi, no rales, no accessory muscle use


Neuro:  Muscle strength 4 out of 5 of right upper and lower extremities with 

strength 3 out of 5 of left upper and lower extremities


Psych: Alert and oriented 3





Discharge diagnosis:





#Breakthrough seizure


#Normocytic anemia


Chronic conditions: ESRD on hemodialysis, type II DM, hypertension, 

hyperlipidemia, history of CVA


Patient Condition at Discharge: Stable





Plan - Discharge Summary


Discharge Rx Participant: No


New Discharge Prescriptions: 


Continue


   amLODIPine [Norvasc] 10 mg PO DAILY #30 tab


   Clopidogrel [Plavix] 75 mg PO DAILY #30 tab


   Atorvastatin [Lipitor] 40 mg PO HS


   Vascepa 1gm 1 gm PO BID


   Sevelamer [Renvela] 800 mg PO BID-W/MEALS


   Calcium Acetate 667 mg PO TID-W/MEALS


   Pantoprazole [Protonix] 40 mg PO DAILY


   Potassium Chloride ER [K-Dur 20] 20 meq PO BID


   Sevelamer [Renvela] 1,600 mg PO W/SUPPER


   Dulaglutide [Trulicity] 3 mg SQ Q7D


   Lacosamide [Vimpat] 150 mg PO MOWEFR@07,14,21


   levETIRAcetam [Keppra] 500 mg PO BID@0700,2100


   Metoprolol Tartrate [Lopressor] 25 mg PO BID


   Divalproex ER [Depakote ER] 250 mg PO BID 30 Days #60 tab


   levETIRAcetam [Keppra] 500 mg PO MoWeFr@1600 30 Days #15 tab


   hydrALAZINE HCL [Apresoline] 50 mg PO BID





Changed


   Lacosamide [Vimpat] 150 mg PO BID@0700,1900 #0


Discharge Medication List





Clopidogrel [Plavix] 75 mg PO DAILY #30 tab 02/13/21 [Rx]


amLODIPine [Norvasc] 10 mg PO DAILY #30 tab 02/13/21 [Rx]


Atorvastatin [Lipitor] 40 mg PO HS 08/31/21 [History]


Dulaglutide [Trulicity] 3 mg SQ Q7D 08/31/21 [History]


Lacosamide [Vimpat] 150 mg PO MOWEFR@07,14,21 08/31/21 [History]


Sevelamer [Renvela] 800 mg PO BID-W/MEALS 08/31/21 [History]


Vascepa 1gm 1 gm PO BID 08/31/21 [History]


Calcium Acetate 667 mg PO TID-W/MEALS 08/05/22 [History]


Metoprolol Tartrate [Lopressor] 25 mg PO BID 08/05/22 [History]


Pantoprazole [Protonix] 40 mg PO DAILY 08/05/22 [History]


Potassium Chloride ER [K-Dur 20] 20 meq PO BID 08/05/22 [History]


levETIRAcetam [Keppra] 500 mg PO BID@0700,2100 08/05/22 [History]


Divalproex ER [Depakote ER] 250 mg PO BID 30 Days #60 tab 08/08/22 [Rx]


levETIRAcetam [Keppra] 500 mg PO MoWeFr@1600 30 Days #15 tab 08/08/22 [Rx]


Sevelamer [Renvela] 1,600 mg PO W/SUPPER 10/07/22 [History]


hydrALAZINE HCL [Apresoline] 50 mg PO BID 10/07/22 [History]


Lacosamide [Vimpat] 150 mg PO BID@0700,1900 #0 10/27/22 [Rx]








Follow up Appointment(s)/Referral(s): 


Tommie Nelson MD [Medical Doctor] - 1 Week


None,Stated [Primary Care Provider] - 1-2 days


Patient Instructions/Handouts:  Seizure/Epilepsy Discharge Instructions & 

Follow-Up


Activity/Diet/Wound Care/Special Instructions: 


Diet: Renal


FU PCP within 1-2 days of DC.


FU with Neurology within 1 week of DC.


Take all medications as advised.


Come back to the ED for any further seizures.


Do not drive until cleared by your Neurologist.


Discharge Disposition: HOME SELF-CARE

## 2022-10-27 NOTE — P.HPIM
History of Present Illness


H&P Date: 10/26/22





The patient is a 52-year-old female with a PMH of ESRD on hemodialysis, history 

of CVA with residual left-sided weakness, seizure disorder, hypertension, 

hyperlipidemia, and type II DM who was brought into the emergency room via EMS 

after a seizure.  The history is largely obtained from ED physician and from the

chart as the patient was lethargic at the time of interview and was not answ

ering all questions appropriately.  The patient reportedly had a breakthrough 

seizure earlier today, witnessed by family members, tonic-clonic in nature.  The

patient was reportedly found on the bathroom floor having a seizure after family

heard a thump.  The patient states that she has been compliant with her 

antiepileptics.  Of note however, the patient has had multiple recent 

hospitalizations for breakthrough seizures.  During her prior hospitalization, 

neurology could not fully evaluate the patient as she had signed out AMA.  At 

time of interview, the patient reported feeling tired.  She denied any 

additional complaints.  History although limited as stated above due to altered 

mentation.  Head/cervical spine CT in the emergency room revealed a midline neck

supple scalp hematoma with EKG showing sinus rhythm at 92 bpm with no ST/T-wave 

changes noted as reviewed by me.  Laboratory evaluation was remarkable for BUN 

of 18, creatinine 4.1, and CO2 of 31 with glucose 190.  Valproic acid level was 

less than 10 with serum alcohol level also less than 10.





Review of systems:


Unable to obtain due to mental status.





Physical examination:


General: Somewhat chronically ill-appearing female, no distress, appears older 

than stated age, overweight 


Derm: Facial hair in hirsutism pattern noted, no unusual rashes/lesions, warm


Head: atraumatic, normocephalic, symmetric


Eyes: EOMI, no lid lag, anicteric sclera, pupils equal round reactive to light


ENT: Nose and ears atraumatic


Neck: No cervical lymphadenopathy, trachea midline, supple


Mouth: no lip lesion, mucus membranes moist


Cardiovascular: S1S2 reg, no murmur, positive dorsalis pedis pulse bilateral, no

edema


Lungs: CTA bilateral, no rhonchi, no rales, no accessory muscle use


Abdominal: soft,  nontender to palpation, no guarding


Ext: muscle strength 4 out of 5 of right upper and lower extremities with 

strength 3 out of 5 of left upper and lower extremities grossly, no gross muscle

atrophy, no contractures, 


Neuro:  CN II-XI grossly intact, no gross focal neuro deficits


Psych: Lethargic, oriented only to person and place, not oriented to time





Assessment/plan





Breakthrough seizure


-Patient currently on Depakote, Vimpat, and Keppra as outpatient, although 

adherence is questionable 


-Follow up levels


-Neurology consult


-Fall, seizure, aspiration precautions





Chronic conditions: ESRD on hemodialysis, type II DM, hypertension, 

hyperlipidemia


-Continue with home meds


-Nephrology consulted for hemodialysis resumption





DVT prophylaxis


-Heparin subcu





The patient is admitted with an anticipated less than 2 midnight stay for 

evaluation of breakthrough seizure


CODE STATUS: Full Code


Discussed with: Patient


Anticipated discharge date: in am


Anticipated discharge place: Home








Past Medical History


Past Medical History: Chest Pain / Angina, CVA/TIA, Diabetes Mellitus, GERD

/Reflux, Hyperlipidemia, Hypertension, Memory Impairment, Osteoarthritis (OA), 

Renal Disease, Seizure Disorder, Thyroid Disorder


Additional Past Medical History / Comment(s): Last seizure approximately one 

month ago. Spouse states she used to have seizures 4X per week until she had 

vagal nerve stimulator placed, now has seizures approximately once a week to 

once a month. Dialysis MOWEFR. Neuropathy, left drop foot, only able to walk 

short distances, otherwise uses wheelchair. 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 

ago, with residual memory impairment. Never diagnosed with Sleep Apnea but 

spouse states she does stop breathing through the night and he has to shake her 

to start breathing again. Varicose veins.


History of Any Multi-Drug Resistant Organisms: None Reported


Past Surgical History:  Section, Tonsillectomy, Uterine Ablation


Additional Past Surgical History / Comment(s): Left arm fistula, loop recorder, 

vagus nerve stimulator.


Past Anesthesia/Blood Transfusion Reactions: Motion Sickness


Additional Past Anesthesia/Blood Transfusion Reaction / Comment(s): Family hx 

unknown, pt adopted.


Past Psychological History: Bipolar


Smoking Status: Former smoker


Past Alcohol Use History: None Reported


Past Drug Use History: None Reported





- Past Family History


  ** Father


Family Medical History: Unable to Obtain


Additional Family Medical History / Comment(s): Pt adopted.





Medications and Allergies


                                Home Medications











 Medication  Instructions  Recorded  Confirmed  Type


 


Clopidogrel [Plavix] 75 mg PO DAILY #30 tab 02/13/21 10/07/22 Rx


 


amLODIPine [Norvasc] 10 mg PO DAILY #30 tab 02/13/21 10/07/22 Rx


 


Atorvastatin [Lipitor] 40 mg PO HS 08/31/21 10/07/22 History


 


Dulaglutide [Trulicity] 3 mg SQ Q7D 08/31/21 10/07/22 History


 


Lacosamide [Vimpat] 150 mg PO MOWEFR@07,14,21 08/31/21 10/07/22 History


 


Lacosamide [Vimpat] 150 mg PO SUTUFRSA@0700,2100 08/31/21 10/07/22 History


 


Sevelamer [Renvela] 800 mg PO BID-W/MEALS 08/31/21 10/07/22 History


 


Vascepa 1gm 1 gm PO BID 08/31/21 10/07/22 History


 


Calcium Acetate 2,001 mg PO TID-W/MEALS 08/05/22 10/07/22 History


 


Metoprolol Tartrate [Lopressor] 25 mg PO BID 08/05/22 10/07/22 History


 


Pantoprazole [Protonix] 40 mg PO DAILY 08/05/22 10/07/22 History


 


Potassium Chloride ER [K-Dur 20] 20 meq PO BID 08/05/22 10/07/22 History


 


levETIRAcetam [Keppra] 500 mg PO BID@0700,2100 08/05/22 10/07/22 History


 


Divalproex ER [Depakote ER] 250 mg PO BID 30 Days #60 tab 08/08/22 10/07/22 Rx


 


levETIRAcetam [Keppra] 500 mg PO MoWeFr@1600 30 Days #15 08/08/22 10/07/22 Rx





 tab   


 


Sevelamer [Renvela] 1,600 mg PO W/SUPPER 10/07/22 10/07/22 History


 


hydrALAZINE HCL [Apresoline] 50 mg PO BID 10/07/22 10/07/22 History








                                    Allergies











Allergy/AdvReac Type Severity Reaction Status Date / Time


 


Penicillins Allergy  Itching Verified 10/07/22 16:06














Physical Exam


Vitals: 


                                   Vital Signs











  Pulse Resp BP Pulse Ox


 


 10/26/22 20:42  94  16  144/76  99








                                Intake and Output











 10/26/22 10/26/22 10/27/22





 14:59 22:59 06:59


 


Other:   


 


  Weight  81.647 kg 














Results


CBC & Chem 7: 


                                 10/26/22 23:14





                                 10/26/22 23:14


Labs: 


                  Abnormal Lab Results - Last 24 Hours (Table)











  10/26/22 10/26/22 Range/Units





  23:14 23:14 


 


RBC  3.68 L   (3.80-5.40)  m/uL


 


Hgb  11.1 L   (11.4-16.0)  gm/dL


 


Hct  32.4 L   (34.0-46.0)  %


 


Chloride   93 L  ()  mmol/L


 


Carbon Dioxide   31 H  (22-30)  mmol/L


 


BUN   18 H  (7-17)  mg/dL


 


Creatinine   4.81 H  (0.52-1.04)  mg/dL


 


Glucose   190 H  (74-99)  mg/dL


 


Alkaline Phosphatase   131 H  ()  U/L

## 2022-10-27 NOTE — P.CNNES
History of Present Illness


Consult date: 10/27/22


Requesting physician: Asha Britton


Reason for Consult: breakthrough seizure


History of Present Illness: 


This is a 52-year-old woman with history of epilepsy on VNS, stroke with left 

hemiparesis, diabetes mellitus, end-stage renal disease on dialysis, 

hypertension who presented to our facility because of breakthrough seizure.  

Patient is known to our neurology team and presents our facility multiple times 

because of breakthrough seizures.  Some of the history is obtained from medical 

records.  Per the ED team the family notified ED physician that the patient went

to the bathroom to use the restroom and the patient had a seizure there and it 

was a tonic-clonic seizure unknown of the duration of the seizure.  Her last 

seizure was a month ago.  I spoke with the  and he stated he stated 

unsure if she missed her vimpat medication yesterday in the morning or not.  

According to her  she is following-up with Dr. Nelson team and has 

followed up in the past 1-2 weeks and had her VNS increased but has not had her 

medication modified.  He believes she is on Keppra 500mg 1 tab bid with 

additional tab post dialysis, Vimpat 150mg 1 tab bid with additional post 

dialysis, Depakote 250mg bid.  He stated Onfi medication does not sound familiar

to her regimen.  Currently patient feels back to baseline and wants to go home.





She was last seen by myself on 2022 for breakthrough seizure and I recomme

nded to continue Keppra 500mg 1 tab bid with additional keppra post dialysis, 

Vimpat 150mg 1 tab bid with additional  post dialysis and recommended restart of

Onfi  and that is helping to discontinue Depakote 250mg bid.  Please refer to my

notes for further details.  


Dr. Fischer was consulted on 10/08/2022 for break-thru seizure but it seems she 

went AMA.





Some of the work-up during this hospital visit consisted of:


Initial glucose is 190 and repeated is 119.


Valproic acid is <10.0


serum alcohol is <10


CT head is reported as mild atrophy.  Old left thalamic lacunar infarct.  Brain 

is not changed compared to the old exam.  I personally reviewed the CT of the 

head and there is no acute subacute ischemia and there is notable, hemorrhage.


CT cervical spine is reported as no acute abnormality of the cervical spine.  

Midline occipital scalp hematoma.








Review of Systems


Review of system:  The 12 point system was reviewed and apparent positive and 

negative per HPI.








Past Medical History


Past Medical History: Chest Pain / Angina, CVA/TIA, Diabetes Mellitus, 

GERD/Reflux, Hyperlipidemia, Hypertension, Memory Impairment, Osteoarthritis 

(OA), Renal Disease, Seizure Disorder, Thyroid Disorder


Additional Past Medical History / Comment(s): Last seizure approximately one 

month ago. Spouse states she used to have seizures 4X per week until she had 

vagal nerve stimulator placed, now has seizures approximately once a week to 

once a month. Dialysis MOWEFR. Neuropathy, left drop foot, only able to walk 

short distances, otherwise uses wheelchair. 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 

ago, with residual memory impairment. Never diagnosed with Sleep Apnea but 

spouse states she does stop breathing through the night and he has to shake her 

to start breathing again. Varicose veins.


History of Any Multi-Drug Resistant Organisms: None Reported


Past Surgical History:  Section, Tonsillectomy, Uterine Ablation


Additional Past Surgical History / Comment(s): Left arm fistula, loop recorder, 

vagus nerve stimulator.


Past Anesthesia/Blood Transfusion Reactions: Motion Sickness


Additional Past Anesthesia/Blood Transfusion Reaction / Comment(s): Family hx 

unknown, pt adopted.


Past Psychological History: Bipolar


Smoking Status: Former smoker


Past Alcohol Use History: None Reported


Past Drug Use History: None Reported





- Past Family History


  ** Father


Family Medical History: Unable to Obtain


Additional Family Medical History / Comment(s): Pt adopted.





Medications and Allergies


                                Home Medications











 Medication  Instructions  Recorded  Confirmed  Type


 


Clopidogrel [Plavix] 75 mg PO DAILY #30 tab 02/13/21 10/27/22 Rx


 


amLODIPine [Norvasc] 10 mg PO DAILY #30 tab 02/13/21 10/27/22 Rx


 


Atorvastatin [Lipitor] 40 mg PO HS 08/31/21 10/27/22 History


 


Dulaglutide [Trulicity] 3 mg SQ Q7D 08/31/21 10/27/22 History


 


Lacosamide [Vimpat] 150 mg PO MOWEFR@07,14,21 08/31/21 10/27/22 History


 


Sevelamer [Renvela] 800 mg PO BID-W/MEALS 08/31/21 10/27/22 History


 


Vascepa 1gm 1 gm PO BID 08/31/21 10/27/22 History


 


Calcium Acetate 667 mg PO TID-W/MEALS 08/05/22 10/27/22 History


 


Metoprolol Tartrate [Lopressor] 25 mg PO BID 08/05/22 10/27/22 History


 


Pantoprazole [Protonix] 40 mg PO DAILY 08/05/22 10/27/22 History


 


Potassium Chloride ER [K-Dur 20] 20 meq PO BID 08/05/22 10/27/22 History


 


levETIRAcetam [Keppra] 500 mg PO BID@0700,2100 08/05/22 10/27/22 History


 


Divalproex ER [Depakote ER] 250 mg PO BID 30 Days #60 tab 08/08/22 10/27/22 Rx


 


levETIRAcetam [Keppra] 500 mg PO MoWeFr@1600 30 Days #15 08/08/22 10/27/22 Rx





 tab   


 


Sevelamer [Renvela] 1,600 mg PO W/SUPPER 10/07/22 10/27/22 History


 


hydrALAZINE HCL [Apresoline] 50 mg PO BID 10/07/22 10/27/22 History


 


Lacosamide [Vimpat] 150 mg PO BID@0700,1900 #0 10/27/22 10/27/22 Rx








                                    Allergies











Allergy/AdvReac Type Severity Reaction Status Date / Time


 


Penicillins Allergy  Itching Verified 10/27/22 10:33














Physical Examination





- Vital Signs


Vital Signs: 


                                   Vital Signs











  Temp Pulse Pulse Resp BP BP Pulse Ox


 


 10/27/22 07:40  98.3 F   91  17   168/69  99


 


 10/27/22 06:38  97.6 F      


 


 10/27/22 05:52   70   16  150/70   97


 


 10/27/22 02:46   84   16    95


 


 10/26/22 20:42   94   16  144/76   99








                                Intake and Output











 10/26/22 10/27/22 10/27/22





 22:59 06:59 14:59


 


Other:   


 


  Weight 81.647 kg  











GENERAL: The patient is lying in bed and is not in acute distress.


CHEST: The heart rate is regular rate rhythm.   No murmurs to auscultation.


LUNG: Clear to auscultation bilaterally no wheezing noted throughout.  Not 

labored breathing.


ABDOMEN/GI: Bowel sounds present in all 4 quadrants. No tenderness to palpation 

throughout.





NEUROLOGICAL:


Higher mental function: The patient is awake, alert, oriented to self, time.  

She stated she was in the hospital but did not know the name.  Patient is 

following simple commands.  Has some word finding difficulty.  No neglect.


Cranial nerves: The pupils are round, equal and reactive to light.  Visual 

fields are full to confrontation throughout.  Extraocular movement is intact no 

nystagmus is noted.  Facial sensation is normal to touch throughout.  The facial

 strength is normal throughout.  Hearing is mildly decreased bilaterally to hand

 rub.  Tongue is midline and moved side-to-side without any difficulty.  No 

dysarthria is noted.  Shoulder shrug is normal bilaterally.


Motor: The strength is left side is 3 (old).  While right side is 5/5.  Slightly

 decrease tone over the left.  Normal bulk.  


Cerebellum: Normal finger to nose over the right.  


Sensation: Sensation is normal to touch throughout.


Reflexes: 1+ throughout.








Results





- Laboratory Findings


CBC and BMP: 


                                 10/27/22 04:16





                                 10/27/22 04:16


Abnormal Lab Findings: 


                                  Abnormal Labs











  10/26/22 10/26/22 10/27/22





  23:14 23:14 04:16


 


RBC  3.68 L  


 


Hgb  11.1 L   11.0 L


 


Hct  32.4 L   33.4 L


 


Chloride   93 L 


 


Carbon Dioxide   31 H 


 


BUN   18 H 


 


Creatinine   4.81 H 


 


Glucose   190 H 


 


POC Glucose (mg/dL)   


 


Alkaline Phosphatase   131 H 














  10/27/22 10/27/22





  04:16 09:00


 


RBC  


 


Hgb  


 


Hct  


 


Chloride  95 L 


 


Carbon Dioxide  


 


BUN  22 H 


 


Creatinine  5.02 H 


 


Glucose  119 H 


 


POC Glucose (mg/dL)   117 H


 


Alkaline Phosphatase  














Assessment and Plan


Assessment: 





Break-through seizure: Possible medication non-compliance (per  unsure if

 she missed her morning dose of Vimpat) and depakote level is subtherapeutic 

(level is <10 but is on low dose but would expect level to higher than 10).


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


History of stroke with residual left hemiparesis


Diabetes mellitus


End-stage renal disease on dialysis


Hypertension








Plan: 


In the ED the patient received Depakote 250 mg once, Keppra 1000 mg once


Continue her home medication of Keppra 500 mg one tablet twice a day with 

additional dose post dialysis, Vimpat 150 mg 1 tablet twice a day with an 

additional dose post dialysis, Depakote 250 mg twice a day.  Recommend Onfi 5mg 

1 tab bid in addition to be started as outpatient (we do not have it as 

formulary) and if that's helping with her seizures recommend stopping Depakote 

but will defer that management to her neurologist.  I personally wrote a script 

for outpatient for Onfi (60 tabs and 1 refil) and will have her neurologist 

modify her anti-epileptic medications as outpatient.


Recommend patient to follow-up with epileptilogist (consider virtual visits 

since wants something locally) since continues to have uncontrolled epilepsy.  

In the mean time, continue to follow-up with her local neurologist (Dr. Nelson's team).


Keppra level was ordered and is pending


I also ordered Vimpat level


Is on seizure precaution and pads


We'll defer the rest of the medical management to primary team





Otherwise no additional workup is needed.  Recommend the patient to follow-up 

with her local neurologist within a week.


The plan is discussed with the patient's  via phone.





Thank you for the consultation











Time with Patient: Greater than 30

## 2022-12-11 ENCOUNTER — HOSPITAL ENCOUNTER (INPATIENT)
Dept: HOSPITAL 47 - EC | Age: 52
LOS: 3 days | Discharge: HOME | DRG: 100 | End: 2022-12-14
Attending: INTERNAL MEDICINE | Admitting: INTERNAL MEDICINE
Payer: MEDICARE
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
## 2022-12-11 DIAGNOSIS — E78.5: ICD-10-CM

## 2022-12-11 DIAGNOSIS — Z99.2: ICD-10-CM

## 2022-12-11 DIAGNOSIS — Z88.0: ICD-10-CM

## 2022-12-11 DIAGNOSIS — Z91.199: ICD-10-CM

## 2022-12-11 DIAGNOSIS — D64.9: ICD-10-CM

## 2022-12-11 DIAGNOSIS — Z79.02: ICD-10-CM

## 2022-12-11 DIAGNOSIS — S01.21XA: ICD-10-CM

## 2022-12-11 DIAGNOSIS — Z79.899: ICD-10-CM

## 2022-12-11 DIAGNOSIS — M89.8X9: ICD-10-CM

## 2022-12-11 DIAGNOSIS — N18.6: ICD-10-CM

## 2022-12-11 DIAGNOSIS — I69.354: ICD-10-CM

## 2022-12-11 DIAGNOSIS — Z87.891: ICD-10-CM

## 2022-12-11 DIAGNOSIS — E11.22: ICD-10-CM

## 2022-12-11 DIAGNOSIS — G40.919: Primary | ICD-10-CM

## 2022-12-11 DIAGNOSIS — Z91.14: ICD-10-CM

## 2022-12-11 DIAGNOSIS — I12.0: ICD-10-CM

## 2022-12-11 LAB
ALBUMIN SERPL-MCNC: 4.6 G/DL (ref 3.5–5)
ALP SERPL-CCNC: 113 U/L (ref 38–126)
ALT SERPL-CCNC: 20 U/L (ref 4–34)
ANION GAP SERPL CALC-SCNC: 15 MMOL/L
AST SERPL-CCNC: 25 U/L (ref 14–36)
BASOPHILS # BLD AUTO: 0.1 K/UL (ref 0–0.2)
BASOPHILS NFR BLD AUTO: 1 %
BUN SERPL-SCNC: 43 MG/DL (ref 7–17)
CALCIUM SPEC-MCNC: 9.5 MG/DL (ref 8.4–10.2)
CHLORIDE SERPL-SCNC: 95 MMOL/L (ref 98–107)
CO2 SERPL-SCNC: 28 MMOL/L (ref 22–30)
EOSINOPHIL # BLD AUTO: 0 K/UL (ref 0–0.7)
EOSINOPHIL NFR BLD AUTO: 1 %
ERYTHROCYTE [DISTWIDTH] IN BLOOD BY AUTOMATED COUNT: 4.19 M/UL (ref 3.8–5.4)
ERYTHROCYTE [DISTWIDTH] IN BLOOD: 14.5 % (ref 11.5–15.5)
GLUCOSE SERPL-MCNC: 145 MG/DL (ref 74–99)
HCT VFR BLD AUTO: 35.8 % (ref 34–46)
HGB BLD-MCNC: 12.4 GM/DL (ref 11.4–16)
LYMPHOCYTES # SPEC AUTO: 1.3 K/UL (ref 1–4.8)
LYMPHOCYTES NFR SPEC AUTO: 17 %
MCH RBC QN AUTO: 29.5 PG (ref 25–35)
MCHC RBC AUTO-ENTMCNC: 34.5 G/DL (ref 31–37)
MCV RBC AUTO: 85.5 FL (ref 80–100)
MONOCYTES # BLD AUTO: 0.3 K/UL (ref 0–1)
MONOCYTES NFR BLD AUTO: 4 %
NEUTROPHILS # BLD AUTO: 6.1 K/UL (ref 1.3–7.7)
NEUTROPHILS NFR BLD AUTO: 77 %
PLATELET # BLD AUTO: 190 K/UL (ref 150–450)
POTASSIUM SERPL-SCNC: 5.1 MMOL/L (ref 3.5–5.1)
PROT SERPL-MCNC: 7.7 G/DL (ref 6.3–8.2)
SODIUM SERPL-SCNC: 138 MMOL/L (ref 137–145)
WBC # BLD AUTO: 8 K/UL (ref 3.8–10.6)

## 2022-12-11 PROCEDURE — 96372 THER/PROPH/DIAG INJ SC/IM: CPT

## 2022-12-11 PROCEDURE — 83735 ASSAY OF MAGNESIUM: CPT

## 2022-12-11 PROCEDURE — 80164 ASSAY DIPROPYLACETIC ACD TOT: CPT

## 2022-12-11 PROCEDURE — 85025 COMPLETE CBC W/AUTO DIFF WBC: CPT

## 2022-12-11 PROCEDURE — 80177 DRUG SCRN QUAN LEVETIRACETAM: CPT

## 2022-12-11 PROCEDURE — 95713 VEEG 2-12 HR CONT MNTR: CPT

## 2022-12-11 PROCEDURE — 90935 HEMODIALYSIS ONE EVALUATION: CPT

## 2022-12-11 PROCEDURE — 36415 COLL VENOUS BLD VENIPUNCTURE: CPT

## 2022-12-11 PROCEDURE — 85027 COMPLETE CBC AUTOMATED: CPT

## 2022-12-11 PROCEDURE — 99291 CRITICAL CARE FIRST HOUR: CPT

## 2022-12-11 PROCEDURE — 70450 CT HEAD/BRAIN W/O DYE: CPT

## 2022-12-11 PROCEDURE — 80053 COMPREHEN METABOLIC PANEL: CPT

## 2022-12-11 RX ADMIN — DIVALPROEX SODIUM SCH: 250 TABLET, FILM COATED, EXTENDED RELEASE ORAL at 22:54

## 2022-12-11 RX ADMIN — LACOSAMIDE SCH MG: 50 TABLET, FILM COATED ORAL at 22:53

## 2022-12-11 NOTE — ED
Seizure HPI





- General


Source: EMS


Mode of arrival: EMS


Limitations: no limitations





<Melba Asif - Last Filed: 22 17:00>





<Isaiah Vasquez - Last Filed: 22 19:12>





- General


Chief Complaint: Seizure


Stated Complaint: Seizure


Time Seen by Provider: 22 15:23





- History of Present Illness


Initial Comments: 


Patient is a 52-year-old  female presenting to the emergency room via 

EMS after having a seizure home earlier today. She is well known to the 

emergency room department with known severe seizure disorder along with previous

stroke causing left-sided weakness and foot drop along with difficulty seeing 

out of her right eye. She is unaccompanied by any family. She is currently awake

and talking but alert and oriented 1-2 with baseline status of alert and 

oriented 2-3. She denies any pain at this time. She has several comorbid 

conditions in addition to her previous stroke and seizure history including 

diabetes, hypertension, end-stage renal disease on hemodialysis, obstructive s

leep apnea without CPAP usage, GERD and hypothyroidism. She has multiple 

secondary renal processes as well including anemia and hyperphosphatasemia. 

(Melba Asif)





- Related Data


                                Home Medications











 Medication  Instructions  Recorded  Confirmed


 


Atorvastatin [Lipitor] 40 mg PO HS 21


 


Dulaglutide [Trulicity] 3 mg SQ Q7D 21


 


Lacosamide [Vimpat] 150 mg PO MOWEFR@07,14,21 21


 


Sevelamer [Renvela] 800 mg PO BID-W/MEALS 21


 


Vascepa 1gm 1 gm PO BID 21


 


Calcium Acetate 667 mg PO TID-W/MEALS 22


 


Metoprolol Tartrate [Lopressor] 25 mg PO BID 22


 


Pantoprazole [Protonix] 40 mg PO DAILY 22


 


Potassium Chloride ER [K-Dur 20] 20 meq PO BID 22


 


levETIRAcetam [Keppra] 500 mg PO BID@0700,2100 22


 


Sevelamer [Renvela] 1,600 mg PO W/SUPPER 10/07/22 12/11/22


 


hydrALAZINE HCL [Apresoline] 50 mg PO BID 10/07/22 12/11/22








                                  Previous Rx's











 Medication  Instructions  Recorded


 


Clopidogrel [Plavix] 75 mg PO DAILY #30 tab 21


 


amLODIPine [Norvasc] 10 mg PO DAILY #30 tab 21


 


Divalproex ER [Depakote ER] 250 mg PO BID 30 Days #60 tab 22


 


levETIRAcetam [Keppra] 500 mg PO MoWeFr@1600 30 Days #15 22





 tab 


 


Lacosamide [Vimpat] 150 mg PO BID@0700,1900 #0 10/27/22











                                    Allergies











Allergy/AdvReac Type Severity Reaction Status Date / Time


 


Penicillins Allergy  Itching Verified 10/27/22 10:33














Review of Systems


ROS Other: All systems not noted in ROS Statement are negative.





<Melba Asif - Last Filed: 22 17:00>


ROS Other: All systems not noted in ROS Statement are negative.





<Isaiah Vasquez - Last Filed: 22 19:12>


ROS Statement: 


Those systems with pertinent positive or pertinent negative responses have been 

documented in the HPI.








Past Medical History


Past Medical History: Chest Pain / Angina, CVA/TIA, Diabetes Mellitus, 

GERD/Reflux, Hyperlipidemia, Hypertension, Memory Impairment, Osteoarthritis 

(OA), Renal Disease, Seizure Disorder, Thyroid Disorder


Additional Past Medical History / Comment(s): Last seizure approximately one 

month ago. Spouse states she used to have seizures 4X per week until she had 

vagal nerve stimulator placed, now has seizures approximately once a week to 

once a month. Dialysis MOWEFR. Neuropathy, left drop foot, only able to walk 

short distances, otherwise uses wheelchair. 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 

ago, with residual memory impairment. Never diagnosed with Sleep Apnea but 

spouse states she does stop breathing through the night and he has to shake her 

to start breathing again. Varicose veins.


History of Any Multi-Drug Resistant Organisms: None Reported


Past Surgical History:  Section, Tonsillectomy, Uterine Ablation


Additional Past Surgical History / Comment(s): Left arm fistula, loop recorder, 

vagus nerve stimulator.


Past Anesthesia/Blood Transfusion Reactions: Motion Sickness


Additional Past Anesthesia/Blood Transfusion Reaction / Comment(s): Family hx 

unknown, pt adopted.


Past Psychological History: Bipolar


Smoking Status: Former smoker


Past Alcohol Use History: None Reported


Past Drug Use History: None Reported





- Past Family History


  ** Father


Family Medical History: Unable to Obtain


Additional Family Medical History / Comment(s): Pt adopted.





<Melba Asif - Last Filed: 22 17:00>





General Exam


Limitations: altered mental status


General appearance: in no apparent distress, other (drowsy; slower responses)


Head exam: Present: atraumatic, normocephalic, normal inspection


  ** Expanded


Eyelids: Normal Inspection: Left


Pupils: Regular, Round: Left (Right larger), Reactive: Left (Right slower)


Sclera/Conjunctival: Normal Inspection: Left


ENT exam: Present: normal exam, mucous membranes moist


Neck exam: Present: normal inspection.  Absent: tenderness


Respiratory exam: Present: normal lung sounds bilaterally.  Absent: respiratory 

distress, wheezes, rales, rhonchi, stridor


Cardiovascular Exam: Present: regular rate, normal rhythm, normal heart sounds, 

systolic murmur.  Absent: diastolic murmur, rubs, gallop, clicks


GI/Abdominal exam: Present: soft, normal bowel sounds.  Absent: distended, 

tenderness, guarding, rebound, rigid


Extremities exam: Present: joint swelling (left foot chronic)


Neurological exam: Present: alert (but drowsy, easily aroused)


  ** Expanded


Neurological exam: Present: protecting the airway, other (slow speech)


Patient oriented to: Present: person, place.  Absent: time


Motor strength exam: RUE: 5, LUE: 2/1, RLE: 4, LLE: 3


Psychiatric exam: Present: flat affect


Skin exam: Present: warm, dry, intact, normal color.  Absent: rash





<Melba Asif - Last Filed: 22 17:00>





Course





<Isaiah Vasquez - Last Filed: 22 19:12>


                                   Vital Signs











  22





  15:23 18:24


 


Temperature 97.7 F 


 


Pulse Rate 92 92


 


Respiratory 18 18





Rate  


 


Blood Pressure 149/69 135/56


 


O2 Sat by Pulse 96 92 L





Oximetry  














- Reevaluation(s)


Reevaluation #1: 





22 17:50


Patient was endorsed to me by ED NP Yefri (secondary to end of her shift) 

with the patient's labs still pending. Patient is currently A&O3, and she is 

requesting to be discharged home.  Patient's valproic acid level is 

undetectable, but she tells me that she is not supposed to be taking valproic 

acid/Depakote.  Patient states that she has been taking all of her seizure 

medications regularly as prescribed.  Patient is a dialysis patient, and she 

states that she is due for her dialysis tomorrow.  Patient is aware of her test 

results, and she feels comfortable being discharged home at this time.


22 18:12


I was just called to the patient's room by the patient's ED RN due to seizure 

activity.  On my arrival, the patient was noted to be having a generalized 

tonic-clonic seizure.  Patient's seizure activity spontaneously resolved after 

about 2 minutes.  IM Ativan was ordered and given by ED RN.  Given recurrent 

seizure activity, will plan on admitting the patient to the hospital at this 

time.


22 18:44


Case, H&P, test results and ED management thus far were discussed with Dr. Sims.  He accepts hospital admission.  He agrees with neurology and 

nephrology consultation.  He has no further recommendations at this time.


 (Isaiah Vasquez)





Medical Decision Making





<Melba Asif - Last Filed: 22 17:00>





- Lab Data


Result diagrams: 


                                 22 17:05





                                 22 17:05





<Isaiah Vasquez - Last Filed: 22 19:12>





- Medical Decision Making


52-year-old  female presenting to the emergency room after seizure 

earlier this afternoon currently mildly postictal with slow but appropriate 

responses alert and orientated 1-2 without any acute distress. No new 

neurological deficits indicating need for diagnostic imaging. Will obtain CBC, 

CMP, mag and Depakote level. no indication for further laboratory studies. Will 

monitor and plan for discharge back home once postictal state resolved if no 

further seizure activity.





Labs currently pending. Patient back at baseline mentation without any further 

seizure activity. Case discussed with and transfer to Dr. Vasquez for 

disposition [id number].. (Melba Asif)


Patient's labs are fairly unremarkable other than demonstrating chronic renal 

failure.  Patient's noncontrast head CT shows no acute intracranial process.  

Patient is scheduled to be dialyzed tomorrow.  Patient's valproic acid level is 

undetectable, but she tells me that she is not taking or supposed to be taking 

valproic acid/Depakote.  Given recurrent seizure activity while in the ED, I fee

l that it would be best for the patient to be admitted to the hospital for 

monitoring and neurology consultation.  Dr. Sims has accepted hospital 

admission. (Isaiah Vasquez)





- Lab Data


                                   Lab Results











  22 Range/Units





  17:05 17:05 17:05 


 


WBC  8.0    (3.8-10.6)  k/uL


 


RBC  4.19    (3.80-5.40)  m/uL


 


Hgb  12.4    (11.4-16.0)  gm/dL


 


Hct  35.8    (34.0-46.0)  %


 


MCV  85.5    (80.0-100.0)  fL


 


MCH  29.5    (25.0-35.0)  pg


 


MCHC  34.5    (31.0-37.0)  g/dL


 


RDW  14.5    (11.5-15.5)  %


 


Plt Count  190    (150-450)  k/uL


 


MPV  10.4    


 


Neutrophils %  77    %


 


Lymphocytes %  17    %


 


Monocytes %  4    %


 


Eosinophils %  1    %


 


Basophils %  1    %


 


Neutrophils #  6.1    (1.3-7.7)  k/uL


 


Lymphocytes #  1.3    (1.0-4.8)  k/uL


 


Monocytes #  0.3    (0-1.0)  k/uL


 


Eosinophils #  0.0    (0-0.7)  k/uL


 


Basophils #  0.1    (0-0.2)  k/uL


 


Sodium    138  (137-145)  mmol/L


 


Potassium    5.1  (3.5-5.1)  mmol/L


 


Chloride    95 L  ()  mmol/L


 


Carbon Dioxide    28  (22-30)  mmol/L


 


Anion Gap    15  mmol/L


 


BUN    43 H  (7-17)  mg/dL


 


Creatinine    8.86 H*  (0.52-1.04)  mg/dL


 


Est GFR (CKD-EPI)AfAm    5  (>60 ml/min/1.73 sqM)  


 


Est GFR (CKD-EPI)NonAf    5  (>60 ml/min/1.73 sqM)  


 


Glucose    145 H  (74-99)  mg/dL


 


Calcium    9.5  (8.4-10.2)  mg/dL


 


Magnesium   2.3   (1.6-2.3)  mg/dL


 


Total Bilirubin    0.4  (0.2-1.3)  mg/dL


 


AST    25  (14-36)  U/L


 


ALT    20  (4-34)  U/L


 


Alkaline Phosphatase    113  ()  U/L


 


Total Protein    7.7  (6.3-8.2)  g/dL


 


Albumin    4.6  (3.5-5.0)  g/dL


 


Valproic Acid    <10.0  ug/mL














- Radiology Data





Noncontrast head CT:





1.  No acute intracranial process.


2.  Remote lacunar injuries along with nonspecific white matter changes likely 

secondary to chronic microangiopathy. (Isaiah Vasquez)





Critical Care Time


Critical Care Time: Yes


Total Critical Care Time: 35





<Isaiah Vasquez - Last Filed: 22 19:12>





Disposition





<Melba Asif - Last Filed: 22 17:00>


Is patient prescribed a controlled substance at d/c from ED?: No


Time of Disposition: 18:45





<Isaiah Vasquez - Last Filed: 22 19:12>


Clinical Impression: 


 Recurrent seizures, Chronic renal failure





Disposition: ADMITTED AS IP TO THIS [facility]


Condition: Stable

## 2022-12-11 NOTE — CT
EXAMINATION TYPE: CT brain wo con

CT DLP: 1192.4 mGycm, Automated exposure control for dose reduction was used.

 

DATE OF EXAM: 12/11/2022 6:50 PM

 

COMPARISON: CT brain 10/26/2022, CT brain 10/8/2022, CT brain 8/5/2022.

 

CLINICAL INDICATION:Female, 52 years old with history of seizure, SEIZURE

 

TECHNIQUE: 

Brain: Axial CT images of the brain were obtained with coronal and sagittal reformats created and rev
iewed.

Contrast used: None.

Oral contrast used: None.

 

FINDINGS:

 

Brain:

Extra-axial spaces: No abnormal extra-axial fluid collections.

Ventricular system: Within normal limits

Cerebral parenchyma: Cerebral atrophy. No acute intraparenchymal hemorrhage or mass effect.  The willian
rebekah of the gray-white junctions are well differentiated. 

Cerebellum: Focal areas of encephalomalacia lateral cerebellar hemispheres. Similar to prior exams.

Mass effect: No evidence of midline shift.

Intracranial vasculature: Atherosclerotic calcifications of the intracranial vessels.

Soft tissues: Normal.

Calvarium/osseous structures: No depressed skull fracture.

Paranasal sinuses and mastoid air cells: Mild scattered paranasal sinus disease.

Visualized orbits: Orbital contents are intact.

 

 

IMPRESSION:

1. No acute intracranial process.

2. Remote lacunar injuries along with nonspecific white matter changes likely secondary to chronic mi
croangiopathy.

## 2022-12-12 LAB
ALBUMIN SERPL-MCNC: 4.3 G/DL (ref 3.5–5)
ALBUMIN/GLOB SERPL: 1.5 {RATIO}
ALP SERPL-CCNC: 88 U/L (ref 38–126)
ALT SERPL-CCNC: 18 U/L (ref 4–34)
ANION GAP SERPL CALC-SCNC: 15 MMOL/L
AST SERPL-CCNC: 26 U/L (ref 14–36)
BASOPHILS # BLD AUTO: 0.1 K/UL (ref 0–0.2)
BASOPHILS NFR BLD AUTO: 1 %
BUN SERPL-SCNC: 50 MG/DL (ref 7–17)
CALCIUM SPEC-MCNC: 9.3 MG/DL (ref 8.4–10.2)
CHLORIDE SERPL-SCNC: 97 MMOL/L (ref 98–107)
CO2 SERPL-SCNC: 25 MMOL/L (ref 22–30)
EOSINOPHIL # BLD AUTO: 0.1 K/UL (ref 0–0.7)
EOSINOPHIL NFR BLD AUTO: 1 %
ERYTHROCYTE [DISTWIDTH] IN BLOOD BY AUTOMATED COUNT: 3.72 M/UL (ref 3.8–5.4)
ERYTHROCYTE [DISTWIDTH] IN BLOOD: 14.4 % (ref 11.5–15.5)
GLOBULIN SER CALC-MCNC: 2.9 G/DL
GLUCOSE BLD-MCNC: 110 MG/DL (ref 70–110)
GLUCOSE BLD-MCNC: 167 MG/DL (ref 70–110)
GLUCOSE BLD-MCNC: 206 MG/DL (ref 70–110)
GLUCOSE BLD-MCNC: 94 MG/DL (ref 70–110)
GLUCOSE SERPL-MCNC: 83 MG/DL (ref 74–99)
HCT VFR BLD AUTO: 33.4 % (ref 34–46)
HGB BLD-MCNC: 11.1 GM/DL (ref 11.4–16)
LYMPHOCYTES # SPEC AUTO: 2.3 K/UL (ref 1–4.8)
LYMPHOCYTES NFR SPEC AUTO: 30 %
MCH RBC QN AUTO: 29.8 PG (ref 25–35)
MCHC RBC AUTO-ENTMCNC: 33.2 G/DL (ref 31–37)
MCV RBC AUTO: 89.6 FL (ref 80–100)
MONOCYTES # BLD AUTO: 0.5 K/UL (ref 0–1)
MONOCYTES NFR BLD AUTO: 7 %
NEUTROPHILS # BLD AUTO: 4.5 K/UL (ref 1.3–7.7)
NEUTROPHILS NFR BLD AUTO: 59 %
PLATELET # BLD AUTO: 251 K/UL (ref 150–450)
POTASSIUM SERPL-SCNC: 5.1 MMOL/L (ref 3.5–5.1)
PROT SERPL-MCNC: 7.2 G/DL (ref 6.3–8.2)
SODIUM SERPL-SCNC: 137 MMOL/L (ref 137–145)
WBC # BLD AUTO: 7.6 K/UL (ref 3.8–10.6)

## 2022-12-12 PROCEDURE — 5A1D70Z PERFORMANCE OF URINARY FILTRATION, INTERMITTENT, LESS THAN 6 HOURS PER DAY: ICD-10-PCS

## 2022-12-12 PROCEDURE — 4A10X4Z MONITORING OF CENTRAL NERVOUS ELECTRICAL ACTIVITY, EXTERNAL APPROACH: ICD-10-PCS

## 2022-12-12 RX ADMIN — PANTOPRAZOLE SODIUM SCH MG: 40 TABLET, DELAYED RELEASE ORAL at 09:31

## 2022-12-12 RX ADMIN — METOPROLOL TARTRATE SCH MG: 25 TABLET, FILM COATED ORAL at 09:32

## 2022-12-12 RX ADMIN — INSULIN ASPART SCH UNIT: 100 INJECTION, SOLUTION INTRAVENOUS; SUBCUTANEOUS at 12:53

## 2022-12-12 RX ADMIN — HEPARIN SODIUM SCH UNIT: 5000 INJECTION INTRAVENOUS; SUBCUTANEOUS at 09:31

## 2022-12-12 RX ADMIN — ATORVASTATIN CALCIUM SCH MG: 40 TABLET, FILM COATED ORAL at 21:11

## 2022-12-12 RX ADMIN — SEVELAMER CARBONATE SCH: 800 TABLET, FILM COATED ORAL at 10:02

## 2022-12-12 RX ADMIN — LACOSAMIDE SCH MG: 50 TABLET, FILM COATED ORAL at 22:05

## 2022-12-12 RX ADMIN — INSULIN ASPART SCH UNIT: 100 INJECTION, SOLUTION INTRAVENOUS; SUBCUTANEOUS at 21:11

## 2022-12-12 RX ADMIN — SEVELAMER CARBONATE SCH: 800 TABLET, FILM COATED ORAL at 17:18

## 2022-12-12 RX ADMIN — SEVELAMER CARBONATE SCH: 800 TABLET, FILM COATED ORAL at 17:30

## 2022-12-12 RX ADMIN — ASPIRIN SCH: 325 TABLET ORAL at 09:30

## 2022-12-12 RX ADMIN — INSULIN ASPART SCH: 100 INJECTION, SOLUTION INTRAVENOUS; SUBCUTANEOUS at 06:31

## 2022-12-12 RX ADMIN — SEVELAMER CARBONATE SCH MG: 800 TABLET, FILM COATED ORAL at 17:21

## 2022-12-12 RX ADMIN — INSULIN ASPART SCH: 100 INJECTION, SOLUTION INTRAVENOUS; SUBCUTANEOUS at 17:10

## 2022-12-12 RX ADMIN — HEPARIN SODIUM SCH: 5000 INJECTION INTRAVENOUS; SUBCUTANEOUS at 16:19

## 2022-12-12 RX ADMIN — DIVALPROEX SODIUM SCH: 250 TABLET, FILM COATED, EXTENDED RELEASE ORAL at 20:20

## 2022-12-12 RX ADMIN — CLOPIDOGREL BISULFATE SCH MG: 75 TABLET ORAL at 09:31

## 2022-12-12 RX ADMIN — METOPROLOL TARTRATE SCH MG: 25 TABLET, FILM COATED ORAL at 21:11

## 2022-12-12 RX ADMIN — LACOSAMIDE SCH MG: 50 TABLET, FILM COATED ORAL at 06:30

## 2022-12-12 RX ADMIN — DIVALPROEX SODIUM SCH: 250 TABLET, FILM COATED, EXTENDED RELEASE ORAL at 10:03

## 2022-12-12 RX ADMIN — ASPIRIN SCH: 325 TABLET ORAL at 22:06

## 2022-12-12 NOTE — P.PN
Subjective


Progress Note Date: 12/12/22





Hospital course:





Patient is a very pleasant 52-year-old female with a past medical history of 

end-stage renal disease on dialysis Mondays/Wednesdays/Fridays, known seizure 

disorder with recurrent breakthrough seizures, CVA with left-sided deficits and 

right-sided visual impairments, hypertension, hyperlipidemia and type II 

non-insulin-dependent diabetes mellitus. She presented to the emergency 

department from home via EMS on 12/11/22 with a chief complaint of breakthrough 

seizure.  She underwent full evaluation in the emergency department.  CBC 

unremarkable and CMP was consistent with ESRD with BUN of 43 and creatinine of 

8.86.  Patient's last dialysis session was Friday 12/9/22.  Valproic acid level 

was subtherapeutic at less then 10.0.  It is unclear if patient has been taking 

medications as prescribed.  Patient was admitted under services with 

consultation to nephrology and neurology.  Patient reports that she lives at 

home with her  and reports she ambulates with use of walker.  





Physical exam:





Vital signs reviewed and stable. 


General: Nontoxic, no distress and appears stated age.  


Derm: Skin warm and dry, normal coloration for ethnicity.


Head: Atraumatic, normocephalic and symmetric.  


Eyes: EOMs intact, no lid lag, and anicteric sclera


Mouth: no lip lesions, mucus membranes moist


Cardiovascular: regular rate and rhythm with normal S1S2,  stage III systolic 

murmur, positive posterior tibial pulses bilaterally, and cap refill < 2 

seconds.   AV fistula left upper extremity with bruit and thrill intact.  


Lungs: Respirations even, regular, and unlabored on room air. Lungs CTA 

bilaterally, no rhonchi, no rales, no wheezing, and no accessory muscle usage. 


Abdominal: soft, nontender to palpation, no guarding, no appreciable 

organomegaly


Ext: ROM intact. No gross muscle atrophy, no edema, no contractures


Neuro: Speech clear, face symmetrical and CN II-XII grossly intact with no noted

focal neuro deficits


Psych: Alert and oriented to person, place, time, and situation. Appropriate and

pleasant affect. 





Assessment and Plan of Care:





Breakthrough seizure


-Seizure precautions, aspiration precautions, and fall precautions in place.


-Depakote level subtherapeutic at less than 10.0.  Keppra level pending.


-Neurology following, recommending prolonged 2-1/2 hour EEG.


-Patient to continue antiepileptic medications including Depakote, Vimpat, and 

Keppra.  Discussed with neurology and he is recommending patient take an 

additional dose (in addition to daily prescribed doses) of Keppra 500 mg and 

Vimpat 150 mg every Monday, Wednesday, and Friday after dialysis is complete.


-Patient informed of [place] state law stating no driving until seizure free 

for 6 months.  Patient also instructed to avoid climbing ladders, operating 

dangerous or heavy machinery or unsupervised swimming until seizure free for 6 

months.





ESRD on dialysis


-Nephrology following and has scheduled patient to undergo dialysis later today.


-Continued close monitoring of renal function and electrolytes throughout 

hospitalization.





History of CVA with left-sided deficits and right-sided visual impairments


-Continue atorvastatin and Plavix.


-Provide safe and supportive care and assistance as needed.


-Fall precautions





Hypertension


Monitor vital signs and continue daily medication regimen with metoprolol, 

hydralazine, and amlodipine.





Hyperlipidemia


Continue daily medication regimen with atorvastatin 40 mg nightly.


Heart healthy and carb consistent diet.





Type 2 diabetes mellitus


Glycemic protocol with NovoLog sliding scale.








CODE STATUS: Full code 


DVT prophylaxis:  Heparin


Discussed with: Patient, neurology, and RN


Anticipated discharge date: Within the next 24-48 hours


Anticipated discharge place: Home


A total of 34 minutes was spent on the care of this complex patient more than 

50% of the time was spent in counseling and care coordination.











Objective





- Vital Signs


Vital signs: 


                                   Vital Signs











Temp  98.6 F   12/12/22 06:00


 


Pulse  88   12/12/22 06:00


 


Resp  16   12/12/22 06:00


 


BP  106/76   12/12/22 06:00


 


Pulse Ox  100   12/12/22 06:00


 


FiO2      








                                 Intake & Output











 12/11/22 12/12/22 12/12/22





 18:59 06:59 18:59


 


Weight 83.915 kg  














- Labs


CBC & Chem 7: 


                                 12/12/22 05:30





                                 12/12/22 05:30


Labs: 


                  Abnormal Lab Results - Last 24 Hours (Table)











  12/11/22 12/12/22 12/12/22 Range/Units





  17:05 05:30 05:30 


 


RBC   3.72 L   (3.80-5.40)  m/uL


 


Hgb   11.1 L   (11.4-16.0)  gm/dL


 


Hct   33.4 L   (34.0-46.0)  %


 


Chloride  95 L   97 L  ()  mmol/L


 


BUN  43 H   50 H  (7-17)  mg/dL


 


Creatinine  8.86 H*   9.58 H*  (0.52-1.04)  mg/dL


 


Glucose  145 H    (74-99)  mg/dL

## 2022-12-12 NOTE — P.HPIM
History of Present Illness


H&P Date: 22


Chief Complaint: seizure





52 year old female with ESRD on HD MWF, seizure disorder





patient comes in from home, after having a breakthrough seizure, patient is not 

providing any meaning ful history , she is not participating in the interview, 

does not answer any of my questions. she is awake, but not cooperating. 





chart review done, patient is well known in the ED with non compliance and 

frequent presentations with breakthrough seizures. she was found to have 

undetectable level of valproic acid, which is one of her seizure meds. 





patient is admitted for neuro evaluation and adjustment of her home meds





no other information is available at this time 





blood work unremarkable , except for ESRD, patient received HD on Friday (2days 

prior to presentation )








Review of Systems


ROS unobtainable: due to mental status





Past Medical History


Past Medical History: Chest Pain / Angina, CVA/TIA, Diabetes Mellitus, 

GERD/Reflux, Hyperlipidemia, Hypertension, Memory Impairment, Osteoarthritis 

(OA), Renal Disease, Seizure Disorder, Thyroid Disorder


Additional Past Medical History / Comment(s): Last seizure approximately one 

month ago. Spouse states she used to have seizures 4X per week until she had 

vagal nerve stimulator placed, now has seizures approximately once a week to 

once a month. Dialysis MOWEFR. Neuropathy, left drop foot, only able to walk 

short distances, otherwise uses wheelchair. 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 

ago, with residual memory impairment. Never diagnosed with Sleep Apnea but 

spouse states she does stop breathing through the night and he has to shake her 

to start breathing again. Varicose veins.


History of Any Multi-Drug Resistant Organisms: None Reported


Past Surgical History:  Section, Tonsillectomy, Uterine Ablation


Additional Past Surgical History / Comment(s): Left arm fistula, loop recorder, 

vagus nerve stimulator.


Past Anesthesia/Blood Transfusion Reactions: Motion Sickness


Additional Past Anesthesia/Blood Transfusion Reaction / Comment(s): Family hx 

unknown, pt adopted.


Past Psychological History: Bipolar


Smoking Status: Former smoker


Past Alcohol Use History: None Reported


Past Drug Use History: None Reported





- Past Family History


  ** Father


Family Medical History: Unable to Obtain


Additional Family Medical History / Comment(s): Pt adopted.





Medications and Allergies


                                Home Medications











 Medication  Instructions  Recorded  Confirmed  Type


 


Clopidogrel [Plavix] 75 mg PO DAILY #30 tab 21 Rx


 


amLODIPine [Norvasc] 10 mg PO DAILY #30 tab 21 Rx


 


Atorvastatin [Lipitor] 40 mg PO HS 21 History


 


Dulaglutide [Trulicity] 3 mg SQ Q7D 21 History


 


Lacosamide [Vimpat] 150 mg PO MOWEFR@07,14,21 21 History


 


Sevelamer [Renvela] 800 mg PO BID-W/MEALS 21 History


 


Vascepa 1gm 1 gm PO BID 21 History


 


Calcium Acetate 667 mg PO TID-W/MEALS 22 History


 


Metoprolol Tartrate [Lopressor] 25 mg PO BID 22 History


 


Pantoprazole [Protonix] 40 mg PO DAILY 22 History


 


Potassium Chloride ER [K-Dur 20] 20 meq PO BID 22 History


 


levETIRAcetam [Keppra] 500 mg PO BID@0700,2100 22 History


 


Divalproex ER [Depakote ER] 250 mg PO BID 30 Days #60 tab 22 Rx


 


levETIRAcetam [Keppra] 500 mg PO MoWeFr@1600 30 Days #15 22 Rx





 tab   


 


Sevelamer [Renvela] 1,600 mg PO W/SUPPER 10/07/22 12/11/22 History


 


hydrALAZINE HCL [Apresoline] 50 mg PO BID 10/07/22 12/11/22 History


 


Lacosamide [Vimpat] 150 mg PO BID@0700,1900 #0 10/27/22 12/11/22 Rx








                                    Allergies











Allergy/AdvReac Type Severity Reaction Status Date / Time


 


Penicillins Allergy  Itching Verified 10/27/22 10:33














Physical Exam


Vitals: 


                                   Vital Signs











  Temp Pulse Resp BP Pulse Ox


 


 22 18:24   92  18  135/56  92 L


 


 22 15:23  97.7 F  92  18  149/69  96








                                Intake and Output











 22/22





 06:59 14:59 22:59


 


Other:   


 


  Weight   83.915 kg

















Constitutional:          No acute distress, uncooperative , awake, does not 

participate in exam or interview


Eyes:      Anicteric sclerae, moist conjunctiva, 


         Pupils equal round reactive to light





ENMT:      NC/AT


         Oropharynx clear, no erythema, or exudates





Neck:      Supple,  no masses, or JVD


         No carotid bruits


         No thyromegaly





Lungs:      Clear to auscultation


         Clear to percussion


         Normal respiratory effort, no accessory muscle use 





Cardiovascular:   Heart regular in rate and rhythm, 


         No murmurs, gallops, or rubs


         No peripheral edema





Abdominal:       Soft


         Nontender, no guarding, rebound or rigidity


         Abdomen moving with respiration


         Normoactive bowel sounds


         No hepatomegaly, No splenomegaly


         No palpable mass 


         No abdominal wall hernia noted 





Skin:      Normal temperature, tone, texture, turgor


          





Extremities:      left arm AVF with positive thrill


         No digital cyanosis 


         No clubbing


         Pedal pulses intact and symmetrical


         Radial pulses intact and symmetrical 


         No calf tenderness 





Psychiatric:      Alert awake , does not answer any of my question s


       


      


Neuro      unable to perform , patient does not cooperate 





Results


CBC & Chem 7: 


                                 22 17:05





                                 22 17:05


Labs: 


                  Abnormal Lab Results - Last 24 Hours (Table)











  22 Range/Units





  17:05 


 


Chloride  95 L  ()  mmol/L


 


BUN  43 H  (7-17)  mg/dL


 


Creatinine  8.86 H*  (0.52-1.04)  mg/dL


 


Glucose  145 H  (74-99)  mg/dL














Assessment and Plan


Assessment: 





breakthrough seizures


valproic acid level <10


resume depakote, keppra , vimpat 


neuro consult 


frequent admissions for breakthrough seizures


seizure precautions 





chronic conditions 


ESRD on HD [facility] 


nephrology consult for HD 


resume home meds


hypertension , resume home BP meds


DM , insulin sliding scale 








full code


dvt ppx heparin sc tid

## 2022-12-12 NOTE — P.NPCON
History of Present Illness





- Reason for Consult


end stage renal disease





- History of Present Illness





Reason for consultation: End-stage renal disease





History of present illness:


Patient is a 52-year-old female seen in renal consultation for end-stage renal 

disease.  She is maintained on hemodialysis on  schedule.

 Last treatment was on Friday.  Patient came to the hospital after having a 

breakthrough seizure.  Patient has dysarthria and is not a very reliable 

historian.  Patient states she has been taking her medications as prescribed.  

Patient's valproic acid level was noted to be low.  Hemodynamically stable.  

Denies chest pain or shortness of breath.  No edema.  No fever or chills.  No 

vomiting or diarrhea.





Vital signs are stable.


General: No acute distress.


HEENT: Head exam is unremarkable. 


LUNGS: Breath sounds decreased.


HEART: Rate and Rhythm are regular. 


ABDOMEN: Soft, no distention.


EXTREMITITES: No edema.





Past Medical History


Past Medical History: Chest Pain / Angina, CVA/TIA, Diabetes Mellitus, 

GERD/Reflux, Hyperlipidemia, Hypertension, Memory Impairment, Osteoarthritis 

(OA), Renal Disease, Seizure Disorder, Thyroid Disorder


Additional Past Medical History / Comment(s): Last seizure approximately one 

month ago. Spouse states she used to have seizures 4X per week until she had 

vagal nerve stimulator placed, now has seizures approximately once a week to 

once a month. Dialysis MOWEFR. Neuropathy, left drop foot, only able to walk 

short distances, otherwise uses wheelchair. 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 

ago, with residual memory impairment. Never diagnosed with Sleep Apnea but s

pouse states she does stop breathing through the night and he has to shake her 

to start breathing again. Varicose veins.


History of Any Multi-Drug Resistant Organisms: None Reported


Past Surgical History:  Section, Tonsillectomy, Uterine Ablation


Additional Past Surgical History / Comment(s): Left arm fistula, loop recorder, 

vagus nerve stimulator.


Past Anesthesia/Blood Transfusion Reactions: Motion Sickness


Additional Past Anesthesia/Blood Transfusion Reaction / Comment(s): Family hx 

unknown, pt adopted.


Past Psychological History: Bipolar


Smoking Status: Former smoker


Past Alcohol Use History: None Reported


Past Drug Use History: None Reported





- Past Family History


  ** Father


Family Medical History: Unable to Obtain


Additional Family Medical History / Comment(s): Pt adopted.





Medications and Allergies


                                Home Medications











 Medication  Instructions  Recorded  Confirmed  Type


 


Clopidogrel [Plavix] 75 mg PO DAILY #30 tab 21 Rx


 


amLODIPine [Norvasc] 10 mg PO DAILY #30 tab 21 Rx


 


Atorvastatin [Lipitor] 40 mg PO HS 21 History


 


Dulaglutide [Trulicity] 3 mg SQ Q7D 21 History


 


Lacosamide [Vimpat] 150 mg PO MOWEFR@21 History


 


Sevelamer [Renvela] 800 mg PO BID-W/MEALS 21 History


 


Vascepa 1gm 1 gm PO BID 21 History


 


Calcium Acetate 667 mg PO TID-W/MEALS 22 History


 


Metoprolol Tartrate [Lopressor] 25 mg PO BID 22 History


 


Pantoprazole [Protonix] 40 mg PO DAILY 22 History


 


Potassium Chloride ER [K-Dur 20] 20 meq PO BID 22 History


 


levETIRAcetam [Keppra] 500 mg PO BID@0700,2100 22 History


 


Divalproex ER [Depakote ER] 250 mg PO BID 30 Days #60 tab 22 Rx


 


levETIRAcetam [Keppra] 500 mg PO MoWeFr@1600 30 Days #15 22 Rx





 tab   


 


Sevelamer [Renvela] 1,600 mg PO W/SUPPER 10/07/22 12/11/22 History


 


hydrALAZINE HCL [Apresoline] 50 mg PO BID 10/07/22 12/11/22 History


 


Lacosamide [Vimpat] 150 mg PO BID@0700,1900 #0 10/27/22 12/11/22 Rx








                                    Allergies











Allergy/AdvReac Type Severity Reaction Status Date / Time


 


Penicillins Allergy  Itching Verified 10/27/22 10:33














Physical Exam


Vitals: 


                                   Vital Signs











  Temp Pulse Pulse Resp BP BP Pulse Ox


 


 22 07:00  98 F   96  18   161/86  99


 


 22 06:00  98.6 F  88   16  106/76   100


 


 22 05:37  98.4 F  72   16  132/78   98


 


 22 00:39   82   18  152/85   100


 


 22 23:00  98.8 F  79   16  153/85   99


 


 22 18:24   92   18  135/56   92 L


 


 22 15:23  97.7 F  92   18  149/69   96








                                Intake and Output











 22





 22:59 06:59 14:59


 


Intake Total   118


 


Balance   118


 


Intake:   


 


  Oral   118


 


Other:   


 


  Weight 83.915 kg  














Results





- Lab Results


                             Most recent lab results











Calcium  9.3 mg/dL (8.4-10.2)   22  05:30    


 


Magnesium  2.3 mg/dL (1.6-2.3)   22  17:05    














                                 22 05:30





                                 22 05:30





Assessment and Plan


Plan: 





Assessment:


1.  End-stage renal disease maintained on hemodialysis on  schedule.


2.  Seizures.  Neurology consulted.


3.  Hypertension with chronic kidney disease.  Stable.


4.  Diabetes mellitus.


5.  Chronic kidney disease mineral bone disease maintained on Renvela.





Plan:


Hemodialysis today.





Thank you for the consultation.  I will continue to follow the patient with you 

during her hospital stay.

## 2022-12-12 NOTE — P.CNNES
History of Present Illness


Consult date: 22


Requesting physician: Isaiah Vasquez


Reason for Consult: Recurrent seizures


History of Present Illness: 





Patient is a 52-year-old right-handed female with long-standing history of 

seizure disorder, and has presented with breakthrough seizures multiple times in

the past.  Patient has history of CVA with left hemiparesis.  She is also on 

hemodialysis for ESRD, diabetes, hypertension.  Patient came to the hospital by 

ambulance yesterday at 3:18 PM.  As per EMS flow sheet, they were called for 

patient with a seizure.  On their arrival, patient was laying on the bathroom 

floor, alert and oriented 1.  Patient was post ictal.  No emesis or 

incontinence of urine or bowels present.  Patient's  has mentioned that 

she has not had a seizure for about a month.  The seizure lasted approximately 

40 seconds.  Patient was sitting on the toilet and the seizure occurred.  

Patient did fall to the floor.  Patient had a small superficial laceration to 

the bridge of her nose.  Patient started to answer questions by nodding her 

head.  Patient denied any pain.  No nausea.  No visual disturbance.  EKG shows 

sinus tachycardia.  Blood sugar was 147.  Blood pressure 180/78, pulse rate 100,

respiration 24 and saturation 97%.





Patient at present states that she missed the dose on Friday and Saturday.  But 

later on she states that she took 3 tablets on Friday and 2 tablets on Saturday,

which is the usual dose of her seizure medications.  Patient wanted me to check 

with her .  I spoke to patient's  on the phone, who concurred with

the history as mentioned in the EMS flow sheet.  He states that she took the 

night dose of seizure medication a bit later at 10 PM instead of the usual time 

of 8 PM.  She does not miss a dose of seizure medication.  Patient generally 

stays in the wheelchair.  The only time she walks is when she has to go 4-5 

steps upstairs from driveway into the house.  Patient has a chronic left 

dropfoot from previous CVA.  She uses a bike pedal on the frame (not on a 

bicycle) while sitting and she uses it every day.





Patient's home medications include Vimpat 150 mg twice a day, and 150 mg extra 

dose every Monday, Wednesday, Friday postdialysis, Keppra 500 mg twice a day and

Keppra 500 mg every Monday, Wednesday, Friday after dialysis, Depakote 250 mg 

twice a day, Plavix 75 mg, amlodipine 10 mg, Lipitor 40 mg, Protonix 

hydralazine.  Patient most recently seen by Dr. Michele Merritt on 10/27/2022, in 

which her breakthrough seizure was felt to be related to noncompliance with 

medication.  Patient has refractory epilepsy with VNS placement.  Also has 

history of stroke with residual left hemiparesis.  She has diabetes, ESRD on 

dialysis.  Patient's last Keppra level was 24.3 (3-60) on 10/26/2022, whereas 

her Vimpat level was 5.2(up to 15.0) 





Patient's blood test shows normal CBC, electrolytes are normal, BUN 43, 

creatinine 8.86.  Hepatic panel is normal, Depakote <10.0





CT head reported no acute process.  Remote lacunar injuries along with 

nonspecific white matter changes, likely secondary to chronic microangiopathy.  

On my review, there is evidence of old lacune in the left basal ganglia, and 

bilateral cerebellar hemisphere and right pontine infarct, which are all old, s

een in the previous CT head.





Patient says that she stopped smoking 7 years ago, does not remember how much 

she smoked prior to that.  Patient claims that she had history of 5 strokes in 

the past.





Review of Systems


Constitutional: Denies chills, Denies fever


Eyes: right loss of peripheral vision, bilateral diplopia, bilateral pain


Ears: bilateral: decreased hearing


Ears, nose, mouth and throat: Denies headache, Denies sore throat


Cardiovascular: Denies chest pain, Denies shortness of breath


Respiratory: Denies cough


Gastrointestinal: Denies abdominal pain, Denies diarrhea, Denies nausea, Denies 

vomiting


Musculoskeletal: Denies myalgias, Denies shooting arm pain


Integumentary: Denies pruritus, Denies rash


Neurological: Reports as per HPI


Psychiatric: Reports depression





Past Medical History


Past Medical History: Chest Pain / Angina, CVA/TIA, Diabetes Mellitus, 

GERD/Reflux, Hyperlipidemia, Hypertension, Memory Impairment, Osteoarthritis 

(OA), Renal Disease, Seizure Disorder, Thyroid Disorder


Additional Past Medical History / Comment(s): Last seizure approximately one 

month ago. Spouse states she used to have seizures 4X per week until she had 

vagal nerve stimulator placed, now has seizures approximately once a week to 

once a month. Dialysis MOWEFR. Neuropathy, left drop foot, only able to walk 

short distances, otherwise uses wheelchair. 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 

ago, with residual memory impairment. Never diagnosed with Sleep Apnea but 

spouse states she does stop breathing through the night and he has to shake her 

to start breathing again. Varicose veins.


History of Any Multi-Drug Resistant Organisms: None Reported


Past Surgical History:  Section, Tonsillectomy, Uterine Ablation


Additional Past Surgical History / Comment(s): Left arm fistula, loop recorder, 

vagus nerve stimulator.


Past Anesthesia/Blood Transfusion Reactions: Motion Sickness


Additional Past Anesthesia/Blood Transfusion Reaction / Comment(s): Family hx 

unknown, pt adopted.


Past Psychological History: Bipolar


Smoking Status: Former smoker


Past Alcohol Use History: None Reported


Past Drug Use History: None Reported





- Past Family History


  ** Father


Family Medical History: Unable to Obtain


Additional Family Medical History / Comment(s): Pt adopted.





Medications and Allergies


                                Home Medications











 Medication  Instructions  Recorded  Confirmed  Type


 


Clopidogrel [Plavix] 75 mg PO DAILY #30 tab 21 Rx


 


amLODIPine [Norvasc] 10 mg PO DAILY #30 tab 21 Rx


 


Atorvastatin [Lipitor] 40 mg PO HS 21 History


 


Dulaglutide [Trulicity] 3 mg SQ Q7D 21 History


 


Lacosamide [Vimpat] 150 mg PO MOWEFR@07,14,21 21 History


 


Sevelamer [Renvela] 800 mg PO BID-W/MEALS 21 History


 


Vascepa 1gm 1 gm PO BID 21 History


 


Calcium Acetate 667 mg PO TID-W/MEALS 22 History


 


Metoprolol Tartrate [Lopressor] 25 mg PO BID 22 History


 


Pantoprazole [Protonix] 40 mg PO DAILY 22 History


 


Potassium Chloride ER [K-Dur 20] 20 meq PO BID 22 History


 


levETIRAcetam [Keppra] 500 mg PO BID@0700,2100 22 History


 


Divalproex ER [Depakote ER] 250 mg PO BID 30 Days #60 tab 22 Rx


 


levETIRAcetam [Keppra] 500 mg PO MoWeFr@1600 30 Days #15 22 Rx





 tab   


 


Sevelamer [Renvela] 1,600 mg PO W/SUPPER 10/07/22 12/11/22 History


 


hydrALAZINE HCL [Apresoline] 50 mg PO BID 10/07/22 12/11/22 History


 


Lacosamide [Vimpat] 150 mg PO BID@0700,1900 #0 10/27/22 12/11/22 Rx








                                    Allergies











Allergy/AdvReac Type Severity Reaction Status Date / Time


 


Penicillins Allergy  Itching Verified 10/27/22 10:33














Physical Examination





- Vital Signs


Vital Signs: 


                                   Vital Signs











  Temp Pulse Pulse Resp BP BP Pulse Ox


 


 22 07:00  98 F   96  18   161/86  99


 


 22 06:00  98.6 F  88   16  106/76   100


 


 22 05:37  98.4 F  72   16  132/78   98


 


 22 00:39   82   18  152/85   100


 


 22 23:00  98.8 F  79   16  153/85   99


 


 22 18:24   92   18  135/56   92 L


 


 22 15:23  97.7 F  92   18  149/69   96








                                Intake and Output











 22





 22:59 06:59 14:59


 


Other:   


 


  Weight 83.915 kg  














Patient is a middle aged  female, in no acute distress. 


Patient is alert awake, but with significant slow mentation.  Patient is 

oriented to time place and person.  Patient knows it is 2022 and that 

she is in [facility], in [place] in [place].  She could not tell 

name of the current or the previous Pres., states, does not follow the politics.

 Speech and language functions are normal.  Patient can name and repeat very 

well.  No aphasia or dysarthria.  Attention, concentration and fund of knowledge

is adequate. 





On cranial nerve examination, pupils are equal, round and reacting to light.  

Patient has cataract in the right eye, for which she is undergoing surgery this 

Friday.  Her visual fields revealed restricted visual field on the right and 

also in the lower parts.  Visual neglect was difficult to assess with her men

tation and visual deficits.  Extraocular muscles are intact with no nystagmus.  

Patient has slight right facial asymmetry.  Her tongue protrudes to the midline.

 Palatal elevation and sensation normal, hearing is decreased and shoulder shrug

decreased on the left, facial sensation normal.  





On muscle strength testing, there is obvious left pronator drift and the 

strength is (right/left) deltoid 5/4, biceps 5/4, triceps 5/5,  5/4+, hip 

flexion 5/4-3+, ankle dorsiflexion 5/1-2. 





Deep tendon reflexes are brisk and plantars upgoing bilaterally.





Sensory to touch is equal with no neglect on double simultaneous stimulation.





Cerebellar function showed ataxia for finger-to-nose testing only on the left 

side, but not on the right.   Tone is increased on the left and bulk of muscles 

normal.





Gait deferred..





On general examination, there is no carotid bruit or murmur, S1-S2 audible.  

Chest is clear on consultation.  Abdomen is soft nontender.  No organomegaly, 

bowel sounds present.   Peripheral pulses are present.  No edema.





Results





- Laboratory Findings


CBC and BMP: 


                                 22 05:30





                                 22 05:30


Abnormal Lab Findings: 


                                  Abnormal Labs











  22





  17:05 05:30 05:30


 


RBC   3.72 L 


 


Hgb   11.1 L 


 


Hct   33.4 L 


 


Chloride  95 L   97 L


 


BUN  43 H   50 H


 


Creatinine  8.86 H*   9.58 H*


 


Glucose  145 H  














Assessment and Plan


Assessment: 





* Long-standing history of seizure disorder, came with breakthrough seizure.  

  Uncertain if patient is compliant with the medication, although her blood 

  level from 10/26/2022 appears be therapeutic of Vimpat and Keppra.


* VNS placement 2 or 3 years ago


* History of CVA with left hemiparesis


* ESRD, on hemodialysis


* Diabetes


* Hypertension


Plan: 





* Check Keppra level.


* Continue Keppra 500 mg twice a day an extra 500 mg 3 times a week postdialysis


* Continue Vimpat 150 mg twice a day and extra Vimpat 150 mg twice a day 3 times

  a week postdialysis.


* Patient also on Depakote 250 mg twice a day.


* Patient has been having recurrent seizures, frequently comes to the hospital. 

  We will check prolonged EEG for better evaluation of underlying epileptiform 

  activity.


* Dr. Merritt has previously recommended patient to be started on Onfi, but has 

  not been started on it yet.


* Neurology will follow.  Thank you for the consult.

## 2022-12-13 LAB
GLUCOSE BLD-MCNC: 117 MG/DL (ref 70–110)
GLUCOSE BLD-MCNC: 123 MG/DL (ref 70–110)
GLUCOSE BLD-MCNC: 143 MG/DL (ref 70–110)
GLUCOSE BLD-MCNC: 166 MG/DL (ref 70–110)

## 2022-12-13 RX ADMIN — HEPARIN SODIUM SCH: 5000 INJECTION INTRAVENOUS; SUBCUTANEOUS at 00:05

## 2022-12-13 RX ADMIN — HEPARIN SODIUM SCH UNIT: 5000 INJECTION INTRAVENOUS; SUBCUTANEOUS at 20:18

## 2022-12-13 RX ADMIN — SEVELAMER CARBONATE SCH MG: 800 TABLET, FILM COATED ORAL at 18:57

## 2022-12-13 RX ADMIN — ATORVASTATIN CALCIUM SCH MG: 40 TABLET, FILM COATED ORAL at 20:18

## 2022-12-13 RX ADMIN — ASPIRIN SCH: 325 TABLET ORAL at 10:07

## 2022-12-13 RX ADMIN — PANTOPRAZOLE SODIUM SCH MG: 40 TABLET, DELAYED RELEASE ORAL at 08:28

## 2022-12-13 RX ADMIN — CLOPIDOGREL BISULFATE SCH MG: 75 TABLET ORAL at 08:29

## 2022-12-13 RX ADMIN — ASPIRIN SCH: 325 TABLET ORAL at 20:18

## 2022-12-13 RX ADMIN — HEPARIN SODIUM SCH UNIT: 5000 INJECTION INTRAVENOUS; SUBCUTANEOUS at 08:28

## 2022-12-13 RX ADMIN — METOPROLOL TARTRATE SCH MG: 25 TABLET, FILM COATED ORAL at 08:28

## 2022-12-13 RX ADMIN — INSULIN ASPART SCH: 100 INJECTION, SOLUTION INTRAVENOUS; SUBCUTANEOUS at 12:25

## 2022-12-13 RX ADMIN — SEVELAMER CARBONATE SCH MG: 800 TABLET, FILM COATED ORAL at 08:29

## 2022-12-13 RX ADMIN — LACOSAMIDE SCH MG: 50 TABLET, FILM COATED ORAL at 18:57

## 2022-12-13 RX ADMIN — DIVALPROEX SODIUM SCH: 250 TABLET, FILM COATED, EXTENDED RELEASE ORAL at 20:18

## 2022-12-13 RX ADMIN — INSULIN ASPART SCH: 100 INJECTION, SOLUTION INTRAVENOUS; SUBCUTANEOUS at 17:06

## 2022-12-13 RX ADMIN — METOPROLOL TARTRATE SCH MG: 25 TABLET, FILM COATED ORAL at 20:18

## 2022-12-13 RX ADMIN — INSULIN ASPART SCH UNIT: 100 INJECTION, SOLUTION INTRAVENOUS; SUBCUTANEOUS at 20:24

## 2022-12-13 RX ADMIN — LACOSAMIDE SCH MG: 50 TABLET, FILM COATED ORAL at 08:29

## 2022-12-13 RX ADMIN — INSULIN ASPART SCH: 100 INJECTION, SOLUTION INTRAVENOUS; SUBCUTANEOUS at 06:36

## 2022-12-13 RX ADMIN — DIVALPROEX SODIUM SCH MG: 250 TABLET, FILM COATED, EXTENDED RELEASE ORAL at 08:28

## 2022-12-13 NOTE — P.PN
Subjective


Progress Note Date: 12/13/22





Hospital course:





Patient is a very pleasant 52-year-old female with a past medical history of 

end-stage renal disease on dialysis Mondays/Wednesdays/Fridays, known seizure 

disorder with recurrent breakthrough seizures, CVA with left-sided deficits and 

right-sided visual impairments, hypertension, hyperlipidemia and type II 

non-insulin-dependent diabetes mellitus. She presented to the emergency 

department from home via EMS on 12/11/22 with a chief complaint of breakthrough 

seizure.  She underwent full evaluation in the emergency department.  CBC 

unremarkable and CMP was consistent with ESRD with BUN of 43 and creatinine of 

8.86.  Patient's last dialysis session was Friday 12/9/22.  Valproic acid level 

was subtherapeutic at less then 10.0.  It is unclear if patient has been taking 

medications as prescribed.  Patient was admitted under services with 

consultation to nephrology and neurology.  Patient reports that she lives at 

home with her  and reports she ambulates with use of walker.  Patient was

taken for prolonged 2-1/2 hour EEG, results currently pending.





Physical exam:





Patient seen and fully evaluated at bedside upon return from prolonged EEG.  She

has had no further episodes of breakthrough seizures since arrival to our 

facility.  Patient denies having any complaints at this time including headache,

lightheadedness, dizziness, chest pain, palpitations, shortness of breath, 

nausea, vomiting, or experiencing any numbness/tingling/weakness in her extre

mities.  Patient was updated tentative plan is for likely discharge once EEG 

results are available and cleared by neurology team.





Vital signs reviewed and stable. 


General: Nontoxic, no distress and appears stated age.  


Derm: Skin warm and dry, normal coloration for ethnicity.


Head: Atraumatic, normocephalic and symmetric.  


Eyes: EOMs intact, no lid lag, and anicteric sclera


Mouth: no lip lesions, mucus membranes moist


Cardiovascular: regular rate and rhythm with normal S1S2,  stage III systolic 

murmur, positive posterior tibial pulses bilaterally, and cap refill < 2 

seconds.   AV fistula left upper extremity with bruit and thrill intact.  


Lungs: Respirations even, regular, and unlabored on room air. Lungs CTA 

bilaterally, no rhonchi, no rales, no wheezing, and no accessory muscle usage. 


Abdominal: soft, nontender to palpation, no guarding, no appreciable 

organomegaly


Ext: ROM intact. No gross muscle atrophy, no edema, no contractures


Neuro: Speech clear, face symmetrical and CN II-XII grossly intact with no noted

focal neuro deficits


Psych: Alert and oriented to person, place, time, and situation. Appropriate and

pleasant affect. 





Assessment and Plan of Care:





Breakthrough seizure


-Seizure precautions, aspiration precautions, and fall precautions in place.


-Depakote level subtherapeutic at less than 10.0.  Keppra level therapeutic at 

50.1.


-Neurology following, recommending prolonged 2-1/2 hour EEG.


-Patient to continue antiepileptic medications including Depakote, Vimpat, and 

Keppra.  Discussed with neurology and he is recommending patient take an 

additional dose (in addition to daily prescribed doses) of Keppra 500 mg and 

Vimpat 150 mg every Monday, Wednesday, and Friday after dialysis is complete.


-Patient informed of [place] [employer] law stating no driving until seizure free 

for 6 months.  Patient also instructed to avoid climbing ladders, operating 

dangerous or heavy machinery or unsupervised swimming until seizure free for 6 

months.





ESRD on dialysis


-Nephrology following and has scheduled patient to undergo dialysis later today.


-Continued close monitoring of renal function and electrolytes throughout 

hospitalization.





History of CVA with left-sided deficits and right-sided visual impairments


-Continue atorvastatin and Plavix.


-Provide safe and supportive care and assistance as needed.


-Fall precautions





Hypertension


Monitor vital signs and continue daily medication regimen with metoprolol, 

hydralazine, and amlodipine.





Hyperlipidemia


Continue daily medication regimen with atorvastatin 40 mg nightly.


Heart healthy and carb consistent diet.





Type 2 diabetes mellitus


Glycemic protocol with NovoLog sliding scale.








CODE STATUS: Full code 


DVT prophylaxis:  Heparin


Discussed with: Patient, neurology, and RN


Anticipated discharge date: Within the next 24-48 hours


Anticipated discharge place: Home


A total of 32 minutes was spent on the care of this complex patient more than 

50% of the time was spent in counseling and care coordination.











Objective





- Vital Signs


Vital signs: 


                                   Vital Signs











Temp  98.2 F   12/13/22 08:20


 


Pulse  85   12/13/22 08:20


 


Resp  16   12/13/22 08:20


 


BP  148/83   12/13/22 08:20


 


Pulse Ox  98   12/13/22 08:20


 


FiO2      








                                 Intake & Output











 12/12/22 12/13/22 12/13/22





 18:59 06:59 18:59


 


Intake Total 654  90


 


Output Total 1800  


 


Balance -1146  90


 


Intake:   


 


  Oral 354  90


 


  Hemodialysis 300  


 


Output:   


 


  Hemodialysis 1800  


 


Other:   


 


  Voiding Method  Bedside Commode Bedside Commode


 


  # Voids 1 2 0


 


  # Bowel Movements  1 














- Labs


CBC & Chem 7: 


                                 12/12/22 05:30





                                 12/12/22 05:30


Labs: 


                  Abnormal Lab Results - Last 24 Hours (Table)











  12/12/22 12/13/22 12/13/22 Range/Units





  20:34 06:12 13:51 


 


POC Glucose (mg/dL)  206 H  117 H  123 H  ()  mg/dL

## 2022-12-14 VITALS — SYSTOLIC BLOOD PRESSURE: 118 MMHG | HEART RATE: 77 BPM | DIASTOLIC BLOOD PRESSURE: 62 MMHG | TEMPERATURE: 97 F

## 2022-12-14 VITALS — RESPIRATION RATE: 16 BRPM

## 2022-12-14 LAB
ALBUMIN SERPL-MCNC: 3.9 G/DL (ref 3.8–4.9)
ALBUMIN/GLOB SERPL: 1.63 G/DL (ref 1.6–3.17)
ALP SERPL-CCNC: 85 U/L (ref 41–126)
ALT SERPL-CCNC: 13 U/L (ref 8–44)
ANION GAP SERPL CALC-SCNC: 15.8 MMOL/L (ref 10–18)
AST SERPL-CCNC: 19 U/L (ref 13–35)
BUN SERPL-SCNC: 28.3 MG/DL (ref 9–27)
BUN/CREAT SERPL: 3.25 RATIO (ref 12–20)
CALCIUM SPEC-MCNC: 9.5 MG/DL (ref 8.7–10.3)
CHLORIDE SERPL-SCNC: 96 MMOL/L (ref 96–109)
CO2 SERPL-SCNC: 26.2 MMOL/L (ref 20–27.5)
ERYTHROCYTE [DISTWIDTH] IN BLOOD BY AUTOMATED COUNT: 3.67 X 10*6/UL (ref 4.1–5.2)
ERYTHROCYTE [DISTWIDTH] IN BLOOD: 14.7 % (ref 11.5–14.5)
GLOBULIN SER CALC-MCNC: 2.4 G/DL (ref 1.6–3.3)
GLUCOSE BLD-MCNC: 124 MG/DL (ref 70–110)
GLUCOSE BLD-MCNC: 142 MG/DL (ref 70–110)
GLUCOSE BLD-MCNC: 159 MG/DL (ref 70–110)
GLUCOSE SERPL-MCNC: 134 MG/DL (ref 70–110)
HCT VFR BLD AUTO: 33.4 % (ref 37.2–46.3)
HGB BLD-MCNC: 10.4 G/DL (ref 12–15)
MAGNESIUM SPEC-SCNC: 2.3 MG/DL (ref 1.5–2.4)
MCH RBC QN AUTO: 28.3 PG (ref 27–32)
MCHC RBC AUTO-ENTMCNC: 31.1 G/DL (ref 32–37)
MCV RBC AUTO: 91 FL (ref 80–97)
NRBC BLD AUTO-RTO: 0 /100 WBCS (ref 0–0)
PLATELET # BLD AUTO: 256 X 10*3/UL (ref 140–440)
POTASSIUM SERPL-SCNC: 4.5 MMOL/L (ref 3.5–5.5)
PROT SERPL-MCNC: 6.3 G/DL (ref 6.2–8.2)
SODIUM SERPL-SCNC: 138 MMOL/L (ref 135–145)
WBC # BLD AUTO: 5.72 X 10*3/UL (ref 4.5–10)

## 2022-12-14 RX ADMIN — PANTOPRAZOLE SODIUM SCH MG: 40 TABLET, DELAYED RELEASE ORAL at 08:12

## 2022-12-14 RX ADMIN — CLOPIDOGREL BISULFATE SCH MG: 75 TABLET ORAL at 08:12

## 2022-12-14 RX ADMIN — METOPROLOL TARTRATE SCH MG: 25 TABLET, FILM COATED ORAL at 08:12

## 2022-12-14 RX ADMIN — ASPIRIN SCH: 325 TABLET ORAL at 10:39

## 2022-12-14 RX ADMIN — SEVELAMER CARBONATE SCH MG: 800 TABLET, FILM COATED ORAL at 08:11

## 2022-12-14 RX ADMIN — HEPARIN SODIUM SCH: 5000 INJECTION INTRAVENOUS; SUBCUTANEOUS at 00:37

## 2022-12-14 RX ADMIN — SEVELAMER CARBONATE SCH: 800 TABLET, FILM COATED ORAL at 13:55

## 2022-12-14 RX ADMIN — INSULIN ASPART SCH: 100 INJECTION, SOLUTION INTRAVENOUS; SUBCUTANEOUS at 12:37

## 2022-12-14 RX ADMIN — DIVALPROEX SODIUM SCH: 250 TABLET, FILM COATED, EXTENDED RELEASE ORAL at 10:39

## 2022-12-14 RX ADMIN — LACOSAMIDE SCH MG: 50 TABLET, FILM COATED ORAL at 08:11

## 2022-12-14 RX ADMIN — HEPARIN SODIUM SCH: 5000 INJECTION INTRAVENOUS; SUBCUTANEOUS at 17:46

## 2022-12-14 RX ADMIN — HEPARIN SODIUM SCH UNIT: 5000 INJECTION INTRAVENOUS; SUBCUTANEOUS at 08:11

## 2022-12-14 RX ADMIN — INSULIN ASPART SCH: 100 INJECTION, SOLUTION INTRAVENOUS; SUBCUTANEOUS at 08:10

## 2022-12-14 NOTE — P.PN
Subjective


Progress Note Date: 12/13/22





Patient was seen for a follow-up.  Patient is laying comfortably in the bed.  No

further seizures reported.





Objective





- Vital Signs


Vital signs: 


                                   Vital Signs











Temp  97.9 F   12/13/22 15:00


 


Pulse  81   12/13/22 15:00


 


Resp  18   12/13/22 15:00


 


BP  126/66   12/13/22 15:00


 


Pulse Ox  100   12/13/22 15:00


 


FiO2      








                                 Intake & Output











 12/13/22 12/13/22 12/14/22





 06:59 18:59 06:59


 


Intake Total  300 


 


Balance  300 


 


Intake:   


 


  Oral  300 


 


Other:   


 


  Voiding Method Bedside Commode Bedside Commode 


 


  # Voids 2 0 


 


  # Bowel Movements 1  














- Exam





Examination unchanged.





- Labs


CBC & Chem 7: 


                                 12/12/22 05:30





                                 12/12/22 05:30


Labs: 


                  Abnormal Lab Results - Last 24 Hours (Table)











  12/12/22 12/13/22 12/13/22 Range/Units





  20:34 06:12 13:51 


 


POC Glucose (mg/dL)  206 H  117 H  123 H  ()  mg/dL














  12/13/22 Range/Units





  17:03 


 


POC Glucose (mg/dL)  143 H  ()  mg/dL














Assessment and Plan


Assessment: 





* Long-standing history of seizure disorder, came with breakthrough seizure.  

  Uncertain if patient is compliant with the medication, although her blood leve

  l from 10/26/2022 appears be therapeutic of Vimpat and Keppra.


* VNS placement 2 or 3 years ago


* History of CVA with left hemiparesis


* ESRD, on hemodialysis


* Diabetes


* Hypertension


Plan: 





* Keppra level is therapeutic 50.1(3-60).


* Continue Keppra 500 mg twice a day an extra 500 mg 3 times a week postdialysis


* Continue Vimpat 150 mg twice a day and extra Vimpat 150 mg twice a day 3 times

  a week postdialysis.


* Patient also on Depakote 250 mg twice a day.


* Patient had undergone prolonged EEG for better evaluation of underlying 

  epileptiform activity.  Unfortunately report not available today, as Dr Chowdhury has called in sick today.  I briefly looked at the EEG, and appears 

  quite abnormal.  We will await official read from Dr. Chowdhury.  


* Dr. Merritt has previously recommended patient to be started on Onfi, but has 

  not been started on it yet.


* Discussed with primary team.

## 2022-12-14 NOTE — P.PN
Subjective


Progress Note Date: 12/14/22





Patient was seen for a follow-up.  Patient is laying comfortably in the bed.  No

further seizures reported.  Patient is getting hemodialysis.





Objective





- Vital Signs


Vital signs: 


                                   Vital Signs











Temp  98.3 F   12/14/22 08:00


 


Pulse  85   12/14/22 08:00


 


Resp  16   12/14/22 08:00


 


BP  169/76   12/14/22 08:00


 


Pulse Ox  99   12/14/22 08:00


 


FiO2      








                                 Intake & Output











 12/13/22 12/14/22 12/14/22





 18:59 06:59 18:59


 


Intake Total 300 200 118


 


Balance 300 200 118


 


Intake:   


 


  Oral 300 200 118


 


Other:   


 


  Voiding Method Bedside Commode Bedside Commode Bedside Commode


 


  # Voids 0 1 














- Exam





Examination unchanged.





- Labs


CBC & Chem 7: 


                                 12/14/22 07:04





                                 12/14/22 07:04


Labs: 


                  Abnormal Lab Results - Last 24 Hours (Table)











  12/13/22 12/13/22 12/13/22 Range/Units





  13:51 17:03 20:04 


 


RBC     (4.10-5.20)  X 10*6/uL


 


Hgb     (12.0-15.0)  g/dL


 


Hct     (37.2-46.3)  %


 


MCHC     (32.0-37.0)  g/dL


 


RDW     (11.5-14.5)  %


 


BUN     (9.0-27.0)  mg/dL


 


Creatinine     (0.6-1.5)  mg/dL


 


Est GFR (CKD-EPI)AfAm     (60.0-200.0)   


 


Est GFR (CKD-EPI)NonAf     (60.0-200.0)   


 


BUN/Creatinine Ratio     (12.00-20.00)  Ratio


 


Glucose     ()  mg/dL


 


POC Glucose (mg/dL)  123 H  143 H  166 H  ()  mg/dL














  12/14/22 12/14/22 12/14/22 Range/Units





  05:43 07:04 07:04 


 


RBC   3.67 L   (4.10-5.20)  X 10*6/uL


 


Hgb   10.4 L   (12.0-15.0)  g/dL


 


Hct   33.4 L   (37.2-46.3)  %


 


MCHC   31.1 L   (32.0-37.0)  g/dL


 


RDW   14.7 H   (11.5-14.5)  %


 


BUN    28.3 H  (9.0-27.0)  mg/dL


 


Creatinine    8.7 H*  (0.6-1.5)  mg/dL


 


Est GFR (CKD-EPI)AfAm    5.4 L  (60.0-200.0)   


 


Est GFR (CKD-EPI)NonAf    4.7 L  (60.0-200.0)   


 


BUN/Creatinine Ratio    3.25 L  (12.00-20.00)  Ratio


 


Glucose    134 H  ()  mg/dL


 


POC Glucose (mg/dL)  159 H    ()  mg/dL














  12/14/22 12/14/22 Range/Units





  08:10 12:33 


 


RBC    (4.10-5.20)  X 10*6/uL


 


Hgb    (12.0-15.0)  g/dL


 


Hct    (37.2-46.3)  %


 


MCHC    (32.0-37.0)  g/dL


 


RDW    (11.5-14.5)  %


 


BUN    (9.0-27.0)  mg/dL


 


Creatinine    (0.6-1.5)  mg/dL


 


Est GFR (CKD-EPI)AfAm    (60.0-200.0)   


 


Est GFR (CKD-EPI)NonAf    (60.0-200.0)   


 


BUN/Creatinine Ratio    (12.00-20.00)  Ratio


 


Glucose    ()  mg/dL


 


POC Glucose (mg/dL)  124 H  142 H  ()  mg/dL














Assessment and Plan


Assessment: 





* Long-standing history of seizure disorder, came with breakthrough seizure.  

  Patient is compliant with medications, as her levels are therapeutic.


* VNS placement 2 or 3 years ago


* History of CVA with left hemiparesis


* ESRD, on hemodialysis


* Diabetes


* Hypertension


Plan: 





* Prolonged, 2.5 hour EEG was performed.  Official report is pending.  

  Preliminary report from Dr. Chowdhury, revealed infrequent runs of generalized 

  sharp and slow wave, or spike and slow wave at 2-3 Hz, lasting for 1-1/2 

  seconds.  This is suggestive of primary generalized epilepsy.  Vimpat is not 

  indicated for primary generalized epilepsy.  We will gradually wean off 

  Vimpat.  We will stop extra Vimpat postdialysis, and decreased maintenance 

  dose to 100 mg twice a day.  After 1 week, would decrease Vimpat to 50 mg 

  twice a day for one week and then 50 mg once a day for following one week and 

  then stop.


* I discussed with patient about optimizing dose of Depakote versus adding 

  Zonegran.  She does not want to increase dose of Depakote.  We will therefore 

  start Zonegran 100 mg daily.  Recommend patient follow up with neurologist in 

  1-2 weeks to further optimize Zonegran.





* Keppra level is therapeutic 50.1(3-60).


* Continue Keppra 500 mg twice a day an extra 500 mg 3 times a week postdialysis





* Patient also on Depakote 250 mg twice a day.


  


* Neurologically clear for discharge.  Discussed with primary team.

## 2022-12-14 NOTE — P.DS
Providers


Date of admission: 


12/13/22 11:30





Expected date of discharge: 12/14/22


Attending physician: 


Keisha Sims MD





Consults: 





                                        





12/11/22 18:45


Consult Physician Urgent 


   Consulting Provider: Brennen Higginbotham


   Consult Reason/Comments: Chronic renal failure, dialysis patient


   Do you want consulting provider notified?: Yes


Consult Physician Urgent 


   Consulting Provider: Vanessa Copeland


   Consult Reason/Comments: Recurrent seizures


   Do you want consulting provider notified?: Yes











Primary care physician: 


Stated None





Hospital Course: 





Discharge Diagnosis:





Breakthrough seizure. Patient was taken for prolonged 2-1/2 hour EEG, official 

report remains pending. Neurologist called and spoke with Dr. Chowdhury whom 

reported findings suggestive of primary generalized epilepsy.  Neurology 

reported Vimpat is not indicated for primary generalized epilepsy and 

recommending patient wean off of medication and start on Zonegran in addition to

her daily medication regimen with Keppra and Depakote.  Neurologist clearing 

patient from neurological standpoint recommending she follow up outpatient with 

her neurologist in 1 week. Patient informed of [place] state law stating no 

driving until seizure free for 6 months.  Patient also instructed to avoid 

climbing ladders, operating dangerous or heavy machinery or unsupervised 

swimming until seizure free for 6 months.





ESRD on dialysis.  Continue to follow up outpatient with nephrologist as 

previously scheduled and for scheduled dialysis sessions on 

Mondays/Wednesdays/Fridays.





History of CVA with left-sided deficits and right-sided visual impairments. 

Continue atorvastatin and Plavix.





Hypertension. Monitor vital signs and continue daily medication regimen with 

metoprolol, hydralazine, and amlodipine.





Hyperlipidemia. Continue daily medication regimen with atorvastatin 40 mg 

nightly.





Type 2 diabetes mellitus.  Monitor blood glucose levels and continue Trulicity. 





Hospital Course: 





Patient is a very pleasant 52-year-old female with a past medical history of 

end-stage renal disease on dialysis Mondays/Wednesdays/Fridays, known seizure 

disorder with recurrent breakthrough seizures, CVA with left-sided deficits and 

right-sided visual impairments, hypertension, hyperlipidemia and type II 

non-insulin-dependent diabetes mellitus. She presented to the emergency 

department from home via EMS on 12/11/22 with a chief complaint of breakthrough 

seizure.  She underwent full evaluation in the emergency department.  CBC 

unremarkable and CMP was consistent with ESRD with BUN of 43 and creatinine of 

8.86.  Patient's last dialysis session was Friday 12/9/22.  Valproic acid level 

was subtherapeutic at less then 10.0.  It is unclear if patient has been taking 

medications as prescribed.  Patient was admitted under services with 

consultation to nephrology and neurology.  Patient reports that she lives at 

home with her  and reports she ambulates with use of walker.  Patient was

taken for prolonged 2-1/2 hour EEG, official report remains pending.  

Neurologist called and spoke with Dr. Chowdhury whom reported findings suggestive

of primary generalized epilepsy.  Neurology reported Vimpat is not indicated for

primary generalized epilepsy and recommending patient wean off of medication and

start on Zonegran and clearing patient from neurological standpoint recommending

she follow up outpatient with her neurologist in 1 week.  Patient was also 

evaluated by nephrologist and received dialysis on Monday and again today and to

follow up outpatient with her nephrologist and for scheduled dialysis sessions 

Mondays/Wednesdays/Fridays.  Patient medically stable for discharge at this time

and as instructed patient to follow up outpatient with PCP, neurologist, and 

nephrologist for continued long-term management.





Physical exam:





Vital signs reviewed and stable. 


General: Nontoxic, no distress and appears stated age.  


Derm: Skin warm and dry, normal coloration for ethnicity.


Head: Atraumatic, normocephalic and symmetric.  


Eyes: EOMs intact, no lid lag, and anicteric sclera


Mouth: no lip lesions, mucus membranes moist


Cardiovascular: regular rate and rhythm with normal S1S2,  stage III systolic 

murmur, positive posterior tibial pulses bilaterally, and cap refill < 2 

seconds.   AV fistula left upper extremity with bruit and thrill intact.  


Lungs: Respirations even, regular, and unlabored on room air. Lungs CTA 

bilaterally, no rhonchi, no rales, no wheezing, and no accessory muscle usage. 


Abdominal: soft, nontender to palpation, no guarding, no appreciable 

organomegaly


Ext: ROM intact. No gross muscle atrophy, no edema, no contractures


Neuro: Speech clear, face symmetrical and CN II-XII grossly intact with no noted

focal neuro deficits


Psych: Alert and oriented to person, place, time, and situation. Appropriate and

pleasant affect.





A total of 38 minutes of time were spent preparing this complex discharge 

summary.





Pt was discharged on 12/14/22 at 4:31 PM.





Attending Note


Yaya Ayala, NP rendered care for this patient independently, reviewed the 

findings and plan as documented in the note above.  I did not physically speak 

with our examined the patient on this date.


Patient Condition at Discharge: Stable





Plan - Discharge Summary


Discharge Rx Participant: No


New Discharge Prescriptions: 


New


   Lacosamide [Vimpat] See Taper PO AS DIRECTED 21 Days #49 tab


   Zonisamide [Zonegran] 100 mg PO DAILY 30 Days #30 cap





Continue


   amLODIPine [Norvasc] 10 mg PO DAILY #30 tab


   Clopidogrel [Plavix] 75 mg PO DAILY #30 tab


   Atorvastatin [Lipitor] 40 mg PO HS


   Vascepa 1gm 1 gm PO BID


   Sevelamer [Renvela] 800 mg PO BID-W/MEALS


   Calcium Acetate 667 mg PO TID-W/MEALS


   Pantoprazole [Protonix] 40 mg PO DAILY


   Potassium Chloride ER [K-Dur 20] 20 meq PO BID


   Sevelamer [Renvela] 1,600 mg PO W/SUPPER


   Dulaglutide [Trulicity] 3 mg SQ Q7D


   levETIRAcetam [Keppra] 500 mg PO BID@0700,2100


   Metoprolol Tartrate [Lopressor] 25 mg PO BID


   Divalproex ER [Depakote ER] 250 mg PO BID 30 Days #60 tab


   levETIRAcetam [Keppra] 500 mg PO MoWeFr@1600 30 Days #15 tab


   hydrALAZINE HCL [Apresoline] 50 mg PO BID





Discontinued


   Lacosamide [Vimpat] 150 mg PO BID@0700,1900 #0


   Lacosamide [Vimpat] 150 mg PO MOWEFR@07,14,21


Discharge Medication List





Clopidogrel [Plavix] 75 mg PO DAILY #30 tab 02/13/21 [Rx]


amLODIPine [Norvasc] 10 mg PO DAILY #30 tab 02/13/21 [Rx]


Atorvastatin [Lipitor] 40 mg PO HS 08/31/21 [History]


Dulaglutide [Trulicity] 3 mg SQ Q7D 08/31/21 [History]


Sevelamer [Renvela] 800 mg PO BID-W/MEALS 08/31/21 [History]


Vascepa 1gm 1 gm PO BID 08/31/21 [History]


Calcium Acetate 667 mg PO TID-W/MEALS 08/05/22 [History]


Metoprolol Tartrate [Lopressor] 25 mg PO BID 08/05/22 [History]


Pantoprazole [Protonix] 40 mg PO DAILY 08/05/22 [History]


Potassium Chloride ER [K-Dur 20] 20 meq PO BID 08/05/22 [History]


levETIRAcetam [Keppra] 500 mg PO BID@0700,2100 08/05/22 [History]


Divalproex ER [Depakote ER] 250 mg PO BID 30 Days #60 tab 08/08/22 [Rx]


levETIRAcetam [Keppra] 500 mg PO MoWeFr@1600 30 Days #15 tab 08/08/22 [Rx]


Sevelamer [Renvela] 1,600 mg PO W/SUPPER 10/07/22 [History]


hydrALAZINE HCL [Apresoline] 50 mg PO BID 10/07/22 [History]


Lacosamide [Vimpat] See Taper PO AS DIRECTED 21 Days #49 tab 12/14/22 [Rx]


Zonisamide [Zonegran] 100 mg PO DAILY 30 Days #30 cap 12/14/22 [Rx]








Follow up Appointment(s)/Referral(s): 


Saturnino Castillo MD [STAFF PHYSICIAN] - 1-2 days


Tommie Nelson MD [Medical Doctor] - 1 Week (patient to call and make an 

appointment )


Patient Instructions/Handouts:  Chronic Kidney Disease (ED), Recurrent Seizures 

in Adults (ED)


Activity/Diet/Wound Care/Special Instructions: 





Activity:





As tolerated.  Take breaks as needed.





Diet: 





Heart healthy and carb consistent diet. Avoid salts, or foods with hidden salts 

such as canned or boxed foods and frozen dinners. Extra salt makes your heart 

work harder and traps the fluid in your body for longer.





Special Instructions:  





Take all of your medications as directed and remember to keep all of your 

doctor's appointments and follow-up as needed.  





Return to the ER immediately should you develop any significant pain, recurrent 

or prolonged seizures, a fever, shortness of breath, numbness or weakness, or 

new or worsening symptoms.  Follow up closely with your primary care provider, 

as well as your neurologist.  Be sure to follow up for your scheduled dialysis 

sessions as well.





[place] state law stating no driving until seizure free for 6 months.  Patient 

also instructed to avoid climbing ladders, operating dangerous or heavy 

machinery or unsupervised swimming until seizure free for 6 months.





Thank you for allowing us to participate in your care, it was truly a pleasure 

having you for our patient!!! 











Discharge Disposition: HOME SELF-CARE

## 2022-12-23 ENCOUNTER — HOSPITAL ENCOUNTER (INPATIENT)
Dept: HOSPITAL 47 - EC | Age: 52
LOS: 12 days | Discharge: NURSING, REHAB OR LONG-TERM CARE | DRG: 100 | End: 2023-01-04
Payer: MEDICARE

## 2022-12-23 VITALS — BODY MASS INDEX: 31.4 KG/M2

## 2022-12-23 DIAGNOSIS — D63.1: ICD-10-CM

## 2022-12-23 DIAGNOSIS — F31.9: ICD-10-CM

## 2022-12-23 DIAGNOSIS — Z79.899: ICD-10-CM

## 2022-12-23 DIAGNOSIS — I13.2: ICD-10-CM

## 2022-12-23 DIAGNOSIS — Z79.85: ICD-10-CM

## 2022-12-23 DIAGNOSIS — M19.90: ICD-10-CM

## 2022-12-23 DIAGNOSIS — Z79.02: ICD-10-CM

## 2022-12-23 DIAGNOSIS — I50.9: ICD-10-CM

## 2022-12-23 DIAGNOSIS — I69.354: ICD-10-CM

## 2022-12-23 DIAGNOSIS — E83.9: ICD-10-CM

## 2022-12-23 DIAGNOSIS — N18.6: ICD-10-CM

## 2022-12-23 DIAGNOSIS — Z88.0: ICD-10-CM

## 2022-12-23 DIAGNOSIS — Z96.82: ICD-10-CM

## 2022-12-23 DIAGNOSIS — J44.9: ICD-10-CM

## 2022-12-23 DIAGNOSIS — G47.33: ICD-10-CM

## 2022-12-23 DIAGNOSIS — E11.42: ICD-10-CM

## 2022-12-23 DIAGNOSIS — E44.0: ICD-10-CM

## 2022-12-23 DIAGNOSIS — Z71.6: ICD-10-CM

## 2022-12-23 DIAGNOSIS — I65.22: ICD-10-CM

## 2022-12-23 DIAGNOSIS — Z87.891: ICD-10-CM

## 2022-12-23 DIAGNOSIS — E03.9: ICD-10-CM

## 2022-12-23 DIAGNOSIS — Z99.2: ICD-10-CM

## 2022-12-23 DIAGNOSIS — N39.0: ICD-10-CM

## 2022-12-23 DIAGNOSIS — Z20.822: ICD-10-CM

## 2022-12-23 DIAGNOSIS — G93.49: ICD-10-CM

## 2022-12-23 DIAGNOSIS — E78.5: ICD-10-CM

## 2022-12-23 DIAGNOSIS — R47.1: ICD-10-CM

## 2022-12-23 DIAGNOSIS — K21.9: ICD-10-CM

## 2022-12-23 DIAGNOSIS — G40.411: Primary | ICD-10-CM

## 2022-12-23 DIAGNOSIS — J96.01: ICD-10-CM

## 2022-12-23 DIAGNOSIS — E11.22: ICD-10-CM

## 2022-12-23 DIAGNOSIS — M21.372: ICD-10-CM

## 2022-12-23 DIAGNOSIS — I83.90: ICD-10-CM

## 2022-12-23 DIAGNOSIS — R13.10: ICD-10-CM

## 2022-12-23 LAB
ALBUMIN SERPL-MCNC: 4.4 G/DL (ref 3.5–5)
ALP SERPL-CCNC: 98 U/L (ref 38–126)
ALT SERPL-CCNC: 14 U/L (ref 4–34)
ANION GAP SERPL CALC-SCNC: 16 MMOL/L
ANION GAP SERPL CALC-SCNC: 18 MMOL/L
APTT BLD: 21.5 SEC (ref 22–30)
AST SERPL-CCNC: 20 U/L (ref 14–36)
BASOPHILS # BLD AUTO: 0 K/UL (ref 0–0.2)
BASOPHILS # BLD AUTO: 0.1 K/UL (ref 0–0.2)
BASOPHILS NFR BLD AUTO: 0 %
BASOPHILS NFR BLD AUTO: 1 %
BUN SERPL-SCNC: 32 MG/DL (ref 7–17)
BUN SERPL-SCNC: 32 MG/DL (ref 7–17)
CALCIUM SPEC-MCNC: 8.4 MG/DL (ref 8.4–10.2)
CALCIUM SPEC-MCNC: 9.8 MG/DL (ref 8.4–10.2)
CHLORIDE SERPL-SCNC: 100 MMOL/L (ref 98–107)
CHLORIDE SERPL-SCNC: 108 MMOL/L (ref 98–107)
CO2 BLDA-SCNC: 26 MMOL/L (ref 19–24)
CO2 SERPL-SCNC: 22 MMOL/L (ref 22–30)
CO2 SERPL-SCNC: 24 MMOL/L (ref 22–30)
EOSINOPHIL # BLD AUTO: 0.1 K/UL (ref 0–0.7)
EOSINOPHIL # BLD AUTO: 0.1 K/UL (ref 0–0.7)
EOSINOPHIL NFR BLD AUTO: 1 %
EOSINOPHIL NFR BLD AUTO: 1 %
ERYTHROCYTE [DISTWIDTH] IN BLOOD BY AUTOMATED COUNT: 3.93 M/UL (ref 3.8–5.4)
ERYTHROCYTE [DISTWIDTH] IN BLOOD BY AUTOMATED COUNT: 4 M/UL (ref 3.8–5.4)
ERYTHROCYTE [DISTWIDTH] IN BLOOD: 14 % (ref 11.5–15.5)
ERYTHROCYTE [DISTWIDTH] IN BLOOD: 14.1 % (ref 11.5–15.5)
GLUCOSE BLD-MCNC: 106 MG/DL (ref 70–110)
GLUCOSE BLD-MCNC: 121 MG/DL (ref 70–110)
GLUCOSE SERPL-MCNC: 124 MG/DL (ref 74–99)
GLUCOSE SERPL-MCNC: 288 MG/DL (ref 74–99)
GLUCOSE UR QL: (no result)
HCO3 BLDA-SCNC: 25 MMOL/L (ref 21–25)
HCT VFR BLD AUTO: 34.6 % (ref 34–46)
HCT VFR BLD AUTO: 35.1 % (ref 34–46)
HGB BLD-MCNC: 11.4 GM/DL (ref 11.4–16)
HGB BLD-MCNC: 11.4 GM/DL (ref 11.4–16)
INR PPP: 1 (ref ?–1.2)
LYMPHOCYTES # SPEC AUTO: 1.5 K/UL (ref 1–4.8)
LYMPHOCYTES # SPEC AUTO: 1.7 K/UL (ref 1–4.8)
LYMPHOCYTES NFR SPEC AUTO: 12 %
LYMPHOCYTES NFR SPEC AUTO: 22 %
MAGNESIUM SPEC-SCNC: 2.1 MG/DL (ref 1.6–2.3)
MCH RBC QN AUTO: 28.4 PG (ref 25–35)
MCH RBC QN AUTO: 29 PG (ref 25–35)
MCHC RBC AUTO-ENTMCNC: 32.4 G/DL (ref 31–37)
MCHC RBC AUTO-ENTMCNC: 33 G/DL (ref 31–37)
MCV RBC AUTO: 87.7 FL (ref 80–100)
MCV RBC AUTO: 88 FL (ref 80–100)
MONOCYTES # BLD AUTO: 0.2 K/UL (ref 0–1)
MONOCYTES # BLD AUTO: 0.7 K/UL (ref 0–1)
MONOCYTES NFR BLD AUTO: 3 %
MONOCYTES NFR BLD AUTO: 5 %
NEUTROPHILS # BLD AUTO: 11.3 K/UL (ref 1.3–7.7)
NEUTROPHILS # BLD AUTO: 4.8 K/UL (ref 1.3–7.7)
NEUTROPHILS NFR BLD AUTO: 71 %
NEUTROPHILS NFR BLD AUTO: 81 %
PCO2 BLDA: 37 MMHG (ref 35–45)
PH BLDA: 7.44 [PH] (ref 7.35–7.45)
PH UR: 7.5 [PH] (ref 5–8)
PLATELET # BLD AUTO: 218 K/UL (ref 150–450)
PLATELET # BLD AUTO: 245 K/UL (ref 150–450)
PO2 BLDA: >400 MMHG (ref 83–108)
POTASSIUM SERPL-SCNC: 3.8 MMOL/L (ref 3.5–5.1)
POTASSIUM SERPL-SCNC: 4 MMOL/L (ref 3.5–5.1)
PROT SERPL-MCNC: 7.6 G/DL (ref 6.3–8.2)
PROT UR QL: (no result)
PT BLD: 10.3 SEC (ref 9–12)
RBC UR QL: 9 /HPF (ref 0–5)
SODIUM SERPL-SCNC: 142 MMOL/L (ref 137–145)
SODIUM SERPL-SCNC: 146 MMOL/L (ref 137–145)
SP GR UR: 1.01 (ref 1–1.03)
SQUAMOUS UR QL AUTO: 2 /HPF (ref 0–4)
UROBILINOGEN UR QL STRIP: <2 MG/DL (ref ?–2)
WBC # BLD AUTO: 13.8 K/UL (ref 3.8–10.6)
WBC # BLD AUTO: 6.7 K/UL (ref 3.8–10.6)
WBC # UR AUTO: 84 /HPF (ref 0–5)

## 2022-12-23 PROCEDURE — 71046 X-RAY EXAM CHEST 2 VIEWS: CPT

## 2022-12-23 PROCEDURE — 81001 URINALYSIS AUTO W/SCOPE: CPT

## 2022-12-23 PROCEDURE — 70450 CT HEAD/BRAIN W/O DYE: CPT

## 2022-12-23 PROCEDURE — 83540 ASSAY OF IRON: CPT

## 2022-12-23 PROCEDURE — 84484 ASSAY OF TROPONIN QUANT: CPT

## 2022-12-23 PROCEDURE — 71045 X-RAY EXAM CHEST 1 VIEW: CPT

## 2022-12-23 PROCEDURE — 5A1945Z RESPIRATORY VENTILATION, 24-96 CONSECUTIVE HOURS: ICD-10-PCS

## 2022-12-23 PROCEDURE — 85025 COMPLETE CBC W/AUTO DIFF WBC: CPT

## 2022-12-23 PROCEDURE — 74230 X-RAY XM SWLNG FUNCJ C+: CPT

## 2022-12-23 PROCEDURE — 80203 DRUG SCREEN QUANT ZONISAMIDE: CPT

## 2022-12-23 PROCEDURE — 83605 ASSAY OF LACTIC ACID: CPT

## 2022-12-23 PROCEDURE — 94660 CPAP INITIATION&MGMT: CPT

## 2022-12-23 PROCEDURE — 70498 CT ANGIOGRAPHY NECK: CPT

## 2022-12-23 PROCEDURE — 80165 DIPROPYLACETIC ACID FREE: CPT

## 2022-12-23 PROCEDURE — 82140 ASSAY OF AMMONIA: CPT

## 2022-12-23 PROCEDURE — 99285 EMERGENCY DEPT VISIT HI MDM: CPT

## 2022-12-23 PROCEDURE — 96375 TX/PRO/DX INJ NEW DRUG ADDON: CPT

## 2022-12-23 PROCEDURE — 85027 COMPLETE CBC AUTOMATED: CPT

## 2022-12-23 PROCEDURE — 84100 ASSAY OF PHOSPHORUS: CPT

## 2022-12-23 PROCEDURE — 85730 THROMBOPLASTIN TIME PARTIAL: CPT

## 2022-12-23 PROCEDURE — 83550 IRON BINDING TEST: CPT

## 2022-12-23 PROCEDURE — 80048 BASIC METABOLIC PNL TOTAL CA: CPT

## 2022-12-23 PROCEDURE — 80053 COMPREHEN METABOLIC PANEL: CPT

## 2022-12-23 PROCEDURE — 87070 CULTURE OTHR SPECIMN AEROBIC: CPT

## 2022-12-23 PROCEDURE — 84443 ASSAY THYROID STIM HORMONE: CPT

## 2022-12-23 PROCEDURE — 83519 RIA NONANTIBODY: CPT

## 2022-12-23 PROCEDURE — 80177 DRUG SCRN QUAN LEVETIRACETAM: CPT

## 2022-12-23 PROCEDURE — 94002 VENT MGMT INPAT INIT DAY: CPT

## 2022-12-23 PROCEDURE — 83735 ASSAY OF MAGNESIUM: CPT

## 2022-12-23 PROCEDURE — 70496 CT ANGIOGRAPHY HEAD: CPT

## 2022-12-23 PROCEDURE — 80164 ASSAY DIPROPYLACETIC ACD TOT: CPT

## 2022-12-23 PROCEDURE — 87205 SMEAR GRAM STAIN: CPT

## 2022-12-23 PROCEDURE — 82728 ASSAY OF FERRITIN: CPT

## 2022-12-23 PROCEDURE — 99291 CRITICAL CARE FIRST HOUR: CPT

## 2022-12-23 PROCEDURE — 82805 BLOOD GASES W/O2 SATURATION: CPT

## 2022-12-23 PROCEDURE — 87040 BLOOD CULTURE FOR BACTERIA: CPT

## 2022-12-23 PROCEDURE — 36415 COLL VENOUS BLD VENIPUNCTURE: CPT

## 2022-12-23 PROCEDURE — 95822 EEG COMA OR SLEEP ONLY: CPT

## 2022-12-23 PROCEDURE — 74019 RADEX ABDOMEN 2 VIEWS: CPT

## 2022-12-23 PROCEDURE — 87635 SARS-COV-2 COVID-19 AMP PRB: CPT

## 2022-12-23 PROCEDURE — 93005 ELECTROCARDIOGRAM TRACING: CPT

## 2022-12-23 PROCEDURE — 85610 PROTHROMBIN TIME: CPT

## 2022-12-23 PROCEDURE — 87086 URINE CULTURE/COLONY COUNT: CPT

## 2022-12-23 PROCEDURE — 90935 HEMODIALYSIS ONE EVALUATION: CPT

## 2022-12-23 PROCEDURE — 83880 ASSAY OF NATRIURETIC PEPTIDE: CPT

## 2022-12-23 PROCEDURE — 96374 THER/PROPH/DIAG INJ IV PUSH: CPT

## 2022-12-23 PROCEDURE — 94003 VENT MGMT INPAT SUBQ DAY: CPT

## 2022-12-23 PROCEDURE — 0BH17EZ INSERTION OF ENDOTRACHEAL AIRWAY INTO TRACHEA, VIA NATURAL OR ARTIFICIAL OPENING: ICD-10-PCS

## 2022-12-23 PROCEDURE — 94760 N-INVAS EAR/PLS OXIMETRY 1: CPT

## 2022-12-23 PROCEDURE — 93880 EXTRACRANIAL BILAT STUDY: CPT

## 2022-12-23 RX ADMIN — CHLORHEXIDINE GLUCONATE SCH: 1.2 RINSE ORAL at 23:46

## 2022-12-23 RX ADMIN — ASPIRIN SCH: 325 TABLET ORAL at 23:46

## 2022-12-23 RX ADMIN — MIDAZOLAM SCH MLS/HR: 5 INJECTION INTRAMUSCULAR; INTRAVENOUS at 23:13

## 2022-12-23 RX ADMIN — ATORVASTATIN CALCIUM SCH: 40 TABLET, FILM COATED ORAL at 23:46

## 2022-12-23 RX ADMIN — LACOSAMIDE SCH MLS/HR: 10 INJECTION INTRAVENOUS at 23:41

## 2022-12-23 RX ADMIN — DIVALPROEX SODIUM SCH: 250 TABLET, FILM COATED, EXTENDED RELEASE ORAL at 23:47

## 2022-12-23 RX ADMIN — DIVALPROEX SODIUM SCH: 500 TABLET, DELAYED RELEASE ORAL at 17:09

## 2022-12-23 RX ADMIN — POTASSIUM CHLORIDE SCH: 20 TABLET, EXTENDED RELEASE ORAL at 23:46

## 2022-12-23 RX ADMIN — METOPROLOL TARTRATE SCH: 25 TABLET, FILM COATED ORAL at 23:46

## 2022-12-23 RX ADMIN — Medication SCH: at 17:09

## 2022-12-23 RX ADMIN — SEVELAMER CARBONATE SCH: 800 TABLET, FILM COATED ORAL at 17:10

## 2022-12-23 RX ADMIN — DIVALPROEX SODIUM SCH: 500 TABLET, DELAYED RELEASE ORAL at 23:46

## 2022-12-23 NOTE — XR
EXAMINATION TYPE: XR chest 1V portable

 

DATE OF EXAM: 12/23/2022

 

COMPARISON: Today

 

HISTORY: Weakness

 

TECHNIQUE: Single view

 

FINDINGS: The endotracheal tube is 2.5 cm from the juan c. Heart and mediastinum are normal. Lungs ar
e clear of infiltrate. There is left axillary neurostimulator. No pleural effusion. There is nasogast
ellen tube in the stomach.

 

IMPRESSION: No active cardiopulmonary disease. Normal heart. No change.

## 2022-12-23 NOTE — ED
Altered Mental Status HPI





- General


Chief Complaint: Altered Mental Status


Stated Complaint: vomiting


Time Seen by Provider: 22 11:21


Source: family, EMS, RN notes reviewed


Mode of arrival: EMS


Limitations: altered mental status





- History of Present Illness


Initial Comments: 


This is a 52-year-old female who presents to the emergency department for 

altered mental status, nausea, and vomiting.  Patient is well-known to this 

emergency department for a severe seizure disorder.  Per EMS, her  put 

her on a bus and she was dropped off at dialysis.  Per the dialysis clinic, as 

soon as she got there, she seemed to go unresponsive.  They did not note any 

seizure-like activity.  She was just not speaking or answering any questions.  

Because of this, she was unable to get her dialysis treatment.  Currently has 

hemodialysis Monday, Wednesday, and Friday.  When EMS arrived, she was producing

a few words, however she was not holding any sort of conversation and she was 

essentially only alert and oriented 1.  She is alert and oriented 2-3 at 

baseline.  She was admitted here from - for worsening seizure 

activity.  She was subsequently started on Zonegran which was added to her 

regimen of Keppra and Depakote.  On arrival, she is actively vomiting, but 

otherwise has her eyes closed, is not answering any questions or responding to 

any commands.





When we were finally able to reach the patient's , he states that over 

the last week when she has been taking the Depakote, she seems to vomit and 

become incoherent.  It is not clear if the dose was altered within the last 

week, as she was admitted to [facility] earlier this week for seizure activity.





MD Complaint: altered mental status, decreased responsiveness


Associated Symptoms: nausea/vomiting





- Related Data


                                Home Medications











 Medication  Instructions  Recorded  Confirmed


 


Atorvastatin [Lipitor] 40 mg PO HS 21


 


Dulaglutide [Trulicity] 3 mg SQ Q7D 21


 


Sevelamer [Renvela] 800 mg PO BID-W/MEALS 21


 


Vascepa 1gm 1 gm PO BID 21


 


Calcium Acetate 667 mg PO TID-W/MEALS 22


 


Metoprolol Tartrate [Lopressor] 25 mg PO BID 22


 


Pantoprazole [Protonix] 40 mg PO DAILY 22


 


Potassium Chloride ER [K-Dur 20] 20 meq PO BID 22


 


levETIRAcetam [Keppra] 500 mg PO BID@0700,2100 22


 


Sevelamer [Renvela] 1,600 mg PO W/SUPPER 10/07/22 12/23/22


 


Divalproex [Depakote] 500 mg PO Q8H 22








                                  Previous Rx's











 Medication  Instructions  Recorded


 


Clopidogrel [Plavix] 75 mg PO DAILY #30 tab 21


 


Divalproex ER [Depakote ER] 250 mg PO BID 30 Days #60 tab 22


 


levETIRAcetam [Keppra] 500 mg PO MoWeFr@1600 30 Days #15 22





 tab 


 


Lacosamide [Vimpat] See Taper PO AS DIRECTED 21 Days 22





 #49 tab 


 


Zonisamide [Zonegran] 100 mg PO DAILY 30 Days #30 cap 22











                                    Allergies











Allergy/AdvReac Type Severity Reaction Status Date / Time


 


Penicillins Allergy  Itching Verified 22 13:57














Review of Systems


ROS Statement: 


Those systems with pertinent positive or pertinent negative responses have been 

documented in the HPI.





ROS Other: All systems not noted in ROS Statement are negative.


Limitations: ROS unobtainable due to patients medical condition





Past Medical History


Past Medical History: Chest Pain / Angina, CVA/TIA, Diabetes Mellitus, 

GERD/Reflux, Hyperlipidemia, Hypertension, Memory Impairment, Osteoarthritis 

(OA), Renal Disease, Seizure Disorder, Thyroid Disorder


Additional Past Medical History / Comment(s): Last seizure approximately one 

month ago. Spouse states she used to have seizures 4X per week until she had 

vagal nerve stimulator placed, now has seizures approximately once a week to 

once a month. Dialysis MOWEFR. Neuropathy, left drop foot, only able to walk 

short distances, otherwise uses wheelchair. 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 

ago, with residual memory impairment. Never diagnosed with Sleep Apnea but 

spouse states she does stop breathing through the night and he has to shake her 

to start breathing again. Varicose veins.


History of Any Multi-Drug Resistant Organisms: None Reported


Past Surgical History:  Section, Tonsillectomy, Uterine Ablation


Additional Past Surgical History / Comment(s): Left arm fistula, loop recorder, 

vagus nerve stimulator.


Past Anesthesia/Blood Transfusion Reactions: Motion Sickness


Additional Past Anesthesia/Blood Transfusion Reaction / Comment(s): Family hx 

unknown, pt adopted.


Past Psychological History: Bipolar


Smoking Status: Former smoker


Past Alcohol Use History: None Reported


Past Drug Use History: None Reported





- Past Family History


  ** Father


Family Medical History: Unable to Obtain


Additional Family Medical History / Comment(s): Pt adopted.





General Exam


Limitations: altered mental status


Head exam: Present: atraumatic, normocephalic, normal inspection


Respiratory exam: Present: normal lung sounds bilaterally.  Absent: respiratory 

distress, wheezes, rales, rhonchi, stridor


Cardiovascular Exam: Present: regular rate, normal rhythm, normal heart sounds. 

 Absent: systolic murmur, diastolic murmur, rubs, gallop, clicks


  ** Expanded


Eye Response: (2) open to pain


Motor Response: (4) withdraws to pain


Verbal Response: incomprehensible sounds


Charity Total: 6


Skin exam: Present: warm, dry, intact, normal color.  Absent: rash





Course


                                   Vital Signs











  22





  11:22 13:03 13:40


 


Temperature   95.4 F L


 


Pulse Rate 85 87 89


 


Respiratory 16 16 16





Rate   


 


Blood Pressure 167/86 168/57 160/83


 


O2 Sat by Pulse 96 100 97





Oximetry   














  22





  15:23 17:14


 


Temperature 95.8 F L 98.6 F


 


Pulse Rate 86 93


 


Respiratory 18 16





Rate  


 


Blood Pressure 103/59 115/60


 


O2 Sat by Pulse 94 L 96





Oximetry  














Medical Decision Making





- Medical Decision Making


This is a 52-year-old female who presents to the emergency department for 

altered mental status.





Was pt. sent in by a medical professional or institution?


@  -Dialysis


Did you speak to anyone other than the patient for history?  


@  -EMS and her 


Did you review nursing and triage notes? 


@  -Agree, accurate with regards to the patient's symptoms.


Were old charts reviewed? 


@  -Admission records from - here.


Differential Diagnosis? 


@  -Differential Altered Mental Status:


Hypoglycemia, DKA, hypercapnia, ETOH, overdose, CO poisoning, trauma, myxedema 

coma, HTN encephalopathy, infection, encephalitis, psychosis, intercranial 

hemorrhage, hepatic encephalopathy, meningitis, CVA, this is not meant to be an 

all-inclusive list


seizure]


EKG interpreted by me (3pts min.)?


@  -Sinus rhythm.  Ventricular rate 85 bpm, UT interval 192 ms, QRS duration 116

ms,  ms.


X-rays interpreted by me (1pt min.)?


@  -Interpretation of the chest x-ray identifies no localized consolidations or 

infiltrates.


CT interpreted by me (1pt min.)?


@  -Computed tomography scan of the brain was obtained.  My interpretation 

reveals a questionable hypodensity in the left brain stem.  The radiologist 

makes note of this as well and cannot rule out a subacute lacunar infarct.


What testing was considered but not performed? (CT, X-rays, U/S, labs)? Why?


@  -None


What meds were considered but not given? Why?


@  -We had attempted to give her her home medication after admission, however 

the patient is NPO for the meantime due to her altered state.


Did you discuss the management of the patient with other professionals?


@  -I spoke with Dr. Multani with [facility] regarding admissison.  Plan to consult 

neurology and nephrology. I also spoke with Dr. Mahmood, nephrology, who advised 

that we can wait until tomorrow morning for her to have dialysis.


Did you reconcile home meds?


@  -Yes


Was smoking cessation discussed for >3mins.?


@  -No


Was critical care preformed (if so, how long)?


@  -Yes, approximately 60 minutes.


Were there social determinants of health that impacted care today? How? 

(Homelessness, low income, unemployed, alcoholism, drug addiction, tr

ansportation, low edu. Level, literacy, decrease access to med. care, penitentiary, 

rehab)?


@  -None


Was there de-escalation of care discussed even if they declined? (Discuss DNR or

withdrawal of care, Hospice)?


@  -No


What co-morbidities impacted this encounter? (DM, HTN, Smoking, COPD, CAD, 

Cancer, CVA, Hep., AIDS, mental health diagnosis, sleep apnea, morbid obesity)?


@  -DM, hyperlipidemia, hypertension, renal failure, seizure disorder, and 

hypothyroidism.


Was patient admitted / discharged?


@  -Admitted.  On arrival, patient had a GCS of 6, however her vital signs were 

stable.  Lab work consistent with chronic renal failure.  Lactic acid is 

elevated, likely related to the chronic renal failure.  Additionally, she has an

elevated BNP.  There are no BNP values for comparison.  Urinalysis appears to be

consistent with a UTI.  Patient given a dose of ceftriaxone with blood cultures 

obtained prior.  Rectal temp obtained, revealing a temperature of 95F.  Patient

was covered in warm blankets and a bear hugger was placed on her.  The cause of 

her low temperature is not clear at this time.  Computed tomography scan of the 

brain was obtained, the radiologist made note of a possible subacute lacunar 

infarct within the left basal ganglia. While in the examination room, the 

patient continued to vomit after doses of Reglan and Zofran.  The nurses were at

bedside performing suction due to concerns for aspiration.  Her valproic acid 

level is elevated at 127 on laboratory studies.  The laboratory notes state that

greater than 100 may be considered toxic, however up to 120 is also within the 

therapeutic range.  Regardless, this value is higher than it should be, and 

because her  notes vomiting and incoherence after Depakote 

administration, this may be contributing to her mental state.  Dialysis is 

planned for tomorrow.


Were any procedures done?


@  -No


Diagnosis/symptom?


@  -Altered mental status


Acute, or Chronic, or Acute on Chronic?


@  -Acute


Uncomplicated (without systemic symptoms) or Complicated (systemic symptoms)?


@  -Complicated


Side effects of treatment?


@  -Side effects will be based on how the admission team plans to treat the 

altered mental status and what the cause of it is found to be.


Exacerbation, Progression, or Severe Exacerbation]


@  -Not applicable


Poses a threat to life or bodily function?


@  -Yes, she is unable to function at the moment due to her current state.  

Whether or not this is life threatening depends on what is causing the altered 

mental status.


Diagnosis/symptom?


@  -Congestive heart failure


Acute, or Chronic, or Acute on Chronic?


@  -Acute, no noted prior diagnosis


Uncomplicated (without systemic symptoms) or Complicated (systemic symptoms)?


@  -Complicated


Side effects of treatment?


@  -Side effects based on treatment determined by the admission team.


Exacerbation, Progression, or Severe Exacerbation]


@  -Not applicable given that this is a new diagnosis


Poses a threat to life or bodily function?


@  -Yes, the heart does not pump adequately. 


Diagnosis/symptom?


@  -UTI


Acute, or Chronic, or Acute on Chronic?


@  -Acute


Uncomplicated (without systemic symptoms) or Complicated (systemic symptoms)?


@  -Unclear if this is causing systemic symptoms


Side effects of treatment?


@  -Adverse reaction to antibiotics


Exacerbation, Progression, or Severe Exacerbation]


@  -Not applicable


Poses a threat to life or bodily function?


@  -A progressive infection leading to a septic state is a life-threatening 

problem.





This case was discussed in detail with the attending ED physician. Presentation,

findings, and treatment plan discussed in detail as well. 





- Lab Data


Result diagrams: 


                                 22 12:22 12:


                                   Lab Results











  22 Range/Units





  12: 12: 12: 


 


WBC  6.7    (3.8-10.6)  k/uL


 


RBC  4.00    (3.80-5.40)  m/uL


 


Hgb  11.4    (11.4-16.0)  gm/dL


 


Hct  35.1    (34.0-46.0)  %


 


MCV  87.7    (80.0-100.0)  fL


 


MCH  28.4    (25.0-35.0)  pg


 


MCHC  32.4    (31.0-37.0)  g/dL


 


RDW  14.0    (11.5-15.5)  %


 


Plt Count  218    (150-450)  k/uL


 


MPV  9.3    


 


Neutrophils %  71    %


 


Lymphocytes %  22    %


 


Monocytes %  3    %


 


Eosinophils %  1    %


 


Basophils %  1    %


 


Neutrophils #  4.8    (1.3-7.7)  k/uL


 


Lymphocytes #  1.5    (1.0-4.8)  k/uL


 


Monocytes #  0.2    (0-1.0)  k/uL


 


Eosinophils #  0.1    (0-0.7)  k/uL


 


Basophils #  0.1    (0-0.2)  k/uL


 


Hypochromasia  Slight    


 


PT   10.3   (9.0-12.0)  sec


 


INR   1.0   (<1.2)  


 


APTT   21.5 L   (22.0-30.0)  sec


 


Sodium     (137-145)  mmol/L


 


Potassium     (3.5-5.1)  mmol/L


 


Chloride     ()  mmol/L


 


Carbon Dioxide     (22-30)  mmol/L


 


Anion Gap     mmol/L


 


BUN     (7-17)  mg/dL


 


Creatinine     (0.52-1.04)  mg/dL


 


Est GFR (CKD-EPI)AfAm     (>60 ml/min/1.73 sqM)  


 


Est GFR (CKD-EPI)NonAf     (>60 ml/min/1.73 sqM)  


 


Glucose     (74-99)  mg/dL


 


Lactic Ac Sepsis Rflx     


 


Plasma Lactic Acid Stan     (0.7-2.0)  mmol/L


 


Calcium     (8.4-10.2)  mg/dL


 


Total Bilirubin     (0.2-1.3)  mg/dL


 


AST     (14-36)  U/L


 


ALT     (4-34)  U/L


 


Alkaline Phosphatase     ()  U/L


 


Troponin I     (0.000-0.034)  ng/mL


 


NT-Pro-B Natriuret Pep     pg/mL


 


Total Protein     (6.3-8.2)  g/dL


 


Albumin     (3.5-5.0)  g/dL


 


Urine Color    Yellow  


 


Urine Appearance    Cloudy H  (Clear)  


 


Urine pH    7.5  (5.0-8.0)  


 


Ur Specific Gravity    1.012  (1.001-1.035)  


 


Urine Protein    3+ H  (Negative)  


 


Urine Glucose (UA)    2+ H  (Negative)  


 


Urine Ketones    Negative  (Negative)  


 


Urine Blood    Trace H  (Negative)  


 


Urine Nitrite    Negative  (Negative)  


 


Urine Bilirubin    Negative  (Negative)  


 


Urine Urobilinogen    <2.0  (<2.0)  mg/dL


 


Ur Leukocyte Esterase    Large H  (Negative)  


 


Urine RBC    9 H  (0-5)  /hpf


 


Urine WBC    84 H  (0-5)  /hpf


 


Ur Squamous Epith Cells    2  (0-4)  /hpf


 


Urine Bacteria    Occasional H  (None)  /hpf


 


Urine Mucus    Rare H  (None)  /hpf


 


Valproic Acid     ug/mL














  22 Range/Units





  12:26 12:26 12:26 


 


WBC     (3.8-10.6)  k/uL


 


RBC     (3.80-5.40)  m/uL


 


Hgb     (11.4-16.0)  gm/dL


 


Hct     (34.0-46.0)  %


 


MCV     (80.0-100.0)  fL


 


MCH     (25.0-35.0)  pg


 


MCHC     (31.0-37.0)  g/dL


 


RDW     (11.5-15.5)  %


 


Plt Count     (150-450)  k/uL


 


MPV     


 


Neutrophils %     %


 


Lymphocytes %     %


 


Monocytes %     %


 


Eosinophils %     %


 


Basophils %     %


 


Neutrophils #     (1.3-7.7)  k/uL


 


Lymphocytes #     (1.0-4.8)  k/uL


 


Monocytes #     (0-1.0)  k/uL


 


Eosinophils #     (0-0.7)  k/uL


 


Basophils #     (0-0.2)  k/uL


 


Hypochromasia     


 


PT     (9.0-12.0)  sec


 


INR     (<1.2)  


 


APTT     (22.0-30.0)  sec


 


Sodium  142    (137-145)  mmol/L


 


Potassium  4.0    (3.5-5.1)  mmol/L


 


Chloride  100    ()  mmol/L


 


Carbon Dioxide  24    (22-30)  mmol/L


 


Anion Gap  18    mmol/L


 


BUN  32 H    (7-17)  mg/dL


 


Creatinine  9.47 H*    (0.52-1.04)  mg/dL


 


Est GFR (CKD-EPI)AfAm  5    (>60 ml/min/1.73 sqM)  


 


Est GFR (CKD-EPI)NonAf  4    (>60 ml/min/1.73 sqM)  


 


Glucose  288 H    (74-99)  mg/dL


 


Lactic Ac Sepsis Rflx     


 


Plasma Lactic Acid Stan   3.0 H*   (0.7-2.0)  mmol/L


 


Calcium  9.8    (8.4-10.2)  mg/dL


 


Total Bilirubin  0.6    (0.2-1.3)  mg/dL


 


AST  20    (14-36)  U/L


 


ALT  14    (4-34)  U/L


 


Alkaline Phosphatase  98    ()  U/L


 


Troponin I    <0.012  (0.000-0.034)  ng/mL


 


NT-Pro-B Natriuret Pep     pg/mL


 


Total Protein  7.6    (6.3-8.2)  g/dL


 


Albumin  4.4    (3.5-5.0)  g/dL


 


Urine Color     


 


Urine Appearance     (Clear)  


 


Urine pH     (5.0-8.0)  


 


Ur Specific Gravity     (1.001-1.035)  


 


Urine Protein     (Negative)  


 


Urine Glucose (UA)     (Negative)  


 


Urine Ketones     (Negative)  


 


Urine Blood     (Negative)  


 


Urine Nitrite     (Negative)  


 


Urine Bilirubin     (Negative)  


 


Urine Urobilinogen     (<2.0)  mg/dL


 


Ur Leukocyte Esterase     (Negative)  


 


Urine RBC     (0-5)  /hpf


 


Urine WBC     (0-5)  /hpf


 


Ur Squamous Epith Cells     (0-4)  /hpf


 


Urine Bacteria     (None)  /hpf


 


Urine Mucus     (None)  /hpf


 


Valproic Acid  127.1 H*    ug/mL














  22 Range/Units





  12:26 13:06 


 


WBC    (3.8-10.6)  k/uL


 


RBC    (3.80-5.40)  m/uL


 


Hgb    (11.4-16.0)  gm/dL


 


Hct    (34.0-46.0)  %


 


MCV    (80.0-100.0)  fL


 


MCH    (25.0-35.0)  pg


 


MCHC    (31.0-37.0)  g/dL


 


RDW    (11.5-15.5)  %


 


Plt Count    (150-450)  k/uL


 


MPV    


 


Neutrophils %    %


 


Lymphocytes %    %


 


Monocytes %    %


 


Eosinophils %    %


 


Basophils %    %


 


Neutrophils #    (1.3-7.7)  k/uL


 


Lymphocytes #    (1.0-4.8)  k/uL


 


Monocytes #    (0-1.0)  k/uL


 


Eosinophils #    (0-0.7)  k/uL


 


Basophils #    (0-0.2)  k/uL


 


Hypochromasia    


 


PT    (9.0-12.0)  sec


 


INR    (<1.2)  


 


APTT    (22.0-30.0)  sec


 


Sodium    (137-145)  mmol/L


 


Potassium    (3.5-5.1)  mmol/L


 


Chloride    ()  mmol/L


 


Carbon Dioxide    (22-30)  mmol/L


 


Anion Gap    mmol/L


 


BUN    (7-17)  mg/dL


 


Creatinine    (0.52-1.04)  mg/dL


 


Est GFR (CKD-EPI)AfAm    (>60 ml/min/1.73 sqM)  


 


Est GFR (CKD-EPI)NonAf    (>60 ml/min/1.73 sqM)  


 


Glucose    (74-99)  mg/dL


 


Lactic Ac Sepsis Rflx   Y  


 


Plasma Lactic Acid Stan    (0.7-2.0)  mmol/L


 


Calcium    (8.4-10.2)  mg/dL


 


Total Bilirubin    (0.2-1.3)  mg/dL


 


AST    (14-36)  U/L


 


ALT    (4-34)  U/L


 


Alkaline Phosphatase    ()  U/L


 


Troponin I    (0.000-0.034)  ng/mL


 


NT-Pro-B Natriuret Pep  2780   pg/mL


 


Total Protein    (6.3-8.2)  g/dL


 


Albumin    (3.5-5.0)  g/dL


 


Urine Color    


 


Urine Appearance    (Clear)  


 


Urine pH    (5.0-8.0)  


 


Ur Specific Gravity    (1.001-1.035)  


 


Urine Protein    (Negative)  


 


Urine Glucose (UA)    (Negative)  


 


Urine Ketones    (Negative)  


 


Urine Blood    (Negative)  


 


Urine Nitrite    (Negative)  


 


Urine Bilirubin    (Negative)  


 


Urine Urobilinogen    (<2.0)  mg/dL


 


Ur Leukocyte Esterase    (Negative)  


 


Urine RBC    (0-5)  /hpf


 


Urine WBC    (0-5)  /hpf


 


Ur Squamous Epith Cells    (0-4)  /hpf


 


Urine Bacteria    (None)  /hpf


 


Urine Mucus    (None)  /hpf


 


Valproic Acid    ug/mL














- EKG Data


EKG Comments: 


Sinus rhythm.  Ventricular rate 85 bpm, UT interval 192 ms, QRS duration 116 ms,

 ms.








- Radiology Data


Radiology results: report reviewed, image reviewed





Disposition


Clinical Impression: 


 Altered mental status, Congestive heart disease, UTI (urinary tract infection),

Chronic kidney disease with end stage renal failure on dialysis





Disposition: ADMITTED AS IP TO THIS HOSP

## 2022-12-23 NOTE — XR
EXAMINATION TYPE: XR chest 2V

 

DATE OF EXAM: 12/23/2022

 

COMPARISON: 2/17/2021

 

HISTORY: Weakness

 

TECHNIQUE:  Frontal and lateral views of the chest are obtained.

 

FINDINGS:  There is no focal air space opacity, pleural effusion, or pneumothorax seen.  The cardiac 
silhouette size is within normal limits.   The osseous structures are intact. Cardiac device again se
en.

 

IMPRESSION:  No acute cardiopulmonary process.

## 2022-12-23 NOTE — CT
EXAMINATION TYPE: CT angio head neck

 

DATE OF EXAM: 12/23/2022

 

COMPARISON: None

 

HISTORY: CODE STROKE

 

CT DLP: 551.3 mGycm

Automated exposure control for dose reduction was used.

 

CONTRAST: 

Performed with IV Contrast, patient injected with 65 mL of Isovue 370.

 

Images obtained from the aortic arch through the vertex of the brain with the IV contrast. There are 
Three-D postprocessed images.

 

There is normal branching pattern of the great vessels of the aortic arch. There is endotracheal tube
. No filling defects seen in the central pulmonary arteries.

 

There is arterial flow in both subclavian arteries.

 

There is arterial flow in the common internal and external carotid arteries bilaterally. There is sig
nificant plaque formation at the left carotid artery bifurcation and 75% stenosis at the origin of th
e left internal carotid artery. There is 35% stenosis origin of the right internal card artery. There
 is arterial flow in both vertebral arteries. There is arterial flow in the vertebral basilar artery 
system. There is diminutive left vertebral artery.

 

There is no evidence of carotid or vertebral artery aneurysm or dissection. There is arterial flow in
 the anterior middle and posterior cerebral arteries bilaterally. No mass effect. No evidence of intr
acranial hemodynamic arterial stenosis. There is normal enhancement of the venous sinuses. There are 
fluid levels in the maxillary sinuses.

 

IMPRESSION:

Irregular moderate plaque at the left carotid artery bifurcation and estimated 75% stenosis origin of
 the left internal carotid artery. There is approximate similar 35% stenosis origin right internal ca
rotid artery.

 

No significant intracranial angiographic abnormality.

## 2022-12-23 NOTE — CT
EXAMINATION TYPE: CT brain wo con

 

DATE OF EXAM: 12/23/2022

 

COMPARISON: 12/11/2022

 

INDICATION: AMS

 

DLP: 1213.4 mGycm, Automated exposure control for dose reduction was used.

 

CONTRAST: None

 

CT of the brain is performed utilizing 3 mm thick sections through the posterior fossa and 3 mm thick
 sections through the remaining calvarium.  Study is performed within 24 hours of arrival to the hosp
ital. 

 

No abnormal hyperdensity is present to suggest an acute intracranial hemorrhage.

No mass lesion is evident.

Some ill-defined hypodensity is within the left brain stem. Subacute lacunar infarct may be within th
e left basal ganglia measuring 1.0 cm. This may be an interval change Consider follow-up MRI.

Ventricles and sulci are mildly prominent for the patient age.  

Paranasal sinuses and mastoid air cells within the field-of-view are clear.

 

IMPRESSIONS:

1.   Hypodensity within the brainstem and within the left basal ganglion could be ischemic changes of
 indeterminate age. Consider follow-up with MRI.

## 2022-12-24 LAB
ANION GAP SERPL CALC-SCNC: 13 MMOL/L
BASOPHILS # BLD AUTO: 0 K/UL (ref 0–0.2)
BASOPHILS NFR BLD AUTO: 1 %
BUN SERPL-SCNC: 39 MG/DL (ref 7–17)
CALCIUM SPEC-MCNC: 8.8 MG/DL (ref 8.4–10.2)
CHLORIDE SERPL-SCNC: 105 MMOL/L (ref 98–107)
CO2 BLDA-SCNC: 25 MMOL/L (ref 19–24)
CO2 SERPL-SCNC: 22 MMOL/L (ref 22–30)
EOSINOPHIL # BLD AUTO: 0 K/UL (ref 0–0.7)
EOSINOPHIL NFR BLD AUTO: 0 %
ERYTHROCYTE [DISTWIDTH] IN BLOOD BY AUTOMATED COUNT: 3.07 M/UL (ref 3.8–5.4)
ERYTHROCYTE [DISTWIDTH] IN BLOOD: 14.5 % (ref 11.5–15.5)
GLUCOSE BLD-MCNC: 111 MG/DL (ref 70–110)
GLUCOSE BLD-MCNC: 134 MG/DL (ref 70–110)
GLUCOSE SERPL-MCNC: 131 MG/DL (ref 74–99)
HCO3 BLDA-SCNC: 24 MMOL/L (ref 21–25)
HCT VFR BLD AUTO: 26 % (ref 34–46)
HGB BLD-MCNC: 8.7 GM/DL (ref 11.4–16)
LYMPHOCYTES # SPEC AUTO: 1.6 K/UL (ref 1–4.8)
LYMPHOCYTES NFR SPEC AUTO: 30 %
MCH RBC QN AUTO: 28.2 PG (ref 25–35)
MCHC RBC AUTO-ENTMCNC: 33.3 G/DL (ref 31–37)
MCV RBC AUTO: 84.7 FL (ref 80–100)
MONOCYTES # BLD AUTO: 0.2 K/UL (ref 0–1)
MONOCYTES NFR BLD AUTO: 3 %
NEUTROPHILS # BLD AUTO: 3.5 K/UL (ref 1.3–7.7)
NEUTROPHILS NFR BLD AUTO: 65 %
PCO2 BLDA: 24 MMHG (ref 35–45)
PH BLDA: 7.6 [PH] (ref 7.35–7.45)
PLATELET # BLD AUTO: 167 K/UL (ref 150–450)
PO2 BLDA: 165 MMHG (ref 83–108)
POTASSIUM SERPL-SCNC: 3.6 MMOL/L (ref 3.5–5.1)
SODIUM SERPL-SCNC: 140 MMOL/L (ref 137–145)
WBC # BLD AUTO: 5.3 K/UL (ref 3.8–10.6)

## 2022-12-24 PROCEDURE — 5A1D70Z PERFORMANCE OF URINARY FILTRATION, INTERMITTENT, LESS THAN 6 HOURS PER DAY: ICD-10-PCS

## 2022-12-24 RX ADMIN — LACOSAMIDE SCH MLS/HR: 10 INJECTION INTRAVENOUS at 21:52

## 2022-12-24 RX ADMIN — SEVELAMER CARBONATE SCH MG: 800 TABLET, FILM COATED ORAL at 16:17

## 2022-12-24 RX ADMIN — Medication SCH MG: at 08:46

## 2022-12-24 RX ADMIN — ZONISAMIDE SCH MG: 100 CAPSULE ORAL at 08:47

## 2022-12-24 RX ADMIN — ASPIRIN SCH: 325 TABLET ORAL at 08:57

## 2022-12-24 RX ADMIN — DIVALPROEX SODIUM SCH MG: 500 TABLET, DELAYED RELEASE ORAL at 10:37

## 2022-12-24 RX ADMIN — CLEVIPIDINE SCH MLS/HR: 0.5 EMULSION INTRAVENOUS at 18:55

## 2022-12-24 RX ADMIN — HEPARIN SODIUM SCH UNIT: 5000 INJECTION INTRAVENOUS; SUBCUTANEOUS at 23:41

## 2022-12-24 RX ADMIN — PANTOPRAZOLE SODIUM SCH MG: 40 TABLET, DELAYED RELEASE ORAL at 08:46

## 2022-12-24 RX ADMIN — DIVALPROEX SODIUM SCH: 500 TABLET, DELAYED RELEASE ORAL at 08:18

## 2022-12-24 RX ADMIN — DIVALPROEX SODIUM SCH: 250 TABLET, FILM COATED, EXTENDED RELEASE ORAL at 08:18

## 2022-12-24 RX ADMIN — LEVETIRACETAM SCH MLS/HR: 100 INJECTION, SOLUTION, CONCENTRATE INTRAVENOUS at 00:38

## 2022-12-24 RX ADMIN — ATORVASTATIN CALCIUM SCH MG: 40 TABLET, FILM COATED ORAL at 21:12

## 2022-12-24 RX ADMIN — METOPROLOL TARTRATE SCH MG: 25 TABLET, FILM COATED ORAL at 10:37

## 2022-12-24 RX ADMIN — CLEVIPIDINE SCH MLS/HR: 0.5 EMULSION INTRAVENOUS at 23:37

## 2022-12-24 RX ADMIN — METOPROLOL TARTRATE SCH MG: 25 TABLET, FILM COATED ORAL at 21:12

## 2022-12-24 RX ADMIN — CHLORHEXIDINE GLUCONATE SCH ML: 1.2 RINSE ORAL at 21:12

## 2022-12-24 RX ADMIN — CLOPIDOGREL BISULFATE SCH MG: 75 TABLET ORAL at 08:46

## 2022-12-24 RX ADMIN — SEVELAMER CARBONATE SCH MG: 800 TABLET, FILM COATED ORAL at 08:50

## 2022-12-24 RX ADMIN — Medication SCH MG: at 16:18

## 2022-12-24 RX ADMIN — ASPIRIN SCH: 325 TABLET ORAL at 21:53

## 2022-12-24 RX ADMIN — METOPROLOL TARTRATE SCH MG: 25 TABLET, FILM COATED ORAL at 08:46

## 2022-12-24 RX ADMIN — LEVETIRACETAM SCH MLS/HR: 100 INJECTION, SOLUTION, CONCENTRATE INTRAVENOUS at 12:54

## 2022-12-24 RX ADMIN — HEPARIN SODIUM SCH UNIT: 5000 INJECTION INTRAVENOUS; SUBCUTANEOUS at 16:03

## 2022-12-24 RX ADMIN — DIVALPROEX SODIUM SCH: 500 TABLET, DELAYED RELEASE ORAL at 16:31

## 2022-12-24 RX ADMIN — LACOSAMIDE SCH MLS/HR: 10 INJECTION INTRAVENOUS at 09:32

## 2022-12-24 RX ADMIN — POTASSIUM CHLORIDE SCH MEQ: 20 TABLET, EXTENDED RELEASE ORAL at 21:12

## 2022-12-24 RX ADMIN — POTASSIUM CHLORIDE SCH: 20 TABLET, EXTENDED RELEASE ORAL at 09:03

## 2022-12-24 RX ADMIN — DIVALPROEX SODIUM SCH MG: 500 TABLET, DELAYED RELEASE ORAL at 16:03

## 2022-12-24 RX ADMIN — DIVALPROEX SODIUM SCH: 250 TABLET, FILM COATED, EXTENDED RELEASE ORAL at 10:42

## 2022-12-24 RX ADMIN — ZONISAMIDE SCH MG: 100 CAPSULE ORAL at 09:02

## 2022-12-24 RX ADMIN — ZONISAMIDE SCH MG: 100 CAPSULE ORAL at 21:12

## 2022-12-24 RX ADMIN — DIVALPROEX SODIUM SCH MG: 125 CAPSULE, COATED PELLETS ORAL at 17:11

## 2022-12-24 RX ADMIN — DIVALPROEX SODIUM SCH MG: 125 CAPSULE, COATED PELLETS ORAL at 21:57

## 2022-12-24 RX ADMIN — Medication SCH MG: at 12:44

## 2022-12-24 RX ADMIN — MIDAZOLAM SCH MLS/HR: 5 INJECTION INTRAMUSCULAR; INTRAVENOUS at 05:28

## 2022-12-24 RX ADMIN — LEVETIRACETAM SCH MLS/HR: 100 INJECTION, SOLUTION, CONCENTRATE INTRAVENOUS at 23:38

## 2022-12-24 RX ADMIN — MIDAZOLAM SCH MLS/HR: 5 INJECTION INTRAMUSCULAR; INTRAVENOUS at 02:52

## 2022-12-24 RX ADMIN — CHLORHEXIDINE GLUCONATE SCH ML: 1.2 RINSE ORAL at 08:45

## 2022-12-24 RX ADMIN — SEVELAMER CARBONATE SCH MG: 800 TABLET, FILM COATED ORAL at 12:41

## 2022-12-24 NOTE — P.CNNES
History of Present Illness


Consult date: 22


Requesting physician: Christine Parry


Reason for Consult: altered mental status, possible subacute lacunar infarct on 

CT


History of Present Illness: 


This is a 52-year-old woman with history of medically refractory epilepsy on 

VNS, stroke with residual left hemiparesis, diabetes mellitus, end-stage renal 

is on dialysis, hypertension presented emergency department because OF the 

status, nausea vomiting.  History is obtained from medical record that.  Patient

is known to our neurology team and has been seen by our team multiple times 

because of breakthrough seizures.  It seems that the patient was at dialysis 

clinic yesterday and became unresponsive.  She was not following commands or 

answering questions as a result she was unable to get her dialysis treatment.  

Per the ED team EMS stated that the patient was producing few words but was not 

holding a conversation and was alert oriented 1.  On arrival she was actively 

vomiting but was not responding to any questions.  Patient continues to be on 

home medication of Keppra 500 mg 1 tablet twice a day with an additional dose 

postdialysis on Monday was a Friday of 500 mg, she is on sonogram 100 mg daily, 

Depakote 500 mg every 8 hours with an additional 250 mg 1 tablet twice a day.  

Also the patient is on local some eye with a tapering dose and is seems the pa

anibal is on 50 mg twice a day but unsure of exact dose she is on currently





As a result the she had a CT of the head which is reported as hypodensity within

the brainstem and within the left basal ganglia could be ischemic changes of and

determine age.  Consider follow-up MRI.





Yesterday the ICU team notified me of that CT finding and I notified them to 

activate a stroke code and get a CT angiography of the head and neck to rule out

any thrombus.  I could not access the CT imaging because of issues with the 

system.


An 18 was activated since the patient was awake but was possibly having back-to-

back seizure not following commands or making eye contact.  Patient had an 

episode of emesis yesterday as well.  As a result the patient was intubated on a

ventilator to protect her airway.  It was felt the patient was in status by the 

A-team.  As a result she was started on IV propofol and Versed


CT angiography of the head and neck was reported as irregular moderate plaque at

the left carotid artery bifurcation and estimated 75% stenosis origin of the 

left ICA carotid artery.  There is approximately similar 35% stenosis right 

internal carotid artery.  No significant intercranial and angiographic 

abnormality.  


I personally spoke with interventional neurologist (Dr. Tovar) who stated there 

is no intervention from his perspective.


Also at 2300 on 1220 2022 was notified by the nurse after coming back from

the imaging patient had some shaking and had some posturing concerning for sei

zure.  She was getting IV Keppra loading dose by A-team and notified them to go 

up on her Versed and transfer patient for long term EEG. Also increased Versed 

from 50mg bid to 100mg bid. 


Today I was able to access the CT and I felt the patient had the basal ganglia 

changes on her prior admission and it doesn't look acute or subacute in my 

opinion.  Regarding the brainstem CT is not the best study for brainstem but she

does have hypodensity but does not look like acute or subacute in my opinion as 

well.





Some of her other workup during his hospital visit consisted of :


Plasma lactic acid venous 3.0 on presentation and improved to 1.6.


Sodium, calcium and TSH is within normal limits


Initial serum glucose is 288.


Initial white blood cell 6.7 repeated 13.8 and most current one is within normal

limits


Urinalysis is possible suggestive of acute urinary tract infection


Urine tox screen is valproic acids 127 and the normal supposed to be between 50-

120.  Her level is slightly supratherapeutic





Of note as stated earlier the patient was seen by our neurology team multiple 

times in the hospital because of breakthrough seizures.  Patient was last seen 

by our team on 2022 for breakthrough seizure and she had a prolonged 2-1/2

hour EEG according to my colleague's note and the preliminary report revealed 

infrequent runs of generalized sharp and slow waves or spike and slow waves of 

2-3 Hz lasting 1-1-1/2 second suggestive of primary generalized epilepsy.  And 

it was recommended that Vimpat is not indicated for primary generalized epilepsy

and the goal was to gradually wean the Vimpat.  Therefore Zonegran was added in 

addition.  Please refer to Dr. Fischer's note for further details.








Review of Systems


Review of system is limited but the prone positive and negative as per HPI








Past Medical History


Past Medical History: Chest Pain / Angina, CVA/TIA, Diabetes Mellitus, 

GERD/Reflux, Hyperlipidemia, Hypertension, Memory Impairment, Osteoarthritis 

(OA), Renal Disease, Seizure Disorder, Thyroid Disorder


Additional Past Medical History / Comment(s): Last seizure approximately one 

month ago. Spouse states she used to have seizures 4X per week until she had 

vagal nerve stimulator placed, now has seizures approximately once a week to 

once a month. Dialysis MOWEFR. Neuropathy, left drop foot, only able to walk 

short distances, otherwise uses wheelchair. 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 

ago, with residual memory impairment. Never diagnosed with Sleep Apnea but 

spouse states she does stop breathing through the night and he has to shake her 

to start breathing again. Varicose veins.


History of Any Multi-Drug Resistant Organisms: None Reported


Past Surgical History:  Section, Tonsillectomy, Uterine Ablation


Additional Past Surgical History / Comment(s): Left arm fistula, loop recorder, 

vagus nerve stimulator.


Past Anesthesia/Blood Transfusion Reactions: Motion Sickness


Additional Past Anesthesia/Blood Transfusion Reaction / Comment(s): Family hx u

nknown, pt adopted.


Past Psychological History: Bipolar


Smoking Status: Former smoker


Past Alcohol Use History: None Reported


Additional Past Alcohol Use History / Comment(s): Quit smoking in .


Past Drug Use History: None Reported





- Past Family History


  ** Father


Family Medical History: Unable to Obtain


Additional Family Medical History / Comment(s): Pt adopted.





Medications and Allergies


                                Home Medications











 Medication  Instructions  Recorded  Confirmed  Type


 


Clopidogrel [Plavix] 75 mg PO DAILY #30 tab 21 Rx


 


Atorvastatin [Lipitor] 40 mg PO HS 21 History


 


Dulaglutide [Trulicity] 3 mg SQ Q7D 21 History


 


Sevelamer [Renvela] 800 mg PO BID-W/MEALS 21 History


 


Vascepa 1gm 1 gm PO BID 21 History


 


Calcium Acetate 667 mg PO TID-W/MEALS 22 History


 


Metoprolol Tartrate [Lopressor] 25 mg PO BID 22 History


 


Pantoprazole [Protonix] 40 mg PO DAILY 22 History


 


Potassium Chloride ER [K-Dur 20] 20 meq PO BID 22 History


 


levETIRAcetam [Keppra] 500 mg PO BID@0700,2100 22 History


 


Divalproex ER [Depakote ER] 250 mg PO BID 30 Days #60 tab 22 Rx


 


levETIRAcetam [Keppra] 500 mg PO MoWeFr@1600 30 Days #15 22 Rx





 tab   


 


Sevelamer [Renvela] 1,600 mg PO W/SUPPER 10/07/22 12/23/22 History


 


Lacosamide [Vimpat] See Taper PO AS DIRECTED 21 Days 22 Rx





 #49 tab   


 


Zonisamide [Zonegran] 100 mg PO DAILY 30 Days #30 cap 22 Rx


 


Divalproex [Depakote] 500 mg PO Q8H 22 History








                                    Allergies











Allergy/AdvReac Type Severity Reaction Status Date / Time


 


Penicillins Allergy  Itching Verified 22 13:57














Physical Examination





- Vital Signs


Vital Signs: 


                                   Vital Signs











  Temp Pulse Pulse Resp BP BP Pulse Ox


 


 22 10:00   72   14  131/78   100


 


 22 09:30   71   14    100


 


 22 09:00   73   14  131/78   100


 


 22 08:30   73   14    100


 


 22 08:22       


 


 22 08:21       


 


 22 08:00  96.2 F L  74   14    100


 


 22 07:30   75   14    99


 


 22 07:00   76   14    98


 


 22 06:30   75   14    99


 


 22 06:00   77   20    100


 


 22 05:30   77   20    99


 


 22 05:00   77   20    98


 


 22 04:30   79   20    98


 


 22 04:02       


 


 22 04:00  97.5 F L  80   20    98


 


 22 03:40       


 


 22 03:30   83   20    99


 


 22 03:00   79   20    100


 


 22 02:30   80   20    100


 


 22 02:00   82   20    100


 


 22 01:30   83   20    100


 


 22 01:00   83   20    100


 


 12/24/22 00:30   93   20  200/93   100


 


 22 00:20   89   20    100


 


 22 00:10  97.8 F  95   20    100


 


 22 00:00   83   20    99


 


 22 23:50   85   20    99


 


 22 23:40   87   20    99


 


 22 23:30   87   20    99


 


 22 23:20   89   20    99


 


 22 23:10   90   20    99


 


 22 23:00   91   20    100


 


 22 22:50   90   20    100


 


 22 22:40     20    100


 


 22 22:30  98.1 F   110 H  20   200/93  94 L


 


 22 22:00   98     


 


 22 21:55       


 


 22 21:50   89   20    99


 


 22 21:40   90   20    99


 


 22 21:30   90   20    99


 


 22 21:20   95   20    100


 


 22 21:11       


 


 22 21:10     20    99


 


 22 21:00  98.1 F  105 H   20    99


 


 22 20:50   93   0 L    99


 


 22 20:42     19   


 


 22 20:15       229/96 


 


 22 20:00  98.4 F   92  19   164/75  98


 


 22 18:30  98.8 F   92  16   168/77  92 L


 


 22 17:14  98.6 F  93   16  115/60   96


 


 22 15:23  95.8 F L  86   18  103/59   94 L


 


 22 13:40  95.4 F L  89   16  160/83   97


 


 22 13:03   87   16  168/57   100


 


 22 11:22   85   16  167/86   96














  FiO2


 


 22 10:00 


 


 22 09:30 


 


 22 09:00 


 


 22 08:30 


 


 22 08:22  30


 


 22 08:21  30


 


 22 08:00  30


 


 22 07:30 


 


 22 07:00 


 


 22 06:30 


 


 22 06:00 


 


 22 05:30 


 


 22 05:00 


 


 22 04:30 


 


 22 04:02  30


 


 22 04:00  30


 


 22 03:40  30


 


 22 03:30 


 


 22 03:00 


 


 22 02:30 


 


 22 02:00 


 


 22 01:30 


 


 22 01:00 


 


 22 00:30 


 


 22 00:20 


 


 22 00:10  40


 


 22 00:00  40


 


 22 23:50 


 


 22 23:40 


 


 22 23:30 


 


 22 23:20 


 


 22 23:10 


 


 22 23:00 


 


 22 22:50 


 


 22 22:40 


 


 22 22:30 


 


 22 22:00 


 


 22 21:55  40


 


 22 21:50 


 


 22 21:40 


 


 22 21:30 


 


 22 21:20 


 


 22 21:11  100


 


 22 21:10  40


 


 22 21:00  100


 


 22 20:50 


 


 22 20:42 


 


 22 20:15 


 


 22 20:00 


 


 22 18:30 


 


 22 17:14 


 


 22 15:23 


 


 22 13:40 


 


 22 13:03 


 


 22 11:22 








                                Intake and Output











 22





 22:59 06:59 14:59


 


Intake Total 5.619 1479.694 74.266


 


Output Total  0 100


 


Balance 5.619 1479.694 -25.734


 


Intake:   


 


  IV  1260 40


 


    Lacosamide  mg In  50 





    Sodium Chloride 0.9% 50   





    ml @ 100 mls/hr IVPB BID   





    [facility] Rx#:[id number]   


 


    NS @ KVO  10 40


 


    Sodium Chloride 0.9% 1,  1000 





    000 ml @ 999 mls/hr IV .   





    Q1H1M ONE Rx#:[id number]   


 


    levETIRAcetam IV 1,000 mg  100 





    In Saline 1 100ml.bag @   





    400 mls/hr IVPB ONCE STA   





    Rx#:[id number]   


 


    levETIRAcetam  mg  100 





    In Sodium Chloride 0.9%   





    100 ml @ 400 mls/hr IVPB   





    Q12H [facility] Rx#:[id number]   


 


  Intake, IV Titration 5.619 219.694 34.266





  Amount   


 


    Midazolam HCl 50 mg In  80.417 33.25





    Sodium Chloride 0.9% 40   





    ml @ 5 MG/HR 5 mls/hr IV   





    .Q10H [facility] Rx#:[id number]   


 


    propofoL 1,000 mg In 5.619 139.277 1.016





    Empty Bag 1 bag @ 15 MCG/   





    KG/MIN 5.715 mls/hr IV .   





    [id number] [facility] Rx#:[id number]   


 


Output:   


 


  Gastric Drainage   100


 


  Urine  0 0


 


Other:   


 


  Weight 63.503 kg 75 kg 








                         ABP, PAP, CO, CI - Last 8 Hours











Arterial Blood Pressure        155/58


 


Arterial Blood Pressure        152/57


 


Arterial Blood Pressure        140/54


 


Arterial Blood Pressure        118/46


 


Arterial Blood Pressure        123/49


 


Arterial Blood Pressure        113/46


 


Arterial Blood Pressure        109/47


 


Arterial Blood Pressure        109/45


 


Arterial Blood Pressure        114/68


 


Arterial Blood Pressure        118/52


 


Arterial Blood Pressure        123/55


 


Arterial Blood Pressure        110/51


 


Arterial Blood Pressure        123/94


 


Arterial Blood Pressure        110/52














GENERAL: The patient is lying in bed and does not appear in acute distress.


CHEST: The heart rate is regular rate rhythm.   No murmurs to auscultation.  


LUNG: Clear to auscultation bilaterally no wheezing noted throughout.  Not 

labored breathing.  Intubated on ventilator.


ABDOMEN/GI: Bowel sounds present in all 4 quadrants. No tenderness to palpation 

throughout.





NEUROLOGICAL:


Is on IV Versed 5mg/hr (earlier today was at 15mg/hr) and IV Propofol 

20mcg/kg/min.


Higher mental function: The patient is comatose.  Not waking up or verbalizing 

or following commands.   


Cranial nerves: I had to manually open eyes.  Primary gaze is midline. The 

pupils are round, pinpoint equal and  sluggishly reactive to light.  No facial 

weakness.  Intubated on ventilator.  Is breathing over the vent.  


Motor: The strength is hard to assess since sedated.  Not withdrawaling any of 

extremities.  Decrease tone throughout.   Normal bulk.  


Cerebellum: Unable to assess.


Sensation: Unable to assess light touch.


Reflexes (right/left): 1+ throughout.


Plantars are mute bilaterally. 








Results





- Laboratory Findings


CBC and BMP: 


                                 22 05:05





                                 22 05:05


Abnormal Lab Findings: 


                                  Abnormal Labs











  22





  12:26 12:26 12:26


 


WBC   


 


RBC   


 


Hgb   


 


Hct   


 


Neutrophils #   


 


APTT  21.5 L  


 


ABG pH   


 


ABG pCO2   


 


ABG pO2   


 


ABG Total CO2   


 


ABG O2 Saturation   


 


Sodium   


 


Chloride   


 


BUN    32 H


 


Creatinine    9.47 H*


 


Glucose    288 H


 


POC Glucose (mg/dL)   


 


Plasma Lactic Acid Stan   


 


Urine Appearance   Cloudy H 


 


Urine Protein   3+ H 


 


Urine Glucose (UA)   2+ H 


 


Urine Blood   Trace H 


 


Ur Leukocyte Esterase   Large H 


 


Urine RBC   9 H 


 


Urine WBC   84 H 


 


Urine Bacteria   Occasional H 


 


Urine Mucus   Rare H 


 


Valproic Acid    127.1 H*














  22





  12:26 15:26 18:00


 


WBC   


 


RBC   


 


Hgb   


 


Hct   


 


Neutrophils #   


 


APTT   


 


ABG pH   


 


ABG pCO2   


 


ABG pO2   


 


ABG Total CO2   


 


ABG O2 Saturation   


 


Sodium   


 


Chloride   


 


BUN   


 


Creatinine   


 


Glucose   


 


POC Glucose (mg/dL)   


 


Plasma Lactic Acid Stan  3.0 H*  3.8 H*  2.4 H*


 


Urine Appearance   


 


Urine Protein   


 


Urine Glucose (UA)   


 


Urine Blood   


 


Ur Leukocyte Esterase   


 


Urine RBC   


 


Urine WBC   


 


Urine Bacteria   


 


Urine Mucus   


 


Valproic Acid   














  22





  20:43 21:10 21:10


 


WBC   13.8 H 


 


RBC   


 


Hgb   


 


Hct   


 


Neutrophils #   11.3 H 


 


APTT   


 


ABG pH   


 


ABG pCO2   


 


ABG pO2   


 


ABG Total CO2   


 


ABG O2 Saturation   


 


Sodium    146 H


 


Chloride    108 H


 


BUN    32 H


 


Creatinine    8.86 H*


 


Glucose    124 H


 


POC Glucose (mg/dL)  121 H  


 


Plasma Lactic Acid Stan   


 


Urine Appearance   


 


Urine Protein   


 


Urine Glucose (UA)   


 


Urine Blood   


 


Ur Leukocyte Esterase   


 


Urine RBC   


 


Urine WBC   


 


Urine Bacteria   


 


Urine Mucus   


 


Valproic Acid   














  22





  21:45 05:05 05:05


 


WBC   


 


RBC   3.07 L 


 


Hgb   8.7 L D 


 


Hct   26.0 L 


 


Neutrophils #   


 


APTT   


 


ABG pH   


 


ABG pCO2   


 


ABG pO2  >400 H  


 


ABG Total CO2  26 H  


 


ABG O2 Saturation  99.2 H  


 


Sodium   


 


Chloride   


 


BUN    39 H


 


Creatinine    9.96 H*


 


Glucose    131 H


 


POC Glucose (mg/dL)   


 


Plasma Lactic Acid Stan   


 


Urine Appearance   


 


Urine Protein   


 


Urine Glucose (UA)   


 


Urine Blood   


 


Ur Leukocyte Esterase   


 


Urine RBC   


 


Urine WBC   


 


Urine Bacteria   


 


Urine Mucus   


 


Valproic Acid   














  22





  05:45 06:17


 


WBC  


 


RBC  


 


Hgb  


 


Hct  


 


Neutrophils #  


 


APTT  


 


ABG pH  7.60 H* 


 


ABG pCO2  24 L 


 


ABG pO2  165 H 


 


ABG Total CO2  25 H 


 


ABG O2 Saturation  100.0 H 


 


Sodium  


 


Chloride  


 


BUN  


 


Creatinine  


 


Glucose  


 


POC Glucose (mg/dL)   134 H


 


Plasma Lactic Acid Stan  


 


Urine Appearance  


 


Urine Protein  


 


Urine Glucose (UA)  


 


Urine Blood  


 


Ur Leukocyte Esterase  


 


Urine RBC  


 


Urine WBC  


 


Urine Bacteria  


 


Urine Mucus  


 


Valproic Acid  














Assessment and Plan


Assessment: 


This is a 52-year-old woman with medical refractory epilepsy, VNS who presents 

to the hospital numerous times for breakthrough seizures.





Clinical status epilepticus.  Her epilepsy is not controlled


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

 preliminary 2.5 hour EEG in earlier 2022)


Left ICA stenosis 75% on CTA


Hypodensity of left basal ganglia and brainstem reported on CT (but I feel basal

 ganglia is seen on prior CT and CT head is not best study for brainstem).


Probable acute UTI


History of stroke with residual left hemiparesis


Diabetes mellitus


Dnd-stage renal is on dialysis


Hypertension 





Plan: 





* STAT EEG is ordered and pending.


* If EEG does not show status epilepticus will hold transfer unless patient 

  continues to have recurrent seizures.


* I will increase her Zonegran from 100mg daily to 100mg bid.  Continue her home

   Depakote 500mg 1 tab tid with additional 250mg bid, Keppra 500mg bid with 

  additional 500mg post dialysis.  Also on Vimpat 50mg bid (on last admission 

  was recommended by Dr. Fischer to taper since she has primary generalized 

  epilepsy).  


* I highly recommend patient to follow-up with epileptilologist since has 

  frequent seizures/medical refractory epilepsy.  Consider Epidiolex for control

   of seizures.  


* Patient has 2.5 hour EEG in our facility on 2022 (prior admission) and 

  preliminary was reported as runs of sharp and slow waves or spike and slow 

  wave at 2-3 Hz lasting 1 -1.5 seconds suggestive of primary generalized 

  epilepsy.  No official report yet.


* Continue neuro checks.


* Left ICA stenosis 75% on CTA.  I consulted vascular surgery team for carotid 

  stenosis and I ordered carotid duplex.


* Hypodensity of left basal ganglia and brainstem reported on CT (but I feel 

  basal ganglia is seen on prior CT and CT head is not best study for 

  brainstem).  I will attempt to get repeat CT head down the line for compari

  son.


* Patient is on home dose Plavix 75mg daily and Lipitor 40mg qhs which is 

  sufficient for secondary stroke prophylaxis.


* Ordered repeat Depakote level.


* PT and OT are consulted.


* Will defer the rest of medical management to primary team.


* For DVT prophylaxis: on subq heparin.





I have spent a total of 40 minutes managing patient (taking care of her 

seizures, talking to different physicians regarding her management).


The plan is discussed with Dr. Head and ICU team.


Thank you for the consultation.





Time with Patient: Greater than 30

## 2022-12-24 NOTE — US
EXAMINATION TYPE: US carotid duplex BILAT

 

DATE OF EXAM: 12/24/2022

 

COMPARISON: CTA: 12/23/22 

 

CLINICAL HISTORY: 52-year-old female carotid stenosis

 

TECHNIQUE: Carotid duplex ultrasound examination. Indirect Doppler criteria was utilized.

 

FINDINGS:

 

Sonographer notes:**Left side limited due to patient position and pt being on a vent.

 

EXAM MEASUREMENTS: 

 

RIGHT:  Peak Systolic Velocity (PSV) cm/sec

----- Right CCA:  56.0  

----- Right ICA:  50.5     

----- Right ECA:  103.2   

ICA/CCA ratio:  0.9    

 

RIGHT:  End Diastole cm/sec

----- Right CCA:  7.7   

----- Right ICA:  14.7      

----- Right ECA:  0.0     

 

 

LEFT:  Peak Systolic Velocity (PSV) cm/sec

----- Left CCA:  88.9  

----- Left ICA:  62.7   

----- Left ECA:  Not vis.  

ICA/CCA ratio:  0.7  

 

LEFT:  End Diastole cm/sec

----- Left CCA:  10.9  

----- Left ICA:  8.6   

----- Left ECA:  Not vis 

 

VERTEBRALS (direction of flow):

Right Vertebral: Antegrade

Left Vertebral: Not vis

 

Rhythm:  Normal

 

SONOGRAPHER NOTES: Plaque seen in bilateral bulbs. Left side limited due to pt being on a vent.

 

 

 

IMPRESSION:  

 

1. No hemodynamically significant internal carotid artery stenosis on either side.

2. Limited overall assessment of the left side including visualization of the left ECA and left verte
bral artery due to patient's condition.

 

 

 

 

 

 

Criteria for Assigning % of Stenosis / Diameter reduction

(Estimation based on the indirect measurements of the internal carotid artery velocities (ICA PSV).

1.  Normal (no stenosis)=ICA PSV < 125 cm/s: ratio < 2.0: ICA EDV<40 cm/s.

2. Less than 50% stenosis=ICA PSV < 125 cm/s: ratio < 2.0: ICA EDV<40 cm/s.

3.  50 to 69% stenosis=ICA PSV of 125 to 230 cm/s: ration 2.0 ? 4.0: ICA EDV  cm/s.

4.  Greater than 70% stenosis to near occlusion= ICA PSV > 230 cm/s: ratio > 4.0: ICA EDV > 100 cm/s.
 

5.  Near occlusion= ICA PSV velocities may be low or undetectable: variable ratio and ICA EDV.

6.  Total occlusion=unable to detect flow.

## 2022-12-24 NOTE — XR
EXAMINATION TYPE: XR chest 1V portable

 

DATE OF EXAM: 12/24/2022

 

CLINICAL HISTORY: Difficulty breathing progress study.  

 

TECHNIQUE: Single AP portable upright view of the chest is obtained.

 

COMPARISON: Chest x-ray from one day earlier

 

FINDINGS:  Stable endotracheal and orogastric tubes.

 

Lungs remain clear. Cardiac silhouette size stable and within normal limits. Overlying loop recorder 
redemonstrated. Underlying scoliosis again seen. There is overlying  left neck stimulator device rede
monstrated

 

IMPRESSION: No acute pulmonary process. No significant change from one day earlier.

## 2022-12-24 NOTE — P.EN
I was called to bedside by A team RN for assessment , patient seemed to be Awake

, but possibly having back to back seizures. does not follow commands or make 

eye contact. vital signs stable 


patient has history of seizure, with multiple presentations due to breakthrough 

seizures. she has been working with neurology to adjust her meds


she come in today for unresponsiveness and possible seizure.  





patient had an episode of emesis with concerns regarding protecting her airways 

and aspiration. 





ED performed a CT brain which showed subacute lacunar infarct and suspicious 

brain stem lesion 





A/P


status epilepticus 


transfer to ICU 


intubate to protect airways and control seizures

## 2022-12-24 NOTE — EEG
ELECTROENCEPHALOGRAM REPORT



CLINICAL HISTORY:

This is a 52-year-old woman with history of medical refractory epilepsy s/p VNS 
who

presented because of breakthrough seizure and it seems that the patient had 
multiple

seizures.  As a result, the patient was intubated on a ventilator.  The video 
EEG is

obtained to evaluate for seizure epileptiform activity.



RELEVANT MEDICATION:

1. IV Versed.

2. Propofol.

3. Keppra.

4. Vimpat.

5. Lacosamide.

6. Zonegran.



EEG TYPE:

A routine 21-channel EEG is performed with video using the 10/20 electrode 
placement

system.



DESCRIPTION:

The patient is intubated on a ventilator.  The background consists of burst

suppression.  During burst activity, there is moderate voltage of 6.5 to 7.5 
hertz

theta activity that is nonrhythmic and sometimes intermixed with delta activity.
 There

is diffuse suppression lasting 1-2 seconds.



There is no focal slowing.



Interictal and ictal, there is diffuse sharp and slow waves concerning for 
primary

generalized epilepsy.  Otherwise, there is no seizure noted during this study.



ACTIVATION PROCEDURE:

Hyperventilation and photic stimulation are not performed because of her

condition/cooperation.



CLINICAL INTERPRETATION:

This is an abnormal routine EEG.  The background slowing is suggestive of 
moderate encephalopathy.

The burst suppression is likely due to medication effect (Versed as well as 
propofol).  

The epileptiform discharge is likely on the basis of primary generalized 
epilepsy.  

There is no focal slowing or seizure detected during this study.  Clinical 
correlation is recommended.





ANDREW / TIM: [id number] / Job#: [id number]

MTDD

## 2022-12-24 NOTE — P.CNPUL
History of Present Illness


Consult date: 22


Requesting physician: Indu Multani


Reason for consult: other (status epilepticus, patient is intubated to protect 

her airways.)


Chief complaint: seizures.  Poorly controlled.


History of present illness: 





this is a 52-year-old female with history of chronic seizure disorder, her 

seizures have been recently poorly controlled, patient follows up with the 

neurologist/Dr. Nelson for her seizures.  Patient was brought into the ER 

yesterday with altered mental status, intermittent episodes of nausea and 

vomiting.  Apparently the patient was dropped off yesterday at the dialysis 

unit/clinic, and when she got there patient became unresponsive, she did not 

notice to have any seizure-like activity.  She was not speaking or answering any

questions.  Patient was unable to get her dialysis treatment, hence the patient 

was brought in by EMS to the ER yesterday.  Apparently the patient was admitted,

and shortly after she was admitted she had recurrent episodes of 

seizures,/status epilepticus.that a team responded to a call from the nurse on 

the floor, patient was evaluated by the hospitalist, did not seem to follow any 

commands or make any eye contact.  And at one point it was felt that the patient

had status epilepticus.  Apparently the patient had to be intubated to protect 

her airways, she was admitted to the ICU, and the plan was to consider 

transferring the patient to [facility] because of her status epilepticus 

and this was based upon the recommendation of the neurologist who was notified 

about this patient.  Patient was placed on multiple medications for her 

seizures, and these will be noted below.  Today I evaluated the patient while 

she is on mechanical ventilation, and I recommended that we start considering 

weaning and stopping her Versed which is 5 mg per hour, he is also on propofol 

at 20 mcg/kg/m, and will possibly extubate the patient.  She was evaluated by 

the neurologist, and he feels that the seizures seem to be presently under 

control, and would be worthwhile giving the patient a weaning trial and possibly

extubation.  Her assist-control rate is 14 tidal volume 400 FiO2 30 PEEP of 5.  

ABG from previous settings was noted she had a pO2 of 165 pCO2 24 pH of 7.60, 

however since then the ventilator settings have been really adjusted.  Her last 

seizure was reported at 2300 hrs.CT of the brain last night showed subacute 

lacunar infarct, and suspicious brainstem lesion.however the neurologist 

evaluated the CT of the brain, and did not feel that this is a worrisome 

finding.patient is getting IV Keppra loading dose, she is also getting Versed 

and propofol, and she was givenDepakote twice a day, patient is also receiving 

Pfpepx16 mg IV twice a day.in addition the patient is on zonisamide 100 mg by 

mouth twice a dayobviously the patient is maximized on all antiseizure 

medications, and I'm hoping her seizures are under control at present, will give

the patient a trial of weaning and possibly extubationobviously no further plans

to transfer the patient at this point.





Review of Systems


ROS unobtainable: due to endotracheal tube





Past Medical History


Past Medical History: Chest Pain / Angina, CVA/TIA, Diabetes Mellitus, 

GERD/Reflux, Hyperlipidemia, Hypertension, Memory Impairment, Osteoarthritis 

(OA), Renal Disease, Seizure Disorder, Thyroid Disorder


Additional Past Medical History / Comment(s): Last seizure approximately one 

month ago. Spouse states she used to have seizures 4X per week until she had 

vagal nerve stimulator placed, now has seizures approximately once a week to 

once a month. Dialysis MOWEFR. Neuropathy, left drop foot, only able to walk 

short distances, otherwise uses wheelchair. 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 

ago, with residual memory impairment. Never diagnosed with Sleep Apnea but 

spouse states she does stop breathing through the night and he has to shake her 

to start breathing again. Varicose veins.


History of Any Multi-Drug Resistant Organisms: None Reported


Past Surgical History:  Section, Tonsillectomy, Uterine Ablation


Additional Past Surgical History / Comment(s): Left arm fistula, loop recorder, 

vagus nerve stimulator.


Past Anesthesia/Blood Transfusion Reactions: Motion Sickness


Additional Past Anesthesia/Blood Transfusion Reaction / Comment(s): Family hx u

nknown, pt adopted.


Past Psychological History: Bipolar


Smoking Status: Former smoker


Past Alcohol Use History: None Reported


Additional Past Alcohol Use History / Comment(s): Quit smoking in .


Past Drug Use History: None Reported





- Past Family History


  ** Father


Family Medical History: Unable to Obtain


Additional Family Medical History / Comment(s): Pt adopted.





Medications and Allergies


                                Home Medications











 Medication  Instructions  Recorded  Confirmed  Type


 


Clopidogrel [Plavix] 75 mg PO DAILY #30 tab 21 Rx


 


Atorvastatin [Lipitor] 40 mg PO HS 21 History


 


Dulaglutide [Trulicity] 3 mg SQ Q7D 21 History


 


Sevelamer [Renvela] 800 mg PO BID-W/MEALS 21 History


 


Vascepa 1gm 1 gm PO BID 21 History


 


Calcium Acetate 667 mg PO TID-W/MEALS 22 History


 


Metoprolol Tartrate [Lopressor] 25 mg PO BID 22 History


 


Pantoprazole [Protonix] 40 mg PO DAILY 22 History


 


Potassium Chloride ER [K-Dur 20] 20 meq PO BID 22 History


 


levETIRAcetam [Keppra] 500 mg PO BID@0700,2100 22 History


 


Divalproex ER [Depakote ER] 250 mg PO BID 30 Days #60 tab 22 Rx


 


levETIRAcetam [Keppra] 500 mg PO MoWeFr@1600 30 Days #15 22 Rx





 tab   


 


Sevelamer [Renvela] 1,600 mg PO W/SUPPER 10/07/22 12/23/22 History


 


Lacosamide [Vimpat] See Taper PO AS DIRECTED 21 Days 22 Rx





 #49 tab   


 


Zonisamide [Zonegran] 100 mg PO DAILY 30 Days #30 cap 22 Rx


 


Divalproex [Depakote] 500 mg PO Q8H 22 History








                                    Allergies











Allergy/AdvReac Type Severity Reaction Status Date / Time


 


Penicillins Allergy  Itching Verified 22 13:57














Physical Exam


Vitals: 


                                   Vital Signs











  Temp Pulse Pulse Resp BP BP Pulse Ox


 


 22 11:31       


 


 22 11:00   71   14    100


 


 22 10:30   71   14    100


 


 22 10:00   72   14  131/78   100


 


 22 09:30   71   14    100


 


 22 09:00   73   14  131/78   100


 


 22 08:30   73   14    100


 


 22 08:22       


 


 22 08:21       


 


 22 08:00  96.2 F L  74   14    100


 


 22 07:30   75   14    99


 


 22 07:00   76   14    98


 


 22 06:30   75   14    99


 


 22 06:00   77   20    100


 


 22 05:30   77   20    99


 


 22 05:00   77   20    98


 


 22 04:30   79   20    98


 


 22 04:02       


 


 22 04:00  97.5 F L  80   20    98


 


 22 03:40       


 


 22 03:30   83   20    99


 


 22 03:00   79   20    100


 


 22 02:30   80   20    100


 


 22 02:00   82   20    100


 


 22 01:30   83   20    100


 


 22 01:00   83   20    100


 


 22 00:30   93   20  200/93   100


 


 22 00:20   89   20    100


 


 22 00:10  97.8 F  95   20    100


 


 22 00:00   83   20    99


 


 22 23:50   85   20    99


 


 22 23:40   87   20    99


 


 22 23:30   87   20    99


 


 22 23:20   89   20    99


 


 22 23:10   90   20    99


 


 22 23:00   91   20    100


 


 22 22:50   90   20    100


 


 22 22:40     20    100


 


 22 22:30  98.1 F   110 H  20   200/93  94 L


 


 22 22:00   98     


 


 22 21:55       


 


 22 21:50   89   20    99


 


 22 21:40   90   20    99


 


 22 21:30   90   20    99


 


 22 21:20   95   20    100


 


 22 21:11       


 


 22 21:10     20    99


 


 22 21:00  98.1 F  105 H   20    99


 


 22 20:50   93   0 L    99


 


 22 20:42     19   


 


 22 20:15       229/96 


 


 22 20:00  98.4 F   92  19   164/75  98


 


 22 18:30  98.8 F   92  16   168/77  92 L


 


 22 17:14  98.6 F  93   16  115/60   96


 


 22 15:23  95.8 F L  86   18  103/59   94 L


 


 22 13:40  95.4 F L  89   16  160/83   97


 


 22 13:03   87   16  168/57   100














  FiO2


 


 22 11:31  30


 


 22 11:00 


 


 22 10:30 


 


 22 10:00 


 


 22 09:30 


 


 22 09:00 


 


 22 08:30 


 


 22 08:22  30


 


 22 08:21  30


 


 22 08:00  30


 


 22 07:30 


 


 22 07:00 


 


 22 06:30 


 


 22 06:00 


 


 22 05:30 


 


 22 05:00 


 


 22 04:30 


 


 22 04:02  30


 


 22 04:00  30


 


 22 03:40  30


 


 22 03:30 


 


 22 03:00 


 


 22 02:30 


 


 22 02:00 


 


 22 01:30 


 


 22 01:00 


 


 22 00:30 


 


 22 00:20 


 


 22 00:10  40


 


 22 00:00  40


 


 22 23:50 


 


 22 23:40 


 


 22 23:30 


 


 22 23:20 


 


 22 23:10 


 


 22 23:00 


 


 22 22:50 


 


 22 22:40 


 


 22 22:30 


 


 22 22:00 


 


 22 21:55  40


 


 22 21:50 


 


 22 21:40 


 


 22 21:30 


 


 22 21:20 


 


 22 21:11  100


 


 22 21:10  40


 


 22 21:00  100


 


 22 20:50 


 


 22 20:42 


 


 22 20:15 


 


 22 20:00 


 


 22 18:30 


 


 22 17:14 


 


 22 15:23 


 


 22 13:40 


 


 22 13:03 








                                Intake and Output











 22





 22:59 06:59 14:59


 


Intake Total 5.619 1479.694 74.266


 


Output Total  0 100


 


Balance 5.619 1479.694 -25.734


 


Intake:   


 


  IV  1260 40


 


    Lacosamide  mg In  50 





    Sodium Chloride 0.9% 50   





    ml @ 100 mls/hr IVPB BID   





    Count includes the [facility] Rx#:[id number]   


 


    NS @ KVO  10 40


 


    Sodium Chloride 0.9% 1,  1000 





    000 ml @ 999 mls/hr IV .   





    Q1H1M ONE Rx#:[id number]   


 


    levETIRAcetam IV 1,000 mg  100 





    In Saline 1 100ml.bag @   





    400 mls/hr IVPB ONCE STA   





    Rx#:[id number]   


 


    levETIRAcetam  mg  100 





    In Sodium Chloride 0.9%   





    100 ml @ 400 mls/hr IVPB   





    Q12H Count includes the [facility] Rx#:[id number]   


 


  Intake, IV Titration 5.619 219.694 34.266





  Amount   


 


    Midazolam HCl 50 mg In  80.417 33.25





    Sodium Chloride 0.9% 40   





    ml @ 5 MG/HR 5 mls/hr IV   





    .Q10H Count includes the [facility] Rx#:[id number]   


 


    propofoL 1,000 mg In 5.619 139.277 1.016





    Empty Bag 1 bag @ 15 MCG/   





    KG/MIN 5.715 mls/hr IV .   





    [id number] Count includes the [facility] Rx#:[id number]   


 


Output:   


 


  Gastric Drainage   100


 


  Urine  0 0


 


Other:   


 


  Weight 63.503 kg 75 kg 75 kg








                         ABP, PAP, CO, CI - Last 8 Hours











Arterial Blood Pressure        131/52


 


Arterial Blood Pressure        130/51


 


Arterial Blood Pressure        155/58


 


Arterial Blood Pressure        152/57


 


Arterial Blood Pressure        140/54


 


Arterial Blood Pressure        118/46


 


Arterial Blood Pressure        123/49


 


Arterial Blood Pressure        113/46


 


Arterial Blood Pressure        109/47


 


Arterial Blood Pressure        109/45


 


Arterial Blood Pressure        114/68


 


Arterial Blood Pressure        118/52


 


Arterial Blood Pressure        123/55


 


Arterial Blood Pressure        110/51


 


Arterial Blood Pressure        123/94

















Physical Exam: Revealed a 52-year-old female in no distress, sedated, intubated,

 mechanically ventilated, on Versed and she is also on propofol which I plan to 

discontinue shortly.


Head: Atraumatic, normocephalic.


HEENT:[Neck is supple.] [No neck masses.] [No thyromegaly.] [No JVD.]


Chest: [Clear throughout, no crackles, no rhonchi, no wheezes.]


Cardiac Exam: [Normal S1 and S2, no S3 gallop, no murmur.]


Abdomen: [Soft, nontender,  no megaly, no rebound, no guarding, normal bowel 

sounds.]


Extremities: [No clubbing, no edema, no cyanosis.]


Neurological Exam:could not assess, patient is sedated presently on propofol and

Versed.


Psychiatric: Could not assess.





Results





- Laboratory Findings


CBC and BMP: 


                                 22 05:05





                                 22 05:05


ABG











ABG pH  7.60  (7.35-7.45)  H*  22  05:45    


 


ABG pCO2  24 mmHg (35-45)  L  22  05:45    


 


ABG pO2  165 mmHg ()  H  22  05:45    


 


ABG O2 Saturation  100.0 % (94-97)  H  22  05:45    





PT/INR, D-dimer











PT  10.3 sec (9.0-12.0)   22  12:26    


 


INR  1.0  (<1.2)   22  12:26    








Abnormal lab findings: 


                                  Abnormal Labs











  22





  12:26 12:26 12:26


 


WBC   


 


RBC   


 


Hgb   


 


Hct   


 


Neutrophils #   


 


APTT  21.5 L  


 


ABG pH   


 


ABG pCO2   


 


ABG pO2   


 


ABG Total CO2   


 


ABG O2 Saturation   


 


Sodium   


 


Chloride   


 


BUN    32 H


 


Creatinine    9.47 H*


 


Glucose    288 H


 


POC Glucose (mg/dL)   


 


Plasma Lactic Acid Stan   


 


Urine Appearance   Cloudy H 


 


Urine Protein   3+ H 


 


Urine Glucose (UA)   2+ H 


 


Urine Blood   Trace H 


 


Ur Leukocyte Esterase   Large H 


 


Urine RBC   9 H 


 


Urine WBC   84 H 


 


Urine Bacteria   Occasional H 


 


Urine Mucus   Rare H 


 


Valproic Acid    127.1 H*














  22





  12:26 15:26 18:00


 


WBC   


 


RBC   


 


Hgb   


 


Hct   


 


Neutrophils #   


 


APTT   


 


ABG pH   


 


ABG pCO2   


 


ABG pO2   


 


ABG Total CO2   


 


ABG O2 Saturation   


 


Sodium   


 


Chloride   


 


BUN   


 


Creatinine   


 


Glucose   


 


POC Glucose (mg/dL)   


 


Plasma Lactic Acid Stan  3.0 H*  3.8 H*  2.4 H*


 


Urine Appearance   


 


Urine Protein   


 


Urine Glucose (UA)   


 


Urine Blood   


 


Ur Leukocyte Esterase   


 


Urine RBC   


 


Urine WBC   


 


Urine Bacteria   


 


Urine Mucus   


 


Valproic Acid   














  22





  20:43 21:10 21:10


 


WBC   13.8 H 


 


RBC   


 


Hgb   


 


Hct   


 


Neutrophils #   11.3 H 


 


APTT   


 


ABG pH   


 


ABG pCO2   


 


ABG pO2   


 


ABG Total CO2   


 


ABG O2 Saturation   


 


Sodium    146 H


 


Chloride    108 H


 


BUN    32 H


 


Creatinine    8.86 H*


 


Glucose    124 H


 


POC Glucose (mg/dL)  121 H  


 


Plasma Lactic Acid Stan   


 


Urine Appearance   


 


Urine Protein   


 


Urine Glucose (UA)   


 


Urine Blood   


 


Ur Leukocyte Esterase   


 


Urine RBC   


 


Urine WBC   


 


Urine Bacteria   


 


Urine Mucus   


 


Valproic Acid   














  22





  21:45 05:05 05:05


 


WBC   


 


RBC   3.07 L 


 


Hgb   8.7 L D 


 


Hct   26.0 L 


 


Neutrophils #   


 


APTT   


 


ABG pH   


 


ABG pCO2   


 


ABG pO2  >400 H  


 


ABG Total CO2  26 H  


 


ABG O2 Saturation  99.2 H  


 


Sodium   


 


Chloride   


 


BUN    39 H


 


Creatinine    9.96 H*


 


Glucose    131 H


 


POC Glucose (mg/dL)   


 


Plasma Lactic Acid Stan   


 


Urine Appearance   


 


Urine Protein   


 


Urine Glucose (UA)   


 


Urine Blood   


 


Ur Leukocyte Esterase   


 


Urine RBC   


 


Urine WBC   


 


Urine Bacteria   


 


Urine Mucus   


 


Valproic Acid   














  22





  05:45 06:17


 


WBC  


 


RBC  


 


Hgb  


 


Hct  


 


Neutrophils #  


 


APTT  


 


ABG pH  7.60 H* 


 


ABG pCO2  24 L 


 


ABG pO2  165 H 


 


ABG Total CO2  25 H 


 


ABG O2 Saturation  100.0 H 


 


Sodium  


 


Chloride  


 


BUN  


 


Creatinine  


 


Glucose  


 


POC Glucose (mg/dL)   134 H


 


Plasma Lactic Acid Stan  


 


Urine Appearance  


 


Urine Protein  


 


Urine Glucose (UA)  


 


Urine Blood  


 


Ur Leukocyte Esterase  


 


Urine RBC  


 


Urine WBC  


 


Urine Bacteria  


 


Urine Mucus  


 


Valproic Acid  














- Diagnostic Findings


Chest x-ray: image reviewed (chest x-ray showed no evidence of active disease.)


Additional studies: 





CT of the brain  results were reviewed.  And the neurologist does not seem to be

concerned about the findings noted by the radiologist.





Assessment and Plan


Assessment: 





impression:


Status epilepticus.  Medically refractory epilepsy.


acute hypoxic respiratory failure secondary to status epilepticus,, unable to 

protect her airways


history of CVA and residual left hemiparesis


Type 2 diabetes.


End-stage renal disease, on hemodialysis


Benign essential hypertension


dyslipidemia


Degenerative joint disease





Recommendation:


Continue ventilatory support for now,


We will discontinue propofol and Versed, awaken the patient and assess for 

possible weaning


In the meantime continue seizure medications as ordered by the neurologist.


Resume home meds.


GI and DVT prophylaxis.


We will likely wean and extubate in the next few hours.


We'll continue seizure precautions


discussedHer overall status with the neurologist on the case, and agrees 

withweaning trials and possibly extubate today


We will continue to follow while in ICU.


Time with Patient: Greater than 30

## 2022-12-24 NOTE — P.HPIM
History of Present Illness


H&P Date: 22


Chief Complaint: altered mental status





patient is a 52-year-old female with a known history ofhypertension, 

hyperlipidemia, diabetes type 2, history of seizure disorder and most recent 

seizures about a month ago, prior history of vagal nerve stimulator placed, 

diabetic peripheral neuropathy, ESRD on hemodialysis  and Friday

and history of CVA/TIA and uses wheelchair, residual memory impairment, newly 

diagnosed obstructive sleep apnea, prior history of smoking and bipolar disorder

and other medical problems was seen at dialysis clinic yesterday when she became

unresponsive and was not following commands and answering questions.  Patient 

was sent to ER for evaluation.  Patient was also having nausea and episodes of 

vomiting.  She was noted to have seizure-like activity.  She did not get 

dialysis and was transferred to ED by EMS.  On arrival to ER patient was awake 

alert and oriented x1 and was not holding any conversation with the staff.


Previously she was admitted to the hospital from - for worsening 

seizure activity.  Patient was started on Zonegran along with Keppra and 

Depakote.


Patient was also having episodes of vomiting in the ER.  As per her  

patient has been having vomiting for the past 1 week on and off.  She was also 

seen at [facility] earlier this week for seizure-like activity.


Patient was admitted to hospital with neurology consultation.  Overnight a team 

was called for assessment and possible status epilepticus.  Patient was not 

following commands or make eye contact.  Patient was also having episodes of 

emesis and concern for possible aspiration.  Patient was transferred to MICU and

intubated for airway protection.


On admission EKG showed sinus rhythm with moderate intraventricular conduction 

delays.


CT head showed hypodensity within the brainstem and within the left basal 

ganglia could be ischemic changes of intermediate age.  Consider follow-up with 

MRI.


Chest x-ray showed no acute cardiopulmonary process.  Chest x-ray repeated last 

night showed no acute cardiopulmonary disease.  Normal heart.  No change.


Laboratory data on admission WBC 6.7 hemoglobin 0.1 and platelets 218


Sodium 142 potassium 4.0 chloride 100 bicarb is 24 BUN 32 and creatinine 9.47 

and lactic acid 3.0 and blood sugar was 288 on admission


proBNP 2780


Troponin x1 negative


TSH 0.708


Urinalysis showed cloudy with 3+ protein large leukocyte esterase and elevated 

RBCs and WBCs.


Valproic acid level was 127.1.  Therapeutic level is 50-1 20





CTA head and neck showed irregular moderate plaque in the left carotid artery 

stenosis 754% at the origin..








Review of Systems





Complete review of systems could not be obtained from the patient.


ROS unobtainable: due to endotracheal tube





Past Medical History


Past Medical History: Chest Pain / Angina, CVA/TIA, Diabetes Mellitus, 

GERD/Reflux, Hyperlipidemia, Hypertension, Memory Impairment, Osteoarthritis 

(OA), Renal Disease, Seizure Disorder, Thyroid Disorder


Additional Past Medical History / Comment(s): Last seizure approximately one 

month ago. Spouse states she used to have seizures 4X per week until she had 

vagal nerve stimulator placed, now has seizures approximately once a week to 

once a month. Dialysis MOWEFR. Neuropathy, left drop foot, only able to walk 

short distances, otherwise uses wheelchair. 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 

ago, with residual memory impairment. Never diagnosed with Sleep Apnea but 

spouse states she does stop breathing through the night and he has to shake her 

to start breathing again. Varicose veins.


History of Any Multi-Drug Resistant Organisms: None Reported


Past Surgical History:  Section, Tonsillectomy, Uterine Ablation


Additional Past Surgical History / Comment(s): Left arm fistula, loop recorder, 

vagus nerve stimulator.


Past Anesthesia/Blood Transfusion Reactions: Motion Sickness


Additional Past Anesthesia/Blood Transfusion Reaction / Comment(s): Family hx 

unknown, pt adopted.


Past Psychological History: Bipolar


Smoking Status: Former smoker


Past Alcohol Use History: None Reported


Additional Past Alcohol Use History / Comment(s): Quit smoking in .


Past Drug Use History: None Reported





- Past Family History


  ** Father


Family Medical History: Unable to Obtain


Additional Family Medical History / Comment(s): Pt adopted.





Medications and Allergies


                                Home Medications











 Medication  Instructions  Recorded  Confirmed  Type


 


Clopidogrel [Plavix] 75 mg PO DAILY #30 tab 21 Rx


 


Atorvastatin [Lipitor] 40 mg PO HS 21 History


 


Dulaglutide [Trulicity] 3 mg SQ Q7D 21 History


 


Sevelamer [Renvela] 800 mg PO BID-W/MEALS 21 History


 


Vascepa 1gm 1 gm PO BID 21 History


 


Calcium Acetate 667 mg PO TID-W/MEALS 22 History


 


Metoprolol Tartrate [Lopressor] 25 mg PO BID 22 History


 


Pantoprazole [Protonix] 40 mg PO DAILY 22 History


 


Potassium Chloride ER [K-Dur 20] 20 meq PO BID 22 History


 


levETIRAcetam [Keppra] 500 mg PO BID@0700,2100 22 History


 


Divalproex ER [Depakote ER] 250 mg PO BID 30 Days #60 tab 22 Rx


 


levETIRAcetam [Keppra] 500 mg PO MoWeFr@1600 30 Days #15 22 Rx





 tab   


 


Sevelamer [Renvela] 1,600 mg PO W/SUPPER 10/07/22 12/23/22 History


 


Lacosamide [Vimpat] See Taper PO AS DIRECTED 21 Days 22 Rx





 #49 tab   


 


Zonisamide [Zonegran] 100 mg PO DAILY 30 Days #30 cap 22 Rx


 


Divalproex [Depakote] 500 mg PO Q8H 22 History








                                    Allergies











Allergy/AdvReac Type Severity Reaction Status Date / Time


 


Penicillins Allergy  Itching Verified 22 13:57














Physical Exam


Vitals: 


                                   Vital Signs











  Temp Pulse Pulse Resp BP BP Pulse Ox


 


 22 10:00   72   14  131/78   100


 


 22 09:30   71   14    100


 


 22 09:00   73   14  131/78   100


 


 22 08:30   73   14    100


 


 22 08:22       


 


 22 08:21       


 


 22 08:00  96.2 F L  74   14    100


 


 22 07:30   75   14    99


 


 22 07:00   76   14    98


 


 22 06:30   75   14    99


 


 22 06:00   77   20    100


 


 22 05:30   77   20    99


 


 22 05:00   77   20    98


 


 22 04:30   79   20    98


 


 22 04:02       


 


 22 04:00  97.5 F L  80   20    98


 


 22 03:40       


 


 22 03:30   83   20    99


 


 22 03:00   79   20    100


 


 22 02:30   80   20    100


 


 22 02:00   82   20    100


 


 22 01:30   83   20    100


 


 22 01:00   83   20    100


 


 22 00:30   93   20  200/93   100


 


 22 00:20   89   20    100


 


 22 00:10  97.8 F  95   20    100


 


 22 00:00   83   20    99


 


 22 23:50   85   20    99


 


 22 23:40   87   20    99


 


 22 23:30   87   20    99


 


 22 23:20   89   20    99


 


 22 23:10   90   20    99


 


 22 23:00   91   20    100


 


 22 22:50   90   20    100


 


 22 22:40     20    100


 


 22 22:30  98.1 F   110 H  20   200/93  94 L


 


 22 22:00   98     


 


 22 21:55       


 


 22 21:50   89   20    99


 


 22 21:40   90   20    99


 


 22 21:30   90   20    99


 


 22 21:20   95   20    100


 


 22 21:11       


 


 22 21:10     20    99


 


 22 21:00  98.1 F  105 H   20    99


 


 22 20:50   93   0 L    99


 


 22 20:42     19   


 


 22 20:15       229/96 


 


 22 20:00  98.4 F   92  19   164/75  98


 


 22 18:30  98.8 F   92  16   168/77  92 L


 


 22 17:14  98.6 F  93   16  115/60   96


 


 22 15:23  95.8 F L  86   18  103/59   94 L


 


 22 13:40  95.4 F L  89   16  160/83   97


 


 22 13:03   87   16  168/57   100


 


 22 11:22   85   16  167/86   96














  FiO2


 


 22 10:00 


 


 22 09:30 


 


 22 09:00 


 


 22 08:30 


 


 22 08:22  30


 


 22 08:21  30


 


 22 08:00  30


 


 22 07:30 


 


 22 07:00 


 


 22 06:30 


 


 22 06:00 


 


 22 05:30 


 


 22 05:00 


 


 22 04:30 


 


 22 04:02  30


 


 22 04:00  30


 


 22 03:40  30


 


 22 03:30 


 


 22 03:00 


 


 22 02:30 


 


 22 02:00 


 


 22 01:30 


 


 22 01:00 


 


 22 00:30 


 


 22 00:20 


 


 22 00:10  40


 


 22 00:00  40


 


 22 23:50 


 


 22 23:40 


 


 22 23:30 


 


 22 23:20 


 


 22 23:10 


 


 22 23:00 


 


 22 22:50 


 


 22 22:40 


 


 22 22:30 


 


 22 22:00 


 


 22 21:55  40


 


 22 21:50 


 


 22 21:40 


 


 22 21:30 


 


 22 21:20 


 


 22 21:11  100


 


 22 21:10  40


 


 22 21:00  100


 


 22 20:50 


 


 22 20:42 


 


 22 20:15 


 


 22 20:00 


 


 22 18:30 


 


 22 17:14 


 


 22 15:23 


 


 22 13:40 


 


 22 13:03 


 


 22 11:22 








                                Intake and Output











 22





 22:59 06:59 14:59


 


Intake Total 5.619 1479.694 74.266


 


Output Total  0 100


 


Balance 5.619 1479.694 -25.734


 


Intake:   


 


  IV  1260 40


 


    Lacosamide  mg In  50 





    Sodium Chloride 0.9% 50   





    ml @ 100 mls/hr IVPB BID   





    [facility] Rx#:[id number]   


 


    NS @ KVO  10 40


 


    Sodium Chloride 0.9% 1,  1000 





    000 ml @ 999 mls/hr IV .   





    Q1H1M ONE Rx#:[id number]   


 


    levETIRAcetam IV 1,000 mg  100 





    In Saline 1 100ml.bag @   





    400 mls/hr IVPB ONCE STA   





    Rx#:[id number]   


 


    levETIRAcetam  mg  100 





    In Sodium Chloride 0.9%   





    100 ml @ 400 mls/hr IVPB   





    Q12H [facility] Rx#:[id number]   


 


  Intake, IV Titration 5.619 219.694 34.266





  Amount   


 


    Midazolam HCl 50 mg In  80.417 33.25





    Sodium Chloride 0.9% 40   





    ml @ 5 MG/HR 5 mls/hr IV   





    .Q10H [facility] Rx#:[id number]   


 


    propofoL 1,000 mg In 5.619 139.277 1.016





    Empty Bag 1 bag @ 15 MCG/   





    KG/MIN 5.715 mls/hr IV .   





    [id number] [facility] Rx#:[id number]   


 


Output:   


 


  Gastric Drainage   100


 


  Urine  0 0


 


Other:   


 


  Weight 63.503 kg 75 kg 








                         ABP, PAP, CO, CI - Last 8 Hours











Arterial Blood Pressure        155/58


 


Arterial Blood Pressure        152/57


 


Arterial Blood Pressure        140/54


 


Arterial Blood Pressure        118/46


 


Arterial Blood Pressure        123/49


 


Arterial Blood Pressure        113/46


 


Arterial Blood Pressure        109/47


 


Arterial Blood Pressure        109/45


 


Arterial Blood Pressure        114/68


 


Arterial Blood Pressure        118/52


 


Arterial Blood Pressure        123/55


 


Arterial Blood Pressure        110/51


 


Arterial Blood Pressure        123/94


 


Arterial Blood Pressure        110/52


 


Arterial Blood Pressure        135/57














PHYSICAL EXAMINATION: 


Patient is currently sedated and intubated... 


HEENT: Normocephalic. Neck is supple. Pupils reactive. Nostrils clear. Oral 

cavity is moist. 


Neck reveals no JVD, carotid bruits, or thyromegaly. 


CHEST EXAMINATION: Trachea is central. Symmetrical expansion.  Bibasilar 

diminished sounds.  No wheezing or crackles.


Lung fields clear to auscultation and percussion. 


CARDIAC: Normal S1, S2 with no gallops. No murmurs 


ABDOMEN: Soft. Bowel sounds present. No organomegaly. No abdominal bruits. 


Extremities: Trace edema.  No clubbing or cyanosis


Neurologically patient is sedated intubated.  No gross focal deficits noted.  

Left foot drop.


Skin: No rash or skin lesions. 


Psychiatric: Could not be assessed at this time.


Musculoskeletal: No joint swelling or deformity. 








Results


CBC & Chem 7: 


                                 22 05:05





                                 22 05:05


Labs: 


                  Abnormal Lab Results - Last 24 Hours (Table)











  22 Range/Units





  12: 12: 12: 


 


WBC     (3.8-10.6)  k/uL


 


RBC     (3.80-5.40)  m/uL


 


Hgb     (11.4-16.0)  gm/dL


 


Hct     (34.0-46.0)  %


 


Neutrophils #     (1.3-7.7)  k/uL


 


APTT  21.5 L    (22.0-30.0)  sec


 


ABG pH     (7.35-7.45)  


 


ABG pCO2     (35-45)  mmHg


 


ABG pO2     ()  mmHg


 


ABG Total CO2     (19-24)  mmol/L


 


ABG O2 Saturation     (94-97)  %


 


Sodium     (137-145)  mmol/L


 


Chloride     ()  mmol/L


 


BUN    32 H  (7-17)  mg/dL


 


Creatinine    9.47 H*  (0.52-1.04)  mg/dL


 


Glucose    288 H  (74-99)  mg/dL


 


POC Glucose (mg/dL)     ()  mg/dL


 


Plasma Lactic Acid Stan     (0.7-2.0)  mmol/L


 


Urine Appearance   Cloudy H   (Clear)  


 


Urine Protein   3+ H   (Negative)  


 


Urine Glucose (UA)   2+ H   (Negative)  


 


Urine Blood   Trace H   (Negative)  


 


Ur Leukocyte Esterase   Large H   (Negative)  


 


Urine RBC   9 H   (0-5)  /hpf


 


Urine WBC   84 H   (0-5)  /hpf


 


Urine Bacteria   Occasional H   (None)  /hpf


 


Urine Mucus   Rare H   (None)  /hpf


 


Valproic Acid    127.1 H*  ug/mL














  22 Range/Units





  12: 15:26 18:00 


 


WBC     (3.8-10.6)  k/uL


 


RBC     (3.80-5.40)  m/uL


 


Hgb     (11.4-16.0)  gm/dL


 


Hct     (34.0-46.0)  %


 


Neutrophils #     (1.3-7.7)  k/uL


 


APTT     (22.0-30.0)  sec


 


ABG pH     (7.35-7.45)  


 


ABG pCO2     (35-45)  mmHg


 


ABG pO2     ()  mmHg


 


ABG Total CO2     (19-24)  mmol/L


 


ABG O2 Saturation     (94-97)  %


 


Sodium     (137-145)  mmol/L


 


Chloride     ()  mmol/L


 


BUN     (7-17)  mg/dL


 


Creatinine     (0.52-1.04)  mg/dL


 


Glucose     (74-99)  mg/dL


 


POC Glucose (mg/dL)     ()  mg/dL


 


Plasma Lactic Acid Stan  3.0 H*  3.8 H*  2.4 H*  (0.7-2.0)  mmol/L


 


Urine Appearance     (Clear)  


 


Urine Protein     (Negative)  


 


Urine Glucose (UA)     (Negative)  


 


Urine Blood     (Negative)  


 


Ur Leukocyte Esterase     (Negative)  


 


Urine RBC     (0-5)  /hpf


 


Urine WBC     (0-5)  /hpf


 


Urine Bacteria     (None)  /hpf


 


Urine Mucus     (None)  /hpf


 


Valproic Acid     ug/mL














  22 Range/Units





  20:43 21:10 21:10 


 


WBC   13.8 H   (3.8-10.6)  k/uL


 


RBC     (3.80-5.40)  m/uL


 


Hgb     (11.4-16.0)  gm/dL


 


Hct     (34.0-46.0)  %


 


Neutrophils #   11.3 H   (1.3-7.7)  k/uL


 


APTT     (22.0-30.0)  sec


 


ABG pH     (7.35-7.45)  


 


ABG pCO2     (35-45)  mmHg


 


ABG pO2     ()  mmHg


 


ABG Total CO2     (19-24)  mmol/L


 


ABG O2 Saturation     (94-97)  %


 


Sodium    146 H  (137-145)  mmol/L


 


Chloride    108 H  ()  mmol/L


 


BUN    32 H  (7-17)  mg/dL


 


Creatinine    8.86 H*  (0.52-1.04)  mg/dL


 


Glucose    124 H  (74-99)  mg/dL


 


POC Glucose (mg/dL)  121 H    ()  mg/dL


 


Plasma Lactic Acid Stan     (0.7-2.0)  mmol/L


 


Urine Appearance     (Clear)  


 


Urine Protein     (Negative)  


 


Urine Glucose (UA)     (Negative)  


 


Urine Blood     (Negative)  


 


Ur Leukocyte Esterase     (Negative)  


 


Urine RBC     (0-5)  /hpf


 


Urine WBC     (0-5)  /hpf


 


Urine Bacteria     (None)  /hpf


 


Urine Mucus     (None)  /hpf


 


Valproic Acid     ug/mL














  22 Range/Units





  21:45 05:05 05:05 


 


WBC     (3.8-10.6)  k/uL


 


RBC   3.07 L   (3.80-5.40)  m/uL


 


Hgb   8.7 L D   (11.4-16.0)  gm/dL


 


Hct   26.0 L   (34.0-46.0)  %


 


Neutrophils #     (1.3-7.7)  k/uL


 


APTT     (22.0-30.0)  sec


 


ABG pH     (7.35-7.45)  


 


ABG pCO2     (35-45)  mmHg


 


ABG pO2  >400 H    ()  mmHg


 


ABG Total CO2  26 H    (19-24)  mmol/L


 


ABG O2 Saturation  99.2 H    (94-97)  %


 


Sodium     (137-145)  mmol/L


 


Chloride     ()  mmol/L


 


BUN    39 H  (7-17)  mg/dL


 


Creatinine    9.96 H*  (0.52-1.04)  mg/dL


 


Glucose    131 H  (74-99)  mg/dL


 


POC Glucose (mg/dL)     ()  mg/dL


 


Plasma Lactic Acid Stan     (0.7-2.0)  mmol/L


 


Urine Appearance     (Clear)  


 


Urine Protein     (Negative)  


 


Urine Glucose (UA)     (Negative)  


 


Urine Blood     (Negative)  


 


Ur Leukocyte Esterase     (Negative)  


 


Urine RBC     (0-5)  /hpf


 


Urine WBC     (0-5)  /hpf


 


Urine Bacteria     (None)  /hpf


 


Urine Mucus     (None)  /hpf


 


Valproic Acid     ug/mL














  22 Range/Units





  05:45 06:17 


 


WBC    (3.8-10.6)  k/uL


 


RBC    (3.80-5.40)  m/uL


 


Hgb    (11.4-16.0)  gm/dL


 


Hct    (34.0-46.0)  %


 


Neutrophils #    (1.3-7.7)  k/uL


 


APTT    (22.0-30.0)  sec


 


ABG pH  7.60 H*   (7.35-7.45)  


 


ABG pCO2  24 L   (35-45)  mmHg


 


ABG pO2  165 H   ()  mmHg


 


ABG Total CO2  25 H   (19-24)  mmol/L


 


ABG O2 Saturation  100.0 H   (94-97)  %


 


Sodium    (137-145)  mmol/L


 


Chloride    ()  mmol/L


 


BUN    (7-17)  mg/dL


 


Creatinine    (0.52-1.04)  mg/dL


 


Glucose    (74-99)  mg/dL


 


POC Glucose (mg/dL)   134 H  ()  mg/dL


 


Plasma Lactic Acid Stan    (0.7-2.0)  mmol/L


 


Urine Appearance    (Clear)  


 


Urine Protein    (Negative)  


 


Urine Glucose (UA)    (Negative)  


 


Urine Blood    (Negative)  


 


Ur Leukocyte Esterase    (Negative)  


 


Urine RBC    (0-5)  /hpf


 


Urine WBC    (0-5)  /hpf


 


Urine Bacteria    (None)  /hpf


 


Urine Mucus    (None)  /hpf


 


Valproic Acid    ug/mL








                      Microbiology - Last 24 Hours (Table)











 22 00:17 Sputum Culture - Preliminary





 Sputum 


 


 22 12:26 Urine Culture - Preliminary





 Urine,Catheterized 














Thrombosis Risk Factor Assmnt





- DVT/VTE Prophylaxis


DVT/VTE Prophylaxis: Pharmacologic Prophylaxis ordered





- Choose All That Apply


Any of the Below Risk Factors Present?: Yes


Each Factor Represents 1 point: Age 41-60 years


Other congenital or acquired thrombophilia - If yes, enter type in comment:  

(MELVA, patient on mechanical ventilator)


Thrombosis Risk Factor Assessment Total Risk Factor Score: 1


Thrombosis Risk Factor Assessment Level: Low Risk





Assessment and Plan


Assessment: 





Altered mental status likely due to e status epilepticus.  Patient was intubated

for airway protection.


History of refractory epilepsy with previous multiple admissions due to 

seizures.


left ICA 75%stenosis at the origin as per CTA neck.


hypodensity in the brainstem and left basal ganglia were related to previous CVA


ESRD on hemodialysisMond and Friday.


History of CVA with left hemiparesis


Diabetes2


I would a peripheral neuropathy


Memory impairment


GERD


Hypertension next hyperlipidemia


Hypothyroidism 


bipolar disorder


Primary history of smoking


DVT prophylaxis with heparin subcu





Plan:


Patient is currently intubated and sedated for airway protection.


Started back on Zonegran, Depakote and Keppra as per neurology recommendation

s.Zonegran dose increased to 100 mg twice a day,Keppra additional dose 500 mg 

post dialysis.


Patient will be continued on secondary stroke prophylaxis Plavix and Lipitor,


Neurology is on board.  Nephrology was consulted for hemodialysis.


Continue with home medications and insulin sliding scale for better blood sugar 

control.


Follow-up closely.  Prognosis is guarded with multiple medical problems and 

comorbid conditions.





Time with Patient: Greater than 30

## 2022-12-24 NOTE — P.NPCON
History of Present Illness





- Reason for Consult


Consult date: 22


end stage renal disease





- Chief Complaint


Altered mental status





- History of Present Illness


ESRD on hemodialysis  schedule via left upper arm aVF, 

admitted to the hospital with encephalopathy.  History of seizure disorders on 

antiepileptics.  Currently intubated in ICU, ongoing dialysis.  Missed dialysis 

on Friday.  No major changes.  Covid 19 negative.  Valproic acid level was 127.








Review of Systems


Constitutional: Reports as per HPI





Past Medical History


Past Medical History: Chest Pain / Angina, CVA/TIA, Diabetes Mellitus, 

GERD/Reflux, Hyperlipidemia, Hypertension, Memory Impairment, Osteoarthritis 

(OA), Renal Disease, Seizure Disorder, Thyroid Disorder


Additional Past Medical History / Comment(s): Last seizure approximately one 

month ago. Spouse states she used to have seizures 4X per week until she had 

vagal nerve stimulator placed, now has seizures approximately once a week to 

once a month. Dialysis MOWEFR. Neuropathy, left drop foot, only able to walk 

short distances, otherwise uses wheelchair. 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 

ago, with residual memory impairment. Never diagnosed with Sleep Apnea but 

spouse states she does stop breathing through the night and he has to shake her 

to start breathing again. Varicose veins.


History of Any Multi-Drug Resistant Organisms: None Reported


Past Surgical History:  Section, Tonsillectomy, Uterine Ablation


Additional Past Surgical History / Comment(s): Left arm fistula, loop recorder, 

vagus nerve stimulator.


Past Anesthesia/Blood Transfusion Reactions: Motion Sickness


Additional Past Anesthesia/Blood Transfusion Reaction / Comment(s): Family hx 

unknown, pt adopted.


Past Psychological History: Bipolar


Smoking Status: Former smoker


Past Alcohol Use History: None Reported


Additional Past Alcohol Use History / Comment(s): Quit smoking in .


Past Drug Use History: None Reported





- Past Family History


  ** Father


Family Medical History: Unable to Obtain


Additional Family Medical History / Comment(s): Pt adopted.





Medications and Allergies


                                Home Medications











 Medication  Instructions  Recorded  Confirmed  Type


 


Clopidogrel [Plavix] 75 mg PO DAILY #30 tab 21 Rx


 


Atorvastatin [Lipitor] 40 mg PO HS 21 History


 


Dulaglutide [Trulicity] 3 mg SQ Q7D 21 History


 


Sevelamer [Renvela] 800 mg PO BID-W/MEALS 21 History


 


Vascepa 1gm 1 gm PO BID 21 History


 


Calcium Acetate 667 mg PO TID-W/MEALS 22 History


 


Metoprolol Tartrate [Lopressor] 25 mg PO BID 22 History


 


Pantoprazole [Protonix] 40 mg PO DAILY 22 History


 


Potassium Chloride ER [K-Dur 20] 20 meq PO BID 22 History


 


levETIRAcetam [Keppra] 500 mg PO BID@0700,2100 22 History


 


Divalproex ER [Depakote ER] 250 mg PO BID 30 Days #60 tab 22 Rx


 


levETIRAcetam [Keppra] 500 mg PO MoWeFr@1600 30 Days #15 22 Rx





 tab   


 


Sevelamer [Renvela] 1,600 mg PO W/SUPPER 10/07/22 12/23/22 History


 


Lacosamide [Vimpat] See Taper PO AS DIRECTED 21 Days 22 Rx





 #49 tab   


 


Zonisamide [Zonegran] 100 mg PO DAILY 30 Days #30 cap 22 Rx


 


Divalproex [Depakote] 500 mg PO Q8H 22 History








                                    Allergies











Allergy/AdvReac Type Severity Reaction Status Date / Time


 


Penicillins Allergy  Itching Verified 22 13:57














Physical Exam


Vitals: 


                                   Vital Signs











  Temp Pulse Pulse Resp BP BP Pulse Ox


 


 22 11:31       


 


 22 11:00   71   14    100


 


 22 10:30   71   14    100


 


 22 10:00   72   14  131/78   100


 


 22 09:30   71   14    100


 


 22 09:00   73   14  131/78   100


 


 22 08:30   73   14    100


 


 22 08:22       


 


 22 08:21       


 


 22 08:00  96.2 F L  74   14    100


 


 22 07:30   75   14    99


 


 22 07:00   76   14    98


 


 22 06:30   75   14    99


 


 22 06:00   77   20    100


 


 22 05:30   77   20    99


 


 22 05:00   77   20    98


 


 22 04:30   79   20    98


 


 22 04:02       


 


 22 04:00  97.5 F L  80   20    98


 


 22 03:40       


 


 22 03:30   83   20    99


 


 22 03:00   79   20    100


 


 22 02:30   80   20    100


 


 22 02:00   82   20    100


 


 22 01:30   83   20    100


 


 22 01:00   83   20    100


 


 22 00:30   93   20  200/93   100


 


 22 00:20   89   20    100


 


 22 00:10  97.8 F  95   20    100


 


 22 00:00   83   20    99


 


 22 23:50   85   20    99


 


 22 23:40   87   20    99


 


 22 23:30   87   20    99


 


 22 23:20   89   20    99


 


 22 23:10   90   20    99


 


 22 23:00   91   20    100


 


 22 22:50   90   20    100


 


 22 22:40     20    100


 


 22 22:30  98.1 F   110 H  20   200/93  94 L


 


 22 22:00   98     


 


 22 21:55       


 


 22 21:50   89   20    99


 


 22 21:40   90   20    99


 


 22 21:30   90   20    99


 


 22 21:20   95   20    100


 


 22 21:11       


 


 22 21:10     20    99


 


 22 21:00  98.1 F  105 H   20    99


 


 22 20:50   93   0 L    99


 


 22 20:42     19   


 


 22 20:15       229/96 


 


 22 20:00  98.4 F   92  19   164/75  98


 


 22 18:30  98.8 F   92  16   168/77  92 L


 


 22 17:14  98.6 F  93   16  115/60   96


 


 22 15:23  95.8 F L  86   18  103/59   94 L


 


 22 13:40  95.4 F L  89   16  160/83   97














  FiO2


 


 22 11:31  30


 


 22 11:00 


 


 22 10:30 


 


 22 10:00 


 


 22 09:30 


 


 22 09:00 


 


 22 08:30 


 


 22 08:22  30


 


 22 08:21  30


 


 22 08:00  30


 


 22 07:30 


 


 22 07:00 


 


 22 06:30 


 


 22 06:00 


 


 22 05:30 


 


 22 05:00 


 


 22 04:30 


 


 22 04:02  30


 


 22 04:00  30


 


 22 03:40  30


 


 22 03:30 


 


 22 03:00 


 


 22 02:30 


 


 22 02:00 


 


 22 01:30 


 


 22 01:00 


 


 22 00:30 


 


 22 00:20 


 


 22 00:10  40


 


 22 00:00  40


 


 22 23:50 


 


 22 23:40 


 


 22 23:30 


 


 22 23:20 


 


 22 23:10 


 


 22 23:00 


 


 22 22:50 


 


 22 22:40 


 


 22 22:30 


 


 22 22:00 


 


 22 21:55  40


 


 22 21:50 


 


 22 21:40 


 


 22 21:30 


 


 22 21:20 


 


 22 21:11  100


 


 22 21:10  40


 


 22 21:00  100


 


 22 20:50 


 


 22 20:42 


 


 22 20:15 


 


 22 20:00 


 


 22 18:30 


 


 22 17:14 


 


 22 15:23 


 


 22 13:40 








                                Intake and Output











 22





 22:59 06:59 14:59


 


Intake Total 5.619 1479.694 154.266


 


Output Total  0 100


 


Balance 5.619 1479.694 54.266


 


Intake:   


 


  IV  1260 120


 


    Lacosamide  mg In  50 50





    Sodium Chloride 0.9% 50   





    ml @ 100 mls/hr IVPB BID   





    [facility] Rx#:[id number]   


 


    NS @ KVO  10 70


 


    Sodium Chloride 0.9% 1,  1000 





    000 ml @ 999 mls/hr IV .   





    Q1H1M ONE Rx#:[id number]   


 


    levETIRAcetam IV 1,000 mg  100 





    In Saline 1 100ml.bag @   





    400 mls/hr IVPB ONCE STA   





    Rx#:[id number]   


 


    levETIRAcetam  mg  100 





    In Sodium Chloride 0.9%   





    100 ml @ 400 mls/hr IVPB   





    Q12H [facility] Rx#:[id number]   


 


  Intake, IV Titration 5.619 219.694 34.266





  Amount   


 


    Midazolam HCl 50 mg In  80.417 33.25





    Sodium Chloride 0.9% 40   





    ml @ 5 MG/HR 5 mls/hr IV   





    .Q10H [facility] Rx#:[id number]   


 


    propofoL 1,000 mg In 5.619 139.277 1.016





    Empty Bag 1 bag @ 15 MCG/   





    KG/MIN 5.715 mls/hr IV .   





    [id number] [facility] Rx#:[id number]   


 


Output:   


 


  Gastric Drainage   100


 


  Urine  0 0


 


Other:   


 


  Weight 63.503 kg 75 kg 75 kg








                         ABP, PAP, CO, CI - Last 8 Hours











Arterial Blood Pressure        131/52


 


Arterial Blood Pressure        130/51


 


Arterial Blood Pressure        155/58


 


Arterial Blood Pressure        152/57


 


Arterial Blood Pressure        140/54


 


Arterial Blood Pressure        118/46


 


Arterial Blood Pressure        123/49


 


Arterial Blood Pressure        113/46


 


Arterial Blood Pressure        109/47


 


Arterial Blood Pressure        109/45


 


Arterial Blood Pressure        114/68


 


Arterial Blood Pressure        118/52














No acute distress


S1-S2 heard


Lungs clear


Oral intubation


Left upper arm aVF


No edema








Results





- Lab Results


                             Most recent lab results











ABG pH  7.60  (7.35-7.45)  H*  22  05:45    


 


ABG pCO2  24 mmHg (35-45)  L  22  05:45    


 


ABG pO2  165 mmHg ()  H  22  05:45    


 


ABG HCO3  24 mmol/L (21-25)   22  05:45    


 


ABG O2 Saturation  100.0 % (94-97)  H  22  05:45    


 


Calcium  8.8 mg/dL (8.4-10.2)   22  05:05    


 


Magnesium  2.1 mg/dL (1.6-2.3)   22  21:10    














                                 22 05:05





                                 22 05:05





Assessment and Plan


Assessment: 


#1 encephalopathy multifactorial suspect seizure disorder/valproic acid to

xicity.


#2 ESRD on hemodialysis, MWF schedule.


#3 hypertension with ESRD


#4 anemia with ESRD


#5 metabolic bone disease





Plan: 


#1 hemodialysis today, next treatment on Monday


#2 ICU care.

## 2022-12-25 LAB
ANION GAP SERPL CALC-SCNC: 9 MMOL/L
BASOPHILS # BLD AUTO: 0.1 K/UL (ref 0–0.2)
BASOPHILS NFR BLD AUTO: 1 %
BUN SERPL-SCNC: 20 MG/DL (ref 7–17)
CALCIUM SPEC-MCNC: 9.5 MG/DL (ref 8.4–10.2)
CHLORIDE SERPL-SCNC: 104 MMOL/L (ref 98–107)
CO2 BLDA-SCNC: 28 MMOL/L (ref 19–24)
CO2 SERPL-SCNC: 26 MMOL/L (ref 22–30)
EOSINOPHIL # BLD AUTO: 0.1 K/UL (ref 0–0.7)
EOSINOPHIL NFR BLD AUTO: 2 %
ERYTHROCYTE [DISTWIDTH] IN BLOOD BY AUTOMATED COUNT: 3.82 M/UL (ref 3.8–5.4)
ERYTHROCYTE [DISTWIDTH] IN BLOOD: 14.2 % (ref 11.5–15.5)
GLUCOSE BLD-MCNC: 117 MG/DL (ref 70–110)
GLUCOSE SERPL-MCNC: 114 MG/DL (ref 74–99)
HCO3 BLDA-SCNC: 27 MMOL/L (ref 21–25)
HCT VFR BLD AUTO: 33.3 % (ref 34–46)
HGB BLD-MCNC: 10.9 GM/DL (ref 11.4–16)
LYMPHOCYTES # SPEC AUTO: 1.7 K/UL (ref 1–4.8)
LYMPHOCYTES NFR SPEC AUTO: 30 %
MCH RBC QN AUTO: 28.6 PG (ref 25–35)
MCHC RBC AUTO-ENTMCNC: 32.8 G/DL (ref 31–37)
MCV RBC AUTO: 87.3 FL (ref 80–100)
MONOCYTES # BLD AUTO: 0.4 K/UL (ref 0–1)
MONOCYTES NFR BLD AUTO: 6 %
NEUTROPHILS # BLD AUTO: 3.4 K/UL (ref 1.3–7.7)
NEUTROPHILS NFR BLD AUTO: 61 %
PCO2 BLDA: 36 MMHG (ref 35–45)
PH BLDA: 7.48 [PH] (ref 7.35–7.45)
PLATELET # BLD AUTO: 203 K/UL (ref 150–450)
PO2 BLDA: 121 MMHG (ref 83–108)
POTASSIUM SERPL-SCNC: 4.2 MMOL/L (ref 3.5–5.1)
SODIUM SERPL-SCNC: 139 MMOL/L (ref 137–145)
WBC # BLD AUTO: 5.6 K/UL (ref 3.8–10.6)

## 2022-12-25 RX ADMIN — CLEVIPIDINE SCH MLS/HR: 0.5 EMULSION INTRAVENOUS at 16:28

## 2022-12-25 RX ADMIN — MIDAZOLAM SCH: 5 INJECTION INTRAMUSCULAR; INTRAVENOUS at 03:15

## 2022-12-25 RX ADMIN — LACOSAMIDE SCH MLS/HR: 10 INJECTION INTRAVENOUS at 21:57

## 2022-12-25 RX ADMIN — DIVALPROEX SODIUM SCH MG: 125 CAPSULE, COATED PELLETS ORAL at 08:19

## 2022-12-25 RX ADMIN — CLEVIPIDINE SCH MLS/HR: 0.5 EMULSION INTRAVENOUS at 08:17

## 2022-12-25 RX ADMIN — CLOPIDOGREL BISULFATE SCH MG: 75 TABLET ORAL at 08:19

## 2022-12-25 RX ADMIN — Medication SCH MG: at 06:40

## 2022-12-25 RX ADMIN — ASPIRIN SCH: 325 TABLET ORAL at 08:21

## 2022-12-25 RX ADMIN — SEVELAMER CARBONATE SCH: 800 TABLET, FILM COATED ORAL at 16:40

## 2022-12-25 RX ADMIN — DIVALPROEX SODIUM SCH MG: 125 CAPSULE, COATED PELLETS ORAL at 08:20

## 2022-12-25 RX ADMIN — MIDAZOLAM SCH: 5 INJECTION INTRAMUSCULAR; INTRAVENOUS at 11:22

## 2022-12-25 RX ADMIN — PANTOPRAZOLE SODIUM SCH MG: 40 TABLET, DELAYED RELEASE ORAL at 06:40

## 2022-12-25 RX ADMIN — DIVALPROEX SODIUM SCH MG: 125 CAPSULE, COATED PELLETS ORAL at 16:27

## 2022-12-25 RX ADMIN — CLEVIPIDINE SCH MLS/HR: 0.5 EMULSION INTRAVENOUS at 20:47

## 2022-12-25 RX ADMIN — METOPROLOL TARTRATE SCH MG: 25 TABLET, FILM COATED ORAL at 08:19

## 2022-12-25 RX ADMIN — HEPARIN SODIUM SCH UNIT: 5000 INJECTION INTRAVENOUS; SUBCUTANEOUS at 08:20

## 2022-12-25 RX ADMIN — Medication SCH MG: at 12:28

## 2022-12-25 RX ADMIN — Medication SCH: at 16:40

## 2022-12-25 RX ADMIN — LACOSAMIDE SCH MLS/HR: 10 INJECTION INTRAVENOUS at 08:39

## 2022-12-25 RX ADMIN — ATORVASTATIN CALCIUM SCH MG: 40 TABLET, FILM COATED ORAL at 21:37

## 2022-12-25 RX ADMIN — POTASSIUM CHLORIDE SCH MEQ: 20 TABLET, EXTENDED RELEASE ORAL at 08:21

## 2022-12-25 RX ADMIN — CHLORHEXIDINE GLUCONATE SCH ML: 1.2 RINSE ORAL at 08:19

## 2022-12-25 RX ADMIN — ZONISAMIDE SCH MG: 100 CAPSULE ORAL at 08:18

## 2022-12-25 RX ADMIN — LEVETIRACETAM SCH MLS/HR: 100 INJECTION, SOLUTION, CONCENTRATE INTRAVENOUS at 12:28

## 2022-12-25 RX ADMIN — SEVELAMER CARBONATE SCH MG: 800 TABLET, FILM COATED ORAL at 12:28

## 2022-12-25 RX ADMIN — SEVELAMER CARBONATE SCH MG: 800 TABLET, FILM COATED ORAL at 06:40

## 2022-12-25 RX ADMIN — CLEVIPIDINE SCH MLS/HR: 0.5 EMULSION INTRAVENOUS at 06:01

## 2022-12-25 RX ADMIN — HEPARIN SODIUM SCH UNIT: 5000 INJECTION INTRAVENOUS; SUBCUTANEOUS at 23:17

## 2022-12-25 RX ADMIN — ZONISAMIDE SCH MG: 100 CAPSULE ORAL at 21:37

## 2022-12-25 RX ADMIN — HEPARIN SODIUM SCH UNIT: 5000 INJECTION INTRAVENOUS; SUBCUTANEOUS at 16:27

## 2022-12-25 RX ADMIN — METOPROLOL TARTRATE SCH MG: 25 TABLET, FILM COATED ORAL at 21:37

## 2022-12-25 RX ADMIN — DIVALPROEX SODIUM SCH MG: 125 CAPSULE, COATED PELLETS ORAL at 21:58

## 2022-12-25 RX ADMIN — POTASSIUM CHLORIDE SCH MEQ: 20 TABLET, EXTENDED RELEASE ORAL at 21:37

## 2022-12-25 RX ADMIN — ASPIRIN SCH: 325 TABLET ORAL at 21:34

## 2022-12-25 RX ADMIN — CLEVIPIDINE SCH MLS/HR: 0.5 EMULSION INTRAVENOUS at 12:33

## 2022-12-25 RX ADMIN — LEVETIRACETAM SCH MLS/HR: 100 INJECTION, SOLUTION, CONCENTRATE INTRAVENOUS at 23:17

## 2022-12-25 RX ADMIN — MIDAZOLAM SCH: 5 INJECTION INTRAMUSCULAR; INTRAVENOUS at 22:10

## 2022-12-25 NOTE — P.PN
Subjective


Progress Note Date: 12/25/22


The patient is seen at bedside and is accompanied by her .  Per nurse no 

further seizures.  She continues to be intubated on ventilator and her sedation 

has been held and they are hoping to extubate patient today.  Per nurse, she is 

following commands.  








Objective





- Vital Signs


Vital signs: 


                                   Vital Signs











Temp  98.7 F   12/25/22 08:00


 


Pulse  89   12/25/22 11:00


 


Resp  14   12/25/22 11:00


 


BP  139/68   12/25/22 11:00


 


Pulse Ox  98   12/25/22 11:00


 


FiO2  30   12/25/22 11:17








                                 Intake & Output











 12/24/22 12/25/22 12/25/22





 18:59 06:59 18:59


 


Intake Total 267.771 388.513 144.346


 


Output Total 100  0


 


Balance 167.771 388.513 144.346


 


Weight 75 kg 75.8 kg 


 


Intake:   


 


   270 90


 


    Lacosamide  mg In 50 50 50





    Sodium Chloride 0.9% 50   





    ml @ 100 mls/hr IVPB BID   





    [facility] Rx#:[id number]   


 


    NS @  120 40


 


    levETIRAcetam  mg  100 





    In Sodium Chloride 0.9%   





    100 ml @ 400 mls/hr IVPB   





    Q12H [facility] Rx#:[id number]   


 


  Intake, IV Titration 97.771 118.513 54.346





  Amount   


 


    Clevidipine Butyrate 25 0.1 82.934 27.2





    mg In Empty Bag 1 bag @ 1   





    MG/HR 2 mls/hr IV .Q24H   





    [facility] Rx#:[id number]   


 


    Midazolam HCl 50 mg In 33.25  





    Sodium Chloride 0.9% 40   





    ml @ 5 MG/HR 5 mls/hr IV   





    .Q10H [facility] Rx#:[id number]   


 


    propofoL 1,000 mg In 64.421 35.579 27.146





    Empty Bag 1 bag @ 15 MCG/   





    KG/MIN 5.715 mls/hr IV .   





    [id number] [facility] Rx#:[id number]   


 


Output:   


 


  Gastric Drainage 100  


 


  Urine 0  0








                       ABP, PAP, CO, CI - Last Documented











Arterial Blood Pressure        150/56

















- Exam





GENERAL: The patient is lying in bed and does not appear in acute distress.


LUNG: Intubated on ventilator.





NEUROLOGICAL:


IV Versed and IV Propofol are stopped.


Higher mental function: The patient is drowsy and awakeable to voice.  She is 

following simple commands.     


Cranial nerves: The pupils are round, pinpoint equal and reactive to light.  No 

facial weakness.  Intubated on ventilator.  Is breathing over the vent.  


Motor: The strength is moving both hands above gravity.  She is lifting right 

lower extremity above gravity.  No movement noted in left lower foot (old per 

). Decrease tone throughout.   Normal bulk.  





Some of her workup during his hospital visit consisted of :


TSH: 0.708


Initial Valproic acid is 127 and repeated is 73.8


Keppra level is 7.9


CT of the head which is reported as hypodensity within the brainstem and within 

the left basal ganglia could be ischemic changes of and determine age.  Consider

follow-up MRI.  I personally reviewed CT head I felt the patient had the basal 

ganglia changes on her prior admission and it doesn't look acute or subacute in 

my opinion.  Regarding the brainstem CT is not the best study for brainstem but 

she does have hypodensity but does not look like acute or subacute in my opinion

as well.


CT angiography of the head and neck was reported as irregular moderate plaque at

the left carotid artery bifurcation and estimated 75% stenosis origin of the 

left ICA carotid artery.  There is approximately similar 35% stenosis right 

internal carotid artery.  No significant intercranial and angiographic 

abnormality.


Carotid duplex is reported as no hemodynamically significant internal carotid 

artery stenosis on either side.  Limited overall assessment on the left side 

including resolution of the left ISA in the left vertebral artery due to the 

patient condition.  


Plasma lactic acid venous 3.0 on presentation and improved to 1.6.


EEG on 12/24/2022 is abnormal.  The burst suppression is likely due to 

medication effect (Versed and Propofol).  The background slowing suggestive of 

moderate encephalopathy.  The epileptiform discharges is likely on the basis of 

primary generalized epilepsy.  There is no focal slowing or seizure detected 

during the study.


Urinalysis is possible suggestive of acute urinary tract infection


Urine tox screen is valproic acids 127 and the normal supposed to be between 50-

120.  Her level is slightly supratherapeutic








- Labs


CBC & Chem 7: 


                                 12/25/22 05:18





                                 12/25/22 05:18


Labs: 


                  Abnormal Lab Results - Last 24 Hours (Table)











  12/24/22 12/25/22 12/25/22 Range/Units





  12:19 05:18 05:18 


 


Hgb   10.9 L   (11.4-16.0)  gm/dL


 


Hct   33.3 L   (34.0-46.0)  %


 


ABG pH     (7.35-7.45)  


 


ABG pO2     ()  mmHg


 


ABG HCO3     (21-25)  mmol/L


 


ABG Total CO2     (19-24)  mmol/L


 


ABG O2 Saturation     (94-97)  %


 


BUN    20 H  (7-17)  mg/dL


 


Creatinine    7.22 H*  (0.52-1.04)  mg/dL


 


Glucose    114 H  (74-99)  mg/dL


 


POC Glucose (mg/dL)  111 H    ()  mg/dL














  12/25/22 Range/Units





  05:46 


 


Hgb   (11.4-16.0)  gm/dL


 


Hct   (34.0-46.0)  %


 


ABG pH  7.48 H  (7.35-7.45)  


 


ABG pO2  121 H  ()  mmHg


 


ABG HCO3  27 H  (21-25)  mmol/L


 


ABG Total CO2  28 H  (19-24)  mmol/L


 


ABG O2 Saturation  99.3 H  (94-97)  %


 


BUN   (7-17)  mg/dL


 


Creatinine   (0.52-1.04)  mg/dL


 


Glucose   (74-99)  mg/dL


 


POC Glucose (mg/dL)   ()  mg/dL








                      Microbiology - Last 24 Hours (Table)











 12/24/22 00:17 Gram Stain - Preliminary





 Sputum Sputum Culture - Preliminary


 


 12/23/22 12:26 Urine Culture - Final





 Urine,Catheterized 


 


 12/23/22 14:15 Blood Culture - Preliminary





 Blood    No Growth after 24 hours


 


 12/23/22 14:00 Blood Culture - Preliminary





 Blood    No Growth after 24 hours














Assessment and Plan


Assessment: 


This is a 52-year-old woman with medical refractory epilepsy, VNS who presents 

to the hospital numerous times for breakthrough seizures.





Clinical status epilepticus---resolved.  Her epilepsy is not controlled.  Is 

compliant with her medications.


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

preliminary 2.5 hour EEG in earlier 12/2022)


Left ICA stenosis 75% on CTA but no significant stenosis on carotids.


Hypodensity of left basal ganglia and brainstem reported on CT (but I feel basal

ganglia is seen on prior CT and CT head is not best study for brainstem).


Probable acute UTI


History of stroke with residual left hemiparesis


Diabetes mellitus


Dnd-stage renal is on dialysis


Hypertension 





Plan: 





* I spoke with the patient's  and he stated her Neurologist (Dr. Nelson's team) recommended her stopping Depakote but she continues to be 

  taking it.  Also she was recommended to continue Vimpat 150mg bid, Keppra 

  500mg bid.  She is also on Zonegran.


* I increased Zonegran from 100mg daily to 100mg bid.  Continue her home 

  Depakote 500mg 1 tab tid with additional 250mg bid, Keppra 500mg bid with 

  additional 500mg post dialysis.  I decreased her Vimpat from 150mg bid to 

  100mg bid since on last admission it was felt she had primary generalized 

  epilepsy and Vimpat was not great drug for primary generalized epilepsy. 


* I highly recommend patient to follow-up with epileptilologist since has 

  frequent seizures/medical refractory epilepsy.  Consider Epidiolex for control

  of seizures.  


* Patient has 2.5 hour EEG in our facility on 12/14/2022 (prior admission) and 

  preliminary was reported as runs of sharp and slow waves or spike and slow 

  wave at 2-3 Hz lasting 1 -1.5 seconds suggestive of primary generalized 

  epilepsy.  No official report yet.


* Continue neuro checks.


* Left ICA stenosis 75% on CTA but on carotid duplex no significant stenosis 

  seen.  I consulted vascular surgery team for carotid stenosis and I ordered 

  carotid duplex.


* Hypodensity of left basal ganglia and brainstem reported on CT (but I feel 

  basal ganglia is seen on prior CT and CT head is not best study for 

  brainstem).  I will  get repeat CT head today for comparison.


* Patient is on home dose Plavix 75mg daily and Lipitor 40mg qhs which is 

  sufficient for secondary stroke prophylaxis.


* PT and OT are consulted.


* Will defer the rest of medical management to primary team.


* For DVT prophylaxis: on subq heparin.





The plan is discussed with patient's  (who is at bedside) and her nurse.


Dr. Fischer will start neurology service tomorrow A.M.





Time with Patient: Less than 30

## 2022-12-25 NOTE — XR
EXAMINATION TYPE: XR chest 1V portable

 

DATE OF EXAM: 12/25/2022

 

Comparison: 12/24/2022

 

Clinical History: 52-year-old female Tube placement

 

Findings:

Left-sided pacemaker generator with similar lead extending to the left neck.

 

Findings the left lower chest. NG tube courses below the diaphragm. ET tube satisfactory. Heart size.
 No consolidation or pleural effusion.

 

 

Impression:

Satisfactory ET and NG tubes. No acute process seen.

## 2022-12-25 NOTE — P.PN
Subjective


Progress Note Date: 12/25/22


Principal diagnosis: 





status epilepticus





this is a 52-year-old female with history of chronic seizure disorder, her 

seizures have been recently poorly controlled, patient follows up with the 

neurologist/Dr. Nelson for her seizures.  Patient was brought into the ER 

yesterday with altered mental status, intermittent episodes of nausea and 

vomiting.  Apparently the patient was dropped off yesterday at the dialysis 

unit/clinic, and when she got there patient became unresponsive, she did not 

notice to have any seizure-like activity.  She was not speaking or answering any

questions.  Patient was unable to get her dialysis treatment, hence the patient 

was brought in by EMS to the ER yesterday.  Apparently the patient was admitted,

and shortly after she was admitted she had recurrent episodes of 

seizures,/status epilepticus.that a team responded to a call from the nurse on t

he floor, patient was evaluated by the hospitalist, did not seem to follow any 

commands or make any eye contact.  And at one point it was felt that the patient

had status epilepticus.  Apparently the patient had to be intubated to protect 

her airways, she was admitted to the ICU, and the plan was to consider 

transferring the patient to [facility] because of her status epilepticus 

and this was based upon the recommendation of the neurologist who was notified 

about this patient.  Patient was placed on multiple medications for her 

seizures, and these will be noted below.  Today I evaluated the patient while 

she is on mechanical ventilation, and I recommended that we start considering 

weaning and stopping her Versed which is 5 mg per hour, he is also on propofol 

at 20 mcg/kg/m, and will possibly extubate the patient.  She was evaluated by 

the neurologist, and he feels that the seizures seem to be presently under 

control, and would be worthwhile giving the patient a weaning trial and possibly

extubation.  Her assist-control rate is 14 tidal volume 400 FiO2 30 PEEP of 5.  

ABG from previous settings was noted she had a pO2 of 165 pCO2 24 pH of 7.60, 

however since then the ventilator settings have been really adjusted.  Her last 

seizure was reported at 2300 hrs.CT of the brain last night showed subacute 

lacunar infarct, and suspicious brainstem lesion.however the neurologist evalu

ated the CT of the brain, and did not feel that this is a worrisome 

finding.patient is getting IV Keppra loading dose, she is also getting Versed 

and propofol, and she was givenDepakote twice a day, patient is also receiving 

[id number] mg IV twice a day.in addition the patient is on zonisamide 100 mg by 

mouth twice a dayobviously the patient is maximized on all antiseizure 

medications, and I'm hoping her seizures are under control at present, will give

the patient a trial of weaning and possibly extubationobviously no further plans

to transfer the patient at this point.





Reevaluated today on 12/25/22, patient remains in the ICU, intubated and 

mechanically ventilated, she is on assist control rate of 20, volume 400 FiO2 

30% and PEEP of 5.ABG showed a pO2 of 121 pCO2 36 pH of 7.48.  WBC count is 5.6 

hemoglobin is 10.9.  Electrolytes are basically unremarkable,BUN is 20 

creatinine 7.22.  Patient is a renal patient, and she is on hemodialysis.  

Patient is on propofol at 20 mcg/kg/m which I have discontinued this morning, he

is also on clevidipine at 6 mg per hour.  After stopping her propofol, the 

patient seems to be appropriate, she is following instructions, and I plan wean 

this patient today using pressure support with CPAP, or may use IMV with 

pressure support leading to CPAP.  Overall the patient is doing better, and 

again I plan to wean and extubate, no seizure activities in the last 24 

hours.chest x-ray is basically unremarkable today.











Objective





- Vital Signs


Vital signs: 


                                   Vital Signs











Temp  98.8 F   12/25/22 04:00


 


Pulse  88   12/25/22 07:00


 


Resp  14   12/25/22 07:00


 


BP  123/69   12/25/22 07:00


 


Pulse Ox  100   12/25/22 07:00


 


FiO2  30   12/25/22 10:43








                                 Intake & Output











 12/24/22 12/25/22 12/25/22





 18:59 06:59 18:59


 


Intake Total 267.771 388.513 124.346


 


Output Total 100  0


 


Balance 167.771 388.513 124.346


 


Weight 75 kg 75.8 kg 


 


Intake:   


 


   270 70


 


    Lacosamide  mg In 50 50 50





    Sodium Chloride 0.9% 50   





    ml @ 100 mls/hr IVPB BID   





    [facility] Rx#:[id number]   


 


    NS @  120 20


 


    levETIRAcetam  mg  100 





    In Sodium Chloride 0.9%   





    100 ml @ 400 mls/hr IVPB   





    Q12H [facility] Rx#:[id number]   


 


  Intake, IV Titration 97.771 118.513 54.346





  Amount   


 


    Clevidipine Butyrate 25 0.1 82.934 27.2





    mg In Empty Bag 1 bag @ 1   





    MG/HR 2 mls/hr IV .Q24H   





    [facility] Rx#:[id number]   


 


    Midazolam HCl 50 mg In 33.25  





    Sodium Chloride 0.9% 40   





    ml @ 5 MG/HR 5 mls/hr IV   





    .Q10H [facility] Rx#:[id number]   


 


    propofoL 1,000 mg In 64.421 35.579 27.146





    Empty Bag 1 bag @ 15 MCG/   





    KG/MIN 5.715 mls/hr IV .   





    [id number] [facility] Rx#:[id number]   


 


Output:   


 


  Gastric Drainage 100  


 


  Urine 0  0








                       ABP, PAP, CO, CI - Last Documented











Arterial Blood Pressure        141/53

















- Exam





Physical Exam: Revealed a 52-year-old female in no distress, sedated, intubated,

mechanically ventilated, 


Head: Atraumatic, normocephalic.


HEENT:[Neck is supple.] [No neck masses.] [No thyromegaly.] [No JVD.]


Chest: [Clear throughout, no crackles, no rhonchi, no wheezes.]


Cardiac Exam: [Normal S1 and S2, no S3 gallop, no murmur.]


Abdomen: [Soft, nontender,  no megaly, no rebound, no guarding, normal bowel 

sounds.]


Extremities: [No clubbing, no edema, no cyanosis.]


Neurological Exam:off propofol, patient is arousable, follows very simple 

instructions but seems to be generally weak.


Psychiatric: Could not assess.











- Labs


CBC & Chem 7: 


                                 12/25/22 05:18





                                 12/25/22 05:18


Labs: 


                  Abnormal Lab Results - Last 24 Hours (Table)











  12/24/22 12/25/22 12/25/22 Range/Units





  12:19 05:18 05:18 


 


Hgb   10.9 L   (11.4-16.0)  gm/dL


 


Hct   33.3 L   (34.0-46.0)  %


 


ABG pH     (7.35-7.45)  


 


ABG pO2     ()  mmHg


 


ABG HCO3     (21-25)  mmol/L


 


ABG Total CO2     (19-24)  mmol/L


 


ABG O2 Saturation     (94-97)  %


 


BUN    20 H  (7-17)  mg/dL


 


Creatinine    7.22 H*  (0.52-1.04)  mg/dL


 


Glucose    114 H  (74-99)  mg/dL


 


POC Glucose (mg/dL)  111 H    ()  mg/dL














  12/25/22 Range/Units





  05:46 


 


Hgb   (11.4-16.0)  gm/dL


 


Hct   (34.0-46.0)  %


 


ABG pH  7.48 H  (7.35-7.45)  


 


ABG pO2  121 H  ()  mmHg


 


ABG HCO3  27 H  (21-25)  mmol/L


 


ABG Total CO2  28 H  (19-24)  mmol/L


 


ABG O2 Saturation  99.3 H  (94-97)  %


 


BUN   (7-17)  mg/dL


 


Creatinine   (0.52-1.04)  mg/dL


 


Glucose   (74-99)  mg/dL


 


POC Glucose (mg/dL)   ()  mg/dL








                      Microbiology - Last 24 Hours (Table)











 12/24/22 00:17 Gram Stain - Preliminary





 Sputum Sputum Culture - Preliminary


 


 12/23/22 12:26 Urine Culture - Final





 Urine,Catheterized 


 


 12/23/22 14:15 Blood Culture - Preliminary





 Blood    No Growth after 24 hours


 


 12/23/22 14:00 Blood Culture - Preliminary





 Blood    No Growth after 24 hours














Assessment and Plan


Assessment: 





impression:


Status epilepticus.  Medically refractory epilepsy.


acute hypoxic respiratory failure secondary to status epilepticus,, unable to 

protect her airways


history of CVA and residual left hemiparesis


Type 2 diabetes.


End-stage renal disease, on hemodialysis


Benign essential hypertension


dyslipidemia


Degenerative joint disease





Recommendation:


Continue ventilatory support for now,will give the patient a trial of weaning 

using pressure support and CPAP.  In the meantime we'll hold all narcotics and 

sedatives.


resume seizure medications.


GI and DVT prophylaxis.


We'll continue seizure precautions


continue GI and DVT prophylaxis.


Continue seizure precautions.


Trial of pressure support with IMV/CPAP, and possibly extubate today.


Prognosis remains guarded.


Critical care time is over 30 minutes


We will continue to follow while in ICU.


Time with Patient: Greater than 30

## 2022-12-26 LAB
ANION GAP SERPL CALC-SCNC: 10 MMOL/L
BUN SERPL-SCNC: 26 MG/DL (ref 7–17)
CALCIUM SPEC-MCNC: 8.8 MG/DL (ref 8.4–10.2)
CHLORIDE SERPL-SCNC: 104 MMOL/L (ref 98–107)
CO2 SERPL-SCNC: 24 MMOL/L (ref 22–30)
ERYTHROCYTE [DISTWIDTH] IN BLOOD BY AUTOMATED COUNT: 3.45 M/UL (ref 3.8–5.4)
ERYTHROCYTE [DISTWIDTH] IN BLOOD: 14.3 % (ref 11.5–15.5)
GLUCOSE BLD-MCNC: 100 MG/DL (ref 70–110)
GLUCOSE BLD-MCNC: 137 MG/DL (ref 70–110)
GLUCOSE BLD-MCNC: 65 MG/DL (ref 70–110)
GLUCOSE BLD-MCNC: 76 MG/DL (ref 70–110)
GLUCOSE BLD-MCNC: 87 MG/DL (ref 70–110)
GLUCOSE BLD-MCNC: 93 MG/DL (ref 70–110)
GLUCOSE BLD-MCNC: 97 MG/DL (ref 70–110)
GLUCOSE SERPL-MCNC: 78 MG/DL (ref 74–99)
HCT VFR BLD AUTO: 30.2 % (ref 34–46)
HGB BLD-MCNC: 9.8 GM/DL (ref 11.4–16)
MAGNESIUM SPEC-SCNC: 2.1 MG/DL (ref 1.6–2.3)
MCH RBC QN AUTO: 28.4 PG (ref 25–35)
MCHC RBC AUTO-ENTMCNC: 32.4 G/DL (ref 31–37)
MCV RBC AUTO: 87.5 FL (ref 80–100)
PLATELET # BLD AUTO: 172 K/UL (ref 150–450)
POTASSIUM SERPL-SCNC: 4.5 MMOL/L (ref 3.5–5.1)
SODIUM SERPL-SCNC: 138 MMOL/L (ref 137–145)
WBC # BLD AUTO: 7.8 K/UL (ref 3.8–10.6)

## 2022-12-26 RX ADMIN — SEVELAMER CARBONATE SCH: 800 TABLET, FILM COATED ORAL at 16:53

## 2022-12-26 RX ADMIN — CLEVIPIDINE SCH MLS/HR: 0.5 EMULSION INTRAVENOUS at 10:01

## 2022-12-26 RX ADMIN — LACOSAMIDE SCH MLS/HR: 10 INJECTION INTRAVENOUS at 09:23

## 2022-12-26 RX ADMIN — Medication SCH: at 11:51

## 2022-12-26 RX ADMIN — ZONISAMIDE SCH: 100 CAPSULE ORAL at 10:35

## 2022-12-26 RX ADMIN — METOPROLOL TARTRATE SCH MG: 50 TABLET, FILM COATED ORAL at 09:22

## 2022-12-26 RX ADMIN — Medication SCH: at 16:52

## 2022-12-26 RX ADMIN — DIVALPROEX SODIUM SCH MG: 125 CAPSULE, COATED PELLETS ORAL at 09:23

## 2022-12-26 RX ADMIN — METOPROLOL TARTRATE SCH: 50 TABLET, FILM COATED ORAL at 20:06

## 2022-12-26 RX ADMIN — ATORVASTATIN CALCIUM SCH: 40 TABLET, FILM COATED ORAL at 20:06

## 2022-12-26 RX ADMIN — CLOPIDOGREL BISULFATE SCH: 75 TABLET ORAL at 10:34

## 2022-12-26 RX ADMIN — DIVALPROEX SODIUM SCH MG: 125 CAPSULE, COATED PELLETS ORAL at 09:24

## 2022-12-26 RX ADMIN — LEVETIRACETAM SCH MLS/HR: 100 INJECTION, SOLUTION, CONCENTRATE INTRAVENOUS at 14:46

## 2022-12-26 RX ADMIN — Medication SCH: at 06:48

## 2022-12-26 RX ADMIN — ASPIRIN SCH: 325 TABLET ORAL at 10:34

## 2022-12-26 RX ADMIN — LACOSAMIDE SCH MLS/HR: 10 INJECTION INTRAVENOUS at 20:53

## 2022-12-26 RX ADMIN — SEVELAMER CARBONATE SCH: 800 TABLET, FILM COATED ORAL at 06:49

## 2022-12-26 RX ADMIN — DEXTROSE MONOHYDRATE AND SODIUM CHLORIDE SCH MLS/HR: 5; .9 INJECTION, SOLUTION INTRAVENOUS at 16:52

## 2022-12-26 RX ADMIN — CLEVIPIDINE SCH MLS/HR: 0.5 EMULSION INTRAVENOUS at 22:55

## 2022-12-26 RX ADMIN — POTASSIUM CHLORIDE SCH: 20 TABLET, EXTENDED RELEASE ORAL at 10:34

## 2022-12-26 RX ADMIN — CLEVIPIDINE SCH MLS/HR: 0.5 EMULSION INTRAVENOUS at 04:58

## 2022-12-26 RX ADMIN — ASPIRIN SCH: 325 TABLET ORAL at 20:55

## 2022-12-26 RX ADMIN — DIVALPROEX SODIUM SCH: 125 CAPSULE, COATED PELLETS ORAL at 11:51

## 2022-12-26 RX ADMIN — MIDAZOLAM SCH: 5 INJECTION INTRAMUSCULAR; INTRAVENOUS at 09:24

## 2022-12-26 RX ADMIN — LEVETIRACETAM SCH MLS/HR: 100 INJECTION, SOLUTION, CONCENTRATE INTRAVENOUS at 22:56

## 2022-12-26 RX ADMIN — HEPARIN SODIUM SCH UNIT: 5000 INJECTION INTRAVENOUS; SUBCUTANEOUS at 09:22

## 2022-12-26 RX ADMIN — DIVALPROEX SODIUM SCH: 125 CAPSULE, COATED PELLETS ORAL at 20:55

## 2022-12-26 RX ADMIN — PANTOPRAZOLE SODIUM SCH: 40 TABLET, DELAYED RELEASE ORAL at 06:48

## 2022-12-26 RX ADMIN — POTASSIUM CHLORIDE SCH: 20 TABLET, EXTENDED RELEASE ORAL at 20:55

## 2022-12-26 RX ADMIN — HEPARIN SODIUM SCH UNIT: 5000 INJECTION INTRAVENOUS; SUBCUTANEOUS at 22:55

## 2022-12-26 RX ADMIN — SEVELAMER CARBONATE SCH: 800 TABLET, FILM COATED ORAL at 11:51

## 2022-12-26 RX ADMIN — ZONISAMIDE SCH: 100 CAPSULE ORAL at 20:55

## 2022-12-26 RX ADMIN — HEPARIN SODIUM SCH UNIT: 5000 INJECTION INTRAVENOUS; SUBCUTANEOUS at 16:57

## 2022-12-26 NOTE — XR
EXAMINATION TYPE: XR chest 1V portable

 

DATE OF EXAM: 12/26/2022 6:32 AM

 

COMPARISON: Chest radiograph from one day prior. 

 

TECHNIQUE: XR chest 1V portable Portable AP radiograph of the chest.

 

CLINICAL INDICATION:Female, 52 years old with history of Tube placement; 

 

FINDINGS: 

Lungs/Pleura: There is no evidence of pleural effusion, focal consolidation, or pneumothorax.  

Pulmonary vascularity: Unremarkable.

Heart/mediastinum: Cardiomediastinal silhouette is unremarkable. A loop recorder projects over the le
ft thorax over the heart. Electronic device with leads terminating over the neck.

Musculoskeletal: No acute osseous pathology.

 

Other findings: None

 

Lines/Tubes:

Interval removal of endotracheal nasogastric tubes.

 

IMPRESSION: 

No acute cardiopulmonary disease/process.

## 2022-12-26 NOTE — P.PN
Subjective


Progress Note Date: 12/26/22


Principal diagnosis: 





status epilepticus





this is a 52-year-old female with history of chronic seizure disorder, her 

seizures have been recently poorly controlled, patient follows up with the 

neurologist/Dr. Nelson for her seizures.  Patient was brought into the ER 

yesterday with altered mental status, intermittent episodes of nausea and 

vomiting.  Apparently the patient was dropped off yesterday at the dialysis 

unit/clinic, and when she got there patient became unresponsive, she did not 

notice to have any seizure-like activity.  She was not speaking or answering any

questions.  Patient was unable to get her dialysis treatment, hence the patient 

was brought in by EMS to the ER yesterday.  Apparently the patient was admitted,

and shortly after she was admitted she had recurrent episodes of 

seizures,/status epilepticus.that a team responded to a call from the nurse on t

he floor, patient was evaluated by the hospitalist, did not seem to follow any 

commands or make any eye contact.  And at one point it was felt that the patient

had status epilepticus.  Apparently the patient had to be intubated to protect 

her airways, she was admitted to the ICU, and the plan was to consider 

transferring the patient to [facility] because of her status epilepticus 

and this was based upon the recommendation of the neurologist who was notified 

about this patient.  Patient was placed on multiple medications for her 

seizures, and these will be noted below.  Today I evaluated the patient while 

she is on mechanical ventilation, and I recommended that we start considering 

weaning and stopping her Versed which is 5 mg per hour, he is also on propofol 

at 20 mcg/kg/m, and will possibly extubate the patient.  She was evaluated by 

the neurologist, and he feels that the seizures seem to be presently under 

control, and would be worthwhile giving the patient a weaning trial and possibly

extubation.  Her assist-control rate is 14 tidal volume 400 FiO2 30 PEEP of 5.  

ABG from previous settings was noted she had a pO2 of 165 pCO2 24 pH of 7.60, 

however since then the ventilator settings have been really adjusted.  Her last 

seizure was reported at 2300 hrs.CT of the brain last night showed subacute 

lacunar infarct, and suspicious brainstem lesion.however the neurologist evalu

ated the CT of the brain, and did not feel that this is a worrisome 

finding.patient is getting IV Keppra loading dose, she is also getting Versed 

and propofol, and she was givenDepakote twice a day, patient is also receiving 

Kerqvw10 mg IV twice a day.in addition the patient is on zonisamide 100 mg by 

mouth twice a dayobviously the patient is maximized on all antiseizure 

medications, and I'm hoping her seizures are under control at present, will give

the patient a trial of weaning and possibly extubationobviously no further plans

to transfer the patient at this point.





Reevaluated today on 12/25/22, patient remains in the ICU, intubated and 

mechanically ventilated, she is on assist control rate of 20, volume 400 FiO2 

30% and PEEP of 5.ABG showed a pO2 of 121 pCO2 36 pH of 7.48.  WBC count is 5.6 

hemoglobin is 10.9.  Electrolytes are basically unremarkable,BUN is 20 

creatinine 7.22.  Patient is a renal patient, and she is on hemodialysis.  

Patient is on propofol at 20 mcg/kg/m which I have discontinued this morning, he

is also on clevidipine at 6 mg per hour.  After stopping her propofol, the 

patient seems to be appropriate, she is following instructions, and I plan wean 

this patient today using pressure support with CPAP, or may use IMV with 

pressure support leading to CPAP.  Overall the patient is doing better, and 

again I plan to wean and extubate, no seizure activities in the last 24 

hours.chest x-ray is basically unremarkable today.





Reevaluated today on 12/26/22, patient remains in the ICU, she was extubated 

yesterday, she tolerated the extubation well.  She is now on 2 L nasal cannula, 

not in distress, she is requiring clevidipine elevated blood pressure, hence I 

started the patient on metoprolol 50 mg twice a day and amlodipine 5 mg by mouth

twice a day.  No seizures noted over the last 24 hours, patient seems to be 

generally weak, not in any form of respiratory distress.  Labs today are 

basically unremarkable except for creatinine of 8.95, and I believe the patient 

will likely be hemodialysis today.  Chest x-ray showed no evidence of any active

disease.











Objective





- Vital Signs


Vital signs: 


                                   Vital Signs











Temp  98.3 F   12/26/22 08:00


 


Pulse  81   12/26/22 11:00


 


Resp  15   12/26/22 11:00


 


BP  134/71   12/26/22 08:00


 


Pulse Ox  96   12/26/22 11:00


 


FiO2  35   12/25/22 18:05








                                 Intake & Output











 12/25/22 12/26/22 12/26/22





 18:59 06:59 18:59


 


Intake Total 408.912 355.433 40.267


 


Output Total 0 0 0


 


Balance 408.912 355.433 40.267


 


Weight  78.8 kg 


 


Intake:   


 


   280 40


 


    Lacosamide  mg In 50 50 





    Sodium Chloride 0.9% 50   





    ml @ 100 mls/hr IVPB BID   





    [facility] Rx#:[id number]   


 


    NS @  130 40


 


    levETIRAcetam  mg 100 100 





    In Sodium Chloride 0.9%   





    100 ml @ 400 mls/hr IVPB   





    Q12H [facility] Rx#:[id number]   


 


  Intake, IV Titration 158.912 75.433 0.267





  Amount   


 


    Clevidipine Butyrate 25 131.766 75.433 0.267





    mg In Empty Bag 1 bag @ 1   





    MG/HR 2 mls/hr IV .Q24H   





    [facility] Rx#:[id number]   


 


    propofoL 1,000 mg In 27.146  





    Empty Bag 1 bag @ 15 MCG/   





    KG/MIN 5.715 mls/hr IV .   





    L30H25C [facility] Rx#:[id number]   


 


Output:   


 


  Urine 0 0 0








                       ABP, PAP, CO, CI - Last Documented











Arterial Blood Pressure        126/53

















- Exam





Physical Exam: Revealed a 52-year-old female in no distress, on 2 L nasal 

cannula.


Head: Atraumatic, normocephalic.


HEENT:[Neck is supple.] [No neck masses.] [No thyromegaly.] [No JVD.]


Chest: [Clear throughout, no crackles, no rhonchi, no wheezes.]


Cardiac Exam: [Normal S1 and S2, no S3 gallop, no murmur.]


Abdomen: [Soft, nontender,  no megaly, no rebound, no guarding, normal bowel 

sounds.]


Extremities: [No clubbing, no edema, no cyanosis.]


Neurological Exam: Patient seems generally weak, alert oriented 3, no gross 

focal neurologic deficits.


Psychiatric: Depressed mood flat affect, normal mental status.


Skin: No rashes.


Musculoskeletal: No deformities and no limitation of range of motion.











- Labs


CBC & Chem 7: 


                                 12/26/22 04:10





                                 12/26/22 04:10


Labs: 


                  Abnormal Lab Results - Last 24 Hours (Table)











  12/25/22 12/26/22 12/26/22 Range/Units





  17:56 04:10 04:10 


 


RBC   3.45 L   (3.80-5.40)  m/uL


 


Hgb   9.8 L   (11.4-16.0)  gm/dL


 


Hct   30.2 L   (34.0-46.0)  %


 


BUN    26 H  (7-17)  mg/dL


 


Creatinine    8.95 H*  (0.52-1.04)  mg/dL


 


POC Glucose (mg/dL)  117 H    ()  mg/dL








                      Microbiology - Last 24 Hours (Table)











 12/24/22 00:17 Gram Stain - Final





 Sputum Sputum Culture - Final


 


 12/23/22 14:15 Blood Culture - Preliminary





 Blood    No Growth after 48 hours


 


 12/23/22 14:00 Blood Culture - Preliminary





 Blood    No Growth after 48 hours














Assessment and Plan


Assessment: 





impression:


Status epilepticus.  Presently under control.  Patient required intubation and 

mechanical ventilation to protect her airways.  Extubated on 12/25/22.


acute hypoxic respiratory failure secondary to status epilepticus,, unable to 

protect her airways


history of CVA and residual left hemiparesis


Type 2 diabetes.


End-stage renal disease, on hemodialysis


Benign essential hypertension


dyslipidemia


Degenerative joint disease





Recommendation: Continue seizure meds as per neurology on the case.


Continue to monitor in the ICU for the next 24 hours


Started patient on metoprolol 50 mg by mouth twice a day and amlodipine 5 mg 

twice a day the plan is to discontinue clevidipine.


GI and DVT prophylaxis.


We'll continue seizure precautions


continue GI and DVT prophylaxis.


Continue seizure precautions.


We will continue to follow while in ICU.  For the next 24 hours


Time with Patient: Less than 30

## 2022-12-26 NOTE — P.GSCN
History of Present Illness


Consult date: 22


Reason for Consult: 





Carotid stenosis.


History of present illness: 





Patient is a 54-year-old female who suffers from significant a polyp the events 

who was recently admitted secondary to an epileptic event.  Concern was 

expressed over the possibility of an ischemic neurologic event and a CTA was 

performed.  This suggested greater than 75% stenosis of the left internal 

carotid artery.  She did subsequently undergo carotid duplex imaging.  Patient 

has no known previous neurologic event related to carotid occlusive disease.








Past Medical History


Past Medical History: Chest Pain / Angina, CVA/TIA, Diabetes Mellitus, 

GERD/Reflux, Hyperlipidemia, Hypertension, Memory Impairment, Osteoarthritis 

(OA), Renal Disease, Seizure Disorder, Thyroid Disorder


Additional Past Medical History / Comment(s): Last seizure approximately one 

month ago. Spouse states she used to have seizures 4X per week until she had 

vagal nerve stimulator placed, now has seizures approximately once a week to 

once a month. Dialysis MOWEFR. Neuropathy, left drop foot, only able to walk 

short distances, otherwise uses wheelchair. 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 

ago, with residual memory impairment. Never diagnosed with Sleep Apnea but 

spouse states she does stop breathing through the night and he has to shake her 

to start breathing again. Varicose veins.


History of Any Multi-Drug Resistant Organisms: None Reported


Past Surgical History:  Section, Tonsillectomy, Uterine Ablation


Additional Past Surgical History / Comment(s): Left arm fistula, loop recorder, 

vagus nerve stimulator.


Past Anesthesia/Blood Transfusion Reactions: Motion Sickness


Additional Past Anesthesia/Blood Transfusion Reaction / Comm: Family hx unknown,

pt adopted.


Past Psychological History: Bipolar


Smoking Status: Former smoker


Past Alcohol Use History: None Reported


Additional Past Alcohol Use History / Comment(s): Quit smoking in .


Past Drug Use History: None Reported





- Past Family History


  ** Father


Family Medical History: Unable to Obtain


Additional Family Medical History / Comment(s): Pt adopted.





Medications and Allergies


                                Home Medications











 Medication  Instructions  Recorded  Confirmed  Type


 


Clopidogrel [Plavix] 75 mg PO DAILY #30 tab 21 Rx


 


Atorvastatin [Lipitor] 40 mg PO HS 21 History


 


Dulaglutide [Trulicity] 3 mg SQ Q7D 21 History


 


Sevelamer [Renvela] 800 mg PO BID-W/MEALS 21 History


 


Vascepa 1gm 1 gm PO BID 21 History


 


Calcium Acetate 667 mg PO TID-W/MEALS 22 History


 


Metoprolol Tartrate [Lopressor] 25 mg PO BID 22 History


 


Pantoprazole [Protonix] 40 mg PO DAILY 22 History


 


Potassium Chloride ER [K-Dur 20] 20 meq PO BID 22 History


 


levETIRAcetam [Keppra] 500 mg PO BID@0700,2100 22 History


 


Divalproex ER [Depakote ER] 250 mg PO BID 30 Days #60 tab 22 Rx


 


levETIRAcetam [Keppra] 500 mg PO MoWeFr@1600 30 Days #15 22 Rx





 tab   


 


Sevelamer [Renvela] 1,600 mg PO W/SUPPER 10/07/22 12/23/22 History


 


Lacosamide [Vimpat] See Taper PO AS DIRECTED 21 Days 22 Rx





 #49 tab   


 


Zonisamide [Zonegran] 100 mg PO DAILY 30 Days #30 cap 22 Rx


 


Divalproex [Depakote] 500 mg PO Q8H 22 History








                                    Allergies











Allergy/AdvReac Type Severity Reaction Status Date / Time


 


Penicillins Allergy  Itching Verified 22 13:57














Surgical - Exam


Osteopathic Statement: *.  No significant issues noted on an osteopathic 

structural exam other than those noted in the History and Physical/Consult.


                                   Vital Signs











Pulse Resp BP Pulse Ox


 


 85   16   167/86   96 


 


 22 11:22  22 11:22  22 11:22  22 11:22














- Neck


carotid bruit: absent (No carotid bruit noted either side.)





Results





- Labs





                                 22 04:10





                                 22 04:10


                  Abnormal Lab Results - Last 24 Hours (Table)











  22 Range/Units





  17:56 04:10 04:10 


 


RBC   3.45 L   (3.80-5.40)  m/uL


 


Hgb   9.8 L   (11.4-16.0)  gm/dL


 


Hct   30.2 L   (34.0-46.0)  %


 


BUN    26 H  (7-17)  mg/dL


 


Creatinine    8.95 H*  (0.52-1.04)  mg/dL


 


POC Glucose (mg/dL)  117 H    ()  mg/dL








                      Microbiology - Last 24 Hours (Table)











 22 14:15 Blood Culture - Preliminary





 Blood    No Growth after 48 hours


 


 22 14:00 Blood Culture - Preliminary





 Blood    No Growth after 48 hours


 


 22 00:17 Gram Stain - Preliminary





 Sputum Sputum Culture - Preliminary








                                 Diabetes panel











  22 Range/Units





  04:10 


 


Sodium  138  (137-145)  mmol/L


 


Potassium  4.5  (3.5-5.1)  mmol/L


 


Chloride  104  ()  mmol/L


 


Carbon Dioxide  24  (22-30)  mmol/L


 


BUN  26 H  (7-17)  mg/dL


 


Creatinine  8.95 H*  (0.52-1.04)  mg/dL


 


Glucose  78  (74-99)  mg/dL


 


Calcium  8.8  (8.4-10.2)  mg/dL








                                  Calcium panel











  22 Range/Units





  04:10 


 


Calcium  8.8  (8.4-10.2)  mg/dL








                                 Pituitary panel











  22 Range/Units





  04:10 


 


Sodium  138  (137-145)  mmol/L


 


Potassium  4.5  (3.5-5.1)  mmol/L


 


Chloride  104  ()  mmol/L


 


Carbon Dioxide  24  (22-30)  mmol/L


 


BUN  26 H  (7-17)  mg/dL


 


Creatinine  8.95 H*  (0.52-1.04)  mg/dL


 


Glucose  78  (74-99)  mg/dL


 


Calcium  8.8  (8.4-10.2)  mg/dL








                                  Adrenal panel











  22 Range/Units





  04:10 


 


Sodium  138  (137-145)  mmol/L


 


Potassium  4.5  (3.5-5.1)  mmol/L


 


Chloride  104  ()  mmol/L


 


Carbon Dioxide  24  (22-30)  mmol/L


 


BUN  26 H  (7-17)  mg/dL


 


Creatinine  8.95 H*  (0.52-1.04)  mg/dL


 


Glucose  78  (74-99)  mg/dL


 


Calcium  8.8  (8.4-10.2)  mg/dL














- Imaging


Additional studies: 





Carotid duplex images reviewed.


CTA images reviewed.





Assessment and Plan


Assessment: 





#1: Severe epileptic disease.


#2: No evidence to suggest significant carotid occlusive disease.  There is a 

discordance between the CTA results in the carotid duplex imaging results 

however I do not see any evidence of carotid occlusive disease on either study.


Plan: 





#1: Agree with current medical regimen.


#2: No plan for vascular surgical intervention in reference to carotid occlusive

 disease nor is any expected to be needed in the future.


#3: We'll be happy to reevaluate at your request.


Time with Patient: Greater than 30

## 2022-12-27 LAB
ANION GAP SERPL CALC-SCNC: 11 MMOL/L
BUN SERPL-SCNC: 38 MG/DL (ref 7–17)
CALCIUM SPEC-MCNC: 8.5 MG/DL (ref 8.4–10.2)
CHLORIDE SERPL-SCNC: 106 MMOL/L (ref 98–107)
CO2 SERPL-SCNC: 22 MMOL/L (ref 22–30)
ERYTHROCYTE [DISTWIDTH] IN BLOOD BY AUTOMATED COUNT: 3.29 M/UL (ref 3.8–5.4)
ERYTHROCYTE [DISTWIDTH] IN BLOOD: 13.9 % (ref 11.5–15.5)
FERRITIN SERPL-MCNC: 1075 NG/ML (ref 10–291)
GLUCOSE BLD-MCNC: 110 MG/DL (ref 70–110)
GLUCOSE BLD-MCNC: 120 MG/DL (ref 70–110)
GLUCOSE BLD-MCNC: 129 MG/DL (ref 70–110)
GLUCOSE BLD-MCNC: 158 MG/DL (ref 70–110)
GLUCOSE BLD-MCNC: 97 MG/DL (ref 70–110)
GLUCOSE SERPL-MCNC: 85 MG/DL (ref 74–99)
HCT VFR BLD AUTO: 29.4 % (ref 34–46)
HGB BLD-MCNC: 9.2 GM/DL (ref 11.4–16)
IRON SERPL-MCNC: 65 UG/DL (ref 50–170)
MAGNESIUM SPEC-SCNC: 2.3 MG/DL (ref 1.6–2.3)
MCH RBC QN AUTO: 28 PG (ref 25–35)
MCHC RBC AUTO-ENTMCNC: 31.4 G/DL (ref 31–37)
MCV RBC AUTO: 89.3 FL (ref 80–100)
PLATELET # BLD AUTO: 154 K/UL (ref 150–450)
POTASSIUM SERPL-SCNC: 4.3 MMOL/L (ref 3.5–5.1)
SODIUM SERPL-SCNC: 139 MMOL/L (ref 137–145)
TIBC SERPL-MCNC: 155 UG/DL (ref 228–460)
WBC # BLD AUTO: 6.6 K/UL (ref 3.8–10.6)

## 2022-12-27 RX ADMIN — PANTOPRAZOLE SODIUM SCH MG: 40 TABLET, DELAYED RELEASE ORAL at 13:51

## 2022-12-27 RX ADMIN — LACOSAMIDE SCH MG: 50 TABLET, FILM COATED ORAL at 20:48

## 2022-12-27 RX ADMIN — DIVALPROEX SODIUM SCH: 125 CAPSULE, COATED PELLETS ORAL at 15:05

## 2022-12-27 RX ADMIN — ZONISAMIDE SCH MG: 100 CAPSULE ORAL at 13:51

## 2022-12-27 RX ADMIN — POTASSIUM CHLORIDE SCH: 20 TABLET, EXTENDED RELEASE ORAL at 08:00

## 2022-12-27 RX ADMIN — SEVELAMER CARBONATE SCH MG: 800 TABLET, FILM COATED ORAL at 17:35

## 2022-12-27 RX ADMIN — Medication SCH MG: at 14:59

## 2022-12-27 RX ADMIN — PANTOPRAZOLE SODIUM SCH: 40 TABLET, DELAYED RELEASE ORAL at 06:13

## 2022-12-27 RX ADMIN — DIVALPROEX SODIUM SCH MG: 125 CAPSULE, COATED PELLETS ORAL at 13:50

## 2022-12-27 RX ADMIN — LEVETIRACETAM SCH MLS/HR: 100 INJECTION, SOLUTION, CONCENTRATE INTRAVENOUS at 14:06

## 2022-12-27 RX ADMIN — LACOSAMIDE SCH: 50 TABLET, FILM COATED ORAL at 18:31

## 2022-12-27 RX ADMIN — ASPIRIN SCH: 325 TABLET ORAL at 08:00

## 2022-12-27 RX ADMIN — Medication SCH: at 06:13

## 2022-12-27 RX ADMIN — SEVELAMER CARBONATE SCH MG: 800 TABLET, FILM COATED ORAL at 14:19

## 2022-12-27 RX ADMIN — HEPARIN SODIUM SCH UNIT: 5000 INJECTION INTRAVENOUS; SUBCUTANEOUS at 23:14

## 2022-12-27 RX ADMIN — LACOSAMIDE SCH: 10 INJECTION INTRAVENOUS at 15:04

## 2022-12-27 RX ADMIN — DIVALPROEX SODIUM SCH MG: 125 CAPSULE, COATED PELLETS ORAL at 20:50

## 2022-12-27 RX ADMIN — HEPARIN SODIUM SCH: 5000 INJECTION INTRAVENOUS; SUBCUTANEOUS at 15:05

## 2022-12-27 RX ADMIN — Medication SCH MG: at 17:35

## 2022-12-27 RX ADMIN — ATORVASTATIN CALCIUM SCH MG: 40 TABLET, FILM COATED ORAL at 20:49

## 2022-12-27 RX ADMIN — SEVELAMER CARBONATE SCH: 800 TABLET, FILM COATED ORAL at 06:13

## 2022-12-27 RX ADMIN — CLEVIPIDINE SCH MLS/HR: 0.5 EMULSION INTRAVENOUS at 02:52

## 2022-12-27 RX ADMIN — METOPROLOL TARTRATE SCH: 50 TABLET, FILM COATED ORAL at 08:00

## 2022-12-27 RX ADMIN — ASPIRIN SCH: 325 TABLET ORAL at 20:50

## 2022-12-27 RX ADMIN — CLOPIDOGREL BISULFATE SCH: 75 TABLET ORAL at 08:00

## 2022-12-27 RX ADMIN — ZONISAMIDE SCH MG: 100 CAPSULE ORAL at 20:48

## 2022-12-27 RX ADMIN — DEXTROSE MONOHYDRATE AND SODIUM CHLORIDE SCH: 5; .9 INJECTION, SOLUTION INTRAVENOUS at 15:04

## 2022-12-27 RX ADMIN — ZONISAMIDE SCH: 100 CAPSULE ORAL at 08:00

## 2022-12-27 RX ADMIN — METOPROLOL TARTRATE SCH MG: 25 TABLET, FILM COATED ORAL at 20:49

## 2022-12-27 RX ADMIN — HEPARIN SODIUM SCH UNIT: 5000 INJECTION INTRAVENOUS; SUBCUTANEOUS at 15:05

## 2022-12-27 RX ADMIN — VALPROATE SODIUM SCH: 100 INJECTION, SOLUTION INTRAVENOUS at 14:58

## 2022-12-27 RX ADMIN — CLOPIDOGREL BISULFATE SCH MG: 75 TABLET ORAL at 13:50

## 2022-12-27 RX ADMIN — VALPROATE SODIUM SCH: 100 INJECTION, SOLUTION INTRAVENOUS at 15:06

## 2022-12-27 RX ADMIN — DIVALPROEX SODIUM SCH: 125 CAPSULE, COATED PELLETS ORAL at 08:00

## 2022-12-27 NOTE — P.PN
Subjective


Progress Note Date: 12/26/22


patient is a 52-year-old female with a known history ofhypertension, 

hyperlipidemia, diabetes type 2, history of seizure disorder and most recent 

seizures about a month ago, prior history of vagal nerve stimulator placed, 

diabetic peripheral neuropathy, ESRD on hemodialysis Monday Wednesday and Friday

and history of CVA/TIA and uses wheelchair, residual memory impairment, newly 

diagnosed obstructive sleep apnea, prior history of smoking and bipolar disorder

and other medical problems was seen at dialysis clinic yesterday when she became

unresponsive and was not following commands and answering questions.  Patient 

was sent to ER for evaluation.  Patient was also having nausea and episodes of 

vomiting.  She was noted to have seizure-like activity.  She did not get 

dialysis and was transferred to ED by EMS.  On arrival to ER patient was awake 

alert and oriented x1 and was not holding any conversation with the staff.


Previously she was admitted to the hospital from 12 11-12 14 for worsening seizu

re activity.  Patient was started on Zonegran along with Keppra and Depakote.


Patient was also having episodes of vomiting in the ER.  As per her  

patient has been having vomiting for the past 1 week on and off.  She was also 

seen at [facility] earlier this week for seizure-like activity.


Patient was admitted to hospital with neurology consultation.  Overnight a team 

was called for assessment and possible status epilepticus.  Patient was not 

following commands or make eye contact.  Patient was also having episodes of 

emesis and concern for possible aspiration.  Patient was transferred to MICU and

intubated for airway protection.


On admission EKG showed sinus rhythm with moderate intraventricular conduction 

delays.


CT head showed hypodensity within the brainstem and within the left basal 

ganglia could be ischemic changes of intermediate age.  Consider follow-up with 

MRI.


Chest x-ray showed no acute cardiopulmonary process.  Chest x-ray repeated last 

night showed no acute cardiopulmonary disease.  Normal heart.  No change.


Laboratory data on admission WBC 6.7 hemoglobin 0.1 and platelets 218


Sodium 142 potassium 4.0 chloride 100 bicarb is 24 BUN 32 and creatinine 9.47 

and lactic acid 3.0 and blood sugar was 288 on admission


proBNP 2780


Troponin x1 negative


TSH 0.708


Urinalysis showed cloudy with 3+ protein large leukocyte esterase and elevated 

RBCs and WBCs.


Valproic acid level was 127.1.  Therapeutic level is 50-1 20





CTA head and neck showed irregular moderate plaque in the left carotid artery 

stenosis 75% at the origin..





12/25/2022


Patient is currently in the MICU and remains intubated and on mechanical 

ventilator.  Propofol has been discontinued and patient remains Cleviprex.  

Patient is able to open her eyes but able to follow commands at this time.  

Pulmonary is planning to wean off with pressure support and possible extubation.


Chest x-ray showed satisfactory ET and NG tube.  No acute process seen.


Patient is being continued on Keppra, Vimpat and Zonegran.  Pulmonary and 

neurology is on board.


Hemodialysis as per schedule.


Laboratory data showed WBC 5.6 hemoglobin 10.9 platelets 203


Sodium 139 potassium 4.2 chloride 104 bicarb is 26 BUN 21 creatinine 7.22 and 

blood sugar is 114.  Valproic acid level is 73.8





12/26/2022


Patient is in the MICU.  Patient was extubated yesterday.  On 2 L oxygen via 

nasal cannula.  Patient is on Cleviprex.  Awake alert and tries to communicate. 

Chest x-ray today showed no acute cardiopulmonary process.


Patient is being continued on antiepileptic drugs and was also started on blood 

pressure medications this morning.  CBG down to 65 this afternoon.  Patient is 

currently nothing by mouth.


Laboratory data showed WBC 7.8 hemoglobin 9.8 and platelets 172 sodium 138 

potassium 4.5 chloride 104 bicarb is 24 BUN 26 and creatinine 8.95.


Pulmonary, neurology and nephrology is on board.





Current medications reviewed.








Objective





- Vital Signs


Vital signs: 


                                   Vital Signs











Temp  98.3 F   12/26/22 20:00


 


Pulse  86   12/26/22 22:00


 


Resp  23   12/26/22 22:00


 


BP  139/102   12/26/22 21:00


 


Pulse Ox  97   12/26/22 22:00


 


FiO2  35   12/25/22 18:05








                                 Intake & Output











 12/26/22 12/26/22 12/27/22





 06:59 18:59 06:59


 


Intake Total 355.433 370.534 390


 


Output Total 0 0 0


 


Balance 355.433 370.534 390


 


Weight 78.8 kg 78.8 kg 


 


Intake:   


 


   310 390


 


    Dextrose 5%-0.9% NaCl 1,  100 200





    000 ml @ 50 mls/hr IV .   





    Q20H [facility] Rx#:[id number]   


 


    Lacosamide  mg In 50  50





    Sodium Chloride 0.9% 50   





    ml @ 100 mls/hr IVPB BID   





    [facility] Rx#:[id number]   


 


    NS @  110 40


 


    levETIRAcetam  mg 100 100 100





    In Sodium Chloride 0.9%   





    100 ml @ 400 mls/hr IVPB   





    Q12H [facility] Rx#:[id number]   


 


  Intake, IV Titration 75.433 60.534 





  Amount   


 


    Clevidipine Butyrate 25 75.433 60.534 





    mg In Empty Bag 1 bag @ 1   





    MG/HR 2 mls/hr IV .Q24H   





    [facility] Rx#:[id number]   


 


Output:   


 


  Urine 0 0 0








                       ABP, PAP, CO, CI - Last Documented











Arterial Blood Pressure        135/52

















- Exam





PHYSICAL EXAMINATION: 


Patient is currently sedated and intubated... 


HEENT: Normocephalic. Neck is supple. Pupils reactive. Nostrils clear. Oral 

cavity is moist. 


Neck reveals no JVD, carotid bruits, or thyromegaly. 


CHEST EXAMINATION: Trachea is central. Symmetrical expansion.  Bibasilar 

diminished sounds.  No wheezing or crackles.


Lung fields clear to auscultation and percussion. 


CARDIAC: Normal S1, S2 with no gallops. No murmurs 


ABDOMEN: Soft. Bowel sounds present. No organomegaly. No abdominal bruits. 


Extremities: Trace edema.  No clubbing or cyanosis


Neurologically patient is sedated intubated.  No gross focal deficits noted.  

Left foot drop.


Skin: No rash or skin lesions. 


Psychiatric: Could not be assessed at this time.


Musculoskeletal: No joint swelling or deformity. 





- Labs


CBC & Chem 7: 


                                 12/26/22 04:10





                                 12/26/22 04:10


Labs: 


                  Abnormal Lab Results - Last 24 Hours (Table)











  12/26/22 12/26/22 12/26/22 Range/Units





  04:10 04:10 16:06 


 


RBC  3.45 L    (3.80-5.40)  m/uL


 


Hgb  9.8 L    (11.4-16.0)  gm/dL


 


Hct  30.2 L    (34.0-46.0)  %


 


BUN   26 H   (7-17)  mg/dL


 


Creatinine   8.95 H*   (0.52-1.04)  mg/dL


 


POC Glucose (mg/dL)    65 L  ()  mg/dL














  12/26/22 Range/Units





  16:29 


 


RBC   (3.80-5.40)  m/uL


 


Hgb   (11.4-16.0)  gm/dL


 


Hct   (34.0-46.0)  %


 


BUN   (7-17)  mg/dL


 


Creatinine   (0.52-1.04)  mg/dL


 


POC Glucose (mg/dL)  137 H  ()  mg/dL








                      Microbiology - Last 24 Hours (Table)











 12/23/22 14:15 Blood Culture - Preliminary





 Blood    No Growth after 72 hours


 


 12/23/22 14:00 Blood Culture - Preliminary





 Blood    No Growth after 72 hours


 


 12/24/22 00:17 Gram Stain - Final





 Sputum Sputum Culture - Final














Assessment and Plan


Assessment: 





Altered mental status likely due to e status epilepticus. Patient is extubated 

on 12/25..


History of refractory epilepsy with previous multiple admissions due to 

seizures.


left ICA 75%stenosis at the origin as per CTA neck.


hypodensity in the brainstem and left basal ganglia were related to previous CVA


ESRD on hemodialysisMonday Wednesday and Friday.


History of CVA with left hemiparesis


Diabetes2


I would a peripheral neuropathy


Memory impairment


GERD


Hypertension next hyperlipidemia


Hypothyroidism 


bipolar disorder


Primary history of smoking


DVT prophylaxis with heparin subcu





Plan:


Patient is extubated 12/25/2022.  Remains Cleviprex.  initiated blood pressure 

medications.


Started back on Zonegran, Depakote and Keppra as per neurology 

recommendations.Zonegran dose increased to 100 mg twice a day,Keppra additional 

dose 500 mg post dialysis.


Patient will be continued on secondary stroke prophylaxis Plavix and Lipitor,


Neurology is on board.  Nephrology is on board for hemodialysis.


Continue with home medications and insulin sliding scale for better blood sugar 

control.


Follow-up closely.  Prognosis is guarded with multiple medical problems and 

comorbid conditions.





Time with Patient: Greater than 30

## 2022-12-27 NOTE — P.PN
Subjective


Progress Note Date: 12/27/22





this is a 52-year-old female with history of chronic seizure disorder, her 

seizures have been recently poorly controlled, patient follows up with the 

neurologist/Dr. Nelson for her seizures.  Patient was brought into the ER 

yesterday with altered mental status, intermittent episodes of nausea and 

vomiting.  Apparently the patient was dropped off yesterday at the dialysis 

unit/clinic, and when she got there patient became unresponsive, she did not 

notice to have any seizure-like activity.  She was not speaking or answering any

questions.  Patient was unable to get her dialysis treatment, hence the patient 

was brought in by EMS to the ER yesterday.  Apparently the patient was admitted,

and shortly after she was admitted she had recurrent episodes of 

seizures,/status epilepticus.that a team responded to a call from the nurse on 

the floor, patient was evaluated by the hospitalist, did not seem to follow any 

commands or make any eye contact.  And at one point it was felt that the patient

had status epilepticus.  Apparently the patient had to be intubated to protect 

her airways, she was admitted to the ICU, and the plan was to consider 

transferring the patient to [facility] because of her status epilepticus 

and this was based upon the recommendation of the neurologist who was notified 

about this patient.  Patient was placed on multiple medications for her 

seizures, and these will be noted below.  Today I evaluated the patient while 

she is on mechanical ventilation, and I recommended that we start considering 

weaning and stopping her Versed which is 5 mg per hour, he is also on propofol 

at 20 mcg/kg/m, and will possibly extubate the patient.  She was evaluated by 

the neurologist, and he feels that the seizures seem to be presently under 

control, and would be worthwhile giving the patient a weaning trial and possibly

extubation.  Her assist-control rate is 14 tidal volume 400 FiO2 30 PEEP of 5.  

ABG from previous settings was noted she had a pO2 of 165 pCO2 24 pH of 7.60, h

owever since then the ventilator settings have been really adjusted.  Her last 

seizure was reported at 2300 hrs.CT of the brain last night showed subacute 

lacunar infarct, and suspicious brainstem lesion.however the neurologist 

evaluated the CT of the brain, and did not feel that this is a worrisome 

finding.patient is getting IV Keppra loading dose, she is also getting Versed 

and propofol, and she was givenDepakote twice a day, patient is also receiving 

Pflcvs44 mg IV twice a day.in addition the patient is on zonisamide 100 mg by 

mouth twice a dayobviously the patient is maximized on all antiseizure 

medications, and I'm hoping her seizures are under control at present, will give

the patient a trial of weaning and possibly extubationobviously no further plans

to transfer the patient at this point.





Reevaluated today on 12/25/22, patient remains in the ICU, intubated and 

mechanically ventilated, she is on assist control rate of 20, volume 400 FiO2 

30% and PEEP of 5.ABG showed a pO2 of 121 pCO2 36 pH of 7.48.  WBC count is 5.6 

hemoglobin is 10.9.  Electrolytes are basically unremarkable,BUN is 20 

creatinine 7.22.  Patient is a renal patient, and she is on hemodialysis.  

Patient is on propofol at 20 mcg/kg/m which I have discontinued this morning, he

is also on clevidipine at 6 mg per hour.  After stopping her propofol, the 

patient seems to be appropriate, she is following instructions, and I plan wean 

this patient today using pressure support with CPAP, or may use IMV with press

ure support leading to CPAP.  Overall the patient is doing better, and again I 

plan to wean and extubate, no seizure activities in the last 24 hours.chest x-

ray is basically unremarkable today.





Reevaluated today on 12/26/22, patient remains in the ICU, she was extubated 

yesterday, she tolerated the extubation well.  She is now on 2 L nasal cannula, 

not in distress, she is requiring clevidipine elevated blood pressure, hence I 

started the patient on metoprolol 50 mg twice a day and amlodipine 5 mg by mouth

twice a day.  No seizures noted over the last 24 hours, patient seems to be 

generally weak, not in any form of respiratory distress.  Labs today are 

basically unremarkable except for creatinine of 8.95, and I believe the patient 

will likely be hemodialysis today.  Chest x-ray showed no evidence of any active

disease.





The patient is seen today 12/27/2022 in follow-up in the intensive care unit.  

She is currently sitting up in bed.  Awake and alert in no acute distress.  She 

is maintaining good O2 saturation in the 90s on 2 L/m per nasal cannula.  She 

has D5W with normal saline at 50 mL per hour.  She is still requiring a 

clevidipine drip at 2 mg per hour for hypertension.  She is receiving 

hemodialysis.  Last treatment on December 24 they removed 1.6 L.  Chest x-ray 

revealed no acute pulmonary process.  Blood cultures revealed no growth.  Urine 

culture revealed no growth.  Sputum culture revealed no growth.  White count 

6.6.  Hemoglobin 9.2.  Platelets 154.  Sodium 139.  Potassium 4.3.  BUN 38.  

Creatinine 10.6.  She is currently on amlodipine and Lopressor.  She did not 

pass a swallow evaluation yesterday.  She remains nothing by mouth.  To be 

reevaluated today as she is more awake and alert.  No further seizure activity.





Objective





- Vital Signs


Vital signs: 


                                   Vital Signs











Temp  98.1 F   12/27/22 08:00


 


Pulse  85   12/27/22 10:00


 


Resp  17   12/27/22 10:00


 


BP  139/102   12/26/22 21:00


 


Pulse Ox  98   12/27/22 10:00


 


FiO2  35   12/25/22 18:05








                                 Intake & Output











 12/26/22 12/27/22 12/27/22





 18:59 06:59 18:59


 


Intake Total 370.534 907.400 240


 


Output Total 0 0 0


 


Balance 370.534 907.400 240


 


Weight 78.8 kg 78 kg 


 


Intake:   


 


   870 240


 


    Dextrose 5%-0.9% NaCl 1, 100 600 200





    000 ml @ 50 mls/hr IV .   





    Q20H [facility] Rx#:[id number]   


 


    Lacosamide  mg In  50 





    Sodium Chloride 0.9% 50   





    ml @ 100 mls/hr IVPB BID   





    [facility] Rx#:[id number]   


 


    NS @  120 40


 


    levETIRAcetam  mg 100 100 





    In Sodium Chloride 0.9%   





    100 ml @ 400 mls/hr IVPB   





    Q12H [facility] Rx#:[id number]   


 


  Intake, IV Titration 60.534 37.400 





  Amount   


 


    Clevidipine Butyrate 25 60.534 37.400 





    mg In Empty Bag 1 bag @ 1   





    MG/HR 2 mls/hr IV .Q24H   





    [facility] Rx#:[id number]   


 


Output:   


 


  Urine 0 0 0








                       ABP, PAP, CO, CI - Last Documented











Arterial Blood Pressure        177/64

















- Exam





GENERAL EXAM: Alert, slow to respond, 52-year-old female, on 2 L nasal cannula, 

comfortable in no apparent distress.


HEAD: Normocephalic.


EYES: Normal reaction of pupils, equal size.


NOSE: Clear with pink turbinates.


THROAT: No erythema or exudates.


NECK: No masses, no JVD.


CHEST: No chest wall deformity.


LUNGS: Equal air entry with no crackles, wheeze, rhonchi or dullness.


CVS: S1 and S2 normal with no audible murmur, regular rhythm.


ABDOMEN: No hepatosplenomegaly, normal bowel sounds, no guarding or rigidity.


SPINE: No scoliosis or deformity


SKIN: No rashes


CENTRAL NERVOUS SYSTEM: No focal deficits, tone is normal in all 4 extremities.


EXTREMITIES: There is 1+ peripheral edema.  No clubbing, no cyanosis.  

Peripheral pulses are intact.





- Labs


CBC & Chem 7: 


                                 12/27/22 04:00





                                 12/27/22 04:00


Labs: 


                  Abnormal Lab Results - Last 24 Hours (Table)











  12/26/22 12/26/22 12/27/22 Range/Units





  16:06 16:29 04:00 


 


RBC    3.29 L  (3.80-5.40)  m/uL


 


Hgb    9.2 L  (11.4-16.0)  gm/dL


 


Hct    29.4 L  (34.0-46.0)  %


 


BUN     (7-17)  mg/dL


 


Creatinine     (0.52-1.04)  mg/dL


 


POC Glucose (mg/dL)  65 L  137 H   ()  mg/dL














  12/27/22 Range/Units





  04:00 


 


RBC   (3.80-5.40)  m/uL


 


Hgb   (11.4-16.0)  gm/dL


 


Hct   (34.0-46.0)  %


 


BUN  38 H  (7-17)  mg/dL


 


Creatinine  10.60 H*  (0.52-1.04)  mg/dL


 


POC Glucose (mg/dL)   ()  mg/dL








                      Microbiology - Last 24 Hours (Table)











 12/23/22 14:15 Blood Culture - Preliminary





 Blood    No Growth after 72 hours


 


 12/23/22 14:00 Blood Culture - Preliminary





 Blood    No Growth after 72 hours


 


 12/24/22 00:17 Gram Stain - Final





 Sputum Sputum Culture - Final














Assessment and Plan


Assessment: 





Status epilepticus.  Presently under control.  Patient required intubation and 

mechanical ventilation to protect her airways.  Extubated on 12/25/22.  

Currently on 2 L nasal cannula.





Acute hypoxic respiratory failure secondary to status epilepticus, unable to 

protect her airways





History of CVA and residual left hemiparesis





Type 2 diabetes.





End-stage renal disease, on hemodialysis





Benign essential hypertension





Dyslipidemia





Degenerative joint disease





Plan:





The patient was seen and evaluated


Labs and medications reviewed


Lopressor 5 mg IV every 12 hours


Titrate off the clevidipine


Transfer to the stepdown unit 3 S.


Continue seizure precautions


Repeat swallow evaluation


Titrate the FiO2 as tolerated


Increase her activity as tolerated


We will continue to follow





I have personally seen and examined the patient, performed the documentation and

the assessment and plan as written.  Number of minutes spent on the visit: 10.

## 2022-12-27 NOTE — P.PN
Subjective


Progress Note Date: 12/25/22


patient is a 52-year-old female with a known history ofhypertension, 

hyperlipidemia, diabetes type 2, history of seizure disorder and most recent 

seizures about a month ago, prior history of vagal nerve stimulator placed, 

diabetic peripheral neuropathy, ESRD on hemodialysis Monday Wednesday and Friday

and history of CVA/TIA and uses wheelchair, residual memory impairment, newly 

diagnosed obstructive sleep apnea, prior history of smoking and bipolar disorder

and other medical problems was seen at dialysis clinic yesterday when she became

unresponsive and was not following commands and answering questions.  Patient 

was sent to ER for evaluation.  Patient was also having nausea and episodes of 

vomiting.  She was noted to have seizure-like activity.  She did not get 

dialysis and was transferred to ED by EMS.  On arrival to ER patient was awake 

alert and oriented x1 and was not holding any conversation with the staff.


Previously she was admitted to the hospital from 12 11-12 14 for worsening seizu

re activity.  Patient was started on Zonegran along with Keppra and Depakote.


Patient was also having episodes of vomiting in the ER.  As per her  

patient has been having vomiting for the past 1 week on and off.  She was also 

seen at [facility] earlier this week for seizure-like activity.


Patient was admitted to hospital with neurology consultation.  Overnight a team 

was called for assessment and possible status epilepticus.  Patient was not 

following commands or make eye contact.  Patient was also having episodes of 

emesis and concern for possible aspiration.  Patient was transferred to MICU and

intubated for airway protection.


On admission EKG showed sinus rhythm with moderate intraventricular conduction 

delays.


CT head showed hypodensity within the brainstem and within the left basal 

ganglia could be ischemic changes of intermediate age.  Consider follow-up with 

MRI.


Chest x-ray showed no acute cardiopulmonary process.  Chest x-ray repeated last 

night showed no acute cardiopulmonary disease.  Normal heart.  No change.


Laboratory data on admission WBC 6.7 hemoglobin 0.1 and platelets 218


Sodium 142 potassium 4.0 chloride 100 bicarb is 24 BUN 32 and creatinine 9.47 

and lactic acid 3.0 and blood sugar was 288 on admission


proBNP 2780


Troponin x1 negative


TSH 0.708


Urinalysis showed cloudy with 3+ protein large leukocyte esterase and elevated 

RBCs and WBCs.


Valproic acid level was 127.1.  Therapeutic level is 50-1 20





CTA head and neck showed irregular moderate plaque in the left carotid artery 

stenosis 75% at the origin..





12/25/2022


Patient is currently in the MICU and remains intubated and on mechanical 

ventilator.  Propofol has been discontinued and patient remains Cleviprex.  

Patient is able to open her eyes but able to follow commands at this time.  

Pulmonary is planning to wean off with pressure support and possible extubation.


Chest x-ray showed satisfactory ET and NG tube.  No acute process seen.


Patient is being continued on Keppra, Vimpat and Zonegran.  Pulmonary and 

neurology is on board.


Hemodialysis as per schedule.


Laboratory data showed WBC 5.6 hemoglobin 10.9 platelets 203


Sodium 139 potassium 4.2 chloride 104 bicarb is 26 BUN 21 creatinine 7.22 and 

blood sugar is 114.  Valproic acid level is 73.8








Objective





- Vital Signs


Vital signs: 


                                   Vital Signs











Temp  98.6 F   12/25/22 20:00


 


Pulse  90   12/25/22 20:00


 


Resp  19   12/25/22 20:00


 


BP  143/71   12/25/22 17:00


 


Pulse Ox  98   12/25/22 20:00


 


FiO2  35   12/25/22 18:05








                                 Intake & Output











 12/25/22 12/25/22 12/26/22





 06:59 18:59 06:59


 


Intake Total 388.513 408.912 45.233


 


Output Total  0 0


 


Balance 388.513 408.912 45.233


 


Weight 75.8 kg  


 


Intake:   


 


   250 20


 


    Lacosamide  mg In 50 50 





    Sodium Chloride 0.9% 50   





    ml @ 100 mls/hr IVPB BID   





    ALICIA Rx#:[id number]   


 


    NS @  100 20


 


    levETIRAcetam  mg 100 100 





    In Sodium Chloride 0.9%   





    100 ml @ 400 mls/hr IVPB   





    Q12H ALICIA Rx#:[id number]   


 


  Intake, IV Titration 118.513 158.912 25.233





  Amount   


 


    Clevidipine Butyrate 25 82.934 131.766 25.233





    mg In Empty Bag 1 bag @ 1   





    MG/HR 2 mls/hr IV .Q24H   





    ALICIA Rx#:[id number]   


 


    propofoL 1,000 mg In 35.579 27.146 





    Empty Bag 1 bag @ 15 MCG/   





    KG/MIN 5.715 mls/hr IV .   





    [id number] ALICIA Rx#:[id number]   


 


Output:   


 


  Urine  0 0








                       ABP, PAP, CO, CI - Last Documented











Arterial Blood Pressure        162/56

















- Exam





PHYSICAL EXAMINATION: 


Patient is currently sedated and intubated... 


HEENT: Normocephalic. Neck is supple. Pupils reactive. Nostrils clear. Oral 

cavity is moist. 


Neck reveals no JVD, carotid bruits, or thyromegaly. 


CHEST EXAMINATION: Trachea is central. Symmetrical expansion.  Bibasilar 

diminished sounds.  No wheezing or crackles.


Lung fields clear to auscultation and percussion. 


CARDIAC: Normal S1, S2 with no gallops. No murmurs 


ABDOMEN: Soft. Bowel sounds present. No organomegaly. No abdominal bruits. 


Extremities: Trace edema.  No clubbing or cyanosis


Neurologically patient is sedated intubated.  No gross focal deficits noted.  

Left foot drop.


Skin: No rash or skin lesions. 


Psychiatric: Could not be assessed at this time.


Musculoskeletal: No joint swelling or deformity. 





- Labs


CBC & Chem 7: 


                                 12/26/22 04:10





                                 12/26/22 04:10


Labs: 


                  Abnormal Lab Results - Last 24 Hours (Table)











  12/25/22 12/25/22 12/25/22 Range/Units





  05:18 05:18 05:46 


 


Hgb  10.9 L    (11.4-16.0)  gm/dL


 


Hct  33.3 L    (34.0-46.0)  %


 


ABG pH    7.48 H  (7.35-7.45)  


 


ABG pO2    121 H  ()  mmHg


 


ABG HCO3    27 H  (21-25)  mmol/L


 


ABG Total CO2    28 H  (19-24)  mmol/L


 


ABG O2 Saturation    99.3 H  (94-97)  %


 


BUN   20 H   (7-17)  mg/dL


 


Creatinine   7.22 H*   (0.52-1.04)  mg/dL


 


Glucose   114 H   (74-99)  mg/dL


 


POC Glucose (mg/dL)     ()  mg/dL














  12/25/22 Range/Units





  17:56 


 


Hgb   (11.4-16.0)  gm/dL


 


Hct   (34.0-46.0)  %


 


ABG pH   (7.35-7.45)  


 


ABG pO2   ()  mmHg


 


ABG HCO3   (21-25)  mmol/L


 


ABG Total CO2   (19-24)  mmol/L


 


ABG O2 Saturation   (94-97)  %


 


BUN   (7-17)  mg/dL


 


Creatinine   (0.52-1.04)  mg/dL


 


Glucose   (74-99)  mg/dL


 


POC Glucose (mg/dL)  117 H  ()  mg/dL








                      Microbiology - Last 24 Hours (Table)











 12/23/22 14:15 Blood Culture - Preliminary





 Blood    No Growth after 48 hours


 


 12/23/22 14:00 Blood Culture - Preliminary





 Blood    No Growth after 48 hours


 


 12/24/22 00:17 Gram Stain - Preliminary





 Sputum Sputum Culture - Preliminary


 


 12/23/22 12:26 Urine Culture - Final





 Urine,Catheterized 














Assessment and Plan


Assessment: 





Altered mental status likely due to e status epilepticus. Patient remains 

intubated.  Weaning trial in process..


History of refractory epilepsy with previous multiple admissions due to 

seizures.


left ICA 75%stenosis at the origin as per CTA neck.


hypodensity in the brainstem and left basal ganglia were related to previous CVA


ESRD on hemodialysisMonday Wednesday and Friday.


History of CVA with left hemiparesis


Diabetes2


I would a peripheral neuropathy


Memory impairment


GERD


Hypertension next hyperlipidemia


Hypothyroidism 


bipolar disorder


Primary history of smoking


DVT prophylaxis with heparin subcu





Plan:


Patient is currently intubated and sedated for airway protection.Patient remains

intubated.  Weaning trial in process.


Started back on Zonegran, Depakote and Keppra as per neurology rec

ommendations.Zonegran dose increased to 100 mg twice a day,Keppra additional 

dose 500 mg post dialysis.


Patient will be continued on secondary stroke prophylaxis Plavix and Lipitor,


Neurology is on board.  Nephrology is on board for hemodialysis.


Continue with home medications and insulin sliding scale for better blood sugar 

control.


Follow-up closely.  Prognosis is guarded with multiple medical problems and 

comorbid conditions.





Time with Patient: Greater than 30

## 2022-12-27 NOTE — P.PN
Subjective





Patient is seen in follow-up for end-stage renal disease.  She is maintained on 

hemodialysis on Monday Wednesday Friday schedule.  Resting in bed.  Mentation 

appears to be at baseline.  Hemodynamically stable.





Vital signs are stable.


General: No acute distress.


HEENT: Head exam is unremarkable. 


LUNGS: Breath sounds decreased.


HEART: Rate and Rhythm are regular. 


ABDOMEN: Soft, no distention.


EXTREMITITES: No edema.





Objective





- Vital Signs


Vital signs: 


                                   Vital Signs











Temp  98.1 F   12/27/22 08:00


 


Pulse  85   12/27/22 10:00


 


Resp  17   12/27/22 10:00


 


BP  139/102   12/26/22 21:00


 


Pulse Ox  98   12/27/22 10:00


 


FiO2  35   12/25/22 18:05








                                 Intake & Output











 12/26/22 12/27/22 12/27/22





 18:59 06:59 18:59


 


Intake Total 370.534 907.400 240


 


Output Total 0 0 0


 


Balance 370.534 907.400 240


 


Weight 78.8 kg 78 kg 


 


Intake:   


 


   870 240


 


    Dextrose 5%-0.9% NaCl 1, 100 600 200





    000 ml @ 50 mls/hr IV .   





    Q20H [facility] Rx#:[id number]   


 


    Lacosamide  mg In  50 





    Sodium Chloride 0.9% 50   





    ml @ 100 mls/hr IVPB BID   





    ALICIA Rx#:[id number]   


 


    NS @  120 40


 


    levETIRAcetam  mg 100 100 





    In Sodium Chloride 0.9%   





    100 ml @ 400 mls/hr IVPB   





    Q12H ALICIA Rx#:[id number]   


 


  Intake, IV Titration 60.534 37.400 





  Amount   


 


    Clevidipine Butyrate 25 60.534 37.400 





    mg In Empty Bag 1 bag @ 1   





    MG/HR 2 mls/hr IV .Q24H   





    ALICIA Rx#:[id number]   


 


Output:   


 


  Urine 0 0 0








                       ABP, PAP, CO, CI - Last Documented











Arterial Blood Pressure        177/64

















- Labs


CBC & Chem 7: 


                                 12/27/22 04:00





                                 12/27/22 04:00


Labs: 


                  Abnormal Lab Results - Last 24 Hours (Table)











  12/26/22 12/26/22 12/27/22 Range/Units





  16:06 16:29 04:00 


 


RBC    3.29 L  (3.80-5.40)  m/uL


 


Hgb    9.2 L  (11.4-16.0)  gm/dL


 


Hct    29.4 L  (34.0-46.0)  %


 


BUN     (7-17)  mg/dL


 


Creatinine     (0.52-1.04)  mg/dL


 


POC Glucose (mg/dL)  65 L  137 H   ()  mg/dL














  12/27/22 Range/Units





  04:00 


 


RBC   (3.80-5.40)  m/uL


 


Hgb   (11.4-16.0)  gm/dL


 


Hct   (34.0-46.0)  %


 


BUN  38 H  (7-17)  mg/dL


 


Creatinine  10.60 H*  (0.52-1.04)  mg/dL


 


POC Glucose (mg/dL)   ()  mg/dL








                      Microbiology - Last 24 Hours (Table)











 12/23/22 14:15 Blood Culture - Preliminary





 Blood    No Growth after 72 hours


 


 12/23/22 14:00 Blood Culture - Preliminary





 Blood    No Growth after 72 hours


 


 12/24/22 00:17 Gram Stain - Final





 Sputum Sputum Culture - Final














Assessment and Plan


Plan: 





Assessment:


1.  End-stage renal disease maintained on hemodialysis on Monday Wednesday Friday schedule.


2.  Seizures.  Being followed by neurology.


3.  Left ICA stenosis.  Seen by vascular surgery.


4.  Hypertension with chronic kidney disease.


5.  Chronic kidney disease mineral bone disease maintained on PhosLo and 

Renvela.


6.  Anemia of chronic kidney disease.





Plan:


Currently seen while undergoing hemodialysis.  Another treatment tomorrow per 

her outpatient schedule.


Stop potassium supplementation.


Check phosphorus level.


Patient failed swallow eval.  Being followed by speech therapy.


Check iron studies.

## 2022-12-28 LAB
ANION GAP SERPL CALC-SCNC: 7 MMOL/L
BUN SERPL-SCNC: 21 MG/DL (ref 7–17)
CALCIUM SPEC-MCNC: 8.5 MG/DL (ref 8.4–10.2)
CHLORIDE SERPL-SCNC: 102 MMOL/L (ref 98–107)
CO2 SERPL-SCNC: 27 MMOL/L (ref 22–30)
ERYTHROCYTE [DISTWIDTH] IN BLOOD BY AUTOMATED COUNT: 3.31 M/UL (ref 3.8–5.4)
ERYTHROCYTE [DISTWIDTH] IN BLOOD: 13.8 % (ref 11.5–15.5)
GLUCOSE BLD-MCNC: 115 MG/DL (ref 70–110)
GLUCOSE BLD-MCNC: 134 MG/DL (ref 70–110)
GLUCOSE BLD-MCNC: 166 MG/DL (ref 70–110)
GLUCOSE SERPL-MCNC: 107 MG/DL (ref 74–99)
HCT VFR BLD AUTO: 28.9 % (ref 34–46)
HGB BLD-MCNC: 9.7 GM/DL (ref 11.4–16)
MAGNESIUM SPEC-SCNC: 2.1 MG/DL (ref 1.6–2.3)
MCH RBC QN AUTO: 29.3 PG (ref 25–35)
MCHC RBC AUTO-ENTMCNC: 33.6 G/DL (ref 31–37)
MCV RBC AUTO: 87.2 FL (ref 80–100)
PLATELET # BLD AUTO: 151 K/UL (ref 150–450)
POTASSIUM SERPL-SCNC: 3.6 MMOL/L (ref 3.5–5.1)
SODIUM SERPL-SCNC: 136 MMOL/L (ref 137–145)
WBC # BLD AUTO: 6.7 K/UL (ref 3.8–10.6)

## 2022-12-28 RX ADMIN — DIVALPROEX SODIUM SCH MG: 125 CAPSULE, COATED PELLETS ORAL at 08:20

## 2022-12-28 RX ADMIN — METOPROLOL TARTRATE SCH MG: 25 TABLET, FILM COATED ORAL at 20:31

## 2022-12-28 RX ADMIN — LACOSAMIDE SCH MG: 50 TABLET, FILM COATED ORAL at 08:17

## 2022-12-28 RX ADMIN — SEVELAMER CARBONATE SCH MG: 800 TABLET, FILM COATED ORAL at 17:33

## 2022-12-28 RX ADMIN — Medication SCH MG: at 12:05

## 2022-12-28 RX ADMIN — ZONISAMIDE SCH MG: 100 CAPSULE ORAL at 08:18

## 2022-12-28 RX ADMIN — HEPARIN SODIUM SCH UNIT: 5000 INJECTION INTRAVENOUS; SUBCUTANEOUS at 08:17

## 2022-12-28 RX ADMIN — HEPARIN SODIUM SCH UNIT: 5000 INJECTION INTRAVENOUS; SUBCUTANEOUS at 17:33

## 2022-12-28 RX ADMIN — SEVELAMER CARBONATE SCH MG: 800 TABLET, FILM COATED ORAL at 12:05

## 2022-12-28 RX ADMIN — HEPARIN SODIUM SCH: 5000 INJECTION INTRAVENOUS; SUBCUTANEOUS at 22:58

## 2022-12-28 RX ADMIN — DIVALPROEX SODIUM SCH MG: 125 CAPSULE, COATED PELLETS ORAL at 08:19

## 2022-12-28 RX ADMIN — PANTOPRAZOLE SODIUM SCH MG: 40 TABLET, DELAYED RELEASE ORAL at 06:13

## 2022-12-28 RX ADMIN — ZONISAMIDE SCH MG: 100 CAPSULE ORAL at 20:38

## 2022-12-28 RX ADMIN — Medication SCH MG: at 06:12

## 2022-12-28 RX ADMIN — DEXTROSE MONOHYDRATE AND SODIUM CHLORIDE SCH: 5; .9 INJECTION, SOLUTION INTRAVENOUS at 08:21

## 2022-12-28 RX ADMIN — CLOPIDOGREL BISULFATE SCH MG: 75 TABLET ORAL at 08:17

## 2022-12-28 RX ADMIN — DARBEPOETIN ALFA SCH MCG: 40 INJECTION, SOLUTION INTRAVENOUS; SUBCUTANEOUS at 14:44

## 2022-12-28 RX ADMIN — ASPIRIN SCH: 325 TABLET ORAL at 08:22

## 2022-12-28 RX ADMIN — Medication SCH MG: at 17:33

## 2022-12-28 RX ADMIN — METOPROLOL TARTRATE SCH MG: 25 TABLET, FILM COATED ORAL at 08:17

## 2022-12-28 RX ADMIN — ATORVASTATIN CALCIUM SCH MG: 40 TABLET, FILM COATED ORAL at 20:31

## 2022-12-28 RX ADMIN — DIVALPROEX SODIUM SCH MG: 125 CAPSULE, COATED PELLETS ORAL at 17:34

## 2022-12-28 RX ADMIN — DIVALPROEX SODIUM SCH MG: 125 CAPSULE, COATED PELLETS ORAL at 20:38

## 2022-12-28 RX ADMIN — SEVELAMER CARBONATE SCH MG: 800 TABLET, FILM COATED ORAL at 06:12

## 2022-12-28 RX ADMIN — DIVALPROEX SODIUM SCH MG: 125 CAPSULE, COATED PELLETS ORAL at 20:31

## 2022-12-28 RX ADMIN — LACOSAMIDE SCH MG: 50 TABLET, FILM COATED ORAL at 20:31

## 2022-12-28 RX ADMIN — ASPIRIN SCH: 325 TABLET ORAL at 21:24

## 2022-12-28 NOTE — P.PN
Subjective


Progress Note Date: 12/27/22 12/27/2022: Patient was seen for a follow-up.  Patient is laying comfortably in 

the bed.  No further seizures reported.  Patient continues to be very stiff.  

Her left side is more spastic.  Awaiting MRI of the brain.





12/26/2022: Patient was seen for a follow-up.  Patient initially seen by Dr. Michele Merritt.  Please refer to his note for details.





Patient is a 52-year-old female with history of seizure disorder.  She has 

clinical status epilepticus that has resolved.  She is known to our neurology 

service for recurrent seizures, and was last time seen by myself on 12/14/2022. 

A 2.5 hour EEG at that time reported generalized spikes seen along with some 

triphasic waves.  At that time it was recommended to wean off Vimpat, continue 

low-dose Depakote 250 mg twice a day, Keppra 500 mg twice a day and extra 500 mg

tablet postdialysis and zonisamide 100 mg daily was started.  





Apparently she was seen by neurologist and patient currently on Depakote 500 mg 

every 8 hours, zonisamide 100 mg daily, Keppra 500 mg twice a day with 500 mg 

postdialysis Monday Wednesday Friday.  Also appears to be on Depakote 250 mg 

twice a day.





Patient has very slow mentation.  Per speech therapist note, patient has been 

made nothing by mouth, patient not able to swallow medications.  She has very 

delayed responses and delayed speech.  Poor oral phase.








Some of her workup during his hospital visit consisted of :


TSH: 0.708


Initial Valproic acid is 127 and repeated is 73.8


Keppra level is 7.9


CT of the head which is reported as hypodensity within the brainstem and within 

the left basal ganglia could be ischemic changes of and determine age.  Consider

follow-up MRI.  I personally reviewed CT head I felt the patient had the basal 

ganglia changes on her prior admission and it doesn't look acute or subacute in 

my opinion.  Regarding the brainstem CT is not the best study for brainstem but 

she does have hypodensity but does not look like acute or subacute in my opinion

as well.


CT angiography of the head and neck was reported as irregular moderate plaque at

the left carotid artery bifurcation and estimated 75% stenosis origin of the 

left ICA carotid artery.  There is approximately similar 35% stenosis right 

internal carotid artery.  No significant intercranial and angiographic 

abnormality.


Carotid duplex is reported as no hemodynamically significant internal carotid 

artery stenosis on either side.  Limited overall assessment on the left side 

including resolution of the left ISA in the left vertebral artery due to the 

patient condition.  


Plasma lactic acid venous 3.0 on presentation and improved to 1.6.


EEG on 12/24/2022 is abnormal.  The burst suppression is likely due to 

medication effect (Versed and Propofol).  The background slowing suggestive of 

moderate encephalopathy.  The epileptiform discharges is likely on the basis of 

primary generalized epilepsy.  There is no focal slowing or seizure detected 

during the study.


Urinalysis is possible suggestive of acute urinary tract infection


Urine tox screen is valproic acids 127 and the normal supposed to be between 50-

120.  Her level is slightly supratherapeutic








Objective





- Vital Signs


Vital signs: 


                                   Vital Signs











Temp  97.8 F   12/27/22 16:39


 


Pulse  90   12/27/22 16:39


 


Resp  19   12/27/22 16:39


 


BP  141/57   12/27/22 16:39


 


Pulse Ox  99   12/27/22 16:00


 


FiO2  35   12/25/22 18:05








                                 Intake & Output











 12/26/22 12/27/22 12/27/22





 18:59 06:59 18:59


 


Intake Total 370.534 203.248 4341.667


 


Output Total 0 0 3200


 


Balance 370.534 907.400 -1924.333


 


Weight 78.8 kg 78 kg 


 


Intake:   


 


   870 790


 


    Dextrose 5%-0.9% NaCl 1, 100 600 300





    000 ml @ 50 mls/hr IV .   





    Q20H [facility] Rx#:[id number]   


 


    Lacosamide  mg In  50 





    Sodium Chloride 0.9% 50   





    ml @ 100 mls/hr IVPB BID   





    ALICIA Rx#:[id number]   


 


    NS @  120 90


 


    levETIRAcetam  mg 100 100 400





    In Sodium Chloride 0.9%   





    100 ml @ 400 mls/hr IVPB   





    Q12H ALICIA Rx#:[id number]   


 


  Intake, IV Titration 60.534 37.400 35.667





  Amount   


 


    Clevidipine Butyrate 25 60.534 37.400 35.667





    mg In Empty Bag 1 bag @ 1   





    MG/HR 2 mls/hr IV .Q24H   





    ALICIA Rx#:[id number]   


 


  Oral   150


 


  Hemodialysis   300


 


Output:   


 


  Urine 0 0 0


 


  Hemodialysis   3200








                       ABP, PAP, CO, CI - Last Documented











Arterial Blood Pressure        154/60

















- Exam





Patient is awake, more alert, and better response time to answer questions.  

Patient has left hemiparesis, but the right side is also somewhat weak.  Tone is

increased.  Bilateral Babinski.





- Labs


CBC & Chem 7: 


                                 12/28/22 04:00





                                 12/28/22 04:00


Labs: 


                  Abnormal Lab Results - Last 24 Hours (Table)











  12/27/22 12/27/22 12/27/22 Range/Units





  04:00 04:00 04:00 


 


RBC  3.29 L    (3.80-5.40)  m/uL


 


Hgb  9.2 L    (11.4-16.0)  gm/dL


 


Hct  29.4 L    (34.0-46.0)  %


 


BUN   38 H   (7-17)  mg/dL


 


Creatinine   10.60 H*   (0.52-1.04)  mg/dL


 


POC Glucose (mg/dL)     ()  mg/dL


 


TIBC    155 L  (228-460)  ug/dL


 


Transferrin    111.0 L  (204.0-354.0)  mg/dL


 


Ferritin    1075.0 H  (10.0-291.0)  ng/mL














  12/27/22 12/27/22 Range/Units





  11:19 16:13 


 


RBC    (3.80-5.40)  m/uL


 


Hgb    (11.4-16.0)  gm/dL


 


Hct    (34.0-46.0)  %


 


BUN    (7-17)  mg/dL


 


Creatinine    (0.52-1.04)  mg/dL


 


POC Glucose (mg/dL)  120 H  158 H  ()  mg/dL


 


TIBC    (228-460)  ug/dL


 


Transferrin    (204.0-354.0)  mg/dL


 


Ferritin    (10.0-291.0)  ng/mL








                      Microbiology - Last 24 Hours (Table)











 12/23/22 14:00 Blood Culture - Preliminary





 Blood    No Growth after 96 hours


 


 12/23/22 14:15 Blood Culture - Preliminary





 Blood    No Growth after 96 hours














Assessment and Plan


Assessment: 





This is a 52-year-old woman with medical refractory epilepsy, VNS who presents 

to the hospital numerous times for breakthrough seizures.





Clinical status epilepticus---resolved.  Her epilepsy is not controlled.  Is 

compliant with her medications.


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

preliminary 2.5 hour EEG in earlier 12/2022)


Left ICA stenosis 75% on CTA but no significant stenosis on carotid Doppler 

ultrasound.


History of multiple strokes.  CT head revealed chronic hypodensity of left basal

ganglia and bilateral cerebellum.  Also a questionable area of hypodensity in 

the left raghav, which could be artifactual.  


Dysphagia.  Patient initially failed swallow studies, but now she has cleared 

swallow.  Rule out CVA.


Probable acute UTI


History of stroke with residual left hemiparesis.  Patient states she has 

history of 5 strokes in the past.


Diabetes mellitus


Dnd-stage renal is on dialysis


Hypertension 





Plan: 





* Await MRI of the brain to evaluate for an acute stroke.  Apparently the VNS 

  has been cleared for MRI.  Patient scheduled for MRI tomorrow morning.





* Dr. Merritt has increased Zonegran from 100mg daily to 100mg bid.  Patient able 

  to take medications by mouth.  Patient resumed back on Depakote 500 mg 3 times

  a day and Depakote 250 mg twice a day.  Also on Keppra 500 mg every 12 hours 

  and Vimpat 100 mg twice a day.   Dr. Merritt has decreased her Vimpat from 150mg

  bid to 100mg bid since on last admission it was felt she had primary 

  generalized epilepsy and Vimpat was not great drug for primary generalized 

  epilepsy. 


* Zonisamide level 10 mcg per mL (10-40), Depakote 73.8, Keppra 7.9 (3-60).  

  Repeat Depakote level.





* I highly recommend patient to follow-up with epileptilologist since has 

  frequent seizures/medical refractory epilepsy.  Consider Epidiolex for control

  of seizures.  


* Patient has 2.5 hour EEG in our facility on 12/14/2022 (prior admission) and 

  preliminary was reported as runs of sharp and slow waves or spike and slow 

  wave at 2-3 Hz lasting 1 -1.5 seconds suggestive of primary generalized 

  epilepsy.  No official report yet.





* Left ICA stenosis 75% on CTA but on carotid duplex no significant stenosis 

  seen.  Vascular surgery input appreciated.  No indication for surgery.  





* Patient is on home dose Plavix 75mg daily and Lipitor 40mg qhs which is 

  sufficient for secondary stroke prophylaxis.


* PT and OT are consulted.


* Will defer the rest of medical management to primary team.


* For DVT prophylaxis: on subq heparin.

## 2022-12-28 NOTE — P.PN
Subjective





Patient is seen in follow-up for end-stage renal disease.  She is maintained on 

hemodialysis on Monday Wednesday Friday schedule.  No problems with dialysis 

yesterday.  Resting in bed.  Hemodynamically stable.  On pured diet.





Vital signs are stable.


General: No acute distress.


HEENT: Head exam is unremarkable. 


LUNGS: Breath sounds decreased.


HEART: Rate and Rhythm are regular. 


ABDOMEN: Soft, no distention.


EXTREMITITES: No edema.





Objective





- Vital Signs


Vital signs: 


                                   Vital Signs











Temp  98.0 F   12/28/22 08:00


 


Pulse  81   12/28/22 09:00


 


Resp  12   12/28/22 09:00


 


BP  139/102   12/28/22 00:00


 


Pulse Ox  94 L  12/28/22 09:00


 


FiO2  35   12/25/22 18:05








                                 Intake & Output











 12/27/22 12/28/22 12/28/22





 18:59 06:59 18:59


 


Intake Total 1455.667 50 450


 


Output Total 3200 0 0


 


Balance -1744.333 50 450


 


Weight  74.8 kg 


 


Intake:   


 


   50 


 


    Dextrose 5%-0.9% NaCl 1, 300  





    000 ml @ 50 mls/hr IV .   





    Q20H [facility] Rx#:[id number]   


 


    NS @  50 


 


    levETIRAcetam  mg 400  





    In Sodium Chloride 0.9%   





    100 ml @ 400 mls/hr IVPB   





    Q12H ALICIA Rx#:[id number]   


 


  Intake, IV Titration 35.667  





  Amount   


 


    Clevidipine Butyrate 25 35.667  





    mg In Empty Bag 1 bag @ 1   





    MG/HR 2 mls/hr IV .Q24H   





    ALICIA Rx#:[id number]   


 


  Oral 300  450


 


  Hemodialysis 300  


 


Output:   


 


  Urine 0 0 0


 


  Hemodialysis 3200  


 


Other:   


 


  # Bowel Movements 1 1 








                       ABP, PAP, CO, CI - Last Documented











Arterial Blood Pressure        115/89

















- Labs


CBC & Chem 7: 


                                 12/28/22 04:00





                                 12/28/22 04:00


Labs: 


                  Abnormal Lab Results - Last 24 Hours (Table)











  12/27/22 12/27/22 12/27/22 Range/Units





  04:00 16:13 19:41 


 


RBC     (3.80-5.40)  m/uL


 


Hgb     (11.4-16.0)  gm/dL


 


Hct     (34.0-46.0)  %


 


Sodium     (137-145)  mmol/L


 


BUN     (7-17)  mg/dL


 


Creatinine     (0.52-1.04)  mg/dL


 


Glucose     (74-99)  mg/dL


 


POC Glucose (mg/dL)   158 H  129 H  ()  mg/dL


 


TIBC  155 L    (228-460)  ug/dL


 


Transferrin  111.0 L    (204.0-354.0)  mg/dL


 


Ferritin  1075.0 H    (10.0-291.0)  ng/mL














  12/28/22 12/28/22 12/28/22 Range/Units





  04:00 04:00 06:48 


 


RBC  3.31 L    (3.80-5.40)  m/uL


 


Hgb  9.7 L    (11.4-16.0)  gm/dL


 


Hct  28.9 L    (34.0-46.0)  %


 


Sodium   136 L   (137-145)  mmol/L


 


BUN   21 H   (7-17)  mg/dL


 


Creatinine   6.95 H   (0.52-1.04)  mg/dL


 


Glucose   107 H   (74-99)  mg/dL


 


POC Glucose (mg/dL)    115 H  ()  mg/dL


 


TIBC     (228-460)  ug/dL


 


Transferrin     (204.0-354.0)  mg/dL


 


Ferritin     (10.0-291.0)  ng/mL








                      Microbiology - Last 24 Hours (Table)











 12/23/22 14:00 Blood Culture - Preliminary





 Blood    No Growth after 96 hours


 


 12/23/22 14:15 Blood Culture - Preliminary





 Blood    No Growth after 96 hours














Assessment and Plan


Plan: 





Assessment:


1.  End-stage renal disease maintained on hemodialysis on Monday Wednesday Friday schedule.


2.  Seizures.  Being followed by neurology.


3.  Left ICA stenosis.  Seen by vascular surgery.


4.  Hypertension with chronic kidney disease.  Stable.


5.  Chronic kidney disease mineral bone disease maintained on PhosLo and 

Renvela.


6.  Anemia of chronic kidney disease.  Iron replete.





Plan:


Hemodialysis today.


Check phosphorus level.


Add Aranesp.

## 2022-12-28 NOTE — P.PN
Subjective


Progress Note Date: 12/28/22





this is a 52-year-old female with history of chronic seizure disorder, her 

seizures have been recently poorly controlled, patient follows up with the 

neurologist/Dr. Nelson for her seizures.  Patient was brought into the ER 

yesterday with altered mental status, intermittent episodes of nausea and 

vomiting.  Apparently the patient was dropped off yesterday at the dialysis 

unit/clinic, and when she got there patient became unresponsive, she did not 

notice to have any seizure-like activity.  She was not speaking or answering any

questions.  Patient was unable to get her dialysis treatment, hence the patient 

was brought in by EMS to the ER yesterday.  Apparently the patient was admitted,

and shortly after she was admitted she had recurrent episodes of 

seizures,/status epilepticus.that a team responded to a call from the nurse on 

the floor, patient was evaluated by the hospitalist, did not seem to follow any 

commands or make any eye contact.  And at one point it was felt that the patient

had status epilepticus.  Apparently the patient had to be intubated to protect 

her airways, she was admitted to the ICU, and the plan was to consider 

transferring the patient to [facility] because of her status epilepticus 

and this was based upon the recommendation of the neurologist who was notified 

about this patient.  Patient was placed on multiple medications for her 

seizures, and these will be noted below.  Today I evaluated the patient while 

she is on mechanical ventilation, and I recommended that we start considering 

weaning and stopping her Versed which is 5 mg per hour, he is also on propofol 

at 20 mcg/kg/m, and will possibly extubate the patient.  She was evaluated by 

the neurologist, and he feels that the seizures seem to be presently under 

control, and would be worthwhile giving the patient a weaning trial and possibly

extubation.  Her assist-control rate is 14 tidal volume 400 FiO2 30 PEEP of 5.  

ABG from previous settings was noted she had a pO2 of 165 pCO2 24 pH of 7.60, h

owever since then the ventilator settings have been really adjusted.  Her last 

seizure was reported at 2300 hrs.CT of the brain last night showed subacute 

lacunar infarct, and suspicious brainstem lesion.however the neurologist 

evaluated the CT of the brain, and did not feel that this is a worrisome 

finding.patient is getting IV Keppra loading dose, she is also getting Versed 

and propofol, and she was givenDepakote twice a day, patient is also receiving 

[id number] mg IV twice a day.in addition the patient is on zonisamide 100 mg by 

mouth twice a dayobviously the patient is maximized on all antiseizure 

medications, and I'm hoping her seizures are under control at present, will give

the patient a trial of weaning and possibly extubationobviously no further plans

to transfer the patient at this point.





Reevaluated today on 12/25/22, patient remains in the ICU, intubated and 

mechanically ventilated, she is on assist control rate of 20, volume 400 FiO2 

30% and PEEP of 5.ABG showed a pO2 of 121 pCO2 36 pH of 7.48.  WBC count is 5.6 

hemoglobin is 10.9.  Electrolytes are basically unremarkable,BUN is 20 

creatinine 7.22.  Patient is a renal patient, and she is on hemodialysis.  

Patient is on propofol at 20 mcg/kg/m which I have discontinued this morning, he

is also on clevidipine at 6 mg per hour.  After stopping her propofol, the 

patient seems to be appropriate, she is following instructions, and I plan wean 

this patient today using pressure support with CPAP, or may use IMV with press

ure support leading to CPAP.  Overall the patient is doing better, and again I 

plan to wean and extubate, no seizure activities in the last 24 hours.chest x-

ray is basically unremarkable today.





Reevaluated today on 12/26/22, patient remains in the ICU, she was extubated 

yesterday, she tolerated the extubation well.  She is now on 2 L nasal cannula, 

not in distress, she is requiring clevidipine elevated blood pressure, hence I 

started the patient on metoprolol 50 mg twice a day and amlodipine 5 mg by mouth

twice a day.  No seizures noted over the last 24 hours, patient seems to be 

generally weak, not in any form of respiratory distress.  Labs today are 

basically unremarkable except for creatinine of 8.95, and I believe the patient 

will likely be hemodialysis today.  Chest x-ray showed no evidence of any active

disease.





The patient is seen today 12/27/2022 in follow-up in the intensive care unit.  

She is currently sitting up in bed.  Awake and alert in no acute distress.  She 

is maintaining good O2 saturation in the 90s on 2 L/m per nasal cannula.  She 

has D5W with normal saline at 50 mL per hour.  She is still requiring a 

clevidipine drip at 2 mg per hour for hypertension.  She is receiving 

hemodialysis.  Last treatment on December 24 they removed 1.6 L.  Chest x-ray 

revealed no acute pulmonary process.  Blood cultures revealed no growth.  Urine 

culture revealed no growth.  Sputum culture revealed no growth.  White count 

6.6.  Hemoglobin 9.2.  Platelets 154.  Sodium 139.  Potassium 4.3.  BUN 38.  

Creatinine 10.6.  She is currently on amlodipine and Lopressor.  She did not 

pass a swallow evaluation yesterday.  She remains nothing by mouth.  To be 

reevaluated today as she is more awake and alert.  No further seizure activity.





The patient is seen today 12/28/2022 in follow-up in the intensive care unit.  

She is awake and alert in no acute distress.  Sitting up in bed.  Maintaining O2

saturation in the 90s on room air.  No IV fluids currently.  Follow-up computed 

tomography scan revealed hypodensities within the left basal ganglia, left raghav 

and cerebellum which were similar compared to 12/23/2022, suggestive of prior 

injury.  No new areas to suggest an acute/subacute CVA.  She is swallowing her 

pills without choking.  She is 68.  Diet with one-to-one supervision.  Blood 

cultures revealed no growth.  Urine culture no growth.  Sputum culture no 

growth.  White count 6.7.  Hemoglobin 9.7.  Platelets 151.  Sodium 136.  

Potassium 3.6.  BUN 21 creatinine 6.95.  Glucose 107.  Blood pressures improved.

 Remains off clevidipine.  Heparin for DVT prophylaxis.  No seizures.





Objective





- Vital Signs


Vital signs: 


                                   Vital Signs











Temp  98.0 F   12/28/22 08:00


 


Pulse  81   12/28/22 09:00


 


Resp  12   12/28/22 09:00


 


BP  139/102   12/28/22 00:00


 


Pulse Ox  94 L  12/28/22 09:00


 


FiO2  35   12/25/22 18:05








                                 Intake & Output











 12/27/22 12/28/22 12/28/22





 18:59 06:59 18:59


 


Intake Total 1455.667 50 450


 


Output Total 3200 0 0


 


Balance -1744.333 50 450


 


Weight  74.8 kg 


 


Intake:   


 


   50 


 


    Dextrose 5%-0.9% NaCl 1, 300  





    000 ml @ 50 mls/hr IV .   





    Q20H ALICIA Rx#:[id number]   


 


    NS @  50 


 


    levETIRAcetam  mg 400  





    In Sodium Chloride 0.9%   





    100 ml @ 400 mls/hr IVPB   





    Q12H ALICIA Rx#:[id number]   


 


  Intake, IV Titration 35.667  





  Amount   


 


    Clevidipine Butyrate 25 35.667  





    mg In Empty Bag 1 bag @ 1   





    MG/HR 2 mls/hr IV .Q24H   





    ALICIA Rx#:[id number]   


 


  Oral 300  450


 


  Hemodialysis 300  


 


Output:   


 


  Urine 0 0 0


 


  Hemodialysis 3200  


 


Other:   


 


  # Bowel Movements 1 1 








                       ABP, PAP, CO, CI - Last Documented











Arterial Blood Pressure        115/89

















- Exam





GENERAL EXAM: Alert, slow to respond, 52-year-old female, on room air, 

comfortable in no apparent distress.


HEAD: Normocephalic.


EYES: Normal reaction of pupils, equal size.


NOSE: Clear with pink turbinates.


THROAT: No erythema or exudates.


NECK: No masses, no JVD.


CHEST: No chest wall deformity.


LUNGS: Equal air entry with no crackles, wheeze, rhonchi or dullness.


CVS: S1 and S2 normal with no audible murmur, regular rhythm.


ABDOMEN: No hepatosplenomegaly, normal bowel sounds, no guarding or rigidity.


SPINE: No scoliosis or deformity


SKIN: No rashes


CENTRAL NERVOUS SYSTEM: No focal deficits, tone is normal in all 4 extremities.


EXTREMITIES: There is 1+ peripheral edema.  No clubbing, no cyanosis.  

Peripheral pulses are intact.





- Labs


CBC & Chem 7: 


                                 12/28/22 04:00





                                 12/28/22 04:00


Labs: 


                  Abnormal Lab Results - Last 24 Hours (Table)











  12/27/22 12/27/22 12/27/22 Range/Units





  04:00 16:13 19:41 


 


RBC     (3.80-5.40)  m/uL


 


Hgb     (11.4-16.0)  gm/dL


 


Hct     (34.0-46.0)  %


 


Sodium     (137-145)  mmol/L


 


BUN     (7-17)  mg/dL


 


Creatinine     (0.52-1.04)  mg/dL


 


Glucose     (74-99)  mg/dL


 


POC Glucose (mg/dL)   158 H  129 H  ()  mg/dL


 


TIBC  155 L    (228-460)  ug/dL


 


Transferrin  111.0 L    (204.0-354.0)  mg/dL


 


Ferritin  1075.0 H    (10.0-291.0)  ng/mL














  12/28/22 12/28/22 12/28/22 Range/Units





  04:00 04:00 06:48 


 


RBC  3.31 L    (3.80-5.40)  m/uL


 


Hgb  9.7 L    (11.4-16.0)  gm/dL


 


Hct  28.9 L    (34.0-46.0)  %


 


Sodium   136 L   (137-145)  mmol/L


 


BUN   21 H   (7-17)  mg/dL


 


Creatinine   6.95 H   (0.52-1.04)  mg/dL


 


Glucose   107 H   (74-99)  mg/dL


 


POC Glucose (mg/dL)    115 H  ()  mg/dL


 


TIBC     (228-460)  ug/dL


 


Transferrin     (204.0-354.0)  mg/dL


 


Ferritin     (10.0-291.0)  ng/mL








                      Microbiology - Last 24 Hours (Table)











 12/23/22 14:00 Blood Culture - Preliminary





 Blood    No Growth after 96 hours


 


 12/23/22 14:15 Blood Culture - Preliminary





 Blood    No Growth after 96 hours














Assessment and Plan


Assessment: 





Status epilepticus.  Presently under control.  Patient required intubation and 

mechanical ventilation to protect her airways.  Extubated on 12/25/22.  

Currently on room air.  No further seizure activity.





Acute hypoxic respiratory failure secondary to status epilepticus, unable to 

protect her airways, recovered and on room air





History of CVA and residual left hemiparesis





Type 2 diabetes.





End-stage renal disease, on hemodialysis





Benign essential hypertension





Dyslipidemia





Degenerative joint disease





Plan:





The patient was seen and evaluated


CT scan of the brain, labs and medications reviewed


Transfer to the regular medical floor without telemetry


Continue seizure precautions


Continue aspiration precautions


Increase her activity as tolerated


We will continue to follow





I have personally seen and examined the patient, performed the documentation and

the assessment and plan as written.  Number of minutes spent on the visit: 10.

## 2022-12-28 NOTE — CT
EXAMINATION TYPE: CT brain wo con

CT DLP: 1099.4 mGycm, Automated exposure control for dose reduction was used.

 

DATE OF EXAM: 12/28/2022 7:17 AM

 

COMPARISON: 12/2/2022.

 

CLINICAL INDICATION:Female, 52 years old with history of Post stroke and seizure, AMS, chronic renal 
failure, post stroke (X5) and seizures

 

TECHNIQUE: 

Brain: Axial CT images of the brain were obtained with coronal and sagittal reformats created and rev
iewed.

Contrast used: None.

Oral contrast used: None.

 

FINDINGS:

 

Brain:

Extra-axial spaces: No abnormal extra-axial fluid collections.

Ventricular system: Within normal limits

Cerebral parenchyma: Basal ganglia lacunar injury and left raghav are similar to prior.

No acute intraparenchymal hemorrhage or mass effect.  The gray-white junction is well differentiated.
 

Cerebellum: Scattered low-density areas suggestive of prior injuries are similar prior

Mass effect: No evidence of midline shift.

Intracranial vasculature: Atherosclerotic calcifications of the intracranial vessels.

Soft tissues: Normal.

Calvarium/osseous structures: No depressed skull fracture.

Paranasal sinuses and mastoid air cells: Mild scattered paranasal sinus disease.

Visualized orbits: Orbital contents are intact.

 

 

IMPRESSION:

Hypodensities within the left basal ganglia, left raghav and cerebellum are similar to 12/23/2022 sugge
stive of prior injury. No new areas to suggest acute/subacute CVA.

## 2022-12-29 LAB
ANION GAP SERPL CALC-SCNC: 13.1 MMOL/L (ref 10–18)
BASOPHILS # BLD AUTO: 0.05 X 10*3/UL (ref 0–0.1)
BASOPHILS NFR BLD AUTO: 0.6 %
BUN SERPL-SCNC: 23.9 MG/DL (ref 9–27)
BUN/CREAT SERPL: 3.81 RATIO (ref 12–20)
CALCIUM SPEC-MCNC: 9.2 MG/DL (ref 8.7–10.3)
CHLORIDE SERPL-SCNC: 103 MMOL/L (ref 96–109)
CO2 SERPL-SCNC: 26.1 MMOL/L (ref 20–27.5)
EOSINOPHIL # BLD AUTO: 0.12 X 10*3/UL (ref 0.04–0.35)
EOSINOPHIL NFR BLD AUTO: 1.6 %
ERYTHROCYTE [DISTWIDTH] IN BLOOD BY AUTOMATED COUNT: 3.31 X 10*6/UL (ref 4.1–5.2)
ERYTHROCYTE [DISTWIDTH] IN BLOOD: 14 % (ref 11.5–14.5)
GLUCOSE BLD-MCNC: 130 MG/DL (ref 70–110)
GLUCOSE BLD-MCNC: 161 MG/DL (ref 70–110)
GLUCOSE BLD-MCNC: 204 MG/DL (ref 70–110)
GLUCOSE BLD-MCNC: 222 MG/DL (ref 70–110)
GLUCOSE SERPL-MCNC: 126 MG/DL (ref 70–110)
HCT VFR BLD AUTO: 30.1 % (ref 37.2–46.3)
HGB BLD-MCNC: 9.4 G/DL (ref 12–15)
IMM GRANULOCYTES BLD QL AUTO: 0.4 %
LYMPHOCYTES # SPEC AUTO: 2.2 X 10*3/UL (ref 0.9–5)
LYMPHOCYTES NFR SPEC AUTO: 28.5 %
MCH RBC QN AUTO: 28.4 PG (ref 27–32)
MCHC RBC AUTO-ENTMCNC: 31.2 G/DL (ref 32–37)
MCV RBC AUTO: 90.9 FL (ref 80–97)
MONOCYTES # BLD AUTO: 0.57 X 10*3/UL (ref 0.2–1)
MONOCYTES NFR BLD AUTO: 7.4 %
NEUTROPHILS # BLD AUTO: 4.76 X 10*3/UL (ref 1.8–7.7)
NEUTROPHILS NFR BLD AUTO: 61.5 %
NRBC BLD AUTO-RTO: 0 /100 WBCS (ref 0–0)
PLATELET # BLD AUTO: 177 X 10*3/UL (ref 140–440)
POTASSIUM SERPL-SCNC: 3.9 MMOL/L (ref 3.5–5.5)
SODIUM SERPL-SCNC: 142 MMOL/L (ref 135–145)
WBC # BLD AUTO: 7.73 X 10*3/UL (ref 4.5–10)

## 2022-12-29 RX ADMIN — DIVALPROEX SODIUM SCH MG: 125 CAPSULE, COATED PELLETS ORAL at 22:03

## 2022-12-29 RX ADMIN — SEVELAMER CARBONATE SCH MG: 800 TABLET, FILM COATED ORAL at 09:23

## 2022-12-29 RX ADMIN — SEVELAMER CARBONATE SCH MG: 800 TABLET, FILM COATED ORAL at 12:28

## 2022-12-29 RX ADMIN — ASPIRIN SCH: 325 TABLET ORAL at 09:26

## 2022-12-29 RX ADMIN — METOPROLOL TARTRATE SCH MG: 25 TABLET, FILM COATED ORAL at 09:21

## 2022-12-29 RX ADMIN — SEVELAMER CARBONATE SCH MG: 800 TABLET, FILM COATED ORAL at 16:56

## 2022-12-29 RX ADMIN — ATORVASTATIN CALCIUM SCH MG: 40 TABLET, FILM COATED ORAL at 22:01

## 2022-12-29 RX ADMIN — Medication SCH MG: at 12:29

## 2022-12-29 RX ADMIN — DIVALPROEX SODIUM SCH MG: 125 CAPSULE, COATED PELLETS ORAL at 16:56

## 2022-12-29 RX ADMIN — DIVALPROEX SODIUM SCH MG: 125 CAPSULE, COATED PELLETS ORAL at 09:24

## 2022-12-29 RX ADMIN — ZONISAMIDE SCH MG: 100 CAPSULE ORAL at 09:27

## 2022-12-29 RX ADMIN — Medication SCH MG: at 16:55

## 2022-12-29 RX ADMIN — ASPIRIN SCH: 325 TABLET ORAL at 22:24

## 2022-12-29 RX ADMIN — PANTOPRAZOLE SODIUM SCH MG: 40 TABLET, DELAYED RELEASE ORAL at 09:22

## 2022-12-29 RX ADMIN — ZONISAMIDE SCH MG: 100 CAPSULE ORAL at 22:03

## 2022-12-29 RX ADMIN — HEPARIN SODIUM SCH UNIT: 5000 INJECTION INTRAVENOUS; SUBCUTANEOUS at 09:21

## 2022-12-29 RX ADMIN — CLOPIDOGREL BISULFATE SCH MG: 75 TABLET ORAL at 09:21

## 2022-12-29 RX ADMIN — LACOSAMIDE SCH MG: 50 TABLET, FILM COATED ORAL at 09:22

## 2022-12-29 RX ADMIN — Medication SCH MG: at 09:21

## 2022-12-29 RX ADMIN — LACOSAMIDE SCH MG: 50 TABLET, FILM COATED ORAL at 22:01

## 2022-12-29 RX ADMIN — HEPARIN SODIUM SCH UNIT: 5000 INJECTION INTRAVENOUS; SUBCUTANEOUS at 16:57

## 2022-12-29 RX ADMIN — METOPROLOL TARTRATE SCH MG: 25 TABLET, FILM COATED ORAL at 22:02

## 2022-12-29 NOTE — P.PN
Subjective


Progress Note Date: 12/29/22


Principal diagnosis: 





Status epilepticus, acute respiratory failure





this is a 52-year-old female with history of chronic seizure disorder, her 

seizures have been recently poorly controlled, patient follows up with the 

neurologist/Dr. Nelson for her seizures.  Patient was brought into the ER 

yesterday with altered mental status, intermittent episodes of nausea and 

vomiting.  Apparently the patient was dropped off yesterday at the dialysis 

unit/clinic, and when she got there patient became unresponsive, she did not 

notice to have any seizure-like activity.  She was not speaking or answering any

questions.  Patient was unable to get her dialysis treatment, hence the patient 

was brought in by EMS to the ER yesterday.  Apparently the patient was admitted,

and shortly after she was admitted she had recurrent episodes of 

seizures,/status epilepticus.that a team responded to a call from the nurse on 

the floor, patient was evaluated by the hospitalist, did not seem to follow any 

commands or make any eye contact.  And at one point it was felt that the patient

had status epilepticus.  Apparently the patient had to be intubated to protect 

her airways, she was admitted to the ICU, and the plan was to consider 

transferring the patient to [facility] because of her status epilepticus 

and this was based upon the recommendation of the neurologist who was notified 

about this patient.  Patient was placed on multiple medications for her 

seizures, and these will be noted below.  Today I evaluated the patient while 

she is on mechanical ventilation, and I recommended that we start considering 

weaning and stopping her Versed which is 5 mg per hour, he is also on propofol 

at 20 mcg/kg/m, and will possibly extubate the patient.  She was evaluated by 

the neurologist, and he feels that the seizures seem to be presently under 

control, and would be worthwhile giving the patient a weaning trial and possibly

extubation.  Her assist-control rate is 14 tidal volume 400 FiO2 30 PEEP of 5.  

ABG from previous settings was noted she had a pO2 of 165 pCO2 24 pH of 7.60, 

however since then the ventilator settings have been really adjusted.  Her last 

seizure was reported at 2300 hrs.CT of the brain last night showed subacute 

lacunar infarct, and suspicious brainstem lesion.however the neurologist 

evaluated the CT of the brain, and did not feel that this is a worrisome findi

ng.patient is getting IV Keppra loading dose, she is also getting Versed and 

propofol, and she was givenDepakote twice a day, patient is also receiving 

[id number] mg IV twice a day.in addition the patient is on zonisamide 100 mg by 

mouth twice a dayobviously the patient is maximized on all antiseizure 

medications, and I'm hoping her seizures are under control at present, will give

the patient a trial of weaning and possibly extubationobviously no further plans

to transfer the patient at this point.





Reevaluated today on 12/25/22, patient remains in the ICU, intubated and 

mechanically ventilated, she is on assist control rate of 20, volume 400 FiO2 

30% and PEEP of 5.ABG showed a pO2 of 121 pCO2 36 pH of 7.48.  WBC count is 5.6 

hemoglobin is 10.9.  Electrolytes are basically unremarkable,BUN is 20 

creatinine 7.22.  Patient is a renal patient, and she is on hemodialysis.  

Patient is on propofol at 20 mcg/kg/m which I have discontinued this morning, he

is also on clevidipine at 6 mg per hour.  After stopping her propofol, the 

patient seems to be appropriate, she is following instructions, and I plan wean 

this patient today using pressure support with CPAP, or may use IMV with 

pressure support leading to CPAP.  Overall the patient is doing better, and 

again I plan to wean and extubate, no seizure activities in the last 24 

hours.chest x-ray is basically unremarkable today.





Reevaluated today on 12/26/22, patient remains in the ICU, she was extubated 

yesterday, she tolerated the extubation well.  She is now on 2 L nasal cannula, 

not in distress, she is requiring clevidipine elevated blood pressure, hence I 

started the patient on metoprolol 50 mg twice a day and amlodipine 5 mg by mouth

twice a day.  No seizures noted over the last 24 hours, patient seems to be 

generally weak, not in any form of respiratory distress.  Labs today are 

basically unremarkable except for creatinine of 8.95, and I believe the patient 

will likely be hemodialysis today.  Chest x-ray showed no evidence of any active

disease.





The patient is seen today 12/27/2022 in follow-up in the intensive care unit.  

She is currently sitting up in bed.  Awake and alert in no acute distress.  She 

is maintaining good O2 saturation in the 90s on 2 L/m per nasal cannula.  She 

has D5W with normal saline at 50 mL per hour.  She is still requiring a 

clevidipine drip at 2 mg per hour for hypertension.  She is receiving 

hemodialysis.  Last treatment on December 24 they removed 1.6 L.  Chest x-ray 

revealed no acute pulmonary process.  Blood cultures revealed no growth.  Urine 

culture revealed no growth.  Sputum culture revealed no growth.  White count 

6.6.  Hemoglobin 9.2.  Platelets 154.  Sodium 139.  Potassium 4.3.  BUN 38.  

Creatinine 10.6.  She is currently on amlodipine and Lopressor.  She did not 

pass a swallow evaluation yesterday.  She remains nothing by mouth.  To be 

reevaluated today as she is more awake and alert.  No further seizure activity.





The patient is seen today 12/28/2022 in follow-up in the intensive care unit.  

She is awake and alert in no acute distress.  Sitting up in bed.  Maintaining O2

saturation in the 90s on room air.  No IV fluids currently.  Follow-up computed 

tomography scan revealed hypodensities within the left basal ganglia, left raghav 

and cerebellum which were similar compared to 12/23/2022, suggestive of prior 

injury.  No new areas to suggest an acute/subacute CVA.  She is swallowing her 

pills without choking.  She is 68.  Diet with one-to-one supervision.  Blood 

cultures revealed no growth.  Urine culture no growth.  Sputum culture no 

growth.  White count 6.7.  Hemoglobin 9.7.  Platelets 151.  Sodium 136.  

Potassium 3.6.  BUN 21 creatinine 6.95.  Glucose 107.  Blood pressures improved.

 Remains off clevidipine.  Heparin for DVT prophylaxis.  No seizures.








The patient is being reevaluated today 12/29/2022 on a general medical floor.  

She is awake and alert, sitting up in bed, on room air.  She denies any 

significant shortness of breath, cough, fever, chest pain.  Nurses report no 

further seizures.  Patient is hemodynamically stable.  Patient is quite weak and

referrals have been sent to [facility] for potential rehab.  No new x-

rays to review.  CBC from today shows some persistent mild anemia hemoglobin 

9.4, hematocrit 30, WBC count 7.7, platelets 177,000.  No blood loss noted BMP 

from today shows a sodium of 142, potassium 3.9, chloride 103, serum CO2 26, BUN

23, creatinine 6.3, glucose 126. Nephrology is following, patient is receiving 

hemodialysis Monday Wednesday Friday schedule.  Patient feels that she is ready 

for discharge.





Objective





- Vital Signs


Vital signs: 


                                   Vital Signs











Temp  98.5 F   12/29/22 14:00


 


Pulse  94   12/29/22 14:00


 


Resp  16   12/29/22 14:00


 


BP  132/80   12/29/22 14:00


 


Pulse Ox  94 L  12/29/22 14:00


 


FiO2  35   12/25/22 18:05








                                 Intake & Output











 12/28/22 12/29/22 12/29/22





 18:59 06:59 18:59


 


Intake Total 1250  


 


Output Total 700  


 


Balance 550  


 


Weight 74.8 kg 71.5 kg 


 


Intake:   


 


  Oral 950  


 


  Hemodialysis 300  


 


Output:   


 


  Urine 0  


 


  Hemodialysis 700  


 


Other:   


 


  Voiding Method  Indwelling Catheter 


 


  # Voids   1


 


  # Bowel Movements 1 1 








                       ABP, PAP, CO, CI - Last Documented











Arterial Blood Pressure        115/89

















- Exam








GENERAL EXAM: Alert, on room air, comfortable, in no apparent distress.


HEAD: Normocephalic.


EYES: Normal reaction of pupils, equal size.


NOSE: Clear with pink turbinates.


THROAT: No erythema or exudates.


NECK: No masses, no JVD.


CHEST: No chest wall deformity.


LUNGS: Equal air entry with no crackles, wheeze, rhonchi or dullness.


CVS: S1 and S2 normal with no audible murmur, regular rhythm.


ABDOMEN: No hepatosplenomegaly, normal bowel sounds, no guarding or rigidity.


SPINE: No scoliosis or deformity


SKIN: No rashes


CENTRAL NERVOUS SYSTEM: No focal deficits, tone is normal in all 4 extremities.


EXTREMITIES: There is 1+ peripheral edema.  No clubbing, no cyanosis.  

Peripheral pulses are intact.











- Labs


CBC & Chem 7: 


                                 12/29/22 07:00





                                 12/29/22 07:00


Labs: 


                  Abnormal Lab Results - Last 24 Hours (Table)











  12/28/22 12/29/22 12/29/22 Range/Units





  20:59 06:24 07:00 


 


RBC    3.31 L  (4.10-5.20)  X 10*6/uL


 


Hgb    9.4 L  (12.0-15.0)  g/dL


 


Hct    30.1 L  (37.2-46.3)  %


 


MCHC    31.2 L  (32.0-37.0)  g/dL


 


MPV    12.5 H  (9.5-12.2)  fL


 


Creatinine     (0.6-1.5)  mg/dL


 


Est GFR (CKD-EPI)AfAm     (60.0-200.0)   


 


Est GFR (CKD-EPI)NonAf     (60.0-200.0)   


 


BUN/Creatinine Ratio     (12.00-20.00)  Ratio


 


Glucose     ()  mg/dL


 


POC Glucose (mg/dL)  134 H  130 H   ()  mg/dL














  12/29/22 12/29/22 Range/Units





  07:00 11:20 


 


RBC    (4.10-5.20)  X 10*6/uL


 


Hgb    (12.0-15.0)  g/dL


 


Hct    (37.2-46.3)  %


 


MCHC    (32.0-37.0)  g/dL


 


MPV    (9.5-12.2)  fL


 


Creatinine  6.3 H   (0.6-1.5)  mg/dL


 


Est GFR (CKD-EPI)AfAm  8.0 L   (60.0-200.0)   


 


Est GFR (CKD-EPI)NonAf  6.9 L   (60.0-200.0)   


 


BUN/Creatinine Ratio  3.81 L   (12.00-20.00)  Ratio


 


Glucose  126 H   ()  mg/dL


 


POC Glucose (mg/dL)   204 H  ()  mg/dL








                      Microbiology - Last 24 Hours (Table)











 12/23/22 14:15 Blood Culture - Preliminary





 Blood    No Growth after 120 hours


 


 12/23/22 14:00 Blood Culture - Preliminary





 Blood    No Growth after 120 hours














Assessment and Plan


Assessment: 





Status epilepticus.  Presently under control.  Patient required intubation and 

mechanical ventilation to protect her airways.  Extubated on 12/25/22.  

Currently on room air.  No further seizure activity.





Acute hypoxic respiratory failure secondary to status epilepticus, unable to 

protect her airways, recovered and on room air





History of CVA and residual left hemiparesis





Type 2 diabetes.





End-stage renal disease, on hemodialysis





Chronic anemia, receiving darbepoetin subcu injections





Benign essential hypertension





Dyslipidemia





Degenerative joint disease


Plan: 





The patient was seen and evaluated


labs and medications reviewed


Continue seizure precautions


Continue aspiration precautions


Increase her activity as tolerated


From a pulmonary standpoint patient is cleared for discharge to rehab facility. 








I have personally seen and examined the patient, performed the documentation and

the assessment and plan as written.  Number of minutes spent on the visit: 10.  


GI


Time with Patient: Less than 30

## 2022-12-29 NOTE — P.PN
Subjective





Patient is seen in follow-up for end-stage renal disease.  She is maintained on 

hemodialysis on Monday Wednesday Friday schedule.  No problems with dialysis 

yesterday.  Resting in bed.  Hemodynamically stable.  On pured diet.  Wants to 

go home.





Vital signs are stable.


General: No acute distress.


HEENT: Head exam is unremarkable. 


LUNGS: Breath sounds decreased.


HEART: Rate and Rhythm are regular. 


ABDOMEN: Soft, no distention.


EXTREMITITES: No edema.





Objective





- Vital Signs


Vital signs: 


                                   Vital Signs











Temp  98.1 F   12/29/22 08:00


 


Pulse  86   12/29/22 08:00


 


Resp  16   12/29/22 09:31


 


BP  155/89   12/29/22 08:00


 


Pulse Ox  95   12/29/22 08:00


 


FiO2  35   12/25/22 18:05








                                 Intake & Output











 12/28/22 12/29/22 12/29/22





 18:59 06:59 18:59


 


Intake Total 1250  


 


Output Total 700  


 


Balance 550  


 


Weight 74.8 kg 71.5 kg 


 


Intake:   


 


  Oral 950  


 


  Hemodialysis 300  


 


Output:   


 


  Urine 0  


 


  Hemodialysis 700  


 


Other:   


 


  Voiding Method  Indwelling Catheter 


 


  # Voids   1


 


  # Bowel Movements 1 1 








                       ABP, PAP, CO, CI - Last Documented











Arterial Blood Pressure        115/89

















- Labs


CBC & Chem 7: 


                                 12/29/22 07:00





                                 12/29/22 07:00


Labs: 


                  Abnormal Lab Results - Last 24 Hours (Table)











  12/28/22 12/28/22 12/29/22 Range/Units





  12:07 20:59 06:24 


 


RBC     (4.10-5.20)  X 10*6/uL


 


Hgb     (12.0-15.0)  g/dL


 


Hct     (37.2-46.3)  %


 


MCHC     (32.0-37.0)  g/dL


 


MPV     (9.5-12.2)  fL


 


Creatinine     (0.6-1.5)  mg/dL


 


Est GFR (CKD-EPI)AfAm     (60.0-200.0)   


 


Est GFR (CKD-EPI)NonAf     (60.0-200.0)   


 


BUN/Creatinine Ratio     (12.00-20.00)  Ratio


 


Glucose     ()  mg/dL


 


POC Glucose (mg/dL)  166 H  134 H  130 H  ()  mg/dL














  12/29/22 12/29/22 12/29/22 Range/Units





  07:00 07:00 11:20 


 


RBC  3.31 L    (4.10-5.20)  X 10*6/uL


 


Hgb  9.4 L    (12.0-15.0)  g/dL


 


Hct  30.1 L    (37.2-46.3)  %


 


MCHC  31.2 L    (32.0-37.0)  g/dL


 


MPV  12.5 H    (9.5-12.2)  fL


 


Creatinine   6.3 H   (0.6-1.5)  mg/dL


 


Est GFR (CKD-EPI)AfAm   8.0 L   (60.0-200.0)   


 


Est GFR (CKD-EPI)NonAf   6.9 L   (60.0-200.0)   


 


BUN/Creatinine Ratio   3.81 L   (12.00-20.00)  Ratio


 


Glucose   126 H   ()  mg/dL


 


POC Glucose (mg/dL)    204 H  ()  mg/dL








                      Microbiology - Last 24 Hours (Table)











 12/23/22 14:15 Blood Culture - Preliminary





 Blood    No Growth after 120 hours


 


 12/23/22 14:00 Blood Culture - Preliminary





 Blood    No Growth after 120 hours














Assessment and Plan


Plan: 





Assessment:


1.  End-stage renal disease maintained on hemodialysis on Monday Wednesday Friday schedule.


2.  Seizures.  Being followed by neurology.


3.  Left ICA stenosis.  Seen by vascular surgery.


4.  Hypertension with chronic kidney disease.  Stable.


5.  Chronic kidney disease mineral bone disease maintained on PhosLo and 

Renvela.


6.  Anemia of chronic kidney disease.  Iron replete.  On Aranesp.





Plan:


Hemodialysis tomorrow.


Check phosphorus level.

## 2022-12-29 NOTE — P.PN
Subjective


Progress Note Date: 12/28/22 12/28/2022: Patient was seen for a follow-up.  Patient states she is feeling 

much better.  No seizures.  Denies headache.





12/27/2022: Patient was seen for a follow-up.  Patient is laying comfortably in 

the bed.  No further seizures reported.  Patient continues to be very stiff.  

Her left side is more spastic.  Awaiting MRI of the brain.





12/26/2022: Patient was seen for a follow-up.  Patient initially seen by Dr. Michele Merritt.  Please refer to his note for details.





Patient is a 52-year-old female with history of seizure disorder.  She has 

clinical status epilepticus that has resolved.  She is known to our neurology 

service for recurrent seizures, and was last time seen by myself on 12/14/2022. 

A 2.5 hour EEG at that time reported generalized spikes seen along with some 

triphasic waves.  At that time it was recommended to wean off Vimpat, continue 

low-dose Depakote 250 mg twice a day, Keppra 500 mg twice a day and extra 500 mg

tablet postdialysis and zonisamide 100 mg daily was started.  





Apparently she was seen by neurologist and patient currently on Depakote 500 mg 

every 8 hours, zonisamide 100 mg daily, Keppra 500 mg twice a day with 500 mg 

postdialysis Monday Wednesday Friday.  Also appears to be on Depakote 250 mg 

twice a day.





Patient has very slow mentation.  Per speech therapist note, patient has been 

made nothing by mouth, patient not able to swallow medications.  She has very 

delayed responses and delayed speech.  Poor oral phase.








Some of her workup during his hospital visit consisted of :


TSH: 0.708


Initial Valproic acid is 127 and repeated is 73.8


Keppra level is 7.9


CT of the head which is reported as hypodensity within the brainstem and within 

the left basal ganglia could be ischemic changes of and determine age.  Consider

follow-up MRI.  I personally reviewed CT head I felt the patient had the basal 

ganglia changes on her prior admission and it doesn't look acute or subacute in 

my opinion.  Regarding the brainstem CT is not the best study for brainstem but 

she does have hypodensity but does not look like acute or subacute in my opinion

as well.


CT angiography of the head and neck was reported as irregular moderate plaque at

the left carotid artery bifurcation and estimated 75% stenosis origin of the 

left ICA carotid artery.  There is approximately similar 35% stenosis right 

internal carotid artery.  No significant intercranial and angiographic 

abnormality.


Carotid duplex is reported as no hemodynamically significant internal carotid 

artery stenosis on either side.  Limited overall assessment on the left side 

including resolution of the left ISA in the left vertebral artery due to the 

patient condition.  


Plasma lactic acid venous 3.0 on presentation and improved to 1.6.


EEG on 12/24/2022 is abnormal.  The burst suppression is likely due to 

medication effect (Versed and Propofol).  The background slowing suggestive of 

moderate encephalopathy.  The epileptiform discharges is likely on the basis of 

primary generalized epilepsy.  There is no focal slowing or seizure detected 

during the study.


Urinalysis is possible suggestive of acute urinary tract infection


Urine tox screen is valproic acids 127 and the normal supposed to be between 50-

120.  Her level is slightly supratherapeutic








Objective





- Vital Signs


Vital signs: 


                                   Vital Signs











Temp  98.1 F   12/29/22 08:00


 


Pulse  86   12/29/22 08:00


 


Resp  16   12/29/22 09:31


 


BP  155/89   12/29/22 08:00


 


Pulse Ox  95   12/29/22 08:00


 


FiO2  35   12/25/22 18:05








                                 Intake & Output











 12/28/22 12/29/22 12/29/22





 18:59 06:59 18:59


 


Intake Total 1250  


 


Output Total 700  


 


Balance 550  


 


Weight 74.8 kg 71.5 kg 


 


Intake:   


 


  Oral 950  


 


  Hemodialysis 300  


 


Output:   


 


  Urine 0  


 


  Hemodialysis 700  


 


Other:   


 


  Voiding Method  Indwelling Catheter 


 


  # Voids   1


 


  # Bowel Movements 1 1 








                       ABP, PAP, CO, CI - Last Documented











Arterial Blood Pressure        115/89

















- Exam





Patient is awake, more alert, and better response time to answer questions.  

Speech and language functions appears normal.





On muscle strength testing (right/left) deltoid 5-/3-4-, biceps 5/4, triceps 

5/4,  4+/4+.  In the lower extremities hip flexion 3+/to, ankle dorsiflexion

4-/1-2.





Patient has bilateral Babinski.











- Labs


CBC & Chem 7: 


                                 12/29/22 07:00





                                 12/28/22 04:00


Labs: 


                  Abnormal Lab Results - Last 24 Hours (Table)











  12/28/22 12/28/22 12/29/22 Range/Units





  12:07 20:59 06:24 


 


RBC     (4.10-5.20)  X 10*6/uL


 


Hgb     (12.0-15.0)  g/dL


 


Hct     (37.2-46.3)  %


 


MCHC     (32.0-37.0)  g/dL


 


MPV     (9.5-12.2)  fL


 


POC Glucose (mg/dL)  166 H  134 H  130 H  ()  mg/dL














  12/29/22 Range/Units





  07:00 


 


RBC  3.31 L  (4.10-5.20)  X 10*6/uL


 


Hgb  9.4 L  (12.0-15.0)  g/dL


 


Hct  30.1 L  (37.2-46.3)  %


 


MCHC  31.2 L  (32.0-37.0)  g/dL


 


MPV  12.5 H  (9.5-12.2)  fL


 


POC Glucose (mg/dL)   ()  mg/dL








                      Microbiology - Last 24 Hours (Table)











 12/23/22 14:15 Blood Culture - Preliminary





 Blood    No Growth after 120 hours


 


 12/23/22 14:00 Blood Culture - Preliminary





 Blood    No Growth after 120 hours














Assessment and Plan


Assessment: 





This is a 52-year-old woman with medically refractory epilepsy, VNS who presents

to the hospital numerous times for breakthrough seizures.





Clinical status epilepticus---resolved.  Her epilepsy is not controlled.  Is 

compliant with her medications.


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

preliminary 2.5 hour EEG in earlier 12/2022)


Left ICA stenosis 75% on CTA but no significant stenosis on carotid Doppler 

ultrasound.


History of multiple strokes.  CT head revealed chronic hypodensity of left basal

ganglia and bilateral cerebellum.  Also a questionable area of hypodensity in 

the left raghav, which could be artifactual.  


Dysphagia.  Patient initially failed swallow studies, but now she has cleared 

swallow.  No evidence of CVA on CT head.


Probable acute UTI


History of stroke with residual left hemiparesis.  Patient states she has 

history of 5 strokes in the past.


Diabetes mellitus


End-stage renal is on dialysis


Hypertension 





Plan: 





* Nurse informed that patient cannot have MRI of the brain because of VNS.  

  Patient's VNS is not compatible with MRI machine in [facility].  Repeat CT head 

  was done.  Revealed no acute ischemic process.  No evidence of stroke in the 

  brainstem.





* Dr. Merritt has increased Zonegran from 100mg daily to 100mg bid.  


* Repeat Depakote level 92.  Patient has slow mentation.  We will decrease 

  Depakote to 500 mg 3 times a day.  Her Depakote level was highly elevated 127 

  on arrival.


* Continue Keppra 500 mg every 12 hours 


* Vimpat 100 mg twice a day.   Dr. Merritt has decreased her Vimpat from 150mg bid

  to 100mg bid since on last admission it was felt she had primary generalized 

  epilepsy and Vimpat was not great drug for primary generalized epilepsy. 





* Zonisamide level 10 mcg per mL (10-40), Depakote 73.8, Keppra 7.9 (3-60).  

  Repeat Depakote level.





* I highly recommend patient to follow-up with epileptilologist since has 

  frequent seizures/medical refractory epilepsy.  Consider Epidiolex for control

  of seizures.  


* Patient has 2.5 hour EEG in our facility on 12/14/2022 (prior admission) and 

  preliminary was reported as runs of sharp and slow waves or spike and slow 

  wave at 2-3 Hz lasting 1 -1.5 seconds suggestive of primary generalized 

  epilepsy.  No official report yet.





* Left ICA stenosis 75% on CTA but on carotid duplex no significant stenosis 

  seen.  Vascular surgery input appreciated.  No indication for surgery.  





* Patient is on home dose Plavix 75mg daily and Lipitor 40mg qhs which is 

  sufficient for secondary stroke prophylaxis.


* PT and OT are consulted.


* Will defer the rest of medical management to primary team.


* For DVT prophylaxis: on subq heparin.

## 2022-12-30 LAB
ANION GAP SERPL CALC-SCNC: 16.3 MMOL/L (ref 10–18)
BASOPHILS # BLD AUTO: 0.04 X 10*3/UL (ref 0–0.1)
BASOPHILS NFR BLD AUTO: 0.6 %
BUN SERPL-SCNC: 40.8 MG/DL (ref 9–27)
BUN/CREAT SERPL: 4.8 RATIO (ref 12–20)
CALCIUM SPEC-MCNC: 9.4 MG/DL (ref 8.7–10.3)
CHLORIDE SERPL-SCNC: 103 MMOL/L (ref 96–109)
CO2 SERPL-SCNC: 24.7 MMOL/L (ref 20–27.5)
EOSINOPHIL # BLD AUTO: 0.1 X 10*3/UL (ref 0.04–0.35)
EOSINOPHIL NFR BLD AUTO: 1.4 %
ERYTHROCYTE [DISTWIDTH] IN BLOOD BY AUTOMATED COUNT: 3.24 X 10*6/UL (ref 4.1–5.2)
ERYTHROCYTE [DISTWIDTH] IN BLOOD: 14 % (ref 11.5–14.5)
GLUCOSE BLD-MCNC: 110 MG/DL (ref 70–110)
GLUCOSE BLD-MCNC: 117 MG/DL (ref 70–110)
GLUCOSE BLD-MCNC: 122 MG/DL (ref 70–110)
GLUCOSE BLD-MCNC: 237 MG/DL (ref 70–110)
GLUCOSE BLD-MCNC: 98 MG/DL (ref 70–110)
GLUCOSE SERPL-MCNC: 107 MG/DL (ref 70–110)
HCT VFR BLD AUTO: 28.2 % (ref 37.2–46.3)
HGB BLD-MCNC: 8.9 G/DL (ref 12–15)
IMM GRANULOCYTES BLD QL AUTO: 0.9 %
LYMPHOCYTES # SPEC AUTO: 2.47 X 10*3/UL (ref 0.9–5)
LYMPHOCYTES NFR SPEC AUTO: 35.4 %
MCH RBC QN AUTO: 27.5 PG (ref 27–32)
MCHC RBC AUTO-ENTMCNC: 31.6 G/DL (ref 32–37)
MCV RBC AUTO: 87 FL (ref 80–97)
MONOCYTES # BLD AUTO: 0.65 X 10*3/UL (ref 0.2–1)
MONOCYTES NFR BLD AUTO: 9.3 %
NEUTROPHILS # BLD AUTO: 3.65 X 10*3/UL (ref 1.8–7.7)
NEUTROPHILS NFR BLD AUTO: 52.4 %
NRBC BLD AUTO-RTO: 0 /100 WBCS (ref 0–0)
PLATELET # BLD AUTO: 196 X 10*3/UL (ref 140–440)
POTASSIUM SERPL-SCNC: 4.3 MMOL/L (ref 3.5–5.5)
SODIUM SERPL-SCNC: 144 MMOL/L (ref 135–145)
WBC # BLD AUTO: 6.97 X 10*3/UL (ref 4.5–10)

## 2022-12-30 RX ADMIN — ASPIRIN SCH: 325 TABLET ORAL at 08:52

## 2022-12-30 RX ADMIN — ZONISAMIDE SCH: 100 CAPSULE ORAL at 19:49

## 2022-12-30 RX ADMIN — PANTOPRAZOLE SODIUM SCH MG: 40 TABLET, DELAYED RELEASE ORAL at 08:49

## 2022-12-30 RX ADMIN — ATORVASTATIN CALCIUM SCH: 40 TABLET, FILM COATED ORAL at 19:48

## 2022-12-30 RX ADMIN — METOPROLOL TARTRATE SCH: 25 TABLET, FILM COATED ORAL at 19:48

## 2022-12-30 RX ADMIN — CLOPIDOGREL BISULFATE SCH MG: 75 TABLET ORAL at 08:49

## 2022-12-30 RX ADMIN — METOPROLOL TARTRATE SCH MG: 25 TABLET, FILM COATED ORAL at 08:49

## 2022-12-30 RX ADMIN — HEPARIN SODIUM SCH UNIT: 5000 INJECTION INTRAVENOUS; SUBCUTANEOUS at 15:30

## 2022-12-30 RX ADMIN — LEVETIRACETAM SCH MLS/HR: 100 INJECTION, SOLUTION, CONCENTRATE INTRAVENOUS at 22:07

## 2022-12-30 RX ADMIN — HEPARIN SODIUM SCH: 5000 INJECTION INTRAVENOUS; SUBCUTANEOUS at 00:37

## 2022-12-30 RX ADMIN — SEVELAMER CARBONATE SCH: 800 TABLET, FILM COATED ORAL at 13:10

## 2022-12-30 RX ADMIN — LACOSAMIDE SCH MG: 50 TABLET, FILM COATED ORAL at 08:49

## 2022-12-30 RX ADMIN — Medication SCH: at 13:10

## 2022-12-30 RX ADMIN — Medication SCH: at 16:36

## 2022-12-30 RX ADMIN — DIVALPROEX SODIUM SCH MG: 125 CAPSULE, COATED PELLETS ORAL at 08:51

## 2022-12-30 RX ADMIN — SEVELAMER CARBONATE SCH: 800 TABLET, FILM COATED ORAL at 16:36

## 2022-12-30 RX ADMIN — ASPIRIN SCH: 325 TABLET ORAL at 19:49

## 2022-12-30 RX ADMIN — HEPARIN SODIUM SCH UNIT: 5000 INJECTION INTRAVENOUS; SUBCUTANEOUS at 08:50

## 2022-12-30 RX ADMIN — LACOSAMIDE SCH MLS/HR: 10 INJECTION INTRAVENOUS at 23:16

## 2022-12-30 RX ADMIN — ZONISAMIDE SCH MG: 100 CAPSULE ORAL at 08:50

## 2022-12-30 RX ADMIN — Medication SCH MG: at 08:49

## 2022-12-30 RX ADMIN — SEVELAMER CARBONATE SCH MG: 800 TABLET, FILM COATED ORAL at 08:50

## 2022-12-30 NOTE — P.PN
Subjective


Progress Note Date: 12/27/22


patient is a 52-year-old female with a known history ofhypertension, 

hyperlipidemia, diabetes type 2, history of seizure disorder and most recent 

seizures about a month ago, prior history of vagal nerve stimulator placed, 

diabetic peripheral neuropathy, ESRD on hemodialysis Monday Wednesday and Friday

and history of CVA/TIA and uses wheelchair, residual memory impairment, newly 

diagnosed obstructive sleep apnea, prior history of smoking and bipolar disorder

and other medical problems was seen at dialysis clinic yesterday when she became

unresponsive and was not following commands and answering questions.  Patient 

was sent to ER for evaluation.  Patient was also having nausea and episodes of 

vomiting.  She was noted to have seizure-like activity.  She did not get 

dialysis and was transferred to ED by EMS.  On arrival to ER patient was awake 

alert and oriented x1 and was not holding any conversation with the staff.


Previously she was admitted to the hospital from 12 11-12 14 for worsening seizu

re activity.  Patient was started on Zonegran along with Keppra and Depakote.


Patient was also having episodes of vomiting in the ER.  As per her  

patient has been having vomiting for the past 1 week on and off.  She was also 

seen at [facility] earlier this week for seizure-like activity.


Patient was admitted to hospital with neurology consultation.  Overnight a team 

was called for assessment and possible status epilepticus.  Patient was not 

following commands or make eye contact.  Patient was also having episodes of 

emesis and concern for possible aspiration.  Patient was transferred to MICU and

intubated for airway protection.


On admission EKG showed sinus rhythm with moderate intraventricular conduction 

delays.


CT head showed hypodensity within the brainstem and within the left basal 

ganglia could be ischemic changes of intermediate age.  Consider follow-up with 

MRI.


Chest x-ray showed no acute cardiopulmonary process.  Chest x-ray repeated last 

night showed no acute cardiopulmonary disease.  Normal heart.  No change.


Laboratory data on admission WBC 6.7 hemoglobin 0.1 and platelets 218


Sodium 142 potassium 4.0 chloride 100 bicarb is 24 BUN 32 and creatinine 9.47 

and lactic acid 3.0 and blood sugar was 288 on admission


proBNP 2780


Troponin x1 negative


TSH 0.708


Urinalysis showed cloudy with 3+ protein large leukocyte esterase and elevated 

RBCs and WBCs.


Valproic acid level was 127.1.  Therapeutic level is 50-1 20





CTA head and neck showed irregular moderate plaque in the left carotid artery 

stenosis 75% at the origin..





12/25/2022


Patient is currently in the MICU and remains intubated and on mechanical 

ventilator.  Propofol has been discontinued and patient remains Cleviprex.  

Patient is able to open her eyes but able to follow commands at this time.  

Pulmonary is planning to wean off with pressure support and possible extubation.


Chest x-ray showed satisfactory ET and NG tube.  No acute process seen.


Patient is being continued on Keppra, Vimpat and Zonegran.  Pulmonary and 

neurology is on board.


Hemodialysis as per schedule.


Laboratory data showed WBC 5.6 hemoglobin 10.9 platelets 203


Sodium 139 potassium 4.2 chloride 104 bicarb is 26 BUN 21 creatinine 7.22 and 

blood sugar is 114.  Valproic acid level is 73.8





12/26/2022


Patient is in the MICU.  Patient was extubated yesterday.  On 2 L oxygen via 

nasal cannula.  Patient is on Cleviprex.  Awake alert and tries to communicate. 

Chest x-ray today showed no acute cardiopulmonary process.


Patient is being continued on antiepileptic drugs and was also started on blood 

pressure medications this morning.  CBG down to 65 this afternoon.  Patient is 

currently nothing by mouth.


Laboratory data showed WBC 7.8 hemoglobin 9.8 and platelets 172 sodium 138 

potassium 4.5 chloride 104 bicarb is 24 BUN 26 and creatinine 8.95.


Pulmonary, neurology and nephrology is on board.





12/27/2022


Patient is in the MICU.  Resting in the bed.  Awake alert and oriented but 

lethargic and sleepy.  Requiring Cleviprex for hypertension.  Requiring oxygen 

at 2 L via nasal cannula.


Chest x-ray today showed no acute cardiopulmonary process.  Nephrology and 

pulmonary is on board.  Patient is able to swallow and Lopressor changed to by 

mouth.


Cultures have been negative.


Laboratory data showed WBC 6.6 hemoglobin 9.1 platelets 154 sodium 139 potassium

4.3 chloride 106 bicarb is 22 BUN 38 and creatinine 10.6.


Patient is scheduled for hemodialysis today.








Current medications reviewed.








Objective





- Vital Signs


Vital signs: 


                                   Vital Signs











Temp  98.4 F   12/27/22 20:00


 


Pulse  84   12/27/22 21:00


 


Resp  14   12/27/22 21:00


 


BP  141/57   12/27/22 16:39


 


Pulse Ox  99   12/27/22 21:00


 


FiO2  35   12/25/22 18:05








                                 Intake & Output











 12/27/22 12/27/22 12/28/22





 06:59 18:59 06:59


 


Intake Total 551.705 4785.667 20


 


Output Total 0 3200 0


 


Balance 907.400 -1744.333 20


 


Weight 78 kg  


 


Intake:   


 


   820 20


 


    Dextrose 5%-0.9% NaCl 1, 600 300 





    000 ml @ 50 mls/hr IV .   





    Q20H [facility]ICIA Rx#:[id number]   


 


    Lacosamide  mg In 50  





    Sodium Chloride 0.9% 50   





    ml @ 100 mls/hr IVPB BID   





    ALICIA Rx#:[id number]   


 


    NS @  120 20


 


    levETIRAcetam  mg 100 400 





    In Sodium Chloride 0.9%   





    100 ml @ 400 mls/hr IVPB   





    Q12H [facility]ICIA Rx#:[id number]   


 


  Intake, IV Titration 37.400 35.667 





  Amount   


 


    Clevidipine Butyrate 25 37.400 35.667 





    mg In Empty Bag 1 bag @ 1   





    MG/HR 2 mls/hr IV .Q24H   





    ALICIA Rx#:[id number]   


 


  Oral  300 


 


  Hemodialysis  300 


 


Output:   


 


  Urine 0 0 0


 


  Hemodialysis  3200 


 


Other:   


 


  # Bowel Movements  1 








                       ABP, PAP, CO, CI - Last Documented











Arterial Blood Pressure        158/51

















- Exam





PHYSICAL EXAMINATION: 


Patient is Patient is awake alert.  Able to verbalize.  Communicates slowly.. 


HEENT: Normocephalic. Neck is supple. Pupils reactive. Nostrils clear. Oral 

cavity is moist. 


Neck reveals no JVD, carotid bruits, or thyromegaly. 


CHEST EXAMINATION: Trachea is central. Symmetrical expansion.  Bibasilar 

diminished sounds.  No wheezing or crackles.


Lung fields clear to auscultation and percussion. 


CARDIAC: Normal S1, S2 with no gallops. No murmurs 


ABDOMEN: Soft. Bowel sounds present. No organomegaly. No abdominal bruits. 


Extremities: Trace edema.  No clubbing or cyanosis


Neurologically patient is sedated intubated.  No gross focal deficits noted.  

Left foot drop.


Skin: No rash or skin lesions. 


Psychiatric: Could not be assessed at this time.


Musculoskeletal: No joint swelling or deformity. 





- Labs


CBC & Chem 7: 


                                 12/29/22 07:00





                                 12/29/22 07:00


Labs: 


                  Abnormal Lab Results - Last 24 Hours (Table)











  12/27/22 12/27/22 12/27/22 Range/Units





  04:00 04:00 04:00 


 


RBC  3.29 L    (3.80-5.40)  m/uL


 


Hgb  9.2 L    (11.4-16.0)  gm/dL


 


Hct  29.4 L    (34.0-46.0)  %


 


BUN   38 H   (7-17)  mg/dL


 


Creatinine   10.60 H*   (0.52-1.04)  mg/dL


 


POC Glucose (mg/dL)     ()  mg/dL


 


TIBC    155 L  (228-460)  ug/dL


 


Transferrin    111.0 L  (204.0-354.0)  mg/dL


 


Ferritin    1075.0 H  (10.0-291.0)  ng/mL














  12/27/22 12/27/22 12/27/22 Range/Units





  11:19 16:13 19:41 


 


RBC     (3.80-5.40)  m/uL


 


Hgb     (11.4-16.0)  gm/dL


 


Hct     (34.0-46.0)  %


 


BUN     (7-17)  mg/dL


 


Creatinine     (0.52-1.04)  mg/dL


 


POC Glucose (mg/dL)  120 H  158 H  129 H  ()  mg/dL


 


TIBC     (228-460)  ug/dL


 


Transferrin     (204.0-354.0)  mg/dL


 


Ferritin     (10.0-291.0)  ng/mL








                      Microbiology - Last 24 Hours (Table)











 12/23/22 14:00 Blood Culture - Preliminary





 Blood    No Growth after 96 hours


 


 12/23/22 14:15 Blood Culture - Preliminary





 Blood    No Growth after 96 hours














Assessment and Plan


Assessment: 





Altered mental status likely due to e status epilepticus. Patient is extubated 

on 12/25..


History of refractory epilepsy with previous multiple admissions due to 

seizures.


left ICA 75%stenosis at the origin as per CTA neck.


hypodensity in the brainstem and left basal ganglia were related to previous CVA


ESRD on hemodialysisMonday Wednesday and Friday.


History of CVA with left hemiparesis


Diabetes2


I would a peripheral neuropathy


Memory impairment


GERD


Hypertension next hyperlipidemia


Hypothyroidism 


bipolar disorder


Primary history of smoking


DVT prophylaxis with heparin subcu





Plan:


Patient is extubated 12/25/2022.  Remains Cleviprex.  initiated blood pressure 

medications.


Started back on Zonegran, Depakote and Keppra as per neurology 

recommendations.Zonegran dose increased to 100 mg twice a day,Keppra additional 

dose 500 mg post dialysis.


Patient will be continued on secondary stroke prophylaxis Plavix and Lipitor,


Neurology is on board.  Nephrology is on board for hemodialysis.


Continue with home medications and insulin sliding scale for better blood sugar 

control.


Follow-up closely.  Prognosis is guarded with multiple medical problems and 

comorbid conditions.





Time with Patient: Greater than 30

## 2022-12-30 NOTE — FL
Modified barium swallow.

Consistencies administered: Pudding and honey thick consistencies.

Silent laryngeal penetration with pudding consistency and aspiration with delayed cough with honey th
ick consistency. Delayed initiation of swallow with premature spill with all consistencies.

 

Fluoro time: 1.29 minutes

No images were sent to PACS. Please see speech pathology report.

## 2022-12-30 NOTE — P.PN
Subjective


Progress Note Date: 12/28/22


patient is a 52-year-old female with a known history ofhypertension, 

hyperlipidemia, diabetes type 2, history of seizure disorder and most recent 

seizures about a month ago, prior history of vagal nerve stimulator placed, 

diabetic peripheral neuropathy, ESRD on hemodialysis Monday Wednesday and Friday

and history of CVA/TIA and uses wheelchair, residual memory impairment, newly 

diagnosed obstructive sleep apnea, prior history of smoking and bipolar disorder

and other medical problems was seen at dialysis clinic yesterday when she became

unresponsive and was not following commands and answering questions.  Patient 

was sent to ER for evaluation.  Patient was also having nausea and episodes of 

vomiting.  She was noted to have seizure-like activity.  She did not get 

dialysis and was transferred to ED by EMS.  On arrival to ER patient was awake 

alert and oriented x1 and was not holding any conversation with the staff.


Previously she was admitted to the hospital from 12 11-12 14 for worsening seizu

re activity.  Patient was started on Zonegran along with Keppra and Depakote.


Patient was also having episodes of vomiting in the ER.  As per her  

patient has been having vomiting for the past 1 week on and off.  She was also 

seen at [facility] earlier this week for seizure-like activity.


Patient was admitted to hospital with neurology consultation.  Overnight a team 

was called for assessment and possible status epilepticus.  Patient was not 

following commands or make eye contact.  Patient was also having episodes of 

emesis and concern for possible aspiration.  Patient was transferred to MICU and

intubated for airway protection.


On admission EKG showed sinus rhythm with moderate intraventricular conduction 

delays.


CT head showed hypodensity within the brainstem and within the left basal 

ganglia could be ischemic changes of intermediate age.  Consider follow-up with 

MRI.


Chest x-ray showed no acute cardiopulmonary process.  Chest x-ray repeated last 

night showed no acute cardiopulmonary disease.  Normal heart.  No change.


Laboratory data on admission WBC 6.7 hemoglobin 0.1 and platelets 218


Sodium 142 potassium 4.0 chloride 100 bicarb is 24 BUN 32 and creatinine 9.47 

and lactic acid 3.0 and blood sugar was 288 on admission


proBNP 2780


Troponin x1 negative


TSH 0.708


Urinalysis showed cloudy with 3+ protein large leukocyte esterase and elevated 

RBCs and WBCs.


Valproic acid level was 127.1.  Therapeutic level is 50-1 20





CTA head and neck showed irregular moderate plaque in the left carotid artery 

stenosis 75% at the origin..





12/25/2022


Patient is currently in the MICU and remains intubated and on mechanical 

ventilator.  Propofol has been discontinued and patient remains Cleviprex.  

Patient is able to open her eyes but able to follow commands at this time.  

Pulmonary is planning to wean off with pressure support and possible extubation.


Chest x-ray showed satisfactory ET and NG tube.  No acute process seen.


Patient is being continued on Keppra, Vimpat and Zonegran.  Pulmonary and 

neurology is on board.


Hemodialysis as per schedule.


Laboratory data showed WBC 5.6 hemoglobin 10.9 platelets 203


Sodium 139 potassium 4.2 chloride 104 bicarb is 26 BUN 21 creatinine 7.22 and 

blood sugar is 114.  Valproic acid level is 73.8





12/26/2022


Patient is in the MICU.  Patient was extubated yesterday.  On 2 L oxygen via 

nasal cannula.  Patient is on Cleviprex.  Awake alert and tries to communicate. 

Chest x-ray today showed no acute cardiopulmonary process.


Patient is being continued on antiepileptic drugs and was also started on blood 

pressure medications this morning.  CBG down to 65 this afternoon.  Patient is 

currently nothing by mouth.


Laboratory data showed WBC 7.8 hemoglobin 9.8 and platelets 172 sodium 138 

potassium 4.5 chloride 104 bicarb is 24 BUN 26 and creatinine 8.95.


Pulmonary, neurology and nephrology is on board.





12/27/2022


Patient is in the MICU.  Resting in the bed.  Awake alert and oriented but 

lethargic and sleepy.  Requiring Cleviprex for hypertension.  Requiring oxygen 

at 2 L via nasal cannula.


Chest x-ray today showed no acute cardiopulmonary process.  Nephrology and 

pulmonary is on board.  Patient is able to swallow and Lopressor changed to by 

mouth.


Cultures have been negative.


Laboratory data showed WBC 6.6 hemoglobin 9.1 platelets 154 sodium 139 potassium

4.3 chloride 106 bicarb is 22 BUN 38 and creatinine 10.6.


Patient is scheduled for hemodialysis today.





12/28/2022


Patient is in the MICU.  Awake alert and oriented.  Communicating slowly.  

Currently titrated down to room air.


Patient had repeat CT scan this morning showed hypodensities within the left 

basal ganglia, left raghav and cerebellum are similar to 12/23/2022.  Suggestive 

of prior injury.  No new areas of suggest acute or subacute CVA.


Cultures have been negative.  Patient underwent hemodialysis yesterday.  

Cleviprex has been discontinued.  No further episodes of seizures overnight.  

Patient is being transferred to medical floor today.


Laboratory data showed WBC 6.7 hemoglobin 9.7 and platelets 151 sodium 136 

potassium 3.6 chloride 102 bicarb is 27 BUN 21 and creatinine 6.95.  Blood sugar

is 107.





Current medications reviewed.








Objective





- Vital Signs


Vital signs: 


                                   Vital Signs











Temp  98.6 F   12/28/22 18:54


 


Pulse  88   12/28/22 18:54


 


Resp  17   12/28/22 18:54


 


BP  138/84   12/28/22 18:54


 


Pulse Ox  97   12/28/22 18:54


 


FiO2  35   12/25/22 18:05








                                 Intake & Output











 12/28/22 12/28/22 12/29/22





 06:59 18:59 06:59


 


Intake Total 50 1250 


 


Output Total 0 700 


 


Balance 50 550 


 


Weight 74.8 kg 74.8 kg 


 


Intake:   


 


  IV 50  


 


    NS @ KVO 50  


 


  Oral  950 


 


  Hemodialysis  300 


 


Output:   


 


  Urine 0 0 


 


  Hemodialysis  700 


 


Other:   


 


  # Bowel Movements 1 1 








                       ABP, PAP, CO, CI - Last Documented











Arterial Blood Pressure        115/89

















- Exam





PHYSICAL EXAMINATION: 


Patient is Patient is awake alert.  Able to verbalize.  Communicates slowly.. 


HEENT: Normocephalic. Neck is supple. Pupils reactive. Nostrils clear. Oral 

cavity is moist. 


Neck reveals no JVD, carotid bruits, or thyromegaly. 


CHEST EXAMINATION: Trachea is central. Symmetrical expansion.  Bibasilar 

diminished sounds.  No wheezing or crackles.


Lung fields clear to auscultation and percussion. 


CARDIAC: Normal S1, S2 with no gallops. No murmurs 


ABDOMEN: Soft. Bowel sounds present. No organomegaly. No abdominal bruits. 


Extremities: Trace edema.  No clubbing or cyanosis


Neurologically patient is sedated intubated.  No gross focal deficits noted.  

Left foot drop.


Skin: No rash or skin lesions. 


Psychiatric: Could not be assessed at this time.


Musculoskeletal: No joint swelling or deformity. 





- Labs


CBC & Chem 7: 


                                 12/29/22 07:00





                                 12/29/22 07:00


Labs: 


                  Abnormal Lab Results - Last 24 Hours (Table)











  12/28/22 12/28/22 12/28/22 Range/Units





  04:00 04:00 06:48 


 


RBC  3.31 L    (3.80-5.40)  m/uL


 


Hgb  9.7 L    (11.4-16.0)  gm/dL


 


Hct  28.9 L    (34.0-46.0)  %


 


Sodium   136 L   (137-145)  mmol/L


 


BUN   21 H   (7-17)  mg/dL


 


Creatinine   6.95 H   (0.52-1.04)  mg/dL


 


Glucose   107 H   (74-99)  mg/dL


 


POC Glucose (mg/dL)    115 H  ()  mg/dL














  12/28/22 12/28/22 Range/Units





  12:07 20:59 


 


RBC    (3.80-5.40)  m/uL


 


Hgb    (11.4-16.0)  gm/dL


 


Hct    (34.0-46.0)  %


 


Sodium    (137-145)  mmol/L


 


BUN    (7-17)  mg/dL


 


Creatinine    (0.52-1.04)  mg/dL


 


Glucose    (74-99)  mg/dL


 


POC Glucose (mg/dL)  166 H  134 H  ()  mg/dL








                      Microbiology - Last 24 Hours (Table)











 12/23/22 14:15 Blood Culture - Preliminary





 Blood    No Growth after 120 hours


 


 12/23/22 14:00 Blood Culture - Preliminary





 Blood    No Growth after 120 hours














Assessment and Plan


Assessment: 





Altered mental status likely due to e status epilepticus. Patient is extubated 

on 12/25..


History of refractory epilepsy with previous multiple admissions due to 

seizures.


left ICA 75%stenosis at the origin as per CTA neck.


hypodensity in the brainstem and left basal ganglia were related to previous CVA


ESRD on hemodialysisMonday Wednesday and Friday.


History of CVA with left hemiparesis


Diabetes2


I would a peripheral neuropathy


Memory impairment


GERD


Hypertension next hyperlipidemia


Hypothyroidism 


bipolar disorder


Primary history of smoking


DVT prophylaxis with heparin subcu





Plan:


Patient is extubated 12/25/2022.  off Cleviprex.  initiated blood pressure 

medications.Blood pressure is controlled.  Patient had hemodialysis yesterday.


Repeat CT head showed no acute intracranial process.  Virt-C DHA is noted as 

above.


Started back on Zonegran, Depakote and Keppra as per neurology 

recommendations.Zonegran dose increased to 100 mg twice a day,Keppra additional 

dose 500 mg post dialysis.


Patient will be continued on secondary stroke prophylaxis Plavix and Lipitor,


Neurology is on board.  Nephrology is on board for hemodialysis.


Continue with home medications and insulin sliding scale for better blood sugar 

control.


Follow-up closely.  Prognosis is guarded with multiple medical problems and 

comorbid conditions.





Time with Patient: Greater than 30

## 2022-12-30 NOTE — P.PN
Subjective


Progress Note Date: 12/29/22


patient is a 52-year-old female with a known history ofhypertension, 

hyperlipidemia, diabetes type 2, history of seizure disorder and most recent 

seizures about a month ago, prior history of vagal nerve stimulator placed, 

diabetic peripheral neuropathy, ESRD on hemodialysis Monday Wednesday and Friday

and history of CVA/TIA and uses wheelchair, residual memory impairment, newly 

diagnosed obstructive sleep apnea, prior history of smoking and bipolar disorder

and other medical problems was seen at dialysis clinic yesterday when she became

unresponsive and was not following commands and answering questions.  Patient 

was sent to ER for evaluation.  Patient was also having nausea and episodes of 

vomiting.  She was noted to have seizure-like activity.  She did not get 

dialysis and was transferred to ED by EMS.  On arrival to ER patient was awake 

alert and oriented x1 and was not holding any conversation with the staff.


Previously she was admitted to the hospital from 12 11-12 14 for worsening seizu

re activity.  Patient was started on Zonegran along with Keppra and Depakote.


Patient was also having episodes of vomiting in the ER.  As per her  

patient has been having vomiting for the past 1 week on and off.  She was also 

seen at [facility] earlier this week for seizure-like activity.


Patient was admitted to hospital with neurology consultation.  Overnight a team 

was called for assessment and possible status epilepticus.  Patient was not 

following commands or make eye contact.  Patient was also having episodes of 

emesis and concern for possible aspiration.  Patient was transferred to MICU and

intubated for airway protection.


On admission EKG showed sinus rhythm with moderate intraventricular conduction 

delays.


CT head showed hypodensity within the brainstem and within the left basal 

ganglia could be ischemic changes of intermediate age.  Consider follow-up with 

MRI.


Chest x-ray showed no acute cardiopulmonary process.  Chest x-ray repeated last 

night showed no acute cardiopulmonary disease.  Normal heart.  No change.


Laboratory data on admission WBC 6.7 hemoglobin 0.1 and platelets 218


Sodium 142 potassium 4.0 chloride 100 bicarb is 24 BUN 32 and creatinine 9.47 

and lactic acid 3.0 and blood sugar was 288 on admission


proBNP 2780


Troponin x1 negative


TSH 0.708


Urinalysis showed cloudy with 3+ protein large leukocyte esterase and elevated 

RBCs and WBCs.


Valproic acid level was 127.1.  Therapeutic level is 50-1 20





CTA head and neck showed irregular moderate plaque in the left carotid artery 

stenosis 75% at the origin..





12/25/2022


Patient is currently in the MICU and remains intubated and on mechanical 

ventilator.  Propofol has been discontinued and patient remains Cleviprex.  

Patient is able to open her eyes but able to follow commands at this time.  

Pulmonary is planning to wean off with pressure support and possible extubation.


Chest x-ray showed satisfactory ET and NG tube.  No acute process seen.


Patient is being continued on Keppra, Vimpat and Zonegran.  Pulmonary and 

neurology is on board.


Hemodialysis as per schedule.


Laboratory data showed WBC 5.6 hemoglobin 10.9 platelets 203


Sodium 139 potassium 4.2 chloride 104 bicarb is 26 BUN 21 creatinine 7.22 and 

blood sugar is 114.  Valproic acid level is 73.8





12/26/2022


Patient is in the MICU.  Patient was extubated yesterday.  On 2 L oxygen via 

nasal cannula.  Patient is on Cleviprex.  Awake alert and tries to communicate. 

Chest x-ray today showed no acute cardiopulmonary process.


Patient is being continued on antiepileptic drugs and was also started on blood 

pressure medications this morning.  CBG down to 65 this afternoon.  Patient is 

currently nothing by mouth.


Laboratory data showed WBC 7.8 hemoglobin 9.8 and platelets 172 sodium 138 

potassium 4.5 chloride 104 bicarb is 24 BUN 26 and creatinine 8.95.


Pulmonary, neurology and nephrology is on board.





12/27/2022


Patient is in the MICU.  Resting in the bed.  Awake alert and oriented but 

lethargic and sleepy.  Requiring Cleviprex for hypertension.  Requiring oxygen 

at 2 L via nasal cannula.


Chest x-ray today showed no acute cardiopulmonary process.  Nephrology and 

pulmonary is on board.  Patient is able to swallow and Lopressor changed to by 

mouth.


Cultures have been negative.


Laboratory data showed WBC 6.6 hemoglobin 9.1 platelets 154 sodium 139 potassium

4.3 chloride 106 bicarb is 22 BUN 38 and creatinine 10.6.


Patient is scheduled for hemodialysis today.





12/28/2022


Patient is in the MICU.  Awake alert and oriented.  Communicating slowly.  

Currently titrated down to room air.


Patient had repeat CT scan this morning showed hypodensities within the left 

basal ganglia, left raghav and cerebellum are similar to 12/23/2022.  Suggestive 

of prior injury.  No new areas of suggest acute or subacute CVA.


Cultures have been negative.  Patient underwent hemodialysis yesterday.  

Cleviprex has been discontinued.  No further episodes of seizures overnight.  

Patient is being transferred to medical floor today.


Laboratory data showed WBC 6.7 hemoglobin 9.7 and platelets 151 sodium 136 

potassium 3.6 chloride 102 bicarb is 27 BUN 21 and creatinine 6.95.  Blood sugar

is 107.





12/29/2022


Patient is in the medical floor.  Awake alert and oriented but slow to respond. 

On room air.  No complaints of chest pain or shortness of breath.  No nausea 

vomiting abdominal pain or diarrhea.  Tolerating oral diet.  No further episode 

of seizures.


Hemodialysis as per schedule Monday Wednesday and Friday.


Laboratory data showed WC 7.7 hemoglobin 9.4 and platelets 177 sodium 142 

potassium 3.9 chloride 103 bicarb is 26.1 BUN 23.9 and creatinine 6.3.  Blood 

sugars 126 and phosphorus 4.8.  Next hemodialysis tomorrow.


Anticipate discharge to rehab in the next 24 hours.








Current medications reviewed.








Objective





- Vital Signs


Vital signs: 


                                   Vital Signs











Temp  98.9 F   12/29/22 19:32


 


Pulse  107 H  12/29/22 19:32


 


Resp  16   12/29/22 19:32


 


BP  163/77   12/29/22 19:32


 


Pulse Ox  94 L  12/29/22 19:32


 


FiO2  35   12/25/22 18:05








                                 Intake & Output











 12/29/22 12/29/22 12/30/22





 06:59 18:59 06:59


 


Intake Total  600 


 


Balance  600 


 


Weight 71.5 kg  


 


Intake:   


 


  Oral  600 


 


Other:   


 


  Voiding Method Indwelling Catheter  


 


  # Voids  1 


 


  # Bowel Movements 1 1 








                       ABP, PAP, CO, CI - Last Documented











Arterial Blood Pressure        115/89

















- Exam





PHYSICAL EXAMINATION: 


Patient is Patient is awake alert.  Able to verbalize.  Communicates slowly.. 


HEENT: Normocephalic. Neck is supple. Pupils reactive. Nostrils clear. Oral 

cavity is moist. 


Neck reveals no JVD, carotid bruits, or thyromegaly. 


CHEST EXAMINATION: Trachea is central. Symmetrical expansion.  Bibasilar 

diminished sounds.  No wheezing or crackles.


Lung fields clear to auscultation and percussion. 


CARDIAC: Normal S1, S2 with no gallops. No murmurs 


ABDOMEN: Soft. Bowel sounds present. No organomegaly. No abdominal bruits. 


Extremities: Trace edema.  No clubbing or cyanosis


Neurologically patient is sedated intubated.  No gross focal deficits noted.  

Left foot drop.


Skin: No rash or skin lesions. 


Psychiatric: Could not be assessed at this time.


Musculoskeletal: No joint swelling or deformity. 





- Labs


CBC & Chem 7: 


                                 12/29/22 07:00





                                 12/29/22 07:00


Labs: 


                  Abnormal Lab Results - Last 24 Hours (Table)











  12/24/22 12/29/22 12/29/22 Range/Units





  12:15 06:24 07:00 


 


RBC    3.31 L  (4.10-5.20)  X 10*6/uL


 


Hgb    9.4 L  (12.0-15.0)  g/dL


 


Hct    30.1 L  (37.2-46.3)  %


 


MCHC    31.2 L  (32.0-37.0)  g/dL


 


MPV    12.5 H  (9.5-12.2)  fL


 


Creatinine     (0.6-1.5)  mg/dL


 


Est GFR (CKD-EPI)AfAm     (60.0-200.0)   


 


Est GFR (CKD-EPI)NonAf     (60.0-200.0)   


 


BUN/Creatinine Ratio     (12.00-20.00)  Ratio


 


Glucose     ()  mg/dL


 


POC Glucose (mg/dL)   130 H   ()  mg/dL


 


Free Valproic Acid  26.0 H    (4.8-17.3)  mg/L














  12/29/22 12/29/22 12/29/22 Range/Units





  07:00 11:20 16:55 


 


RBC     (4.10-5.20)  X 10*6/uL


 


Hgb     (12.0-15.0)  g/dL


 


Hct     (37.2-46.3)  %


 


MCHC     (32.0-37.0)  g/dL


 


MPV     (9.5-12.2)  fL


 


Creatinine  6.3 H    (0.6-1.5)  mg/dL


 


Est GFR (CKD-EPI)AfAm  8.0 L    (60.0-200.0)   


 


Est GFR (CKD-EPI)NonAf  6.9 L    (60.0-200.0)   


 


BUN/Creatinine Ratio  3.81 L    (12.00-20.00)  Ratio


 


Glucose  126 H    ()  mg/dL


 


POC Glucose (mg/dL)   204 H  161 H  ()  mg/dL


 


Free Valproic Acid     (4.8-17.3)  mg/L














  12/29/22 Range/Units





  20:02 


 


RBC   (4.10-5.20)  X 10*6/uL


 


Hgb   (12.0-15.0)  g/dL


 


Hct   (37.2-46.3)  %


 


MCHC   (32.0-37.0)  g/dL


 


MPV   (9.5-12.2)  fL


 


Creatinine   (0.6-1.5)  mg/dL


 


Est GFR (CKD-EPI)AfAm   (60.0-200.0)   


 


Est GFR (CKD-EPI)NonAf   (60.0-200.0)   


 


BUN/Creatinine Ratio   (12.00-20.00)  Ratio


 


Glucose   ()  mg/dL


 


POC Glucose (mg/dL)  222 H  ()  mg/dL


 


Free Valproic Acid   (4.8-17.3)  mg/L








                      Microbiology - Last 24 Hours (Table)











 12/23/22 14:15 Blood Culture - Final





 Blood    No Growth after 144 hours


 


 12/23/22 14:00 Blood Culture - Final





 Blood    No Growth after 144 hours














Assessment and Plan


Assessment: 





Altered mental status likely due to e status epilepticus. Patient is extubated o

n 12/25..


History of refractory epilepsy with previous multiple admissions due to 

seizures.


left ICA 75%stenosis at the origin as per CTA neck.


hypodensity in the brainstem and left basal ganglia were related to previous CVA


ESRD on hemodialysisMonday Wednesday and Friday.


History of CVA with left hemiparesis


Diabetes2


I would a peripheral neuropathy


Memory impairment


GERD


Hypertension next hyperlipidemia


Hypothyroidism 


bipolar disorder


Primary history of smoking


DVT prophylaxis with heparin subcu





Plan:


Patient is extubated 12/25/2022.  off Cleviprex.  initiated blood pressure 

medications.Blood pressure is controlled.  Hemodialysis as per schedule on 

Monday Wednesday and Friday.  Next hemodialysis tomorrow.


Repeat CT head showed no acute intracranial process.  old cva changed as noted 

above.


Started back on Zonegran, Depakote and Keppra as per neurology 

recommendations.Zonegran dose increased to 100 mg twice a day,Keppra additional 

dose 500 mg post dialysis.


Patient will be continued on secondary stroke prophylaxis Plavix and Lipitor,


Neurology is on board.  Nephrology is on board for hemodialysis.


Continue with home medications and insulin sliding scale for better blood sugar 

control.


Follow-up closely.  Prognosis is guarded with multiple medical problems and 

comorbid conditions.





Time with Patient: Greater than 30

## 2022-12-30 NOTE — P.PN
Subjective


Progress Note Date: 12/29/22 12/29/2022: Patient is laying comfortably in the bed.  No further seizures 

reported.  Patient continues to have slow mentation.





12/28/2022: Patient was seen for a follow-up.  Patient states she is feeling 

much better.  No seizures.  Denies headache.





12/27/2022: Patient was seen for a follow-up.  Patient is laying comfortably in 

the bed.  No further seizures reported.  Patient continues to be very stiff.  

Her left side is more spastic.  Awaiting MRI of the brain.





12/26/2022: Patient was seen for a follow-up.  Patient initially seen by Dr. Michele Merritt.  Please refer to his note for details.





Patient is a 52-year-old female with history of seizure disorder.  She has 

clinical status epilepticus that has resolved.  She is known to our neurology 

service for recurrent seizures, and was last time seen by myself on 12/14/2022. 

A 2.5 hour EEG at that time reported generalized spikes seen along with some 

triphasic waves.  At that time it was recommended to wean off Vimpat, continue 

low-dose Depakote 250 mg twice a day, Keppra 500 mg twice a day and extra 500 mg

tablet postdialysis and zonisamide 100 mg daily was started.  





Apparently she was seen by neurologist and patient currently on Depakote 500 mg 

every 8 hours, zonisamide 100 mg daily, Keppra 500 mg twice a day with 500 mg 

postdialysis Monday Wednesday Friday.  Also appears to be on Depakote 250 mg 

twice a day.





Patient has very slow mentation.  Per speech therapist note, patient has been 

made nothing by mouth, patient not able to swallow medications.  She has very 

delayed responses and delayed speech.  Poor oral phase.








Some of her workup during his hospital visit consisted of :


TSH: 0.708


Initial Valproic acid is 127 and repeated is 73.8


Keppra level is 7.9


CT of the head which is reported as hypodensity within the brainstem and within 

the left basal ganglia could be ischemic changes of and determine age.  Consider

follow-up MRI.  I personally reviewed CT head I felt the patient had the basal 

ganglia changes on her prior admission and it doesn't look acute or subacute in 

my opinion.  Regarding the brainstem CT is not the best study for brainstem but 

she does have hypodensity but does not look like acute or subacute in my opinion

as well.


CT angiography of the head and neck was reported as irregular moderate plaque at

the left carotid artery bifurcation and estimated 75% stenosis origin of the 

left ICA carotid artery.  There is approximately similar 35% stenosis right 

internal carotid artery.  No significant intercranial and angiographic 

abnormality.


Carotid duplex is reported as no hemodynamically significant internal carotid 

artery stenosis on either side.  Limited overall assessment on the left side 

including resolution of the left ISA in the left vertebral artery due to the 

patient condition.  


Plasma lactic acid venous 3.0 on presentation and improved to 1.6.


EEG on 12/24/2022 is abnormal.  The burst suppression is likely due to 

medication effect (Versed and Propofol).  The background slowing suggestive of 

moderate encephalopathy.  The epileptiform discharges is likely on the basis of 

primary generalized epilepsy.  There is no focal slowing or seizure detected 

during the study.


Urinalysis is possible suggestive of acute urinary tract infection


Urine tox screen is valproic acids 127 and the normal supposed to be between 50-

120.  Her level is slightly supratherapeutic








Objective





- Vital Signs


Vital signs: 


                                   Vital Signs











Temp  98.5 F   12/29/22 14:00


 


Pulse  94   12/29/22 14:00


 


Resp  16   12/29/22 14:00


 


BP  132/80   12/29/22 14:00


 


Pulse Ox  94 L  12/29/22 14:00


 


FiO2  35   12/25/22 18:05








                                 Intake & Output











 12/28/22 12/29/22 12/29/22





 18:59 06:59 18:59


 


Intake Total 1250  600


 


Output Total 700  


 


Balance 550  600


 


Weight 74.8 kg 71.5 kg 


 


Intake:   


 


  Oral 950  600


 


  Hemodialysis 300  


 


Output:   


 


  Urine 0  


 


  Hemodialysis 700  


 


Other:   


 


  Voiding Method  Indwelling Catheter 


 


  # Voids   1


 


  # Bowel Movements 1 1 1








                       ABP, PAP, CO, CI - Last Documented











Arterial Blood Pressure        115/89

















- Exam





Patient is awake, more alert, and better response time to answer questions.  

Speech and language functions appears normal.





On muscle strength testing (right/left) deltoid 5-/3-4-, biceps 5/4, triceps 

5/4,  4+/4+.  In the lower extremities hip flexion 3+/to, ankle dorsiflexion

4-/1-2.





Patient has bilateral Babinski.











- Labs


CBC & Chem 7: 


                                 12/30/22 05:51





                                 12/30/22 05:51


Labs: 


                  Abnormal Lab Results - Last 24 Hours (Table)











  12/28/22 12/29/22 12/29/22 Range/Units





  20:59 06:24 07:00 


 


RBC    3.31 L  (4.10-5.20)  X 10*6/uL


 


Hgb    9.4 L  (12.0-15.0)  g/dL


 


Hct    30.1 L  (37.2-46.3)  %


 


MCHC    31.2 L  (32.0-37.0)  g/dL


 


MPV    12.5 H  (9.5-12.2)  fL


 


Creatinine     (0.6-1.5)  mg/dL


 


Est GFR (CKD-EPI)AfAm     (60.0-200.0)   


 


Est GFR (CKD-EPI)NonAf     (60.0-200.0)   


 


BUN/Creatinine Ratio     (12.00-20.00)  Ratio


 


Glucose     ()  mg/dL


 


POC Glucose (mg/dL)  134 H  130 H   ()  mg/dL














  12/29/22 12/29/22 12/29/22 Range/Units





  07:00 11:20 16:55 


 


RBC     (4.10-5.20)  X 10*6/uL


 


Hgb     (12.0-15.0)  g/dL


 


Hct     (37.2-46.3)  %


 


MCHC     (32.0-37.0)  g/dL


 


MPV     (9.5-12.2)  fL


 


Creatinine  6.3 H    (0.6-1.5)  mg/dL


 


Est GFR (CKD-EPI)AfAm  8.0 L    (60.0-200.0)   


 


Est GFR (CKD-EPI)NonAf  6.9 L    (60.0-200.0)   


 


BUN/Creatinine Ratio  3.81 L    (12.00-20.00)  Ratio


 


Glucose  126 H    ()  mg/dL


 


POC Glucose (mg/dL)   204 H  161 H  ()  mg/dL








                      Microbiology - Last 24 Hours (Table)











 12/23/22 14:15 Blood Culture - Final





 Blood    No Growth after 144 hours


 


 12/23/22 14:00 Blood Culture - Final





 Blood    No Growth after 144 hours














Assessment and Plan


Assessment: 





This is a 52-year-old woman with medically refractory epilepsy, VNS who presents

to the hospital numerous times for breakthrough seizures.





Clinical status epilepticus---resolved.  Her epilepsy is not controlled.  

Patient is compliant with her medications.


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

preliminary 2.5 hour EEG in earlier 12/2022)


Left ICA stenosis 75% on CTA but no significant stenosis on carotid Doppler 

ultrasound.


History of multiple strokes.  CT head revealed chronic hypodensity of left basal

ganglia and bilateral cerebellum.  Also a questionable area of hypodensity in 

the left raghav, which could be artifactual.  


Dysphagia.  Patient initially failed swallow studies, but now she has cleared 

swallow.  No evidence of CVA on CT head.


Probable acute UTI


History of stroke with residual left hemiparesis.  Patient states she has histor

y of 5 strokes in the past.


Diabetes mellitus


End-stage renal is on dialysis


Hypertension 





Plan: 





* Nurse informed that patient cannot have MRI of the brain because of VNS.  

  Patient's VNS is not compatible with MRI machine in [facility].  Repeat CT head 

  was done.  Revealed no acute ischemic process.  No evidence of stroke in the 

  brainstem.





* Dr. Merritt has increased Zonegran from 100mg daily to 100mg bid.  


* Repeat Depakote level 92.  Patient has slow mentation.  We will decrease 

  Depakote to 500 mg 3 times a day.  Her Depakote level was highly elevated 127 

  on arrival.


* Continue Keppra 500 mg every 12 hours 


* Vimpat 100 mg twice a day.   Dr. Merritt has decreased her Vimpat from 150mg bid

  to 100mg bid since on last admission it was felt she had primary generalized 

  epilepsy and Vimpat was not great drug for primary generalized epilepsy. 





* Zonisamide level 10 mcg per mL (10-40), Depakote 73.8, Keppra 7.9 (3-60).  

  Repeat Depakote level 92.  Depakote decreased to 500 mg 3 times a day.





* I highly recommend patient to follow-up with epileptilologist since has 

  frequent seizures/medical refractory epilepsy.  Consider Epidiolex for control

  of seizures.  


* Patient has 2.5 hour EEG in our facility on 12/14/2022 (prior admission) and 

  preliminary was reported as runs of sharp and slow waves or spike and slow 

  wave at 2-3 Hz lasting 1 -1.5 seconds suggestive of primary generalized 

  epilepsy.  No official report yet.





* Left ICA stenosis 75% on CTA but on carotid duplex no significant stenosis 

  seen.  Vascular surgery input appreciated.  No indication for surgery.  





* Patient is on home dose Plavix 75mg daily and Lipitor 40mg qhs which is 

  sufficient for secondary stroke prophylaxis.


* PT and OT are consulted.


* Will defer the rest of medical management to primary team.


* For DVT prophylaxis: on subq heparin.


* Neurologically clear.  Recommend patient follow up with neurologist as an 

  outpatient. Nsaids Pregnancy And Lactation Text: These medications are considered safe up to 30 weeks gestation. It is excreted in breast milk.

## 2022-12-30 NOTE — P.PN
Subjective


Progress Note Date: 12/30/22





this is a 52-year-old female with history of chronic seizure disorder, her 

seizures have been recently poorly controlled, patient follows up with the 

neurologist/Dr. Nelson for her seizures.  Patient was brought into the ER 

yesterday with altered mental status, intermittent episodes of nausea and 

vomiting.  Apparently the patient was dropped off yesterday at the dialysis 

unit/clinic, and when she got there patient became unresponsive, she did not 

notice to have any seizure-like activity.  She was not speaking or answering any

questions.  Patient was unable to get her dialysis treatment, hence the patient 

was brought in by EMS to the ER yesterday.  Apparently the patient was admitted,

and shortly after she was admitted she had recurrent episodes of 

seizures,/status epilepticus.that a team responded to a call from the nurse on 

the floor, patient was evaluated by the hospitalist, did not seem to follow any 

commands or make any eye contact.  And at one point it was felt that the patient

had status epilepticus.  Apparently the patient had to be intubated to protect 

her airways, she was admitted to the ICU, and the plan was to consider 

transferring the patient to [facility] because of her status epilepticus 

and this was based upon the recommendation of the neurologist who was notified 

about this patient.  Patient was placed on multiple medications for her 

seizures, and these will be noted below.  Today I evaluated the patient while 

she is on mechanical ventilation, and I recommended that we start considering 

weaning and stopping her Versed which is 5 mg per hour, he is also on propofol 

at 20 mcg/kg/m, and will possibly extubate the patient.  She was evaluated by 

the neurologist, and he feels that the seizures seem to be presently under 

control, and would be worthwhile giving the patient a weaning trial and possibly

extubation.  Her assist-control rate is 14 tidal volume 400 FiO2 30 PEEP of 5.  

ABG from previous settings was noted she had a pO2 of 165 pCO2 24 pH of 7.60, h

owever since then the ventilator settings have been really adjusted.  Her last 

seizure was reported at 2300 hrs.CT of the brain last night showed subacute 

lacunar infarct, and suspicious brainstem lesion.however the neurologist 

evaluated the CT of the brain, and did not feel that this is a worrisome 

finding.patient is getting IV Keppra loading dose, she is also getting Versed 

and propofol, and she was givenDepakote twice a day, patient is also receiving 

[id number] mg IV twice a day.in addition the patient is on zonisamide 100 mg by 

mouth twice a dayobviously the patient is maximized on all antiseizure 

medications, and I'm hoping her seizures are under control at present, will give

the patient a trial of weaning and possibly extubationobviously no further plans

to transfer the patient at this point.





Reevaluated today on 12/25/22, patient remains in the ICU, intubated and 

mechanically ventilated, she is on assist control rate of 20, volume 400 FiO2 

30% and PEEP of 5.ABG showed a pO2 of 121 pCO2 36 pH of 7.48.  WBC count is 5.6 

hemoglobin is 10.9.  Electrolytes are basically unremarkable,BUN is 20 

creatinine 7.22.  Patient is a renal patient, and she is on hemodialysis.  

Patient is on propofol at 20 mcg/kg/m which I have discontinued this morning, he

is also on clevidipine at 6 mg per hour.  After stopping her propofol, the 

patient seems to be appropriate, she is following instructions, and I plan wean 

this patient today using pressure support with CPAP, or may use IMV with press

ure support leading to CPAP.  Overall the patient is doing better, and again I 

plan to wean and extubate, no seizure activities in the last 24 hours.chest x-

ray is basically unremarkable today.





Reevaluated today on 12/26/22, patient remains in the ICU, she was extubated 

yesterday, she tolerated the extubation well.  She is now on 2 L nasal cannula, 

not in distress, she is requiring clevidipine elevated blood pressure, hence I 

started the patient on metoprolol 50 mg twice a day and amlodipine 5 mg by mouth

twice a day.  No seizures noted over the last 24 hours, patient seems to be 

generally weak, not in any form of respiratory distress.  Labs today are 

basically unremarkable except for creatinine of 8.95, and I believe the patient 

will likely be hemodialysis today.  Chest x-ray showed no evidence of any active

disease.





The patient is seen today 12/27/2022 in follow-up in the intensive care unit.  

She is currently sitting up in bed.  Awake and alert in no acute distress.  She 

is maintaining good O2 saturation in the 90s on 2 L/m per nasal cannula.  She 

has D5W with normal saline at 50 mL per hour.  She is still requiring a 

clevidipine drip at 2 mg per hour for hypertension.  She is receiving 

hemodialysis.  Last treatment on December 24 they removed 1.6 L.  Chest x-ray 

revealed no acute pulmonary process.  Blood cultures revealed no growth.  Urine 

culture revealed no growth.  Sputum culture revealed no growth.  White count 

6.6.  Hemoglobin 9.2.  Platelets 154.  Sodium 139.  Potassium 4.3.  BUN 38.  

Creatinine 10.6.  She is currently on amlodipine and Lopressor.  She did not 

pass a swallow evaluation yesterday.  She remains nothing by mouth.  To be 

reevaluated today as she is more awake and alert.  No further seizure activity.





The patient is seen today 12/28/2022 in follow-up in the intensive care unit.  

She is awake and alert in no acute distress.  Sitting up in bed.  Maintaining O2

saturation in the 90s on room air.  No IV fluids currently.  Follow-up computed 

tomography scan revealed hypodensities within the left basal ganglia, left raghav 

and cerebellum which were similar compared to 12/23/2022, suggestive of prior 

injury.  No new areas to suggest an acute/subacute CVA.  She is swallowing her 

pills without choking.  She is 68.  Diet with one-to-one supervision.  Blood 

cultures revealed no growth.  Urine culture no growth.  Sputum culture no 

growth.  White count 6.7.  Hemoglobin 9.7.  Platelets 151.  Sodium 136.  

Potassium 3.6.  BUN 21 creatinine 6.95.  Glucose 107.  Blood pressures improved.

 Remains off clevidipine.  Heparin for DVT prophylaxis.  No seizures.











The patient is seen today 12/30/2022 in follow-up on the regular medical floor. 

She is currently sitting up in bed.  Awake and alert in no acute distress.  She 

remains somewhat slow to respond.  She is maintaining good O2 saturations in the

90s on room air.  No IV fluids.  Sputum culture revealed no growth.  Blood 

cultures reveal no growth.  Urine culture revealed no growth.  Count 6.9.  

Hemoglobin 8.9.  Platelets 196.  Sodium 144.  Potassium 4.3.  Bicarb 25.  BUN 

40.  Creatinine 8.5.  Glucose 107.  She remains on heparin for DVT prophylaxis. 

Protonix for GI prophylaxis.  Plan is for repeat swallow evaluation today.





Objective





- Vital Signs


Vital signs: 


                                   Vital Signs











Temp  98.3 F   12/30/22 07:07


 


Pulse  85   12/30/22 07:07


 


Resp  25 H  12/30/22 07:07


 


BP  148/75   12/30/22 07:07


 


Pulse Ox  95   12/30/22 08:10


 


FiO2  35   12/25/22 18:05








                                 Intake & Output











 12/29/22 12/30/22 12/30/22





 18:59 06:59 18:59


 


Intake Total 600  180


 


Balance 600  180


 


Weight  71 kg 


 


Intake:   


 


  Oral 600  180


 


Other:   


 


  # Voids 1 1 


 


  # Bowel Movements 1  








                       ABP, PAP, CO, CI - Last Documented











Arterial Blood Pressure        115/89

















- Exam





GENERAL EXAM: Alert, slow to respond, 52-year-old female, on room air, sitting 

up in bed, comfortable in no apparent distress.


HEAD: Normocephalic.


EYES: Normal reaction of pupils, equal size.


NOSE: Clear with pink turbinates.


THROAT: No erythema or exudates.


NECK: No masses, no JVD.


CHEST: No chest wall deformity.


LUNGS: Equal air entry with no crackles, wheeze, rhonchi or dullness.


CVS: S1 and S2 normal with no audible murmur, regular rhythm.


ABDOMEN: No hepatosplenomegaly, normal bowel sounds, no guarding or rigidity.


SPINE: No scoliosis or deformity


SKIN: No rashes


CENTRAL NERVOUS SYSTEM: No focal deficits, tone is normal in all 4 extremities.


EXTREMITIES: There is 1+ peripheral edema.  No clubbing, no cyanosis.  

Peripheral pulses are intact.





- Labs


CBC & Chem 7: 


                                 12/30/22 05:51





                                 12/30/22 05:51


Labs: 


                  Abnormal Lab Results - Last 24 Hours (Table)











  12/24/22 12/29/22 12/29/22 Range/Units





  12:15 16:55 20:02 


 


RBC     (4.10-5.20)  X 10*6/uL


 


Hgb     (12.0-15.0)  g/dL


 


Hct     (37.2-46.3)  %


 


MCHC     (32.0-37.0)  g/dL


 


Immature Gran #     (0.00-0.04)  X 10*3/uL


 


BUN     (9.0-27.0)  mg/dL


 


Creatinine     (0.6-1.5)  mg/dL


 


Est GFR (CKD-EPI)AfAm     (60.0-200.0)   


 


Est GFR (CKD-EPI)NonAf     (60.0-200.0)   


 


BUN/Creatinine Ratio     (12.00-20.00)  Ratio


 


POC Glucose (mg/dL)   161 H  222 H  ()  mg/dL


 


Free Valproic Acid  26.0 H    (4.8-17.3)  mg/L














  12/30/22 12/30/22 12/30/22 Range/Units





  05:51 05:51 06:17 


 


RBC  3.24 L    (4.10-5.20)  X 10*6/uL


 


Hgb  8.9 L    (12.0-15.0)  g/dL


 


Hct  28.2 L    (37.2-46.3)  %


 


MCHC  31.6 L    (32.0-37.0)  g/dL


 


Immature Gran #  0.06 H    (0.00-0.04)  X 10*3/uL


 


BUN   40.8 H   (9.0-27.0)  mg/dL


 


Creatinine   8.5 H*   (0.6-1.5)  mg/dL


 


Est GFR (CKD-EPI)AfAm   5.6 L   (60.0-200.0)   


 


Est GFR (CKD-EPI)NonAf   4.8 L   (60.0-200.0)   


 


BUN/Creatinine Ratio   4.80 L   (12.00-20.00)  Ratio


 


POC Glucose (mg/dL)    122 H  ()  mg/dL


 


Free Valproic Acid     (4.8-17.3)  mg/L














  12/30/22 12/30/22 Range/Units





  07:30 11:21 


 


RBC    (4.10-5.20)  X 10*6/uL


 


Hgb    (12.0-15.0)  g/dL


 


Hct    (37.2-46.3)  %


 


MCHC    (32.0-37.0)  g/dL


 


Immature Gran #    (0.00-0.04)  X 10*3/uL


 


BUN    (9.0-27.0)  mg/dL


 


Creatinine    (0.6-1.5)  mg/dL


 


Est GFR (CKD-EPI)AfAm    (60.0-200.0)   


 


Est GFR (CKD-EPI)NonAf    (60.0-200.0)   


 


BUN/Creatinine Ratio    (12.00-20.00)  Ratio


 


POC Glucose (mg/dL)  117 H  237 H  ()  mg/dL


 


Free Valproic Acid    (4.8-17.3)  mg/L








                      Microbiology - Last 24 Hours (Table)











 12/23/22 14:15 Blood Culture - Final





 Blood    No Growth after 144 hours


 


 12/23/22 14:00 Blood Culture - Final





 Blood    No Growth after 144 hours














Assessment and Plan


Assessment: 





Status epilepticus.  Presently under control.  Patient required intubation and 

mechanical ventilation to protect her airways.  Extubated on 12/25/22.  

Currently on room air.  No further seizure activity.





Acute hypoxic respiratory failure secondary to status epilepticus, unable to 

protect her airways, recovered and on room air





History of CVA and residual left hemiparesis





Dysphagia





Type 2 diabetes.





End-stage renal disease, on hemodialysis





Benign essential hypertension





Dyslipidemia





Degenerative joint disease





Plan:





The patient was seen and evaluated


Labs and medications reviewed


Stable and on room air


Continue seizure precautions


Continue aspiration precautions


Swallowing evaluation pending


We will see as needed





I have personally seen and examined the patient, performed the documentation and

the assessment and plan as written.  Number of minutes spent on the visit: 10.

## 2022-12-30 NOTE — P.PN
Subjective





Patient is seen in follow-up for end-stage renal disease.  She is maintained on 

hemodialysis on Monday Wednesday Friday schedule.  Scheduled for dialysis today.

 Resting in bed.  Hemodynamically stable.  Scheduled for barium swallow eval 

today.





Vital signs are stable.


General: No acute distress.


HEENT: Head exam is unremarkable. 


LUNGS: Breath sounds decreased.


HEART: Rate and Rhythm are regular. 


ABDOMEN: Soft, no distention.


EXTREMITITES: No edema.





Objective





- Vital Signs


Vital signs: 


                                   Vital Signs











Temp  98.3 F   12/30/22 07:07


 


Pulse  85   12/30/22 07:07


 


Resp  25 H  12/30/22 07:07


 


BP  148/75   12/30/22 07:07


 


Pulse Ox  95   12/30/22 08:10


 


FiO2  35   12/25/22 18:05








                                 Intake & Output











 12/29/22 12/30/22 12/30/22





 18:59 06:59 18:59


 


Intake Total 600  180


 


Balance 600  180


 


Weight  71 kg 


 


Intake:   


 


  Oral 600  180


 


Other:   


 


  # Voids 1 1 


 


  # Bowel Movements 1  








                       ABP, PAP, CO, CI - Last Documented











Arterial Blood Pressure        115/89

















- Labs


CBC & Chem 7: 


                                 12/30/22 05:51





                                 12/30/22 05:51


Labs: 


                  Abnormal Lab Results - Last 24 Hours (Table)











  12/24/22 12/29/22 12/29/22 Range/Units





  12:15 16:55 20:02 


 


RBC     (4.10-5.20)  X 10*6/uL


 


Hgb     (12.0-15.0)  g/dL


 


Hct     (37.2-46.3)  %


 


MCHC     (32.0-37.0)  g/dL


 


Immature Gran #     (0.00-0.04)  X 10*3/uL


 


BUN     (9.0-27.0)  mg/dL


 


Creatinine     (0.6-1.5)  mg/dL


 


Est GFR (CKD-EPI)AfAm     (60.0-200.0)   


 


Est GFR (CKD-EPI)NonAf     (60.0-200.0)   


 


BUN/Creatinine Ratio     (12.00-20.00)  Ratio


 


POC Glucose (mg/dL)   161 H  222 H  ()  mg/dL


 


Free Valproic Acid  26.0 H    (4.8-17.3)  mg/L














  12/30/22 12/30/22 12/30/22 Range/Units





  05:51 05:51 06:17 


 


RBC  3.24 L    (4.10-5.20)  X 10*6/uL


 


Hgb  8.9 L    (12.0-15.0)  g/dL


 


Hct  28.2 L    (37.2-46.3)  %


 


MCHC  31.6 L    (32.0-37.0)  g/dL


 


Immature Gran #  0.06 H    (0.00-0.04)  X 10*3/uL


 


BUN   40.8 H   (9.0-27.0)  mg/dL


 


Creatinine   8.5 H*   (0.6-1.5)  mg/dL


 


Est GFR (CKD-EPI)AfAm   5.6 L   (60.0-200.0)   


 


Est GFR (CKD-EPI)NonAf   4.8 L   (60.0-200.0)   


 


BUN/Creatinine Ratio   4.80 L   (12.00-20.00)  Ratio


 


POC Glucose (mg/dL)    122 H  ()  mg/dL


 


Free Valproic Acid     (4.8-17.3)  mg/L














  12/30/22 12/30/22 Range/Units





  07:30 11:21 


 


RBC    (4.10-5.20)  X 10*6/uL


 


Hgb    (12.0-15.0)  g/dL


 


Hct    (37.2-46.3)  %


 


MCHC    (32.0-37.0)  g/dL


 


Immature Gran #    (0.00-0.04)  X 10*3/uL


 


BUN    (9.0-27.0)  mg/dL


 


Creatinine    (0.6-1.5)  mg/dL


 


Est GFR (CKD-EPI)AfAm    (60.0-200.0)   


 


Est GFR (CKD-EPI)NonAf    (60.0-200.0)   


 


BUN/Creatinine Ratio    (12.00-20.00)  Ratio


 


POC Glucose (mg/dL)  117 H  237 H  ()  mg/dL


 


Free Valproic Acid    (4.8-17.3)  mg/L








                      Microbiology - Last 24 Hours (Table)











 12/23/22 14:15 Blood Culture - Final





 Blood    No Growth after 144 hours


 


 12/23/22 14:00 Blood Culture - Final





 Blood    No Growth after 144 hours














Assessment and Plan


Plan: 





Assessment:


1.  End-stage renal disease maintained on hemodialysis on Monday Wednesday Friday schedule.


2.  Seizures.  Being followed by neurology.


3.  Left ICA stenosis.  Seen by vascular surgery.


4.  Hypertension with chronic kidney disease.  Stable.


5.  Chronic kidney disease mineral bone disease maintained on PhosLo and 

Renvela.  Phosphorus 5.1 today.


6.  Anemia of chronic kidney disease.  Iron replete.  On Aranesp.





Plan:


Hemodialysis today.

## 2022-12-31 LAB
ANION GAP SERPL CALC-SCNC: 13.7 MMOL/L (ref 10–18)
BASOPHILS # BLD AUTO: 0.05 X 10*3/UL (ref 0–0.1)
BASOPHILS NFR BLD AUTO: 0.7 %
BUN SERPL-SCNC: 19 MG/DL (ref 9–27)
BUN/CREAT SERPL: 3.36 RATIO (ref 12–20)
CALCIUM SPEC-MCNC: 9.6 MG/DL (ref 8.7–10.3)
CHLORIDE SERPL-SCNC: 100 MMOL/L (ref 96–109)
CO2 SERPL-SCNC: 24.5 MMOL/L (ref 20–27.5)
EOSINOPHIL # BLD AUTO: 0.1 X 10*3/UL (ref 0.04–0.35)
EOSINOPHIL NFR BLD AUTO: 1.4 %
ERYTHROCYTE [DISTWIDTH] IN BLOOD BY AUTOMATED COUNT: 3.28 X 10*6/UL (ref 4.1–5.2)
ERYTHROCYTE [DISTWIDTH] IN BLOOD: 14.2 % (ref 11.5–14.5)
GLUCOSE BLD-MCNC: 104 MG/DL (ref 70–110)
GLUCOSE BLD-MCNC: 108 MG/DL (ref 70–110)
GLUCOSE BLD-MCNC: 76 MG/DL (ref 70–110)
GLUCOSE BLD-MCNC: 78 MG/DL (ref 70–110)
GLUCOSE SERPL-MCNC: 95 MG/DL (ref 70–110)
HCT VFR BLD AUTO: 28.6 % (ref 37.2–46.3)
HGB BLD-MCNC: 9.3 G/DL (ref 12–15)
IMM GRANULOCYTES BLD QL AUTO: 1.1 %
LYMPHOCYTES # SPEC AUTO: 2.42 X 10*3/UL (ref 0.9–5)
LYMPHOCYTES NFR SPEC AUTO: 34.3 %
MCH RBC QN AUTO: 28.4 PG (ref 27–32)
MCHC RBC AUTO-ENTMCNC: 32.5 G/DL (ref 32–37)
MCV RBC AUTO: 87.2 FL (ref 80–97)
MONOCYTES # BLD AUTO: 0.67 X 10*3/UL (ref 0.2–1)
MONOCYTES NFR BLD AUTO: 9.5 %
NEUTROPHILS # BLD AUTO: 3.73 X 10*3/UL (ref 1.8–7.7)
NEUTROPHILS NFR BLD AUTO: 53 %
NRBC BLD AUTO-RTO: 0 /100 WBCS (ref 0–0)
PLATELET # BLD AUTO: 206 X 10*3/UL (ref 140–440)
POTASSIUM SERPL-SCNC: 5 MMOL/L (ref 3.5–5.5)
SODIUM SERPL-SCNC: 139 MMOL/L (ref 135–145)
WBC # BLD AUTO: 7.05 X 10*3/UL (ref 4.5–10)

## 2022-12-31 RX ADMIN — SEVELAMER CARBONATE SCH: 800 TABLET, FILM COATED ORAL at 15:46

## 2022-12-31 RX ADMIN — ASPIRIN SCH: 325 TABLET ORAL at 22:26

## 2022-12-31 RX ADMIN — METOPROLOL TARTRATE SCH: 25 TABLET, FILM COATED ORAL at 09:51

## 2022-12-31 RX ADMIN — VALPROATE SODIUM SCH MLS/HR: 100 INJECTION, SOLUTION INTRAVENOUS at 02:03

## 2022-12-31 RX ADMIN — HEPARIN SODIUM SCH UNIT: 5000 INJECTION INTRAVENOUS; SUBCUTANEOUS at 00:25

## 2022-12-31 RX ADMIN — ATORVASTATIN CALCIUM SCH: 40 TABLET, FILM COATED ORAL at 22:26

## 2022-12-31 RX ADMIN — Medication SCH: at 17:26

## 2022-12-31 RX ADMIN — LACOSAMIDE SCH MLS/HR: 10 INJECTION INTRAVENOUS at 21:53

## 2022-12-31 RX ADMIN — Medication SCH: at 15:46

## 2022-12-31 RX ADMIN — Medication SCH: at 06:11

## 2022-12-31 RX ADMIN — SEVELAMER CARBONATE SCH: 800 TABLET, FILM COATED ORAL at 17:26

## 2022-12-31 RX ADMIN — VALPROATE SODIUM SCH MLS/HR: 100 INJECTION, SOLUTION INTRAVENOUS at 10:55

## 2022-12-31 RX ADMIN — ZONISAMIDE SCH: 100 CAPSULE ORAL at 22:26

## 2022-12-31 RX ADMIN — CLOPIDOGREL BISULFATE SCH: 75 TABLET ORAL at 09:51

## 2022-12-31 RX ADMIN — LEVETIRACETAM SCH MLS/HR: 100 INJECTION, SOLUTION, CONCENTRATE INTRAVENOUS at 11:52

## 2022-12-31 RX ADMIN — LEVETIRACETAM SCH MLS/HR: 100 INJECTION, SOLUTION, CONCENTRATE INTRAVENOUS at 21:13

## 2022-12-31 RX ADMIN — PANTOPRAZOLE SODIUM SCH MG: 40 INJECTION, POWDER, FOR SOLUTION INTRAVENOUS at 09:51

## 2022-12-31 RX ADMIN — METOPROLOL TARTRATE SCH: 25 TABLET, FILM COATED ORAL at 22:26

## 2022-12-31 RX ADMIN — ZONISAMIDE SCH: 100 CAPSULE ORAL at 09:52

## 2022-12-31 RX ADMIN — DIVALPROEX SODIUM SCH: 125 CAPSULE, COATED PELLETS ORAL at 15:47

## 2022-12-31 RX ADMIN — ASPIRIN SCH: 325 TABLET ORAL at 09:52

## 2022-12-31 RX ADMIN — LACOSAMIDE SCH MLS/HR: 10 INJECTION INTRAVENOUS at 09:50

## 2022-12-31 RX ADMIN — SEVELAMER CARBONATE SCH: 800 TABLET, FILM COATED ORAL at 06:11

## 2022-12-31 RX ADMIN — HEPARIN SODIUM SCH UNIT: 5000 INJECTION INTRAVENOUS; SUBCUTANEOUS at 09:51

## 2022-12-31 RX ADMIN — HEPARIN SODIUM SCH UNIT: 5000 INJECTION INTRAVENOUS; SUBCUTANEOUS at 17:23

## 2022-12-31 RX ADMIN — VALPROATE SODIUM SCH MLS/HR: 100 INJECTION, SOLUTION INTRAVENOUS at 17:23

## 2022-12-31 NOTE — P.GSCN
History of Present Illness


Consult date: 22


History of present illness: 


Patient seen and evaluated.  Swallow study.  Findings of aspiration.  

Gastrostomy tube placement requested.  EGD with PEG tube placement while 

inpatient for 2023





Past Medical History


Past Medical History: Chest Pain / Angina, CVA/TIA, Diabetes Mellitus, 

GERD/Reflux, Hyperlipidemia, Hypertension, Memory Impairment, Osteoarthritis 

(OA), Renal Disease, Seizure Disorder, Thyroid Disorder


Additional Past Medical History / Comment(s): Last seizure approximately one 

month ago. Spouse states she used to have seizures 4X per week until she had 

vagal nerve stimulator placed, now has seizures approximately once a week to 

once a month. Dialysis MOWEFR. Neuropathy, left drop foot, only able to walk 

short distances, otherwise uses wheelchair. 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 

ago, with residual memory impairment. Never diagnosed with Sleep Apnea but 

spouse states she does stop breathing through the night and he has to shake her 

to start breathing again. Varicose veins.


History of Any Multi-Drug Resistant Organisms: None Reported


Past Surgical History:  Section, Tonsillectomy, Uterine Ablation


Additional Past Surgical History / Comment(s): Left arm fistula, loop recorder, 

vagus nerve stimulator.


Past Anesthesia/Blood Transfusion Reactions: Motion Sickness


Additional Past Anesthesia/Blood Transfusion Reaction / Comm: Family hx unknown,

pt adopted.


Past Psychological History: Bipolar


Smoking Status: Former smoker


Past Alcohol Use History: None Reported


Additional Past Alcohol Use History / Comment(s): Quit smoking in .


Past Drug Use History: None Reported





- Past Family History


  ** Father


Family Medical History: Unable to Obtain


Additional Family Medical History / Comment(s): Pt adopted.





Medications and Allergies


                                Home Medications











 Medication  Instructions  Recorded  Confirmed  Type


 


Clopidogrel [Plavix] 75 mg PO DAILY #30 tab 21 Rx


 


Atorvastatin [Lipitor] 40 mg PO HS 21 History


 


Dulaglutide [Trulicity] 3 mg SQ Q7D 21 History


 


Sevelamer [Renvela] 800 mg PO BID-W/MEALS 21 History


 


Vascepa 1gm 1 gm PO BID 21 History


 


Calcium Acetate 667 mg PO TID-W/MEALS 22 History


 


Metoprolol Tartrate [Lopressor] 25 mg PO BID 22 History


 


Pantoprazole [Protonix] 40 mg PO DAILY 22 History


 


Potassium Chloride ER [K-Dur 20] 20 meq PO BID 22 History


 


levETIRAcetam [Keppra] 500 mg PO BID@0700,2100 22 History


 


Divalproex ER [Depakote ER] 250 mg PO BID 30 Days #60 tab 22 Rx


 


levETIRAcetam [Keppra] 500 mg PO MoWeFr@1600 30 Days #15 22 Rx





 tab   


 


Sevelamer [Renvela] 1,600 mg PO W/SUPPER 10/07/22 12/23/22 History


 


Lacosamide [Vimpat] See Taper PO AS DIRECTED 21 Days 22 Rx





 #49 tab   


 


Zonisamide [Zonegran] 100 mg PO DAILY 30 Days #30 cap 22 Rx


 


Divalproex [Depakote] 500 mg PO Q8H 22 History








                                    Allergies











Allergy/AdvReac Type Severity Reaction Status Date / Time


 


Penicillins Allergy  Itching Verified 22 13:57














Surgical - Exam


                                   Vital Signs











Pulse Resp BP Pulse Ox


 


 85   16   167/86   96 


 


 22 11:22  22 11:22  22 11:22  22 11:22














Results





- Labs





                                 22 05:16





                                 22 05:16


                  Abnormal Lab Results - Last 24 Hours (Table)











  22 Range/Units





  05:16 05:16 


 


RBC  3.28 L   (4.10-5.20)  X 10*6/uL


 


Hgb  9.3 L   (12.0-15.0)  g/dL


 


Hct  28.6 L   (37.2-46.3)  %


 


MPV  12.5 H   (9.5-12.2)  fL


 


Immature Gran #  0.08 H   (0.00-0.04)  X 10*3/uL


 


Creatinine   5.7 H  (0.6-1.5)  mg/dL


 


Est GFR (CKD-EPI)AfAm   9.1 L  (60.0-200.0)   


 


Est GFR (CKD-EPI)NonAf   7.9 L  (60.0-200.0)   


 


BUN/Creatinine Ratio   3.36 L  (12.00-20.00)  Ratio








                                 Diabetes panel











  22 Range/Units





  05:16 


 


Sodium  139  (135-145)  mmol/L


 


Potassium  5.0  (3.5-5.5)  mmol/L


 


Chloride  100  ()  mmol/L


 


Carbon Dioxide  24.5  (20.0-27.5)  mmol/L


 


BUN  19.0  (9.0-27.0)  mg/dL


 


Creatinine  5.7 H  (0.6-1.5)  mg/dL


 


Glucose  95  ()  mg/dL


 


Calcium  9.6  (8.7-10.3)  mg/dL








                                  Calcium panel











  22 Range/Units





  05:16 


 


Calcium  9.6  (8.7-10.3)  mg/dL








                                 Pituitary panel











  22 Range/Units





  05:16 


 


Sodium  139  (135-145)  mmol/L


 


Potassium  5.0  (3.5-5.5)  mmol/L


 


Chloride  100  ()  mmol/L


 


Carbon Dioxide  24.5  (20.0-27.5)  mmol/L


 


BUN  19.0  (9.0-27.0)  mg/dL


 


Creatinine  5.7 H  (0.6-1.5)  mg/dL


 


Glucose  95  ()  mg/dL


 


Calcium  9.6  (8.7-10.3)  mg/dL








                                  Adrenal panel











  22 Range/Units





  05:16 


 


Sodium  139  (135-145)  mmol/L


 


Potassium  5.0  (3.5-5.5)  mmol/L


 


Chloride  100  ()  mmol/L


 


Carbon Dioxide  24.5  (20.0-27.5)  mmol/L


 


BUN  19.0  (9.0-27.0)  mg/dL


 


Creatinine  5.7 H  (0.6-1.5)  mg/dL


 


Glucose  95  ()  mg/dL


 


Calcium  9.6  (8.7-10.3)  mg/dL

## 2022-12-31 NOTE — P.PN
Subjective


Progress Note Date: 12/30/22


patient is a 52-year-old female with a known history ofhypertension, 

hyperlipidemia, diabetes type 2, history of seizure disorder and most recent 

seizures about a month ago, prior history of vagal nerve stimulator placed, 

diabetic peripheral neuropathy, ESRD on hemodialysis Monday Wednesday and Friday

and history of CVA/TIA and uses wheelchair, residual memory impairment, newly 

diagnosed obstructive sleep apnea, prior history of smoking and bipolar disorder

and other medical problems was seen at dialysis clinic yesterday when she became

unresponsive and was not following commands and answering questions.  Patient 

was sent to ER for evaluation.  Patient was also having nausea and episodes of 

vomiting.  She was noted to have seizure-like activity.  She did not get 

dialysis and was transferred to ED by EMS.  On arrival to ER patient was awake 

alert and oriented x1 and was not holding any conversation with the staff.


Previously she was admitted to the hospital from 12 11-12 14 for worsening seizu

re activity.  Patient was started on Zonegran along with Keppra and Depakote.


Patient was also having episodes of vomiting in the ER.  As per her  

patient has been having vomiting for the past 1 week on and off.  She was also 

seen at [facility] earlier this week for seizure-like activity.


Patient was admitted to hospital with neurology consultation.  Overnight a team 

was called for assessment and possible status epilepticus.  Patient was not 

following commands or make eye contact.  Patient was also having episodes of 

emesis and concern for possible aspiration.  Patient was transferred to MICU and

intubated for airway protection.


On admission EKG showed sinus rhythm with moderate intraventricular conduction 

delays.


CT head showed hypodensity within the brainstem and within the left basal 

ganglia could be ischemic changes of intermediate age.  Consider follow-up with 

MRI.


Chest x-ray showed no acute cardiopulmonary process.  Chest x-ray repeated last 

night showed no acute cardiopulmonary disease.  Normal heart.  No change.


Laboratory data on admission WBC 6.7 hemoglobin 0.1 and platelets 218


Sodium 142 potassium 4.0 chloride 100 bicarb is 24 BUN 32 and creatinine 9.47 

and lactic acid 3.0 and blood sugar was 288 on admission


proBNP 2780


Troponin x1 negative


TSH 0.708


Urinalysis showed cloudy with 3+ protein large leukocyte esterase and elevated 

RBCs and WBCs.


Valproic acid level was 127.1.  Therapeutic level is 50-1 20





CTA head and neck showed irregular moderate plaque in the left carotid artery 

stenosis 75% at the origin..





12/25/2022


Patient is currently in the MICU and remains intubated and on mechanical 

ventilator.  Propofol has been discontinued and patient remains Cleviprex.  

Patient is able to open her eyes but able to follow commands at this time.  

Pulmonary is planning to wean off with pressure support and possible extubation.


Chest x-ray showed satisfactory ET and NG tube.  No acute process seen.


Patient is being continued on Keppra, Vimpat and Zonegran.  Pulmonary and 

neurology is on board.


Hemodialysis as per schedule.


Laboratory data showed WBC 5.6 hemoglobin 10.9 platelets 203


Sodium 139 potassium 4.2 chloride 104 bicarb is 26 BUN 21 creatinine 7.22 and 

blood sugar is 114.  Valproic acid level is 73.8





12/26/2022


Patient is in the MICU.  Patient was extubated yesterday.  On 2 L oxygen via 

nasal cannula.  Patient is on Cleviprex.  Awake alert and tries to communicate. 

Chest x-ray today showed no acute cardiopulmonary process.


Patient is being continued on antiepileptic drugs and was also started on blood 

pressure medications this morning.  CBG down to 65 this afternoon.  Patient is 

currently nothing by mouth.


Laboratory data showed WBC 7.8 hemoglobin 9.8 and platelets 172 sodium 138 

potassium 4.5 chloride 104 bicarb is 24 BUN 26 and creatinine 8.95.


Pulmonary, neurology and nephrology is on board.





12/27/2022


Patient is in the MICU.  Resting in the bed.  Awake alert and oriented but 

lethargic and sleepy.  Requiring Cleviprex for hypertension.  Requiring oxygen 

at 2 L via nasal cannula.


Chest x-ray today showed no acute cardiopulmonary process.  Nephrology and 

pulmonary is on board.  Patient is able to swallow and Lopressor changed to by 

mouth.


Cultures have been negative.


Laboratory data showed WBC 6.6 hemoglobin 9.1 platelets 154 sodium 139 potassium

4.3 chloride 106 bicarb is 22 BUN 38 and creatinine 10.6.


Patient is scheduled for hemodialysis today.





12/28/2022


Patient is in the MICU.  Awake alert and oriented.  Communicating slowly.  

Currently titrated down to room air.


Patient had repeat CT scan this morning showed hypodensities within the left 

basal ganglia, left raghav and cerebellum are similar to 12/23/2022.  Suggestive 

of prior injury.  No new areas of suggest acute or subacute CVA.


Cultures have been negative.  Patient underwent hemodialysis yesterday.  

Cleviprex has been discontinued.  No further episodes of seizures overnight.  

Patient is being transferred to medical floor today.


Laboratory data showed WBC 6.7 hemoglobin 9.7 and platelets 151 sodium 136 

potassium 3.6 chloride 102 bicarb is 27 BUN 21 and creatinine 6.95.  Blood sugar

is 107.





12/29/2022


Patient is in the medical floor.  Awake alert and oriented but slow to respond. 

On room air.  No complaints of chest pain or shortness of breath.  No nausea 

vomiting abdominal pain or diarrhea.  Tolerating oral diet.  No further episode 

of seizures.


Hemodialysis as per schedule Monday Wednesday and Friday.


Laboratory data showed WC 7.7 hemoglobin 9.4 and platelets 177 sodium 142 

potassium 3.9 chloride 103 bicarb is 26.1 BUN 23.9 and creatinine 6.3.  Blood 

sugars 126 and phosphorus 4.8.  Next hemodialysis tomorrow.


Anticipate discharge to rehab in the next 24 hours.





12/30/2022


Patient is currently lying in the bed.  Awake alert seems to be lethargic and 

weak today.  No complaints of nausea or vomiting.  Decreased oral intake.  

Patient is scheduled for hemodialysis today.  Also underwent fluoroscopic 

swallow study showed silent laryngeal penetration with pudding consistency and 

aspiration with a delayed cough with honey thick consistency.


Patient has been afebrile.  Currently patient will do Nothing by mouth.


Laboratory data showed WBC 6.9 hemoglobin 8.9 platelets 196 sodium 144 potassium

4.3 chloride 103 bicarb 24.7 BUN 40.8 and creatinine 8.5 and phosphorus 5.1.


Nephrology and pulmonary is on board..





Current medications reviewed.








Objective





- Vital Signs


Vital signs: 


                                   Vital Signs











Temp  98.3 F   12/30/22 20:00


 


Pulse  95   12/30/22 20:00


 


Resp  17   12/30/22 20:00


 


BP  181/80   12/30/22 20:00


 


Pulse Ox  98   12/30/22 20:00


 


FiO2  35   12/25/22 18:05








                                 Intake & Output











 12/30/22 12/30/22 12/31/22





 06:59 18:59 06:59


 


Intake Total  930 


 


Output Total  2150 


 


Balance  -1220 


 


Weight 71 kg  


 


Intake:   


 


  Oral  180 


 


  Hemodialysis  750 


 


Output:   


 


  Hemodialysis  2150 


 


Other:   


 


  # Voids 1 1 


 


  # Bowel Movements  1 








                       ABP, PAP, CO, CI - Last Documented











Arterial Blood Pressure        115/89

















- Exam





PHYSICAL EXAMINATION: 


Patient is Patient is awake alert.  Able to verbalize.  Communicates slowly.. 


HEENT: Normocephalic. Neck is supple. Pupils reactive. Nostrils clear. Oral 

cavity is moist. 


Neck reveals no JVD, carotid bruits, or thyromegaly. 


CHEST EXAMINATION: Trachea is central. Symmetrical expansion.  Bibasilar 

diminished sounds.  No wheezing or crackles.


Lung fields clear to auscultation and percussion. 


CARDIAC: Normal S1, S2 with no gallops. No murmurs 


ABDOMEN: Soft. Bowel sounds present. No organomegaly. No abdominal bruits. 


Extremities: Trace edema.  No clubbing or cyanosis


Neurologically patient is sedated intubated.  No gross focal deficits noted.  

Left foot drop.


Skin: No rash or skin lesions. 


Psychiatric: Could not be assessed at this time.


Musculoskeletal: No joint swelling or deformity. 





- Labs


CBC & Chem 7: 


                                 12/31/22 05:16





                                 12/30/22 05:51


Labs: 


                  Abnormal Lab Results - Last 24 Hours (Table)











  12/30/22 12/30/22 12/30/22 Range/Units





  05:51 05:51 06:17 


 


RBC  3.24 L    (4.10-5.20)  X 10*6/uL


 


Hgb  8.9 L    (12.0-15.0)  g/dL


 


Hct  28.2 L    (37.2-46.3)  %


 


MCHC  31.6 L    (32.0-37.0)  g/dL


 


Immature Gran #  0.06 H    (0.00-0.04)  X 10*3/uL


 


BUN   40.8 H   (9.0-27.0)  mg/dL


 


Creatinine   8.5 H*   (0.6-1.5)  mg/dL


 


Est GFR (CKD-EPI)AfAm   5.6 L   (60.0-200.0)   


 


Est GFR (CKD-EPI)NonAf   4.8 L   (60.0-200.0)   


 


BUN/Creatinine Ratio   4.80 L   (12.00-20.00)  Ratio


 


POC Glucose (mg/dL)    122 H  ()  mg/dL














  12/30/22 12/30/22 Range/Units





  07:30 11:21 


 


RBC    (4.10-5.20)  X 10*6/uL


 


Hgb    (12.0-15.0)  g/dL


 


Hct    (37.2-46.3)  %


 


MCHC    (32.0-37.0)  g/dL


 


Immature Gran #    (0.00-0.04)  X 10*3/uL


 


BUN    (9.0-27.0)  mg/dL


 


Creatinine    (0.6-1.5)  mg/dL


 


Est GFR (CKD-EPI)AfAm    (60.0-200.0)   


 


Est GFR (CKD-EPI)NonAf    (60.0-200.0)   


 


BUN/Creatinine Ratio    (12.00-20.00)  Ratio


 


POC Glucose (mg/dL)  117 H  237 H  ()  mg/dL














Assessment and Plan


Assessment: 





Failed swallow study.


Altered mental status likely due to e status epilepticus. Patient is extubated 

on 12/25..


History of refractory epilepsy with previous multiple admissions due to 

seizures.


left ICA 75%stenosis at the origin as per CTA neck.


hypodensity in the brainstem and left basal ganglia were related to previous CVA


ESRD on hemodialysisMonday Wednesday and Friday.


History of CVA with left hemiparesis


Diabetes2


I would a peripheral neuropathy


Memory impairment


GERD


Hypertension next hyperlipidemia


Hypothyroidism 


bipolar disorder


Primary history of smoking


DVT prophylaxis with heparin subcu





Plan:


Patient did have silent aspiration with a fluoroscopic swallow study today.  

Will be kept nothing by mouth.


Patient is extubated 12/25/2022.  off Cleviprex.  initiated blood pressure 

medications.Blood pressure is controlled.  Hemodialysis as per schedule on 

Monday Wednesday and Friday. 


Repeat CT head showed no acute intracranial process.  old cva changed as noted 

above.


Started back on Zonegran, Depakote and Keppra as per neurology 

recommendations.Zonegran dose increased to 100 mg twice a day,Keppra additional 

dose 500 mg post dialysis.


Patient will be continued on secondary stroke prophylaxis Plavix and Lipitor,


Neurology is on board.  Nephrology is on board for hemodialysis.


Continue with home medications and insulin sliding scale for better blood sugar 

control.


Follow-up closely.  Prognosis is guarded with multiple medical problems and 

comorbid conditions.





Time with Patient: Greater than 30

## 2022-12-31 NOTE — P.PN
Subjective


Progress Note Date: 12/31/22


patient is a 52-year-old female with a known history ofhypertension, 

hyperlipidemia, diabetes type 2, history of seizure disorder and most recent 

seizures about a month ago, prior history of vagal nerve stimulator placed, 

diabetic peripheral neuropathy, ESRD on hemodialysis Monday Wednesday and Friday

and history of CVA/TIA and uses wheelchair, residual memory impairment, newly 

diagnosed obstructive sleep apnea, prior history of smoking and bipolar disorder

and other medical problems was seen at dialysis clinic yesterday when she became

unresponsive and was not following commands and answering questions.  Patient 

was sent to ER for evaluation.  Patient was also having nausea and episodes of 

vomiting.  She was noted to have seizure-like activity.  She did not get 

dialysis and was transferred to ED by EMS.  On arrival to ER patient was awake 

alert and oriented x1 and was not holding any conversation with the staff.


Previously she was admitted to the hospital from 12 11-12 14 for worsening seizu

re activity.  Patient was started on Zonegran along with Keppra and Depakote.


Patient was also having episodes of vomiting in the ER.  As per her  

patient has been having vomiting for the past 1 week on and off.  She was also 

seen at [facility] earlier this week for seizure-like activity.


Patient was admitted to hospital with neurology consultation.  Overnight a team 

was called for assessment and possible status epilepticus.  Patient was not 

following commands or make eye contact.  Patient was also having episodes of 

emesis and concern for possible aspiration.  Patient was transferred to MICU and

intubated for airway protection.


On admission EKG showed sinus rhythm with moderate intraventricular conduction 

delays.


CT head showed hypodensity within the brainstem and within the left basal 

ganglia could be ischemic changes of intermediate age.  Consider follow-up with 

MRI.


Chest x-ray showed no acute cardiopulmonary process.  Chest x-ray repeated last 

night showed no acute cardiopulmonary disease.  Normal heart.  No change.


Laboratory data on admission WBC 6.7 hemoglobin 0.1 and platelets 218


Sodium 142 potassium 4.0 chloride 100 bicarb is 24 BUN 32 and creatinine 9.47 

and lactic acid 3.0 and blood sugar was 288 on admission


proBNP 2780


Troponin x1 negative


TSH 0.708


Urinalysis showed cloudy with 3+ protein large leukocyte esterase and elevated 

RBCs and WBCs.


Valproic acid level was 127.1.  Therapeutic level is 50-1 20





CTA head and neck showed irregular moderate plaque in the left carotid artery 

stenosis 75% at the origin..





12/25/2022


Patient is currently in the MICU and remains intubated and on mechanical 

ventilator.  Propofol has been discontinued and patient remains Cleviprex.  

Patient is able to open her eyes but able to follow commands at this time.  

Pulmonary is planning to wean off with pressure support and possible extubation.


Chest x-ray showed satisfactory ET and NG tube.  No acute process seen.


Patient is being continued on Keppra, Vimpat and Zonegran.  Pulmonary and 

neurology is on board.


Hemodialysis as per schedule.


Laboratory data showed WBC 5.6 hemoglobin 10.9 platelets 203


Sodium 139 potassium 4.2 chloride 104 bicarb is 26 BUN 21 creatinine 7.22 and 

blood sugar is 114.  Valproic acid level is 73.8





12/26/2022


Patient is in the MICU.  Patient was extubated yesterday.  On 2 L oxygen via 

nasal cannula.  Patient is on Cleviprex.  Awake alert and tries to communicate. 

Chest x-ray today showed no acute cardiopulmonary process.


Patient is being continued on antiepileptic drugs and was also started on blood 

pressure medications this morning.  CBG down to 65 this afternoon.  Patient is 

currently nothing by mouth.


Laboratory data showed WBC 7.8 hemoglobin 9.8 and platelets 172 sodium 138 

potassium 4.5 chloride 104 bicarb is 24 BUN 26 and creatinine 8.95.


Pulmonary, neurology and nephrology is on board.





12/27/2022


Patient is in the MICU.  Resting in the bed.  Awake alert and oriented but 

lethargic and sleepy.  Requiring Cleviprex for hypertension.  Requiring oxygen 

at 2 L via nasal cannula.


Chest x-ray today showed no acute cardiopulmonary process.  Nephrology and 

pulmonary is on board.  Patient is able to swallow and Lopressor changed to by 

mouth.


Cultures have been negative.


Laboratory data showed WBC 6.6 hemoglobin 9.1 platelets 154 sodium 139 potassium

4.3 chloride 106 bicarb is 22 BUN 38 and creatinine 10.6.


Patient is scheduled for hemodialysis today.





12/28/2022


Patient is in the MICU.  Awake alert and oriented.  Communicating slowly.  

Currently titrated down to room air.


Patient had repeat CT scan this morning showed hypodensities within the left 

basal ganglia, left raghav and cerebellum are similar to 12/23/2022.  Suggestive 

of prior injury.  No new areas of suggest acute or subacute CVA.


Cultures have been negative.  Patient underwent hemodialysis yesterday.  

Cleviprex has been discontinued.  No further episodes of seizures overnight.  

Patient is being transferred to medical floor today.


Laboratory data showed WBC 6.7 hemoglobin 9.7 and platelets 151 sodium 136 

potassium 3.6 chloride 102 bicarb is 27 BUN 21 and creatinine 6.95.  Blood sugar

is 107.





12/29/2022


Patient is in the medical floor.  Awake alert and oriented but slow to respond. 

On room air.  No complaints of chest pain or shortness of breath.  No nausea 

vomiting abdominal pain or diarrhea.  Tolerating oral diet.  No further episode 

of seizures.


Hemodialysis as per schedule Monday Wednesday and Friday.


Laboratory data showed WC 7.7 hemoglobin 9.4 and platelets 177 sodium 142 

potassium 3.9 chloride 103 bicarb is 26.1 BUN 23.9 and creatinine 6.3.  Blood 

sugars 126 and phosphorus 4.8.  Next hemodialysis tomorrow.


Anticipate discharge to rehab in the next 24 hours.





12/30/2022


Patient is currently lying in the bed.  Awake alert seems to be lethargic and 

weak today.  No complaints of nausea or vomiting.  Decreased oral intake.  

Patient is scheduled for hemodialysis today.  Also underwent fluoroscopic 

swallow study showed silent laryngeal penetration with pudding consistency and 

aspiration with a delayed cough with honey thick consistency.


Patient has been afebrile.  Currently patient will do Nothing by mouth.


Laboratory data showed WBC 6.9 hemoglobin 8.9 platelets 196 sodium 144 potassium

4.3 chloride 103 bicarb 24.7 BUN 40.8 and creatinine 8.5 and phosphorus 5.1.


Nephrology and pulmonary is on board..








12/31/2022


Patient is monitored on stepdown unit today. More awake than yesterday. She is 

unsure whether she wants the PEG tube or not, this was discussed in extent and 

patient requesting to discuss with her  Steve. She continues to be NPO at

this time.  is agreeing with PEG tube. His overall goal is for his wife 

to get stronger and D/C to rehab when ready. Gen Surgery consulted for evaluati

on. Nephrology following and making recommendations regarding hemodialysis was 

dialyzed yesterday. Continues on IV lacosamide, IV keppra, IV valproic acid. No 

further seizure activity noted. 





Review of Systems





Constitutional: Denied any fatigue denied any fever.


Cardio vascular: denied any chest pain, palpitations


Gastrointestinal: denied any nausea, vomiting, diarrhea


Pulmonary: Denied any shortness of breath cough


Neurologic denied any new focal deficits





All inpatient medications were reviewed and appropriate changes in these 

medications as dictated in the interval history and assessment and plan.





PHYSICAL EXAMINATION: 





GENERAL: The patient is alert and oriented x2, not in any acute distress. Well 

developed, well nourished. 


HEENT: Pupils are round and equally reacting to light. EOMI. No scleral icterus.

No conjunctival pallor. Normocephalic, atraumatic. No pharyngeal erythema. No 

thyromegaly. 


CARDIOVASCULAR: S1 and S2 present. No murmurs, rubs, or gallops. 


PULMONARY: Chest is clear to auscultation, no wheezing or crackles. 


ABDOMEN: Soft, nontender, nondistended, normoactive bowel sounds. No palpable 

organomegaly. 


MUSCULOSKELETAL: No joint swelling or deformity.


EXTREMITIES: No cyanosis, clubbing, or pedal edema. 


NEUROLOGICAL: Continues with diffuse weakness, slurred speech 


SKIN: No rashes. 





Assessment and Plan


Assessment


Failed swallow study.


Altered mental status likely due to estatus epilepticus. Patient is extubated on

12/25, intubated for airway protection.


History of refractory epilepsy with previous multiple admissions due to 

seizures.


left ICA 75% stenosis at the origin as per CTA neck.


hypodensity in the brainstem and left basal ganglia were related to previous CVA


ESRD on hemodialysis Monday Wednesday and Friday.


History of CVA with left hemiparesis


Diabetes Mellitus type 2 


Peripheral neuropathy


Memory impairment


GERD


Hypertension 


Hyperlipidemia


Hypothyroidism 


bipolar disorder


Primary history of smoking


DVT prophylaxis with heparin subcu


GI prophylaxis


Full Code





Plan:


Patient did have silent aspiration with a fluoroscopic swallow study, Currently 

NPO, continue aspiration precautions with HOB up 30*


General surgery has been consulted for evaluation PEG tube placement


Hemodialysis as per schedule on Monday Wednesday and Friday. 


Started back on Zonegran, Depakote and Keppra as per neurology 

recommendations.Zonegran dose increased to 100 mg twice a day,Keppra additional 

dose 500 mg post dialysis.


Patient will be continued on secondary stroke prophylaxis Plavix and Lipitor,


Neurology is on board.  Nephrology is on board for hemodialysis.


Continue with home medications and insulin sliding scale for better blood sugar 

control.


Subacute rehab on discharge when stable 





The impression and plan of care has been dictated by Scarlet Bill, Nurse 

Practitioner as directed.





Dr. Tom MD


I have performed a history and physical examination and medical decision making 

of this patient, discussed the same with the dictator, and agree with the 

dictators assessment and plan as written, documented as a scribe. Based on total

visit time, I have performed more than 50% of this visit. 








Objective





- Vital Signs


Vital signs: 


                                   Vital Signs











Temp  98.4 F   12/31/22 07:58


 


Pulse  77   12/31/22 07:58


 


Resp  16   12/31/22 07:58


 


BP  134/78   12/31/22 07:58


 


Pulse Ox  100   12/31/22 07:58


 


FiO2  35   12/25/22 18:05








                                 Intake & Output











 12/30/22 12/31/22 12/31/22





 18:59 06:59 18:59


 


Intake Total 930  


 


Output Total 2150  


 


Balance -1220  


 


Weight  71 kg 


 


Intake:   


 


  Oral 180  


 


  Hemodialysis 750  


 


Output:   


 


  Hemodialysis 2150  


 


Other:   


 


  # Voids 1 1 


 


  # Bowel Movements 1 2 








                       ABP, PAP, CO, CI - Last Documented











Arterial Blood Pressure        115/89

















- Labs


CBC & Chem 7: 


                                 12/31/22 05:16





                                 12/31/22 05:16


Labs: 


                  Abnormal Lab Results - Last 24 Hours (Table)











  12/30/22 12/31/22 Range/Units





  11:21 05:16 


 


RBC   3.28 L  (4.10-5.20)  X 10*6/uL


 


Hgb   9.3 L  (12.0-15.0)  g/dL


 


Hct   28.6 L  (37.2-46.3)  %


 


MPV   12.5 H  (9.5-12.2)  fL


 


Immature Gran #   0.08 H  (0.00-0.04)  X 10*3/uL


 


POC Glucose (mg/dL)  237 H   ()  mg/dL














Assessment and Plan


Time with Patient: Greater than 30

## 2023-01-01 LAB
GLUCOSE BLD-MCNC: 71 MG/DL (ref 70–110)
GLUCOSE BLD-MCNC: 72 MG/DL (ref 70–110)
GLUCOSE BLD-MCNC: 72 MG/DL (ref 70–110)
GLUCOSE BLD-MCNC: 75 MG/DL (ref 70–110)

## 2023-01-01 RX ADMIN — VALPROATE SODIUM SCH MLS/HR: 100 INJECTION, SOLUTION INTRAVENOUS at 01:14

## 2023-01-01 RX ADMIN — HEPARIN SODIUM SCH UNIT: 5000 INJECTION INTRAVENOUS; SUBCUTANEOUS at 08:09

## 2023-01-01 RX ADMIN — ATORVASTATIN CALCIUM SCH: 40 TABLET, FILM COATED ORAL at 19:43

## 2023-01-01 RX ADMIN — CLOPIDOGREL BISULFATE SCH: 75 TABLET ORAL at 08:22

## 2023-01-01 RX ADMIN — Medication SCH: at 06:11

## 2023-01-01 RX ADMIN — SEVELAMER CARBONATE SCH: 800 TABLET, FILM COATED ORAL at 15:57

## 2023-01-01 RX ADMIN — VALPROATE SODIUM SCH MLS/HR: 100 INJECTION, SOLUTION INTRAVENOUS at 16:00

## 2023-01-01 RX ADMIN — SEVELAMER CARBONATE SCH: 800 TABLET, FILM COATED ORAL at 11:49

## 2023-01-01 RX ADMIN — ASPIRIN SCH: 325 TABLET ORAL at 08:22

## 2023-01-01 RX ADMIN — LACOSAMIDE SCH MLS/HR: 10 INJECTION INTRAVENOUS at 23:32

## 2023-01-01 RX ADMIN — LACOSAMIDE SCH MLS/HR: 10 INJECTION INTRAVENOUS at 10:04

## 2023-01-01 RX ADMIN — PANTOPRAZOLE SODIUM SCH MG: 40 INJECTION, POWDER, FOR SOLUTION INTRAVENOUS at 08:09

## 2023-01-01 RX ADMIN — ASPIRIN SCH: 325 TABLET ORAL at 19:43

## 2023-01-01 RX ADMIN — LEVETIRACETAM SCH MLS/HR: 100 INJECTION, SOLUTION, CONCENTRATE INTRAVENOUS at 09:27

## 2023-01-01 RX ADMIN — LEVETIRACETAM SCH MLS/HR: 100 INJECTION, SOLUTION, CONCENTRATE INTRAVENOUS at 20:58

## 2023-01-01 RX ADMIN — METOPROLOL TARTRATE SCH: 25 TABLET, FILM COATED ORAL at 19:43

## 2023-01-01 RX ADMIN — HEPARIN SODIUM SCH UNIT: 5000 INJECTION INTRAVENOUS; SUBCUTANEOUS at 16:00

## 2023-01-01 RX ADMIN — SEVELAMER CARBONATE SCH: 800 TABLET, FILM COATED ORAL at 06:11

## 2023-01-01 RX ADMIN — ZONISAMIDE SCH: 100 CAPSULE ORAL at 19:44

## 2023-01-01 RX ADMIN — Medication SCH: at 11:49

## 2023-01-01 RX ADMIN — Medication SCH: at 15:57

## 2023-01-01 RX ADMIN — METOPROLOL TARTRATE SCH: 25 TABLET, FILM COATED ORAL at 08:22

## 2023-01-01 RX ADMIN — HEPARIN SODIUM SCH UNIT: 5000 INJECTION INTRAVENOUS; SUBCUTANEOUS at 01:17

## 2023-01-01 RX ADMIN — ZONISAMIDE SCH: 100 CAPSULE ORAL at 08:23

## 2023-01-01 RX ADMIN — VALPROATE SODIUM SCH MLS/HR: 100 INJECTION, SOLUTION INTRAVENOUS at 08:10

## 2023-01-01 NOTE — XR
EXAMINATION TYPE: XR abdomen 2V

 

DATE OF EXAM: 1/1/2023 9:20 AM

 

INDICATION: 

Patient age:Female;  52 years old; 

Reason for study: abdominal pain; 

 

COMPARISON: 6/21/2013

 

TECHNIQUE: Two views of the abdomen were obtained.

 

FINDINGS: Retained oral contrast is seen within the ascending colon. Loop recorder present. Electroni
c battery device noted in the left chest wall. The bowel gas pattern is nonspecific without dilated l
oops of small or large bowel. There is no evidence for organomegaly or pneumoperitoneum.  The osseous
 structures are intact.  No abnormal calcifications are present. Fecal material and gas are demonstra
tat[place] throughout the colon and rectum.  

 

IMPRESSION:

Nonspecific bowel gas pattern without radiographic evidence for acute process.

## 2023-01-01 NOTE — P.PN
Subjective


Progress Note Date: 01/01/23














CHIEF COMPLAINT: Chronic aspiration





HISTORY OF PRESENT ILLNESS: The patient is a 52-year-old female with pre-

existing history of aspiration with eating. General surgery is following for 

moderate protein malnutrition due to inadequate protein intake, prolonged NPO 

status from aspiration. 





ROS: No reports of nausea and vomiting.  She is NPO. No fevers or chills.  No 

new chest pain.  No productive sputum. She has seizure disorder with bipolar 

disorder





PHYSICAL EXAM: 


VITAL SIGNS: Reviewed


CONSTITUTIONAL:  Well developed and in no acute distress. 


EYES:  Conjuctivae without sclera icterus. Extraocular movements grossly intact.




HEAD, EARS, NOSE, THROAT: Moist buccal mucosa. Head is atraumatic, n

ormocephalic. Hears conversational speech. No nasal drainage.


RESPIRATORY:  Non-labored respirations and equal bilateral excursions.


CARDIOVASCULAR:  Palpable 2+ radial pulses.  


ABDOMEN: No abdominal scarring. 


MUSCULOSKELETAL:  No gross deformity of the lower extremities noted.  No 

clubbing.  No cyanosis.


SKIN: Good skin turgor. Well perfused. 


NEUROLOGIC: Cranial nerves II through XII grossly intact.  No focal or 

lateralizing signs. 


PSYCH:  Flat affect.  Alert and oriented to person, place and time. 





CLINICAL LABS: Reviewed. Hgb 9.3 yesterday.





STUDIES: Abdominal xray 2 view reviewed without fee air. Retained contrast 

within proximal transverse colon which is redundant. This is my independent 

interpretation. 





ASSESSMENT: 


1. Aspiration 


2. Moderate protein malnutrition due to inadequate protein intake. 


3. Seizure disorder


4. Bipolar disorder





PLAN: 


1.  EGD with PEG tube placement reviewed. Patient is NPO status from swallow 

study.


2.  She is elevated risk due to multiple co-morbid conditions


3.  Dietician consultation for tube feed goals.


4.  Benefits and risks of procedure reviewed with patient. Questions addressed. 





Objective





- Vital Signs


Vital signs: 


                                   Vital Signs











Temp  98.5 F   01/01/23 14:07


 


Pulse  80   01/01/23 14:07


 


Resp  20   01/01/23 14:07


 


BP  151/76   01/01/23 14:07


 


Pulse Ox  93 L  01/01/23 14:07


 


FiO2  35   12/25/22 18:05








                                 Intake & Output











 12/31/22 01/01/23 01/01/23





 18:59 06:59 18:59


 


Intake Total 600  


 


Output Total  5 


 


Balance 600 -5 


 


Intake:   


 


  Intake, IV Titration 600  





  Amount   


 


    Lacosamide  mg In 100  





    Sodium Chloride 0.9% 50   





    ml @ 100 mls/hr IVPB BID   





    [facility] Rx#:[id number]   


 


    Valproate Sodium 500 mg 100  





    In Sodium Chloride 0.9%   





    100 ml @ 100 mls/hr IVPB   





    Q8HR [facility] Rx#:[id number]   


 


    levETIRAcetam  mg 400  





    In Sodium Chloride 0.9%   





    100 ml @ 400 mls/hr IVPB   





    Q12HR [facility] Rx#:[id number]   


 


Output:   


 


  Urine  4 


 


  Stool  1 


 


Other:   


 


  Voiding Method  Indwelling Catheter 


 


  # Voids 1  1


 


  # Bowel Movements 2  1








                       ABP, PAP, CO, CI - Last Documented











Arterial Blood Pressure        115/89

















- Labs


CBC & Chem 7: 


                                 12/31/22 05:16





                                 12/31/22 05:16

## 2023-01-01 NOTE — P.PN
Subjective


Progress Note Date: 01/01/23 01/01/2023: Patient was seen for a follow-up.  Patient is laying comfortably in 

the bed.  Patient denies headache.  Patient does have dysphagia, dysarthria.  

Patient to undergo PEG placement in the morning.  No seizures.





12/31/2022: Patient laying comfortably in the bed.  Patient has again failed 

swallow.  Patient to undergo PEG placement on Monday.  No seizures.





12/29/2022: Patient is laying comfortably in the bed.  No further seizures 

reported.  Patient continues to have slow mentation.





12/28/2022: Patient was seen for a follow-up.  Patient states she is feeling 

much better.  No seizures.  Denies headache.





12/27/2022: Patient was seen for a follow-up.  Patient is laying comfortably in 

the bed.  No further seizures reported.  Patient continues to be very stiff.  

Her left side is more spastic.  Awaiting MRI of the brain.





12/26/2022: Patient was seen for a follow-up.  Patient initially seen by Dr. Michele Merritt.  Please refer to his note for details.





Patient is a 52-year-old female with history of seizure disorder.  She has 

clinical status epilepticus that has resolved.  She is known to our neurology 

service for recurrent seizures, and was last time seen by myself on 12/14/2022. 

A 2.5 hour EEG at that time reported generalized spikes seen along with some tri

phasic waves.  At that time it was recommended to wean off Vimpat, continue low-

dose Depakote 250 mg twice a day, Keppra 500 mg twice a day and extra 500 mg 

tablet postdialysis and zonisamide 100 mg daily was started.  





Apparently she was seen by neurologist and patient currently on Depakote 500 mg 

every 8 hours, zonisamide 100 mg daily, Keppra 500 mg twice a day with 500 mg 

postdialysis Monday Wednesday Friday.  Also appears to be on Depakote 250 mg 

twice a day.





Patient has very slow mentation.  Per speech therapist note, patient has been 

made nothing by mouth, patient not able to swallow medications.  She has very 

delayed responses and delayed speech.  Poor oral phase.








Some of her workup during his hospital visit consisted of :


TSH: 0.708


Initial Valproic acid is 127 and repeated is 73.8


Keppra level is 7.9


CT of the head which is reported as hypodensity within the brainstem and within 

the left basal ganglia could be ischemic changes of and determine age.  Consider

follow-up MRI.  I personally reviewed CT head I felt the patient had the basal 

ganglia changes on her prior admission and it doesn't look acute or subacute in 

my opinion.  Regarding the brainstem CT is not the best study for brainstem but 

she does have hypodensity but does not look like acute or subacute in my opinion

as well.


CT angiography of the head and neck was reported as irregular moderate plaque at

the left carotid artery bifurcation and estimated 75% stenosis origin of the le

ft ICA carotid artery.  There is approximately similar 35% stenosis right 

internal carotid artery.  No significant intercranial and angiographic 

abnormality.


Carotid duplex is reported as no hemodynamically significant internal carotid 

artery stenosis on either side.  Limited overall assessment on the left side 

including resolution of the left ISA in the left vertebral artery due to the 

patient condition.  


Plasma lactic acid venous 3.0 on presentation and improved to 1.6.


EEG on 12/24/2022 is abnormal.  The burst suppression is likely due to 

medication effect (Versed and Propofol).  The background slowing suggestive of 

moderate encephalopathy.  The epileptiform discharges is likely on the basis of 

primary generalized epilepsy.  There is no focal slowing or seizure detected 

during the study.


Urinalysis is possible suggestive of acute urinary tract infection


Urine tox screen is valproic acids 127 and the normal supposed to be between 50-

120.  Her level is slightly supratherapeutic








Objective





- Vital Signs


Vital signs: 


                                   Vital Signs











Temp  97.9 F   01/01/23 07:14


 


Pulse  78   01/01/23 08:00


 


Resp  19   01/01/23 08:00


 


BP  147/83   01/01/23 07:14


 


Pulse Ox  99   01/01/23 07:14


 


FiO2  35   12/25/22 18:05








                                 Intake & Output











 12/31/22 01/01/23 01/01/23





 18:59 06:59 18:59


 


Intake Total 600  


 


Output Total  5 


 


Balance 600 -5 


 


Intake:   


 


  Intake, IV Titration 600  





  Amount   


 


    Lacosamide  mg In 100  





    Sodium Chloride 0.9% 50   





    ml @ 100 mls/hr IVPB BID   





    [facility] Rx#:[id number]   


 


    Valproate Sodium 500 mg 100  





    In Sodium Chloride 0.9%   





    100 ml @ 100 mls/hr IVPB   





    Q8HR [facility] Rx#:[id number]   


 


    levETIRAcetam  mg 400  





    In Sodium Chloride 0.9%   





    100 ml @ 400 mls/hr IVPB   





    Q12HR [facility] Rx#:[id number]   


 


Output:   


 


  Urine  4 


 


  Stool  1 


 


Other:   


 


  Voiding Method  Indwelling Catheter 


 


  # Voids 1  1


 


  # Bowel Movements 2  1








                       ABP, PAP, CO, CI - Last Documented











Arterial Blood Pressure        115/89

















- Exam





Patient is slightly groggy, but wakes up and becomes alert.  Speech and language

functions appears normal.  Her speech is slightly hoarse and mildly spastic 

dysarthric .





On muscle strength testing (right/left) deltoid 5-/3-4-, biceps 5/4, triceps 

5/4,  4+/4+.  In the lower extremities hip flexion 3+/to, ankle dorsiflexion

4-/1-2.  Patient is very spastic in the arms, left much more than right.  No 

tremors at rest noted.





Patient has bilateral Babinski.











- Labs


CBC & Chem 7: 


                                 12/31/22 05:16





                                 12/31/22 05:16





Assessment and Plan


Assessment: 





This is a 52-year-old woman with medically refractory epilepsy, VNS who presents

to the hospital numerous times for breakthrough seizures.





Clinical status epilepticus---resolved.  Her epilepsy is not controlled.  

Patient is compliant with her medications.


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

preliminary 2.5 hour EEG in earlier 12/2022)


Left ICA stenosis 75% on CTA but no significant stenosis on carotid Doppler 

ultrasound.


History of multiple strokes.  CT head revealed chronic hypodensity of left basal

ganglia and bilateral cerebellum.  No obvious stroke noted in the brainstem.


Dysphagia.  Patient initially failed swallow studies, then cleared swallow and 

now again failed swallow.   No evidence of CVA on CT head.  Patient cannot have 

MRI because of VNS.


Probable acute UTI


History of stroke with residual left hemiparesis.  Patient states she has 

history of 5 strokes in the past.


Diabetes mellitus


End-stage renal is on dialysis


Hypertension 


Account to 80% well.  By by 80%.  To 40 miles a 60 that would typically be 

mildly clumsy, he is overtired and divided percent better the


Plan: 





* Nurse informed that patient cannot have MRI of the brain because of VNS.  

  Patient's VNS is not compatible with MRI machine in [facility].  Repeat CT head 

  was done.  Revealed no acute ischemic process.  No evidence of stroke in the 

  brainstem.





* Dr. Merritt has increased Zonegran from 100mg daily to 100mg bid.  Resume 

  zonisamide after PEG placement, currently on hold.


* Repeat Depakote level 92.  Patient has slow mentation.  We will decrease 

  Depakote to 500 mg 3 times a day.  Her Depakote level was highly elevated 127 

  on arrival.


* Continue Keppra 500 mg every 12 hours, and 500 mg extra dose every post di

  alysis. 


* Vimpat 100 mg twice a day.   Dr. Merritt has decreased her Vimpat from 150mg bid

  to 100mg bid since on last admission it was felt she had primary generalized 

  epilepsy and Vimpat was not great drug for primary generalized epilepsy. 


* All medication switched to IV form because patient is nothing by mouth.  

  Patient to undergo PEG placement on Monday.  After PEG placement, may switch 

  back to oral medications via PEG.





* Zonisamide level 10 mcg per mL (10-40), Depakote 73.8, Keppra 7.9 (3-60).  

  Repeat Depakote level 92.  Depakote decreased to 500 mg 3 times a day.





* I highly recommend patient to follow-up with epileptilologist since has 

  frequent seizures/medical refractory epilepsy.  Consider Epidiolex for control

  of seizures.  


* Patient has 2.5 hour EEG in our facility on 12/14/2022 (prior admission) and 

  preliminary was reported as runs of sharp and slow waves or spike and slow 

  wave at 2-3 Hz lasting 1 -1.5 seconds suggestive of primary generalized 

  epilepsy.  No official report yet.





* Left ICA stenosis 75% on CTA but on carotid duplex no significant stenosis 

  seen.  Vascular surgery input appreciated.  No indication for surgery.  





* Patient is on home dose Plavix 75mg daily and Lipitor 40mg qhs which is 

  sufficient for secondary stroke prophylaxis.


* PT and OT are consulted.


* Will defer the rest of medical management to primary team.


* For DVT prophylaxis: on subq heparin.


* Dr. Michele Merritt will be starting neurology service in the morning.  

  Neurologically otherwise clear with above recommendations.

## 2023-01-01 NOTE — P.PN
Subjective


Progress Note Date: 01/01/23


patient is a 52-year-old female with a known history ofhypertension, 

hyperlipidemia, diabetes type 2, history of seizure disorder and most recent 

seizures about a month ago, prior history of vagal nerve stimulator placed, 

diabetic peripheral neuropathy, ESRD on hemodialysis Monday Wednesday and Friday

and history of CVA/TIA and uses wheelchair, residual memory impairment, newly 

diagnosed obstructive sleep apnea, prior history of smoking and bipolar disorder

and other medical problems was seen at dialysis clinic yesterday when she became

unresponsive and was not following commands and answering questions.  Patient 

was sent to ER for evaluation.  Patient was also having nausea and episodes of 

vomiting.  She was noted to have seizure-like activity.  She did not get 

dialysis and was transferred to ED by EMS.  On arrival to ER patient was awake 

alert and oriented x1 and was not holding any conversation with the staff.


Previously she was admitted to the hospital from 12 11-12 14 for worsening seizu

re activity.  Patient was started on Zonegran along with Keppra and Depakote.


Patient was also having episodes of vomiting in the ER.  As per her  

patient has been having vomiting for the past 1 week on and off.  She was also 

seen at [facility] earlier this week for seizure-like activity.


Patient was admitted to hospital with neurology consultation.  Overnight a team 

was called for assessment and possible status epilepticus.  Patient was not 

following commands or make eye contact.  Patient was also having episodes of 

emesis and concern for possible aspiration.  Patient was transferred to MICU and

intubated for airway protection.


On admission EKG showed sinus rhythm with moderate intraventricular conduction 

delays.


CT head showed hypodensity within the brainstem and within the left basal 

ganglia could be ischemic changes of intermediate age.  Consider follow-up with 

MRI.


Chest x-ray showed no acute cardiopulmonary process.  Chest x-ray repeated last 

night showed no acute cardiopulmonary disease.  Normal heart.  No change.


Laboratory data on admission WBC 6.7 hemoglobin 0.1 and platelets 218


Sodium 142 potassium 4.0 chloride 100 bicarb is 24 BUN 32 and creatinine 9.47 

and lactic acid 3.0 and blood sugar was 288 on admission


proBNP 2780


Troponin x1 negative


TSH 0.708


Urinalysis showed cloudy with 3+ protein large leukocyte esterase and elevated 

RBCs and WBCs.


Valproic acid level was 127.1.  Therapeutic level is 50-1 20





CTA head and neck showed irregular moderate plaque in the left carotid artery 

stenosis 75% at the origin..





12/25/2022


Patient is currently in the MICU and remains intubated and on mechanical 

ventilator.  Propofol has been discontinued and patient remains Cleviprex.  

Patient is able to open her eyes but able to follow commands at this time.  

Pulmonary is planning to wean off with pressure support and possible extubation.


Chest x-ray showed satisfactory ET and NG tube.  No acute process seen.


Patient is being continued on Keppra, Vimpat and Zonegran.  Pulmonary and 

neurology is on board.


Hemodialysis as per schedule.


Laboratory data showed WBC 5.6 hemoglobin 10.9 platelets 203


Sodium 139 potassium 4.2 chloride 104 bicarb is 26 BUN 21 creatinine 7.22 and 

blood sugar is 114.  Valproic acid level is 73.8





12/26/2022


Patient is in the MICU.  Patient was extubated yesterday.  On 2 L oxygen via 

nasal cannula.  Patient is on Cleviprex.  Awake alert and tries to communicate. 

Chest x-ray today showed no acute cardiopulmonary process.


Patient is being continued on antiepileptic drugs and was also started on blood 

pressure medications this morning.  CBG down to 65 this afternoon.  Patient is 

currently nothing by mouth.


Laboratory data showed WBC 7.8 hemoglobin 9.8 and platelets 172 sodium 138 

potassium 4.5 chloride 104 bicarb is 24 BUN 26 and creatinine 8.95.


Pulmonary, neurology and nephrology is on board.





12/27/2022


Patient is in the MICU.  Resting in the bed.  Awake alert and oriented but 

lethargic and sleepy.  Requiring Cleviprex for hypertension.  Requiring oxygen 

at 2 L via nasal cannula.


Chest x-ray today showed no acute cardiopulmonary process.  Nephrology and 

pulmonary is on board.  Patient is able to swallow and Lopressor changed to by 

mouth.


Cultures have been negative.


Laboratory data showed WBC 6.6 hemoglobin 9.1 platelets 154 sodium 139 potassium

4.3 chloride 106 bicarb is 22 BUN 38 and creatinine 10.6.


Patient is scheduled for hemodialysis today.





12/28/2022


Patient is in the MICU.  Awake alert and oriented.  Communicating slowly.  

Currently titrated down to room air.


Patient had repeat CT scan this morning showed hypodensities within the left 

basal ganglia, left raghav and cerebellum are similar to 12/23/2022.  Suggestive 

of prior injury.  No new areas of suggest acute or subacute CVA.


Cultures have been negative.  Patient underwent hemodialysis yesterday.  

Cleviprex has been discontinued.  No further episodes of seizures overnight.  

Patient is being transferred to medical floor today.


Laboratory data showed WBC 6.7 hemoglobin 9.7 and platelets 151 sodium 136 

potassium 3.6 chloride 102 bicarb is 27 BUN 21 and creatinine 6.95.  Blood sugar

is 107.





12/29/2022


Patient is in the medical floor.  Awake alert and oriented but slow to respond. 

On room air.  No complaints of chest pain or shortness of breath.  No nausea 

vomiting abdominal pain or diarrhea.  Tolerating oral diet.  No further episode 

of seizures.


Hemodialysis as per schedule Monday Wednesday and Friday.


Laboratory data showed WC 7.7 hemoglobin 9.4 and platelets 177 sodium 142 

potassium 3.9 chloride 103 bicarb is 26.1 BUN 23.9 and creatinine 6.3.  Blood 

sugars 126 and phosphorus 4.8.  Next hemodialysis tomorrow.


Anticipate discharge to rehab in the next 24 hours.





12/30/2022


Patient is currently lying in the bed.  Awake alert seems to be lethargic and 

weak today.  No complaints of nausea or vomiting.  Decreased oral intake.  

Patient is scheduled for hemodialysis today.  Also underwent fluoroscopic 

swallow study showed silent laryngeal penetration with pudding consistency and 

aspiration with a delayed cough with honey thick consistency.


Patient has been afebrile.  Currently patient will do Nothing by mouth.


Laboratory data showed WBC 6.9 hemoglobin 8.9 platelets 196 sodium 144 potassium

4.3 chloride 103 bicarb 24.7 BUN 40.8 and creatinine 8.5 and phosphorus 5.1.


Nephrology and pulmonary is on board..








12/31/2022


Patient is monitored on stepdown unit today. More awake than yesterday. She is 

unsure whether she wants the PEG tube or not, this was discussed in extent and 

patient requesting to discuss with her  Steve. She continues to be NPO at

this time.  is agreeing with PEG tube. His overall goal is for his wife 

to get stronger and D/C to rehab when ready. Gen Surgery consulted for evaluati

on. Nephrology following and making recommendations regarding hemodialysis was 

dialyzed yesterday. Continues on IV lacosamide, IV keppra, IV valproic acid. No 

further seizure activity noted. 





01/01/2022


Patient monitored on stepdown unit. Has been evaluated by surgery and will 

undergo EGD with PEG tube placement tomorrow. Had abdominal xray completed today

showing a non acute abdomen with fecal material in the colon and rectum. Patient

has been having bowel movements daily. Continues to be NPO. Continues on IV 

lacosamide, IV keppra, IV valproic acid. Currently afebrile, on room air, blood 

pressure 147/83.





Review of Systems





Constitutional: Denied any fatigue denied any fever.


Cardio vascular: denied any chest pain, palpitations


Gastrointestinal: denied any nausea, vomiting, diarrhea


Pulmonary: Denied any shortness of breath cough


Neurologic denied any new focal deficits





All inpatient medications were reviewed and appropriate changes in these 

medications as dictated in the interval history and assessment and plan.





PHYSICAL EXAMINATION: 





GENERAL: The patient is alert and oriented x2, not in any acute distress. Well 

developed, well nourished. 


HEENT: Pupils are round and equally reacting to light. EOMI. No scleral icterus.

No conjunctival pallor. Normocephalic, atraumatic. No pharyngeal erythema. No 

thyromegaly. 


CARDIOVASCULAR: S1 and S2 present. No murmurs, rubs, or gallops. 


PULMONARY: Coarse scattered lung sounds throughout


ABDOMEN: Soft, nontender, nondistended, normoactive bowel sounds. No palpable 

organomegaly. 


MUSCULOSKELETAL: No joint swelling or deformity.


EXTREMITIES: No cyanosis, clubbing, or pedal edema. 


NEUROLOGICAL: Continues with diffuse weakness, slurred speech 


SKIN: No rashes. 





Assessment and Plan


Assessment


Failed swallow study.


Altered mental status likely due to estatus epilepticus. Patient is extubated on

12/25, had been intubated for airway protection.


History of refractory epilepsy with previous multiple admissions due to 

seizures.


left ICA 75% stenosis at the origin as per CTA neck.


hypodensity in the brainstem and left basal ganglia were related to previous CVA


ESRD on hemodialysis Monday Wednesday and Friday.


History of CVA with left hemiparesis


Diabetes Mellitus type 2 


Peripheral neuropathy


Memory impairment


GERD


Hypertension 


Hyperlipidemia


Hypothyroidism 


bipolar disorder


Primary history of smoking


DVT prophylaxis with heparin subcu


GI prophylaxis


Full Code





Plan:


Patient did have silent aspiration with a fluoroscopic swallow study, Currently 

NPO, continue aspiration precautions with HOB up 30*


Scheduled for EGD with PEG tube placement tomorrow 


Hemodialysis as per schedule on Monday Wednesday and Friday. 


Started back on Zonegran, Depakote and Keppra as per neurology recommendations. 

Zonegran dose increased to 100 mg twice a day,Keppra additional dose 500 mg post

dialysis.


Patient will be continued on secondary stroke prophylaxis Plavix and Lipitor,


Neurology is on board.  Nephrology is on board for hemodialysis.


Subacute rehab on discharge when stable 





The impression and plan of care has been dictated by Scarlet Bill, Nurse 

Practitioner as directed.





Dr. Tom MD


I have performed a history and physical examination and medical decision making 

of this patient, discussed the same with the dictator, and agree with the 

dictators assessment and plan as written, documented as a scribe. Based on total

visit time, I have performed more than 50% of this visit. 








Objective





- Vital Signs


Vital signs: 


                                   Vital Signs











Temp  97.9 F   01/01/23 07:14


 


Pulse  78   01/01/23 07:14


 


Resp  19   01/01/23 07:14


 


BP  147/83   01/01/23 07:14


 


Pulse Ox  99   01/01/23 07:14


 


FiO2  35   12/25/22 18:05








                                 Intake & Output











 12/31/22 01/01/23 01/01/23





 18:59 06:59 18:59


 


Intake Total 600  


 


Output Total  5 


 


Balance 600 -5 


 


Intake:   


 


  Intake, IV Titration 600  





  Amount   


 


    Lacosamide  mg In 100  





    Sodium Chloride 0.9% 50   





    ml @ 100 mls/hr IVPB BID   





    [facility] Rx#:[id number]   


 


    Valproate Sodium 500 mg 100  





    In Sodium Chloride 0.9%   





    100 ml @ 100 mls/hr IVPB   





    Q8HR [facility] Rx#:[id number]   


 


    levETIRAcetam  mg 400  





    In Sodium Chloride 0.9%   





    100 ml @ 400 mls/hr IVPB   





    Q12HR [facility] Rx#:[id number]   


 


Output:   


 


  Urine  4 


 


  Stool  1 


 


Other:   


 


  Voiding Method  Indwelling Catheter 


 


  # Voids 1  1


 


  # Bowel Movements 2  1








                       ABP, PAP, CO, CI - Last Documented











Arterial Blood Pressure        115/89

















- Labs


CBC & Chem 7: 


                                 12/31/22 05:16





                                 12/31/22 05:16


Labs: 


                  Abnormal Lab Results - Last 24 Hours (Table)











  12/31/22 Range/Units





  05:16 


 


Creatinine  5.7 H  (0.6-1.5)  mg/dL


 


Est GFR (CKD-EPI)AfAm  9.1 L  (60.0-200.0)   


 


Est GFR (CKD-EPI)NonAf  7.9 L  (60.0-200.0)   


 


BUN/Creatinine Ratio  3.36 L  (12.00-20.00)  Ratio














Assessment and Plan


Time with Patient: Less than 30

## 2023-01-01 NOTE — P.PN
Subjective





Patient is seen in follow-up for end-stage renal disease.  She is maintained on 

hemodialysis on Monday Wednesday Friday schedule.    Resting in bed.  

Hemodynamically stable.  








Objective





- Vital Signs


Vital signs: 


                                   Vital Signs











Temp  97.9 F   01/01/23 07:14


 


Pulse  78   01/01/23 08:00


 


Resp  19   01/01/23 08:00


 


BP  147/83   01/01/23 07:14


 


Pulse Ox  99   01/01/23 07:14


 


FiO2  35   12/25/22 18:05








                                 Intake & Output











 12/31/22 01/01/23 01/01/23





 18:59 06:59 18:59


 


Intake Total 600  


 


Output Total  5 


 


Balance 600 -5 


 


Intake:   


 


  Intake, IV Titration 600  





  Amount   


 


    Lacosamide  mg In 100  





    Sodium Chloride 0.9% 50   





    ml @ 100 mls/hr IVPB BID   





    [facility] Rx#:[id number]   


 


    Valproate Sodium 500 mg 100  





    In Sodium Chloride 0.9%   





    100 ml @ 100 mls/hr IVPB   





    Q8HR [facility] Rx#:[id number]   


 


    levETIRAcetam  mg 400  





    In Sodium Chloride 0.9%   





    100 ml @ 400 mls/hr IVPB   





    Q12HR [facility] Rx#:[id number]   


 


Output:   


 


  Urine  4 


 


  Stool  1 


 


Other:   


 


  Voiding Method  Indwelling Catheter 


 


  # Voids 1  1


 


  # Bowel Movements 2  1








                       ABP, PAP, CO, CI - Last Documented











Arterial Blood Pressure        115/89

















- Exam





Patient is awake, comfortable, no acute distress


Examination of the heart S1 and S2


Examination lungs bilateral breath sounds are heard


Abdomen is soft nontender


Examination lower extremity shows no evidence of edema [place] CNS exam grossly 

intact





- Labs


CBC & Chem 7: 


                                 12/31/22 05:16





                                 12/31/22 05:16


Labs: 


                  Abnormal Lab Results - Last 24 Hours (Table)











  12/31/22 Range/Units





  05:16 


 


Creatinine  5.7 H  (0.6-1.5)  mg/dL


 


Est GFR (CKD-EPI)AfAm  9.1 L  (60.0-200.0)   


 


Est GFR (CKD-EPI)NonAf  7.9 L  (60.0-200.0)   


 


BUN/Creatinine Ratio  3.36 L  (12.00-20.00)  Ratio














Assessment and Plan


Assessment: 





1.  End-stage renal disease maintained on hemodialysis on Monday Wednesday Friday schedule.


2.  Seizures.  Being followed by neurology.


3.  Left ICA stenosis.  Seen by vascular surgery.


4.  Hypertension with chronic kidney disease.  Stable.


5.  Chronic kidney disease mineral bone disease maintained on PhosLo and 

Renvela.  .


6.  Anemia of chronic kidney disease.  Iron replete.  On Aranesp.


Plan: 





Hemodialysis in a.m.

## 2023-01-01 NOTE — P.PN
Subjective


Progress Note Date: 12/31/22 12/31/2022: Patient laying comfortably in the bed.  Patient has again failed 

swallow.  Patient to undergo PEG placement on Monday.  No seizures.





12/29/2022: Patient is laying comfortably in the bed.  No further seizures 

reported.  Patient continues to have slow mentation.





12/28/2022: Patient was seen for a follow-up.  Patient states she is feeling 

much better.  No seizures.  Denies headache.





12/27/2022: Patient was seen for a follow-up.  Patient is laying comfortably in 

the bed.  No further seizures reported.  Patient continues to be very stiff.  

Her left side is more spastic.  Awaiting MRI of the brain.





12/26/2022: Patient was seen for a follow-up.  Patient initially seen by Dr. Michele Merritt.  Please refer to his note for details.





Patient is a 52-year-old female with history of seizure disorder.  She has 

clinical status epilepticus that has resolved.  She is known to our neurology 

service for recurrent seizures, and was last time seen by myself on 12/14/2022. 

A 2.5 hour EEG at that time reported generalized spikes seen along with some 

triphasic waves.  At that time it was recommended to wean off Vimpat, continue 

low-dose Depakote 250 mg twice a day, Keppra 500 mg twice a day and extra 500 mg

tablet postdialysis and zonisamide 100 mg daily was started.  





Apparently she was seen by neurologist and patient currently on Depakote 500 mg 

every 8 hours, zonisamide 100 mg daily, Keppra 500 mg twice a day with 500 mg 

postdialysis Monday Wednesday Friday.  Also appears to be on Depakote 250 mg 

twice a day.





Patient has very slow mentation.  Per speech therapist note, patient has been 

made nothing by mouth, patient not able to swallow medications.  She has very 

delayed responses and delayed speech.  Poor oral phase.








Some of her workup during his hospital visit consisted of :


TSH: 0.708


Initial Valproic acid is 127 and repeated is 73.8


Keppra level is 7.9


CT of the head which is reported as hypodensity within the brainstem and within 

the left basal ganglia could be ischemic changes of and determine age.  Consider

follow-up MRI.  I personally reviewed CT head I felt the patient had the basal 

ganglia changes on her prior admission and it doesn't look acute or subacute in 

my opinion.  Regarding the brainstem CT is not the best study for brainstem but 

she does have hypodensity but does not look like acute or subacute in my opinion

as well.


CT angiography of the head and neck was reported as irregular moderate plaque at

the left carotid artery bifurcation and estimated 75% stenosis origin of the 

left ICA carotid artery.  There is approximately similar 35% stenosis right 

internal carotid artery.  No significant intercranial and angiographic ab

normality.


Carotid duplex is reported as no hemodynamically significant internal carotid 

artery stenosis on either side.  Limited overall assessment on the left side 

including resolution of the left ISA in the left vertebral artery due to the 

patient condition.  


Plasma lactic acid venous 3.0 on presentation and improved to 1.6.


EEG on 12/24/2022 is abnormal.  The burst suppression is likely due to 

medication effect (Versed and Propofol).  The background slowing suggestive of 

moderate encephalopathy.  The epileptiform discharges is likely on the basis of 

primary generalized epilepsy.  There is no focal slowing or seizure detected 

during the study.


Urinalysis is possible suggestive of acute urinary tract infection


Urine tox screen is valproic acids 127 and the normal supposed to be between 50-

120.  Her level is slightly supratherapeutic








Objective





- Vital Signs


Vital signs: 


                                   Vital Signs











Temp  98.0 F   12/31/22 13:44


 


Pulse  78   12/31/22 13:44


 


Resp  18   12/31/22 13:44


 


BP  125/71   12/31/22 13:44


 


Pulse Ox  97   12/31/22 13:44


 


FiO2  35   12/25/22 18:05








                                 Intake & Output











 12/30/22 12/31/22 12/31/22





 18:59 06:59 18:59


 


Intake Total 930  


 


Output Total 2150  


 


Balance -1220  


 


Weight  71 kg 


 


Intake:   


 


  Oral 180  


 


  Hemodialysis 750  


 


Output:   


 


  Hemodialysis 2150  


 


Other:   


 


  # Voids 1 1 


 


  # Bowel Movements 1 2 








                       ABP, PAP, CO, CI - Last Documented











Arterial Blood Pressure        115/89

















- Exam





Patient is awake, more alert, and better response time to answer questions.  

Speech and language functions appears normal.  Her speech is slightly hoarse.





On muscle strength testing (right/left) deltoid 5-/3-4-, biceps 5/4, triceps 

5/4,  4+/4+.  In the lower extremities hip flexion 3+/to, ankle dorsiflexion

4-/1-2.





Patient has bilateral Babinski.











- Labs


CBC & Chem 7: 


                                 12/31/22 05:16





                                 12/31/22 05:16


Labs: 


                  Abnormal Lab Results - Last 24 Hours (Table)











  12/31/22 12/31/22 Range/Units





  05:16 05:16 


 


RBC  3.28 L   (4.10-5.20)  X 10*6/uL


 


Hgb  9.3 L   (12.0-15.0)  g/dL


 


Hct  28.6 L   (37.2-46.3)  %


 


MPV  12.5 H   (9.5-12.2)  fL


 


Immature Gran #  0.08 H   (0.00-0.04)  X 10*3/uL


 


Creatinine   5.7 H  (0.6-1.5)  mg/dL


 


Est GFR (CKD-EPI)AfAm   9.1 L  (60.0-200.0)   


 


Est GFR (CKD-EPI)NonAf   7.9 L  (60.0-200.0)   


 


BUN/Creatinine Ratio   3.36 L  (12.00-20.00)  Ratio














Assessment and Plan


Assessment: 





This is a 52-year-old woman with medically refractory epilepsy, VNS who presents

to the hospital numerous times for breakthrough seizures.





Clinical status epilepticus---resolved.  Her epilepsy is not controlled.  

Patient is compliant with her medications.


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

preliminary 2.5 hour EEG in earlier 12/2022)


Left ICA stenosis 75% on CTA but no significant stenosis on carotid Doppler 

ultrasound.


History of multiple strokes.  CT head revealed chronic hypodensity of left basal

ganglia and bilateral cerebellum.  No obvious stroke noted in the brainstem.


Dysphagia.  Patient initially failed swallow studies, then cleared swallow and 

now again failed swallow.   No evidence of CVA on CT head.  Patient cannot have 

MRI because of VNS.


Probable acute UTI


History of stroke with residual left hemiparesis.  Patient states she has 

history of 5 strokes in the past.


Diabetes mellitus


End-stage renal is on dialysis


Hypertension 





Plan: 





* Nurse informed that patient cannot have MRI of the brain because of VNS.  

  Patient's VNS is not compatible with MRI machine in [facility].  Repeat CT head 

  was done.  Revealed no acute ischemic process.  No evidence of stroke in the 

  brainstem.





* Dr. Merritt has increased Zonegran from 100mg daily to 100mg bid.  


* Repeat Depakote level 92.  Patient has slow mentation.  We will decrease 

  Depakote to 500 mg 3 times a day.  Her Depakote level was highly elevated 127 

  on arrival.


* Continue Keppra 500 mg every 12 hours, and 500 mg extra dose every post 

  dialysis. 


* Vimpat 100 mg twice a day.   Dr. Merritt has decreased her Vimpat from 150mg bid

  to 100mg bid since on last admission it was felt she had primary generalized 

  epilepsy and Vimpat was not great drug for primary generalized epilepsy. 


* All medication switched to IV form because patient is nothing by mouth.  

  Patient to undergo pack placement on Monday.





* Zonisamide level 10 mcg per mL (10-40), Depakote 73.8, Keppra 7.9 (3-60).  

  Repeat Depakote level 92.  Depakote decreased to 500 mg 3 times a day.





* I highly recommend patient to follow-up with epileptilologist since has 

  frequent seizures/medical refractory epilepsy.  Consider Epidiolex for control

  of seizures.  


* Patient has 2.5 hour EEG in our facility on 12/14/2022 (prior admission) and 

  preliminary was reported as runs of sharp and slow waves or spike and slow 

  wave at 2-3 Hz lasting 1 -1.5 seconds suggestive of primary generalized 

  epilepsy.  No official report yet.





* Left ICA stenosis 75% on CTA but on carotid duplex no significant stenosis 

  seen.  Vascular surgery input appreciated.  No indication for surgery.  





* Patient is on home dose Plavix 75mg daily and Lipitor 40mg qhs which is 

  sufficient for secondary stroke prophylaxis.


* PT and OT are consulted.


* Will defer the rest of medical management to primary team.


* For DVT prophylaxis: on subq heparin.

## 2023-01-02 LAB
GLUCOSE BLD-MCNC: 132 MG/DL (ref 70–110)
GLUCOSE BLD-MCNC: 78 MG/DL (ref 70–110)
GLUCOSE BLD-MCNC: 81 MG/DL (ref 70–110)
GLUCOSE BLD-MCNC: 84 MG/DL (ref 70–110)

## 2023-01-02 RX ADMIN — ASPIRIN SCH: 325 TABLET ORAL at 20:15

## 2023-01-02 RX ADMIN — VALPROATE SODIUM SCH MLS/HR: 100 INJECTION, SOLUTION INTRAVENOUS at 01:16

## 2023-01-02 RX ADMIN — ATORVASTATIN CALCIUM SCH MG: 40 TABLET, FILM COATED ORAL at 19:59

## 2023-01-02 RX ADMIN — SEVELAMER CARBONATE SCH: 800 TABLET, FILM COATED ORAL at 04:08

## 2023-01-02 RX ADMIN — Medication SCH MG: at 17:27

## 2023-01-02 RX ADMIN — PANTOPRAZOLE SODIUM SCH MG: 40 INJECTION, POWDER, FOR SOLUTION INTRAVENOUS at 10:28

## 2023-01-02 RX ADMIN — ASPIRIN SCH: 325 TABLET ORAL at 09:12

## 2023-01-02 RX ADMIN — METOPROLOL TARTRATE SCH: 25 TABLET, FILM COATED ORAL at 09:12

## 2023-01-02 RX ADMIN — HEPARIN SODIUM SCH: 5000 INJECTION INTRAVENOUS; SUBCUTANEOUS at 07:52

## 2023-01-02 RX ADMIN — ZONISAMIDE SCH MG: 100 CAPSULE ORAL at 19:59

## 2023-01-02 RX ADMIN — METOPROLOL TARTRATE SCH MG: 25 TABLET, FILM COATED ORAL at 19:59

## 2023-01-02 RX ADMIN — LACOSAMIDE SCH MLS/HR: 10 INJECTION INTRAVENOUS at 22:24

## 2023-01-02 RX ADMIN — Medication SCH: at 14:12

## 2023-01-02 RX ADMIN — HEPARIN SODIUM SCH UNIT: 5000 INJECTION INTRAVENOUS; SUBCUTANEOUS at 15:59

## 2023-01-02 RX ADMIN — CLOPIDOGREL BISULFATE SCH: 75 TABLET ORAL at 09:12

## 2023-01-02 RX ADMIN — LACOSAMIDE SCH MLS/HR: 10 INJECTION INTRAVENOUS at 10:28

## 2023-01-02 RX ADMIN — SEVELAMER CARBONATE SCH MG: 800 TABLET, FILM COATED ORAL at 17:27

## 2023-01-02 RX ADMIN — SEVELAMER CARBONATE SCH: 800 TABLET, FILM COATED ORAL at 14:12

## 2023-01-02 RX ADMIN — Medication SCH: at 04:08

## 2023-01-02 RX ADMIN — LEVETIRACETAM SCH MLS/HR: 100 INJECTION, SOLUTION, CONCENTRATE INTRAVENOUS at 07:57

## 2023-01-02 RX ADMIN — HEPARIN SODIUM SCH UNIT: 5000 INJECTION INTRAVENOUS; SUBCUTANEOUS at 01:16

## 2023-01-02 RX ADMIN — VALPROATE SODIUM SCH MLS/HR: 100 INJECTION, SOLUTION INTRAVENOUS at 08:16

## 2023-01-02 RX ADMIN — VALPROATE SODIUM SCH MLS/HR: 100 INJECTION, SOLUTION INTRAVENOUS at 15:59

## 2023-01-02 RX ADMIN — LEVETIRACETAM SCH MLS/HR: 100 INJECTION, SOLUTION, CONCENTRATE INTRAVENOUS at 20:00

## 2023-01-02 RX ADMIN — ZONISAMIDE SCH: 100 CAPSULE ORAL at 09:12

## 2023-01-02 NOTE — P.PN
Subjective


Progress Note Date: 01/02/23





Patient had a repeat swallow study.  She passed a swallow study.  Tolerated.  

Diet.  Patient had bowel movement.  No abdominal pain.  We'll cancel EGD with 

PEG tube placement.





Objective





- Vital Signs


Vital signs: 


                                   Vital Signs











Temp  98.7 F   01/02/23 18:58


 


Pulse  83   01/02/23 18:58


 


Resp  18   01/02/23 18:58


 


BP  130/77   01/02/23 18:58


 


Pulse Ox  95   01/02/23 18:58


 


FiO2  35   12/25/22 18:05








                                 Intake & Output











 01/02/23 01/02/23 01/03/23





 06:59 18:59 06:59


 


Intake Total  750 


 


Output Total 1 2800 


 


Balance -1 -2050 


 


Weight 78 kg 78 kg 


 


Intake:   


 


  Intake, IV Titration  450 





  Amount   


 


    Lacosamide  mg In  50 





    Sodium Chloride 0.9% 50   





    ml @ 100 mls/hr IVPB BID   





    ALICIA Rx#:[id number]   


 


    Valproate Sodium 500 mg  200 





    In Sodium Chloride 0.9%   





    100 ml @ 100 mls/hr IVPB   





    Q8HR [facility]ICIA Rx#:[id number]   


 


    levETIRAcetam  mg  200 





    In Sodium Chloride 0.9%   





    100 ml @ 400 mls/hr IVPB   





    MoWeFr@[facility] Rx#:   





    [id number]   


 


  Hemodialysis  300 


 


Output:   


 


  Stool 1  


 


  Hemodialysis  2800 


 


Other:   


 


  Voiding Method Indwelling Catheter Diaper 





  Incontinent 


 


  # Voids 1  








                       ABP, PAP, CO, CI - Last Documented











Arterial Blood Pressure        115/89

















- Labs


CBC & Chem 7: 


                                 12/31/22 05:16





                                 12/31/22 05:16


Labs: 


                  Abnormal Lab Results - Last 24 Hours (Table)











  01/02/23 Range/Units





  21:02 


 


POC Glucose (mg/dL)  132 H  ()  mg/dL

## 2023-01-02 NOTE — P.PN
Subjective


Progress Note Date: 01/02/23


The patient is known to me and I saw her last during this admission on 

12/25/2022 then was being care by Dr. Fischer.


No further seizures.  Today she is getting dialysis today.  She is pending PEG 

tube. 


Please refer to Dr. Fischer's notes for further details.








Objective





- Vital Signs


Vital signs: 


                                   Vital Signs











Temp  97.4 F L  01/02/23 08:00


 


Pulse  76   01/02/23 08:00


 


Resp  14   01/02/23 08:00


 


BP  149/71   01/02/23 08:00


 


Pulse Ox  96   01/02/23 08:00


 


FiO2  35   12/25/22 18:05








                                 Intake & Output











 01/01/23 01/02/23 01/02/23





 18:59 06:59 18:59


 


Output Total  1 


 


Balance  -1 


 


Weight  78 kg 


 


Output:   


 


  Stool  1 


 


Other:   


 


  Voiding Method  Indwelling Catheter Diaper





   Incontinent


 


  # Voids 1 1 


 


  # Bowel Movements 1  








                       ABP, PAP, CO, CI - Last Documented











Arterial Blood Pressure        115/89

















- Exam


GENERAL: The patient is lying in bed and is not in acute distress.  Is getting 

dialysis today.





NEUROLOGICAL:


Higher mental function: The patient is mildly drowsy but is awakeable to voice. 

Is oriented to self, time.  She stated she is in the hospital with options.  She

is moderately slow responding to questions.  No aphasia or neglect.  


Cranial nerves: The pupils are round, equal and reactive to light. \Extraocular 

movement is intact no nystagmus is noted. The facial strength is normal 

throughout.  Tongue is midline and moved side-to-side without any difficulty.  

No dysarthria is noted and has low pitched voice.  


Motor: The strength is 5 over 5 throughout right side.  The left side has 

weakness, more over the left lower than upper (able to raise upper over gravity 

but has 1/5 over the lower).  Has somewhat increase tone over the left.    


Sensation: Sensation is slightly decreased over the left side to touch 

throughout.








- Labs


CBC & Chem 7: 


                                 12/31/22 05:16





                                 12/31/22 05:16





Assessment and Plan


Assessment: 


This is a 52-year-old woman with medically refractory epilepsy, VNS who presents

to the hospital numerous times for breakthrough seizures.





Clinical status epilepticus---resolved.  Her epilepsy is not controlled.  

Patient is compliant with her medications.


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

preliminary 2.5 hour EEG in earlier 12/2022)


Left ICA stenosis 75% on CTA but no significant stenosis on carotid Doppler 

ultrasound.


History of multiple strokes.  CT head revealed chronic hypodensity of left basal

ganglia and bilateral cerebellum.  No obvious stroke noted in the brainstem.


Dysphagia.  Patient initially failed swallow studies, then cleared swallow and 

now again failed swallow.   No evidence of CVA on CT head.  Patient cannot have 

MRI because of VNS.


Probable acute UTI


History of stroke with residual left hemiparesis.  Patient states she has 

history of 5 strokes in the past.


Diabetes mellitus


End-stage renal is on dialysis


Hypertension 





Plan: 





* Cannot have MRI of the brain because of VNS.  Patient's VNS is not compatible 

  with MRI machine in [facility].  Repeat CT head was done.  Revealed no acute 

  ischemic process.  No evidence of stroke in the brainstem.


* Zonegran was increased during this hospital because of her recurrent seizure 

  on presentation from 100mg daily to 100mg bid.  Resume zonisamide after PEG 

  placement, currently on hold.


* Repeat Depakote level 92.  Patient has slow mentation.  We will decrease 

  Depakote to 500 mg 3 times a day.  Her Depakote level was highly elevated 127 

  on arrival.


* Continue Keppra 500 mg every 12 hours, and 500 mg extra dose every post 

  dialysis. 


* Vimpat 100 mg twice a day.   Dr. Merritt has decreased her Vimpat from 150mg bid

  to 100mg bid since on last admission it was felt she had primary generalized 

  epilepsy and Vimpat was not great drug for primary generalized epilepsy. 


* All medication switched to IV form because patient is nothing by mouth.  

  Patient to undergo PEG placement on Monday.  After PEG placement, may switch 

  back to oral medications via PEG.


* Zonisamide level 10 mcg per mL (10-40), Depakote 73.8, Keppra 7.9 (3-60).  

  Repeat Depakote level 92.  Depakote decreased to 500 mg 3 times a day.


* I highly recommend patient to follow-up with epileptilologist since has 

  frequent seizures/medical refractory epilepsy.  Consider Epidiolex for control

  of seizures.  


* Patient has 2.5 hour EEG in our facility on 12/14/2022 (prior admission) and 

  preliminary was reported as runs of sharp and slow waves or spike and slow 

  wave at 2-3 Hz lasting 1 -1.5 seconds suggestive of primary generalized 

  epilepsy.  No official report yet.


* Left ICA stenosis 75% on CTA but on carotid duplex no significant stenosis 

  seen.  Vascular surgery input appreciated.  No indication for surgery.  


* Patient is on home dose Plavix 75mg daily and Lipitor 40mg qhs which is 

  sufficient for secondary stroke prophylaxis.


* PT and OT are consulted.


* Will defer the rest of medical management to primary team.


* For DVT prophylaxis: on subq heparin.





Otherwise no additional work-up.  Will continue to follow if continues to be in 

the hospital.


The plan is discussed with her nurse.





Time with Patient: Less than 30

## 2023-01-02 NOTE — PN
PROGRESS NOTE



DATE OF SERVICE:  01/02/2023



SUBJECTIVE:

This is a 52-year-old woman, who was admitted with change in mental status and failed

swallow study, has to undergo PEG tube placement today.  The patient is receiving

hemodialysis.  About 3 L of fluid has been removed.  No chest pain.  No palpitations.

No fever.



OBJECTIVE:

VITAL SIGNS:  Pulse is 76, blood pressure 129/70, respirations 14.

CHEST:  Clear to auscultation.

CARDIOVASCULAR:  S1 and S2.

ABDOMEN:  Soft.

NERVOUS SYSTEM:  Nonfocal.



LABORATORY DATA:

Reviewed.



ASSESSMENT:

1. Change in mental status, possibly status epilepticus.

2. Failed swallow study, for percutaneous endoscopic gastrostomy tube placement.

3. Refractory epilepsy.

4. Left internal carotid artery stenosis.

5. Hypodensity in the brainstem, left basal ganglia related to previous

    cerebrovascular accident.

6. End-stage renal disease, on hemodialysis.

7. Cerebrovascular accident.

8. Multiple medical issues.

9. Diabetes mellitus, type 2.

10.Peripheral neuropathy.



RECOMMENDATIONS:

Recommend to continue current medications and symptomatic treatment.  PEG tube

placement today.  Otherwise, we will closely monitor.  PT and OT evaluation.  Once the

PEG tube is placed, we will get Dietary evaluation.  Once the tube feeding is

stabilized, we will send the patient to ECF.  Continue with hemodialysis in the

meantime.  Prognosis is guarded.  Further recommendations to follow.





MMODL / IJN: [id number] / Job#: [id number]

## 2023-01-02 NOTE — FL
EXAMINATION TYPE: FL barium swallow w video

 

DATE OF EXAM: 1/2/2023

 

CLINICAL HISTORY: 52-year-old female scheduled for PEG tube after aspiration and compromise swallow. 
Swallowing is improving, reassess for aspiration.

 

TECHNIQUE:  Deglutition study is performed utilizing thin liquid barium, honey and nectar thick liqui
d barium, barium thick applesauce, and barium coated cracker.

 

Total fluoroscopy time: 2 minutes 8 seconds.

Total images: None. Real-time fluoroscopy support was provided to speech pathology.

 

COMPARISON: 12/30/2022.

 

FINDINGS: 

Swallow initiation was mildly delayed with bolus free spilling to the level of the vallecula. There i
s no evidence of penetration or aspiration with any modality tested.  No significant pharyngeal resid
ue was appreciated.

 

 

IMPRESSION:  

Significant interval improvement. No penetration or aspiration. Mild swallow delay remains.

Please refer to speech therapist notes for further details if necessary.

## 2023-01-02 NOTE — P.PN
Subjective





Patient is seen in follow-up for end-stage renal disease.  She is maintained on 

hemodialysis on Monday Wednesday Friday schedule.    Resting in bed.  

Hemodynamically stable.  


Seen on hemodialysis today.  UF increased to about 3 L.











Objective





- Vital Signs


Vital signs: 


                                   Vital Signs











Temp  97.4 F L  01/02/23 08:00


 


Pulse  76   01/02/23 08:00


 


Resp  14   01/02/23 08:00


 


BP  149/71   01/02/23 08:00


 


Pulse Ox  96   01/02/23 08:00


 


FiO2  35   12/25/22 18:05








                                 Intake & Output











 01/01/23 01/02/23 01/02/23





 18:59 06:59 18:59


 


Output Total  1 


 


Balance  -1 


 


Weight  78 kg 


 


Output:   


 


  Stool  1 


 


Other:   


 


  Voiding Method  Indwelling Catheter Diaper





   Incontinent


 


  # Voids 1 1 


 


  # Bowel Movements 1  








                       ABP, PAP, CO, CI - Last Documented











Arterial Blood Pressure        115/89

















- Exam





Patient is awake, comfortable, no acute distress


Examination of the heart S1 and S2


Examination lungs bilateral breath sounds are heard


Abdomen is soft nontender


Examination lower extremity shows no evidence of edema 


 CNS exam grossly intact





- Labs


CBC & Chem 7: 


                                 12/31/22 05:16





                                 12/31/22 05:16





Assessment and Plan


Assessment: 





1.  End-stage renal disease maintained on hemodialysis on Monday Wednesday Friday schedule.


2.  Seizures.  Being followed by neurology.


3.  Left ICA stenosis.  Seen by vascular surgery.


4.  Hypertension with chronic kidney disease.  Stable.


5.  Chronic kidney disease mineral bone disease maintained on PhosLo and 

Renvela.  .


6.  Anemia of chronic kidney disease.  Iron replete.  On Aranesp.


Plan: 





Hemodialysis today.  Increase UF to about 3 L as tolerated

## 2023-01-03 LAB
ANION GAP SERPL CALC-SCNC: 15.2 MMOL/L (ref 10–18)
BASOPHILS # BLD AUTO: 0.08 X 10*3/UL (ref 0–0.1)
BASOPHILS NFR BLD AUTO: 1.5 %
BUN SERPL-SCNC: 26.1 MG/DL (ref 9–27)
BUN/CREAT SERPL: 4.02 RATIO (ref 12–20)
CALCIUM SPEC-MCNC: 9.7 MG/DL (ref 8.7–10.3)
CHLORIDE SERPL-SCNC: 97 MMOL/L (ref 96–109)
CO2 SERPL-SCNC: 22.8 MMOL/L (ref 20–27.5)
EOSINOPHIL # BLD AUTO: 0.09 X 10*3/UL (ref 0.04–0.35)
EOSINOPHIL NFR BLD AUTO: 1.6 %
ERYTHROCYTE [DISTWIDTH] IN BLOOD BY AUTOMATED COUNT: 3.68 X 10*6/UL (ref 4.1–5.2)
ERYTHROCYTE [DISTWIDTH] IN BLOOD: 13.8 % (ref 11.5–14.5)
GLUCOSE BLD-MCNC: 104 MG/DL (ref 70–110)
GLUCOSE BLD-MCNC: 144 MG/DL (ref 70–110)
GLUCOSE BLD-MCNC: 158 MG/DL (ref 70–110)
GLUCOSE BLD-MCNC: 177 MG/DL (ref 70–110)
GLUCOSE SERPL-MCNC: 133 MG/DL (ref 70–110)
HCT VFR BLD AUTO: 32.8 % (ref 37.2–46.3)
HGB BLD-MCNC: 10.1 G/DL (ref 12–15)
IMM GRANULOCYTES BLD QL AUTO: 4 %
LYMPHOCYTES # SPEC AUTO: 1.64 X 10*3/UL (ref 0.9–5)
LYMPHOCYTES NFR SPEC AUTO: 29.9 %
MCH RBC QN AUTO: 27.4 PG (ref 27–32)
MCHC RBC AUTO-ENTMCNC: 30.8 G/DL (ref 32–37)
MCV RBC AUTO: 89.1 FL (ref 80–97)
MONOCYTES # BLD AUTO: 0.59 X 10*3/UL (ref 0.2–1)
MONOCYTES NFR BLD AUTO: 10.8 %
NEUTROPHILS # BLD AUTO: 2.86 X 10*3/UL (ref 1.8–7.7)
NEUTROPHILS NFR BLD AUTO: 52.2 %
NRBC BLD AUTO-RTO: 0 /100 WBCS (ref 0–0)
PLATELET # BLD AUTO: 203 X 10*3/UL (ref 140–440)
POTASSIUM SERPL-SCNC: 4.2 MMOL/L (ref 3.5–5.5)
SODIUM SERPL-SCNC: 135 MMOL/L (ref 135–145)
WBC # BLD AUTO: 5.48 X 10*3/UL (ref 4.5–10)

## 2023-01-03 RX ADMIN — METOPROLOL TARTRATE SCH MG: 25 TABLET, FILM COATED ORAL at 21:12

## 2023-01-03 RX ADMIN — SEVELAMER CARBONATE SCH MG: 800 TABLET, FILM COATED ORAL at 16:56

## 2023-01-03 RX ADMIN — Medication SCH MG: at 11:47

## 2023-01-03 RX ADMIN — HEPARIN SODIUM SCH UNIT: 5000 INJECTION INTRAVENOUS; SUBCUTANEOUS at 23:26

## 2023-01-03 RX ADMIN — LEVETIRACETAM SCH MLS/HR: 100 INJECTION, SOLUTION, CONCENTRATE INTRAVENOUS at 09:24

## 2023-01-03 RX ADMIN — SEVELAMER CARBONATE SCH MG: 800 TABLET, FILM COATED ORAL at 16:52

## 2023-01-03 RX ADMIN — ASPIRIN SCH: 325 TABLET ORAL at 23:25

## 2023-01-03 RX ADMIN — ASPIRIN SCH: 325 TABLET ORAL at 08:04

## 2023-01-03 RX ADMIN — DIVALPROEX SODIUM SCH MG: 125 CAPSULE, COATED PELLETS ORAL at 21:13

## 2023-01-03 RX ADMIN — ZONISAMIDE SCH MG: 100 CAPSULE ORAL at 23:26

## 2023-01-03 RX ADMIN — VALPROATE SODIUM SCH MLS/HR: 100 INJECTION, SOLUTION INTRAVENOUS at 00:35

## 2023-01-03 RX ADMIN — LACOSAMIDE SCH MLS/HR: 10 INJECTION INTRAVENOUS at 10:10

## 2023-01-03 RX ADMIN — SEVELAMER CARBONATE SCH MG: 800 TABLET, FILM COATED ORAL at 11:47

## 2023-01-03 RX ADMIN — SEVELAMER CARBONATE SCH MG: 800 TABLET, FILM COATED ORAL at 06:50

## 2023-01-03 RX ADMIN — Medication SCH MG: at 06:50

## 2023-01-03 RX ADMIN — VALPROATE SODIUM SCH MLS/HR: 100 INJECTION, SOLUTION INTRAVENOUS at 08:00

## 2023-01-03 RX ADMIN — HEPARIN SODIUM SCH UNIT: 5000 INJECTION INTRAVENOUS; SUBCUTANEOUS at 00:37

## 2023-01-03 RX ADMIN — ATORVASTATIN CALCIUM SCH MG: 40 TABLET, FILM COATED ORAL at 21:13

## 2023-01-03 RX ADMIN — ZONISAMIDE SCH MG: 100 CAPSULE ORAL at 08:04

## 2023-01-03 RX ADMIN — HEPARIN SODIUM SCH UNIT: 5000 INJECTION INTRAVENOUS; SUBCUTANEOUS at 16:51

## 2023-01-03 RX ADMIN — PANTOPRAZOLE SODIUM SCH MG: 40 INJECTION, POWDER, FOR SOLUTION INTRAVENOUS at 08:03

## 2023-01-03 RX ADMIN — Medication SCH MG: at 16:52

## 2023-01-03 RX ADMIN — METOPROLOL TARTRATE SCH MG: 25 TABLET, FILM COATED ORAL at 08:03

## 2023-01-03 RX ADMIN — CLOPIDOGREL BISULFATE SCH MG: 75 TABLET ORAL at 08:03

## 2023-01-03 RX ADMIN — DIVALPROEX SODIUM SCH MG: 125 CAPSULE, COATED PELLETS ORAL at 16:53

## 2023-01-03 RX ADMIN — HEPARIN SODIUM SCH UNIT: 5000 INJECTION INTRAVENOUS; SUBCUTANEOUS at 08:01

## 2023-01-03 RX ADMIN — LACOSAMIDE SCH MG: 50 TABLET, FILM COATED ORAL at 21:11

## 2023-01-03 NOTE — P.PN
Subjective


Progress Note Date: 01/03/23


The patient is seen at bedside and no further seizures.








Objective





- Vital Signs


Vital signs: 


                                   Vital Signs











Temp  97.7 F   01/03/23 07:49


 


Pulse  72   01/03/23 07:49


 


Resp  15   01/03/23 07:49


 


BP  119/75   01/03/23 07:49


 


Pulse Ox  97   01/03/23 07:49


 


FiO2  35   12/25/22 18:05








                                 Intake & Output











 01/02/23 01/03/23 01/03/23





 18:59 06:59 18:59


 


Intake Total 750  


 


Output Total 2800 1 


 


Balance -2050 -1 


 


Weight 78 kg  


 


Intake:   


 


  Intake, IV Titration 450  





  Amount   


 


    Lacosamide  mg In 50  





    Sodium Chloride 0.9% 50   





    ml @ 100 mls/hr IVPB BID   





    [facility] Rx#:[id number]   


 


    Valproate Sodium 500 mg 200  





    In Sodium Chloride 0.9%   





    100 ml @ 100 mls/hr IVPB   





    Q8HR [facility] Rx#:[id number]   


 


    levETIRAcetam  mg 200  





    In Sodium Chloride 0.9%   





    100 ml @ 400 mls/hr IVPB   





    MoWeFr@1200 [facility] Rx#:   





    [id number]   


 


  Hemodialysis 300  


 


Output:   


 


  Stool  1 


 


  Hemodialysis 2800  


 


Other:   


 


  Voiding Method Diaper Diaper Diaper





 Incontinent Incontinent Incontinent


 


  # Bowel Movements  4 1








                       ABP, PAP, CO, CI - Last Documented











Arterial Blood Pressure        115/89

















- Exam


GENERAL: The patient is lying in bed and is not in acute distress.  Is getting 

dialysis today.





NEUROLOGICAL:


Higher mental function: The patient is mildly drowsy but is awakeable to voice. 

Is oriented to self, time.  She stated she is in the hospital with options.  She

is moderately slow responding to questions.  No aphasia or neglect.  


Cranial nerves: The pupils are round, equal and reactive to light. \Extraocular 

movement is intact no nystagmus is noted. The facial strength is normal 

throughout.  Tongue is midline and moved side-to-side without any difficulty.  

No dysarthria is noted and has low pitched voice.  


Motor: The strength is 5 over 5 throughout right side.  The left side has 

weakness, more over the left lower than upper (able to raise upper over gravity 

but has 1/5 over the lower).  Has somewhat increase tone over the left.    


Sensation: Sensation is slightly decreased over the left side to touch 

throughout.








- Labs


CBC & Chem 7: 


                                 01/03/23 07:56





                                 01/03/23 07:56


Labs: 


                  Abnormal Lab Results - Last 24 Hours (Table)











  01/02/23 01/03/23 01/03/23 Range/Units





  21:02 07:56 07:56 


 


RBC   3.68 L   (4.10-5.20)  X 10*6/uL


 


Hgb   10.1 L   (12.0-15.0)  g/dL


 


Hct   32.8 L   (37.2-46.3)  %


 


MCHC   30.8 L   (32.0-37.0)  g/dL


 


Immature Gran #   0.22 H   (0.00-0.04)  X 10*3/uL


 


Creatinine    6.5 H  (0.6-1.5)  mg/dL


 


Est GFR (CKD-EPI)AfAm    7.7 L  (60.0-200.0)   


 


Est GFR (CKD-EPI)NonAf    6.6 L  (60.0-200.0)   


 


BUN/Creatinine Ratio    4.02 L  (12.00-20.00)  Ratio


 


Glucose    133 H  ()  mg/dL


 


POC Glucose (mg/dL)  132 H    ()  mg/dL














Assessment and Plan


Assessment: 


This is a 52-year-old woman with medically refractory epilepsy, VNS who presents

to the hospital numerous times for breakthrough seizures.





Clinical status epilepticus---resolved.  Her epilepsy is not controlled.  Pa

anibal is compliant with her medications.


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

preliminary 2.5 hour EEG in earlier 12/2022)


Left ICA stenosis 75% on CTA but no significant stenosis on carotid Doppler 

ultrasound.


History of multiple strokes.  CT head revealed chronic hypodensity of left basal

ganglia and bilateral cerebellum.  No obvious stroke noted in the brainstem.


Dysphagia.  Patient initially failed swallow studies, then cleared swallow and 

now again failed swallow.   No evidence of CVA on CT head.  Patient cannot have 

MRI because of VNS.


Probable acute UTI


History of stroke with residual left hemiparesis.  Patient states she has 

history of 5 strokes in the past.


Diabetes mellitus


End-stage renal is on dialysis


Hypertension 





Plan: 





* Cannot have MRI of the brain because of VNS.  Patient's VNS is not compatible 

  with MRI machine in [facility].  Repeat CT head was done.  Revealed no acute 

  ischemic process.  No evidence of stroke in the brainstem.


* Zonegran was increased during this hospital because of her recurrent seizure 

  on presentation from 100mg daily to 100mg bid.  Resume zonisamide after PEG 

  placement, currently on hold.


* Repeat Depakote level 92.  Patient has slow mentation.  We will decrease 

  Depakote to 500 mg 3 times a day.  Her Depakote level was highly elevated 127 

  on arrival.


* Continue Keppra 500 mg every 12 hours, and 500 mg extra dose every post 

  dialysis. 


* Vimpat 100 mg twice a day.   Dr. Merritt has decreased her Vimpat from 150mg bid

  to 100mg bid since on last admission it was felt she had primary generalized 

  epilepsy and Vimpat was not great drug for primary generalized epilepsy. 


* Zonisamide level 10 mcg per mL (10-40), Depakote 73.8, Keppra 7.9 (3-60).  

  Repeat Depakote level 92.  Depakote decreased to 500 mg 3 times a day.


* I highly recommend patient to follow-up with epileptilologist since has 

  frequent seizures/medical refractory epilepsy.  Consider Epidiolex for control

  of seizures.  


* Patient has 2.5 hour EEG in our facility on 12/14/2022 (prior admission) and 

  preliminary was reported as runs of sharp and slow waves or spike and slow 

  wave at 2-3 Hz lasting 1 -1.5 seconds suggestive of primary generalized 

  epilepsy.  No official report yet.


* Left ICA stenosis 75% on CTA but on carotid duplex no significant stenosis 

  seen.  Vascular surgery input appreciated.  No indication for surgery.  


* Patient is on home dose Plavix 75mg daily and Lipitor 40mg qhs which is 

  sufficient for secondary stroke prophylaxis.


* PT and OT are consulted.


* Will defer the rest of medical management to primary team.


* For DVT prophylaxis: on subq heparin.





Otherwise no additional work-up.  Will continue to follow if continues to be in 

the hospital.


The plan is discussed with her nurse.





Time with Patient: Less than 30

## 2023-01-03 NOTE — P.PN
Subjective


Progress Note Date: 01/03/23











patient is a 52-year-old female with a known history ofhypertension, 

hyperlipidemia, diabetes type 2, history of seizure disorder and most recent 

seizures about a month ago, prior history of vagal nerve stimulator placed, 

diabetic peripheral neuropathy, ESRD on hemodialysis Monday Wednesday and Friday

and history of CVA/TIA and uses wheelchair, residual memory impairment, newly 

diagnosed obstructive sleep apnea, prior history of smoking and bipolar disorder

and other medical problems was seen at dialysis clinic yesterday when she became

unresponsive and was not following commands and answering questions.  Patient 

was sent to ER for evaluation.  Patient was also having nausea and episodes of 

vomiting.  She was noted to have seizure-like activity.  She did not get 

dialysis and was transferred to ED by EMS.  On arrival to ER patient was awake 

alert and oriented x1 and was not holding any conversation with the staff.


Previously she was admitted to the hospital from 12 11-12 14 for worsening se

izure activity.  Patient was started on Zonegran along with Keppra and Depakote.


Patient was also having episodes of vomiting in the ER.  As per her  

patient has been having vomiting for the past 1 week on and off.  She was also 

seen at [facility] earlier this week for seizure-like activity.


Patient was admitted to hospital with neurology consultation.  Overnight a team 

was called for assessment and possible status epilepticus.  Patient was not 

following commands or make eye contact.  Patient was also having episodes of 

emesis and concern for possible aspiration.  Patient was transferred to MICU and

intubated for airway protection.


On admission EKG showed sinus rhythm with moderate intraventricular conduction 

delays.


CT head showed hypodensity within the brainstem and within the left basal 

ganglia could be ischemic changes of intermediate age.  Consider follow-up with 

MRI.


Chest x-ray showed no acute cardiopulmonary process.  Chest x-ray repeated last 

night showed no acute cardiopulmonary disease.  Normal heart.  No change.


Laboratory data on admission WBC 6.7 hemoglobin 0.1 and platelets 218


Sodium 142 potassium 4.0 chloride 100 bicarb is 24 BUN 32 and creatinine 9.47 

and lactic acid 3.0 and blood sugar was 288 on admission


proBNP 2780


Troponin x1 negative


TSH 0.708


Urinalysis showed cloudy with 3+ protein large leukocyte esterase and elevated 

RBCs and WBCs.


Valproic acid level was 127.1.  Therapeutic level is 50-1 20





CTA head and neck showed irregular moderate plaque in the left carotid artery 

stenosis 75% at the origin..





12/25/2022


Patient is currently in the MICU and remains intubated and on mechanical 

ventilator.  Propofol has been discontinued and patient remains Cleviprex.  

Patient is able to open her eyes but able to follow commands at this time.  

Pulmonary is planning to wean off with pressure support and possible extubation.


Chest x-ray showed satisfactory ET and NG tube.  No acute process seen.


Patient is being continued on Keppra, Vimpat and Zonegran.  Pulmonary and 

neurology is on board.


Hemodialysis as per schedule.


Laboratory data showed WBC 5.6 hemoglobin 10.9 platelets 203


Sodium 139 potassium 4.2 chloride 104 bicarb is 26 BUN 21 creatinine 7.22 and 

blood sugar is 114.  Valproic acid level is 73.8





12/26/2022


Patient is in the MICU.  Patient was extubated yesterday.  On 2 L oxygen via 

nasal cannula.  Patient is on Cleviprex.  Awake alert and tries to communicate. 

Chest x-ray today showed no acute cardiopulmonary process.


Patient is being continued on antiepileptic drugs and was also started on blood 

pressure medications this morning.  CBG down to 65 this afternoon.  Patient is 

currently nothing by mouth.


Laboratory data showed WBC 7.8 hemoglobin 9.8 and platelets 172 sodium 138 

potassium 4.5 chloride 104 bicarb is 24 BUN 26 and creatinine 8.95.


Pulmonary, neurology and nephrology is on board.





12/27/2022


Patient is in the MICU.  Resting in the bed.  Awake alert and oriented but 

lethargic and sleepy.  Requiring Cleviprex for hypertension.  Requiring oxygen 

at 2 L via nasal cannula.


Chest x-ray today showed no acute cardiopulmonary process.  Nephrology and 

pulmonary is on board.  Patient is able to swallow and Lopressor changed to by 

mouth.


Cultures have been negative.


Laboratory data showed WBC 6.6 hemoglobin 9.1 platelets 154 sodium 139 potassium

4.3 chloride 106 bicarb is 22 BUN 38 and creatinine 10.6.


Patient is scheduled for hemodialysis today.





12/28/2022


Patient is in the MICU.  Awake alert and oriented.  Communicating slowly.  

Currently titrated down to room air.


Patient had repeat CT scan this morning showed hypodensities within the left 

basal ganglia, left raghav and cerebellum are similar to 12/23/2022.  Suggestive 

of prior injury.  No new areas of suggest acute or subacute CVA.


Cultures have been negative.  Patient underwent hemodialysis yesterday.  

Cleviprex has been discontinued.  No further episodes of seizures overnight.  

Patient is being transferred to medical floor today.


Laboratory data showed WBC 6.7 hemoglobin 9.7 and platelets 151 sodium 136 

potassium 3.6 chloride 102 bicarb is 27 BUN 21 and creatinine 6.95.  Blood sugar

is 107.





12/29/2022


Patient is in the medical floor.  Awake alert and oriented but slow to respond. 

On room air.  No complaints of chest pain or shortness of breath.  No nausea 

vomiting abdominal pain or diarrhea.  Tolerating oral diet.  No further episode 

of seizures.


Hemodialysis as per schedule Monday Wednesday and Friday.


Laboratory data showed WC 7.7 hemoglobin 9.4 and platelets 177 sodium 142 

potassium 3.9 chloride 103 bicarb is 26.1 BUN 23.9 and creatinine 6.3.  Blood 

sugars 126 and phosphorus 4.8.  Next hemodialysis tomorrow.


Anticipate discharge to rehab in the next 24 hours.





12/30/2022


Patient is currently lying in the bed.  Awake alert seems to be lethargic and 

weak today.  No complaints of nausea or vomiting.  Decreased oral intake.  

Patient is scheduled for hemodialysis today.  Also underwent fluoroscopic 

swallow study showed silent laryngeal penetration with pudding consistency and 

aspiration with a delayed cough with honey thick consistency.


Patient has been afebrile.  Currently patient will do Nothing by mouth.


Laboratory data showed WBC 6.9 hemoglobin 8.9 platelets 196 sodium 144 potassium

4.3 chloride 103 bicarb 24.7 BUN 40.8 and creatinine 8.5 and phosphorus 5.1.


Nephrology and pulmonary is on board..








12/31/2022


Patient is monitored on stepdown unit today. More awake than yesterday. She is 

unsure whether she wants the PEG tube or not, this was discussed in extent and 

patient requesting to discuss with her  Steve. She continues to be NPO at

this time.  is agreeing with PEG tube. His overall goal is for his wife 

to get stronger and D/C to rehab when ready. Gen Surgery consulted for evalu

ation. Nephrology following and making recommendations regarding hemodialysis 

was dialyzed yesterday. Continues on IV lacosamide, IV keppra, IV valproic acid.

No further seizure activity noted. 





01/01/2023


Patient monitored on stepdown unit. Has been evaluated by surgery and will 

undergo EGD with PEG tube placement tomorrow. Had abdominal xray completed today

showing a non acute abdomen with fecal material in the colon and rectum. Patient

has been having bowel movements daily. Continues to be NPO. Continues on IV 

lacosamide, IV keppra, IV valproic acid. Currently afebrile, on room air, blood 

pressure 147/83.





01/03/2023





Patient is seen and evaluated in follow-up today lethargic although arousable.  

Nephrology and surgery following the patient along with neurology.  Patient is 

scheduled dialysis Monday/Wednesday/Friday.  Patient was initially scheduled to 

undergo EGD with PEG tube placement although reevaluated and underwent repeat 

swallow eval and past and reports to tolerating diet.  EGD/PEG tube placement 

was canceled per surgery.  Recommend strict aspiration precautions with nectar 

thickened liquids and no straws with supervision with meals and continuing to 

have the head of the bed elevated 30-45 at all times.  Patient will be placed 

on dysphagia 2-3 with ground and chopped meats per speech recommendations.  Will

have PT/OT therapy evaluate the patient and discuss possible ECF on discharge.  

Patient is currently afebrile denies chest pain or shortness of breath.  No re

ports of nausea or vomiting noted and patient is tolerating diet





Review of Systems:





Constitutional: Reports fatigue denied any fever.


Cardio vascular: denied any chest pain, palpitations


Gastrointestinal: denied any nausea, vomiting, diarrhea, reports having bowel 

movements daily and tolerating diet


Pulmonary: Denied any worsening shortness of breath cough


Neurologic denied any new focal deficits





Active Medications





Amlodipine Besylate (Amlodipine 5 Mg Tab)  5 mg PO BID [facility]


   Last Admin: 01/03/23 08:03 Dose:  5 mg


   


Atorvastatin Calcium (Atorvastatin 40 Mg Tab)  40 mg PO HS [facility]


   Last Admin: 01/02/23 19:59 Dose:  40 mg


   


Calcium Acetate (Calcium Acetate 667 Mg Tab)  667 mg PO TID-W/MEALS [facility]


   Last Admin: 01/03/23 11:47 Dose:  667 mg


   


Clopidogrel Bisulfate (Clopidogrel 75 Mg Tab)  75 mg PO DAILY [facility]


   Last Admin: 01/03/23 08:03 Dose:  75 mg


   


Darbepoetin Christiano (Darbepoetin Christiano 40 Mcg/0.4 Ml Syringe)  40 mcg SQ Q7D [facility]


   Last Admin: 12/28/22 14:44 Dose:  40 mcg


   


Divalproex Sodium (Divalproex 500 Mg Tablet.Dr)  500 mg PO TID [facility]


Heparin Sodium (Porcine) (Heparin Sodium,Porcine/Pf 5,000 Unit/0.5 Ml Syringe)  

5,000 unit SQ Q8HR [facility]


   Last Admin: 01/03/23 08:01 Dose:  5,000 unit


   


Acetaminophen 1,000 mg/ IV (Solution)  100 mls @ 400 mls/hr IVPB Q6H PRN


   PRN Reason: Fever and/ or Pain


   Last Admin: 01/02/23 03:19 Dose:  400 mls/hr


   


Lacosamide (Lacosamide 50 Mg Tablet)  100 mg PO BID [facility]


Levetiracetam (Levetiracetam 500 Mg Tab)  500 mg PO Q12HR [facility]


Levetiracetam (Levetiracetam 500 Mg Tab)  500 mg PO MOWEFR@1800 [facility]


Metoprolol Tartrate (Metoprolol Tartrate 25 Mg Tab)  25 mg PO BID [facility]


   Last Admin: 01/03/23 08:03 Dose:  25 mg


   


Naloxone HCl (Naloxone 0.4 Mg/Ml 1 Ml Vial)  0.2 mg IV Q2M PRN


   PRN Reason: Opioid Reversal


Non-Formulary Medication (Vascepa 1gm)  1 gm PO BID [facility]


   Last Admin: 01/03/23 08:04 Dose:  Not Given


   


Ondansetron HCl (Ondansetron 4 Mg/2 Ml Vial)  4 mg IVP Q8HR PRN


   PRN Reason: Nausea And Vomiting


Pantoprazole Sodium (Pantoprazole 40 Mg/10 Ml Vial)  40 mg IV DAILY [facility]


   Last Admin: 01/03/23 08:03 Dose:  40 mg


   


Sevelamer Carbonate (Sevelamer 800 Mg Tab)  800 mg PO BID@0730,1230 [facility]


   Last Admin: 01/03/23 11:47 Dose:  800 mg


   


Sevelamer Carbonate (Sevelamer 800 Mg Tab)  1,600 mg PO W/SUPPER [facility]


   Last Admin: 01/02/23 17:27 Dose:  1,600 mg


   


Zonisamide (Zonisamide 100 Mg Cap)  100 mg PO Q12HR [facility]


   Last Admin: 01/03/23 08:04 Dose:  100 mg


   











PHYSICAL EXAMINATION: 





GENERAL: The patient is alert and oriented x2, not in any acute distress. Well 

developed, well nourished. 


HEENT: Pupils are round and equally reacting to light. EOMI. No scleral icterus.

No conjunctival pallor. Normocephalic, atraumatic. No pharyngeal erythema. No 

thyromegaly. 


CARDIOVASCULAR: S1 and S2 present. No murmurs, rubs, or gallops. 


PULMONARY: Coarse scattered lung sounds throughout


ABDOMEN: Soft, nontender, nondistended, normoactive bowel sounds. No palpable 

organomegaly. 


MUSCULOSKELETAL: No joint swelling or deformity.


EXTREMITIES: No cyanosis, clubbing, or pedal edema. 


NEUROLOGICAL: Continues with diffuse weakness, slurred speech 


SKIN: No rashes. 








Assessment:








Altered mental status likely due to status epilepticus. Patient is extubated on 

12/25, had been intubated for airway protection.


Failed swallow study, considering PEG tube placement


History of refractory epilepsy with previous multiple admissions due to 

seizures.


left ICA 75% stenosis at the origin as per CTA neck.


hypodensity in the brainstem and left basal ganglia were related to previous CVA


ESRD on hemodialysis Monday Wednesday and Friday.


History of CVA with left hemiparesis


Diabetes Mellitus type 2 


Peripheral neuropathy


Memory impairment


GERD


Hypertension 


Hyperlipidemia


Hypothyroidism 


bipolar disorder


Primary history of smoking


DVT prophylaxis with heparin subcu


GI prophylaxis


Full Code





Plan:





Patient being followed by multiple medical consultations including nephrology 

and maintained on Monday/Wednesday/Friday dialysis 


Patient has been seen and evaluated by general surgery and initially 

recommending EGD with PEG tube placement although will undergo EGD tomorrow and 

will not be receiving a PEG tube


Neurology following as well recommending to continue with current medication 

regimen and outpatient follow-up


PT/OT therapy to evaluate the patient with possible ECF once stabilized and 

discharged


Recommend continue monitoring Accu-Cheks before meals and at bedtime 


Patient did have silent aspiration with a fluoroscopic swallow study initially 

and speech reevaluated and underwent a videofluoroscopic swallow and passed 

recommending dysphagia ground with aspiration precautions


Recommend head of the bed elevated 30-45 at all times with supervision with 

meals and nectar thickened liquids with no straws and supervision with meals 


Nursing staff reports she will undergo an EGD tomorrow although surgery note 

reports canceling EGD and PEG tube.  Will follow-up with surgery 


Due to multiple complex medical issues, prognosis is guarded





The impression and plan of care has been dictated by Karie Rosado, Nurse 

Practitioner as directed.





Dr. Myron MD


I have performed a history and examination and MDM of this patient, discussed th

e same with the dictator, and  agree with the dictator's assessment and plan as 

written ,documented as a scribe. Based on total visit time,  I have performed 

more than 50% of the visit.  








Objective





- Vital Signs


Vital signs: 


                                   Vital Signs











Temp  97.7 F   01/03/23 07:49


 


Pulse  72   01/03/23 07:49


 


Resp  15   01/03/23 07:49


 


BP  119/75   01/03/23 07:49


 


Pulse Ox  97   01/03/23 07:49


 


FiO2  35   12/25/22 18:05








                                 Intake & Output











 01/02/23 01/03/23 01/03/23





 18:59 06:59 18:59


 


Intake Total 750  


 


Output Total 2800 1 


 


Balance -2050 -1 


 


Weight 78 kg  


 


Intake:   


 


  Intake, IV Titration 450  





  Amount   


 


    Lacosamide  mg In 50  





    Sodium Chloride 0.9% 50   





    ml @ 100 mls/hr IVPB BID   





    [facility] Rx#:[id number]   


 


    Valproate Sodium 500 mg 200  





    In Sodium Chloride 0.9%   





    100 ml @ 100 mls/hr IVPB   





    Q8HR [facility] Rx#:[id number]   


 


    levETIRAcetam  mg 200  





    In Sodium Chloride 0.9%   





    100 ml @ 400 mls/hr IVPB   





    MoWeFr@1200 [facility] Rx#:   





    [id number]   


 


  Hemodialysis 300  


 


Output:   


 


  Stool  1 


 


  Hemodialysis 2800  


 


Other:   


 


  Voiding Method Diaper Diaper Diaper





 Incontinent Incontinent Incontinent


 


  # Bowel Movements  4 1








                       ABP, PAP, CO, CI - Last Documented











Arterial Blood Pressure        115/89

















- Labs


CBC & Chem 7: 


                                 01/03/23 07:56





                                 01/03/23 07:56


Labs: 


                  Abnormal Lab Results - Last 24 Hours (Table)











  01/02/23 01/03/23 01/03/23 Range/Units





  21:02 07:56 07:56 


 


RBC   3.68 L   (4.10-5.20)  X 10*6/uL


 


Hgb   10.1 L   (12.0-15.0)  g/dL


 


Hct   32.8 L   (37.2-46.3)  %


 


MCHC   30.8 L   (32.0-37.0)  g/dL


 


Immature Gran #   0.22 H   (0.00-0.04)  X 10*3/uL


 


Creatinine    6.5 H  (0.6-1.5)  mg/dL


 


Est GFR (CKD-EPI)AfAm    7.7 L  (60.0-200.0)   


 


Est GFR (CKD-EPI)NonAf    6.6 L  (60.0-200.0)   


 


BUN/Creatinine Ratio    4.02 L  (12.00-20.00)  Ratio


 


Glucose    133 H  ()  mg/dL


 


POC Glucose (mg/dL)  132 H    ()  mg/dL














  01/03/23 Range/Units





  11:11 


 


RBC   (4.10-5.20)  X 10*6/uL


 


Hgb   (12.0-15.0)  g/dL


 


Hct   (37.2-46.3)  %


 


MCHC   (32.0-37.0)  g/dL


 


Immature Gran #   (0.00-0.04)  X 10*3/uL


 


Creatinine   (0.6-1.5)  mg/dL


 


Est GFR (CKD-EPI)AfAm   (60.0-200.0)   


 


Est GFR (CKD-EPI)NonAf   (60.0-200.0)   


 


BUN/Creatinine Ratio   (12.00-20.00)  Ratio


 


Glucose   ()  mg/dL


 


POC Glucose (mg/dL)  158 H  ()  mg/dL

## 2023-01-03 NOTE — XR
EXAMINATION TYPE: XR chest 1V portable

 

DATE OF EXAM: 1/3/2023 6:47 AM

 

COMPARISON: Chest radiographs from 12/26/2022

 

TECHNIQUE: XR chest 1V portable Portable AP radiograph of the chest.

 

CLINICAL INDICATION:Female, 52 years old with history of chf; 

 

FINDINGS: 

Lungs/Pleura: There is no evidence of pleural effusion, focal consolidation, or pneumothorax.  

Pulmonary vascularity: Unremarkable.

Heart/mediastinum: Cardiomediastinal silhouette is unremarkable. A loop recorder projects over the le
ft thorax over the heart.

Musculoskeletal: No acute osseous pathology.

Other: Battery pack lead terminating in the neck.

 

IMPRESSION: 

No acute cardiopulmonary disease/process.

## 2023-01-03 NOTE — P.PN
Subjective


Progress Note Date: 01/03/23





CHIEF COMPLAINT: Silent aspiration on swallow study





HISTORY OF PRESENT ILLNESS: Patient evaluated by speech therapy.  She has passed

her repeat swallow evaluations.  She is tolerating diet.  Denies any abdominal 

pain.  Denies any nausea or vomiting.  Afebrile.  WBC is 5.48 Hgb 10.1 platelets

203 sodium is 135 potassium 4.2 creatinine 6.5





PHYSICAL EXAM: 


VITAL SIGNS: Reviewed


GENERAL: Well-developed in no acute distress. 


HEENT:  No sclera icterus. Extraocular movements grossly intact.  Moist buccal 

mucosa. 


Head is atraumatic, normocephalic. Hears conversational speech. No nasal 

drainage.


NECK:  Supple without lymphadenopathy.


CHEST:  Non-labored respirations and equal bilateral excursions. 


CARDIOVASCULAR:   Palpable 2+ radial pulses.


ABDOMEN:  Soft.  Nondistended. Nontender.


MUSCULOSKELETAL:  No clubbing or cyanosis.


NEUROLOGIC:  No focal or lateralizing signs.  Cranial nerves II through XII 

grossly intact.


PSYCH:  Appropriate affect.  Alert and oriented to person, place and time.


SKIN: Well perfused.  Good skin turgor. 





ASSESSMENT: 


1.  Silent aspiration on initial swallow study.  Patient passed her repeat 

swallow evaluations 








PLAN: 


-Patient is tolerating diet.  No need for PEG tube at this time.


-Surgical service will sign off.  Please call with any questions or concerns.





Physician Assistant note has been reviewed by physician. Signing provider agrees

with the documented findings, assessment, and plan of care. 





Objective





- Vital Signs


Vital signs: 


                                   Vital Signs











Temp  98.2 F   01/03/23 14:00


 


Pulse  73   01/03/23 14:00


 


Resp  16   01/03/23 14:00


 


BP  126/73   01/03/23 14:00


 


Pulse Ox  98   01/03/23 14:00


 


FiO2  35   12/25/22 18:05








                                 Intake & Output











 01/02/23 01/03/23 01/03/23





 18:59 06:59 18:59


 


Intake Total 750  


 


Output Total 2800 1 


 


Balance -2050 -1 


 


Weight 78 kg  


 


Intake:   


 


  Intake, IV Titration 450  





  Amount   


 


    Lacosamide  mg In 50  





    Sodium Chloride 0.9% 50   





    ml @ 100 mls/hr IVPB BID   





    [facility] Rx#:[id number]   


 


    Valproate Sodium 500 mg 200  





    In Sodium Chloride 0.9%   





    100 ml @ 100 mls/hr IVPB   





    Q8HR [facility] Rx#:[id number]   


 


    levETIRAcetam  mg 200  





    In Sodium Chloride 0.9%   





    100 ml @ 400 mls/hr IVPB   





    MoWeFr@1200 [facility] Rx#:   





    [id number]   


 


  Hemodialysis 300  


 


Output:   


 


  Stool  1 


 


  Hemodialysis 2800  


 


Other:   


 


  Voiding Method Diaper Diaper Diaper





 Incontinent Incontinent Incontinent


 


  # Bowel Movements  4 1








                       ABP, PAP, CO, CI - Last Documented











Arterial Blood Pressure        115/89

















- Labs


CBC & Chem 7: 


                                 01/03/23 07:56





                                 01/03/23 07:56


Labs: 


                  Abnormal Lab Results - Last 24 Hours (Table)











  01/02/23 01/03/23 01/03/23 Range/Units





  21:02 07:56 07:56 


 


RBC   3.68 L   (4.10-5.20)  X 10*6/uL


 


Hgb   10.1 L   (12.0-15.0)  g/dL


 


Hct   32.8 L   (37.2-46.3)  %


 


MCHC   30.8 L   (32.0-37.0)  g/dL


 


Immature Gran #   0.22 H   (0.00-0.04)  X 10*3/uL


 


Creatinine    6.5 H  (0.6-1.5)  mg/dL


 


Est GFR (CKD-EPI)AfAm    7.7 L  (60.0-200.0)   


 


Est GFR (CKD-EPI)NonAf    6.6 L  (60.0-200.0)   


 


BUN/Creatinine Ratio    4.02 L  (12.00-20.00)  Ratio


 


Glucose    133 H  ()  mg/dL


 


POC Glucose (mg/dL)  132 H    ()  mg/dL














  01/03/23 Range/Units





  11:11 


 


RBC   (4.10-5.20)  X 10*6/uL


 


Hgb   (12.0-15.0)  g/dL


 


Hct   (37.2-46.3)  %


 


MCHC   (32.0-37.0)  g/dL


 


Immature Gran #   (0.00-0.04)  X 10*3/uL


 


Creatinine   (0.6-1.5)  mg/dL


 


Est GFR (CKD-EPI)AfAm   (60.0-200.0)   


 


Est GFR (CKD-EPI)NonAf   (60.0-200.0)   


 


BUN/Creatinine Ratio   (12.00-20.00)  Ratio


 


Glucose   ()  mg/dL


 


POC Glucose (mg/dL)  158 H  ()  mg/dL

## 2023-01-03 NOTE — P.PN
Subjective





Patient is seen in follow-up for end-stage renal disease.  She is maintained on 

hemodialysis on Monday Wednesday Friday schedule.    Resting in bed.  

Hemodynamically stable.  


No complaints today.











Objective





- Vital Signs


Vital signs: 


                                   Vital Signs











Temp  97.7 F   01/03/23 07:49


 


Pulse  72   01/03/23 07:49


 


Resp  15   01/03/23 07:49


 


BP  119/75   01/03/23 07:49


 


Pulse Ox  97   01/03/23 07:49


 


FiO2  35   12/25/22 18:05








                                 Intake & Output











 01/02/23 01/03/23 01/03/23





 18:59 06:59 18:59


 


Intake Total 750  


 


Output Total 2800 1 


 


Balance -2050 -1 


 


Weight 78 kg  


 


Intake:   


 


  Intake, IV Titration 450  





  Amount   


 


    Lacosamide  mg In 50  





    Sodium Chloride 0.9% 50   





    ml @ 100 mls/hr IVPB BID   





    [facility] Rx#:[id number]   


 


    Valproate Sodium 500 mg 200  





    In Sodium Chloride 0.9%   





    100 ml @ 100 mls/hr IVPB   





    Q8HR [facility] Rx#:[id number]   


 


    levETIRAcetam  mg 200  





    In Sodium Chloride 0.9%   





    100 ml @ 400 mls/hr IVPB   





    MoWeFr@1200 [facility] Rx#:   





    [id number]   


 


  Hemodialysis 300  


 


Output:   


 


  Stool  1 


 


  Hemodialysis 2800  


 


Other:   


 


  Voiding Method Diaper Diaper Diaper





 Incontinent Incontinent Incontinent


 


  # Bowel Movements  4 








                       ABP, PAP, CO, CI - Last Documented











Arterial Blood Pressure        115/89

















- Exam





Patient is awake, comfortable, no acute distress


Examination of the heart S1 and S2


Examination lungs bilateral breath sounds are heard


Abdomen is soft nontender


Examination lower extremity shows no evidence of edema 


 CNS exam grossly intact





- Labs


CBC & Chem 7: 


                                 01/03/23 07:56





                                 12/31/22 05:16


Labs: 


                  Abnormal Lab Results - Last 24 Hours (Table)











  01/02/23 01/03/23 Range/Units





  21:02 07:56 


 


RBC   3.68 L  (4.10-5.20)  X 10*6/uL


 


Hgb   10.1 L  (12.0-15.0)  g/dL


 


Hct   32.8 L  (37.2-46.3)  %


 


MCHC   30.8 L  (32.0-37.0)  g/dL


 


Immature Gran #   0.22 H  (0.00-0.04)  X 10*3/uL


 


POC Glucose (mg/dL)  132 H   ()  mg/dL














Assessment and Plan


Assessment: 





1.  End-stage renal disease maintained on hemodialysis on Monday Wednesday Friday schedule.


2.  Seizures.  Being followed by neurology.


3.  Left ICA stenosis.  Seen by vascular surgery.


4.  Hypertension with chronic kidney disease.  Stable.


5.  Chronic kidney disease mineral bone disease maintained on PhosLo and 

Renvela.  .


6.  Anemia of chronic kidney disease.  Iron replete.  On Aranesp.


Plan: 





Hemodialysis in a.m.


Encourage increased oral intake.  Patient did pass the barium swallow

## 2023-01-04 VITALS
TEMPERATURE: 97.3 F | SYSTOLIC BLOOD PRESSURE: 124 MMHG | HEART RATE: 77 BPM | RESPIRATION RATE: 15 BRPM | DIASTOLIC BLOOD PRESSURE: 77 MMHG

## 2023-01-04 LAB
GLUCOSE BLD-MCNC: 114 MG/DL (ref 70–110)
GLUCOSE BLD-MCNC: 94 MG/DL (ref 70–110)

## 2023-01-04 RX ADMIN — HEPARIN SODIUM SCH UNIT: 5000 INJECTION INTRAVENOUS; SUBCUTANEOUS at 16:17

## 2023-01-04 RX ADMIN — LACOSAMIDE SCH MG: 50 TABLET, FILM COATED ORAL at 09:11

## 2023-01-04 RX ADMIN — CLOPIDOGREL BISULFATE SCH MG: 75 TABLET ORAL at 09:10

## 2023-01-04 RX ADMIN — ASPIRIN SCH: 325 TABLET ORAL at 09:13

## 2023-01-04 RX ADMIN — SEVELAMER CARBONATE SCH MG: 800 TABLET, FILM COATED ORAL at 06:38

## 2023-01-04 RX ADMIN — Medication SCH MG: at 11:50

## 2023-01-04 RX ADMIN — METOPROLOL TARTRATE SCH: 25 TABLET, FILM COATED ORAL at 09:12

## 2023-01-04 RX ADMIN — SEVELAMER CARBONATE SCH MG: 800 TABLET, FILM COATED ORAL at 16:18

## 2023-01-04 RX ADMIN — HEPARIN SODIUM SCH UNIT: 5000 INJECTION INTRAVENOUS; SUBCUTANEOUS at 09:10

## 2023-01-04 RX ADMIN — Medication SCH MG: at 16:18

## 2023-01-04 RX ADMIN — ZONISAMIDE SCH MG: 100 CAPSULE ORAL at 09:13

## 2023-01-04 RX ADMIN — DARBEPOETIN ALFA SCH MCG: 40 INJECTION, SOLUTION INTRAVENOUS; SUBCUTANEOUS at 09:15

## 2023-01-04 RX ADMIN — DIVALPROEX SODIUM SCH MG: 125 CAPSULE, COATED PELLETS ORAL at 09:11

## 2023-01-04 RX ADMIN — DIVALPROEX SODIUM SCH MG: 125 CAPSULE, COATED PELLETS ORAL at 16:18

## 2023-01-04 RX ADMIN — Medication SCH MG: at 06:38

## 2023-01-04 RX ADMIN — PANTOPRAZOLE SODIUM SCH MG: 40 INJECTION, POWDER, FOR SOLUTION INTRAVENOUS at 09:12

## 2023-01-04 RX ADMIN — SEVELAMER CARBONATE SCH MG: 800 TABLET, FILM COATED ORAL at 11:51

## 2023-01-04 NOTE — P.DS
Providers


Date of admission: 


12/23/22 13:24





Expected date of discharge: 01/04/23


Attending physician: 


Bhargav Head





Consults: 





                                        





12/23/22 14:29


Consult Physician Urgent 


   Consulting Provider: Michele Merritt


   Consult Reason/Comments: Altered mental status, possible subacute lacunar 

infarct on CT


   Do you want consulting provider notified?: Yes





12/23/22 14:30


Consult Physician Urgent 


   Consulting Provider: Guillermina Byers


   Consult Reason/Comments: Chronic renal failure on dialysis, missed dialysis 

today due to AMS


   Do you want consulting provider notified?: Yes





12/23/22 23:18


Consult Physician Stat 


   Consulting Provider: Carleen Scott


   Consult Reason/Comments: ICU management


   Do you want consulting provider notified?: Already Contacted





01/02/23 11:18


Consult Physician Routine 


   Consulting Provider: [facility]


   Consult Reason/Comments: [facility]


   Do you want consulting provider notified?: Yes











Primary care physician: 


Stated None





Hospital Course: 





Final diagnosis





Altered mental status likely due to status epilepticus. Patient is extubated on 

12/25, had been intubated for airway protection.


Failed swallow study initially although reevaluated and passed recommend to 

continue with dysphagia diet and aspiration precautions


History of refractory epilepsy with previous multiple admissions due to 

seizures.


left ICA 75% stenosis at the origin as per CTA neck.


hypodensity in the brainstem and left basal ganglia were related to previous CVA


ESRD on hemodialysis Monday Wednesday and Friday.


History of CVA with left hemiparesis


Diabetes Mellitus type 2 


Peripheral neuropathy


Memory impairment


GERD


Hypertension 


Hyperlipidemia


Hypothyroidism 


bipolar disorder


Primary history of smoking


DVT prophylaxis with heparin subcu


GI prophylaxis


Full Code











Discharge disposition


Patient is being discharged in a stable condition with guarded prognosis to  

[facility] .  Patient will follow-up with Dr. Roblero  in the outpatient 

setting upon discharge.  Patient is to continue with hemodialysis as scheduled 

on Monday/Wednesday/Friday.  Total time taken is greater than 35 minutes.





Hospital course


This is a 52-year-old female who was recently admitted with altered mental 

status most likely status epilepticus and intubated initially to maintain airway

patency and extubated.  Patient continued to be lethargic although slowly 

improved underwent repeat swallow eval and was successful recommending dysphagia

diet with aspiration precautions and thickened liquids.  Patient to follow up 

with neurology outpatient and continue current medication regimen.  Patient also

has end-stage renal disease on dialysis Monday/Wednesday/Friday and will 

continue in close outpatient follow-up with nephrology.  Patient to follow-up 

with her neurologist outpatient as well.  Patient continues with significant 

weakness and will be going to [facility] today.  Patient has been cleared by 

consultations.  Please refer to previous documentations along with consultation 

documentations for further HPI. Currently no reports of chest pain, shortness of

breath, or palpitations.  Patient is afebrile.  No reports of nausea or vomiting

and patient is tolerating diet.  Patient will be going to [facility] today.  

Guarded prognosis and patient is high risk for multiple hospitalizations and 

readmissions.





Physical exam:








Gen: This is a 52-year-old female who is awake, alert and oriented 2, her 

baseline, well-developed, well-nourished


HEENT: Head is atraumatic, normocephalic. Pupils equal, round. Sclerae is 

anicteric. 


NECK: Supple. No JVD. No lymphadenopathy. No thyromegaly. 


LUNGS: Diminished breath sounds bilaterally with some scattered rhonchi noted.  

No intercostal retractions.


HEART: S1, S2 are muffled


ABDOMEN: Soft. Bowel sounds are present. No masses.  No tenderness.


EXTREMITIES: No pedal edema.  No calf tenderness.


NEUROLOGICAL: Patient is awake, alert and oriented x3. Cranial nerves 2 through 

12 are grossly intact.  Diffusely weak





Please refer to medication reconciliation sheet for a list of medications.





The impression and plan of care has been dictated by Karie Rosado, Nurse 

Practitioner as directed.





Dr. Myron MD


I have performed a history and examination and MDM of this patient, discussed 

the same with the dictator, and  agree with the dictator's assessment and plan 

as written ,documented as a scribe. Based on total visit time,  I have performed

more than 50% of the visit.  


Patient Condition at Discharge: Fair





Plan - Discharge Summary


Discharge Rx Participant: Yes


New Discharge Prescriptions: 


New


   Heparin Sodium,Porcine [Heparin Sodium] 5,000 unit SQ Q12HR 30 Days #60 each


   Lacosamide [Vimpat] 100 mg PO BID #6 tab


   Darbepoetin Christiano [Aranesp] 40 mcg SQ Q7D  each


   Divalproex Sprinkle [Depakote Sprinkle] 500 mg PO TID  cap


   levETIRAcetam [Keppra] 500 mg PO MOWEFR@1800  tab


   levETIRAcetam [Keppra] 500 mg PO Q12HR  tab


   Metoprolol Tartrate [Lopressor] 25 mg PO BID  tab


   amLODIPine [Norvasc] 5 mg PO BID  tab





Continue


   Clopidogrel [Plavix] 75 mg PO DAILY #30 tab


   Atorvastatin [Lipitor] 40 mg PO HS


   Vascepa 1gm 1 gm PO BID


   Sevelamer [Renvela] 800 mg PO BID-W/MEALS


   Calcium Acetate 667 mg PO TID-W/MEALS


   Pantoprazole [Protonix] 40 mg PO DAILY


   Sevelamer [Renvela] 1,600 mg PO W/SUPPER


   Lacosamide [Vimpat] See Taper PO AS DIRECTED 21 Days #49 tab


   Zonisamide [Zonegran] 100 mg PO DAILY 30 Days #30 cap


   Dulaglutide [Trulicity] 3 mg SQ Q7D





Discontinued


   Potassium Chloride ER [K-Dur 20] 20 meq PO BID


   levETIRAcetam [Keppra] 500 mg PO BID@0700,2100


   Metoprolol Tartrate [Lopressor] 25 mg PO BID


   Divalproex ER [Depakote ER] 250 mg PO BID 30 Days #60 tab


   levETIRAcetam [Keppra] 500 mg PO MoWeFr@1600 30 Days #15 tab


   Divalproex [Depakote] 500 mg PO Q8H


Discharge Medication List





Clopidogrel [Plavix] 75 mg PO DAILY #30 tab 02/13/21 [Rx]


Atorvastatin [Lipitor] 40 mg PO HS 08/31/21 [History]


Dulaglutide [Trulicity] 3 mg SQ Q7D 08/31/21 [History]


Sevelamer [Renvela] 800 mg PO BID-W/MEALS 08/31/21 [History]


Vascepa 1gm 1 gm PO BID 08/31/21 [History]


Calcium Acetate 667 mg PO TID-W/MEALS 08/05/22 [History]


Pantoprazole [Protonix] 40 mg PO DAILY 08/05/22 [History]


Sevelamer [Renvela] 1,600 mg PO W/SUPPER 10/07/22 [History]


Lacosamide [Vimpat] See Taper PO AS DIRECTED 21 Days #49 tab 12/14/22 [Rx]


Zonisamide [Zonegran] 100 mg PO DAILY 30 Days #30 cap 12/14/22 [Rx]


Darbepoetin Christiano [Aranesp] 40 mcg SQ Q7D  each 01/04/23 [Rx]


Divalproex Sprinkle [Depakote Sprinkle] 500 mg PO TID  cap 01/04/23 [Rx]


Heparin Sodium,Porcine [Heparin Sodium] 5,000 unit SQ Q12HR 30 Days #60 each 

01/04/23 [Rx]


Lacosamide [Vimpat] 100 mg PO BID #6 tab 01/04/23 [Rx]


Metoprolol Tartrate [Lopressor] 25 mg PO BID  tab 01/04/23 [Rx]


amLODIPine [Norvasc] 5 mg PO BID  tab 01/04/23 [Rx]


levETIRAcetam [Keppra] 500 mg PO MOWEFR@1800  tab 01/04/23 [Rx]


levETIRAcetam [Keppra] 500 mg PO Q12HR  tab 01/04/23 [Rx]








Follow up Appointment(s)/Referral(s): 


Dharmesh Roblero MD [STAFF PHYSICIAN] - 1 Week


Esther Erickson MD [STAFF PHYSICIAN] - 1 Week


Activity/Diet/Wound Care/Special Instructions: 


Patient is going to [facility]


activity as tolerated


Continue to follow up with neurology outpatient


Follow-up nephrology outpatient


continue hemodialysis Monday/Wednesday/Friday


Recommend repeat CBC, BMP, mag in 2-3 days


Recommend continue with dysphagia 3 chopped diet and ground meats with 

aspiration precautions of 30-45 head of the bed elevated at all times along 

with one-to-one supervision with meals and no straws and continue nectar 

thickened liquids





Discharge Disposition: TRANSFER TO SNF/ECF

## 2023-01-04 NOTE — P.PN
Subjective


Progress Note Date: 01/04/23


Patient is seen at bedside and no further seizures.  Feels is doing better.








Objective





- Vital Signs


Vital signs: 


                                   Vital Signs











Temp  97.8 F   01/04/23 13:54


 


Pulse  72   01/04/23 13:54


 


Resp  19   01/04/23 13:54


 


BP  121/68   01/04/23 13:54


 


Pulse Ox  97   01/04/23 07:32


 


FiO2  35   12/25/22 18:05








                                 Intake & Output











 01/03/23 01/04/23 01/04/23





 18:59 06:59 18:59


 


Intake Total   300


 


Output Total   2300


 


Balance   -2000


 


Weight  70.5 kg 


 


Intake:   


 


  Hemodialysis   300


 


Output:   


 


  Hemodialysis   2300


 


Other:   


 


  Voiding Method Diaper  





 Incontinent  


 


  # Voids   1


 


  # Bowel Movements 1 2 








                       ABP, PAP, CO, CI - Last Documented











Arterial Blood Pressure        115/89

















- Exam


GENERAL: The patient is lying in bed and is not in acute distress.  Is getting 

dialysis today.





NEUROLOGICAL:


Higher mental function: The patient is mildly drowsy but is awakeable to voice. 

Is oriented to self, time.  She stated she is in the hospital with options.  She

is moderately slow responding to questions.  No aphasia or neglect.  


Cranial nerves: The pupils are round, equal and reactive to light. \Extraocular 

movement is intact no nystagmus is noted. The facial strength is normal 

throughout.  Tongue is midline and moved side-to-side without any difficulty.  

No dysarthria is noted and has low pitched voice.  


Motor: The strength is 5 over 5 throughout right side.  The left side has 

weakness, more over the left lower than upper (able to raise upper over gravity 

but has 1/5 over the lower).  Has somewhat increase tone over the left.    


Sensation: Sensation is slightly decreased over the left side to touch 

throughout.








- Labs


CBC & Chem 7: 


                                 01/03/23 07:56





                                 01/03/23 07:56


Labs: 


                  Abnormal Lab Results - Last 24 Hours (Table)











  01/03/23 01/03/23 01/04/23 Range/Units





  16:22 21:01 06:08 


 


POC Glucose (mg/dL)  144 H  177 H  114 H  ()  mg/dL














Assessment and Plan


Assessment: 


This is a 52-year-old woman with medically refractory epilepsy, VNS who presents

to the hospital numerous times for breakthrough seizures.





Clinical status epilepticus---resolved.  Her epilepsy is not controlled.  

Patient is compliant with her medications.


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

preliminary 2.5 hour EEG in earlier 12/2022)


Left ICA stenosis 75% on CTA but no significant stenosis on carotid Doppler 

ultrasound.


History of multiple strokes.  CT head revealed chronic hypodensity of left basal

ganglia and bilateral cerebellum.  No obvious stroke noted in the brainstem.


Dysphagia.  Patient initially failed swallow studies, then cleared swallow and 

now again failed swallow.   No evidence of CVA on CT head.  Patient cannot have 

MRI because of VNS.


Probable acute UTI


History of stroke with residual left hemiparesis.  Patient states she has 

history of 5 strokes in the past.


Diabetes mellitus


End-stage renal is on dialysis


Hypertension 





Plan: 





* Cannot have MRI of the brain because of VNS.  Patient's VNS is not compatible 

  with MRI machine in [facility].  Repeat CT head was done.  Revealed no acute 

  ischemic process.  No evidence of stroke in the brainstem.


* Zonegran was increased during this hospital because of her recurrent seizure 

  on presentation from 100mg daily to 100mg bid.  Resume zonisamide after PEG 

  placement, currently on hold.


* Repeat Depakote level 92.  Patient has slow mentation.  We will decrease 

  Depakote to 500 mg 3 times a day.  Her Depakote level was highly elevated 127 

  on arrival.


* Continue Keppra 500 mg every 12 hours, and 500 mg extra dose every post 

  dialysis. 


* Vimpat 100 mg twice a day.   Dr. Merritt has decreased her Vimpat from 150mg bid

  to 100mg bid since on last admission it was felt she had primary generalized 

  epilepsy and Vimpat was not great drug for primary generalized epilepsy. 


* Zonisamide level 10 mcg per mL (10-40), Depakote 73.8, Keppra 7.9 (3-60).  

  Repeat Depakote level 92.  Depakote decreased to 500 mg 3 times a day.


* I highly recommend patient to follow-up with epileptilologist since has 

  frequent seizures/medical refractory epilepsy.  Consider Epidiolex for control

  of seizures.  


* Patient has 2.5 hour EEG in our facility on 12/14/2022 (prior admission) and 

  preliminary was reported as runs of sharp and slow waves or spike and slow 

  wave at 2-3 Hz lasting 1 -1.5 seconds suggestive of primary generalized 

  epilepsy.  No official report yet.


* Left ICA stenosis 75% on CTA but on carotid duplex no significant stenosis 

  seen.  Vascular surgery input appreciated.  No indication for surgery.  


* Patient is on home dose Plavix 75mg daily and Lipitor 40mg qhs which is 

  sufficient for secondary stroke prophylaxis.


* PT and OT are consulted.


* Will defer the rest of medical management to primary team.


* For DVT prophylaxis: on subq heparin.





Otherwise no additional work-up.  Will continue to follow if continues to be in 

the hospital.


The plan is discussed with her nurse.





Time with Patient: Less than 30

## 2023-01-04 NOTE — P.PN
Subjective





Patient is seen in follow-up for end-stage renal disease.  She is maintained on 

hemodialysis on Monday Wednesday Friday schedule.    Resting in bed.  

Hemodynamically stable.  


No complaints today.





Seen on hemodialysis.  Tolerating treatment well





Objective





- Vital Signs


Vital signs: 


                                   Vital Signs











Temp  97.8 F   01/04/23 07:32


 


Pulse  75   01/04/23 07:32


 


Resp  15   01/04/23 07:32


 


BP  157/80   01/04/23 07:32


 


Pulse Ox  97   01/04/23 07:32


 


FiO2  35   12/25/22 18:05








                                 Intake & Output











 01/03/23 01/04/23 01/04/23





 18:59 06:59 18:59


 


Weight  70.5 kg 


 


Other:   


 


  Voiding Method Diaper  





 Incontinent  


 


  # Bowel Movements 1 2 








                       ABP, PAP, CO, CI - Last Documented











Arterial Blood Pressure        115/89

















- Exam





Patient is awake, comfortable, no acute distress


Examination of the heart S1 and S2


Examination lungs bilateral breath sounds are heard


Abdomen is soft nontender


Examination lower extremity shows no evidence of edema 


 CNS exam grossly intact





- Labs


CBC & Chem 7: 


                                 01/03/23 07:56





                                 01/03/23 07:56


Labs: 


                  Abnormal Lab Results - Last 24 Hours (Table)











  01/03/23 01/03/23 01/04/23 Range/Units





  16:22 21:01 06:08 


 


POC Glucose (mg/dL)  144 H  177 H  114 H  ()  mg/dL














Assessment and Plan


Assessment: 





1.  End-stage renal disease maintained on hemodialysis on Monday Wednesday Friday schedule.


2.  Seizures.  Being followed by neurology.  No further seizures


3.  Left ICA stenosis.  Seen by vascular surgery.


4.  Hypertension with chronic kidney disease.  Stable.


5.  Chronic kidney disease mineral bone disease maintained on PhosLo and 

Renvela.  .


6.  Anemia of chronic kidney disease.  Iron replete.  On Aranesp.


Plan: 





Hemodialysis today


Encourage increased oral intake.  Patient did pass the barium swallow

## 2023-01-04 NOTE — P.EN
Patient is a seizure problem and is unable to support herself upright in a 

wheelchair and will require ambulance transfer to and from dialysis on Monday Wednesday and Friday

## 2023-01-06 ENCOUNTER — HOSPITAL ENCOUNTER (INPATIENT)
Dept: HOSPITAL 47 - EC | Age: 53
LOS: 11 days | Discharge: NURSING, REHAB OR LONG-TERM CARE | DRG: 682 | End: 2023-01-17
Payer: MEDICARE

## 2023-01-06 VITALS — BODY MASS INDEX: 20.3 KG/M2

## 2023-01-06 DIAGNOSIS — E83.9: ICD-10-CM

## 2023-01-06 DIAGNOSIS — N18.6: ICD-10-CM

## 2023-01-06 DIAGNOSIS — Z28.310: ICD-10-CM

## 2023-01-06 DIAGNOSIS — M21.372: ICD-10-CM

## 2023-01-06 DIAGNOSIS — M19.90: ICD-10-CM

## 2023-01-06 DIAGNOSIS — I12.0: Primary | ICD-10-CM

## 2023-01-06 DIAGNOSIS — I69.354: ICD-10-CM

## 2023-01-06 DIAGNOSIS — Z91.A3: ICD-10-CM

## 2023-01-06 DIAGNOSIS — Z28.21: ICD-10-CM

## 2023-01-06 DIAGNOSIS — Z87.891: ICD-10-CM

## 2023-01-06 DIAGNOSIS — Z99.2: ICD-10-CM

## 2023-01-06 DIAGNOSIS — E11.42: ICD-10-CM

## 2023-01-06 DIAGNOSIS — R41.3: ICD-10-CM

## 2023-01-06 DIAGNOSIS — Z79.899: ICD-10-CM

## 2023-01-06 DIAGNOSIS — Z79.02: ICD-10-CM

## 2023-01-06 DIAGNOSIS — R13.10: ICD-10-CM

## 2023-01-06 DIAGNOSIS — G40.419: ICD-10-CM

## 2023-01-06 DIAGNOSIS — K21.9: ICD-10-CM

## 2023-01-06 DIAGNOSIS — F31.9: ICD-10-CM

## 2023-01-06 DIAGNOSIS — E11.22: ICD-10-CM

## 2023-01-06 DIAGNOSIS — E87.20: ICD-10-CM

## 2023-01-06 DIAGNOSIS — D63.1: ICD-10-CM

## 2023-01-06 DIAGNOSIS — E78.5: ICD-10-CM

## 2023-01-06 DIAGNOSIS — Z20.822: ICD-10-CM

## 2023-01-06 DIAGNOSIS — R47.81: ICD-10-CM

## 2023-01-06 DIAGNOSIS — I65.22: ICD-10-CM

## 2023-01-06 DIAGNOSIS — T50.3X6A: ICD-10-CM

## 2023-01-06 LAB
ALBUMIN SERPL-MCNC: 3.3 G/DL (ref 3.5–5)
ALP SERPL-CCNC: 56 U/L (ref 38–126)
ALT SERPL-CCNC: 9 U/L (ref 4–34)
ANION GAP SERPL CALC-SCNC: 12 MMOL/L
APTT BLD: 26.5 SEC (ref 22–30)
AST SERPL-CCNC: 14 U/L (ref 14–36)
BASOPHILS # BLD AUTO: 0 K/UL (ref 0–0.2)
BASOPHILS NFR BLD AUTO: 1 %
BUN SERPL-SCNC: 35 MG/DL (ref 7–17)
CALCIUM SPEC-MCNC: 10.1 MG/DL (ref 8.4–10.2)
CHLORIDE SERPL-SCNC: 100 MMOL/L (ref 98–107)
CO2 SERPL-SCNC: 24 MMOL/L (ref 22–30)
EOSINOPHIL # BLD AUTO: 0.1 K/UL (ref 0–0.7)
EOSINOPHIL NFR BLD AUTO: 1 %
ERYTHROCYTE [DISTWIDTH] IN BLOOD BY AUTOMATED COUNT: 4.15 M/UL (ref 3.8–5.4)
ERYTHROCYTE [DISTWIDTH] IN BLOOD: 14.1 % (ref 11.5–15.5)
GLUCOSE BLD-MCNC: 92 MG/DL (ref 70–110)
GLUCOSE SERPL-MCNC: 106 MG/DL (ref 74–99)
HCT VFR BLD AUTO: 36.3 % (ref 34–46)
HGB BLD-MCNC: 12.3 GM/DL (ref 11.4–16)
INR PPP: 1.1 (ref ?–1.2)
LYMPHOCYTES # SPEC AUTO: 2 K/UL (ref 1–4.8)
LYMPHOCYTES NFR SPEC AUTO: 31 %
MCH RBC QN AUTO: 29.5 PG (ref 25–35)
MCHC RBC AUTO-ENTMCNC: 33.8 G/DL (ref 31–37)
MCV RBC AUTO: 87.3 FL (ref 80–100)
MONOCYTES # BLD AUTO: 0.4 K/UL (ref 0–1)
MONOCYTES NFR BLD AUTO: 5 %
NEUTROPHILS # BLD AUTO: 3.9 K/UL (ref 1.3–7.7)
NEUTROPHILS NFR BLD AUTO: 61 %
PLATELET # BLD AUTO: 179 K/UL (ref 150–450)
POTASSIUM SERPL-SCNC: 4.5 MMOL/L (ref 3.5–5.1)
PROT SERPL-MCNC: 6.1 G/DL (ref 6.3–8.2)
PT BLD: 11 SEC (ref 9–12)
SODIUM SERPL-SCNC: 136 MMOL/L (ref 137–145)
WBC # BLD AUTO: 6.4 K/UL (ref 3.8–10.6)

## 2023-01-06 PROCEDURE — 71046 X-RAY EXAM CHEST 2 VIEWS: CPT

## 2023-01-06 PROCEDURE — 96365 THER/PROPH/DIAG IV INF INIT: CPT

## 2023-01-06 PROCEDURE — 90935 HEMODIALYSIS ONE EVALUATION: CPT

## 2023-01-06 PROCEDURE — 84145 PROCALCITONIN (PCT): CPT

## 2023-01-06 PROCEDURE — 85730 THROMBOPLASTIN TIME PARTIAL: CPT

## 2023-01-06 PROCEDURE — 80053 COMPREHEN METABOLIC PANEL: CPT

## 2023-01-06 PROCEDURE — 87636 SARSCOV2 & INF A&B AMP PRB: CPT

## 2023-01-06 PROCEDURE — 82728 ASSAY OF FERRITIN: CPT

## 2023-01-06 PROCEDURE — 96366 THER/PROPH/DIAG IV INF ADDON: CPT

## 2023-01-06 PROCEDURE — 94760 N-INVAS EAR/PLS OXIMETRY 1: CPT

## 2023-01-06 PROCEDURE — 80306 DRUG TEST PRSMV INSTRMNT: CPT

## 2023-01-06 PROCEDURE — 83540 ASSAY OF IRON: CPT

## 2023-01-06 PROCEDURE — 84484 ASSAY OF TROPONIN QUANT: CPT

## 2023-01-06 PROCEDURE — 80164 ASSAY DIPROPYLACETIC ACD TOT: CPT

## 2023-01-06 PROCEDURE — 85610 PROTHROMBIN TIME: CPT

## 2023-01-06 PROCEDURE — 71045 X-RAY EXAM CHEST 1 VIEW: CPT

## 2023-01-06 PROCEDURE — 80165 DIPROPYLACETIC ACID FREE: CPT

## 2023-01-06 PROCEDURE — 80048 BASIC METABOLIC PNL TOTAL CA: CPT

## 2023-01-06 PROCEDURE — 87340 HEPATITIS B SURFACE AG IA: CPT

## 2023-01-06 PROCEDURE — 93005 ELECTROCARDIOGRAM TRACING: CPT

## 2023-01-06 PROCEDURE — 96376 TX/PRO/DX INJ SAME DRUG ADON: CPT

## 2023-01-06 PROCEDURE — 83550 IRON BINDING TEST: CPT

## 2023-01-06 PROCEDURE — 85025 COMPLETE CBC W/AUTO DIFF WBC: CPT

## 2023-01-06 PROCEDURE — 86706 HEP B SURFACE ANTIBODY: CPT

## 2023-01-06 PROCEDURE — 86704 HEP B CORE ANTIBODY TOTAL: CPT

## 2023-01-06 PROCEDURE — 84100 ASSAY OF PHOSPHORUS: CPT

## 2023-01-06 PROCEDURE — 99285 EMERGENCY DEPT VISIT HI MDM: CPT

## 2023-01-06 PROCEDURE — 36415 COLL VENOUS BLD VENIPUNCTURE: CPT

## 2023-01-06 RX ADMIN — VALPROATE SODIUM SCH MLS/HR: 100 INJECTION, SOLUTION INTRAVENOUS at 20:52

## 2023-01-06 RX ADMIN — SEVELAMER CARBONATE SCH: 800 TABLET, FILM COATED ORAL at 18:05

## 2023-01-06 RX ADMIN — ZONISAMIDE SCH: 100 CAPSULE ORAL at 18:05

## 2023-01-06 RX ADMIN — Medication SCH: at 18:05

## 2023-01-06 RX ADMIN — LEVETIRACETAM SCH MLS/HR: 100 INJECTION, SOLUTION, CONCENTRATE INTRAVENOUS at 22:00

## 2023-01-06 RX ADMIN — METOPROLOL TARTRATE SCH: 25 TABLET, FILM COATED ORAL at 21:06

## 2023-01-06 RX ADMIN — HEPARIN SODIUM SCH: 5000 INJECTION INTRAVENOUS; SUBCUTANEOUS at 21:07

## 2023-01-06 NOTE — P.HPIM
History of Present Illness


H&P Date: 23


Chief Complaint: Lethargy





Patient is a 52-year-old female with a known history of ESRD on hemodialysis, 

seizure disorder with status epilepticus during recent admission and was 

discharged to [facility] on 2023, hypothyroidism, hypertension, hyperlipidemia, 

diabetes type 2, history of CVA/TIA and history of left foot drop, bipolar 

disorder and prior history of smoking was sent back from nursing home.  Patient 

was discharged on 2023.  Patient was found to be progressively lethargic as 

noted by the nursing home staff, patient was tested positive at the nursing home

this morning however COVID-19 PCR was negative currently.


Possible aspiration was also suspected as per nursing home staff.


Patient had a swallow study recently and was able to tolerate dysphagia diet 

with aspiration precautions.


Patient was afebrile on admission.  Pulse ox 96% on room air.


Chest x-ray showed right perihilar patchy opacities which may represent 

infiltrative versus atelectasis.


EKG showed sinus rhythm


Laboratory data showed WBC 6.4 hemoglobin 12.3, platelets 179


Sodium 136 potassium 4.5 chloride 100 bicarb is 24 BUN 35 and creatinine 9.62 

and blood sugar is 106


UDS negative and influenza A, B, RSV and SARS-CoV-2 not detected.











Review of Systems





Constitutional: Patient denies any fever or chills .  Patient does have 

generalized weakness.


Abdomen: Patient denied any nausea or vomiting or abd. pain


Cardiovascular: Patient denies any chest pain or short of breath no 

palpitations.


Respiratory: patient denied any cough . no sputum production.  No shortness of 

breath


Neurologic: Patient denied any numbness or tingling headache.


Complete review of systems could not be obtained from the patient except as per 

HPI and above





Past Medical History


Past Medical History: Chest Pain / Angina, CVA/TIA, Diabetes Mellitus, 

GERD/Reflux, Hyperlipidemia, Hypertension, Memory Impairment, Osteoarthritis 

(OA), Renal Disease, Seizure Disorder, Thyroid Disorder


Additional Past Medical History / Comment(s): Last seizure approximately one 

month ago. Spouse states she used to have seizures 4X per week until she had 

vagal nerve stimulator placed, now has seizures approximately once a week to 

once a month. Dialysis MOWEFR. Neuropathy, left drop foot, only able to walk 

short distances, otherwise uses wheelchair. 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 

ago, with residual memory impairment. Never diagnosed with Sleep Apnea but 

spouse states she does stop breathing through the night and he has to shake her 

to start breathing again. Varicose veins.


History of Any Multi-Drug Resistant Organisms: None Reported


Past Surgical History:  Section, Tonsillectomy, Uterine Ablation


Additional Past Surgical History / Comment(s): Left arm fistula, loop recorder, 

vagus nerve stimulator.


Past Anesthesia/Blood Transfusion Reactions: Motion Sickness


Additional Past Anesthesia/Blood Transfusion Reaction / Comment(s): Family hx 

unknown, pt adopted.


Past Psychological History: Bipolar


Smoking Status: Former smoker


Past Alcohol Use History: None Reported


Past Drug Use History: None Reported





- Past Family History


  ** Father


Family Medical History: Unable to Obtain


Additional Family Medical History / Comment(s): Pt adopted.





Medications and Allergies


                                Home Medications











 Medication  Instructions  Recorded  Confirmed  Type


 


Clopidogrel [Plavix] 75 mg PO DAILY #30 tab 21 Rx


 


Atorvastatin [Lipitor] 40 mg PO DAILY 21 History


 


Dulaglutide [Trulicity] 3 mg SQ Q7D 21 History


 


Sevelamer [Renvela] 800 mg PO BID-W/MEALS 21 History


 


Vascepa 1gm 1 gm PO BID 21 History


 


Calcium Acetate 667 mg PO TID-W/MEALS 22 History


 


Pantoprazole [Protonix] 40 mg PO DAILY 22 History


 


Sevelamer [Renvela] 1,600 mg PO W/SUPPER 10/07/22 01/06/23 History


 


Zonisamide [Zonegran] 100 mg PO DAILY 30 Days #30 cap 22 Rx


 


Darbepoetin Christiano [Aranesp] 40 mcg SQ Q7D  each 23 Rx


 


Divalproex Sprinkle [Depakote 500 mg PO TID  cap 23 Rx





Sprinkle]    


 


Heparin Sodium,Porcine [Heparin 5,000 unit SQ Q12HR 30 Days #60 23 Rx





Sodium] each   


 


Lacosamide [Vimpat] 100 mg PO BID #6 tab 23 Rx


 


Metoprolol Tartrate [Lopressor] 25 mg PO BID  tab 23 Rx


 


amLODIPine [Norvasc] 5 mg PO BID  tab 23 Rx


 


levETIRAcetam [Keppra] 500 mg PO MOWEFR@1800  tab 23 Rx


 


levETIRAcetam [Keppra] 500 mg PO Q12HR  tab 23 Rx


 


bisacodyL 10 mg RECTAL DAILY PRN 23 History








                                    Allergies











Allergy/AdvReac Type Severity Reaction Status Date / Time


 


Penicillins Allergy  Itching Verified 23 14:14














Physical Exam


Vitals: 


                                   Vital Signs











  Temp Pulse Resp BP Pulse Ox


 


 23 21:03   74  16  152/79  97


 


 23 14:47   75  18  126/91  99


 


 23 12:22   75  18  126/91  100


 


 23 10:48  98.5 F  74  18  124/68  96








                                Intake and Output











 23





 06:59 14:59 22:59


 


Other:   


 


  Weight  72.575 kg 














PHYSICAL EXAMINATION: 


Patient is lying in the bed comfortably, no acute distress, awake alert and 

oriented..  Lethargic and weak and slow to respond.


HEENT: Normocephalic. Neck is supple. Pupils reactive. Nostrils clear. Oral 

cavity is moist. 


Neck reveals no JVD, carotid bruits, or thyromegaly. 


CHEST EXAMINATION: Trachea is central. Symmetrical expansion.  Bibasilar 

diminished sounds.  No wheezing or rhonchi.. 


CARDIAC: Normal S1, S2 with no gallops. No murmurs 


ABDOMEN: Soft. Bowel sounds present.  Nontender.  No organomegaly. No abdominal 

bruits. 


Extremities: reveal no edema.  No clubbing or cyanosis


Neurologically awake, alert, oriented x2-left-sided weakness.  Lethargic and 

drowsy.


Skin: No rash or skin lesions. 


Psychiatric: Coperative.  Could not be assessed completely.  Musculoskeletal: No

 joint swelling or deformity.








Results


CBC & Chem 7: 


                                 23 11:25





                                 23 11:25


Labs: 


                  Abnormal Lab Results - Last 24 Hours (Table)











  23 Range/Units





  11:25 


 


Sodium  136 L  (137-145)  mmol/L


 


BUN  35 H  (7-17)  mg/dL


 


Creatinine  9.62 H*  (0.52-1.04)  mg/dL


 


Glucose  106 H  (74-99)  mg/dL


 


Total Protein  6.1 L  (6.3-8.2)  g/dL


 


Albumin  3.3 L  (3.5-5.0)  g/dL














Thrombosis Risk Factor Assmnt





- DVT/VTE Prophylaxis


DVT/VTE Prophylaxis: Pharmacologic Prophylaxis ordered





Assessment and Plan


Assessment: 








RT. perihilar OPACITIES possible atelectasis versus pneumonia.  COVID-19 PCR not

 detected.  


Missed Hemodialysis


ESRD on hemodialysis  and Friday


Recent admission with status epilepticus.  We will continue with antiepileptic 

drugs as per recent admission.


Dysphagia diet with aspiration precautions as per most recent swallow study.


Left ICA 75% stenosis at the origin as per CTA neck.


Hypodensity in the brainstem and left basal ganglia related to previous CVA


History of CVA with left-sided weakness


Diabetes type 2


Peripheral neuropathy


Memory impairment


GERD


Hypertension


Hyperlipidemia


Hypothyroidism


Bipolar disorder


Prior history of smoking


DVT prophylaxis with heparin subcu


GI prophylaxis





Plan:


Patient will be continued on telemetry monitoring and will be started antiepilep

tic medications as per recent neurology recommendations.


Nephrology was consulted for hemodialysis.


Continue with dysphagia diet with aspiration precautions


Follow-up procalcitonin level and repeat chest x-ray.


Continues home medications and follow-up closely.  Prognosis is guarded with 

multiple medical problems and comorbid conditions.





Time with Patient: Greater than 30

## 2023-01-06 NOTE — XR
EXAMINATION TYPE: XR chest 2V

 

DATE OF EXAM: 1/6/2023 11:43 AM

 

COMPARISON: Chest radiographs from 1/3/2023

 

TECHNIQUE: XR chest 2V Frontal and lateral views of the chest.

 

CLINICAL INDICATION:Female, 52 years old with history of altered mental status; 

 

FINDINGS: 

Lungs/Pleura: There is no evidence of pleural effusion or pneumothorax. Right perihilar patchy opacit
ies.

Pulmonary vascularity: Unremarkable.

Heart/mediastinum: Cardiomediastinal silhouette is unremarkable.

Musculoskeletal: No acute osseous pathology.

 

Other findings: Loop recorder overlies the left chest wall. Battery pack overlies left chest with gamaliel
d terminating in the neck.

 

IMPRESSION: 

Right perihilar patchy opacities which may represent infiltrate versus atelectasis.

## 2023-01-06 NOTE — ED
General Adult HPI





- General


Chief complaint: Altered Mental Status


Stated complaint: AMS


Time Seen by Provider: 23 10:50


Source: patient, EMS, RN notes reviewed, old records reviewed


Mode of arrival: EMS


Limitations: altered mental status





- History of Present Illness


Initial comments: 





This a 52-year-old female presents emergency Department from the nursing home.  

Patient comes in because she has been slowly becoming more lethargic since last 

night according to staff.  Staff stated when she arrived yesterday from the 

hospital she was COVID negative however this morning she tested COVID positive. 

Patient also is thought to have possibly aspirated because they switched her 

from.  Food to solid foods and every time she ate solid food she was coughing.  

Patient has had no fevers that were reported there's been no difficulty 

breathing has been reported patient herself is able to follow simple commands 

but not able to contribute to her history.  Patient has a past history of 

seizures stroke and diabetes as well as kidney failure.  Patient has left-sided 

residual weakness





- Related Data


                                Home Medications











 Medication  Instructions  Recorded  Confirmed


 


Atorvastatin [Lipitor] 40 mg PO DAILY 21


 


Dulaglutide [Trulicity] 3 mg SQ Q7D 21


 


Sevelamer [Renvela] 800 mg PO BID-W/MEALS 21


 


Vascepa 1gm 1 gm PO BID 21


 


Calcium Acetate 667 mg PO TID-W/MEALS 22


 


Pantoprazole [Protonix] 40 mg PO DAILY 22


 


Sevelamer [Renvela] 1,600 mg PO W/SUPPER 10/07/22 01/06/23


 


bisacodyL 10 mg RECTAL DAILY PRN 23








                                  Previous Rx's











 Medication  Instructions  Recorded


 


Clopidogrel [Plavix] 75 mg PO DAILY #30 tab 21


 


Zonisamide [Zonegran] 100 mg PO DAILY 30 Days #30 cap 22


 


Darbepoetin Christiano [Aranesp] 40 mcg SQ Q7D  each 23


 


Divalproex Sprinkle [Depakote 500 mg PO TID  cap 23





Sprinkle]  


 


Heparin Sodium,Porcine [Heparin 5,000 unit SQ Q12HR 30 Days #60 01/04/23





Sodium] each 


 


Lacosamide [Vimpat] 100 mg PO BID #6 tab 23


 


Metoprolol Tartrate [Lopressor] 25 mg PO BID  tab 23


 


amLODIPine [Norvasc] 5 mg PO BID  tab 23


 


levETIRAcetam [Keppra] 500 mg PO MOWEFR@1800  tab 23


 


levETIRAcetam [Keppra] 500 mg PO Q12HR  tab 23











                                    Allergies











Allergy/AdvReac Type Severity Reaction Status Date / Time


 


Penicillins Allergy  Itching Verified 23 14:14














Review of Systems


ROS Statement: 


Those systems with pertinent positive or pertinent negative responses have been 

documented in the HPI.





ROS Other: All systems not noted in ROS Statement are negative.





Past Medical History


Past Medical History: Chest Pain / Angina, CVA/TIA, Diabetes Mellitus, 

GERD/Reflux, Hyperlipidemia, Hypertension, Memory Impairment, Osteoarthritis 

(OA), Renal Disease, Seizure Disorder, Thyroid Disorder


Additional Past Medical History / Comment(s): Last seizure approximately one 

month ago. Spouse states she used to have seizures 4X per week until she had 

vagal nerve stimulator placed, now has seizures approximately once a week to 

once a month. Dialysis MOWEFR. Neuropathy, left drop foot, only able to walk 

short distances, otherwise uses wheelchair. 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 

ago, with residual memory impairment. Never diagnosed with Sleep Apnea but 

spouse states she does stop breathing through the night and he has to shake her 

to start breathing again. Varicose veins.


History of Any Multi-Drug Resistant Organisms: None Reported


Past Surgical History:  Section, Tonsillectomy, Uterine Ablation


Additional Past Surgical History / Comment(s): Left arm fistula, loop recorder, 

vagus nerve stimulator.


Past Anesthesia/Blood Transfusion Reactions: Motion Sickness


Additional Past Anesthesia/Blood Transfusion Reaction / Comment(s): Family hx 

unknown, pt adopted.


Past Psychological History: Bipolar


Smoking Status: Former smoker


Past Alcohol Use History: None Reported


Past Drug Use History: None Reported





- Past Family History


  ** Father


Family Medical History: Unable to Obtain


Additional Family Medical History / Comment(s): Pt adopted.





General Exam





- General Exam Comments


Initial Comments: 





GENERAL:


Patient is well-developed and well-nourished.  Patient is nontoxic and well-

hydrated and is in no acute distress.





ENT:


Neck is soft and supple.  No significant lymphadenopathy is noted.  Oropharynx i

s clear.  Moist mucous membranes.  Neck has full range of motion without 

eliciting any pain. 





EYES:


The sclera were anicteric and conjunctiva were pink and moist.  Extraocular 

movements were intact and pupils were equal round and reactive to light.  

Eyelids were unremarkable.





PULMONARY:


Unlabored respirations.  Good breath sounds bilaterally.  No audible rales 

rhonchi or wheezing was noted.





CARDIOVASCULAR:


There is a regular rate and rhythm without any murmurs gallops or rubs.  





ABDOMEN:


Soft and nontender with normal bowel sounds. 





SKIN:


Skin is clear with no lesions or rashes and otherwise unremarkable.





NEUROLOGIC:


Patient is alert and oriented 2.  Cranial nerves II through XII are grossly 

intact.  Patient's  on the left is weaker than the right but paperwork 

states this is normal.  Patient's speech is very shallow quiet





MUSCULOSKELETAL:


Normal extremities with adequate strength and full range of motion.  





LYMPHATICS:


No significant lymphadenopathy is noted





PSYCHIATRIC:


Unable to evaluate


Limitations: altered mental status





Course


                                   Vital Signs











  23





  10:48 12:22


 


Temperature 98.5 F 


 


Pulse Rate 74 75


 


Respiratory 18 18





Rate  


 


Blood Pressure 124/68 126/91


 


O2 Sat by Pulse 96 100





Oximetry  














Medical Decision Making





- Medical Decision Making





EKG shows sinus rhythm at 73 bpm ND interval 190 QRS is 106 QT interval 42 QTC 

is 428 per patient's EKG shows no ST segment elevation or depression.





Was pt. sent in by a medical professional or institution (, PA, NP, urgent 

care, hospital, or nursing home...) When possible be specific


@  -Patient came from a nursing home


Did you speak to anyone other than the patient for history (EMS, parent, family,

 police, friend...)? What history was obtained from this source 


@  -EMS gave us most of the history


Did you review nursing and triage notes (agree or disagree)?  Why? 


@  -I reviewed and agree with nursing and triage notes


Were old charts reviewed (outside hosp., previous admission, EMS record, old 

EKG, old radiological studies, urgent care reports/EKG's, nursing home records)?

 Report findings 


@  -No old charts were reviewed


Differential Diagnosis (chest pain, altered mental status, abdominal pain women,

 abdominal pain men, vaginal bleeding, weakness, fever, dyspnea, syncope, 

headache, dizziness, GI bleed, back pain, seizure, CVA, palpatations, mental 

health)? 


@  -Differential Altered Mental Status:


Hypoglycemia, DKA, hypercapnia, ETOH, overdose, CO poisoning, trauma, myxedema 

coma, HTN encephalopathy, infection, encephalitis, psychosis, intercranial 

hemorrhage, hepatic encephalopathy, meningitis, CVA, this is not meant to be an 

all-inclusive list





EKG interpreted by me (3pts min.).


@  -As above


X-rays interpreted by me (1pt min.).


@  -Chest x-rays interpreted by myself and showed no acute abnormality.


CT interpreted by me (1pt min.).


@  -None done


U/S interpreted by me (1pt. min.).


@  -None done


What testing was considered but not performed or refused? (CT, X-rays, U/S, 

labs)? Why?


@  -None


What meds were considered but not given or refused? Why?


@  -None


Did you discuss the management of the patient with other professionals 

(professionals i.e. , PA, NP, lab, RT, psych nurse, , , 

teacher, , )? Give summary


@  -No


Was smoking cessation discussed for >3mins.?


@  -No


Was critical care preformed (if so, how long)?


@  -No


Were there social determinants of health that impacted care today? How? 

(Homelessness, low income, unemployed, alcoholism, drug addiction, 

transportation, low edu. Level, literacy, decrease access to med. care, senior living, 

rehab)?


@  -No


Was there de-escalation of care discussed even if they declined (Discuss DNR or 

withdrawal of care, Hospice)? DNR status


@  -No


What co-morbidities impacted this encounter? (DM, HTN, Smoking, COPD, CAD, 

Cancer, CVA, ARF, Chemo, Hep., AIDS, mental health diagnosis, sleep apnea, 

morbid obesity)?


@  -And has acute renal failure however she supposed be dialyzed today she did 

not go instead the nursing home sent to the emergency department.


Was patient admitted / discharged? Hospital course, mention meds given and 

route, prescriptions, significant lab abnormalities, going to OR and other perti

nent info.


@  -Patient will be admitted because of altered mental status she also needs 

dialysis since she missed her dialysis today because she was sent here.  We were

 told patient had COVID however when they called the nursing home they said that

 she did not know the report we received was that she did so we repeated the 

test and the patient did not have COVID Undiagnosed new problem with uncertain 

prognosis?


@  -No


Drug Therapy requiring intensive monitoring for toxicity (Heparin, Nitro, 

Insulin, Cardizem)?


@  -No


Were any procedures done?


@  -No


Diagnosis/symptom?


@  -Altered mental status


Acute, or Chronic, or Acute on Chronic?


@  -default


Uncomplicated (without systemic symptoms) or Complicated (systemic symptoms)?


@  -default


Side effects of treatment?


@  -No


Exacerbation, Progression, or Severe Exacerbation?


@  -No


Poses a threat to life or bodily function? How? (Chest pain, USA, MI, pneumonia,

 PE, COPD, DKA, ARF, appy, cholecystitis, CVA, Diverticulitis, Homicidal, 

Suicidal, threat to staff... and all critical care pts)


@  -No


Diagnosis/symptom?


@  -Chronic renal failure and need for dialysis


Acute, or Chronic, or Acute on Chronic?


@  -Acute on chronic


Uncomplicated (without systemic symptoms) or Complicated (systemic symptoms)?


@  -Uncomplicated


Side effects of treatment?


@  -none


Exacerbation, Progression, or Severe Exacerbation]


@  -no


Poses a threat to life or bodily function?


@  -no








- Lab Data


Result diagrams: 


                                 23 11:25





                                 23 11:25


                                   Lab Results











  23 Range/Units





  11:25 11:25 11:25 


 


WBC  6.4    (3.8-10.6)  k/uL


 


RBC  4.15    (3.80-5.40)  m/uL


 


Hgb  12.3    (11.4-16.0)  gm/dL


 


Hct  36.3    (34.0-46.0)  %


 


MCV  87.3    (80.0-100.0)  fL


 


MCH  29.5    (25.0-35.0)  pg


 


MCHC  33.8    (31.0-37.0)  g/dL


 


RDW  14.1    (11.5-15.5)  %


 


Plt Count  179    (150-450)  k/uL


 


MPV  8.6    


 


Neutrophils %  61    %


 


Lymphocytes %  31    %


 


Monocytes %  5    %


 


Eosinophils %  1    %


 


Basophils %  1    %


 


Neutrophils #  3.9    (1.3-7.7)  k/uL


 


Lymphocytes #  2.0    (1.0-4.8)  k/uL


 


Monocytes #  0.4    (0-1.0)  k/uL


 


Eosinophils #  0.1    (0-0.7)  k/uL


 


Basophils #  0.0    (0-0.2)  k/uL


 


PT   11.0   (9.0-12.0)  sec


 


INR   1.1   (<1.2)  


 


APTT   26.5   (22.0-30.0)  sec


 


Sodium    136 L  (137-145)  mmol/L


 


Potassium    4.5  (3.5-5.1)  mmol/L


 


Chloride    100  ()  mmol/L


 


Carbon Dioxide    24  (22-30)  mmol/L


 


Anion Gap    12  mmol/L


 


BUN    35 H  (7-17)  mg/dL


 


Creatinine    9.62 H*  (0.52-1.04)  mg/dL


 


Est GFR (CKD-EPI)AfAm    5  (>60 ml/min/1.73 sqM)  


 


Est GFR (CKD-EPI)NonAf    4  (>60 ml/min/1.73 sqM)  


 


Glucose    106 H  (74-99)  mg/dL


 


POC Glucose (mg/dL)     ()  mg/dL


 


POC Glu Operater ID     


 


Calcium    10.1  (8.4-10.2)  mg/dL


 


Total Bilirubin    0.3  (0.2-1.3)  mg/dL


 


AST    14  (14-36)  U/L


 


ALT    9  (4-34)  U/L


 


Alkaline Phosphatase    56  ()  U/L


 


Troponin I     (0.000-0.034)  ng/mL


 


Total Protein    6.1 L  (6.3-8.2)  g/dL


 


Albumin    3.3 L  (3.5-5.0)  g/dL


 


Urine Opiates Screen     (NotDetected)  


 


Ur Oxycodone Screen     (NotDetected)  


 


Urine Methadone Screen     (NotDetected)  


 


Ur Propoxyphene Screen     (NotDetected)  


 


Ur Barbiturates Screen     (NotDetected)  


 


U Tricyclic Antidepress     (NotDetected)  


 


Ur Phencyclidine Scrn     (NotDetected)  


 


Ur Amphetamines Screen     (NotDetected)  


 


U Methamphetamines Scrn     (NotDetected)  


 


U Benzodiazepines Scrn     (NotDetected)  


 


Urine Cocaine Screen     (NotDetected)  


 


U Marijuana (THC) Screen     (NotDetected)  


 


Influenza Type A (PCR)     (Not Detectd)  


 


Influenza Type B (PCR)     (Not Detectd)  


 


RSV (PCR)     (Not Detectd)  


 


SARS-CoV-2 (PCR)     (Not Detectd)  














  23 Range/Units





  11:25 11:51 12:15 


 


WBC     (3.8-10.6)  k/uL


 


RBC     (3.80-5.40)  m/uL


 


Hgb     (11.4-16.0)  gm/dL


 


Hct     (34.0-46.0)  %


 


MCV     (80.0-100.0)  fL


 


MCH     (25.0-35.0)  pg


 


MCHC     (31.0-37.0)  g/dL


 


RDW     (11.5-15.5)  %


 


Plt Count     (150-450)  k/uL


 


MPV     


 


Neutrophils %     %


 


Lymphocytes %     %


 


Monocytes %     %


 


Eosinophils %     %


 


Basophils %     %


 


Neutrophils #     (1.3-7.7)  k/uL


 


Lymphocytes #     (1.0-4.8)  k/uL


 


Monocytes #     (0-1.0)  k/uL


 


Eosinophils #     (0-0.7)  k/uL


 


Basophils #     (0-0.2)  k/uL


 


PT     (9.0-12.0)  sec


 


INR     (<1.2)  


 


APTT     (22.0-30.0)  sec


 


Sodium     (137-145)  mmol/L


 


Potassium     (3.5-5.1)  mmol/L


 


Chloride     ()  mmol/L


 


Carbon Dioxide     (22-30)  mmol/L


 


Anion Gap     mmol/L


 


BUN     (7-17)  mg/dL


 


Creatinine     (0.52-1.04)  mg/dL


 


Est GFR (CKD-EPI)AfAm     (>60 ml/min/1.73 sqM)  


 


Est GFR (CKD-EPI)NonAf     (>60 ml/min/1.73 sqM)  


 


Glucose     (74-99)  mg/dL


 


POC Glucose (mg/dL)     ()  mg/dL


 


POC Glu Operater ID     


 


Calcium     (8.4-10.2)  mg/dL


 


Total Bilirubin     (0.2-1.3)  mg/dL


 


AST     (14-36)  U/L


 


ALT     (4-34)  U/L


 


Alkaline Phosphatase     ()  U/L


 


Troponin I  <0.012    (0.000-0.034)  ng/mL


 


Total Protein     (6.3-8.2)  g/dL


 


Albumin     (3.5-5.0)  g/dL


 


Urine Opiates Screen    Not Detected  (NotDetected)  


 


Ur Oxycodone Screen    Not Detected  (NotDetected)  


 


Urine Methadone Screen    Not Detected  (NotDetected)  


 


Ur Propoxyphene Screen    Not Detected  (NotDetected)  


 


Ur Barbiturates Screen    Not Detected  (NotDetected)  


 


U Tricyclic Antidepress    Not Detected  (NotDetected)  


 


Ur Phencyclidine Scrn    Not Detected  (NotDetected)  


 


Ur Amphetamines Screen    Not Detected  (NotDetected)  


 


U Methamphetamines Scrn    Not Detected  (NotDetected)  


 


U Benzodiazepines Scrn    Not Detected  (NotDetected)  


 


Urine Cocaine Screen    Not Detected  (NotDetected)  


 


U Marijuana (THC) Screen    Not Detected  (NotDetected)  


 


Influenza Type A (PCR)   Not Detected   (Not Detectd)  


 


Influenza Type B (PCR)   Not Detected   (Not Detectd)  


 


RSV (PCR)   Not Detected   (Not Detectd)  


 


SARS-CoV-2 (PCR)   Not Detected   (Not Detectd)  














  23 Range/Units





  12:39 


 


WBC   (3.8-10.6)  k/uL


 


RBC   (3.80-5.40)  m/uL


 


Hgb   (11.4-16.0)  gm/dL


 


Hct   (34.0-46.0)  %


 


MCV   (80.0-100.0)  fL


 


MCH   (25.0-35.0)  pg


 


MCHC   (31.0-37.0)  g/dL


 


RDW   (11.5-15.5)  %


 


Plt Count   (150-450)  k/uL


 


MPV   


 


Neutrophils %   %


 


Lymphocytes %   %


 


Monocytes %   %


 


Eosinophils %   %


 


Basophils %   %


 


Neutrophils #   (1.3-7.7)  k/uL


 


Lymphocytes #   (1.0-4.8)  k/uL


 


Monocytes #   (0-1.0)  k/uL


 


Eosinophils #   (0-0.7)  k/uL


 


Basophils #   (0-0.2)  k/uL


 


PT   (9.0-12.0)  sec


 


INR   (<1.2)  


 


APTT   (22.0-30.0)  sec


 


Sodium   (137-145)  mmol/L


 


Potassium   (3.5-5.1)  mmol/L


 


Chloride   ()  mmol/L


 


Carbon Dioxide   (22-30)  mmol/L


 


Anion Gap   mmol/L


 


BUN   (7-17)  mg/dL


 


Creatinine   (0.52-1.04)  mg/dL


 


Est GFR (CKD-EPI)AfAm   (>60 ml/min/1.73 sqM)  


 


Est GFR (CKD-EPI)NonAf   (>60 ml/min/1.73 sqM)  


 


Glucose   (74-99)  mg/dL


 


POC Glucose (mg/dL)  92  ()  mg/dL


 


POC Glu Operater ID  Barry Wolf  


 


Calcium   (8.4-10.2)  mg/dL


 


Total Bilirubin   (0.2-1.3)  mg/dL


 


AST   (14-36)  U/L


 


ALT   (4-34)  U/L


 


Alkaline Phosphatase   ()  U/L


 


Troponin I   (0.000-0.034)  ng/mL


 


Total Protein   (6.3-8.2)  g/dL


 


Albumin   (3.5-5.0)  g/dL


 


Urine Opiates Screen   (NotDetected)  


 


Ur Oxycodone Screen   (NotDetected)  


 


Urine Methadone Screen   (NotDetected)  


 


Ur Propoxyphene Screen   (NotDetected)  


 


Ur Barbiturates Screen   (NotDetected)  


 


U Tricyclic Antidepress   (NotDetected)  


 


Ur Phencyclidine Scrn   (NotDetected)  


 


Ur Amphetamines Screen   (NotDetected)  


 


U Methamphetamines Scrn   (NotDetected)  


 


U Benzodiazepines Scrn   (NotDetected)  


 


Urine Cocaine Screen   (NotDetected)  


 


U Marijuana (THC) Screen   (NotDetected)  


 


Influenza Type A (PCR)   (Not Detectd)  


 


Influenza Type B (PCR)   (Not Detectd)  


 


RSV (PCR)   (Not Detectd)  


 


SARS-CoV-2 (PCR)   (Not Detectd)  














Disposition


Clinical Impression: 


 Altered mental status, Missed dialysis





Disposition: ADMITTED AS IP TO THIS HOSP


Referrals: 


None,Stated [REFERRING] - 1-2 days


Time of Disposition: 14:48

## 2023-01-07 LAB
ANION GAP SERPL CALC-SCNC: 13 MMOL/L
BUN SERPL-SCNC: 45 MG/DL (ref 7–17)
CALCIUM SPEC-MCNC: 10.1 MG/DL (ref 8.4–10.2)
CHLORIDE SERPL-SCNC: 106 MMOL/L (ref 98–107)
CO2 SERPL-SCNC: 20 MMOL/L (ref 22–30)
GLUCOSE SERPL-MCNC: 80 MG/DL (ref 74–99)
POTASSIUM SERPL-SCNC: 4.9 MMOL/L (ref 3.5–5.1)
SODIUM SERPL-SCNC: 139 MMOL/L (ref 137–145)

## 2023-01-07 PROCEDURE — 5A1D70Z PERFORMANCE OF URINARY FILTRATION, INTERMITTENT, LESS THAN 6 HOURS PER DAY: ICD-10-PCS

## 2023-01-07 RX ADMIN — ATORVASTATIN CALCIUM SCH MG: 40 TABLET, FILM COATED ORAL at 07:58

## 2023-01-07 RX ADMIN — METOPROLOL TARTRATE SCH MG: 25 TABLET, FILM COATED ORAL at 20:18

## 2023-01-07 RX ADMIN — Medication SCH: at 07:58

## 2023-01-07 RX ADMIN — VALPROATE SODIUM SCH MLS/HR: 100 INJECTION, SOLUTION INTRAVENOUS at 20:18

## 2023-01-07 RX ADMIN — LEVETIRACETAM SCH MLS/HR: 100 INJECTION, SOLUTION, CONCENTRATE INTRAVENOUS at 07:58

## 2023-01-07 RX ADMIN — HEPARIN SODIUM SCH UNIT: 5000 INJECTION INTRAVENOUS; SUBCUTANEOUS at 07:57

## 2023-01-07 RX ADMIN — SEVELAMER CARBONATE SCH: 800 TABLET, FILM COATED ORAL at 07:59

## 2023-01-07 RX ADMIN — CLOPIDOGREL BISULFATE SCH MG: 75 TABLET ORAL at 07:57

## 2023-01-07 RX ADMIN — PANTOPRAZOLE SODIUM SCH MG: 40 INJECTION, POWDER, FOR SOLUTION INTRAVENOUS at 07:59

## 2023-01-07 RX ADMIN — HEPARIN SODIUM SCH UNIT: 5000 INJECTION INTRAVENOUS; SUBCUTANEOUS at 20:18

## 2023-01-07 RX ADMIN — Medication SCH: at 12:04

## 2023-01-07 RX ADMIN — VALPROATE SODIUM SCH MLS/HR: 100 INJECTION, SOLUTION INTRAVENOUS at 12:12

## 2023-01-07 RX ADMIN — SEVELAMER CARBONATE SCH: 800 TABLET, FILM COATED ORAL at 12:04

## 2023-01-07 RX ADMIN — METOPROLOL TARTRATE SCH MG: 25 TABLET, FILM COATED ORAL at 08:06

## 2023-01-07 RX ADMIN — Medication SCH: at 16:50

## 2023-01-07 RX ADMIN — SEVELAMER CARBONATE SCH: 800 TABLET, FILM COATED ORAL at 16:50

## 2023-01-07 RX ADMIN — VALPROATE SODIUM SCH MLS/HR: 100 INJECTION, SOLUTION INTRAVENOUS at 03:57

## 2023-01-07 RX ADMIN — ZONISAMIDE SCH MG: 100 CAPSULE ORAL at 07:59

## 2023-01-07 NOTE — XR
EXAMINATION TYPE: XR chest 1V

 

DATE OF EXAM: 1/7/2023

 

CLINICAL HISTORY: Difficulty breathing progress study.  

 

TECHNIQUE: Single AP portable upright view of the chest is obtained.

 

COMPARISON: Chest x-ray from one day earlier

 

FINDINGS:  No suspicious focal airspace opacity, pleural effusion, or pneumothorax seen bilaterally. 
Cardiac silhouette size is stable and upper limits of normal. Overlying loop order left heart border 
is redemonstrated. Left neck stimulator device again seen. Osseous structures are intact.

 

IMPRESSION: No acute process currently.

## 2023-01-07 NOTE — P.NPCON
History of Present Illness





- Reason for Consult


end stage renal disease





- History of Present Illness





Reason for consultation: End-stage renal disease





History of present illness: 


The patient is a 52-year-old female seen in renal consultation for end-stage 

renal disease.  She is making on hemodialysis on  

schedule.  Patient presents from extended care facility due to worsening leth

argy noted but the nursing staff.  Patient tested positive for coronary at the 

nursing home.  There was also concern for aspiration.  It is noted in the chart 

at the patient was coughing when she was fed.  Patient has history of seizures 

and is required multiple hospitalizations for it recently.  In the hospital she 

tested negative for influenza, RSV and covert PCR.  No fever.  Her white count 

is not elevated.  Chest x-ray from today showed no acute process.  Currently 

resting in bed.  Hemodynamically stable.  She is on room air.  Patient has 

history of CVA residual hemiparesis and dysarthria.





Vital signs are stable.


General: Awake.  No acute distress.


HEENT: Head exam is unremarkable. 


LUNGS: Breath sounds decreased.


HEART: Rate and Rhythm are regular. 


ABDOMEN: Soft, no distention.


EXTREMITITES: No edema.





Past Medical History


Past Medical History: Chest Pain / Angina, CVA/TIA, Diabetes Mellitus, 

GERD/Reflux, Hyperlipidemia, Hypertension, Memory Impairment, Osteoarthritis 

(OA), Renal Disease, Seizure Disorder, Thyroid Disorder


Additional Past Medical History / Comment(s): Last seizure approximately one 

month ago. Spouse states she used to have seizures 4X per week until she had 

vagal nerve stimulator placed, now has seizures approximately once a week to 

once a month. Dialysis MOWEFR. Neuropathy, left drop foot, only able to walk 

short distances, otherwise uses wheelchair. 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 

ago, with residual memory impairment. Never diagnosed with Sleep Apnea but 

spouse states she does stop breathing through the night and he has to shake her 

to start breathing again. Varicose veins.


History of Any Multi-Drug Resistant Organisms: None Reported


Past Surgical History:  Section, Tonsillectomy, Uterine Ablation


Additional Past Surgical History / Comment(s): Left arm fistula, loop recorder, 

vagus nerve stimulator.


Past Anesthesia/Blood Transfusion Reactions: Motion Sickness


Additional Past Anesthesia/Blood Transfusion Reaction / Comment(s): Family hx 

unknown, pt adopted.


Past Psychological History: Bipolar


Smoking Status: Former smoker


Past Alcohol Use History: None Reported


Past Drug Use History: None Reported





- Past Family History


  ** Father


Family Medical History: Unable to Obtain


Additional Family Medical History / Comment(s): Pt adopted.





Medications and Allergies


                                Home Medications











 Medication  Instructions  Recorded  Confirmed  Type


 


Clopidogrel [Plavix] 75 mg PO DAILY #30 tab 21 Rx


 


Atorvastatin [Lipitor] 40 mg PO DAILY 21 History


 


Dulaglutide [Trulicity] 3 mg SQ Q7D 21 History


 


Sevelamer [Renvela] 800 mg PO BID-W/MEALS 21 History


 


Vascepa 1gm 1 gm PO BID 21 History


 


Calcium Acetate 667 mg PO TID-W/MEALS 22 History


 


Pantoprazole [Protonix] 40 mg PO DAILY 22 History


 


Sevelamer [Renvela] 1,600 mg PO W/SUPPER 10/07/22 01/06/23 History


 


Zonisamide [Zonegran] 100 mg PO DAILY 30 Days #30 cap 22 Rx


 


Darbepoetin Christiano [Aranesp] 40 mcg SQ Q7D  each 23 Rx


 


Divalproex Sprinkle [Depakote 500 mg PO TID  cap 23 Rx





Sprinkle]    


 


Heparin Sodium,Porcine [Heparin 5,000 unit SQ Q12HR 30 Days #60 23 Rx





Sodium] each   


 


Lacosamide [Vimpat] 100 mg PO BID #6 tab 23 Rx


 


Metoprolol Tartrate [Lopressor] 25 mg PO BID  tab 23 Rx


 


amLODIPine [Norvasc] 5 mg PO BID  tab 23 Rx


 


levETIRAcetam [Keppra] 500 mg PO MOWEFR@1800  tab 23 Rx


 


levETIRAcetam [Keppra] 500 mg PO Q12HR  tab 23 Rx


 


bisacodyL 10 mg RECTAL DAILY PRN 23 History








                                    Allergies











Allergy/AdvReac Type Severity Reaction Status Date / Time


 


Penicillins Allergy  Itching Verified 23 14:14














Physical Exam


Vitals: 


                                   Vital Signs











  Temp Pulse Pulse Pulse Resp BP BP


 


 23 07:35  97.6 F   76   16   148/81


 


 23 02:14  97.4 F L    70  16   158/80


 


 23 23:17  97.4 F L    77  18   160/82


 


 23 22:54  98.6 F  74    16  152/79 


 


 23 21:03   74    16  152/79 


 


 23 14:47   75    18  126/91 


 


 23 12:22   75    18  126/91 














  Pulse Ox


 


 23 07:35  99


 


 23 02:14  98


 


 23 23:17  100


 


 23 22:54 


 


 23 21:03  97


 


 23 14:47  99


 


 23 12:22  100








                                Intake and Output











 23





 22:59 06:59 14:59


 


Other:   


 


  Voiding Method  Toilet 


 


  # Bowel Movements   1


 


  Weight 72.575 kg  














Results





- Lab Results


                             Most recent lab results











Calcium  10.1 mg/dL (8.4-10.2)   23  07:04    














                                 23 11:25





                                 23 07:04





Assessment and Plan


Plan: 





Assessment:


1.  End-stage renal disease maintained on hemodialysis on  schedule.


2.  Hypertension with chronic kidney disease.


3.  History of seizures.


4.  Chronic kidney disease mineral bone disease maintained on PhosLo and 

Renvela.


5.  Metabolic acidosis secondary to chronic kidney disease and missed dialysis 

treatment.  Expect improvement post dialysis.





Plan:


Hemodialysis today as she missed yesterday's treatment.


Check phosphorus level.





Thank you for the consultation.  I will continue to follow the patient with you 

during her hospital stay.

## 2023-01-07 NOTE — P.CNNES
History of Present Illness


Consult date: 23


Requesting physician: Karie Rosado


Reason for Consult: history of seizure, medication management


History of Present Illness: 


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 

and appears she has primary generalized epilepsy, left ICA stenosis of 75% on 

CTA but discrepancy on the carotid duplex and not significant, history of stroke

with residual left hemiparesis, diabetes mellitus, end-stage renal disease on 

dialysis, hypertension presented to the emergency department on 2023 from 

nursing facility because the patient was lethargic.  She is known to our 

neurology service.  She was just recently discharged from our facility on 

2023 because of clinical status epilepticus and was stable with no 

seizures on prior admission for days.  Sure if patient had a seizure or not the 

facility.  But today her she is more awake and responding and could not tell me 

what transpired the yesterday at the facility.  Patient was recently discharged 

on Zonegran 100 mg a tablet twice a day, Keppra 500 mg 1 tablet every 12 hours 

with additional 500 mg after dialysis, Vimpat 100 mg twice a day Depakote 500 mg

1 tablet 3 times a day.





Again she was seen last by me on 2023.  Please review my notes for further

details.


CBC with differential is unremarkable


Sodium is 136, BUN is 35, glucose is 106 on presentation.  AST and ALT within 

normal limits


Urine drug seeing is not detected


SARS Covid to PCR was not detected.  RSV, influenza A/B is not detected











Review of Systems


Review of system:  The 12 point system was reviewed and apparent positive and 

negative per HPI.








Past Medical History


Past Medical History: Chest Pain / Angina, CVA/TIA, Diabetes Mellitus, 

GERD/Reflux, Hyperlipidemia, Hypertension, Memory Impairment, Osteoarthritis 

(OA), Renal Disease, Seizure Disorder, Thyroid Disorder


Additional Past Medical History / Comment(s): Last seizure approximately one 

month ago. Spouse states she used to have seizures 4X per week until she had 

vagal nerve stimulator placed, now has seizures approximately once a week to 

once a month. Dialysis MOWEFR. Neuropathy, left drop foot, only able to walk 

short distances, otherwise uses wheelchair. 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 

ago, with residual memory impairment. Never diagnosed with Sleep Apnea but 

spouse states she does stop breathing through the night and he has to shake her 

to start breathing again. Varicose veins.


History of Any Multi-Drug Resistant Organisms: None Reported


Past Surgical History:  Section, Tonsillectomy, Uterine Ablation


Additional Past Surgical History / Comment(s): Left arm fistula, loop recorder, 

vagus nerve stimulator.


Past Anesthesia/Blood Transfusion Reactions: Motion Sickness


Additional Past Anesthesia/Blood Transfusion Reaction / Comment(s): Family hx 

unknown, pt adopted.


Past Psychological History: Bipolar


Smoking Status: Former smoker


Past Alcohol Use History: None Reported


Past Drug Use History: None Reported





- Past Family History


  ** Father


Family Medical History: Unable to Obtain


Additional Family Medical History / Comment(s): Pt adopted.





Medications and Allergies


                                Home Medications











 Medication  Instructions  Recorded  Confirmed  Type


 


Clopidogrel [Plavix] 75 mg PO DAILY #30 tab 21 Rx


 


Atorvastatin [Lipitor] 40 mg PO DAILY 21 History


 


Dulaglutide [Trulicity] 3 mg SQ Q7D 21 History


 


Sevelamer [Renvela] 800 mg PO BID-W/MEALS 21 History


 


Vascepa 1gm 1 gm PO BID 21 History


 


Calcium Acetate 667 mg PO TID-W/MEALS 22 History


 


Pantoprazole [Protonix] 40 mg PO DAILY 22 History


 


Sevelamer [Renvela] 1,600 mg PO W/SUPPER 10/07/22 01/06/23 History


 


Zonisamide [Zonegran] 100 mg PO DAILY 30 Days #30 cap 22 Rx


 


Darbepoetin Christiano [Aranesp] 40 mcg SQ Q7D  each 23 Rx


 


Divalproex Sprinkle [Depakote 500 mg PO TID  cap 23 Rx





Sprinkle]    


 


Heparin Sodium,Porcine [Heparin 5,000 unit SQ Q12HR 30 Days #60 23 Rx





Sodium] each   


 


Lacosamide [Vimpat] 100 mg PO BID #6 tab 23 Rx


 


Metoprolol Tartrate [Lopressor] 25 mg PO BID  tab 23 Rx


 


amLODIPine [Norvasc] 5 mg PO BID  tab 23 Rx


 


levETIRAcetam [Keppra] 500 mg PO MOWEFR@1800  tab 23 Rx


 


levETIRAcetam [Keppra] 500 mg PO Q12HR  tab 23 Rx


 


bisacodyL 10 mg RECTAL DAILY PRN 23 History








                                    Allergies











Allergy/AdvReac Type Severity Reaction Status Date / Time


 


Penicillins Allergy  Itching Verified 23 14:14














Physical Examination





- Vital Signs


Vital Signs: 


                                   Vital Signs











  Temp Pulse Pulse Pulse Resp BP BP


 


 23 07:35  97.6 F   76   16   148/81


 


 23 02:14  97.4 F L    70  16   158/80


 


 23 23:17  97.4 F L    77  18   160/82


 


 23 22:54  98.6 F  74    16  152/79 


 


 23 21:03   74    16  152/79 


 


 23 14:47   75    18  126/91 














  Pulse Ox


 


 23 07:35  99


 


 23 02:14  98


 


 23 23:17  100


 


 23 22:54 


 


 23 21:03  97


 


 23 14:47  99








                                Intake and Output











 23





 22:59 06:59 14:59


 


Other:   


 


  Voiding Method  Toilet 


 


  # Bowel Movements   1


 


  Weight 72.575 kg  











GENERAL: The patient is lying in bed and is not in acute distress.


CHEST: The heart rate is regular rate rhythm.   No murmurs to auscultation.  


LUNG: Clear to auscultation bilaterally no wheezing noted throughout.  Not 

labored breathing.


ABDOMEN/GI: Bowel sounds present in all 4 quadrants. No tenderness to palpation 

throughout.





NEUROLOGICAL:


Higher mental function: The patient is mildly drowsy but is awakeable to voice. 

 She stated she is in the hospital.  Is slowing responding to questions.  

Patient is following few simple commands.  Language is limited.  No  neglect.


Cranial nerves: The pupils are round, equal and reactive to light.  Visual 

fields are full to confrontation throughout.  Extraocular movement is intact no 

nystagmus is noted.   The facial strength is right nasolabial flattening (old). 

 No dysarthria is noted.  Has hypophoni.  Shoulder shrug is normal bilaterally.


Motor: The strength is has left sided weakness (lower > upper.  Has antigravity 

of upper while lower is 1).  Otherwise 5 over 5 throughout right side.  Somewhat

 increased tone ove the left lower.  Normal bulk.  


Cerebellum: Normal finger to nose  bilaterally.


Sensation: Sensation is normal to touch throughout.


Reflexes (right/left): 1+


Plantars is decreased over the left side (old).   








Results





- Laboratory Findings


CBC and BMP: 


                                 23 11:25





                                 23 07:04


Abnormal Lab Findings: 


                                  Abnormal Labs











  23





  11:25 06:43 07:04


 


Sodium  136 L  


 


Carbon Dioxide    20 L


 


BUN  35 H   45 H


 


Creatinine  9.62 H*   10.33 H*


 


Glucose  106 H  


 


Total Protein  6.1 L  


 


Albumin  3.3 L  


 


Procalcitonin   0.33 H 














Assessment and Plan


Assessment: 


This is a 53-year-old woman with history of medical refractory progress he was 

recently discharged from our facility on 2023 because of clinical status 

epilepticus who presented to the back to our facility from nursing facility 

because of lethargy.





Medical refractory epilepsy on VNS and appears she has primary generalized 

epilepsy (on multiple medication).  She was found lethargic and I'll not sure if

 the patient had a seizure/post ictal state.


History of epilepsy and she had a prolonged EEG in our facility which was 

suggestive of primary generalized epilepsy


Left ICA stenosis of 75% on CTA but discrepancy on the carotid duplex and not 

significant


History of stroke with residual left hemiparesis


Dabetes mellitus


End-stage renal disease on dialysis


Hypertension 





Plan: 





She had multiple EEGs in a prolonged to a half hour EEG in our facility.  There 

is no need for a repeat EEG if clinically she is improving.


Continue Zonegran 100 mg a tablet twice a day, Keppra 500 mg 1 tablet every 12 

hours with additional 500 mg after dialysis, Vimpat 100 mg twice a day Depakote 

500 mg 1 tablet 3 times a day.  On just the recent and prior admission patient 

was on these medication and had no seizures for days prior to discharge.  


Continue neuro checks


Placed on seizure precautions seizure pads


I highly recommend the patient to follow-up with an epileptilogist as outpatient

 for her medical refractory epilepsy.  She had multiple admission to our [facility] 

as well is to another local facility and her seizures needs to be seen by a 

specialist as an outpatient.  Consider Epidiolex since has medical refractory 

epilepsy


Nephrology is consulted


Speech therapy is consulted


Defer the rest of the medical management to the primary team





The plan was discussed with the patient's nurse


Thank you for the consultation








Time with Patient: Greater than 30

## 2023-01-08 LAB
ANION GAP SERPL CALC-SCNC: 16 MMOL/L (ref 10–18)
BASOPHILS # BLD AUTO: 0.06 X 10*3/UL (ref 0–0.1)
BASOPHILS NFR BLD AUTO: 0.8 %
BUN SERPL-SCNC: 35.5 MG/DL (ref 9–27)
BUN/CREAT SERPL: 4.43 RATIO (ref 12–20)
CALCIUM SPEC-MCNC: 9.3 MG/DL (ref 8.7–10.3)
CHLORIDE SERPL-SCNC: 99 MMOL/L (ref 96–109)
CO2 SERPL-SCNC: 20.4 MMOL/L (ref 20–27.5)
EOSINOPHIL # BLD AUTO: 0.11 X 10*3/UL (ref 0.04–0.35)
EOSINOPHIL NFR BLD AUTO: 1.4 %
ERYTHROCYTE [DISTWIDTH] IN BLOOD BY AUTOMATED COUNT: 3.29 X 10*6/UL (ref 4.1–5.2)
ERYTHROCYTE [DISTWIDTH] IN BLOOD: 14.7 % (ref 11.5–14.5)
GLUCOSE SERPL-MCNC: 102 MG/DL (ref 70–110)
HCT VFR BLD AUTO: 29.5 % (ref 37.2–46.3)
HGB BLD-MCNC: 9.2 G/DL (ref 12–15)
IMM GRANULOCYTES BLD QL AUTO: 2.9 %
LYMPHOCYTES # SPEC AUTO: 2.78 X 10*3/UL (ref 0.9–5)
LYMPHOCYTES NFR SPEC AUTO: 36.6 %
MCH RBC QN AUTO: 28 PG (ref 27–32)
MCHC RBC AUTO-ENTMCNC: 31.2 G/DL (ref 32–37)
MCV RBC AUTO: 89.7 FL (ref 80–97)
MONOCYTES # BLD AUTO: 0.78 X 10*3/UL (ref 0.2–1)
MONOCYTES NFR BLD AUTO: 10.3 %
NEUTROPHILS # BLD AUTO: 3.65 X 10*3/UL (ref 1.8–7.7)
NEUTROPHILS NFR BLD AUTO: 48 %
NRBC BLD AUTO-RTO: 0 /100 WBCS (ref 0–0)
PLATELET # BLD AUTO: 154 X 10*3/UL (ref 140–440)
POTASSIUM SERPL-SCNC: 4.6 MMOL/L (ref 3.5–5.5)
SODIUM SERPL-SCNC: 135 MMOL/L (ref 135–145)
WBC # BLD AUTO: 7.6 X 10*3/UL (ref 4.5–10)

## 2023-01-08 RX ADMIN — VALPROATE SODIUM SCH MLS/HR: 100 INJECTION, SOLUTION INTRAVENOUS at 20:01

## 2023-01-08 RX ADMIN — Medication SCH MG: at 12:04

## 2023-01-08 RX ADMIN — CLOPIDOGREL BISULFATE SCH MG: 75 TABLET ORAL at 08:48

## 2023-01-08 RX ADMIN — HEPARIN SODIUM SCH UNIT: 5000 INJECTION INTRAVENOUS; SUBCUTANEOUS at 21:20

## 2023-01-08 RX ADMIN — ZONISAMIDE SCH MG: 100 CAPSULE ORAL at 08:49

## 2023-01-08 RX ADMIN — LEVETIRACETAM SCH MLS/HR: 100 INJECTION, SOLUTION, CONCENTRATE INTRAVENOUS at 09:52

## 2023-01-08 RX ADMIN — SEVELAMER CARBONATE SCH MG: 800 TABLET, FILM COATED ORAL at 08:49

## 2023-01-08 RX ADMIN — Medication SCH MG: at 16:51

## 2023-01-08 RX ADMIN — LEVETIRACETAM SCH MLS/HR: 100 INJECTION, SOLUTION, CONCENTRATE INTRAVENOUS at 21:13

## 2023-01-08 RX ADMIN — LEVETIRACETAM SCH MLS/HR: 100 INJECTION, SOLUTION, CONCENTRATE INTRAVENOUS at 01:01

## 2023-01-08 RX ADMIN — METOPROLOL TARTRATE SCH MG: 25 TABLET, FILM COATED ORAL at 08:48

## 2023-01-08 RX ADMIN — PANTOPRAZOLE SODIUM SCH MG: 40 INJECTION, POWDER, FOR SOLUTION INTRAVENOUS at 09:51

## 2023-01-08 RX ADMIN — ATORVASTATIN CALCIUM SCH MG: 40 TABLET, FILM COATED ORAL at 08:48

## 2023-01-08 RX ADMIN — SEVELAMER CARBONATE SCH MG: 800 TABLET, FILM COATED ORAL at 12:04

## 2023-01-08 RX ADMIN — METOPROLOL TARTRATE SCH MG: 25 TABLET, FILM COATED ORAL at 21:19

## 2023-01-08 RX ADMIN — VALPROATE SODIUM SCH MLS/HR: 100 INJECTION, SOLUTION INTRAVENOUS at 12:55

## 2023-01-08 RX ADMIN — VALPROATE SODIUM SCH MLS/HR: 100 INJECTION, SOLUTION INTRAVENOUS at 04:20

## 2023-01-08 RX ADMIN — SEVELAMER CARBONATE SCH MG: 800 TABLET, FILM COATED ORAL at 16:51

## 2023-01-08 RX ADMIN — Medication SCH MG: at 08:48

## 2023-01-08 RX ADMIN — HEPARIN SODIUM SCH UNIT: 5000 INJECTION INTRAVENOUS; SUBCUTANEOUS at 08:48

## 2023-01-08 NOTE — P.PN
Subjective


Progress Note Date: 01/08/23


The patient seen at bedside and the according to the patient and her nurse no 

further seizures.  She feels she continues to be stable.








Objective





- Vital Signs


Vital signs: 


                                   Vital Signs











Temp  98.1 F   01/08/23 07:18


 


Pulse  72   01/08/23 07:18


 


Resp  16   01/08/23 07:18


 


BP  132/72   01/08/23 07:18


 


Pulse Ox  98   01/08/23 07:18


 


FiO2      








                                 Intake & Output











 01/07/23 01/08/23 01/08/23





 18:59 06:59 18:59


 


Intake Total 300  


 


Output Total 1300  


 


Balance -1000  


 


Intake:   


 


  Hemodialysis 300  


 


Output:   


 


  Hemodialysis 1300  


 


Other:   


 


  # Voids  1 


 


  # Bowel Movements 3 1 1














- Exam





GENERAL: The patient is lying in bed and is not in acute distress.





NEUROLOGICAL:


Higher mental function: The patient is awake, alert, oriented to self, place.  

She correctly stated the year but could not tell me month.  She is slow 

responding.  Following simple commands.  Language is limited.  No  neglect.


Cranial nerves: The pupils are round, equal and reactive to light.  Visual 

fields are full to confrontation throughout.  Extraocular movement is intact no 

nystagmus is noted.   The facial strength is right nasolabial flattening (old). 

No dysarthria is noted.  Has hypophoni.  Shoulder shrug is normal bilaterally.


Motor: The strength is has left sided weakness (lower > upper.  Has antigravity 

of upper while lower is 1).  Otherwise 5 over 5 throughout right side.  Somewhat

increased tone ove the left lower.  Normal bulk.  


Cerebellum: Normal finger to nose  bilaterally.


Sensation: Sensation is normal to touch throughout.


Reflexes (right/left): 1+


Plantars is decreased over the left side (old). 








- Labs


CBC & Chem 7: 


                                 01/08/23 05:29





                                 01/08/23 05:29


Labs: 


                  Abnormal Lab Results - Last 24 Hours (Table)











  01/07/23 01/08/23 01/08/23 Range/Units





  06:43 05:29 05:29 


 


RBC   3.29 L   (4.10-5.20)  X 10*6/uL


 


Hgb   9.2 L   (12.0-15.0)  g/dL


 


Hct   29.5 L   (37.2-46.3)  %


 


MCHC   31.2 L   (32.0-37.0)  g/dL


 


RDW   14.7 H   (11.5-14.5)  %


 


Immature Gran #   0.22 H   (0.00-0.04)  X 10*3/uL


 


BUN    35.5 H  (9.0-27.0)  mg/dL


 


Creatinine    8.0 H*  (0.6-1.5)  mg/dL


 


Est GFR (CKD-EPI)AfAm    6.0 L  (60.0-200.0)   


 


Est GFR (CKD-EPI)NonAf    5.2 L  (60.0-200.0)   


 


BUN/Creatinine Ratio    4.43 L  (12.00-20.00)  Ratio


 


Procalcitonin  0.33 H    (0.02-0.09)  ng/mL














Assessment and Plan


Assessment: 


This is a 53-year-old woman with history of medical refractory progress he was 

recently discharged from our facility on 01/04/2023 because of clinical status 

epilepticus who presented to the back to our facility from nursing facility 

because of lethargy.





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 

  epilepsy (on multiple medication).  She was found lethargic and I'll not sure 

  if the patient had a seizure/post ictal state.  She has been stable in our 

  facility and no further seizures for the past two days.


* History of epilepsy and she had a prolonged EEG in our facility which was 

  suggestive of primary generalized epilepsy


* Left ICA stenosis of 75% on CTA but discrepancy on the carotid duplex and not 

  significant


* History of stroke with residual left hemiparesis


* Dabetes mellitus


* End-stage renal disease on dialysis


* Hypertension 





Plan: 





* She had multiple EEGs in a prolonged to a half hour EEG in our facility.  

  There is no need for a repeat EEG if clinically she is improving.


* Continue Zonegran 100 mg a tablet twice a day, Keppra 500 mg 1 tablet every 12

  hours with additional 500 mg after dialysis, Vimpat 100 mg twice a day 

  Depakote 500 mg 1 tablet 3 times a day.  On just the recent and prior 

  admission patient was on these medication and had no seizures for days prior 

  to discharge.  


* her Depakote level currently is 111.9 (considered high normal).


* 


* Continue neuro checks


* Placed on seizure precautions seizure pads


* I highly recommend the patient to follow-up with an epileptilogist as 

  outpatient for her medical refractory epilepsy.  She had multiple admission to

  our [facility] as well is to another local facility and her seizures needs to be 

  seen by a specialist as an outpatient.  Consider Epidiolex since has medical 

  refractory epilepsy


* Nephrology is consulted


* Speech therapy is consulted


* Defer the rest of the medical management to the primary team





  


I do not understand why the patient has no further seizures while hospitalized 

during this visit and during her last recent hospital visit.  But once she gets 

discharged she gets readmitted to our hospital as well at different local 

hospital for these recurrent seizure-like activity and unresponsiveness 

episodes.  I am not sure if patient is not getting her medication on time or 

questionably misses her medication





The plan was discussed with the patient's nurse.


If continues to be stable by tomorrow can be discharged from neurological 

perspective. Will continue to monitor.





Dr. Fischer will start neurology service tomorrow A.M.








Time with Patient: Less than 30

## 2023-01-08 NOTE — P.PN
Subjective





Patient is seen in follow-up for end-stage renal disease.  No problems with 

dialysis yesterday.  Sitting up in bed.  Tolerating dysphagia 3 diet but has to 

be fed.





Vital signs are stable.


General: Awake.  No acute distress.


HEENT: Head exam is unremarkable. 


LUNGS: Breath sounds decreased.


HEART: Rate and Rhythm are regular. 


ABDOMEN: Soft, no distention.


EXTREMITITES: No edema. 





Objective





- Vital Signs


Vital signs: 


                                   Vital Signs











Temp  98.1 F   01/08/23 07:18


 


Pulse  72   01/08/23 07:18


 


Resp  16   01/08/23 07:18


 


BP  132/72   01/08/23 07:18


 


Pulse Ox  98   01/08/23 07:18


 


FiO2      








                                 Intake & Output











 01/07/23 01/08/23 01/08/23





 18:59 06:59 18:59


 


Intake Total 300  


 


Output Total 1300  


 


Balance -1000  


 


Intake:   


 


  Hemodialysis 300  


 


Output:   


 


  Hemodialysis 1300  


 


Other:   


 


  # Voids  1 


 


  # Bowel Movements 3 1 1














- Labs


CBC & Chem 7: 


                                 01/08/23 05:29





                                 01/08/23 05:29


Labs: 


                  Abnormal Lab Results - Last 24 Hours (Table)











  01/07/23 01/08/23 01/08/23 Range/Units





  06:43 05:29 05:29 


 


RBC   3.29 L   (4.10-5.20)  X 10*6/uL


 


Hgb   9.2 L   (12.0-15.0)  g/dL


 


Hct   29.5 L   (37.2-46.3)  %


 


MCHC   31.2 L   (32.0-37.0)  g/dL


 


RDW   14.7 H   (11.5-14.5)  %


 


Immature Gran #   0.22 H   (0.00-0.04)  X 10*3/uL


 


BUN    35.5 H  (9.0-27.0)  mg/dL


 


Creatinine    8.0 H*  (0.6-1.5)  mg/dL


 


Est GFR (CKD-EPI)AfAm    6.0 L  (60.0-200.0)   


 


Est GFR (CKD-EPI)NonAf    5.2 L  (60.0-200.0)   


 


BUN/Creatinine Ratio    4.43 L  (12.00-20.00)  Ratio


 


Procalcitonin  0.33 H    (0.02-0.09)  ng/mL














Assessment and Plan


Plan: 





Assessment:


1.  End-stage renal disease maintained on hemodialysis on Monday Wednesday Friday schedule.


2.  Hypertension with chronic kidney disease.


3.  History of seizures.


4.  Chronic kidney disease mineral bone disease maintained on PhosLo and 

Renvela.


5.  Metabolic acidosis secondary to chronic kidney disease and missed dialysis 

treatment.  Better.





Plan:


Hemodialysis tomorrow.


Follow-up phosphorus level.

## 2023-01-09 LAB
ANION GAP SERPL CALC-SCNC: 10.5 MMOL/L (ref 10–18)
BASOPHILS # BLD AUTO: 0.06 X 10*3/UL (ref 0–0.1)
BASOPHILS NFR BLD AUTO: 1 %
BUN SERPL-SCNC: 45.8 MG/DL (ref 9–27)
BUN/CREAT SERPL: 4.72 RATIO (ref 12–20)
CALCIUM SPEC-MCNC: 9.8 MG/DL (ref 8.7–10.3)
CHLORIDE SERPL-SCNC: 100 MMOL/L (ref 96–109)
CO2 SERPL-SCNC: 24.5 MMOL/L (ref 20–27.5)
EOSINOPHIL # BLD AUTO: 0.11 X 10*3/UL (ref 0.04–0.35)
EOSINOPHIL NFR BLD AUTO: 1.9 %
ERYTHROCYTE [DISTWIDTH] IN BLOOD BY AUTOMATED COUNT: 3.35 X 10*6/UL (ref 4.1–5.2)
ERYTHROCYTE [DISTWIDTH] IN BLOOD: 14.7 % (ref 11.5–14.5)
GLUCOSE SERPL-MCNC: 92 MG/DL (ref 70–110)
HCT VFR BLD AUTO: 29.7 % (ref 37.2–46.3)
HGB BLD-MCNC: 9.5 G/DL (ref 12–15)
IMM GRANULOCYTES BLD QL AUTO: 2.9 %
LYMPHOCYTES # SPEC AUTO: 2.51 X 10*3/UL (ref 0.9–5)
LYMPHOCYTES NFR SPEC AUTO: 42.4 %
MCH RBC QN AUTO: 28.4 PG (ref 27–32)
MCHC RBC AUTO-ENTMCNC: 32 G/DL (ref 32–37)
MCV RBC AUTO: 88.7 FL (ref 80–97)
MONOCYTES # BLD AUTO: 0.56 X 10*3/UL (ref 0.2–1)
MONOCYTES NFR BLD AUTO: 9.5 %
NEUTROPHILS # BLD AUTO: 2.51 X 10*3/UL (ref 1.8–7.7)
NEUTROPHILS NFR BLD AUTO: 42.3 %
NRBC BLD AUTO-RTO: 0 /100 WBCS (ref 0–0)
PLATELET # BLD AUTO: 143 X 10*3/UL (ref 140–440)
POTASSIUM SERPL-SCNC: 4.8 MMOL/L (ref 3.5–5.5)
SODIUM SERPL-SCNC: 135 MMOL/L (ref 135–145)
WBC # BLD AUTO: 5.92 X 10*3/UL (ref 4.5–10)

## 2023-01-09 RX ADMIN — ZONISAMIDE SCH MG: 100 CAPSULE ORAL at 10:54

## 2023-01-09 RX ADMIN — DIVALPROEX SODIUM SCH MG: 125 CAPSULE, COATED PELLETS ORAL at 18:09

## 2023-01-09 RX ADMIN — PANTOPRAZOLE SODIUM SCH: 40 INJECTION, POWDER, FOR SOLUTION INTRAVENOUS at 10:58

## 2023-01-09 RX ADMIN — DIVALPROEX SODIUM SCH MG: 125 CAPSULE, COATED PELLETS ORAL at 21:18

## 2023-01-09 RX ADMIN — HEPARIN SODIUM SCH UNIT: 5000 INJECTION INTRAVENOUS; SUBCUTANEOUS at 10:54

## 2023-01-09 RX ADMIN — VALPROATE SODIUM SCH MLS/HR: 100 INJECTION, SOLUTION INTRAVENOUS at 04:18

## 2023-01-09 RX ADMIN — HEPARIN SODIUM SCH UNIT: 5000 INJECTION INTRAVENOUS; SUBCUTANEOUS at 21:17

## 2023-01-09 RX ADMIN — METOPROLOL TARTRATE SCH MG: 25 TABLET, FILM COATED ORAL at 21:17

## 2023-01-09 RX ADMIN — Medication SCH MG: at 18:08

## 2023-01-09 RX ADMIN — SEVELAMER CARBONATE SCH: 800 TABLET, FILM COATED ORAL at 14:02

## 2023-01-09 RX ADMIN — PANTOPRAZOLE SODIUM SCH MG: 40 TABLET, DELAYED RELEASE ORAL at 11:09

## 2023-01-09 RX ADMIN — CLOPIDOGREL BISULFATE SCH MG: 75 TABLET ORAL at 10:54

## 2023-01-09 RX ADMIN — Medication SCH MG: at 10:54

## 2023-01-09 RX ADMIN — METOPROLOL TARTRATE SCH MG: 25 TABLET, FILM COATED ORAL at 10:54

## 2023-01-09 RX ADMIN — LEVETIRACETAM SCH: 100 INJECTION, SOLUTION, CONCENTRATE INTRAVENOUS at 11:03

## 2023-01-09 RX ADMIN — PANTOPRAZOLE SODIUM SCH MG: 40 INJECTION, POWDER, FOR SOLUTION INTRAVENOUS at 10:54

## 2023-01-09 RX ADMIN — Medication SCH MG: at 06:28

## 2023-01-09 RX ADMIN — SEVELAMER CARBONATE SCH MG: 800 TABLET, FILM COATED ORAL at 06:28

## 2023-01-09 RX ADMIN — ATORVASTATIN CALCIUM SCH MG: 40 TABLET, FILM COATED ORAL at 10:54

## 2023-01-09 RX ADMIN — SEVELAMER CARBONATE SCH: 800 TABLET, FILM COATED ORAL at 10:55

## 2023-01-09 RX ADMIN — LEVETIRACETAM SCH MLS/HR: 100 INJECTION, SOLUTION, CONCENTRATE INTRAVENOUS at 10:54

## 2023-01-09 NOTE — P.PN
Subjective





Patient is seen in follow-up for end-stage renal disease.  Tolerating dialysis 

well.  Sitting up in bed.  Tolerating dysphagia 3 diet but has to be fed.  

Hemodynamically stable.  No changes overnight.





Vital signs are stable.


General: Awake.  No acute distress.


HEENT: Head exam is unremarkable. 


LUNGS: Breath sounds decreased.


HEART: Rate and Rhythm are regular. 


ABDOMEN: Soft, no distention.


EXTREMITITES: No edema. 





Objective





- Vital Signs


Vital signs: 


                                   Vital Signs











Temp  97.7 F   01/09/23 07:15


 


Pulse  72   01/09/23 07:15


 


Resp  16   01/09/23 07:15


 


BP  144/85   01/09/23 07:15


 


Pulse Ox  97   01/09/23 07:15


 


FiO2      








                                 Intake & Output











 01/08/23 01/09/23 01/09/23





 18:59 06:59 18:59


 


Other:   


 


  # Voids  1 


 


  # Bowel Movements 1 1 














- Labs


CBC & Chem 7: 


                                 01/09/23 05:14





                                 01/09/23 05:14


Labs: 


                  Abnormal Lab Results - Last 24 Hours (Table)











  01/09/23 01/09/23 Range/Units





  05:14 05:14 


 


RBC  3.35 L   (4.10-5.20)  X 10*6/uL


 


Hgb  9.5 L   (12.0-15.0)  g/dL


 


Hct  29.7 L   (37.2-46.3)  %


 


RDW  14.7 H   (11.5-14.5)  %


 


Immature Gran #  0.17 H   (0.00-0.04)  X 10*3/uL


 


BUN   45.8 H  (9.0-27.0)  mg/dL


 


Creatinine   9.7 H*  (0.6-1.5)  mg/dL


 


Est GFR (CKD-EPI)AfAm   4.7 L  (60.0-200.0)   


 


Est GFR (CKD-EPI)NonAf   4.1 L  (60.0-200.0)   


 


BUN/Creatinine Ratio   4.72 L  (12.00-20.00)  Ratio














Assessment and Plan


Plan: 





Assessment:


1.  End-stage renal disease maintained on hemodialysis on Monday Wednesday Friday schedule.


2.  Hypertension with chronic kidney disease.


3.  History of seizures.


4.  Chronic kidney disease mineral bone disease maintained on PhosLo and 

Renvela.  Phosphorus level 5.0 today.


5.  Metabolic acidosis secondary to chronic kidney disease and missed dialysis 

treatment.  Better.





Plan:


Currently seen while undergoing hemodialysis. Next treatment on Wednesday.

## 2023-01-09 NOTE — XR
EXAMINATION TYPE: XR chest 1V

 

DATE OF EXAM: 1/9/2023

 

COMPARISON: 1/7/2023

 

HISTORY: Cough

 

TECHNIQUE: Single frontal view of the chest is obtained.

 

FINDINGS:  There is no focal air space opacity, pleural effusion, or pneumothorax seen.  The cardiac 
silhouette size is within normal limits.   The osseous structures are intact. There is a loop recorde
r overlying the left heart border. There also is a stimulator device seen with extending cephalad.

 

IMPRESSION:  No acute process.

## 2023-01-09 NOTE — P.PN
Subjective


Progress Note Date: 01/08/23





Patient is a 52-year-old female with a known history of ESRD on hemodialysis, 

seizure disorder with status epilepticus during recent admission and was 

discharged to [facility] on 1/4/2023, hypothyroidism, hypertension, hyperlipidemia, 

diabetes type 2, history of CVA/TIA and history of left foot drop, bipolar dis

order and prior history of smoking was sent back from nursing home.  Patient was

discharged on 1/4/2023.  Patient was found to be progressively lethargic as 

noted by the nursing home staff, patient was tested positive at the nursing home

this morning however COVID-19 PCR was negative currently.


Possible aspiration was also suspected as per nursing home staff.


Patient had a swallow study recently and was able to tolerate dysphagia diet 

with aspiration precautions.


Patient was afebrile on admission.  Pulse ox 96% on room air.


Chest x-ray showed right perihilar patchy opacities which may represent 

infiltrative versus atelectasis.


EKG showed sinus rhythm


Laboratory data showed WBC 6.4 hemoglobin 12.3, platelets 179


Sodium 136 potassium 4.5 chloride 100 bicarb is 24 BUN 35 and creatinine 9.62 a

nd blood sugar is 106


UDS negative and influenza A, B, RSV and SARS-CoV-2 not detected.





1/7/2023


Patient is lying in bed.  Awake alert and oriented.  Slow to respond.  No 

further episodes of seizures.  No complaints of chest pain or worsening 

shortness of breath.  Denies any complaints of cough or sputum production.  No 

nausea vomiting abdominal pain or diarrhea


Laboratory reviewed.  Procalcitonin level is 0.33.  Sodium 139 potassium 4.9 

chloride 106 bicarb is 20 BUN 45 creatinine 10.3.


Patient is undergoing hemodialysis today.


Nephrology and neurology is on board.





1/8/2023


Patient is currently resting in the bed.  Awake alert and oriented x3.  Slow to 

respond.  No complaints of chest pain or worsening shortness of breath.  Patient

has been afebrile overnight.  No further episodes of seizures.  Patient is being

on dysphagia diet.


Patient did have episode of choking today afternoon while eating.  Remained 

stable at this time.  Denies any complaints of worsening chest pain or shortness

of breath.  Patient has been afebrile.  Follow-up repeat chest x-ray tomorrow.


Nephrology neurology on board.  Plans for discharge back to facility in the next

24 to 48 hours.








Current medications reviewed.








Objective





- Vital Signs


Vital signs: 


                                   Vital Signs











Temp  97.7 F   01/08/23 14:00


 


Pulse  74   01/08/23 14:00


 


Resp  17   01/08/23 14:00


 


BP  125/81   01/08/23 14:00


 


Pulse Ox  98   01/08/23 14:00


 


FiO2      








                                 Intake & Output











 01/08/23 01/08/23 01/09/23





 06:59 18:59 06:59


 


Other:   


 


  # Voids 1  


 


  # Bowel Movements 1 1 














- Exam





PHYSICAL EXAMINATION: 


Patient is lying in the bed comfortably, no acute distress, awake alert and 

oriented..  Lethargic and weak and slow to respond.


HEENT: Normocephalic. Neck is supple. Pupils reactive. Nostrils clear. Oral 

cavity is moist. 


Neck reveals no JVD, carotid bruits, or thyromegaly. 


CHEST EXAMINATION: Trachea is central. Symmetrical expansion.  Bibasilar 

diminished sounds.  No wheezing or rhonchi.. 


CARDIAC: Normal S1, S2 with no gallops. No murmurs 


ABDOMEN: Soft. Bowel sounds present.  Nontender.  No organomegaly. No abdominal 

bruits. 


Extremities: reveal no edema.  No clubbing or cyanosis


Neurologically awake, alert, oriented x2-left-sided weakness.  Lethargic and 

drowsy.


Skin: No rash or skin lesions. 


Psychiatric: Coperative.  Could not be assessed completely.  Musculoskeletal: No

joint swelling or deformity.





- Labs


CBC & Chem 7: 


                                 01/08/23 05:29





                                 01/08/23 05:29


Labs: 


                  Abnormal Lab Results - Last 24 Hours (Table)











  01/08/23 01/08/23 Range/Units





  05:29 05:29 


 


RBC  3.29 L   (4.10-5.20)  X 10*6/uL


 


Hgb  9.2 L   (12.0-15.0)  g/dL


 


Hct  29.5 L   (37.2-46.3)  %


 


MCHC  31.2 L   (32.0-37.0)  g/dL


 


RDW  14.7 H   (11.5-14.5)  %


 


Immature Gran #  0.22 H   (0.00-0.04)  X 10*3/uL


 


BUN   35.5 H  (9.0-27.0)  mg/dL


 


Creatinine   8.0 H*  (0.6-1.5)  mg/dL


 


Est GFR (CKD-EPI)AfAm   6.0 L  (60.0-200.0)   


 


Est GFR (CKD-EPI)NonAf   5.2 L  (60.0-200.0)   


 


BUN/Creatinine Ratio   4.43 L  (12.00-20.00)  Ratio














Assessment and Plan


Assessment: 








RT. perihilar OPACITIES possible atelectasis versus pneumonia.  COVID-19 PCR not

detected.  


Missed Hemodialysis


ESRD on hemodialysis Monday Wednesday and Friday


Recent admission with status epilepticus.  We will continue with antiepileptic 

drugs as per recent admission.


Dysphagia diet with aspiration precautions as per most recent swallow study.


Left ICA 75% stenosis at the origin as per CTA neck.


Hypodensity in the brainstem and left basal ganglia related to previous CVA


History of CVA with left-sided weakness


Diabetes type 2


Peripheral neuropathy


Memory impairment


GERD


Hypertension


Hyperlipidemia


Hypothyroidism


Bipolar disorder


Prior history of smoking


DVT prophylaxis with heparin subcu


GI prophylaxis





Plan:


Patient will be continued on telemetry monitoring and will be started 

antiepileptic medications as per recent neurology recommendations.


Patient was started on antibiotics, Levaquin.


Nephrology is on board.  Hemodialysis tomorrow


Continue with dysphagia diet with aspiration precautions


Follow-up procalcitonin level 0.33


Continues home medications and follow-up closely.  Prognosis is guarded with 

multiple medical problems and comorbid conditions.


 Follow-up repeat chest x-ray tomorrow.


Nephrology neurology on board.  Plans for discharge back to facility in the next

24 to 48 hours.


Time with Patient: Greater than 30

## 2023-01-09 NOTE — P.PN
Subjective


Progress Note Date: 01/09/23


Patient is a 52-year-old female with a known history of ESRD on hemodialysis, 

seizure disorder with status epilepticus during recent admission and was 

discharged to [facility] on 1/4/2023, hypothyroidism, hypertension, hyperlipidemia, 

diabetes type 2, history of CVA/TIA and history of left foot drop, bipolar diso

rder and prior history of smoking was sent back from nursing home.  Patient was 

discharged on 1/4/2023.  Patient was found to be progressively lethargic as 

noted by the nursing home staff, patient was tested positive at the nursing home

this morning however COVID-19 PCR was negative currently.


Possible aspiration was also suspected as per nursing home staff.


Patient had a swallow study recently and was able to tolerate dysphagia diet 

with aspiration precautions.


Patient was afebrile on admission.  Pulse ox 96% on room air.


Chest x-ray showed right perihilar patchy opacities which may represent 

infiltrative versus atelectasis.


EKG showed sinus rhythm


Laboratory data showed WBC 6.4 hemoglobin 12.3, platelets 179


Sodium 136 potassium 4.5 chloride 100 bicarb is 24 BUN 35 and creatinine 9.62 an

d blood sugar is 106


UDS negative and influenza A, B, RSV and SARS-CoV-2 not detected.





1/7/2023


Patient is lying in bed.  Awake alert and oriented.  Slow to respond.  No 

further episodes of seizures.  No complaints of chest pain or worsening 

shortness of breath.  Denies any complaints of cough or sputum production.  No 

nausea vomiting abdominal pain or diarrhea


Laboratory reviewed.  Procalcitonin level is 0.33.  Sodium 139 potassium 4.9 

chloride 106 bicarb is 20 BUN 45 creatinine 10.3.


Patient is undergoing hemodialysis today.


Nephrology and neurology is on board.





1/8/2023


Patient is currently resting in the bed.  Awake alert and oriented x3.  Slow to 

respond.  No complaints of chest pain or worsening shortness of breath.  Patient

has been afebrile overnight.  No further episodes of seizures.  Patient is being

on dysphagia diet.


Patient did have episode of choking today afternoon while eating.  Remained 

stable at this time.  Denies any complaints of worsening chest pain or shortness

of breath.  Patient has been afebrile.  Follow-up repeat chest x-ray tomorrow.


Nephrology neurology on board.  Plans for discharge back to facility in the next

24 to 48 hours.





01/09/2022


Patient is evaluated today resting in bed, currently receiving hemodialysis. She

has been maintained on dysphagia diet with 1:1 supervision. She reports 

tolerating diet. No acute events overnight, no further seizure like activity. 

Neurology recommending for patient to follow up with epilepsy specialist on 

discharge. Currently she is refusing D/C to subacute rehab. She would like to 

discharge home and states her  will be able to provide care. Physical 

therapy evaluation pending at this time. She has not been ambulatory. 





Review of Systems





Constitutional: Denied any fatigue denied any fever.


Cardio vascular: denied any chest pain, palpitations


Gastrointestinal: denied any nausea, vomiting, diarrhea


Pulmonary: Denied any shortness of breath cough


Neurologic denied any new focal deficits





All inpatient medications were reviewed and appropriate changes in these 

medications as dictated in the interval history and assessment and plan.





PHYSICAL EXAMINATION: 





GENERAL: The patient is alert and oriented x3, not in any acute distress. Well 

developed, well nourished. 


HEENT: Pupils are round and equally reacting to light. EOMI. No scleral icterus.

No conjunctival pallor. Normocephalic, atraumatic. No pharyngeal erythema. No 

thyromegaly. 


CARDIOVASCULAR: S1 and S2 present. No murmurs, rubs, or gallops. 


PULMONARY: Chest is clear to auscultation, no wheezing or crackles. 


ABDOMEN: Soft, nontender, nondistended, normoactive bowel sounds. No palpable 

organomegaly. 


MUSCULOSKELETAL: No joint swelling or deformity.


EXTREMITIES: No cyanosis, clubbing, or pedal edema. 


NEUROLOGICAL: Slurred speech, diffuse generalized weakness


SKIN: No rashes. 





Assessment and Plan


Assessment


RT. perihilar pacities possible atelectasis versus pneumonia.  COVID-19 PCR not 

detected. Procalcitonin level 0.33. Follow up chest xray shows clearing of 

opacities and no white count. Pneumonia ruled out likely representing 

atelectasis. 


Missed Hemodialysis


ESRD on hemodialysis Monday Wednesday and Friday


Recent admission with status epilepticus.  We will continue with antiepileptic 

drugs as per recent admission.


Dysphagia diet with aspiration precautions as per most recent swallow study.


Left ICA 75% stenosis at the origin as per CTA neck.


Hypodensity in the brainstem and left basal ganglia related to previous CVA


History of CVA with left-sided weakness


Diabetes type 2


Peripheral neuropathy


Memory impairment


GERD


Hypertension


Hyperlipidemia


Hypothyroidism


Bipolar disorder


Prior history of smoking


DVT prophylaxis with heparin subcu


GI prophylaxis





Plan 


Continue current medications


Hemodialysis today 


PT/OT consultation for discharge planning


Patient would like to DC home at this time


Reinforce education regarding dysphagia diet and aspiration precautions


Will Require ongoing speech therapy outpatient.


Possible D/C in the next 24 to 48 hours 





The impression and plan of care has been dictated by Scarlet Bill Nurse 

Practitioner as directed.





Dr. Tom MD


I have performed a history and physical examination and medical decision making 

of this patient, discussed the same with the dictator, and agree with the 

dictators assessment and plan as written, documented as a scribe. Based on total

visit time, I have performed more than 50% of this visit. 











Objective





- Vital Signs


Vital signs: 


                                   Vital Signs











Temp  98.0 F   01/09/23 13:19


 


Pulse  69   01/09/23 13:19


 


Resp  18   01/09/23 13:19


 


BP  126/62   01/09/23 13:19


 


Pulse Ox  97   01/09/23 07:15


 


FiO2      








                                 Intake & Output











 01/08/23 01/09/23 01/09/23





 18:59 06:59 18:59


 


Intake Total   300


 


Output Total   1500


 


Balance   -1200


 


Intake:   


 


  Hemodialysis   300


 


Output:   


 


  Hemodialysis   1500


 


Other:   


 


  Voiding Method   Toilet


 


  # Voids  1 


 


  # Bowel Movements 1 1 














- Labs


CBC & Chem 7: 


                                 01/09/23 05:14





                                 01/09/23 05:14


Labs: 


                  Abnormal Lab Results - Last 24 Hours (Table)











  01/09/23 01/09/23 Range/Units





  05:14 05:14 


 


RBC  3.35 L   (4.10-5.20)  X 10*6/uL


 


Hgb  9.5 L   (12.0-15.0)  g/dL


 


Hct  29.7 L   (37.2-46.3)  %


 


RDW  14.7 H   (11.5-14.5)  %


 


Immature Gran #  0.17 H   (0.00-0.04)  X 10*3/uL


 


BUN   45.8 H  (9.0-27.0)  mg/dL


 


Creatinine   9.7 H*  (0.6-1.5)  mg/dL


 


Est GFR (CKD-EPI)AfAm   4.7 L  (60.0-200.0)   


 


Est GFR (CKD-EPI)NonAf   4.1 L  (60.0-200.0)   


 


BUN/Creatinine Ratio   4.72 L  (12.00-20.00)  Ratio














Assessment and Plan


Time with Patient: Less than 30

## 2023-01-09 NOTE — P.PN
Subjective


Progress Note Date: 01/07/23





Patient is a 52-year-old female with a known history of ESRD on hemodialysis, 

seizure disorder with status epilepticus during recent admission and was 

discharged to [facility] on 1/4/2023, hypothyroidism, hypertension, hyperlipidemia, 

diabetes type 2, history of CVA/TIA and history of left foot drop, bipolar dis

order and prior history of smoking was sent back from nursing home.  Patient was

discharged on 1/4/2023.  Patient was found to be progressively lethargic as 

noted by the nursing home staff, patient was tested positive at the nursing home

this morning however COVID-19 PCR was negative currently.


Possible aspiration was also suspected as per nursing home staff.


Patient had a swallow study recently and was able to tolerate dysphagia diet 

with aspiration precautions.


Patient was afebrile on admission.  Pulse ox 96% on room air.


Chest x-ray showed right perihilar patchy opacities which may represent 

infiltrative versus atelectasis.


EKG showed sinus rhythm


Laboratory data showed WBC 6.4 hemoglobin 12.3, platelets 179


Sodium 136 potassium 4.5 chloride 100 bicarb is 24 BUN 35 and creatinine 9.62 a

nd blood sugar is 106


UDS negative and influenza A, B, RSV and SARS-CoV-2 not detected.


1/7/2023


Patient is lying in bed.  Awake alert and oriented.  Slow to respond.  No 

further episodes of seizures.  No complaints of chest pain or worsening 

shortness of breath.  Denies any complaints of cough or sputum production.  No 

nausea vomiting abdominal pain or diarrhea


Laboratory reviewed.  Procalcitonin level is 0.33.  Sodium 139 potassium 4.9 

chloride 106 bicarb is 20 BUN 45 creatinine 10.3.


Patient is undergoing hemodialysis today.


Nephrology and neurology is on board.





Current medications reviewed.








Objective





- Vital Signs


Vital signs: 


                                   Vital Signs











Temp  97.6 F   01/07/23 07:35


 


Pulse  76   01/07/23 07:35


 


Resp  16   01/07/23 07:35


 


BP  148/81   01/07/23 07:35


 


Pulse Ox  99   01/07/23 07:35


 


FiO2      








                                 Intake & Output











 01/06/23 01/07/23 01/07/23





 18:59 06:59 18:59


 


Weight 72.575 kg  


 


Other:   


 


  Voiding Method  Toilet 


 


  # Bowel Movements   1














- Exam





PHYSICAL EXAMINATION: 


Patient is lying in the bed comfortably, no acute distress, awake alert and 

oriented..  Lethargic and weak and slow to respond.


HEENT: Normocephalic. Neck is supple. Pupils reactive. Nostrils clear. Oral 

cavity is moist. 


Neck reveals no JVD, carotid bruits, or thyromegaly. 


CHEST EXAMINATION: Trachea is central. Symmetrical expansion.  Bibasilar 

diminished sounds.  No wheezing or rhonchi.. 


CARDIAC: Normal S1, S2 with no gallops. No murmurs 


ABDOMEN: Soft. Bowel sounds present.  Nontender.  No organomegaly. No abdominal 

bruits. 


Extremities: reveal no edema.  No clubbing or cyanosis


Neurologically awake, alert, oriented x2-left-sided weakness.  Lethargic and 

drowsy.


Skin: No rash or skin lesions. 


Psychiatric: Coperative.  Could not be assessed completely.  Musculoskeletal: No

joint swelling or deformity.





- Labs


CBC & Chem 7: 


                                 01/08/23 05:29





                                 01/08/23 05:29


Labs: 


                  Abnormal Lab Results - Last 24 Hours (Table)











  01/07/23 01/07/23 Range/Units





  06:43 07:04 


 


Carbon Dioxide   20 L  (22-30)  mmol/L


 


BUN   45 H  (7-17)  mg/dL


 


Creatinine   10.33 H*  (0.52-1.04)  mg/dL


 


Procalcitonin  0.33 H   (0.02-0.09)  ng/mL














Assessment and Plan


Assessment: 








RT. perihilar OPACITIES possible atelectasis versus pneumonia.  COVID-19 PCR not

detected.  


Missed Hemodialysis


ESRD on hemodialysis Monday Wednesday and Friday


Recent admission with status epilepticus.  We will continue with antiepileptic 

drugs as per recent admission.


Dysphagia diet with aspiration precautions as per most recent swallow study.


Left ICA 75% stenosis at the origin as per CTA neck.


Hypodensity in the brainstem and left basal ganglia related to previous CVA


History of CVA with left-sided weakness


Diabetes type 2


Peripheral neuropathy


Memory impairment


GERD


Hypertension


Hyperlipidemia


Hypothyroidism


Bipolar disorder


Prior history of smoking


DVT prophylaxis with heparin subcu


GI prophylaxis





Plan:


Patient will be continued on telemetry monitoring and will be started 

antiepileptic medications as per recent neurology recommendations.


Patient was started on antibiotics, Levaquin.


Nephrology is on board.  Hemodialysis today.


Continue with dysphagia diet with aspiration precautions


Follow-up procalcitonin level and repeat chest x-ray.


Continues home medications and follow-up closely.  Prognosis is guarded with 

multiple medical problems and comorbid conditions.





Time with Patient: Greater than 30

## 2023-01-10 RX ADMIN — PANTOPRAZOLE SODIUM SCH MG: 40 TABLET, DELAYED RELEASE ORAL at 06:56

## 2023-01-10 RX ADMIN — DIVALPROEX SODIUM SCH MG: 125 CAPSULE, COATED PELLETS ORAL at 07:53

## 2023-01-10 RX ADMIN — METOPROLOL TARTRATE SCH MG: 25 TABLET, FILM COATED ORAL at 20:43

## 2023-01-10 RX ADMIN — Medication SCH MG: at 16:44

## 2023-01-10 RX ADMIN — HEPARIN SODIUM SCH UNIT: 5000 INJECTION INTRAVENOUS; SUBCUTANEOUS at 07:53

## 2023-01-10 RX ADMIN — DIVALPROEX SODIUM SCH MG: 125 CAPSULE, COATED PELLETS ORAL at 20:43

## 2023-01-10 RX ADMIN — SEVELAMER CARBONATE SCH MG: 800 TABLET, FILM COATED ORAL at 16:44

## 2023-01-10 RX ADMIN — HEPARIN SODIUM SCH UNIT: 5000 INJECTION INTRAVENOUS; SUBCUTANEOUS at 20:44

## 2023-01-10 RX ADMIN — Medication SCH MG: at 06:56

## 2023-01-10 RX ADMIN — ZONISAMIDE SCH MG: 100 CAPSULE ORAL at 07:54

## 2023-01-10 RX ADMIN — CLOPIDOGREL BISULFATE SCH MG: 75 TABLET ORAL at 07:53

## 2023-01-10 RX ADMIN — ATORVASTATIN CALCIUM SCH MG: 40 TABLET, FILM COATED ORAL at 07:53

## 2023-01-10 RX ADMIN — SEVELAMER CARBONATE SCH MG: 800 TABLET, FILM COATED ORAL at 11:36

## 2023-01-10 RX ADMIN — METOPROLOL TARTRATE SCH MG: 25 TABLET, FILM COATED ORAL at 07:54

## 2023-01-10 RX ADMIN — Medication SCH MG: at 11:36

## 2023-01-10 RX ADMIN — SEVELAMER CARBONATE SCH MG: 800 TABLET, FILM COATED ORAL at 06:56

## 2023-01-10 RX ADMIN — DIVALPROEX SODIUM SCH MG: 125 CAPSULE, COATED PELLETS ORAL at 16:44

## 2023-01-10 NOTE — P.PN
Subjective


Progress Note Date: 01/10/23


Patient is a 52-year-old female with a known history of ESRD on hemodialysis, 

seizure disorder with status epilepticus during recent admission and was 

discharged to [facility] on 1/4/2023, hypothyroidism, hypertension, hyperlipidemia, 

diabetes type 2, history of CVA/TIA and history of left foot drop, bipolar diso

rder and prior history of smoking was sent back from nursing home.  Patient was 

discharged on 1/4/2023.  Patient was found to be progressively lethargic as 

noted by the nursing home staff, patient was tested positive at the nursing home

this morning however COVID-19 PCR was negative currently.


Possible aspiration was also suspected as per nursing home staff.


Patient had a swallow study recently and was able to tolerate dysphagia diet 

with aspiration precautions.


Patient was afebrile on admission.  Pulse ox 96% on room air.


Chest x-ray showed right perihilar patchy opacities which may represent 

infiltrative versus atelectasis.


EKG showed sinus rhythm


Laboratory data showed WBC 6.4 hemoglobin 12.3, platelets 179


Sodium 136 potassium 4.5 chloride 100 bicarb is 24 BUN 35 and creatinine 9.62 an

d blood sugar is 106


UDS negative and influenza A, B, RSV and SARS-CoV-2 not detected.





1/7/2023


Patient is lying in bed.  Awake alert and oriented.  Slow to respond.  No 

further episodes of seizures.  No complaints of chest pain or worsening 

shortness of breath.  Denies any complaints of cough or sputum production.  No 

nausea vomiting abdominal pain or diarrhea


Laboratory reviewed.  Procalcitonin level is 0.33.  Sodium 139 potassium 4.9 

chloride 106 bicarb is 20 BUN 45 creatinine 10.3.


Patient is undergoing hemodialysis today.


Nephrology and neurology is on board.





1/8/2023


Patient is currently resting in the bed.  Awake alert and oriented x3.  Slow to 

respond.  No complaints of chest pain or worsening shortness of breath.  Patient

has been afebrile overnight.  No further episodes of seizures.  Patient is being

on dysphagia diet.


Patient did have episode of choking today afternoon while eating.  Remained 

stable at this time.  Denies any complaints of worsening chest pain or shortness

of breath.  Patient has been afebrile.  Follow-up repeat chest x-ray tomorrow.


Nephrology neurology on board.  Plans for discharge back to facility in the next

24 to 48 hours.





01/09/2022


Patient is evaluated today resting in bed, currently receiving hemodialysis. She

has been maintained on dysphagia diet with 1:1 supervision. She reports 

tolerating diet. No acute events overnight, no further seizure like activity. 

Neurology recommending for patient to follow up with epilepsy specialist on 

discharge. Currently she is refusing D/C to subacute rehab. She would like to 

discharge home and states her  will be able to provide care. Physical 

therapy evaluation pending at this time. She has not been ambulatory. 





01/10/2023


Patient is resting in bed. She has been evaluated by PT/OT today and 

recommendations for subacute rehab she is willing to go to rehab now.  

unable to provide care for her at home at this time. She is maintained on 

dialysis scheduled of Mon/Wed/Fri and will undergo hemodialysis tomorrow. 

Continues with aspiration precautions sitting upright with dysphagia chopped 

diet and nectar thick liquids. Labs and vitals stable. Pending authorization for

DC to rehab. 





Review of Systems





Constitutional: Denied any fatigue denied any fever.


Cardio vascular: denied any chest pain, palpitations


Gastrointestinal: denied any nausea, vomiting, diarrhea


Pulmonary: Denied any shortness of breath cough


Neurologic denied any new focal deficits





All inpatient medications were reviewed and appropriate changes in these 

medications as dictated in the interval history and assessment and plan.





PHYSICAL EXAMINATION: 





GENERAL: The patient is alert and oriented x3, not in any acute distress. Well 

developed, well nourished. 


HEENT: Pupils are round and equally reacting to light. EOMI. No scleral icterus.

No conjunctival pallor. Normocephalic, atraumatic. No pharyngeal erythema. No 

thyromegaly. 


CARDIOVASCULAR: S1 and S2 present. No murmurs, rubs, or gallops. 


PULMONARY: Chest is clear to auscultation, no wheezing or crackles. 


ABDOMEN: Soft, nontender, nondistended, normoactive bowel sounds. No palpable 

organomegaly. 


MUSCULOSKELETAL: No joint swelling or deformity.


EXTREMITIES: No cyanosis, clubbing, or pedal edema. 


NEUROLOGICAL: Slurred speech, diffuse generalized weakness


SKIN: No rashes. 





Assessment and Plan


Assessment


RT. perihilar pacities possible atelectasis versus pneumonia.  COVID-19 PCR not 

detected. Procalcitonin level 0.33. Follow up chest xray shows clearing of 

opacities and no white count. Pneumonia ruled out likely representing 

atelectasis. 


Missed Hemodialysis


ESRD on hemodialysis Monday Wednesday and Friday


Recent admission with status epilepticus.  We will continue with antiepileptic 

drugs as per recent admission.


Dysphagia diet with aspiration precautions as per most recent swallow study.


Left ICA 75% stenosis at the origin as per CTA neck.


Hypodensity in the brainstem and left basal ganglia related to previous CVA


History of CVA with left-sided weakness


Diabetes type 2


Peripheral neuropathy


Memory impairment


GERD


Hypertension


Hyperlipidemia


Hypothyroidism


Bipolar disorder


Prior history of smoking


DVT prophylaxis with heparin subcu


GI prophylaxis





Plan 


Continue current medications


Hemodialysis tomorrow


Reinforce education regarding dysphagia diet and aspiration precautions


Will Require ongoing speech therapy outpatient.


Possible D/C in the next 24 to 48 hours to subacute rehab pending auth.





The impression and plan of care has been dictated by Scarlet Bill, Nurse 

Practitioner as directed.





Dr. Tom MD


I have performed a history and physical examination and medical decision making 

of this patient, discussed the same with the dictator, and agree with the 

dictators assessment and plan as written, documented as a scribe. Based on total

visit time, I have performed more than 50% of this visit. 











Objective





- Vital Signs


Vital signs: 


                                   Vital Signs











Temp  98.3 F   01/10/23 14:10


 


Pulse  76   01/10/23 14:10


 


Resp  16   01/10/23 14:10


 


BP  117/72   01/10/23 14:10


 


Pulse Ox  99   01/10/23 14:10


 


FiO2      








                                 Intake & Output











 01/09/23 01/10/23 01/10/23





 18:59 06:59 18:59


 


Intake Total 300  


 


Output Total 1500  


 


Balance -1200  


 


Intake:   


 


  Hemodialysis 300  


 


Output:   


 


  Hemodialysis 1500  


 


Other:   


 


  Voiding Method Toilet  


 


  # Voids 0 1 














- Labs


CBC & Chem 7: 


                                 01/09/23 05:14





                                 01/09/23 05:14





Assessment and Plan


Time with Patient: Less than 30

## 2023-01-10 NOTE — P.PN
Subjective





Patient is seen in follow-up for end-stage renal disease.  No problems with 

dialysis yesterday.  Sitting up in bed.  Tolerating dysphagia 3 diet but has to 

be fed.  Hemodynamically stable.  No changes overnight.  Wants to go home.





Vital signs are stable.


General: Awake.  No acute distress.


HEENT: Head exam is unremarkable. 


LUNGS: Breath sounds decreased.


HEART: Rate and Rhythm are regular. 


ABDOMEN: Soft, no distention.


EXTREMITITES: No edema. 





Objective





- Vital Signs


Vital signs: 


                                   Vital Signs











Temp  97.5 F L  01/10/23 07:35


 


Pulse  67   01/10/23 07:35


 


Resp  18   01/10/23 07:35


 


BP  128/76   01/10/23 07:35


 


Pulse Ox  97   01/10/23 07:35


 


FiO2      








                                 Intake & Output











 01/09/23 01/10/23 01/10/23





 18:59 06:59 18:59


 


Intake Total 300  


 


Output Total 1500  


 


Balance -1200  


 


Intake:   


 


  Hemodialysis 300  


 


Output:   


 


  Hemodialysis 1500  


 


Other:   


 


  Voiding Method Toilet  


 


  # Voids 0 1 














- Labs


CBC & Chem 7: 


                                 01/09/23 05:14





                                 01/09/23 05:14





Assessment and Plan


Plan: 





Assessment:


1.  End-stage renal disease maintained on hemodialysis on Monday Wednesday F

riday schedule.


2.  Hypertension with chronic kidney disease.  Controlled.


3.  History of seizures.


4.  Chronic kidney disease mineral bone disease maintained on PhosLo and 

Renvela.  Phosphorus level 5.0 today.


5.  Metabolic acidosis secondary to chronic kidney disease and missed dialysis 

treatment.  Better.





Plan:


Hemodialysis tomorrow.

## 2023-01-11 LAB — GLUCOSE BLD-MCNC: 163 MG/DL (ref 70–110)

## 2023-01-11 RX ADMIN — SEVELAMER CARBONATE SCH MG: 800 TABLET, FILM COATED ORAL at 12:07

## 2023-01-11 RX ADMIN — SEVELAMER CARBONATE SCH MG: 800 TABLET, FILM COATED ORAL at 16:43

## 2023-01-11 RX ADMIN — LACOSAMIDE SCH MG: 50 TABLET, FILM COATED ORAL at 20:16

## 2023-01-11 RX ADMIN — HEPARIN SODIUM SCH UNIT: 5000 INJECTION INTRAVENOUS; SUBCUTANEOUS at 20:16

## 2023-01-11 RX ADMIN — DIVALPROEX SODIUM SCH MG: 125 CAPSULE, COATED PELLETS ORAL at 16:42

## 2023-01-11 RX ADMIN — PANTOPRAZOLE SODIUM SCH MG: 40 TABLET, DELAYED RELEASE ORAL at 07:03

## 2023-01-11 RX ADMIN — CLOPIDOGREL BISULFATE SCH MG: 75 TABLET ORAL at 08:47

## 2023-01-11 RX ADMIN — METOPROLOL TARTRATE SCH MG: 25 TABLET, FILM COATED ORAL at 20:16

## 2023-01-11 RX ADMIN — Medication SCH MG: at 07:03

## 2023-01-11 RX ADMIN — METOPROLOL TARTRATE SCH MG: 25 TABLET, FILM COATED ORAL at 08:48

## 2023-01-11 RX ADMIN — ZONISAMIDE SCH MG: 100 CAPSULE ORAL at 08:49

## 2023-01-11 RX ADMIN — DIVALPROEX SODIUM SCH MG: 125 CAPSULE, COATED PELLETS ORAL at 21:20

## 2023-01-11 RX ADMIN — HEPARIN SODIUM SCH UNIT: 5000 INJECTION INTRAVENOUS; SUBCUTANEOUS at 08:49

## 2023-01-11 RX ADMIN — Medication SCH MG: at 12:07

## 2023-01-11 RX ADMIN — SEVELAMER CARBONATE SCH MG: 800 TABLET, FILM COATED ORAL at 07:03

## 2023-01-11 RX ADMIN — ATORVASTATIN CALCIUM SCH MG: 40 TABLET, FILM COATED ORAL at 08:48

## 2023-01-11 RX ADMIN — LACOSAMIDE SCH MG: 50 TABLET, FILM COATED ORAL at 10:40

## 2023-01-11 RX ADMIN — DIVALPROEX SODIUM SCH MG: 125 CAPSULE, COATED PELLETS ORAL at 08:47

## 2023-01-11 RX ADMIN — Medication SCH MG: at 16:43

## 2023-01-11 NOTE — P.PN
Subjective





Patient is seen in follow-up for end-stage renal disease.  Tolerating dialysis 

well.  Tolerating dysphagia 3 diet. Hemodynamically stable.  No changes 

overnight.  Wants to go home.





Vital signs are stable.


General: Awake.  No acute distress.


HEENT: Head exam is unremarkable. 


LUNGS: Breath sounds decreased.


HEART: Rate and Rhythm are regular. 


ABDOMEN: Soft, no distention.


EXTREMITITES: No edema. 





Objective





- Vital Signs


Vital signs: 


                                   Vital Signs











Temp  96.1 F L  01/11/23 07:11


 


Pulse  70   01/11/23 07:11


 


Resp  17   01/11/23 07:11


 


BP  129/63   01/11/23 07:11


 


Pulse Ox  98   01/11/23 07:11


 


FiO2  21   01/10/23 20:35








                                 Intake & Output











 01/10/23 01/11/23 01/11/23





 18:59 06:59 18:59


 


Weight  55.5 kg 55.5 kg


 


Other:   


 


  Voiding Method   Toilet


 


  # Voids 2  














- Labs


CBC & Chem 7: 


                                 01/09/23 05:14





                                 01/09/23 05:14





Assessment and Plan


Plan: 





Assessment:


1.  End-stage renal disease maintained on hemodialysis on Monday Wednesday Friday schedule.


2.  Hypertension with chronic kidney disease.  Controlled.


3.  History of seizures.


4.  Chronic kidney disease mineral bone disease maintained on PhosLo and 

Renvela.  Phosphorus level 5.0 dated 01/09/2023.


5.  Metabolic acidosis secondary to chronic kidney disease and missed dialysis 

treatment.  Better.





Plan:


Currently seen while undergoing hemodialysis.  Next treatment on Friday.

## 2023-01-11 NOTE — P.PN
Subjective


Progress Note Date: 01/11/23


Patient is a 52-year-old female with a known history of ESRD on hemodialysis, 

seizure disorder with status epilepticus during recent admission and was 

discharged to [facility] on 1/4/2023, hypothyroidism, hypertension, hyperlipidemia, 

diabetes type 2, history of CVA/TIA and history of left foot drop, bipolar diso

rder and prior history of smoking was sent back from nursing home.  Patient was 

discharged on 1/4/2023.  Patient was found to be progressively lethargic as 

noted by the nursing home staff, patient was tested positive at the nursing home

this morning however COVID-19 PCR was negative currently.


Possible aspiration was also suspected as per nursing home staff.


Patient had a swallow study recently and was able to tolerate dysphagia diet 

with aspiration precautions.


Patient was afebrile on admission.  Pulse ox 96% on room air.


Chest x-ray showed right perihilar patchy opacities which may represent 

infiltrative versus atelectasis.


EKG showed sinus rhythm


Laboratory data showed WBC 6.4 hemoglobin 12.3, platelets 179


Sodium 136 potassium 4.5 chloride 100 bicarb is 24 BUN 35 and creatinine 9.62 an

d blood sugar is 106


UDS negative and influenza A, B, RSV and SARS-CoV-2 not detected.





1/7/2023


Patient is lying in bed.  Awake alert and oriented.  Slow to respond.  No 

further episodes of seizures.  No complaints of chest pain or worsening 

shortness of breath.  Denies any complaints of cough or sputum production.  No 

nausea vomiting abdominal pain or diarrhea


Laboratory reviewed.  Procalcitonin level is 0.33.  Sodium 139 potassium 4.9 

chloride 106 bicarb is 20 BUN 45 creatinine 10.3.


Patient is undergoing hemodialysis today.


Nephrology and neurology is on board.





1/8/2023


Patient is currently resting in the bed.  Awake alert and oriented x3.  Slow to 

respond.  No complaints of chest pain or worsening shortness of breath.  Patient

has been afebrile overnight.  No further episodes of seizures.  Patient is being

on dysphagia diet.


Patient did have episode of choking today afternoon while eating.  Remained 

stable at this time.  Denies any complaints of worsening chest pain or shortness

of breath.  Patient has been afebrile.  Follow-up repeat chest x-ray tomorrow.


Nephrology neurology on board.  Plans for discharge back to facility in the next

24 to 48 hours.





01/09/2022


Patient is evaluated today resting in bed, currently receiving hemodialysis. She

has been maintained on dysphagia diet with 1:1 supervision. She reports 

tolerating diet. No acute events overnight, no further seizure like activity. 

Neurology recommending for patient to follow up with epilepsy specialist on 

discharge. Currently she is refusing D/C to subacute rehab. She would like to 

discharge home and states her  will be able to provide care. Physical 

therapy evaluation pending at this time. She has not been ambulatory. 





01/10/2023


Patient is resting in bed. She has been evaluated by PT/OT today and 

recommendations for subacute rehab she is willing to go to rehab now.  

unable to provide care for her at home at this time. She is maintained on 

dialysis scheduled of Mon/Wed/Fri and will undergo hemodialysis tomorrow. 

Continues with aspiration precautions sitting upright with dysphagia chopped 

diet and nectar thick liquids. Labs and vitals stable. Pending authorization for

DC to rehab. 





01/11/2023


Patient evaluated today during hemodialysis. No acute events overnight. Making 

progress with physical therapy was able to sit on edge of bed unassisted. She is

continuing to make progress and is agreeable to continuing therapy. Additionally

has been re-evaluated by speech therapy today and is able to tolerate free 

water. Continues with aspiration precautions and close monitoring with meal 

intake. She continues on MWF hemodialysis scheduled. Resumed on all oral 

antiseizure medications. Patient is agreeable for D/C to rehab and pending 

return to [facility] otherwise medically stable. 





Review of Systems





Constitutional: Denied any fatigue denied any fever.


Cardio vascular: denied any chest pain, palpitations


Gastrointestinal: denied any nausea, vomiting, diarrhea


Pulmonary: Denied any shortness of breath cough


Neurologic denied any new focal deficits





All inpatient medications were reviewed and appropriate changes in these 

medications as dictated in the interval history and assessment and plan.





PHYSICAL EXAMINATION: 





GENERAL: The patient is alert and oriented x3, not in any acute distress. Well 

developed, well nourished. 


HEENT: Pupils are round and equally reacting to light. EOMI. No scleral icterus.

No conjunctival pallor. Normocephalic, atraumatic. No pharyngeal erythema. No 

thyromegaly. 


CARDIOVASCULAR: S1 and S2 present. No murmurs, rubs, or gallops. 


PULMONARY: Chest is clear to auscultation, no wheezing or crackles. 


ABDOMEN: Soft, nontender, nondistended, normoactive bowel sounds. No palpable 

organomegaly. 


MUSCULOSKELETAL: No joint swelling or deformity.


EXTREMITIES: No cyanosis, clubbing, or pedal edema. 


NEUROLOGICAL: Slurred speech, diffuse generalized weakness


SKIN: No rashes. 





Assessment and Plan


Assessment


RT. perihilar pacities possible atelectasis versus pneumonia.  COVID-19 PCR not 

detected. Procalcitonin level 0.33. Follow up chest xray shows clearing of 

opacities and no white count. Pneumonia ruled out likely representing 

atelectasis. 


Missed Hemodialysis


ESRD on hemodialysis Monday Wednesday and Friday


Recent admission with status epilepticus.  We will continue with antiepileptic 

drugs as per recent admission.


Dysphagia diet with aspiration precautions as per most recent swallow study.


Left ICA 75% stenosis at the origin as per CTA neck.


Hypodensity in the brainstem and left basal ganglia related to previous CVA


History of CVA with left-sided weakness


Diabetes type 2


Peripheral neuropathy


Memory impairment


GERD


Hypertension


Hyperlipidemia


Hypothyroidism


Bipolar disorder


Prior history of smoking


DVT prophylaxis with heparin subcu


GI prophylaxis





Plan 


Continue current medications


Continue PT/OT and speech therapy 


Resumed on oral vimpat had not been resumed after D/C IV dosing. 


Hemodialysis Friday


Reinforce education regarding dysphagia diet and aspiration precautions


Cleared for free water intake with guidelines


Possible D/C in the next 24 to 48 hours return to [facility] pending acceptance and

insurance 


Medically stable for D/C.





The impression and plan of care has been dictated by Scarlet Bill Nurse 

Practitioner as directed.





Dr. Tom MD


I have performed a history and physical examination and medical decision making 

of this patient, discussed the same with the dictator, and agree with the 

dictators assessment and plan as written, documented as a scribe. Based on total

visit time, I have performed more than 50% of this visit. 











Objective





- Vital Signs


Vital signs: 


                                   Vital Signs











Temp  96.1 F L  01/11/23 07:11


 


Pulse  70   01/11/23 07:11


 


Resp  17   01/11/23 07:11


 


BP  129/63   01/11/23 07:11


 


Pulse Ox  98   01/11/23 07:11


 


FiO2  21   01/10/23 20:35








                                 Intake & Output











 01/10/23 01/11/23 01/11/23





 18:59 06:59 18:59


 


Weight  55.5 kg 55.5 kg


 


Other:   


 


  Voiding Method   Toilet


 


  # Voids 2  














- Labs


CBC & Chem 7: 


                                 01/09/23 05:14





                                 01/09/23 05:14





Assessment and Plan


Time with Patient: Less than 30

## 2023-01-11 NOTE — P.PN
Subjective


Progress Note Date: 01/10/23





Patient was seen for a follow-up.  Patient has been seen numerous times in the 

hospital with previous admissions.  On this admission, patient initially seen by

Dr. Michele Merritt.  Please refer to his note for details.  Patient has history of 

seizure disorder.  She was discharged, but then readmitted for lethargy and 

confusion.  Her seizures are controlled.





No seizures reported since arrival to the hospital.





Objective





- Vital Signs


Vital signs: 


                                   Vital Signs











Temp  98.3 F   01/10/23 14:10


 


Pulse  76   01/10/23 14:10


 


Resp  16   01/10/23 14:10


 


BP  117/72   01/10/23 14:10


 


Pulse Ox  99   01/10/23 14:10


 


FiO2      








                                 Intake & Output











 01/09/23 01/10/23 01/10/23





 18:59 06:59 18:59


 


Intake Total 300  


 


Output Total 1500  


 


Balance -1200  


 


Intake:   


 


  Hemodialysis 300  


 


Output:   


 


  Hemodialysis 1500  


 


Other:   


 


  Voiding Method Toilet  


 


  # Voids 0 1 














- Exam





Patient is alert and awake, slow mentation.  However better than previous 

admission.





On muscle strength testing (right/left) deltoid 5/4+, biceps 5/5, triceps 5/5, 

 5/4+, hip flexion 4+5-/0, ankle dorsiflexion 5/0.





Patient has bilateral Babinski.





- Labs


CBC & Chem 7: 


                                 01/09/23 05:14





                                 01/09/23 05:14





Assessment and Plan


Assessment: 





This is a 53-year-old woman with history of medical refractory epilepsy was 

recently discharged from our facility on 01/04/2023 because of clinical status 

epilepticus who presented to the back to our facility from nursing facility 

because of lethargy.





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 

  epilepsy (on multiple medication).  She was found lethargic and I'll not sure 

  if the patient had a seizure/post ictal state.  She has been stable in our 

  facility and no further seizures for the past two days.


* History of epilepsy and she had a prolonged EEG in our facility which was 

  suggestive of primary generalized epilepsy


* Left ICA stenosis of 75% on CTA but discrepancy on the carotid duplex and not 

  significant


* History of stroke with residual left hemiparesis


* Dabetes mellitus


* End-stage renal disease on dialysis


* Hypertension 





Plan: 








* She had multiple EEGs in a prolonged to a half hour EEG in our facility.  

  There is no need for a repeat EEG if clinically she is improving.





* Patient currently on Zonegran 100 mg once a day.  If patient has any 

  breakthrough seizures, would increase Zonegran to 100 mg twice a day.  


* Continue Keppra 500 mg 1 tablet every 12 hours with additional 500 mg after 

  dialysis.   


* Continue Depakote 500 mg 1 tablet 3 times a day.  On just the recent and prior

  admission patient was on these medication and had no seizures for days prior 

  to discharge.  Her Depakote level currently is 111.9 (considered high normal).


* Patient is off Vimpat.





* Placed on seizure precautions seizure pads


* I highly recommend the patient to follow-up with an epileptilogist as outpat

  ient for her medical refractory epilepsy.  She had multiple admission to our 

  [facility] as well is to another local facility and her seizures needs to be seen 

  by a specialist as an outpatient.  Dr. Merritt recommended Epidiolex since has 

  medical refractory epilepsy


* Nephrology is consulted


* Speech therapy is consulted


* Defer the rest of the medical management to the primary team.


* Neurologically clear.

## 2023-01-12 LAB
FERRITIN SERPL-MCNC: 2270 NG/ML (ref 10–291)
GLUCOSE BLD-MCNC: 115 MG/DL (ref 70–110)
GLUCOSE BLD-MCNC: 145 MG/DL (ref 70–110)
GLUCOSE BLD-MCNC: 157 MG/DL (ref 70–110)
GLUCOSE BLD-MCNC: 203 MG/DL (ref 70–110)
IRON SERPL-MCNC: 110 UG/DL (ref 50–170)
TIBC SERPL-MCNC: 181 UG/DL (ref 228–460)

## 2023-01-12 RX ADMIN — DIVALPROEX SODIUM SCH MG: 125 CAPSULE, COATED PELLETS ORAL at 07:49

## 2023-01-12 RX ADMIN — HEPARIN SODIUM SCH UNIT: 5000 INJECTION INTRAVENOUS; SUBCUTANEOUS at 07:49

## 2023-01-12 RX ADMIN — METOPROLOL TARTRATE SCH MG: 25 TABLET, FILM COATED ORAL at 07:49

## 2023-01-12 RX ADMIN — Medication SCH MG: at 17:10

## 2023-01-12 RX ADMIN — DIVALPROEX SODIUM SCH MG: 125 CAPSULE, COATED PELLETS ORAL at 17:11

## 2023-01-12 RX ADMIN — HEPARIN SODIUM SCH UNIT: 5000 INJECTION INTRAVENOUS; SUBCUTANEOUS at 20:56

## 2023-01-12 RX ADMIN — SEVELAMER CARBONATE SCH MG: 800 TABLET, FILM COATED ORAL at 12:20

## 2023-01-12 RX ADMIN — SEVELAMER CARBONATE SCH MG: 800 TABLET, FILM COATED ORAL at 07:48

## 2023-01-12 RX ADMIN — PANTOPRAZOLE SODIUM SCH MG: 40 TABLET, DELAYED RELEASE ORAL at 07:48

## 2023-01-12 RX ADMIN — METOPROLOL TARTRATE SCH MG: 25 TABLET, FILM COATED ORAL at 20:56

## 2023-01-12 RX ADMIN — DIVALPROEX SODIUM SCH MG: 125 CAPSULE, COATED PELLETS ORAL at 20:57

## 2023-01-12 RX ADMIN — CLOPIDOGREL BISULFATE SCH MG: 75 TABLET ORAL at 07:49

## 2023-01-12 RX ADMIN — ZONISAMIDE SCH MG: 100 CAPSULE ORAL at 07:49

## 2023-01-12 RX ADMIN — Medication SCH MG: at 12:20

## 2023-01-12 RX ADMIN — ATORVASTATIN CALCIUM SCH MG: 40 TABLET, FILM COATED ORAL at 07:49

## 2023-01-12 RX ADMIN — SEVELAMER CARBONATE SCH MG: 800 TABLET, FILM COATED ORAL at 17:10

## 2023-01-12 RX ADMIN — Medication SCH MG: at 07:48

## 2023-01-12 RX ADMIN — LACOSAMIDE SCH MG: 50 TABLET, FILM COATED ORAL at 07:49

## 2023-01-12 NOTE — P.PN
Subjective





Patient is seen in follow-up for end-stage renal disease.  No problems with 

dialysis yesterday.  Tolerating dysphagia 3 diet. Hemodynamically stable.  No 

changes overnight.  Always discharged to rehab.





Vital signs are stable.


General: Awake.  No acute distress.


HEENT: Head exam is unremarkable. 


LUNGS: Breath sounds decreased.


HEART: Rate and Rhythm are regular. 


ABDOMEN: Soft, no distention.


EXTREMITITES: No edema. 





Objective





- Vital Signs


Vital signs: 


                                   Vital Signs











Temp  96.9 F L  01/12/23 07:45


 


Pulse  76   01/12/23 07:45


 


Resp  15   01/12/23 07:45


 


BP  131/86   01/12/23 07:45


 


Pulse Ox  96   01/12/23 08:49


 


FiO2  21   01/10/23 20:35








                                 Intake & Output











 01/11/23 01/12/23 01/12/23





 18:59 06:59 18:59


 


Intake Total 300 240 


 


Output Total 2000  


 


Balance -1700 240 


 


Weight 55.5 kg  


 


Intake:   


 


  Oral  240 


 


  Hemodialysis 300  


 


Output:   


 


  Hemodialysis 2000  


 


Other:   


 


  Voiding Method Toilet Diaper Diaper





  Incontinent Incontinent


 


  # Voids 1 2 


 


  # Bowel Movements   1














- Labs


CBC & Chem 7: 


                                 01/09/23 05:14





                                 01/09/23 05:14


Labs: 


                  Abnormal Lab Results - Last 24 Hours (Table)











  01/08/23 01/11/23 01/12/23 Range/Units





  05:29 21:19 06:41 


 


POC Glucose (mg/dL)   163 H  115 H  ()  mg/dL


 


Free Valproic Acid  65.9 H    (4.8-17.3)  mg/L














  01/12/23 Range/Units





  11:24 


 


POC Glucose (mg/dL)  157 H  ()  mg/dL


 


Free Valproic Acid   (4.8-17.3)  mg/L














Assessment and Plan


Plan: 





Assessment:


1.  End-stage renal disease maintained on hemodialysis on Monday Wednesday Friday schedule.


2.  Hypertension with chronic kidney disease.  Controlled.


3.  History of seizures.


4.  Chronic kidney disease mineral bone disease maintained on PhosLo and 

Renvela.  Phosphorus level 5.0 dated 01/09/2023.


5.  Metabolic acidosis secondary to chronic kidney disease and missed dialysis 

treatment.  Better.


6.  Anemia of chronic kidney disease.





Plan:


Hemodialysis tomorrow.


Check iron studies.


Add Aranesp.


Awaits discharged to [facility].

## 2023-01-12 NOTE — P.PN
Subjective


Progress Note Date: 01/12/23


Patient is a 52-year-old female with a known history of ESRD on hemodialysis, 

seizure disorder with status epilepticus during recent admission and was 

discharged to [facility] on 1/4/2023, hypothyroidism, hypertension, hyperlipidemia, 

diabetes type 2, history of CVA/TIA and history of left foot drop, bipolar diso

rder and prior history of smoking was sent back from nursing home.  Patient was 

discharged on 1/4/2023.  Patient was found to be progressively lethargic as 

noted by the nursing home staff, patient was tested positive at the nursing home

this morning however COVID-19 PCR was negative currently.


Possible aspiration was also suspected as per nursing home staff.


Patient had a swallow study recently and was able to tolerate dysphagia diet 

with aspiration precautions.


Patient was afebrile on admission.  Pulse ox 96% on room air.


Chest x-ray showed right perihilar patchy opacities which may represent 

infiltrative versus atelectasis.


EKG showed sinus rhythm


Laboratory data showed WBC 6.4 hemoglobin 12.3, platelets 179


Sodium 136 potassium 4.5 chloride 100 bicarb is 24 BUN 35 and creatinine 9.62 an

d blood sugar is 106


UDS negative and influenza A, B, RSV and SARS-CoV-2 not detected.





1/7/2023


Patient is lying in bed.  Awake alert and oriented.  Slow to respond.  No 

further episodes of seizures.  No complaints of chest pain or worsening 

shortness of breath.  Denies any complaints of cough or sputum production.  No 

nausea vomiting abdominal pain or diarrhea


Laboratory reviewed.  Procalcitonin level is 0.33.  Sodium 139 potassium 4.9 

chloride 106 bicarb is 20 BUN 45 creatinine 10.3.


Patient is undergoing hemodialysis today.


Nephrology and neurology is on board.





1/8/2023


Patient is currently resting in the bed.  Awake alert and oriented x3.  Slow to 

respond.  No complaints of chest pain or worsening shortness of breath.  Patient

has been afebrile overnight.  No further episodes of seizures.  Patient is being

on dysphagia diet.


Patient did have episode of choking today afternoon while eating.  Remained 

stable at this time.  Denies any complaints of worsening chest pain or shortness

of breath.  Patient has been afebrile.  Follow-up repeat chest x-ray tomorrow.


Nephrology neurology on board.  Plans for discharge back to facility in the next

24 to 48 hours.





01/09/2022


Patient is evaluated today resting in bed, currently receiving hemodialysis. She

has been maintained on dysphagia diet with 1:1 supervision. She reports 

tolerating diet. No acute events overnight, no further seizure like activity. 

Neurology recommending for patient to follow up with epilepsy specialist on 

discharge. Currently she is refusing D/C to subacute rehab. She would like to 

discharge home and states her  will be able to provide care. Physical 

therapy evaluation pending at this time. She has not been ambulatory. 





01/10/2023


Patient is resting in bed. She has been evaluated by PT/OT today and 

recommendations for subacute rehab she is willing to go to rehab now.  

unable to provide care for her at home at this time. She is maintained on 

dialysis scheduled of Mon/Wed/Fri and will undergo hemodialysis tomorrow. 

Continues with aspiration precautions sitting upright with dysphagia chopped 

diet and nectar thick liquids. Labs and vitals stable. Pending authorization for

DC to rehab. 





01/11/2023


Patient evaluated today during hemodialysis. No acute events overnight. Making 

progress with physical therapy was able to sit on edge of bed unassisted. She is

continuing to make progress and is agreeable to continuing therapy. Additionally

has been re-evaluated by speech therapy today and is able to tolerate free 

water. Continues with aspiration precautions and close monitoring with meal 

intake. She continues on MWF hemodialysis scheduled. Resumed on all oral 

antiseizure medications. Patient is agreeable for D/C to rehab and pending 

return to [facility] otherwise medically stable. 





01/12/2023


Patient is currently pending placement and insurance auth approval for discharge

to subacute rehab. Continues on oral anti seizure medications. Making 

improvements with PT/OT and speech therapy. Was up in the chair today. Able to 

tolerate free water. Alert x 3. Patient will have hemodialysis tomorrow per MWF 

schedule. Nephrology continues to follow. Blood pressure 102/67 today, afebrile,

heart rate 78, on room air. 





Review of Systems





Constitutional: Denied any fatigue denied any fever.


Cardio vascular: denied any chest pain, palpitations


Gastrointestinal: denied any nausea, vomiting, diarrhea


Pulmonary: Denied any shortness of breath cough


Neurologic denied any new focal deficits





All inpatient medications were reviewed and appropriate changes in these 

medications as dictated in the interval history and assessment and plan.





PHYSICAL EXAMINATION: 





GENERAL: The patient is alert and oriented x3, not in any acute distress. Well 

developed, well nourished. 


HEENT: Pupils are round and equally reacting to light. EOMI. No scleral icterus.

No conjunctival pallor. Normocephalic, atraumatic. No pharyngeal erythema. No 

thyromegaly. 


CARDIOVASCULAR: S1 and S2 present. No murmurs, rubs, or gallops. 


PULMONARY: Chest is clear to auscultation, no wheezing or crackles. 


ABDOMEN: Soft, nontender, nondistended, normoactive bowel sounds. No palpable 

organomegaly. 


MUSCULOSKELETAL: No joint swelling or deformity.


EXTREMITIES: No cyanosis, clubbing, or pedal edema. 


NEUROLOGICAL: Slurred speech, diffuse generalized weakness


SKIN: No rashes. 





Assessment and Plan


Assessment


RT. perihilar pacities possible atelectasis versus pneumonia.  COVID-19 PCR not 

detected. Procalcitonin level 0.33. Follow up chest xray shows clearing of 

opacities and no white count. Pneumonia ruled out likely representing 

atelectasis. 


Missed Hemodialysis


ESRD on hemodialysis Monday Wednesday and Friday


Recent admission with status epilepticus.  We will continue with antiepileptic 

drugs as per recent admission.


Dysphagia diet with aspiration precautions as per most recent swallow study.


Left ICA 75% stenosis at the origin as per CTA neck.


Hypodensity in the brainstem and left basal ganglia related to previous CVA


History of CVA with left-sided weakness


Diabetes type 2


Peripheral neuropathy


Memory impairment


GERD


Hypertension


Hyperlipidemia


Hypothyroidism


Bipolar disorder


Prior history of smoking


DVT prophylaxis with heparin subcu


GI prophylaxis





Plan 


Continue current medications


Continue PT/OT and speech therapy 


Resumed on oral vimpat had not been resumed after D/C IV dosing. 


Hemodialysis Friday


Reinforce education regarding dysphagia diet and aspiration precautions


Cleared for free water intake with guidelines


Medically stable for DC pending accepting facility and insurance authorization 





The impression and plan of care has been dictated by Scarlet Bill, Nurse 

Practitioner as directed.





Dr. Tom MD


I have performed a history and physical examination and medical decision making 

of this patient, discussed the same with the dictator, and agree with the 

dictators assessment and plan as written, documented as a scribe. Based on total

visit time, I have performed more than 50% of this visit. 











Objective





- Vital Signs


Vital signs: 


                                   Vital Signs











Temp  97.8 F   01/12/23 13:18


 


Pulse  78   01/12/23 13:18


 


Resp  17   01/12/23 13:18


 


BP  102/67   01/12/23 13:18


 


Pulse Ox  96   01/12/23 08:49


 


FiO2  21   01/10/23 20:35








                                 Intake & Output











 01/11/23 01/12/23 01/12/23





 18:59 06:59 18:59


 


Intake Total 300 240 


 


Output Total 2000  


 


Balance -1700 240 


 


Weight 55.5 kg  


 


Intake:   


 


  Oral  240 


 


  Hemodialysis 300  


 


Output:   


 


  Hemodialysis 2000  


 


Other:   


 


  Voiding Method Toilet Diaper Diaper





  Incontinent Incontinent


 


  # Voids 1 2 


 


  # Bowel Movements   1














- Labs


CBC & Chem 7: 


                                 01/09/23 05:14





                                 01/09/23 05:14


Labs: 


                  Abnormal Lab Results - Last 24 Hours (Table)











  01/08/23 01/11/23 01/12/23 Range/Units





  05:29 21:19 06:41 


 


POC Glucose (mg/dL)   163 H  115 H  ()  mg/dL


 


Free Valproic Acid  65.9 H    (4.8-17.3)  mg/L














  01/12/23 Range/Units





  11:24 


 


POC Glucose (mg/dL)  157 H  ()  mg/dL


 


Free Valproic Acid   (4.8-17.3)  mg/L

## 2023-01-13 LAB
ANION GAP SERPL CALC-SCNC: 6 MMOL/L
BUN SERPL-SCNC: 28 MG/DL (ref 7–17)
CALCIUM SPEC-MCNC: 9.5 MG/DL (ref 8.4–10.2)
CHLORIDE SERPL-SCNC: 100 MMOL/L (ref 98–107)
CO2 SERPL-SCNC: 26 MMOL/L (ref 22–30)
GLUCOSE BLD-MCNC: 113 MG/DL (ref 70–110)
GLUCOSE BLD-MCNC: 203 MG/DL (ref 70–110)
GLUCOSE BLD-MCNC: 232 MG/DL (ref 70–110)
GLUCOSE BLD-MCNC: 87 MG/DL (ref 70–110)
GLUCOSE SERPL-MCNC: 139 MG/DL (ref 74–99)
POTASSIUM SERPL-SCNC: 4.2 MMOL/L (ref 3.5–5.1)
SODIUM SERPL-SCNC: 132 MMOL/L (ref 137–145)

## 2023-01-13 RX ADMIN — METOPROLOL TARTRATE SCH MG: 25 TABLET, FILM COATED ORAL at 20:18

## 2023-01-13 RX ADMIN — DIVALPROEX SODIUM SCH MG: 125 CAPSULE, COATED PELLETS ORAL at 17:03

## 2023-01-13 RX ADMIN — SEVELAMER CARBONATE SCH MG: 800 TABLET, FILM COATED ORAL at 17:04

## 2023-01-13 RX ADMIN — ZONISAMIDE SCH MG: 100 CAPSULE ORAL at 08:18

## 2023-01-13 RX ADMIN — Medication SCH MG: at 06:36

## 2023-01-13 RX ADMIN — SEVELAMER CARBONATE SCH MG: 800 TABLET, FILM COATED ORAL at 06:37

## 2023-01-13 RX ADMIN — PANTOPRAZOLE SODIUM SCH MG: 40 TABLET, DELAYED RELEASE ORAL at 06:36

## 2023-01-13 RX ADMIN — Medication SCH MG: at 17:04

## 2023-01-13 RX ADMIN — HEPARIN SODIUM SCH UNIT: 5000 INJECTION INTRAVENOUS; SUBCUTANEOUS at 08:18

## 2023-01-13 RX ADMIN — DIVALPROEX SODIUM SCH MG: 125 CAPSULE, COATED PELLETS ORAL at 08:18

## 2023-01-13 RX ADMIN — SEVELAMER CARBONATE SCH MG: 800 TABLET, FILM COATED ORAL at 13:03

## 2023-01-13 RX ADMIN — ATORVASTATIN CALCIUM SCH MG: 40 TABLET, FILM COATED ORAL at 08:17

## 2023-01-13 RX ADMIN — HEPARIN SODIUM SCH UNIT: 5000 INJECTION INTRAVENOUS; SUBCUTANEOUS at 20:18

## 2023-01-13 RX ADMIN — CLOPIDOGREL BISULFATE SCH MG: 75 TABLET ORAL at 08:18

## 2023-01-13 RX ADMIN — METOPROLOL TARTRATE SCH MG: 25 TABLET, FILM COATED ORAL at 08:18

## 2023-01-13 RX ADMIN — Medication SCH MG: at 13:03

## 2023-01-13 RX ADMIN — DIVALPROEX SODIUM SCH MG: 125 CAPSULE, COATED PELLETS ORAL at 20:23

## 2023-01-13 NOTE — P.PN
Subjective


Progress Note Date: 01/13/23


Patient is a 52-year-old female with a known history of ESRD on hemodialysis, 

seizure disorder with status epilepticus during recent admission and was 

discharged to [facility] on 1/4/2023, hypothyroidism, hypertension, hyperlipidemia, 

diabetes type 2, history of CVA/TIA and history of left foot drop, bipolar diso

rder and prior history of smoking was sent back from nursing home.  Patient was 

discharged on 1/4/2023.  Patient was found to be progressively lethargic as 

noted by the nursing home staff, patient was tested positive at the nursing home

this morning however COVID-19 PCR was negative currently.


Possible aspiration was also suspected as per nursing home staff.


Patient had a swallow study recently and was able to tolerate dysphagia diet 

with aspiration precautions.


Patient was afebrile on admission.  Pulse ox 96% on room air.


Chest x-ray showed right perihilar patchy opacities which may represent 

infiltrative versus atelectasis.


EKG showed sinus rhythm


Laboratory data showed WBC 6.4 hemoglobin 12.3, platelets 179


Sodium 136 potassium 4.5 chloride 100 bicarb is 24 BUN 35 and creatinine 9.62 an

d blood sugar is 106


UDS negative and influenza A, B, RSV and SARS-CoV-2 not detected.





1/7/2023


Patient is lying in bed.  Awake alert and oriented.  Slow to respond.  No 

further episodes of seizures.  No complaints of chest pain or worsening 

shortness of breath.  Denies any complaints of cough or sputum production.  No 

nausea vomiting abdominal pain or diarrhea


Laboratory reviewed.  Procalcitonin level is 0.33.  Sodium 139 potassium 4.9 

chloride 106 bicarb is 20 BUN 45 creatinine 10.3.


Patient is undergoing hemodialysis today.


Nephrology and neurology is on board.





1/8/2023


Patient is currently resting in the bed.  Awake alert and oriented x3.  Slow to 

respond.  No complaints of chest pain or worsening shortness of breath.  Patient

has been afebrile overnight.  No further episodes of seizures.  Patient is being

on dysphagia diet.


Patient did have episode of choking today afternoon while eating.  Remained 

stable at this time.  Denies any complaints of worsening chest pain or shortness

of breath.  Patient has been afebrile.  Follow-up repeat chest x-ray tomorrow.


Nephrology neurology on board.  Plans for discharge back to facility in the next

24 to 48 hours.





01/09/2022


Patient is evaluated today resting in bed, currently receiving hemodialysis. She

has been maintained on dysphagia diet with 1:1 supervision. She reports 

tolerating diet. No acute events overnight, no further seizure like activity. 

Neurology recommending for patient to follow up with epilepsy specialist on 

discharge. Currently she is refusing D/C to subacute rehab. She would like to 

discharge home and states her  will be able to provide care. Physical 

therapy evaluation pending at this time. She has not been ambulatory. 





01/10/2023


Patient is resting in bed. She has been evaluated by PT/OT today and 

recommendations for subacute rehab she is willing to go to rehab now.  

unable to provide care for her at home at this time. She is maintained on 

dialysis scheduled of Mon/Wed/Fri and will undergo hemodialysis tomorrow. 

Continues with aspiration precautions sitting upright with dysphagia chopped 

diet and nectar thick liquids. Labs and vitals stable. Pending authorization for

DC to rehab. 





01/11/2023


Patient evaluated today during hemodialysis. No acute events overnight. Making 

progress with physical therapy was able to sit on edge of bed unassisted. She is

continuing to make progress and is agreeable to continuing therapy. Additionally

has been re-evaluated by speech therapy today and is able to tolerate free 

water. Continues with aspiration precautions and close monitoring with meal 

intake. She continues on MWF hemodialysis scheduled. Resumed on all oral 

antiseizure medications. Patient is agreeable for D/C to rehab and pending 

return to [facility] otherwise medically stable. 





01/12/2023


Patient is currently pending placement and insurance auth approval for discharge

to subacute rehab. Continues on oral anti seizure medications. Making 

improvements with PT/OT and speech therapy. Was up in the chair today. Able to 

tolerate free water. Alert x 3. Patient will have hemodialysis tomorrow per MWF 

schedule. Nephrology continues to follow. Blood pressure 102/67 today, afebrile,

heart rate 78, on room air. 





01/13/20223


Patient is pending insurance authorization and will likely DC to [facility] in 

[place] on Monday where they have on site hemodialysis. She has been dialyzed 

today per her usual MWF scheduled. Discussed with neurology and vimpat is 

discontinued and patient is recommending to continue off of vimpat. She is alert

x 3 today and no longer lethargic. No acute events overnight. Afebrile, blood 

pressure 118/59, heart rate 70, 94% room air. Anticipating discharge.





Review of Systems





Constitutional: Denied any fatigue denied any fever.


Cardio vascular: denied any chest pain, palpitations


Gastrointestinal: denied any nausea, vomiting, diarrhea


Pulmonary: Denied any shortness of breath cough


Neurologic denied any new focal deficits





All inpatient medications were reviewed and appropriate changes in these 

medications as dictated in the interval history and assessment and plan.





PHYSICAL EXAMINATION: 





GENERAL: The patient is alert and oriented x3, not in any acute distress. Well 

developed, well nourished. 


HEENT: Pupils are round and equally reacting to light. EOMI. No scleral icterus.

No conjunctival pallor. Normocephalic, atraumatic. No pharyngeal erythema. No 

thyromegaly. 


CARDIOVASCULAR: S1 and S2 present. No murmurs, rubs, or gallops. 


PULMONARY: Chest is clear to auscultation, no wheezing or crackles. 


ABDOMEN: Soft, nontender, nondistended, normoactive bowel sounds. No palpable 

organomegaly. 


MUSCULOSKELETAL: No joint swelling or deformity.


EXTREMITIES: No cyanosis, clubbing, or pedal edema. 


NEUROLOGICAL: Slurred speech, diffuse generalized weakness


SKIN: No rashes. 





Assessment and Plan


Assessment


RT. perihilar pacities possible atelectasis versus pneumonia.  COVID-19 PCR not 

detected. Procalcitonin level 0.33. Follow up chest xray shows clearing of 

opacities and no white count. Pneumonia ruled out likely representing atelecta

sis. 


ESRD on hemodialysis Monday Wednesday and Friday


Recent admission with status epilepticus. \


Dysphagia diet with aspiration precautions as per most recent swallow study.


Left ICA 75% stenosis at the origin as per CTA neck.


Hypodensity in the brainstem and left basal ganglia related to previous CVA


History of CVA with left-sided weakness


Diabetes type 2


Peripheral neuropathy


Memory impairment


GERD


Hypertension


Hyperlipidemia


Hypothyroidism


Bipolar disorder


Prior history of smoking


DVT prophylaxis with heparin subcu


GI prophylaxis





Plan 


Continue current medications Vimpat is discontinued 


Reinforce education regarding dysphagia diet and aspiration precautions


Cleared for free water intake with guidelines


Continue hemodialysis MWF


Continue PT/OT and speech therapy 


Medically stable for DC pending insurance authorization





The impression and plan of care has been dictated by Scarlet Bill, Nurse 

Practitioner as directed.





Dr. Tom MD


I have performed a history and physical examination and medical decision making 

of this patient, discussed the same with the dictator, and agree with the 

dictators assessment and plan as written, documented as a scribe. Based on total

visit time, I have performed more than 50% of this visit. 











Objective





- Vital Signs


Vital signs: 


                                   Vital Signs











Temp  97.6 F   01/13/23 12:59


 


Pulse  70   01/13/23 12:59


 


Resp  19   01/13/23 12:59


 


BP  118/59   01/13/23 12:59


 


Pulse Ox  94 L  01/13/23 08:52


 


FiO2  21   01/10/23 20:35








                                 Intake & Output











 01/12/23 01/13/23 01/13/23





 18:59 06:59 18:59


 


Intake Total 100  300


 


Output Total   2200


 


Balance 100  -1900


 


Weight  73 kg 


 


Intake:   


 


  Oral 100  


 


  Hemodialysis   300


 


Output:   


 


  Hemodialysis   2200


 


Other:   


 


  Voiding Method Diaper Diaper Diaper





 Incontinent Incontinent Incontinent


 


  # Bowel Movements 2 1 1














- Labs


CBC & Chem 7: 


                                 01/09/23 05:14





                                 01/13/23 09:30


Labs: 


                  Abnormal Lab Results - Last 24 Hours (Table)











  01/09/23 01/12/23 01/12/23 Range/Units





  05:14 16:44 20:21 


 


Sodium     (137-145)  mmol/L


 


BUN     (7-17)  mg/dL


 


Creatinine     (0.52-1.04)  mg/dL


 


Glucose     (74-99)  mg/dL


 


POC Glucose (mg/dL)   145 H  203 H  ()  mg/dL


 


TIBC  181 L    (228-460)  ug/dL


 


% Saturation  60.91 H    (12.00-45.00)   


 


Transferrin  129.0 L    (204.0-354.0)  mg/dL


 


Ferritin  2270.0 H    (10.0-291.0)  ng/mL














  01/13/23 01/13/23 Range/Units





  09:30 11:46 


 


Sodium  132 L   (137-145)  mmol/L


 


BUN  28 H   (7-17)  mg/dL


 


Creatinine  7.94 H*   (0.52-1.04)  mg/dL


 


Glucose  139 H   (74-99)  mg/dL


 


POC Glucose (mg/dL)   113 H  ()  mg/dL


 


TIBC    (228-460)  ug/dL


 


% Saturation    (12.00-45.00)   


 


Transferrin    (204.0-354.0)  mg/dL


 


Ferritin    (10.0-291.0)  ng/mL














Assessment and Plan


Time with Patient: Less than 30

## 2023-01-13 NOTE — P.PN
Subjective


Progress Note Date: 01/13/23





Patient was seen for a follow-up.  Patient has been seen numerous times in the 

hospital with previous admissions.  On this admission, patient initially seen by

Dr. Michele Merritt.  Please refer to his note for details.  Patient has history of 

seizure disorder.  She was discharged, but then readmitted for lethargy and 

confusion.  Her seizures are controlled.  Patient get lethargic with Vimpat.  

This has now finally been discontinued.  Per nurse, she is much more alert and 

awake.





No seizures reported since arrival to the hospital.





Objective





- Vital Signs


Vital signs: 


                                   Vital Signs











Temp  97.6 F   01/13/23 12:59


 


Pulse  70   01/13/23 12:59


 


Resp  19   01/13/23 12:59


 


BP  118/59   01/13/23 12:59


 


Pulse Ox  94 L  01/13/23 08:52


 


FiO2  21   01/10/23 20:35








                                 Intake & Output











 01/12/23 01/13/23 01/13/23





 18:59 06:59 18:59


 


Intake Total 100  300


 


Output Total   2200


 


Balance 100  -1900


 


Weight  73 kg 


 


Intake:   


 


  Oral 100  


 


  Hemodialysis   300


 


Output:   


 


  Hemodialysis   2200


 


Other:   


 


  Voiding Method Diaper Diaper Diaper





 Incontinent Incontinent Incontinent


 


  # Bowel Movements 2 1 1














- Exam





Patient is alert and awake, slow mentation.  However better than previous admis

willy.  Speech is slightly dysarthric.  She knows it is January, could not tell 

the year.  She knows it is [facility].  Knows it is a hospital.





Patient has right facial droop, but normalizes with active testing.  Patient has

right homonymous hemianopia.  Tongue protrudes the midline.





On muscle strength testing (right/left) deltoid 5/4+, biceps 5/5, triceps 5/5, 

 5/4+, hip flexion 4+5-/3 to 3+, ankle dorsiflexion 5/0.





Patient has bilateral Babinski.





- Labs


CBC & Chem 7: 


                                 01/09/23 05:14





                                 01/13/23 09:30


Labs: 


                  Abnormal Lab Results - Last 24 Hours (Table)











  01/09/23 01/12/23 01/12/23 Range/Units





  05:14 16:44 20:21 


 


Sodium     (137-145)  mmol/L


 


BUN     (7-17)  mg/dL


 


Creatinine     (0.52-1.04)  mg/dL


 


Glucose     (74-99)  mg/dL


 


POC Glucose (mg/dL)   145 H  203 H  ()  mg/dL


 


TIBC  181 L    (228-460)  ug/dL


 


% Saturation  60.91 H    (12.00-45.00)   


 


Transferrin  129.0 L    (204.0-354.0)  mg/dL


 


Ferritin  2270.0 H    (10.0-291.0)  ng/mL














  01/13/23 01/13/23 Range/Units





  09:30 11:46 


 


Sodium  132 L   (137-145)  mmol/L


 


BUN  28 H   (7-17)  mg/dL


 


Creatinine  7.94 H*   (0.52-1.04)  mg/dL


 


Glucose  139 H   (74-99)  mg/dL


 


POC Glucose (mg/dL)   113 H  ()  mg/dL


 


TIBC    (228-460)  ug/dL


 


% Saturation    (12.00-45.00)   


 


Transferrin    (204.0-354.0)  mg/dL


 


Ferritin    (10.0-291.0)  ng/mL














Assessment and Plan


Assessment: 





This is a 53-year-old woman with history of medically refractory epilepsy was 

recently discharged from our facility on 01/04/2023 because of clinical status 

epilepticus who presented to our facility from nursing facility because of 

lethargy.





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 

  epilepsy (on multiple medication).  She was found lethargic and I'll not sure 

  if the patient had a seizure/post ictal state.  She has been stable in our 

  facility and no further seizures for the past two days.


* History of epilepsy and she had a prolonged EEG in our facility which was 

  suggestive of primary generalized epilepsy


* Left ICA stenosis of 75% on CTA but discrepancy on the carotid duplex and not 

  significant


* History of stroke with residual left hemiparesis


* Dabetes mellitus


* End-stage renal disease on dialysis


* Hypertension 





Plan: 








* She had multiple EEGs in a prolonged to a half hour EEG in our facility.  

  There is no need for a repeat EEG if clinically she is improving.





* Patient currently on Zonegran 100 mg once a day.  If patient has any 

  breakthrough seizures, would increase Zonegran to 100 mg twice a day.  


* Continue Keppra 500 mg 1 tablet every 12 hours with additional 500 mg after 

  dialysis.   


* Continue Depakote 500 mg 1 tablet 3 times a day.  On just the recent and prior

  admission patient was on these medication and had no seizures for days prior 

  to discharge.  Her Depakote level currently is 111.9 (considered high normal).


* Patient is off Vimpat.





* Placed on seizure precautions seizure pads


* I highly recommend the patient to follow-up with an epileptilogist as 

  outpatient for her medical refractory epilepsy.  She had multiple admission to

  our [facility] as well is to another local facility and her seizures needs to be 

  seen by a specialist as an outpatient.  


* Nephrology is consulted


* Speech therapy is consulted


* Defer the rest of the medical management to the primary team.


* Neurologically clear.  Awaiting insurance authorization.  Neurology will sign 

  off.  Please reconsult if any concerns.

## 2023-01-13 NOTE — P.PN
Subjective


Progress Note Date: 01/12/23





Patient was seen for a follow-up.  Patient has been seen numerous times in the 

hospital with previous admissions.  On this admission, patient initially seen by

Dr. Michele Merritt.  Please refer to his note for details.  Patient has history of 

seizure disorder.  She was discharged, but then readmitted for lethargy and 

confusion.  Her seizures are controlled.





No seizures reported since arrival to the hospital.





Objective





- Vital Signs


Vital signs: 


                                   Vital Signs











Temp  97.8 F   01/12/23 13:18


 


Pulse  78   01/12/23 13:18


 


Resp  17   01/12/23 13:18


 


BP  102/67   01/12/23 13:18


 


Pulse Ox  96   01/12/23 08:49


 


FiO2  21   01/10/23 20:35








                                 Intake & Output











 01/12/23 01/12/23 01/13/23





 06:59 18:59 06:59


 


Intake Total 240 100 


 


Balance 240 100 


 


Weight 72 kg  


 


Intake:   


 


  Oral 240 100 


 


Other:   


 


  Voiding Method Diaper Diaper 





 Incontinent Incontinent 


 


  # Voids 2  


 


  # Bowel Movements  2 














- Exam





Patient is alert and awake, slow mentation.  However better than previous 

admission.





On muscle strength testing (right/left) deltoid 5/4+, biceps 5/5, triceps 5/5, 

 5/4+, hip flexion 4+5-/0, ankle dorsiflexion 5/0.





Patient has bilateral Babinski.





- Labs


CBC & Chem 7: 


                                 01/09/23 05:14





                                 01/09/23 05:14


Labs: 


                  Abnormal Lab Results - Last 24 Hours (Table)











  01/08/23 01/09/23 01/11/23 Range/Units





  05:29 05:14 21:19 


 


POC Glucose (mg/dL)    163 H  ()  mg/dL


 


TIBC   181 L   (228-460)  ug/dL


 


% Saturation   60.91 H   (12.00-45.00)   


 


Transferrin   129.0 L   (204.0-354.0)  mg/dL


 


Ferritin   2270.0 H   (10.0-291.0)  ng/mL


 


Free Valproic Acid  65.9 H    (4.8-17.3)  mg/L














  01/12/23 01/12/23 01/12/23 Range/Units





  06:41 11:24 16:44 


 


POC Glucose (mg/dL)  115 H  157 H  145 H  ()  mg/dL


 


TIBC     (228-460)  ug/dL


 


% Saturation     (12.00-45.00)   


 


Transferrin     (204.0-354.0)  mg/dL


 


Ferritin     (10.0-291.0)  ng/mL


 


Free Valproic Acid     (4.8-17.3)  mg/L














Assessment and Plan


Assessment: 





This is a 53-year-old woman with history of medically refractory epilepsy was 

recently discharged from our facility on 01/04/2023 because of clinical status 

epilepticus who presented to our facility from nursing facility because of 

lethargy.





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 

  epilepsy (on multiple medication).  She was found lethargic and I'll not sure 

  if the patient had a seizure/post ictal state.  She has been stable in our 

  facility and no further seizures for the past two days.


* History of epilepsy and she had a prolonged EEG in our facility which was 

  suggestive of primary generalized epilepsy


* Left ICA stenosis of 75% on CTA but discrepancy on the carotid duplex and not 

  significant


* History of stroke with residual left hemiparesis


* Dabetes mellitus


* End-stage renal disease on dialysis


* Hypertension 





Plan: 








* She had multiple EEGs in a prolonged to a half hour EEG in our facility.  

  There is no need for a repeat EEG if clinically she is improving.





* Patient currently on Zonegran 100 mg once a day.  If patient has any 

  breakthrough seizures, would increase Zonegran to 100 mg twice a day.  


* Continue Keppra 500 mg 1 tablet every 12 hours with additional 500 mg after 

  dialysis.   


* Continue Depakote 500 mg 1 tablet 3 times a day.  On just the recent and prior

  admission patient was on these medication and had no seizures for days prior 

  to discharge.  Her Depakote level currently is 111.9 (considered high normal).


* Patient is off Vimpat.





* Placed on seizure precautions seizure pads


* I highly recommend the patient to follow-up with an epileptilogist as 

  outpatient for her medical refractory epilepsy.  She had multiple admission to

  our [facility] as well is to another local facility and her seizures needs to be 

  seen by a specialist as an outpatient.  Dr. Merritt recommended Epidiolex since 

  has medical refractory epilepsy


* Nephrology is consulted


* Speech therapy is consulted


* Defer the rest of the medical management to the primary team.


* Neurologically clear.

## 2023-01-13 NOTE — P.PN
Subjective





Patient is seen in follow-up for end-stage renal disease.  Tolerating dialysis 

well.  Tolerating dysphagia 3 diet. Hemodynamically stable.  No changes 

overnight.  A weights discharge to rehab.





Vital signs are stable.


General: Awake.  No acute distress.


HEENT: Head exam is unremarkable. 


LUNGS: Breath sounds decreased.


HEART: Rate and Rhythm are regular. 


ABDOMEN: Soft, no distention.


EXTREMITITES: No edema. 





Objective





- Vital Signs


Vital signs: 


                                   Vital Signs











Temp  97.3 F L  01/13/23 07:33


 


Pulse  68   01/13/23 07:33


 


Resp  18   01/13/23 07:33


 


BP  148/81   01/13/23 07:33


 


Pulse Ox  94 L  01/13/23 08:52


 


FiO2  21   01/10/23 20:35








                                 Intake & Output











 01/12/23 01/13/23 01/13/23





 18:59 06:59 18:59


 


Intake Total 100  


 


Balance 100  


 


Weight  73 kg 


 


Intake:   


 


  Oral 100  


 


Other:   


 


  Voiding Method Diaper Diaper Diaper





 Incontinent Incontinent Incontinent


 


  # Bowel Movements 2 1 1














- Labs


CBC & Chem 7: 


                                 01/09/23 05:14





                                 01/13/23 09:30


Labs: 


                  Abnormal Lab Results - Last 24 Hours (Table)











  01/09/23 01/12/23 01/12/23 Range/Units





  05:14 11:24 16:44 


 


Sodium     (137-145)  mmol/L


 


BUN     (7-17)  mg/dL


 


Creatinine     (0.52-1.04)  mg/dL


 


Glucose     (74-99)  mg/dL


 


POC Glucose (mg/dL)   157 H  145 H  ()  mg/dL


 


TIBC  181 L    (228-460)  ug/dL


 


% Saturation  60.91 H    (12.00-45.00)   


 


Transferrin  129.0 L    (204.0-354.0)  mg/dL


 


Ferritin  2270.0 H    (10.0-291.0)  ng/mL














  01/12/23 01/13/23 Range/Units





  20:21 09:30 


 


Sodium   132 L  (137-145)  mmol/L


 


BUN   28 H  (7-17)  mg/dL


 


Creatinine   7.94 H*  (0.52-1.04)  mg/dL


 


Glucose   139 H  (74-99)  mg/dL


 


POC Glucose (mg/dL)  203 H   ()  mg/dL


 


TIBC    (228-460)  ug/dL


 


% Saturation    (12.00-45.00)   


 


Transferrin    (204.0-354.0)  mg/dL


 


Ferritin    (10.0-291.0)  ng/mL














Assessment and Plan


Plan: 





Assessment:


1.  End-stage renal disease maintained on hemodialysis on Monday Wednesday Friday schedule.


2.  Hypertension with chronic kidney disease.  Controlled.


3.  History of seizures.


4.  Chronic kidney disease mineral bone disease maintained on PhosLo and 

Renvela.  Phosphorus level 5.0 dated 01/09/2023.


5.  Metabolic acidosis secondary to chronic kidney disease and missed dialysis 

treatment.  Better.


6.  Anemia of chronic kidney disease.  On Aranesp.  Iron replete.





Plan:


Currently seen while undergoing hemodialysis.  Neck she made on Monday.


Awaits discharged to [facility].

## 2023-01-14 LAB
GLUCOSE BLD-MCNC: 108 MG/DL (ref 70–110)
GLUCOSE BLD-MCNC: 185 MG/DL (ref 70–110)
GLUCOSE BLD-MCNC: 191 MG/DL (ref 70–110)
GLUCOSE BLD-MCNC: 193 MG/DL (ref 70–110)

## 2023-01-14 RX ADMIN — SEVELAMER CARBONATE SCH MG: 800 TABLET, FILM COATED ORAL at 16:56

## 2023-01-14 RX ADMIN — DIVALPROEX SODIUM SCH MG: 125 CAPSULE, COATED PELLETS ORAL at 09:18

## 2023-01-14 RX ADMIN — PANTOPRAZOLE SODIUM SCH MG: 40 TABLET, DELAYED RELEASE ORAL at 06:34

## 2023-01-14 RX ADMIN — METOPROLOL TARTRATE SCH MG: 25 TABLET, FILM COATED ORAL at 09:19

## 2023-01-14 RX ADMIN — ATORVASTATIN CALCIUM SCH MG: 40 TABLET, FILM COATED ORAL at 09:19

## 2023-01-14 RX ADMIN — ZONISAMIDE SCH MG: 100 CAPSULE ORAL at 09:19

## 2023-01-14 RX ADMIN — HEPARIN SODIUM SCH UNIT: 5000 INJECTION INTRAVENOUS; SUBCUTANEOUS at 19:50

## 2023-01-14 RX ADMIN — CLOPIDOGREL BISULFATE SCH MG: 75 TABLET ORAL at 09:19

## 2023-01-14 RX ADMIN — DIVALPROEX SODIUM SCH MG: 125 CAPSULE, COATED PELLETS ORAL at 19:51

## 2023-01-14 RX ADMIN — Medication SCH MG: at 06:34

## 2023-01-14 RX ADMIN — Medication SCH MG: at 16:55

## 2023-01-14 RX ADMIN — METOPROLOL TARTRATE SCH MG: 25 TABLET, FILM COATED ORAL at 19:50

## 2023-01-14 RX ADMIN — Medication SCH MG: at 12:58

## 2023-01-14 RX ADMIN — SEVELAMER CARBONATE SCH MG: 800 TABLET, FILM COATED ORAL at 06:34

## 2023-01-14 RX ADMIN — SEVELAMER CARBONATE SCH MG: 800 TABLET, FILM COATED ORAL at 12:58

## 2023-01-14 RX ADMIN — DIVALPROEX SODIUM SCH MG: 125 CAPSULE, COATED PELLETS ORAL at 16:55

## 2023-01-14 RX ADMIN — HEPARIN SODIUM SCH UNIT: 5000 INJECTION INTRAVENOUS; SUBCUTANEOUS at 09:18

## 2023-01-14 NOTE — P.PN
Subjective


Progress Note Date: 01/14/23


Principal diagnosis: 





Follow-up for ESRD on dialysis Monday Wednesday Friday admitted with confusion 

and a history of seizures in the background.  Currently she is awake and alert a

nd oriented.  Comfortable.  Denies any complaints.








History of present illness: 


The patient is a 52-year-old female seen in renal consultation for end-stage 

renal disease.  She is making on hemodialysis on Monday Wednesday Friday 

schedule.  Patient presents from extended care facility due to worsening 

lethargy noted but the nursing staff.  Patient tested positive for coronary at 

the nursing home.  There was also concern for aspiration.  It is noted in the 

chart at the patient was coughing when she was fed.  Patient has history of 

seizures and is required multiple hospitalizations for it recently.  In the 

hospital she tested negative for influenza, RSV and covert PCR.  No fever.  Her 

white count is not elevated.  Chest x-ray from today showed no acute process.  

Currently resting in bed.  Hemodynamically stable.  She is on room air.  Patient

has history of CVA residual hemiparesis and dysarthria.








Objective





- Vital Signs


Vital signs: 


                                   Vital Signs











Temp  97.7 F   01/14/23 09:16


 


Pulse  71   01/14/23 09:16


 


Resp  17   01/14/23 09:16


 


BP  97/60   01/14/23 09:16


 


Pulse Ox  95   01/14/23 09:16


 


FiO2  21   01/10/23 20:35








                                 Intake & Output











 01/13/23 01/14/23 01/14/23





 18:59 06:59 18:59


 


Intake Total 300  


 


Output Total 2200  


 


Balance -1900  


 


Weight  75.5 kg 


 


Intake:   


 


  Hemodialysis 300  


 


Output:   


 


  Hemodialysis 2200  


 


Other:   


 


  Voiding Method Diaper Diaper Diaper





 Incontinent Incontinent Incontinent


 


  # Voids  1 


 


  # Bowel Movements 1 5 











Examination awake alert oriented





No JVP noted neck is supple no facial asymmetry





Lungs clear to auscultation good air entry bilaterally





Heart sounds unremarkable





Abdomen soft nontender





Extremity exam was no edema





Neurologically awake alert oriented





- Labs


CBC & Chem 7: 


                                 01/09/23 05:14





                                 01/13/23 09:30


Labs: 


                  Abnormal Lab Results - Last 24 Hours (Table)











  01/13/23 01/13/23 01/14/23 Range/Units





  17:10 21:12 11:46 


 


POC Glucose (mg/dL)  203 H  232 H  185 H  ()  mg/dL














Assessment and Plan


Assessment: 





Impression





1.  ESRD on dialysis Monday Wednesday Friday stable





2.  Admitted with confusion resolved.





3.  History of seizures





4.  Anemia of ESRD, hemoglobin is 9.5 on 01/09/2023 improved from 9.2





Recommendation





1.  Maintain dialysis schedule Monday Wednesday Friday neck salicylate be Monday





2.  Able discharge from our perspective

## 2023-01-14 NOTE — P.PN
Subjective


Progress Note Date: 01/14/23


Patient is a 52-year-old female with a known history of ESRD on hemodialysis, 

seizure disorder with status epilepticus during recent admission and was 

discharged to [facility] on 1/4/2023, hypothyroidism, hypertension, hyperlipidemia, 

diabetes type 2, history of CVA/TIA and history of left foot drop, bipolar diso

rder and prior history of smoking was sent back from nursing home.  Patient was 

discharged on 1/4/2023.  Patient was found to be progressively lethargic as 

noted by the nursing home staff, patient was tested positive at the nursing home

this morning however COVID-19 PCR was negative currently.


Possible aspiration was also suspected as per nursing home staff.


Patient had a swallow study recently and was able to tolerate dysphagia diet 

with aspiration precautions.


Patient was afebrile on admission.  Pulse ox 96% on room air.


Chest x-ray showed right perihilar patchy opacities which may represent 

infiltrative versus atelectasis.


EKG showed sinus rhythm


Laboratory data showed WBC 6.4 hemoglobin 12.3, platelets 179


Sodium 136 potassium 4.5 chloride 100 bicarb is 24 BUN 35 and creatinine 9.62 an

d blood sugar is 106


UDS negative and influenza A, B, RSV and SARS-CoV-2 not detected.





1/7/2023


Patient is lying in bed.  Awake alert and oriented.  Slow to respond.  No 

further episodes of seizures.  No complaints of chest pain or worsening 

shortness of breath.  Denies any complaints of cough or sputum production.  No 

nausea vomiting abdominal pain or diarrhea


Laboratory reviewed.  Procalcitonin level is 0.33.  Sodium 139 potassium 4.9 

chloride 106 bicarb is 20 BUN 45 creatinine 10.3.


Patient is undergoing hemodialysis today.


Nephrology and neurology is on board.





1/8/2023


Patient is currently resting in the bed.  Awake alert and oriented x3.  Slow to 

respond.  No complaints of chest pain or worsening shortness of breath.  Patient

has been afebrile overnight.  No further episodes of seizures.  Patient is being

on dysphagia diet.


Patient did have episode of choking today afternoon while eating.  Remained 

stable at this time.  Denies any complaints of worsening chest pain or shortness

of breath.  Patient has been afebrile.  Follow-up repeat chest x-ray tomorrow.


Nephrology neurology on board.  Plans for discharge back to facility in the next

24 to 48 hours.





01/09/2022


Patient is evaluated today resting in bed, currently receiving hemodialysis. She

has been maintained on dysphagia diet with 1:1 supervision. She reports 

tolerating diet. No acute events overnight, no further seizure like activity. 

Neurology recommending for patient to follow up with epilepsy specialist on 

discharge. Currently she is refusing D/C to subacute rehab. She would like to 

discharge home and states her  will be able to provide care. Physical 

therapy evaluation pending at this time. She has not been ambulatory. 





01/10/2023


Patient is resting in bed. She has been evaluated by PT/OT today and 

recommendations for subacute rehab she is willing to go to rehab now.  

unable to provide care for her at home at this time. She is maintained on 

dialysis scheduled of Mon/Wed/Fri and will undergo hemodialysis tomorrow. 

Continues with aspiration precautions sitting upright with dysphagia chopped 

diet and nectar thick liquids. Labs and vitals stable. Pending authorization for

DC to rehab. 





01/11/2023


Patient evaluated today during hemodialysis. No acute events overnight. Making 

progress with physical therapy was able to sit on edge of bed unassisted. She is

continuing to make progress and is agreeable to continuing therapy. Additionally

has been re-evaluated by speech therapy today and is able to tolerate free 

water. Continues with aspiration precautions and close monitoring with meal 

intake. She continues on MWF hemodialysis scheduled. Resumed on all oral 

antiseizure medications. Patient is agreeable for D/C to rehab and pending 

return to [facility] otherwise medically stable. 





01/12/2023


Patient is currently pending placement and insurance auth approval for discharge

to subacute rehab. Continues on oral anti seizure medications. Making 

improvements with PT/OT and speech therapy. Was up in the chair today. Able to 

tolerate free water. Alert x 3. Patient will have hemodialysis tomorrow per MWF 

schedule. Nephrology continues to follow. Blood pressure 102/67 today, afebrile,

heart rate 78, on room air. 





01/13/20223


Patient is pending insurance authorization and will likely DC to [facility] in 

[place] on Monday where they have on site hemodialysis. She has been dialyzed 

today per her usual MWF scheduled. Discussed with neurology and vimpat is 

discontinued and patient is recommending to continue off of vimpat. She is alert

x 3 today and no longer lethargic. No acute events overnight. Afebrile, blood 

pressure 118/59, heart rate 70, 94% room air. Anticipating discharge.





01/14/2023


Patient is evaluated on medical floor today. Pending insurance authorization for

DC likely on monday. Had hemodialysis yesterday with 2.2L off. No acute events 

overnight. Monitored off the vimpat. Continues on room air, afebrile. 





Review of Systems





Constitutional: Denied any fatigue denied any fever.


Cardio vascular: denied any chest pain, palpitations


Gastrointestinal: denied any nausea, vomiting, diarrhea


Pulmonary: Denied any shortness of breath cough


Neurologic denied any new focal deficits





All inpatient medications were reviewed and appropriate changes in these medicat

ions as dictated in the interval history and assessment and plan.





PHYSICAL EXAMINATION: 





GENERAL: The patient is alert and oriented x3, not in any acute distress. Well 

developed, well nourished. 


HEENT: Pupils are round and equally reacting to light. EOMI. No scleral icterus.

No conjunctival pallor. Normocephalic, atraumatic. No pharyngeal erythema. No 

thyromegaly. 


CARDIOVASCULAR: S1 and S2 present. No murmurs, rubs, or gallops. 


PULMONARY: Chest is clear to auscultation, no wheezing or crackles. 


ABDOMEN: Soft, nontender, nondistended, normoactive bowel sounds. No palpable 

organomegaly. 


MUSCULOSKELETAL: No joint swelling or deformity.


EXTREMITIES: No cyanosis, clubbing, or pedal edema. 


NEUROLOGICAL: Slurred speech, diffuse generalized weakness


SKIN: No rashes. 





Assessment and Plan


Assessment


RT. perihilar pacities possible atelectasis versus pneumonia.  COVID-19 PCR not 

detected. Procalcitonin level 0.33. Follow up chest xray shows clearing of 

opacities and no white count. Pneumonia ruled out likely representing 

atelectasis. Monitored off antibiotics.


ESRD on hemodialysis Monday Wednesday and Friday


Recent admission with status epilepticus. \


Dysphagia diet with aspiration precautions as per most recent swallow study.


Left ICA 75% stenosis at the origin as per CTA neck.


Hypodensity in the brainstem and left basal ganglia related to previous CVA


History of CVA with left-sided weakness


Diabetes type 2


Peripheral neuropathy


Memory impairment


GERD


Hypertension


Hyperlipidemia


Hypothyroidism


Bipolar disorder


Prior history of smoking


DVT prophylaxis with heparin subcu


GI prophylaxis





Plan 


Continue current medications Vimpat is discontinued 


Reinforce education regarding dysphagia diet and aspiration precautions


Cleared for free water intake with guidelines


Continue hemodialysis MWF


Continue PT/OT and speech therapy 


Medically stable for DC pending insurance authorization which discharge is 

planned for monday to [facility]. No insurance auth as of today.





The impression and plan of care has been dictated by Scarlet Bill, Nurse 

Practitioner as directed.





Dr. Tom MD


I have performed a history and physical examination and medical decision making 

of this patient, discussed the same with the dictator, and agree with the 

dictators assessment and plan as written, documented as a scribe. Based on total

visit time, I have performed more than 50% of this visit. 











Objective





- Vital Signs


Vital signs: 


                                   Vital Signs











Temp  98.4 F   01/14/23 01:45


 


Pulse  71   01/14/23 01:45


 


Resp  14   01/14/23 01:45


 


BP  127/77   01/14/23 01:45


 


Pulse Ox  97   01/14/23 07:29


 


FiO2  21   01/10/23 20:35








                                 Intake & Output











 01/13/23 01/14/23 01/14/23





 18:59 06:59 18:59


 


Intake Total 300  


 


Output Total 2200  


 


Balance -1900  


 


Weight  75.5 kg 


 


Intake:   


 


  Hemodialysis 300  


 


Output:   


 


  Hemodialysis 2200  


 


Other:   


 


  Voiding Method Diaper Diaper 





 Incontinent Incontinent 


 


  # Voids  1 


 


  # Bowel Movements 1 5 














- Labs


CBC & Chem 7: 


                                 01/09/23 05:14





                                 01/13/23 09:30


Labs: 


                  Abnormal Lab Results - Last 24 Hours (Table)











  01/13/23 01/13/23 01/13/23 Range/Units





  09:30 11:46 17:10 


 


Sodium  132 L    (137-145)  mmol/L


 


BUN  28 H    (7-17)  mg/dL


 


Creatinine  7.94 H*    (0.52-1.04)  mg/dL


 


Glucose  139 H    (74-99)  mg/dL


 


POC Glucose (mg/dL)   113 H  203 H  ()  mg/dL














  01/13/23 Range/Units





  21:12 


 


Sodium   (137-145)  mmol/L


 


BUN   (7-17)  mg/dL


 


Creatinine   (0.52-1.04)  mg/dL


 


Glucose   (74-99)  mg/dL


 


POC Glucose (mg/dL)  232 H  ()  mg/dL














Assessment and Plan


Time with Patient: Less than 30

## 2023-01-15 LAB
GLUCOSE BLD-MCNC: 118 MG/DL (ref 70–110)
GLUCOSE BLD-MCNC: 141 MG/DL (ref 70–110)
GLUCOSE BLD-MCNC: 161 MG/DL (ref 70–110)
GLUCOSE BLD-MCNC: 186 MG/DL (ref 70–110)

## 2023-01-15 RX ADMIN — ZONISAMIDE SCH MG: 100 CAPSULE ORAL at 08:21

## 2023-01-15 RX ADMIN — PANTOPRAZOLE SODIUM SCH MG: 40 TABLET, DELAYED RELEASE ORAL at 06:02

## 2023-01-15 RX ADMIN — HEPARIN SODIUM SCH UNIT: 5000 INJECTION INTRAVENOUS; SUBCUTANEOUS at 21:54

## 2023-01-15 RX ADMIN — METOPROLOL TARTRATE SCH MG: 25 TABLET, FILM COATED ORAL at 21:55

## 2023-01-15 RX ADMIN — METOPROLOL TARTRATE SCH MG: 25 TABLET, FILM COATED ORAL at 08:19

## 2023-01-15 RX ADMIN — Medication SCH MG: at 06:02

## 2023-01-15 RX ADMIN — SEVELAMER CARBONATE SCH MG: 800 TABLET, FILM COATED ORAL at 06:02

## 2023-01-15 RX ADMIN — SEVELAMER CARBONATE SCH MG: 800 TABLET, FILM COATED ORAL at 17:19

## 2023-01-15 RX ADMIN — DIVALPROEX SODIUM SCH MG: 125 CAPSULE, COATED PELLETS ORAL at 08:19

## 2023-01-15 RX ADMIN — DIVALPROEX SODIUM SCH MG: 125 CAPSULE, COATED PELLETS ORAL at 21:55

## 2023-01-15 RX ADMIN — HEPARIN SODIUM SCH UNIT: 5000 INJECTION INTRAVENOUS; SUBCUTANEOUS at 08:20

## 2023-01-15 RX ADMIN — ATORVASTATIN CALCIUM SCH MG: 40 TABLET, FILM COATED ORAL at 08:19

## 2023-01-15 RX ADMIN — DIVALPROEX SODIUM SCH MG: 125 CAPSULE, COATED PELLETS ORAL at 16:02

## 2023-01-15 RX ADMIN — SEVELAMER CARBONATE SCH MG: 800 TABLET, FILM COATED ORAL at 12:23

## 2023-01-15 RX ADMIN — Medication SCH MG: at 17:24

## 2023-01-15 RX ADMIN — CLOPIDOGREL BISULFATE SCH MG: 75 TABLET ORAL at 08:20

## 2023-01-15 RX ADMIN — Medication SCH MG: at 12:22

## 2023-01-15 NOTE — P.PN
Subjective


Progress Note Date: 01/15/23


Patient is a 52-year-old female with a known history of ESRD on hemodialysis, 

seizure disorder with status epilepticus during recent admission and was 

discharged to [facility] on 1/4/2023, hypothyroidism, hypertension, hyperlipidemia, 

diabetes type 2, history of CVA/TIA and history of left foot drop, bipolar diso

rder and prior history of smoking was sent back from nursing home.  Patient was 

discharged on 1/4/2023.  Patient was found to be progressively lethargic as 

noted by the nursing home staff, patient was tested positive at the nursing home

this morning however COVID-19 PCR was negative currently.


Possible aspiration was also suspected as per nursing home staff.


Patient had a swallow study recently and was able to tolerate dysphagia diet 

with aspiration precautions.


Patient was afebrile on admission.  Pulse ox 96% on room air.


Chest x-ray showed right perihilar patchy opacities which may represent 

infiltrative versus atelectasis.


EKG showed sinus rhythm


Laboratory data showed WBC 6.4 hemoglobin 12.3, platelets 179


Sodium 136 potassium 4.5 chloride 100 bicarb is 24 BUN 35 and creatinine 9.62 an

d blood sugar is 106


UDS negative and influenza A, B, RSV and SARS-CoV-2 not detected.





1/7/2023


Patient is lying in bed.  Awake alert and oriented.  Slow to respond.  No 

further episodes of seizures.  No complaints of chest pain or worsening 

shortness of breath.  Denies any complaints of cough or sputum production.  No 

nausea vomiting abdominal pain or diarrhea


Laboratory reviewed.  Procalcitonin level is 0.33.  Sodium 139 potassium 4.9 

chloride 106 bicarb is 20 BUN 45 creatinine 10.3.


Patient is undergoing hemodialysis today.


Nephrology and neurology is on board.





1/8/2023


Patient is currently resting in the bed.  Awake alert and oriented x3.  Slow to 

respond.  No complaints of chest pain or worsening shortness of breath.  Patient

has been afebrile overnight.  No further episodes of seizures.  Patient is being

on dysphagia diet.


Patient did have episode of choking today afternoon while eating.  Remained 

stable at this time.  Denies any complaints of worsening chest pain or shortness

of breath.  Patient has been afebrile.  Follow-up repeat chest x-ray tomorrow.


Nephrology neurology on board.  Plans for discharge back to facility in the next

24 to 48 hours.





01/09/2022


Patient is evaluated today resting in bed, currently receiving hemodialysis. She

has been maintained on dysphagia diet with 1:1 supervision. She reports 

tolerating diet. No acute events overnight, no further seizure like activity. 

Neurology recommending for patient to follow up with epilepsy specialist on 

discharge. Currently she is refusing D/C to subacute rehab. She would like to 

discharge home and states her  will be able to provide care. Physical 

therapy evaluation pending at this time. She has not been ambulatory. 





01/10/2023


Patient is resting in bed. She has been evaluated by PT/OT today and 

recommendations for subacute rehab she is willing to go to rehab now.  

unable to provide care for her at home at this time. She is maintained on 

dialysis scheduled of Mon/Wed/Fri and will undergo hemodialysis tomorrow. 

Continues with aspiration precautions sitting upright with dysphagia chopped 

diet and nectar thick liquids. Labs and vitals stable. Pending authorization for

DC to rehab. 





01/11/2023


Patient evaluated today during hemodialysis. No acute events overnight. Making 

progress with physical therapy was able to sit on edge of bed unassisted. She is

continuing to make progress and is agreeable to continuing therapy. Additionally

has been re-evaluated by speech therapy today and is able to tolerate free 

water. Continues with aspiration precautions and close monitoring with meal 

intake. She continues on MWF hemodialysis scheduled. Resumed on all oral 

antiseizure medications. Patient is agreeable for D/C to rehab and pending 

return to [facility] otherwise medically stable. 





01/12/2023


Patient is currently pending placement and insurance auth approval for discharge

to subacute rehab. Continues on oral anti seizure medications. Making 

improvements with PT/OT and speech therapy. Was up in the chair today. Able to 

tolerate free water. Alert x 3. Patient will have hemodialysis tomorrow per MWF 

schedule. Nephrology continues to follow. Blood pressure 102/67 today, afebrile,

heart rate 78, on room air. 





01/13/20223


Patient is pending insurance authorization and will likely DC to [facility] in 

[place] on Monday where they have on site hemodialysis. She has been dialyzed 

today per her usual MWF scheduled. Discussed with neurology and vimpat is 

discontinued and patient is recommending to continue off of vimpat. She is alert

x 3 today and no longer lethargic. No acute events overnight. Afebrile, blood 

pressure 118/59, heart rate 70, 94% room air. Anticipating discharge.





01/14/2023


Patient is evaluated on medical floor today. Pending insurance authorization for

DC likely on monday. Had hemodialysis yesterday with 2.2L off. No acute events 

overnight. Monitored off the vimpat. Continues on room air, afebrile. 





01/15/2023 


Patient continues to be evaluated on medical floor. Maintained on hemodialysis 

MWF. She is pending authorization for discharge to subacute rehab. Patient is 

afebrile, heart rate 70, blood pressure 126/77, 98% on room air. She is 

tolerating diet and her lung sounds continue to improve, monitored off 

antibiotics and using incentive spirometer. 





Review of Systems





Constitutional: Denied any fatigue denied any fever.


Cardio vascular: denied any chest pain, palpitations


Gastrointestinal: denied any nausea, vomiting, diarrhea


Pulmonary: Denied any shortness of breath cough


Neurologic denied any new focal deficits





All inpatient medications were reviewed and appropriate changes in these 

medications as dictated in the interval history and assessment and plan.





PHYSICAL EXAMINATION: 





GENERAL: The patient is alert and oriented x3, not in any acute distress. Well 

developed, well nourished. 


HEENT: Pupils are round and equally reacting to light. EOMI. No scleral icterus.

No conjunctival pallor. Normocephalic, atraumatic. No pharyngeal erythema. No 

thyromegaly. 


CARDIOVASCULAR: S1 and S2 present. No murmurs, rubs, or gallops. 


PULMONARY: Chest is clear to auscultation, no wheezing or crackles. 


ABDOMEN: Soft, nontender, nondistended, normoactive bowel sounds. No palpable 

organomegaly. 


MUSCULOSKELETAL: No joint swelling or deformity.


EXTREMITIES: No cyanosis, clubbing, or pedal edema. 


NEUROLOGICAL: Slurred speech, diffuse generalized weakness


SKIN: No rashes. 





Assessment and Plan


Assessment


RT. perihilar pacities possible atelectasis versus pneumonia.  COVID-19 PCR not 

detected. Procalcitonin level 0.33. Follow up chest xray shows clearing of 

opacities and no white count. Pneumonia ruled out likely representing atelect

asis. Monitored off antibiotics.


ESRD on hemodialysis Monday Wednesday and Friday


Recent admission with status epilepticus. 


Dysphagia diet with aspiration precautions as per most recent swallow study.


Left ICA 75% stenosis at the origin as per CTA neck.


Hypodensity in the brainstem and left basal ganglia related to previous CVA


History of CVA with left-sided weakness


Diabetes type 2


Peripheral neuropathy


Memory impairment


GERD


Hypertension


Hyperlipidemia


Hypothyroidism


Bipolar disorder


Prior history of smoking


DVT prophylaxis with heparin subcu


GI prophylaxis





Plan 


Continue current medications Vimpat is discontinued 


Reinforce education regarding dysphagia diet and aspiration precautions


Cleared for free water intake with guidelines


Continue hemodialysis MWF


Continue PT/OT and speech therapy 


Medically stable for DC pending insurance authorization which discharge is 

planned for monday to [facility]. No insurance auth as of today.





The impression and plan of care has been dictated by Scarlet Bill Nurse 

Practitioner as directed.





Dr. Tom MD


I have performed a history and physical examination and medical decision making 

of this patient, discussed the same with the dictator, and agree with the 

dictators assessment and plan as written, documented as a scribe. Based on total

visit time, I have performed more than 50% of this visit. 











Objective





- Vital Signs


Vital signs: 


                                   Vital Signs











Temp  97.5 F L  01/15/23 07:02


 


Pulse  70   01/15/23 07:02


 


Resp  18   01/15/23 07:02


 


BP  126/77   01/15/23 07:02


 


Pulse Ox  98   01/15/23 07:42


 


FiO2  21   01/10/23 20:35








                                 Intake & Output











 01/14/23 01/15/23 01/15/23





 18:59 06:59 18:59


 


Weight  74.5 kg 


 


Other:   


 


  Voiding Method Diaper Diaper 





 Incontinent Incontinent 


 


  # Voids 1 2 


 


  # Bowel Movements  1 














- Labs


CBC & Chem 7: 


                                 01/09/23 05:14





                                 01/13/23 09:30


Labs: 


                  Abnormal Lab Results - Last 24 Hours (Table)











  01/14/23 01/14/23 01/14/23 Range/Units





  11:46 16:42 20:37 


 


POC Glucose (mg/dL)  185 H  193 H  191 H  ()  mg/dL














  01/15/23 Range/Units





  06:17 


 


POC Glucose (mg/dL)  118 H  ()  mg/dL














Assessment and Plan


Time with Patient: Less than 30

## 2023-01-15 NOTE — P.PN
Subjective


Progress Note Date: 01/15/23


Principal diagnosis: 





Follow-up for ESRD on dialysis Monday Wednesday Friday admitted with confusion 

and a history of seizures in the background.  Currently she is awake and alert a

nd oriented.  Comfortable.  Denies any complaints.


She was recently discharged on 01/04/2023 and was sent back because of 

progressively worsening lethargy


Vital signs are stable blood pressure 126, to 140s systolic and diastolic in the

70-80's afebrile she is awake and alert and oriented.  No nausea vomiting.  

Unable to walk yet


History of present illness: 


The patient is a 52-year-old female seen in renal consultation for end-stage 

renal disease.  She is on hemodialysis on Monday Wednesday Friday schedule.  

Patient presents from extended care facility due to worsening lethargy noted but

the nursing staff.  Patient tested positive for coronary at the nursing home.  

There was also concern for aspiration.  It is noted in the chart at the patient 

was coughing when she was fed.  Patient has history of seizures and is required 

multiple hospitalizations for it recently.  In the hospital she tested negative 

for influenza, RSV and covert PCR.  No fever.  Her white count is not elevated. 

Chest x-ray from today showed no acute process.  Currently resting in bed.  

Hemodynamically stable.  She is on room air.  Patient has history of CVA 

residual hemiparesis and dysarthria.








Objective





- Vital Signs


Vital signs: 


                                   Vital Signs











Temp  97.5 F L  01/15/23 07:02


 


Pulse  70   01/15/23 07:02


 


Resp  18   01/15/23 07:02


 


BP  126/77   01/15/23 07:02


 


Pulse Ox  98   01/15/23 07:42


 


FiO2  21   01/10/23 20:35








                                 Intake & Output











 01/14/23 01/15/23 01/15/23





 18:59 06:59 18:59


 


Weight  74.5 kg 


 


Other:   


 


  Voiding Method Diaper Diaper 





 Incontinent Incontinent 


 


  # Voids 1 2 


 


  # Bowel Movements  1 











Examination awake alert oriented





No JVP noted neck is supple no facial asymmetry





Lungs clear to auscultation good air entry bilaterally





Heart sounds unremarkable





Abdomen soft nontender





Extremity exam was no edema





Neurologically awake alert oriented





- Labs


CBC & Chem 7: 


                                 01/09/23 05:14





                                 01/13/23 09:30


Labs: 


                  Abnormal Lab Results - Last 24 Hours (Table)











  01/14/23 01/14/23 01/15/23 Range/Units





  16:42 20:37 06:17 


 


POC Glucose (mg/dL)  193 H  191 H  118 H  ()  mg/dL














  01/15/23 Range/Units





  11:39 


 


POC Glucose (mg/dL)  161 H  ()  mg/dL














Assessment and Plan


Assessment: 





Impression





1.  ESRD on dialysis Monday Wednesday Friday stable, with an access on her left 

upper on





2.  Admitted with confusion resolved.





3.  History of seizures





4.  Anemia of ESRD, hemoglobin is 9.5 on 01/09/2023 improved from 9.2





Recommendation





1.  Maintain dialysis schedule Monday Wednesday Friday 





2.  May be discharge from our perspective

## 2023-01-16 LAB
ANION GAP SERPL CALC-SCNC: 8 MMOL/L
BUN SERPL-SCNC: 38 MG/DL (ref 7–17)
CALCIUM SPEC-MCNC: 9.2 MG/DL (ref 8.4–10.2)
CHLORIDE SERPL-SCNC: 101 MMOL/L (ref 98–107)
CO2 SERPL-SCNC: 26 MMOL/L (ref 22–30)
GLUCOSE BLD-MCNC: 128 MG/DL (ref 70–110)
GLUCOSE BLD-MCNC: 149 MG/DL (ref 70–110)
GLUCOSE BLD-MCNC: 193 MG/DL (ref 70–110)
GLUCOSE BLD-MCNC: 215 MG/DL (ref 70–110)
GLUCOSE SERPL-MCNC: 159 MG/DL (ref 74–99)
HBV SURFACE AB SERPL IA-ACNC: 100 MIU/ML
POTASSIUM SERPL-SCNC: 4.1 MMOL/L (ref 3.5–5.1)
SODIUM SERPL-SCNC: 135 MMOL/L (ref 137–145)

## 2023-01-16 RX ADMIN — HEPARIN SODIUM SCH UNIT: 5000 INJECTION INTRAVENOUS; SUBCUTANEOUS at 21:54

## 2023-01-16 RX ADMIN — PANTOPRAZOLE SODIUM SCH MG: 40 TABLET, DELAYED RELEASE ORAL at 06:48

## 2023-01-16 RX ADMIN — CLOPIDOGREL BISULFATE SCH MG: 75 TABLET ORAL at 12:12

## 2023-01-16 RX ADMIN — DIVALPROEX SODIUM SCH MG: 125 CAPSULE, COATED PELLETS ORAL at 21:53

## 2023-01-16 RX ADMIN — DIVALPROEX SODIUM SCH MG: 125 CAPSULE, COATED PELLETS ORAL at 08:46

## 2023-01-16 RX ADMIN — HEPARIN SODIUM SCH UNIT: 5000 INJECTION INTRAVENOUS; SUBCUTANEOUS at 12:12

## 2023-01-16 RX ADMIN — ZONISAMIDE SCH MG: 100 CAPSULE ORAL at 12:12

## 2023-01-16 RX ADMIN — METOPROLOL TARTRATE SCH: 25 TABLET, FILM COATED ORAL at 14:28

## 2023-01-16 RX ADMIN — METOPROLOL TARTRATE SCH MG: 25 TABLET, FILM COATED ORAL at 21:53

## 2023-01-16 RX ADMIN — SEVELAMER CARBONATE SCH MG: 800 TABLET, FILM COATED ORAL at 12:13

## 2023-01-16 RX ADMIN — Medication SCH MG: at 17:04

## 2023-01-16 RX ADMIN — Medication SCH MG: at 06:48

## 2023-01-16 RX ADMIN — SEVELAMER CARBONATE SCH MG: 800 TABLET, FILM COATED ORAL at 06:49

## 2023-01-16 RX ADMIN — DIVALPROEX SODIUM SCH MG: 125 CAPSULE, COATED PELLETS ORAL at 17:05

## 2023-01-16 RX ADMIN — SEVELAMER CARBONATE SCH MG: 800 TABLET, FILM COATED ORAL at 17:04

## 2023-01-16 RX ADMIN — Medication SCH MG: at 12:12

## 2023-01-16 RX ADMIN — ATORVASTATIN CALCIUM SCH MG: 40 TABLET, FILM COATED ORAL at 12:12

## 2023-01-16 NOTE — P.PN
Subjective





Patient is seen for f/u for ESRD.


Currently seen on hemodialysis. Tolerating treatment well.





Objective





- Vital Signs


Vital signs: 


                                   Vital Signs











Temp  97.5 F L  01/16/23 13:37


 


Pulse  72   01/16/23 14:36


 


Resp  20   01/16/23 13:37


 


BP  103/67   01/16/23 14:36


 


Pulse Ox  97   01/16/23 14:36


 


FiO2  21   01/10/23 20:35








                                 Intake & Output











 01/15/23 01/16/23 01/16/23





 18:59 06:59 18:59


 


Intake Total 250  


 


Balance 250  


 


Weight  73.5 kg 


 


Intake:   


 


  Oral 250  


 


Other:   


 


  Voiding Method  Diaper Diaper





  Incontinent Incontinent


 


  # Voids 2 1 


 


  # Bowel Movements   1














- Exam





Awake, comfortable


Lungs are clear


Heart sounds are heard


Abdomen is soft


No edema noted.





- Labs


CBC & Chem 7: 


                                 01/09/23 05:14





                                 01/16/23 08:50


Labs: 


                  Abnormal Lab Results - Last 24 Hours (Table)











  01/13/23 01/15/23 01/15/23 Range/Units





  09:30 16:39 20:55 


 


Sodium     (137-145)  mmol/L


 


BUN     (7-17)  mg/dL


 


Creatinine     (0.52-1.04)  mg/dL


 


Glucose     (74-99)  mg/dL


 


POC Glucose (mg/dL)   141 H  186 H  ()  mg/dL


 


Hep Bs Antibody  Reactive A    (Nonreactive)  














  01/16/23 01/16/23 01/16/23 Range/Units





  06:06 08:50 11:14 


 


Sodium   135 L   (137-145)  mmol/L


 


BUN   38 H   (7-17)  mg/dL


 


Creatinine   8.92 H*   (0.52-1.04)  mg/dL


 


Glucose   159 H   (74-99)  mg/dL


 


POC Glucose (mg/dL)  193 H   128 H  ()  mg/dL


 


Hep Bs Antibody     (Nonreactive)  














Assessment and Plan


Assessment: 








1.  ESRD on dialysis Monday Wednesday Friday stable, with an access on her left 

upper arm.





2.  Admitted with confusion resolved.





3.  History of seizures





4.  Anemia of ESRD, hemoglobin is 9.5 on 01/09/2023 improved from 9.2


Plan: 





Continue maintenance hemodialysis.

## 2023-01-16 NOTE — P.DS
Providers


Date of admission: 


01/06/23 14:48





Attending physician: 


Bhargav Head





Consults: 





                                        





01/06/23 14:48


Consult Physician Urgent 


   Consulting Provider: Esther Erickson


   Consult Reason/Comments: Missed dialysis


   Do you want consulting provider notified?: Yes





01/06/23 23:39


Consult Physician Urgent 


   Consulting Provider: Michele Merritt


   Consult Reason/Comments: Hx of seizure, medication management


   Do you want consulting provider notified?: Yes











Primary care physician: 


Dharmesh Roblero





Hospital Course: 


Final Diagnosis


Right perihilar opacities possible atelectasis versus pneumonia.  COVID-19 PCR 

not detected. Procalcitonin level 0.33. Follow up chest xray shows clearing of 

opacities and no white count. Pneumonia ruled out likely representing 

atelectasis. 


Missed Hemodialysis


ESRD on hemodialysis Monday Wednesday and Friday


Recent admission with status epilepticus. Continue current regimen, patient is 

off vimpat.


Dysphagia diet with aspiration precautions as per most recent swallow study.


Left ICA 75% stenosis at the origin as per CTA neck.


Hypodensity in the brainstem and left basal ganglia related to previous CVA


History of CVA with left-sided weakness


Diabetes type 2


Peripheral neuropathy


Memory impairment


GERD


Hypertension


Hyperlipidemia


Hypothyroidism


Bipolar disorder


Prior history of smoking





Discharge Disposition


Patient is stable for discharge to subacute rehab, she is continuing to make 

progress with PT/OT and speech therapy. Current diet dysphgia level 3 chopped, 

aspiration precautions, 1:1 supervison, sitting upright 90 degrees. Nectar thick

liquids with free water guidelines. Patient will require ongoing speech therapy 

on discharge. Patient to continue current hemodialysis scheduled 

monday/wed/friday and follow up with nephrology as previously recommended. 

Continue with antiseizure medications as scheduled. Vimpat has been discontinued

on discharge. Patient does have medical refractory epilepsy and is recommended 

to see an epileptilogist on discharge in addition to her known neurologist. 

Close follow up with primary care. Repeat labs in 2 to 3 days. 





Hospital Course


The patient is a 52-year-old female with medical history of diabetes mellitus, 

CVA, hypertension, hyperlipidemia, seizure disorder, thyroid disorder, GERD, 

sleep apnea, bipolar, former smoker. She has left arm fistula.  She is 

maintained on hemodialysis on Monday Wednesday Friday schedule. Patient presents

from extended care facility due to worsening lethargy noted by the nursing 

staff.  Patient tested positive for covid at the nursing home however here she 

is negative for influenza, RSV and COVID. There was also concern for aspiration.

 It is noted in the chart at the patient was coughing when she was fed.  Patient

has history of seizures and is required multiple hospitalizations for it recen

tly.  In the hospital she tested negative for influenza, RSV and covert PCR.  No

fever.  Her white count is not elevated.  Chest x-ray from today showed no acute

process.  Currently resting in bed.  Hemodynamically stable.  She is on room 

air.  Patient has history of CVA residual hemiparesis and dysarthria. She was 

admitted and evaluated by neurology who had resumed her antiseizure medications.

She was taken off the vimpat. She has improved overall and has been working with

PT/OT and speech therapy. She is alert x 3, tolerating diet. She has been 

continued on hemodialysis this admission MWF per usual. Her most recent labs 

showing sodium of 135, BUN 38, creatinine of 8.92. She has been pending 

authorization for DC to weekend and has been medically cleared for discharge. 





01/16/2023


Patient is evaluated today resting in bed. She has been working with PT and was 

able to get up to the chair yesterday. She continues with significant 

generalized weakness. Additionally, she has made improvements with speech 

therapy. She continues on dysphagia level 3 chopped diet with nectar thick 

liquids and FFWP. Her white count has remained negative this admission, hgb 

stable at 9.5, sodium 135, potassium 4.1, BUN 38, creatinine 8.92. Currently 

denying shortness of breath, no chest pain. No nausea, vomiting or diarrhea. Her

bowels are moving. She is alert x 3, she has some residual slurred speech and 

slow to respond. She has been in better spirits. Lungs are clear today, S1 S2 

auscultated. She is encouraged to continue incentive spirometer 10 x an hour 

while awake.  She is afebrile, heart rate 72, blood pressure 135/47, 95% room 

air. Cleared medically for discharge to subacute rehab.








Please see medication reconciliation for list of current medication.  Thank you 

for allowing us to participate in the care of this patient.





The impression and plan of care has been dictated by Scarlet Bill, Nurse 

Practitioner as directed.





Dr. Tom MD


I have performed a history and physical examination and medical decision making 

of this patient, discussed the same with the dictator, and agree with the 

dictators assessment and plan as written, documented as a scribe. Based on total

visit time, I have performed more than 50% of this visit. 





Patient Condition at Discharge: Stable





Plan - Discharge Summary


Discharge Rx Participant: No


New Discharge Prescriptions: 


Continue


   Clopidogrel [Plavix] 75 mg PO DAILY #30 tab


   Atorvastatin [Lipitor] 40 mg PO DAILY


   Vascepa 1gm 1 gm PO BID


   Sevelamer [Renvela] 800 mg PO BID-W/MEALS


   Calcium Acetate 667 mg PO TID-W/MEALS


   Pantoprazole [Protonix] 40 mg PO DAILY


   Sevelamer [Renvela] 1,600 mg PO W/SUPPER


   Zonisamide [Zonegran] 100 mg PO DAILY 30 Days #30 cap


   Heparin Sodium,Porcine [Heparin Sodium] 5,000 unit SQ Q12HR 30 Days #60 each


   bisacodyL 10 mg RECTAL DAILY PRN


     PRN Reason: Constipation


   Dulaglutide [Trulicity] 3 mg SQ Q7D


   Darbepoetin Christiano [Aranesp] 40 mcg SQ Q7D  each


   Divalproex Sprinkle [Depakote Sprinkle] 500 mg PO TID  cap


   levETIRAcetam [Keppra] 500 mg PO MOWEFR@1800  tab


   levETIRAcetam [Keppra] 500 mg PO Q12HR  tab


   Metoprolol Tartrate [Lopressor] 25 mg PO BID  tab


   amLODIPine [Norvasc] 5 mg PO BID  tab





Discontinued


   Lacosamide [Vimpat] 100 mg PO BID #6 tab


Discharge Medication List





Clopidogrel [Plavix] 75 mg PO DAILY #30 tab 02/13/21 [Rx]


Atorvastatin [Lipitor] 40 mg PO DAILY 08/31/21 [History]


Dulaglutide [Trulicity] 3 mg SQ Q7D 08/31/21 [History]


Sevelamer [Renvela] 800 mg PO BID-W/MEALS 08/31/21 [History]


Vascepa 1gm 1 gm PO BID 08/31/21 [History]


Calcium Acetate 667 mg PO TID-W/MEALS 08/05/22 [History]


Pantoprazole [Protonix] 40 mg PO DAILY 08/05/22 [History]


Sevelamer [Renvela] 1,600 mg PO W/SUPPER 10/07/22 [History]


Zonisamide [Zonegran] 100 mg PO DAILY 30 Days #30 cap 12/14/22 [Rx]


Darbepoetin Christiano [Aranesp] 40 mcg SQ Q7D  each 01/04/23 [Rx]


Divalproex Sprinkle [Depakote Sprinkle] 500 mg PO TID  cap 01/04/23 [Rx]


Heparin Sodium,Porcine [Heparin Sodium] 5,000 unit SQ Q12HR 30 Days #60 each 

01/04/23 [Rx]


Metoprolol Tartrate [Lopressor] 25 mg PO BID  tab 01/04/23 [Rx]


amLODIPine [Norvasc] 5 mg PO BID  tab 01/04/23 [Rx]


levETIRAcetam [Keppra] 500 mg PO MOWEFR@1800  tab 01/04/23 [Rx]


levETIRAcetam [Keppra] 500 mg PO Q12HR  tab 01/04/23 [Rx]


bisacodyL 10 mg RECTAL DAILY PRN 01/06/23 [History]








Follow up Appointment(s)/Referral(s): 


Saturnino Castillo MD [STAFF PHYSICIAN] - 1-2 Days


Bernice Pena NPC [REFERRING] - 1 Week


Brennen Higginbotham DO [STAFF PHYSICIAN] - 1 Week


Ambulatory/Diagnostic Orders: 


Basic Metabolic Panel [LAB.AMB] Time Frame: 3 Days, Location: None Selected


Complete Blood Count w/diff [LAB.AMB] Time Frame: 3 Days, Location: None 

Selected


Activity/Diet/Wound Care/Special Instructions: 


Patient needs to follow up with epilepsy specialist as well as her neurologist


Patient is recommending to stay off vimpat.





Recommend to see PCP in 1 to 2 days 





Continue hemodialysis scheduled Mon/Wed/Fri and follow up with nephrology as 

usual








Continue with dysphagia level 3 chopped diet 


Sitting up 90 degrees for all meals/drinks


Aspiration precautions 1:1 supervision 


No Straws


[place] Thick Liquids





Patient has free water guidelines in place 





Patient is high risk for aspiration








Discharge Disposition: TRANSFER TO SNF/ECF

## 2023-01-17 VITALS — SYSTOLIC BLOOD PRESSURE: 113 MMHG | DIASTOLIC BLOOD PRESSURE: 72 MMHG | TEMPERATURE: 97.9 F | HEART RATE: 66 BPM

## 2023-01-17 VITALS — RESPIRATION RATE: 17 BRPM

## 2023-01-17 LAB — GLUCOSE BLD-MCNC: 115 MG/DL (ref 70–110)

## 2023-01-17 RX ADMIN — Medication SCH MG: at 08:21

## 2023-01-17 RX ADMIN — CLOPIDOGREL BISULFATE SCH MG: 75 TABLET ORAL at 08:21

## 2023-01-17 RX ADMIN — HEPARIN SODIUM SCH UNIT: 5000 INJECTION INTRAVENOUS; SUBCUTANEOUS at 08:21

## 2023-01-17 RX ADMIN — ATORVASTATIN CALCIUM SCH MG: 40 TABLET, FILM COATED ORAL at 08:20

## 2023-01-17 RX ADMIN — METOPROLOL TARTRATE SCH MG: 25 TABLET, FILM COATED ORAL at 08:20

## 2023-01-17 RX ADMIN — ZONISAMIDE SCH MG: 100 CAPSULE ORAL at 08:22

## 2023-01-17 RX ADMIN — DIVALPROEX SODIUM SCH MG: 125 CAPSULE, COATED PELLETS ORAL at 08:20

## 2023-01-17 RX ADMIN — SEVELAMER CARBONATE SCH MG: 800 TABLET, FILM COATED ORAL at 08:21

## 2023-01-17 RX ADMIN — PANTOPRAZOLE SODIUM SCH MG: 40 TABLET, DELAYED RELEASE ORAL at 05:56

## 2023-04-23 ENCOUNTER — HOSPITAL ENCOUNTER (EMERGENCY)
Dept: HOSPITAL 47 - EC | Age: 53
LOS: 1 days | Discharge: HOME | End: 2023-04-24
Payer: MEDICARE

## 2023-04-23 VITALS — TEMPERATURE: 98.4 F

## 2023-04-23 DIAGNOSIS — E78.5: ICD-10-CM

## 2023-04-23 DIAGNOSIS — F31.9: ICD-10-CM

## 2023-04-23 DIAGNOSIS — Z99.2: ICD-10-CM

## 2023-04-23 DIAGNOSIS — I12.0: ICD-10-CM

## 2023-04-23 DIAGNOSIS — Z86.73: ICD-10-CM

## 2023-04-23 DIAGNOSIS — N39.0: Primary | ICD-10-CM

## 2023-04-23 DIAGNOSIS — Z88.0: ICD-10-CM

## 2023-04-23 DIAGNOSIS — N18.6: ICD-10-CM

## 2023-04-23 DIAGNOSIS — Z79.899: ICD-10-CM

## 2023-04-23 DIAGNOSIS — M19.90: ICD-10-CM

## 2023-04-23 DIAGNOSIS — Z87.891: ICD-10-CM

## 2023-04-23 DIAGNOSIS — E11.22: ICD-10-CM

## 2023-04-23 LAB
ALBUMIN SERPL-MCNC: 3.7 G/DL (ref 3.5–5)
ALP SERPL-CCNC: 109 U/L (ref 38–126)
ALT SERPL-CCNC: 32 U/L (ref 4–34)
ANION GAP SERPL CALC-SCNC: 12 MMOL/L
APTT BLD: 24 SEC (ref 22–30)
AST SERPL-CCNC: 50 U/L (ref 14–36)
BASOPHILS # BLD AUTO: 0 K/UL (ref 0–0.2)
BASOPHILS NFR BLD AUTO: 1 %
BUN SERPL-SCNC: 64 MG/DL (ref 7–17)
CALCIUM SPEC-MCNC: 9.3 MG/DL (ref 8.4–10.2)
CHLORIDE SERPL-SCNC: 97 MMOL/L (ref 98–107)
CO2 SERPL-SCNC: 28 MMOL/L (ref 22–30)
EOSINOPHIL # BLD AUTO: 0.1 K/UL (ref 0–0.7)
EOSINOPHIL NFR BLD AUTO: 1 %
ERYTHROCYTE [DISTWIDTH] IN BLOOD BY AUTOMATED COUNT: 3.43 M/UL (ref 3.8–5.4)
ERYTHROCYTE [DISTWIDTH] IN BLOOD: 13.8 % (ref 11.5–15.5)
GLUCOSE SERPL-MCNC: 213 MG/DL (ref 74–99)
HCT VFR BLD AUTO: 34.7 % (ref 34–46)
HGB BLD-MCNC: 11.3 GM/DL (ref 11.4–16)
INR PPP: 1.1 (ref ?–1.2)
LYMPHOCYTES # SPEC AUTO: 2 K/UL (ref 1–4.8)
LYMPHOCYTES NFR SPEC AUTO: 28 %
MCH RBC QN AUTO: 33.1 PG (ref 25–35)
MCHC RBC AUTO-ENTMCNC: 32.7 G/DL (ref 31–37)
MCV RBC AUTO: 101.3 FL (ref 80–100)
MONOCYTES # BLD AUTO: 0.5 K/UL (ref 0–1)
MONOCYTES NFR BLD AUTO: 7 %
NEUTROPHILS # BLD AUTO: 4.6 K/UL (ref 1.3–7.7)
NEUTROPHILS NFR BLD AUTO: 63 %
PLATELET # BLD AUTO: 198 K/UL (ref 150–450)
POTASSIUM SERPL-SCNC: 5.3 MMOL/L (ref 3.5–5.1)
PROT SERPL-MCNC: 6.6 G/DL (ref 6.3–8.2)
PT BLD: 11 SEC (ref 9–12)
SODIUM SERPL-SCNC: 137 MMOL/L (ref 137–145)
WBC # BLD AUTO: 7.3 K/UL (ref 3.8–10.6)

## 2023-04-23 PROCEDURE — 70450 CT HEAD/BRAIN W/O DYE: CPT

## 2023-04-23 PROCEDURE — 87086 URINE CULTURE/COLONY COUNT: CPT

## 2023-04-23 PROCEDURE — 80306 DRUG TEST PRSMV INSTRMNT: CPT

## 2023-04-23 PROCEDURE — 87077 CULTURE AEROBIC IDENTIFY: CPT

## 2023-04-23 PROCEDURE — 80053 COMPREHEN METABOLIC PANEL: CPT

## 2023-04-23 PROCEDURE — 87186 SC STD MICRODIL/AGAR DIL: CPT

## 2023-04-23 PROCEDURE — 93005 ELECTROCARDIOGRAM TRACING: CPT

## 2023-04-23 PROCEDURE — 81001 URINALYSIS AUTO W/SCOPE: CPT

## 2023-04-23 PROCEDURE — 96361 HYDRATE IV INFUSION ADD-ON: CPT

## 2023-04-23 PROCEDURE — 85610 PROTHROMBIN TIME: CPT

## 2023-04-23 PROCEDURE — 96374 THER/PROPH/DIAG INJ IV PUSH: CPT

## 2023-04-23 PROCEDURE — 99285 EMERGENCY DEPT VISIT HI MDM: CPT

## 2023-04-23 PROCEDURE — 85025 COMPLETE CBC W/AUTO DIFF WBC: CPT

## 2023-04-23 PROCEDURE — 36415 COLL VENOUS BLD VENIPUNCTURE: CPT

## 2023-04-23 PROCEDURE — 85730 THROMBOPLASTIN TIME PARTIAL: CPT

## 2023-04-23 PROCEDURE — 84484 ASSAY OF TROPONIN QUANT: CPT

## 2023-04-23 PROCEDURE — 82140 ASSAY OF AMMONIA: CPT

## 2023-04-23 PROCEDURE — 71046 X-RAY EXAM CHEST 2 VIEWS: CPT

## 2023-04-23 NOTE — XR
EXAMINATION TYPE: XR chest 2V

 

DATE OF EXAM: 4/23/2023 8:25 PM

 

COMPARISON: Chest x-ray 3/8/2023

 

TECHNIQUE: XR chest 2V .

 

CLINICAL INDICATION:Female, 52 years old with history of altered mental status; 

 

FINDINGS: 

Lungs/Pleura: There is no evidence of pleural effusion, focal consolidation, or pneumothorax.  

Pulmonary vascularity: Unremarkable.

Heart/mediastinum: Cardiomediastinal silhouette is unremarkable.  Electronic device projects of the l
eft chest wall with leads extending into the neck. Loop recorder projects over the lower chest.

Musculoskeletal: No acute osseous pathology.

 

IMPRESSION: 

No acute cardiopulmonary disease/process.

## 2023-04-23 NOTE — CT
EXAMINATION TYPE: CT brain wo con

CT DLP: 1099.8 mGycm, Automated exposure control for dose reduction was used.

 

DATE OF EXAM: 4/23/2023 8:37 PM

 

COMPARISON: CT brain 3/8/2023, CT brain 2/18/2023.

 

CLINICAL INDICATION:Female, 52 years old with history of Altered mental status, AMS

 

TECHNIQUE: 

Brain: Axial CT images of the brain were obtained with coronal and sagittal reformats created and rev
iewed.

Contrast used: None.

Oral contrast used: None.

 

FINDINGS:

 

Brain:

Extra-axial spaces: No abnormal extra-axial fluid collections.

Ventricular system: Within normal limits

Cerebral parenchyma: Cerebral atrophy. No acute intraparenchymal hemorrhage or mass effect.  The gray
-white junction is well differentiated. Stable remote left thalamic and left mari raghav lacunar injuri
es. Scattered hypoattenuating areas are seen within the white matter.  

Cerebellum: Multiple bilateral lacunar injuries, similar to prior examinations.

Mass effect: No evidence of midline shift.

Intracranial vasculature: Atherosclerotic calcifications of the intracranial vessels.

Soft tissues: Normal.

Calvarium/osseous structures: No depressed skull fracture.

Paranasal sinuses and mastoid air cells: Clear. Mastoid air cells are Clear

Visualized orbits: Orbital contents are intact.

 

 

IMPRESSION:

1. No acute intracranial process.

2. Remote lacunar injuries along with nonspecific white matter changes likely secondary to chronic mi
croangiopathy.

## 2023-04-24 VITALS — SYSTOLIC BLOOD PRESSURE: 148 MMHG | RESPIRATION RATE: 16 BRPM | HEART RATE: 83 BPM | DIASTOLIC BLOOD PRESSURE: 78 MMHG

## 2023-04-24 LAB
HYALINE CASTS UR QL AUTO: 1 /LPF (ref 0–2)
PH UR: 8 [PH] (ref 5–8)
PROT UR QL: (no result)
RBC UR QL: 13 /HPF (ref 0–5)
SP GR UR: 1.01 (ref 1–1.03)
SQUAMOUS UR QL AUTO: 1 /HPF (ref 0–4)
UROBILINOGEN UR QL STRIP: <2 MG/DL (ref ?–2)
WBC # UR AUTO: 136 /HPF (ref 0–5)

## 2023-05-01 ENCOUNTER — HOSPITAL ENCOUNTER (OUTPATIENT)
Dept: HOSPITAL 47 - EC | Age: 53
Setting detail: OBSERVATION
LOS: 1 days | Discharge: NURSING, REHAB OR LONG-TERM CARE | End: 2023-05-02
Attending: INTERNAL MEDICINE | Admitting: INTERNAL MEDICINE
Payer: MEDICARE

## 2023-05-01 DIAGNOSIS — M19.90: ICD-10-CM

## 2023-05-01 DIAGNOSIS — K21.9: ICD-10-CM

## 2023-05-01 DIAGNOSIS — E87.5: ICD-10-CM

## 2023-05-01 DIAGNOSIS — E11.42: ICD-10-CM

## 2023-05-01 DIAGNOSIS — M89.8X9: ICD-10-CM

## 2023-05-01 DIAGNOSIS — Z91.158: ICD-10-CM

## 2023-05-01 DIAGNOSIS — R41.82: Primary | ICD-10-CM

## 2023-05-01 DIAGNOSIS — Z79.899: ICD-10-CM

## 2023-05-01 DIAGNOSIS — E07.9: ICD-10-CM

## 2023-05-01 DIAGNOSIS — Z79.85: ICD-10-CM

## 2023-05-01 DIAGNOSIS — E78.5: ICD-10-CM

## 2023-05-01 DIAGNOSIS — I12.0: ICD-10-CM

## 2023-05-01 DIAGNOSIS — Z98.891: ICD-10-CM

## 2023-05-01 DIAGNOSIS — R82.81: ICD-10-CM

## 2023-05-01 DIAGNOSIS — I83.90: ICD-10-CM

## 2023-05-01 DIAGNOSIS — Z87.891: ICD-10-CM

## 2023-05-01 DIAGNOSIS — R41.3: ICD-10-CM

## 2023-05-01 DIAGNOSIS — Z88.0: ICD-10-CM

## 2023-05-01 DIAGNOSIS — Z99.2: ICD-10-CM

## 2023-05-01 DIAGNOSIS — F31.9: ICD-10-CM

## 2023-05-01 DIAGNOSIS — Z95.818: ICD-10-CM

## 2023-05-01 DIAGNOSIS — Z86.73: ICD-10-CM

## 2023-05-01 DIAGNOSIS — Z96.82: ICD-10-CM

## 2023-05-01 DIAGNOSIS — G40.909: ICD-10-CM

## 2023-05-01 DIAGNOSIS — M21.372: ICD-10-CM

## 2023-05-01 DIAGNOSIS — N18.6: ICD-10-CM

## 2023-05-01 LAB
ALBUMIN SERPL-MCNC: 3.4 G/DL (ref 3.5–5)
ALP SERPL-CCNC: 73 U/L (ref 38–126)
ALT SERPL-CCNC: 24 U/L (ref 4–34)
ANION GAP SERPL CALC-SCNC: 12 MMOL/L
APTT BLD: 24.6 SEC (ref 22–30)
AST SERPL-CCNC: 29 U/L (ref 14–36)
BASOPHILS # BLD AUTO: 0 K/UL (ref 0–0.2)
BASOPHILS NFR BLD AUTO: 0 %
BUN SERPL-SCNC: 55 MG/DL (ref 7–17)
CALCIUM SPEC-MCNC: 9.2 MG/DL (ref 8.4–10.2)
CHLORIDE SERPL-SCNC: 96 MMOL/L (ref 98–107)
CO2 SERPL-SCNC: 30 MMOL/L (ref 22–30)
EOSINOPHIL # BLD AUTO: 0 K/UL (ref 0–0.7)
EOSINOPHIL NFR BLD AUTO: 1 %
ERYTHROCYTE [DISTWIDTH] IN BLOOD BY AUTOMATED COUNT: 3.64 M/UL (ref 3.8–5.4)
ERYTHROCYTE [DISTWIDTH] IN BLOOD: 13.9 % (ref 11.5–15.5)
GLUCOSE BLD-MCNC: 143 MG/DL (ref 70–110)
GLUCOSE SERPL-MCNC: 106 MG/DL (ref 74–99)
HCT VFR BLD AUTO: 35.8 % (ref 34–46)
HGB BLD-MCNC: 11.7 GM/DL (ref 11.4–16)
INR PPP: 1.1 (ref ?–1.2)
LYMPHOCYTES # SPEC AUTO: 1.1 K/UL (ref 1–4.8)
LYMPHOCYTES NFR SPEC AUTO: 13 %
MCH RBC QN AUTO: 32.2 PG (ref 25–35)
MCHC RBC AUTO-ENTMCNC: 32.8 G/DL (ref 31–37)
MCV RBC AUTO: 98.1 FL (ref 80–100)
MONOCYTES # BLD AUTO: 0.7 K/UL (ref 0–1)
MONOCYTES NFR BLD AUTO: 9 %
NEUTROPHILS # BLD AUTO: 6.4 K/UL (ref 1.3–7.7)
NEUTROPHILS NFR BLD AUTO: 76 %
PH UR: 8.5 [PH] (ref 5–8)
PLATELET # BLD AUTO: 145 K/UL (ref 150–450)
POTASSIUM SERPL-SCNC: 5.4 MMOL/L (ref 3.5–5.1)
PROT SERPL-MCNC: 6.3 G/DL (ref 6.3–8.2)
PROT UR QL: (no result)
PT BLD: 11.4 SEC (ref 9–12)
RBC UR QL: 93 /HPF (ref 0–5)
SODIUM SERPL-SCNC: 138 MMOL/L (ref 137–145)
SP GR UR: 1.01 (ref 1–1.03)
SQUAMOUS UR QL AUTO: 3 /HPF (ref 0–4)
UROBILINOGEN UR QL STRIP: <2 MG/DL (ref ?–2)
WBC # BLD AUTO: 8.4 K/UL (ref 3.8–10.6)
WBC # UR AUTO: >182 /HPF (ref 0–5)

## 2023-05-01 PROCEDURE — 96375 TX/PRO/DX INJ NEW DRUG ADDON: CPT

## 2023-05-01 PROCEDURE — 80048 BASIC METABOLIC PNL TOTAL CA: CPT

## 2023-05-01 PROCEDURE — 71046 X-RAY EXAM CHEST 2 VIEWS: CPT

## 2023-05-01 PROCEDURE — 96361 HYDRATE IV INFUSION ADD-ON: CPT

## 2023-05-01 PROCEDURE — 97162 PT EVAL MOD COMPLEX 30 MIN: CPT

## 2023-05-01 PROCEDURE — 87086 URINE CULTURE/COLONY COUNT: CPT

## 2023-05-01 PROCEDURE — 84484 ASSAY OF TROPONIN QUANT: CPT

## 2023-05-01 PROCEDURE — 85610 PROTHROMBIN TIME: CPT

## 2023-05-01 PROCEDURE — 84100 ASSAY OF PHOSPHORUS: CPT

## 2023-05-01 PROCEDURE — 93005 ELECTROCARDIOGRAM TRACING: CPT

## 2023-05-01 PROCEDURE — 81001 URINALYSIS AUTO W/SCOPE: CPT

## 2023-05-01 PROCEDURE — 85025 COMPLETE CBC W/AUTO DIFF WBC: CPT

## 2023-05-01 PROCEDURE — 70450 CT HEAD/BRAIN W/O DYE: CPT

## 2023-05-01 PROCEDURE — 94760 N-INVAS EAR/PLS OXIMETRY 1: CPT

## 2023-05-01 PROCEDURE — 36415 COLL VENOUS BLD VENIPUNCTURE: CPT

## 2023-05-01 PROCEDURE — 92610 EVALUATE SWALLOWING FUNCTION: CPT

## 2023-05-01 PROCEDURE — 97166 OT EVAL MOD COMPLEX 45 MIN: CPT

## 2023-05-01 PROCEDURE — 84132 ASSAY OF SERUM POTASSIUM: CPT

## 2023-05-01 PROCEDURE — 96365 THER/PROPH/DIAG IV INF INIT: CPT

## 2023-05-01 PROCEDURE — 85730 THROMBOPLASTIN TIME PARTIAL: CPT

## 2023-05-01 PROCEDURE — 99285 EMERGENCY DEPT VISIT HI MDM: CPT

## 2023-05-01 PROCEDURE — 80053 COMPREHEN METABOLIC PANEL: CPT

## 2023-05-01 RX ADMIN — LACOSAMIDE SCH MG: 150 TABLET, FILM COATED ORAL at 21:14

## 2023-05-01 RX ADMIN — ZONISAMIDE SCH MG: 100 CAPSULE ORAL at 21:04

## 2023-05-01 RX ADMIN — DIVALPROEX SODIUM SCH MG: 500 TABLET, DELAYED RELEASE ORAL at 21:00

## 2023-05-01 RX ADMIN — ASPIRIN SCH: 325 TABLET ORAL at 20:29

## 2023-05-01 RX ADMIN — ZONISAMIDE SCH: 100 CAPSULE ORAL at 18:17

## 2023-05-01 RX ADMIN — SEVELAMER CARBONATE SCH: 800 TABLET, FILM COATED ORAL at 18:18

## 2023-05-01 NOTE — P.HPIM
History of Present Illness


H&P Date: 23











This is a 52-year-old female who presented to the emergency department via EMS 

[place] [facility] [place] with reports of altered 

mental status.  Per nursing staff patient was having some extremity weakness and

altered mentation difficulty to arouse this morning and sent here for further 

evaluation.  Patient follows with Dr. Saturnino Castillo in the outpatient setting with

a significant past medical history of seizures, angina, CVA/TIA, diabetes, GERD,

hyperlipidemia, hypertension, memory impairment, end-stage renal disease, 

thyroid disorder with osteoarthritis.  Patient normally does dialysis 

Monday/Wednesday/Friday and was found to have hyperkalemia as well as altered 

mentation on admission.  Patient did have abnormal urine and will empirically 

start Levaquin as patient is not able to give much of a history that is 

currently nonverbal.  Patient appears somewhat postictal and does follow with 

neurology outpatient.  Patient does take a number of seizure medications and has

been admitted previously multiple times for noncompliance of medications.  Chest

x-ray shows no acute process, EKG shows normal sinus rhythm with a heart rate of

89, and CT of the brain shows no acute intracranial hemorrhage or midline shift 

no significant change from most recent prior CT.  Patient was admitted with 

altered mental status, missed dialysis, and hyperkalemia.





Review Of Systems: Unable to completely assess as patient is nonverbal and alert

and oriented 1














PHYSICAL EXAMINATION: 





GENERAL: The patient is alert and oriented x1, makes eye contact when voicing 

her name although nonverbal and unable to answer questions, elderly-appearing


HEENT: Pupils are round and equally reacting to light. EOMI. no scleral icterus.

No conjunctival pallor. Normocephalic, atraumatic. No pharyngeal erythema. No 

thyromegaly.  


CARDIOVASCULAR: S1 and S2  muffled 


PULMONARY: diminished breath sounds bilaterally with no wheezing or rhonchi 

noted. 


ABDOMEN: soft.  Nontender on exam. non-distended, normoactive bowel sounds. No 

palpable organomegaly. 


MUSCULOSKELETAL: No joint swelling or deformity.


EXTREMITIES: No cyanosis, clubbing, or pedal edema.  


NEUROLOGICAL: Gross neurological examination did not reveal any focal deficits. 

Appears somewhat postictal.  Diffuse weakness


SKIN: No rashes. 





Assessment:





Altered mental status, possibly secondary to breakthrough seizure, multifa

ctorial also a component of possible missed dialysis


End-stage renal disease on hemodialysis Monday/Wednesday/Friday with missed 

dialysis today


History of seizure disorder


Hyperkalemia


History of CVA/TIA


Diabetes mellitus 


GERD


Hyperlipidemia


Hypertension


Memory impairment


Osteoarthritis


GI prophylaxis


DVT prophylaxis


Former smoker


Full code





Plan:





Recommend to continue with current medications and management and have consulted

nephrology as patient is maintained on hemodialysis Monday/Wednesday/Friday and 

missed dialysis today


Patient with hyperkalemia and being corrected will follow-up with repeat labs in

the a.m.


[place] [facility] [place], will consult

case management along with PT/OT therapy


Urine is abnormal on admission and does not make very much urine, will add 

empiric Levaquin


Patient is altered and confused more than her baseline with significant seizure 

history and is nonverbal at this time











The impression and plan of care has been dictated by Karie Rosado, nurse 

practitioner as directed.





Dr. Tom MD


I have performed a history and examination and MDM of this patient, discussed 

the same with the dictator, and  agree with the dictator's assessment and plan 

as written ,documented as a scribe. Based on total visit time,  I have performed

more than 50% of the visit. Any additional findings or plans will be noted. 





Past Medical History


Past Medical History: Chest Pain / Angina, CVA/TIA, Diabetes Mellitus, 

GERD/Reflux, Hyperlipidemia, Hypertension, Memory Impairment, Osteoarthritis 

(OA), Renal Disease, Seizure Disorder, Thyroid Disorder


Additional Past Medical History / Comment(s): Last seizure approximately one 

month ago. Spouse states she used to have seizures 4X per week until she had 

vagal nerve stimulator placed, now has seizures approximately once a week to 

once a month. Dialysis MOWEFR. Neuropathy, left drop foot, only able to walk 

short distances, otherwise uses wheelchair. 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 

ago, with residual memory impairment. Never diagnosed with Sleep Apnea but 

spouse states she does stop breathing through the night and he has to shake her 

to start breathing again. Varicose veins.


History of Any Multi-Drug Resistant Organisms: None Reported


Past Surgical History:  Section, Tonsillectomy, Uterine Ablation


Additional Past Surgical History / Comment(s): Left arm fistula, loop recorder, 

vagus nerve stimulator.


Past Anesthesia/Blood Transfusion Reactions: Motion Sickness


Additional Past Anesthesia/Blood Transfusion Reaction / Comment(s): Family hx 

unknown, pt adopted.


Past Psychological History: Bipolar


Smoking Status: Former smoker


Past Alcohol Use History: None Reported


Past Drug Use History: None Reported





- Past Family History


  ** Father


Family Medical History: Unable to Obtain


Additional Family Medical History / Comment(s): Pt adopted.





Occupational Seizure History





- Commerical Driving History


Currently uses [employer] for employment (including self-employed).: No





Medications and Allergies


                                Home Medications











 Medication  Instructions  Recorded  Confirmed  Type


 


Atorvastatin [Lipitor] 40 mg PO HS 21 History


 


Dulaglutide [Trulicity] 3 mg SQ WE 21 History


 


Calcium Acetate 667 mg PO MOWEFR@0800,1700 22 History


 


Sevelamer [Renvela] 1,600 mg PO BID 10/07/22 05/01/23 History


 


Zonisamide [Zonegran] 100 mg PO TID@0600,1500,2200 23 History


 


levETIRAcetam [Keppra] 500 mg PO BID@0900,23 History


 


levETIRAcetam [Keppra] 500 mg PO MOWEFR@1600 23 History


 


Divalproex Sodium [Depakote] 500 mg PO BID@0900,23 History


 


Divalproex Sodium [Depakote] 500 mg PO MOWEFR@1600 23 History


 


Famotidine [Pepcid] 20 mg PO DAILY #30 tab 03/10/23 05/01/23 Rx


 


Lacosamide [Vimpat] 150 mg PO BID@0900, #6 tab 03/10/23 05/01/23 Rx


 


Metoprolol Tartrate [Lopressor] 25 mg PO MOWEFR@23 History


 


Calcium Acetate 667 mg PO SUTUTHSA@08,13,23 History


 


Lacosamide [Vimpat] 150 mg PO MOWEFR@1600 23 History


 


Metoprolol Tartrate [Lopressor] 25 mg PO SUTUTHSA@0900,23 

History


 


icosapent ethyL [Icosapent Ethyl] 1 gm PO BID 23 History








                                    Allergies











Allergy/AdvReac Type Severity Reaction Status Date / Time


 


Penicillins Allergy  Itching Verified 23 10:57














Physical Exam


Vitals: 


                                   Vital Signs











  Temp Pulse Pulse Resp BP BP Pulse Ox


 


 23 17:40  98.6 F   81    157/76  97


 


 23 17:20   88   18  166/84   97


 


 23 14:52   85   16  145/83   97


 


 23 12:19   87   18  141/22   97


 


 23 10:56  97.8 F      


 


 23 10:39  76.8 F L  89   18  136/50   100








                                Intake and Output











 23





 06:59 14:59 22:59


 


Other:   


 


  Weight  68.039 kg 














Results


CBC & Chem 7: 


                                 23 11:04





                                 23 11:04


Labs: 


                  Abnormal Lab Results - Last 24 Hours (Table)











  23 Range/Units





  11:04 11:04 11:04 


 


RBC  3.64 L    (3.80-5.40)  m/uL


 


Plt Count  145 L    (150-450)  k/uL


 


Potassium   5.4 H   (3.5-5.1)  mmol/L


 


Chloride   96 L   ()  mmol/L


 


BUN   55 H   (7-17)  mg/dL


 


Creatinine   8.86 H*   (0.52-1.04)  mg/dL


 


Glucose   106 H   (74-99)  mg/dL


 


Albumin   3.4 L   (3.5-5.0)  g/dL


 


Urine Appearance    Cloudy H  (Clear)  


 


Urine pH    8.5 H  (5.0-8.0)  


 


Urine Protein    2+ H  (Negative)  


 


Urine Blood    Small H  (Negative)  


 


Ur Leukocyte Esterase    Large H  (Negative)  


 


Urine RBC    93 H  (0-5)  /hpf


 


Urine WBC    >182 H  (0-5)  /hpf


 


Urine WBC Clumps    Few H  (None)  /hpf


 


Urine Bacteria    Rare H  (None)  /hpf














Thrombosis Risk Factor Assmnt





- DVT/VTE Prophylaxis


DVT/VTE Prophylaxis: Pharmacologic Prophylaxis ordered





Assessment and Plan


Time with Patient: Greater than 30

## 2023-05-01 NOTE — XR
EXAMINATION TYPE: XR chest 2V

 

DATE OF EXAM: 5/1/2023

 

COMPARISON: NONE

 

TECHNIQUE: PA and lateral views submitted.

 

HISTORY: Altered mental status

 

FINDINGS:

The lungs are clear and  there is no pneumothorax, pleural effusion, or focal pneumonia.  Heart size 
normal and no overt failure. Osseous structures intact. Upper cord are noted. Stimulator device noted
.

 

IMPRESSION: 

1. No acute process.

## 2023-05-01 NOTE — ED
General Adult HPI





- General


Chief complaint: Altered Mental Status


Stated complaint: AMS


Time Seen by Provider: 23 10:39


Source: EMS, RN notes reviewed


Mode of arrival: EMS


Limitations: altered mental status





- History of Present Illness


Initial comments: 





52-year-old  female who is well-known to this emergency Department with

an extensive past medical history Via EMS presents to the emergency department 

with a chief complaint of altered mental status.  Patient resides at [facility] who

reports that the patient has had difficulty raising her upper bilateral 

extremities.  It is unsure of when her last known well was.  History is limited 

due to patient AMS.





- Related Data


                                Home Medications











 Medication  Instructions  Recorded  Confirmed


 


Atorvastatin [Lipitor] 40 mg PO HS 21


 


Dulaglutide [Trulicity] 3 mg SQ WE 21


 


Calcium Acetate 667 mg PO MOWEFR@0800,1700 22


 


Sevelamer [Renvela] 1,600 mg PO BID 10/07/22 05/01/23


 


Zonisamide [Zonegran] 100 mg PO TID@0600,1500,2200 23


 


levETIRAcetam [Keppra] 500 mg PO BID@0900,23


 


levETIRAcetam [Keppra] 500 mg PO MOWEFR@1600 23


 


Divalproex Sodium [Depakote] 500 mg PO BID@0900,23


 


Divalproex Sodium [Depakote] 500 mg PO MOWEFR@1600 23


 


Metoprolol Tartrate [Lopressor] 25 mg PO MOWEFR@2100 23


 


Calcium Acetate 667 mg PO SUTUTHSA@08,13,17 23


 


Lacosamide [Vimpat] 150 mg PO MOWEFR@1600 23


 


Metoprolol Tartrate [Lopressor] 25 mg PO SUTUTHSA@0900,2100 23


 


icosapent ethyL [Icosapent Ethyl] 1 gm PO BID 23








                                  Previous Rx's











 Medication  Instructions  Recorded


 


Famotidine [Pepcid] 20 mg PO DAILY #30 tab 03/10/23


 


Lacosamide [Vimpat] 150 mg PO BID@09, #6 tab 03/10/23











                                    Allergies











Allergy/AdvReac Type Severity Reaction Status Date / Time


 


Penicillins Allergy  Itching Verified 23 10:57














Review of Systems


ROS Statement: 


Those systems with pertinent positive or pertinent negative responses have been 

documented in the HPI.





ROS Other: All systems not noted in ROS Statement are negative.





Past Medical History


Past Medical History: Chest Pain / Angina, CVA/TIA, Diabetes Mellitus, 

GERD/Reflux, Hyperlipidemia, Hypertension, Memory Impairment, Osteoarthritis 

(OA), Renal Disease, Seizure Disorder, Thyroid Disorder


Additional Past Medical History / Comment(s): Last seizure approximately one 

month ago. Spouse states she used to have seizures 4X per week until she had 

vagal nerve stimulator placed, now has seizures approximately once a week to 

once a month. Dialysis MOWEFR. Neuropathy, left drop foot, only able to walk 

short distances, otherwise uses wheelchair. 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 

ago, with residual memory impairment. Never diagnosed with Sleep Apnea but 

spouse states she does stop breathing through the night and he has to shake her 

to start breathing again. Varicose veins.


History of Any Multi-Drug Resistant Organisms: None Reported


Past Surgical History:  Section, Tonsillectomy, Uterine Ablation


Additional Past Surgical History / Comment(s): Left arm fistula, loop recorder, 

vagus nerve stimulator.


Past Anesthesia/Blood Transfusion Reactions: Motion Sickness


Additional Past Anesthesia/Blood Transfusion Reaction / Comment(s): Family hx 

unknown, pt adopted.


Past Psychological History: Bipolar


Smoking Status: Former smoker


Past Alcohol Use History: None Reported


Past Drug Use History: None Reported





- Past Family History


  ** Father


Family Medical History: Unable to Obtain


Additional Family Medical History / Comment(s): Pt adopted.





General Exam





- General Exam Comments


Initial Comments: 





General: Alert, in no acute distress


Head: atraumatic normocephalic.  Eyes PERRL, EOMI intact, mucous membranes moist




Respiratory: Lungs clear to auscultation bilaterally


Cardiovascular: Heart rate regular rate and rhythm


Abdominal: Soft without guarding or rebound


Extremities: Normal inspection with full range of motion and normal capillary 

refill


Neuroogic: alert and oriented 3, CN II-XII intact, able to ambulate with steady

gait 


Skin: warm dry and intact with normal color


Limitations: altered mental status





Course


                                   Vital Signs











  23





  10:39 10:56 12:19


 


Temperature 76.8 F L 97.8 F 


 


Pulse Rate 89  87


 


Respiratory 18  18





Rate   


 


Blood Pressure 136/50  141/22


 


O2 Sat by Pulse 100  97





Oximetry   














  23





  14:52 17:20


 


Temperature  


 


Pulse Rate 85 88


 


Respiratory 16 18





Rate  


 


Blood Pressure 145/83 166/84


 


O2 Sat by Pulse 97 97





Oximetry  














- Reevaluation(s)


Reevaluation #1: 





23 13:45


Patient updated and reevaluated.  Case discussed with Dr. herr, EMS  who 

agrees and accepts the patient for admission with consult to neurology and 

nephrology





EKG Findings





- EKG Comments:


EKG Findings:: I interpreted the following: EKG performed at 10:38 rate 89 bpm 

normal sinus rhythm NE interval 192, QRS duration 86, QT/QTc 359/406





Medical Decision Making





- Medical Decision Making





Was pt. sent in by a medical professional or institution (, PA, NP, urgent 

care, hospital, or nursing home...) When possible be specific


@  -[No]


Did you speak to anyone other than the patient for history (EMS, parent, family,

 police, friend...)? What history was obtained from this source 


@  -[No]


Did you review nursing and triage notes (agree or disagree)?  Why? 


@  -[I reviewed and agree with nursing and triage notes]


Were old charts reviewed (outside hosp., previous admission, EMS record, old 

EKG, old radiological studies, urgent care reports/EKG's, nursing home records)?

Report findings 


@  -[No old charts were reviewed]


Differential Diagnosis (chest pain, altered mental status, abdominal pain women,

abdominal pain men, vaginal bleeding, weakness, fever, dyspnea, syncope, he

adache, dizziness, GI bleed, back pain, seizure, CVA, palpatations, mental 

health, musculoskeletal)? 


@  -[not applicable]


EKG interpreted by me (3pts min.).


@  -[As above]


X-rays interpreted by me (1pt min.).


@  -[None done]


CT interpreted by me (1pt min.).


@  -[None done]


U/S interpreted by me (1pt. min.).


@  -[None done]


What testing was considered but not performed or refused? (CT, X-rays, U/S, 

labs)? Why?


@  -[None]


What meds were considered but not given or refused? Why?


@  -[None]


Did you discuss the management of the patient with other professionals 

(professionals i.e. , PA, NP, lab, RT, psych nurse, , , 

teacher, , )? Give summary


@  -[No]


Was smoking cessation discussed for >3mins.?


@  -[No]


Was critical care preformed (if so, how long)?


@  -[No]


Were there social determinants of health that impacted care today? How? 

(Homelessness, low income, unemployed, alcoholism, drug addiction, 

transportation, low edu. Level, literacy, decrease access to med. care, assisted, 

rehab)?


@  -[No]


Was there de-escalation of care discussed even if they declined (Discuss DNR or 

withdrawal of care, Hospice)? DNR status


@  -[No]


What co-morbidities impacted this encounter? (DM, HTN, Smoking, COPD, CAD, Can

cer, CVA, ARF, Chemo, Hep., AIDS, mental health diagnosis, sleep apnea, morbid 

obesity)?


@  -[None]


Was patient admitted / discharged? Hospital course, mention meds given and 

route, prescriptions, significant lab abnormalities, going to OR and other 

pertinent info.


@  -Admission.  52-year-old  male who presents the emergency department

 with altered mental status.  Patient had a thorough history and physical exam 

performed patient remains to be afebrile heart rate regular rate and rhythm 

lungs clear to auscultation bilaterally abdomen is soft and nontender.  Patient 

appears to be at her baseline mental status.





He should had lab work and imaging performed on the ED: 





Labs remarkable for WBC 8.4, hemoglobin 11.7 likely dilation studies 

unremarkable sodium 138, potassium 5.4 BUNs 55, creatinine 8.6





Urinalysis appears cloudy 2+ proteins, small amount of blood, large leukocyte 

esterase, 93 rbc's, greater than 180-2 every few WBC clumps





I discussed the results in detail with Dr. Moore who agrees and accepts the

 patient for admission with consult to nephrology.  Patient was due to have 

dialysis today however admits to her appointment due to being evaluated in the 

emergency department. Case discussed with Dr. [facility] who agrees with plan 

of car.e 


Undiagnosed new problem with uncertain prognosis?


@  -[No]


Drug Therapy requiring intensive monitoring for toxicity (Heparin, Nitro, 

Insulin, Cardizem)?


@  -[No]


Were any procedures done?


@  -[No]


Diagnosis/symptom?


@  -Altered Mental Status


- Hyperkalemia 


- Urinary Tract Infection 


- Missed Dialysis 


Acute, or Chronic, or Acute on Chronic?


@  -acute 


Uncomplicated (without systemic symptoms) or Complicated (systemic symptoms)?


@  -complicated 


Side effects of treatment?


@  -[No]


Exacerbation, Progression, or Severe Exacerbation?


@  -[No]


Poses a threat to life or bodily function? How? (Chest pain, USA, MI, pneumonia,

 PE, COPD, DKA, ARF, appy, cholecystitis, CVA, Diverticulitis, Homicidal, 

Suicidal, threat to staff... and all critical care pts)


@  -low likelihood 





- Lab Data


Result diagrams: 


                                 23 11:04





                                 23 11:04


                                   Lab Results











  23 Range/Units





  11:04 11:04 11:04 


 


WBC  8.4    (3.8-10.6)  k/uL


 


RBC  3.64 L    (3.80-5.40)  m/uL


 


Hgb  11.7    (11.4-16.0)  gm/dL


 


Hct  35.8    (34.0-46.0)  %


 


MCV  98.1    (80.0-100.0)  fL


 


MCH  32.2    (25.0-35.0)  pg


 


MCHC  32.8    (31.0-37.0)  g/dL


 


RDW  13.9    (11.5-15.5)  %


 


Plt Count  145 L    (150-450)  k/uL


 


MPV  7.9    


 


Neutrophils %  76    %


 


Lymphocytes %  13    %


 


Monocytes %  9    %


 


Eosinophils %  1    %


 


Basophils %  0    %


 


Neutrophils #  6.4    (1.3-7.7)  k/uL


 


Lymphocytes #  1.1    (1.0-4.8)  k/uL


 


Monocytes #  0.7    (0-1.0)  k/uL


 


Eosinophils #  0.0    (0-0.7)  k/uL


 


Basophils #  0.0    (0-0.2)  k/uL


 


PT   11.4   (9.0-12.0)  sec


 


INR   1.1   (<1.2)  


 


APTT   24.6   (22.0-30.0)  sec


 


Sodium    138  (137-145)  mmol/L


 


Potassium    5.4 H  (3.5-5.1)  mmol/L


 


Chloride    96 L  ()  mmol/L


 


Carbon Dioxide    30  (22-30)  mmol/L


 


Anion Gap    12  mmol/L


 


BUN    55 H  (7-17)  mg/dL


 


Creatinine    8.86 H*  (0.52-1.04)  mg/dL


 


Est GFR (CKD-EPI)AfAm    5  (>60 ml/min/1.73 sqM)  


 


Est GFR (CKD-EPI)NonAf    5  (>60 ml/min/1.73 sqM)  


 


Glucose    106 H  (74-99)  mg/dL


 


Calcium    9.2  (8.4-10.2)  mg/dL


 


Total Bilirubin    0.5  (0.2-1.3)  mg/dL


 


AST    29  (14-36)  U/L


 


ALT    24  (4-34)  U/L


 


Alkaline Phosphatase    73  ()  U/L


 


Troponin I     (0.000-0.034)  ng/mL


 


Total Protein    6.3  (6.3-8.2)  g/dL


 


Albumin    3.4 L  (3.5-5.0)  g/dL


 


Urine Color     


 


Urine Appearance     (Clear)  


 


Urine pH     (5.0-8.0)  


 


Ur Specific Gravity     (1.001-1.035)  


 


Urine Protein     (Negative)  


 


Urine Glucose (UA)     (Negative)  


 


Urine Ketones     (Negative)  


 


Urine Blood     (Negative)  


 


Urine Nitrite     (Negative)  


 


Urine Bilirubin     (Negative)  


 


Urine Urobilinogen     (<2.0)  mg/dL


 


Ur Leukocyte Esterase     (Negative)  


 


Urine RBC     (0-5)  /hpf


 


Urine WBC     (0-5)  /hpf


 


Urine WBC Clumps     (None)  /hpf


 


Ur Squamous Epith Cells     (0-4)  /hpf


 


Urine Bacteria     (None)  /hpf














  23 Range/Units





  11:04 11:04 


 


WBC    (3.8-10.6)  k/uL


 


RBC    (3.80-5.40)  m/uL


 


Hgb    (11.4-16.0)  gm/dL


 


Hct    (34.0-46.0)  %


 


MCV    (80.0-100.0)  fL


 


MCH    (25.0-35.0)  pg


 


MCHC    (31.0-37.0)  g/dL


 


RDW    (11.5-15.5)  %


 


Plt Count    (150-450)  k/uL


 


MPV    


 


Neutrophils %    %


 


Lymphocytes %    %


 


Monocytes %    %


 


Eosinophils %    %


 


Basophils %    %


 


Neutrophils #    (1.3-7.7)  k/uL


 


Lymphocytes #    (1.0-4.8)  k/uL


 


Monocytes #    (0-1.0)  k/uL


 


Eosinophils #    (0-0.7)  k/uL


 


Basophils #    (0-0.2)  k/uL


 


PT    (9.0-12.0)  sec


 


INR    (<1.2)  


 


APTT    (22.0-30.0)  sec


 


Sodium    (137-145)  mmol/L


 


Potassium    (3.5-5.1)  mmol/L


 


Chloride    ()  mmol/L


 


Carbon Dioxide    (22-30)  mmol/L


 


Anion Gap    mmol/L


 


BUN    (7-17)  mg/dL


 


Creatinine    (0.52-1.04)  mg/dL


 


Est GFR (CKD-EPI)AfAm    (>60 ml/min/1.73 sqM)  


 


Est GFR (CKD-EPI)NonAf    (>60 ml/min/1.73 sqM)  


 


Glucose    (74-99)  mg/dL


 


Calcium    (8.4-10.2)  mg/dL


 


Total Bilirubin    (0.2-1.3)  mg/dL


 


AST    (14-36)  U/L


 


ALT    (4-34)  U/L


 


Alkaline Phosphatase    ()  U/L


 


Troponin I  <0.012   (0.000-0.034)  ng/mL


 


Total Protein    (6.3-8.2)  g/dL


 


Albumin    (3.5-5.0)  g/dL


 


Urine Color   Yellow  


 


Urine Appearance   Cloudy H  (Clear)  


 


Urine pH   8.5 H  (5.0-8.0)  


 


Ur Specific Gravity   1.014  (1.001-1.035)  


 


Urine Protein   2+ H  (Negative)  


 


Urine Glucose (UA)   Negative  (Negative)  


 


Urine Ketones   Negative  (Negative)  


 


Urine Blood   Small H  (Negative)  


 


Urine Nitrite   Negative  (Negative)  


 


Urine Bilirubin   Negative  (Negative)  


 


Urine Urobilinogen   <2.0  (<2.0)  mg/dL


 


Ur Leukocyte Esterase   Large H  (Negative)  


 


Urine RBC   93 H  (0-5)  /hpf


 


Urine WBC   >182 H  (0-5)  /hpf


 


Urine WBC Clumps   Few H  (None)  /hpf


 


Ur Squamous Epith Cells   3  (0-4)  /hpf


 


Urine Bacteria   Rare H  (None)  /hpf














Disposition


Clinical Impression: 


 Altered mental status, Hyperkalemia, Missed dialysis





Disposition: ADMITTED AS IP TO THIS HOSP


Condition: Fair


Time of Disposition: 18:50

## 2023-05-01 NOTE — CT
EXAMINATION TYPE: CT brain wo con

 

DATE OF EXAM: 5/1/2023

 

HISTORY: AMS

 

CT DLP: 1143.4 mGycm.  Automated Exposure Control for Dose Reduction was Utilized.

 

TECHNIQUE: CT scan of the head is performed without contrast.

 

COMPARISON: CT brain April 23, 2023.

 

FINDINGS:   There is no acute intracranial hemorrhage or midline shift identified. There is mild diff
use ventricular and sulcal prominence redemonstrated. Mild low-attenuation in the periventricular whi
te matter redemonstrated. The calvarium is intact. The globes are not imaged. The visualized sinuses 
are clear.   

 

IMPRESSION:  No acute intracranial hemorrhage or midline shift. No significant change from most recen
t prior CT.

## 2023-05-02 VITALS — RESPIRATION RATE: 16 BRPM

## 2023-05-02 VITALS — DIASTOLIC BLOOD PRESSURE: 81 MMHG | SYSTOLIC BLOOD PRESSURE: 155 MMHG | HEART RATE: 77 BPM | TEMPERATURE: 98.1 F

## 2023-05-02 LAB
ANION GAP SERPL CALC-SCNC: 15.8 MMOL/L (ref 10–18)
BASOPHILS # BLD AUTO: 0.04 X 10*3/UL (ref 0–0.1)
BASOPHILS NFR BLD AUTO: 0.6 %
BUN SERPL-SCNC: 65.3 MG/DL (ref 9–27)
BUN/CREAT SERPL: 6.66 RATIO (ref 12–20)
CALCIUM SPEC-MCNC: 9.1 MG/DL (ref 8.7–10.3)
CHLORIDE SERPL-SCNC: 98 MMOL/L (ref 96–109)
CO2 SERPL-SCNC: 24.2 MMOL/L (ref 20–27.5)
EOSINOPHIL # BLD AUTO: 0.03 X 10*3/UL (ref 0.04–0.35)
EOSINOPHIL NFR BLD AUTO: 0.4 %
ERYTHROCYTE [DISTWIDTH] IN BLOOD BY AUTOMATED COUNT: 3.16 X 10*6/UL (ref 4.1–5.2)
ERYTHROCYTE [DISTWIDTH] IN BLOOD: 13.7 % (ref 11.5–14.5)
GLUCOSE SERPL-MCNC: 79 MG/DL (ref 70–110)
HCT VFR BLD AUTO: 31.9 % (ref 37.2–46.3)
HGB BLD-MCNC: 10.3 G/DL (ref 12–15)
IMM GRANULOCYTES BLD QL AUTO: 0.3 %
LYMPHOCYTES # SPEC AUTO: 1.8 X 10*3/UL (ref 0.9–5)
LYMPHOCYTES NFR SPEC AUTO: 26.7 %
MCH RBC QN AUTO: 32.6 PG (ref 27–32)
MCHC RBC AUTO-ENTMCNC: 32.3 G/DL (ref 32–37)
MCV RBC AUTO: 100.9 FL (ref 80–97)
MONOCYTES # BLD AUTO: 0.93 X 10*3/UL (ref 0.2–1)
MONOCYTES NFR BLD AUTO: 13.8 %
NEUTROPHILS # BLD AUTO: 3.91 X 10*3/UL (ref 1.8–7.7)
NEUTROPHILS NFR BLD AUTO: 58.2 %
NRBC BLD AUTO-RTO: 0 /100 WBCS (ref 0–0)
PLATELET # BLD AUTO: 145 X 10*3/UL (ref 140–440)
POTASSIUM SERPL-SCNC: 5.3 MMOL/L (ref 3.5–5.5)
SODIUM SERPL-SCNC: 138 MMOL/L (ref 135–145)
WBC # BLD AUTO: 6.73 X 10*3/UL (ref 4.5–10)

## 2023-05-02 RX ADMIN — Medication SCH: at 10:47

## 2023-05-02 RX ADMIN — LACOSAMIDE SCH MG: 150 TABLET, FILM COATED ORAL at 12:03

## 2023-05-02 RX ADMIN — ZONISAMIDE SCH MG: 100 CAPSULE ORAL at 15:28

## 2023-05-02 RX ADMIN — SEVELAMER CARBONATE SCH: 800 TABLET, FILM COATED ORAL at 07:44

## 2023-05-02 RX ADMIN — Medication SCH: at 15:13

## 2023-05-02 RX ADMIN — ASPIRIN SCH: 325 TABLET ORAL at 10:48

## 2023-05-02 RX ADMIN — DIVALPROEX SODIUM SCH MG: 500 TABLET, DELAYED RELEASE ORAL at 10:47

## 2023-05-02 RX ADMIN — ZONISAMIDE SCH MG: 100 CAPSULE ORAL at 05:58

## 2023-05-02 NOTE — P.NPCON
History of Present Illness





- Reason for Consult


end stage renal disease





- History of Present Illness





Reason for consultation: End-stage renal disease





History of present illness:


Patient is a 52-year-old female seen in renal consultation for end-stage renal 

disease.  She is making on hemodialysis on  schedule.  

Patient did not lose dialysis yesterday and will be getting a treatment today.  

She was sent to the hospital from [facility] due to altered mental status.  Patient was

having a difficult time raising her upper extremities and was sent to the 

hospital.  Patient has history of stroke for dysarthria and is not a reliable 

historian.  Hemodynamically stable.  Brain CT showed no acute changes.  Patient 

does have history of recurrent seizures and is maintained on antiepileptics.  No

fever or chills.  She is on room air.  Denies chest pain or shortness of breath.

 No vomiting or diarrhea.





Vital signs are stable.


General: No acute distress.


HEENT: Head exam is unremarkable.


LUNGS: No audible rhonchi or wheezes.


HEART: Rate and Rhythm are regular.


ABDOMEN: Nontender.


EXTREMITITES: No edema.





Past Medical History


Past Medical History: Chest Pain / Angina, CVA/TIA, Diabetes Mellitus, 

GERD/Reflux, Hyperlipidemia, Hypertension, Memory Impairment, Osteoarthritis 

(OA), Renal Disease, Seizure Disorder, Thyroid Disorder


Additional Past Medical History / Comment(s): Last seizure approximately one 

month ago. Spouse states she used to have seizures 4X per week until she had 

vagal nerve stimulator placed, now has seizures approximately once a week to 

once a month. Dialysis MOWEFR. Neuropathy, left drop foot, only able to walk 

short distances, otherwise uses wheelchair. 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 

ago, with residual memory impairment. Never diagnosed with Sleep Apnea but 

spouse states she does stop breathing through the night and he has to shake her 

to start breathing again. Varicose veins.


History of Any Multi-Drug Resistant Organisms: None Reported


Past Surgical History:  Section, Tonsillectomy, Uterine Ablation


Additional Past Surgical History / Comment(s): Left arm fistula, loop recorder, 

vagus nerve stimulator.


Past Anesthesia/Blood Transfusion Reactions: Motion Sickness


Additional Past Anesthesia/Blood Transfusion Reaction / Comment(s): Family hx 

unknown, pt adopted.


Smoking Status: Former smoker





- Past Family History


  ** Father


Family Medical History: Unable to Obtain


Additional Family Medical History / Comment(s): Pt adopted.





Medications and Allergies


                                Home Medications











 Medication  Instructions  Recorded  Confirmed  Type


 


Atorvastatin [Lipitor] 40 mg PO HS 21 History


 


Dulaglutide [Trulicity] 3 mg SQ WE 21 History


 


Calcium Acetate 667 mg PO MOWEFR@0800,1700 22 History


 


Sevelamer [Renvela] 1,600 mg PO BID 10/07/22 05/01/23 History


 


Zonisamide [Zonegran] 100 mg PO TID@0600,1500,2200 23 History


 


levETIRAcetam [Keppra] 500 mg PO BID@0900,23 History


 


levETIRAcetam [Keppra] 500 mg PO MOWEFR@1600 23 History


 


Divalproex Sodium [Depakote] 500 mg PO BID@0900,23 History


 


Divalproex Sodium [Depakote] 500 mg PO MOWEFR@1600 23 History


 


Famotidine [Pepcid] 20 mg PO DAILY #30 tab 03/10/23 05/01/23 Rx


 


Lacosamide [Vimpat] 150 mg PO BID@0900, #6 tab 03/10/23 05/01/23 Rx


 


Metoprolol Tartrate [Lopressor] 25 mg PO MOWEFR@23 History


 


Calcium Acetate 667 mg PO SUTUTHSA@08,13,17 23 History


 


Lacosamide [Vimpat] 150 mg PO MOWEFR@1600 23 History


 


Metoprolol Tartrate [Lopressor] 25 mg PO SUTUTHSA@0900,23 

History


 


icosapent ethyL [Icosapent Ethyl] 1 gm PO BID 23 History








                                    Allergies











Allergy/AdvReac Type Severity Reaction Status Date / Time


 


Penicillins Allergy  Itching Verified 23 10:57














Physical Exam


Vitals: 


                                   Vital Signs











  Temp Pulse Pulse Resp BP BP Pulse Ox


 


 23 08:45        97


 


 23 07:43  98.0 F   79  16   133/67  99


 


 23 04:32  98.2 F   83  17   135/81  97


 


 23 20:09  98.3 F   82  17   153/79  96


 


 23 17:40  98.6 F   81    157/76  97


 


 23 17:20   88   18  166/84   97


 


 23 14:52   85   16  145/83   97


 


 23 12:19   87   18  141/22   97


 


 23 10:56  97.8 F      


 


 23 10:39  76.8 F L  89   18  136/50   100








                                Intake and Output











 23





 22:59 06:59 14:59


 


Other:   


 


  # Voids 0 0 


 


  # Bowel Movements 0  1


 


  Weight 68.039 kg  














Results





- Lab Results


                             Most recent lab results











Calcium  9.2 mg/dL (8.4-10.2)   23  11:04    














                                 23 11:04





                                 23 20:52





Assessment and Plan


Plan: 





Assessment:


1.  End-stage renal disease maintained on hemodialysis on  schedule.


2.  Mild hyperkalemia secondary to chronic kidney disease and missed dialysis 

treatment.


3.  History of seizures.


4.  Chronic kidney disease mineral bone disease maintained on phosphate binders.


5.  History of stroke.


6.  Pyuria on antibiotics.





Plan:


Hemodialysis today and again tomorrow.


Check phosphorus level.


Hemoglobin at goal.





Thank you for the consultation.  I will continue to follow the patient with you 

during her hospital stay.

## 2023-05-02 NOTE — P.DS
Providers


Date of admission: 


05/01/23 13:31





Expected date of discharge: 05/02/23


Attending physician: 


Juan Sharp MD





Consults: 





                                        





05/01/23 13:52


Consult Physician Urgent 


   Consulting Provider: Brennen Higginbotham


   Consult Reason/Comments: esrd, ams


   Do you want consulting provider notified?: Yes











Primary care physician: 


Saturnino Castillo





Hospital Course: 











Final diagnosis





Altered mental status, possibly secondary to breakthrough seizure, 

multifactorial also a component of possible missed dialysis, improved


End-stage renal disease on hemodialysis Monday/Wednesday/Friday with missed di

alysis 


Possible acute urinary tract infection, present on admission


History of seizure disorder


Hyperkalemia, improved


History of CVA/TIA


Diabetes mellitus 


GERD


Hyperlipidemia


Hypertension


Memory impairment


Osteoarthritis


GI prophylaxis


DVT prophylaxis


Former smoker


Full code











Discharge disposition


Patient is being discharged in a stable condition with guarded prognosis [place] 

[facility] [place]  Patient will follow-up with Dr. Roblero   in the outpatient

setting upon discharge.  Patient is to continue with hemodialysis as scheduled 

on Monday/Wednesday/Friday.  Patient to continue with oral Levaquin 250 mg every

other day for the next 10 days to complete a five-day course.  Total time taken 

is greater than 35 minutes.





Hospital course


This is a 52-year-old female who was recently admitted with altered mental 

status, concerns of possible acute urinary tract infection, present on 

admission.  Patient appears to have had a possible seizure breakthrough seizure 

and is maintained on seizure medication including Vimpat and Keppra and have 

been resumed.  Patient's mentation is much improved today and schedule to 

receive hemodialysis.  Nephrology recommending continuing same course of shey

tment on Monday/Wednesday/Friday.  Patient is currently afebrile denies chest 

pain or shortness of breath and will go back to [facility] today.  Patient and  

at the bedside with concerns of requesting possible hospice or other options and

discuss with the  about obtaining guardianship as patient reports she was

told she is not able to make these decisions on her own.  Urinalysis was abnorma

l and urine culture sent and was started on Levaquin and will continue Levaquin 

250 mg every other day for 5 doses to complete the course.  Currently no reports

of chest pain, shortness of breath, or palpitations.  Patient is afebrile.  No 

reports of nausea or vomiting and patient is tolerating diet.  Patient will be 

discharged [place] [facility] [place].  Guarded prognosis and high risk for 

readmissions given patient's significant comorbidities.





Physical exam:








Gen: This is a 52-year-old female who is awake, alert and oriented 2-3, 

baseline, well-developed, elderly-appearing


HEENT: Head is atraumatic, normocephalic. Pupils equal, round. Sclerae is 

anicteric. 


NECK: Supple. No JVD. No lymphadenopathy. No thyromegaly. 


LUNGS: Clear to auscultation. No wheezes or rhonchi.  No intercostal 

retractions.


HEART: Regular rate and rhythm. No murmur. 


ABDOMEN: Soft. Bowel sounds are present. No masses.  No tenderness.


EXTREMITIES: No pedal edema.  No calf tenderness.


NEUROLOGICAL: Patient is awake, alert and oriented x2. Cranial nerves 2 through 

12 are grossly intact. 





Please refer to medication reconciliation sheet for a list of medications.





The impression and plan of care has been dictated by Karie Rosado, Nurse 

Practitioner as directed.





Dr. Tom MD


I have performed a history and examination and MDM of this patient, discussed 

the same with the dictator, and  agree with the dictator's assessment and plan 

as written ,documented as a scribe. Based on total visit time,  I have performed

more than 50% of the visit.  





Patient Condition at Discharge: Fair





Plan - Discharge Summary


New Discharge Prescriptions: 


New


   Levofloxacin [Levaquin] 250 mg PO Q48H 5 Days #5 tablet





Continue


   Atorvastatin [Lipitor] 40 mg PO HS


   Calcium Acetate 667 mg PO MOWEFR@0800,1700


   Sevelamer [Renvela] 1,600 mg PO BID


   levETIRAcetam [Keppra] 500 mg PO BID@0900,2000


   Divalproex Sodium [Depakote] 500 mg PO MOWEFR@1600


   Metoprolol Tartrate [Lopressor] 25 mg PO MOWEFR@2100


   Lacosamide [Vimpat] 150 mg PO BID@0900,2000 #6 tab


   Dulaglutide [Trulicity] 3 mg SQ WE


   Zonisamide [Zonegran] 100 mg PO TID@0600,1500,2200


   levETIRAcetam [Keppra] 500 mg PO MOWEFR@1600


   Divalproex Sodium [Depakote] 500 mg PO BID@0900,2000


   Famotidine [Pepcid] 20 mg PO DAILY #30 tab


   icosapent ethyL [Icosapent Ethyl] 1 gm PO BID


   Metoprolol Tartrate [Lopressor] 25 mg PO SUTUTHSA@0900,2100


   Calcium Acetate 667 mg PO SUTUTHSA@08,13,17





Changed


   Lacosamide [Vimpat] 150 mg PO MOWEFR@1600 #4 tab


Discharge Medication List





Atorvastatin [Lipitor] 40 mg PO HS 08/31/21 [History]


Dulaglutide [Trulicity] 3 mg SQ WE 08/31/21 [History]


Calcium Acetate 667 mg PO MOWEFR@0800,1700 08/05/22 [History]


Sevelamer [Renvela] 1,600 mg PO BID 10/07/22 [History]


Zonisamide [Zonegran] 100 mg PO TID@0600,1500,2200 02/18/23 [History]


levETIRAcetam [Keppra] 500 mg PO BID@0900,2000 02/18/23 [History]


levETIRAcetam [Keppra] 500 mg PO MOWEFR@1600 02/18/23 [History]


Divalproex Sodium [Depakote] 500 mg PO BID@0900,2000 02/20/23 [History]


Divalproex Sodium [Depakote] 500 mg PO MOWEFR@1600 02/20/23 [History]


Famotidine [Pepcid] 20 mg PO DAILY #30 tab 03/10/23 [Rx]


Metoprolol Tartrate [Lopressor] 25 mg PO MOWEFR@2100 04/23/23 [History]


Calcium Acetate 667 mg PO SUTUTHSA@08,13,17 05/01/23 [History]


Metoprolol Tartrate [Lopressor] 25 mg PO SUTUTHSA@0900,2100 05/01/23 [History]


icosapent ethyL [Icosapent Ethyl] 1 gm PO BID 05/01/23 [History]


Lacosamide [Vimpat] 150 mg PO BID@0900,2000 #6 tab 05/02/23 [Rx]


Lacosamide [Vimpat] 150 mg PO MOWEFR@1600 #4 tab 05/02/23 [Rx]


Levofloxacin [Levaquin] 250 mg PO Q48H 5 Days #5 tablet 05/02/23 [Rx]








Follow up Appointment(s)/Referral(s): 


Dharmesh Roblero MD [STAFF PHYSICIAN] - 1-2 days


Activity/Diet/Wound Care/Special Instructions: 


[place] is [place] [facility] [place]


Activity as tolerated


Recommend continue with hemodialysis on Monday/Wednesday/Friday


Patient discussed further with case management/social work and physician about 

options of possible hospice


Discussed with  about obtaining guardianship


Continue taking Levaquin 250 mg every other day for 5 doses


Discharge Disposition: TRANSFER TO SNF/ECF

## 2023-05-13 ENCOUNTER — HOSPITAL ENCOUNTER (EMERGENCY)
Dept: HOSPITAL 47 - EC | Age: 53
Discharge: HOME | End: 2023-05-13
Payer: MEDICARE

## 2023-05-13 VITALS — RESPIRATION RATE: 16 BRPM | HEART RATE: 87 BPM | TEMPERATURE: 98 F

## 2023-05-13 VITALS — DIASTOLIC BLOOD PRESSURE: 152 MMHG | SYSTOLIC BLOOD PRESSURE: 175 MMHG

## 2023-05-13 DIAGNOSIS — M19.90: ICD-10-CM

## 2023-05-13 DIAGNOSIS — S09.90XA: Primary | ICD-10-CM

## 2023-05-13 DIAGNOSIS — E11.9: ICD-10-CM

## 2023-05-13 DIAGNOSIS — Z79.899: ICD-10-CM

## 2023-05-13 DIAGNOSIS — F31.9: ICD-10-CM
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
## 2023-05-13 DIAGNOSIS — Z87.891: ICD-10-CM

## 2023-05-13 DIAGNOSIS — Z79.1: ICD-10-CM

## 2023-05-13 DIAGNOSIS — I10: ICD-10-CM

## 2023-05-13 DIAGNOSIS — Z79.84: ICD-10-CM
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
## 2023-05-13 DIAGNOSIS — Z86.73: ICD-10-CM

## 2023-05-13 DIAGNOSIS — Z88.0: ICD-10-CM

## 2023-05-13 DIAGNOSIS — E78.5: ICD-10-CM

## 2023-05-13 PROCEDURE — 71045 X-RAY EXAM CHEST 1 VIEW: CPT

## 2023-05-13 PROCEDURE — 72125 CT NECK SPINE W/O DYE: CPT

## 2023-05-13 PROCEDURE — 70450 CT HEAD/BRAIN W/O DYE: CPT

## 2023-05-13 PROCEDURE — 72170 X-RAY EXAM OF PELVIS: CPT

## 2023-05-13 PROCEDURE — 99285 EMERGENCY DEPT VISIT HI MDM: CPT

## 2023-05-13 NOTE — XR
EXAMINATION TYPE: XR pelvis AP view

 

DATE OF EXAM: 5/13/2023

 

CLINICAL HISTORY: pain

 

TECHNIQUE: Single view the pelvis is submitted.

 

FINDINGS: No  evidence for fracture, dislocation or bony lesion.  Joint spaces are well-preserved.  S
I joints appear symmetric.

 

IMPRESSION: 

 

1. No acute fracture or dislocation seen.

 

ICD 10 NO FRACTURE, INITIAL EVALUATION

## 2023-05-13 NOTE — XR
EXAMINATION TYPE: XR chest 1V portable

 

DATE OF EXAM: 5/13/2023

 

COMPARISON: 5/1/2023

 

HISTORY: Shortness of breath

 

TECHNIQUE:  Frontal and lateral views of the chest are obtained.

 

FINDINGS:

 

Scattered senescent parenchymal changes noted. Hyperinflation compatible with COPD. 

 

No evidence for infiltrate. No evidence for atelectasis.

 

Heart size is stable.

 

Mediastinal structures are stable and grossly unremarkable.

 

No evidence for hilar prominence.

 

Degenerative changes dorsal spine. 

 

IMPRESSION:

1. No evidence for acute pulmonary disease.

## 2023-05-13 NOTE — ED
Fall HPI





- General


Chief Complaint: Fall


Stated Complaint: FALL


Time Seen by Provider: 23 19:00


Source: EMS, RN notes reviewed, old records reviewed


Mode of arrival: EMS


Limitations: no limitations





- History of Present Illness


Initial Comments: 





This is a 52-year-old female to the emergency department for evaluation patient 

from [facility] care facility.  Patient has a fall fall from standing with head 

injury.  Patient underwent is fall complaining of headache no other acute injury

noted.  Patient is a poor strain.  History obtained from EMS and patient's chart


MD Complaint: fall


-: hour(s)


Fall From: standing


When Fall Occurred: 1-3 hours PTA


Fall Witnessed: no


Place Fall Occurred: home


Loss of Consciousness: none


Prolonged Down Time?: no


Symptoms Prior to Fall: none


Severity: severe


Severity scale (1-10): 5


Quality: sharp


Context: tripped/slipped


Associated Symptoms: denies





- Related Data


                                Home Medications











 Medication  Instructions  Recorded  Confirmed


 


Atorvastatin [Lipitor] 40 mg PO HS 21


 


Dulaglutide [Trulicity] 3 mg SQ WE 21


 


Calcium Acetate 667 mg PO MOWEFR@0800,1700 22


 


Sevelamer [Renvela] 1,600 mg PO BID 10/07/22 05/01/23


 


Zonisamide [Zonegran] 100 mg PO TID@0600,1500,2200 23


 


levETIRAcetam [Keppra] 500 mg PO BID@0900,23


 


levETIRAcetam [Keppra] 500 mg PO MOWEFR@1600 23


 


Divalproex Sodium [Depakote] 500 mg PO BID@0900,23


 


Divalproex Sodium [Depakote] 500 mg PO MOWEFR@1600 23


 


Metoprolol Tartrate [Lopressor] 25 mg PO MOWEFR@2100 23


 


Calcium Acetate 667 mg PO SUTUTHSA@08,13,17 23


 


Metoprolol Tartrate [Lopressor] 25 mg PO SUTUTHSA@0900,2100 23


 


icosapent ethyL [Icosapent Ethyl] 1 gm PO BID 23








                                  Previous Rx's











 Medication  Instructions  Recorded


 


Famotidine [Pepcid] 20 mg PO DAILY #30 tab 03/10/23


 


Lacosamide [Vimpat] 150 mg PO BID@0900,2000 #6 tab 23


 


Lacosamide [Vimpat] 150 mg PO MOWEFR@1600 #4 tab 23


 


Levofloxacin [Levaquin] 250 mg PO Q48H 5 Days #5 tablet 23











                                    Allergies











Allergy/AdvReac Type Severity Reaction Status Date / Time


 


Penicillins Allergy  Itching Verified 23 19:02














Review of Systems


ROS Statement: 


Those systems with pertinent positive or pertinent negative responses have been 

documented in the HPI.





ROS Other: All systems not noted in ROS Statement are negative.





Past Medical History


Past Medical History: Chest Pain / Angina, CVA/TIA, Diabetes Mellitus, 

GERD/Reflux, Hyperlipidemia, Hypertension, Memory Impairment, Osteoarthritis 

(OA), Renal Disease, Seizure Disorder, Thyroid Disorder


Additional Past Medical History / Comment(s): Last seizure approximately one 

month ago. Spouse states she used to have seizures 4X per week until she had 

vagal nerve stimulator placed, now has seizures approximately once a week to 

once a month. Dialysis MOWEFR. Neuropathy, left drop foot, only able to walk 

short distances, otherwise uses wheelchair. 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 

ago, with residual memory impairment. Never diagnosed with Sleep Apnea but 

spouse states she does stop breathing through the night and he has to shake her 

to start breathing again. Varicose veins.


History of Any Multi-Drug Resistant Organisms: None Reported


Past Surgical History:  Section, Tonsillectomy, Uterine Ablation


Additional Past Surgical History / Comment(s): Left arm fistula, loop recorder, 

vagus nerve stimulator.


Past Anesthesia/Blood Transfusion Reactions: Motion Sickness


Additional Past Anesthesia/Blood Transfusion Reaction / Comment(s): Family hx 

unknown, pt adopted.


Past Psychological History: Bipolar


Smoking Status: Former smoker





- Past Family History


  ** Father


Family Medical History: Unable to Obtain


Additional Family Medical History / Comment(s): Pt adopted.





General Exam


Limitations: no limitations


General appearance: alert, in no apparent distress, anxious


Head exam: Present: atraumatic, normocephalic, normal inspection


Eye exam: Present: normal appearance, PERRL, EOMI.  Absent: scleral icterus, 

conjunctival injection, periorbital swelling


ENT exam: Present: normal exam, mucous membranes moist


Neck exam: Present: normal inspection.  Absent: tenderness, meningismus, 

lymphadenopathy


Respiratory exam: Present: normal lung sounds bilaterally.  Absent: respiratory 

distress, wheezes, rales, rhonchi, stridor


Cardiovascular Exam: Present: regular rate, normal rhythm, normal heart sounds. 

 Absent: systolic murmur, diastolic murmur, rubs, gallop, clicks


GI/Abdominal exam: Present: soft, normal bowel sounds.  Absent: distended, 

tenderness, guarding, rebound, rigid


Extremities exam: Present: normal inspection, full ROM, normal capillary refill.

  Absent: tenderness, pedal edema, joint swelling, calf tenderness


Back exam: Present: normal inspection


Neurological exam: Present: alert, oriented X3, CN II-XII intact


Psychiatric exam: Present: normal affect, normal mood


Skin exam: Present: warm, dry, intact, normal color.  Absent: rash





Course


                                   Vital Signs











  23





  18:54 19:00 19:30


 


Temperature 98.0 F  


 


Pulse Rate 87  


 


Respiratory 16  





Rate   


 


Blood Pressure 175/109 175/109 168/88


 


O2 Sat by Pulse 100 100 99





Oximetry   














  23





  20:00


 


Temperature 


 


Pulse Rate 


 


Respiratory 





Rate 


 


Blood Pressure 175/152


 


O2 Sat by Pulse 99





Oximetry 














- Reevaluation(s)


Reevaluation #1: 





23 22:22


Medical record is reviewed


Reevaluation #2: 





23 22:22


Patient symptoms are improved here in the ER


Reevaluation #3: 





23 22:22


Patient informed results questions are answered


Reevaluation #4: 





23 22:23


Was pt. sent in by a medical professional or institution?


@  -no


Did you speak to anyone other than the patient for history?  


@  -no


Did you review nursing and triage notes? 


@  -agree


Were old charts reviewed? 


@  -no


Differential Diagnosis? 


@  -prior


EKG interpreted by me (3pts min.)?


@  -yes


X-rays interpreted by me (1pt min.)?


@  -yes


CT interpreted by me (1pt min.)?


@  -no


U/S interpreted by me (1pt. min.)?


@  -no


What testing was considered but not performed? (CT, X-rays, U/S, labs)? Why?


@  -no


What meds were considered but not given? Why?


@  -no


Did you discuss the management of the patient with other professionals?


@  -no


Did you reconcile home meds?


@  -no


Was smoking cessation discussed for >3mins.?


@  -no


Was critical care preformed (if so, how long)?


@  -no


Were there social determinants of health that impacted care today? How? 

(Homelessness, low income, unemployed, alcoholism, drug addiction, 

transportation, low edu. Level, literacy, decrease access to med. care, halfway, 

rehab)?


@  -no


Was there de-escalation of care discussed even if they declined? (Discuss DNR or

 withdrawal of care, Hospice)?


@  -no


What co-morbidities impacted this encounter? (DM, HTN, Smoking, COPD, CAD, 

Cancer, CVA, Hep., AIDS, mental health diagnosis, sleep apnea, morbid obesity)?


@  -none


Was patient admitted / discharged?


@  -dc


Undiagnosed new problem with uncertain prognosis?


@  -no


Drug Therapy requiring intensive monitoring for toxicity (Heparin, Nitro, 

Insulin, Cardizem)?


@  -no


Were any procedures done?


@  -no


Diagnosis/symptom?


@  -Fall, head injury


Acute, or Chronic, or Acute on Chronic?


@  -acute


Uncomplicated (without systemic symptoms) or Complicated (systemic symptoms)?


@  -uncomplicated


Side effects of treatment?


@  -no


Exacerbation, Progression, or Severe Exacerbation]


@  -no


Poses a threat to life or bodily function?


@  -no








Reevaluation #5: 





23 22:23


Differential Weakness:


Hypoglycemia, shock, sepsis, hyponatremia, anemia, infection, MI, ETOH, adverse 

medicine reaction, overdose, stroke, this is not meant to be an all-inclusive 

list.





Medical Decision Making





- Medical Decision Making





52 female to the emergency department status post fall fall from standing with 

head injury no acute significant injury noted on computed tomography scan 

patient can be discharged home





- Radiology Data


Radiology results: report reviewed (CT brain C-spine chest and pelvis negative 

for genetic injury), image reviewed





Disposition


Clinical Impression: 


 Fall, Head injury





Disposition: HOME SELF-CARE


Condition: Good


Instructions (If sedation given, give patient instructions):  Fall Prevention 

(ED)


Is patient prescribed a controlled substance at d/c from ED?: No


Referrals: 


Dharmesh Roblero MD [Primary Care Provider] - 1-2 days


Time of Disposition: 20:45

## 2023-05-13 NOTE — CT
EXAMINATION TYPE: CT brain cspine wo con

 

DATE OF EXAM: 5/13/2023

 

COMPARISON: 123

 

HISTORY: pain after fall. hx of CVA/TIA

 

CT DLP: 1355.6 mGycm

 

Unenhanced CT of the brain was performed.  

 

The ventricles, basal cisterns and sulci overlying the cerebral convexities demonstrate mild enlargem
ent.  

 

There is no evidence for intracranial hemorrhage or sulcal effacement.  There is decreased attenuatio
n about the periventricular white matter and deep white matter of both cerebral hemispheres, compatib
le with chronic small vessel ischemia.  

 

No mass effects are seen.  

If symptoms persist consider MRI.  

 

Osseous calvarium is intact.

 

IMPRESSION:

 

1.  Age related atrophic and chronic small vessel ischemic change without acute intracranial process 
seen at this time.

 

CT Cervical Spine:

 

Unenhanced CT of the cervical spine was performed with bone and soft tissue window settings submitted
.  Coronal and sagittal reconstruction is obtained.  

 

There is normal alignment and prevertebral soft tissues. No evidence for acute cervical fracture .   
Scattered degenerative disc disease and spondylosis. Biapical scarring.   

 

IMPRESSION:

 

1.  No evidence for acute fracture or subluxation of the cervical spine.

## 2023-05-22 ENCOUNTER — HOSPITAL ENCOUNTER (EMERGENCY)
Dept: HOSPITAL 47 - EC | Age: 53
Discharge: HOME | End: 2023-05-22
Payer: MEDICARE

## 2023-05-22 VITALS — SYSTOLIC BLOOD PRESSURE: 146 MMHG | RESPIRATION RATE: 14 BRPM | DIASTOLIC BLOOD PRESSURE: 82 MMHG | TEMPERATURE: 97.9 F

## 2023-05-22 VITALS — HEART RATE: 82 BPM

## 2023-05-22 DIAGNOSIS — I67.82: ICD-10-CM

## 2023-05-22 DIAGNOSIS — Z86.73: ICD-10-CM

## 2023-05-22 DIAGNOSIS — Z87.891: ICD-10-CM

## 2023-05-22 DIAGNOSIS — M19.90: ICD-10-CM

## 2023-05-22 DIAGNOSIS — I12.0: ICD-10-CM

## 2023-05-22 DIAGNOSIS — E78.5: ICD-10-CM

## 2023-05-22 DIAGNOSIS — Z88.0: ICD-10-CM

## 2023-05-22 DIAGNOSIS — Z79.1: ICD-10-CM

## 2023-05-22 DIAGNOSIS — J98.4: ICD-10-CM
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
## 2023-05-22 DIAGNOSIS — F31.9: ICD-10-CM

## 2023-05-22 DIAGNOSIS — E11.22: Primary | ICD-10-CM

## 2023-05-22 DIAGNOSIS — N18.6: ICD-10-CM

## 2023-05-22 DIAGNOSIS — Z79.84: ICD-10-CM

## 2023-05-22 DIAGNOSIS — Z79.899: ICD-10-CM

## 2023-05-22 LAB
ALBUMIN SERPL-MCNC: 3.9 G/DL (ref 3.5–5)
ALP SERPL-CCNC: 98 U/L (ref 38–126)
ALT SERPL-CCNC: 20 U/L (ref 4–34)
ANION GAP SERPL CALC-SCNC: 12 MMOL/L
APTT BLD: 23 SEC (ref 22–30)
AST SERPL-CCNC: 26 U/L (ref 14–36)
BASOPHILS # BLD AUTO: 0 K/UL (ref 0–0.2)
BASOPHILS NFR BLD AUTO: 1 %
BUN SERPL-SCNC: 51 MG/DL (ref 7–17)
CALCIUM SPEC-MCNC: 9.9 MG/DL (ref 8.4–10.2)
CHLORIDE SERPL-SCNC: 97 MMOL/L (ref 98–107)
CO2 SERPL-SCNC: 31 MMOL/L (ref 22–30)
EOSINOPHIL # BLD AUTO: 0.1 K/UL (ref 0–0.7)
EOSINOPHIL NFR BLD AUTO: 1 %
ERYTHROCYTE [DISTWIDTH] IN BLOOD BY AUTOMATED COUNT: 4.18 M/UL (ref 3.8–5.4)
ERYTHROCYTE [DISTWIDTH] IN BLOOD: 14.6 % (ref 11.5–15.5)
GLUCOSE BLD-MCNC: 118 MG/DL (ref 70–110)
GLUCOSE SERPL-MCNC: 148 MG/DL (ref 74–99)
GLUCOSE UR QL: (no result)
HCT VFR BLD AUTO: 41.7 % (ref 34–46)
HGB BLD-MCNC: 13.6 GM/DL (ref 11.4–16)
INR PPP: 1 (ref ?–1.2)
LYMPHOCYTES # SPEC AUTO: 1.9 K/UL (ref 1–4.8)
LYMPHOCYTES NFR SPEC AUTO: 28 %
MCH RBC QN AUTO: 32.6 PG (ref 25–35)
MCHC RBC AUTO-ENTMCNC: 32.6 G/DL (ref 31–37)
MCV RBC AUTO: 99.8 FL (ref 80–100)
MONOCYTES # BLD AUTO: 0.3 K/UL (ref 0–1)
MONOCYTES NFR BLD AUTO: 4 %
NEUTROPHILS # BLD AUTO: 4.6 K/UL (ref 1.3–7.7)
NEUTROPHILS NFR BLD AUTO: 66 %
PH UR: 8.5 [PH] (ref 5–8)
PLATELET # BLD AUTO: 176 K/UL (ref 150–450)
POTASSIUM SERPL-SCNC: 5.5 MMOL/L (ref 3.5–5.1)
PROT SERPL-MCNC: 7.1 G/DL (ref 6.3–8.2)
PROT UR QL: (no result)
PT BLD: 10.8 SEC (ref 9–12)
RBC UR QL: 40 /HPF (ref 0–5)
SODIUM SERPL-SCNC: 140 MMOL/L (ref 137–145)
SP GR UR: 1.01 (ref 1–1.03)
SQUAMOUS UR QL AUTO: <1 /HPF (ref 0–4)
UROBILINOGEN UR QL STRIP: <2 MG/DL (ref ?–2)
WBC # BLD AUTO: 7 K/UL (ref 3.8–10.6)
WBC # UR AUTO: 16 /HPF (ref 0–5)

## 2023-05-22 PROCEDURE — 87086 URINE CULTURE/COLONY COUNT: CPT

## 2023-05-22 PROCEDURE — 80053 COMPREHEN METABOLIC PANEL: CPT

## 2023-05-22 PROCEDURE — 85610 PROTHROMBIN TIME: CPT

## 2023-05-22 PROCEDURE — 84484 ASSAY OF TROPONIN QUANT: CPT

## 2023-05-22 PROCEDURE — 80306 DRUG TEST PRSMV INSTRMNT: CPT

## 2023-05-22 PROCEDURE — 71046 X-RAY EXAM CHEST 2 VIEWS: CPT

## 2023-05-22 PROCEDURE — 83605 ASSAY OF LACTIC ACID: CPT

## 2023-05-22 PROCEDURE — 96361 HYDRATE IV INFUSION ADD-ON: CPT

## 2023-05-22 PROCEDURE — 70450 CT HEAD/BRAIN W/O DYE: CPT

## 2023-05-22 PROCEDURE — 96360 HYDRATION IV INFUSION INIT: CPT

## 2023-05-22 PROCEDURE — 51798 US URINE CAPACITY MEASURE: CPT

## 2023-05-22 PROCEDURE — 99285 EMERGENCY DEPT VISIT HI MDM: CPT

## 2023-05-22 PROCEDURE — 81001 URINALYSIS AUTO W/SCOPE: CPT

## 2023-05-22 PROCEDURE — 72125 CT NECK SPINE W/O DYE: CPT

## 2023-05-22 PROCEDURE — 85730 THROMBOPLASTIN TIME PARTIAL: CPT

## 2023-05-22 PROCEDURE — 93005 ELECTROCARDIOGRAM TRACING: CPT

## 2023-05-22 PROCEDURE — 36415 COLL VENOUS BLD VENIPUNCTURE: CPT

## 2023-05-22 PROCEDURE — 85025 COMPLETE CBC W/AUTO DIFF WBC: CPT

## 2023-05-22 NOTE — XR
EXAMINATION TYPE: XR chest 2V

 

DATE OF EXAM: 5/22/2023

 

COMPARISON: 5/13/2023

 

HISTORY: Shortness of breath

 

TECHNIQUE:  Frontal and lateral views of the chest are obtained.

 

FINDINGS:

 

Scattered senescent parenchymal changes noted. Hyperinflation compatible with COPD. 

 

No evidence for infiltrate. No evidence for atelectasis.

 

Heart size is stable.

 

Mediastinal structures are stable and grossly unremarkable.

 

No evidence for hilar prominence.

 

Degenerative changes dorsal spine. 

 

IMPRESSION:

1. No evidence for acute pulmonary disease.

## 2023-05-22 NOTE — ED
Fall HPI





- General


Chief Complaint: Fall


Stated Complaint: Altered Mental Status


Time Seen by Provider: 23 10:06


Source: EMS


Mode of arrival: EMS





- History of Present Illness


Initial Comments: 


Patient is a 52-year-old female presents to the emergency department for 

evaluation of fall.  According to EMS patient had an unwitnessed fall at 

[facility].  Unknown head trauma.  No blood thinner use.  Patient presents 

nonverbal I am unable to obtain history from her.  I did speak with [facility] 

apparently patient normally talks.  She does have history of she does have 

history of seizures on Keppra and Vimpat.  She is an ESRD patient on 

hemodialysis Monday, Wednesday, Friday.  She did not have dialysis today.  She 

does make some urine.  She was recently admitted for urinary tract infection and

possible breakthrough seizure.








- Related Data


                                Home Medications











 Medication  Instructions  Recorded  Confirmed


 


Atorvastatin [Lipitor] 40 mg PO HS@21


 


Dulaglutide [Trulicity] 3 mg SQ WE@0900 21


 


Sevelamer [Renvela] 1,600 mg PO BID 10/07/22 05/22/23


 


Zonisamide [Zonegran] 100 mg PO TID@0600,1500,2200 23


 


levETIRAcetam [Keppra] 500 mg PO BID@0900,23


 


levETIRAcetam [Keppra] 500 mg PO MOWEFR@1600 23


 


Divalproex Sodium [Depakote] 500 mg PO BID@0900,23


 


Divalproex Sodium [Depakote] 500 mg PO MOWEFR@1600 23


 


Metoprolol Tartrate [Lopressor] 25 mg PO MOWEFR@23


 


Metoprolol Tartrate [Lopressor] 25 mg PO SUTUTHSA@0900,23


 


icosapent ethyL [Icosapent Ethyl] 1 gm PO BID@0900,23


 


Famotidine [Pepcid] 20 mg PO DAILY@0600 23


 


Lacosamide [Vimpat] 150 mg PO BID@0900,2100 23


 


Ondansetron [Zofran] 4 mg PO Q8H PRN 23








                                  Previous Rx's











 Medication  Instructions  Recorded


 


Lacosamide [Vimpat] 150 mg PO MOWEFR@1600 #4 tab 23











                                    Allergies











Allergy/AdvReac Type Severity Reaction Status Date / Time


 


Penicillins Allergy  Itching Verified 23 13:14














Review of Systems


ROS Statement: 


Those systems with pertinent positive or pertinent negative responses have been 

documented in the HPI.





ROS Other: All systems not noted in ROS Statement are negative.





Past Medical History


Past Medical History: Chest Pain / Angina, CVA/TIA, Diabetes Mellitus, 

GERD/Reflux, Hyperlipidemia, Hypertension, Memory Impairment, Osteoarthritis 

(OA), Renal Disease, Seizure Disorder, Thyroid Disorder


Additional Past Medical History / Comment(s): Last seizure approximately one 

month ago. Spouse states she used to have seizures 4X per week until she had 

vagal nerve stimulator placed, now has seizures approximately once a week to 

once a month. Dialysis MOWEFR. Neuropathy, left drop foot, only able to walk 

short distances, otherwise uses wheelchair. 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 

ago, with residual memory impairment. Never diagnosed with Sleep Apnea but 

spouse states she does stop breathing through the night and he has to shake her 

to start breathing again. Varicose veins.


History of Any Multi-Drug Resistant Organisms: None Reported


Past Surgical History:  Section, Tonsillectomy, Uterine Ablation


Additional Past Surgical History / Comment(s): Left arm fistula, loop recorder, 

vagus nerve stimulator.


Past Anesthesia/Blood Transfusion Reactions: Motion Sickness


Additional Past Anesthesia/Blood Transfusion Reaction / Comment(s): Family hx 

unknown, pt adopted.


Past Psychological History: Bipolar


Smoking Status: Former smoker


Past Alcohol Use History: None Reported


Past Drug Use History: None Reported





- Past Family History


  ** Father


Family Medical History: Unable to Obtain


Additional Family Medical History / Comment(s): Pt adopted.





General Exam


Limitations: language barrier, altered mental status


General appearance: alert, in no apparent distress


Head exam: Present: atraumatic, normocephalic, normal inspection


Eye exam: Present: normal appearance, PERRL, EOMI.  Absent: scleral icterus, 

conjunctival injection, periorbital swelling


ENT exam: Present: normal oropharynx, TM's normal bilaterally


Neck exam: Present: normal inspection, full ROM.  Absent: tenderness (patient 

does not react to palpation)


Respiratory exam: Present: normal lung sounds bilaterally.  Absent: respiratory 

distress, wheezes, rales, rhonchi, stridor


Cardiovascular Exam: Present: regular rate, normal rhythm, normal heart sounds. 

 Absent: systolic murmur, diastolic murmur, rubs, gallop, clicks


GI/Abdominal exam: Present: soft, normal bowel sounds.  Absent: distended, 

tenderness (patient does not react to palpation), guarding, rebound, rigid


Extremities exam: Present: normal inspection, full ROM, normal capillary refill


Neurological exam: Present: alert


Skin exam: Present: warm, dry, intact, normal color.  Absent: rash





Course


                                   Vital Signs











  23





  09:57 10:01


 


Temperature 97.4 F L 


 


Pulse Rate 82 


 


Respiratory 18 





Rate  


 


Blood Pressure  155/85


 


O2 Sat by Pulse 100 





Oximetry  














Medical Decision Making





- Medical Decision Making


EKG taken at 10:28, interpreted by me


Sinus rhythm with first-degree AV block no new ST segment or T-wave abnormality


Ventricular rate 83, MA interval 224, QRS duration 82, 





Was pt. sent in by a medical professional or institution ([facility], NP, urgent 

care, hospital, or nursing home...) When possible be specific


@  -[facility] today


Did you speak to anyone other than the patient for history (EMS, parent, family,

 police, friend...)? What history was obtained from this source 


@  -EMS provided history.  [facility] helped provide information about mental 

status.


Did you review nursing and triage notes (agree or disagree)?  Why? 


@  -I reviewed and agree with nursing and triage notes


Were old charts reviewed (outside hosp., previous admission, EMS record, old 

EKG, old radiological studies, urgent care reports/EKG's, nursing home records)?

 Report findings 


@  -No old charts were reviewed


Differential Diagnosis (chest pain, altered mental status, abdominal pain women,

 abdominal pain men, vaginal bleeding, weakness, fever, dyspnea, syncope, 

headache, dizziness, GI bleed, back pain, seizure, CVA, palpatations, mental 

health)? 


@  -Differential Headache:


Migraine, tension, cluster, carbon monoxide, central venous thrombosis, pension 

karma temporal arteritis, acute closure glaucoma, intercranial hemorrhage, 

mastoiditis, sinusitis, head injury, this is not meant to be an all-inclusive 

list.


EKG interpreted by me (3pts min.).


@  -As above


X-rays interpreted by me (1pt min.).


@  -Yes, x-ray negative for acute process.


CT interpreted by me (1pt min.).


@ Yes, CT of the brain and C-spine negative for acute process.


U/S interpreted by me (1pt. min.).


@  -None done


What testing was considered but not performed or refused? (CT, X-rays, U/S, 

labs)? Why?


@  -None


What meds were considered but not given or refused? Why?


@  -None


Did you discuss the management of the patient with other professionals 

(professionals i.e. , PA, NP, lab, RT, psych nurse, , , 

teacher, , )? Give summary


@  -No


Was smoking cessation discussed for >3mins.?


@  -No


Was critical care preformed (if so, how long)?


@  -No


Were there social determinants of health that impacted care today? How? 

(Homelessness, low income, unemployed, alcoholism, drug addiction, 

transportation, low edu. Level, literacy, decrease access to med. care, nursing home, 

rehab)?


@  -No


Was there de-escalation of care discussed even if they declined (Discuss DNR or 

withdrawal of care, Hospice)? DNR status


@  -No


What co-morbidities impacted this encounter? (DM, HTN, Smoking, COPD, CAD, 

Cancer, CVA, ARF, Chemo, Hep., AIDS, mental health diagnosis, sleep apnea, 

morbid obesity)?


@  -seizure, ESRD


Was patient admitted / discharged? Hospital course, mention meds given and 

route, prescriptions, significant lab abnormalities, going to OR and other 

pertinent info.


@  -Patient presenting for evaluation of fall.  She is nonverbal but alert.  She

 does not appear to be in any pain.  Possibly post ictal.  No tongue injury.EKG 

was obtained interpreted by myself showing sinus rhythm no evidence of acute 

ischemia.  Laboratory studies obtained.  Creatinine is 8.72, BUN 51, consistent 

with end-stage renal dialysis.  There is mild hyperkalemia 5.5.  Other 

laboratory studies were relatively unremarkable.   





Patient given loading dose of Keppra.  Mental status did improve during visit.  

Patient is alert and oriented x 3 slow and confused.  She denies any pain.  

Discussed case with Dr. Erickson who states dialysis Center can get patient in for 

outpatient dialysis if she is discharged soon.  Urinalysis is pending but vitals

 are within acceptable limits.  There is no leukocytosis.  Patient is in stable 

medical condition for discharge to dialysis Center.  I will call [facility] with 

updates results and treatment should urinalysis reveal any infection.


Undiagnosed new problem with uncertain prognosis?


@  -No


Drug Therapy requiring intensive monitoring for toxicity (Heparin, Nitro, 

Insulin, Cardizem)?


@  -No


Were any procedures done?


@  -No


Diagnosis/symptom?


@  -fall, ESRD on dialysis


Acute, or Chronic, or Acute on Chronic?


@  -acute, chronic


Uncomplicated (without systemic symptoms) or Complicated (systemic symptoms)?


@  -uncomplicated


Side effects of treatment?


@  -No


Exacerbation, Progression, or Severe Exacerbation?


@  -No


Poses a threat to life or bodily function? How? (Chest pain, USA, MI, pneumonia,

 PE, COPD, DKA, ARF, appy, cholecystitis, CVA, Diverticulitis, Homicidal, Suicid

al, threat to staff... and all critical care pts)


@  -No








Dr. Shepherd is my attending 





- Lab Data


Result diagrams: 


                                 23 12:06





                                 23 12:06


                                   Lab Results











  23 Range/Units





  12:06 12:06 12:06 


 


WBC  7.0    (3.8-10.6)  k/uL


 


RBC  4.18    (3.80-5.40)  m/uL


 


Hgb  13.6    (11.4-16.0)  gm/dL


 


Hct  41.7    (34.0-46.0)  %


 


MCV  99.8    (80.0-100.0)  fL


 


MCH  32.6    (25.0-35.0)  pg


 


MCHC  32.6    (31.0-37.0)  g/dL


 


RDW  14.6    (11.5-15.5)  %


 


Plt Count  176    (150-450)  k/uL


 


MPV  9.2    


 


Neutrophils %  66    %


 


Lymphocytes %  28    %


 


Monocytes %  4    %


 


Eosinophils %  1    %


 


Basophils %  1    %


 


Neutrophils #  4.6    (1.3-7.7)  k/uL


 


Lymphocytes #  1.9    (1.0-4.8)  k/uL


 


Monocytes #  0.3    (0-1.0)  k/uL


 


Eosinophils #  0.1    (0-0.7)  k/uL


 


Basophils #  0.0    (0-0.2)  k/uL


 


Macrocytosis  Slight    


 


PT   10.8   (9.0-12.0)  sec


 


INR   1.0   (<1.2)  


 


APTT   23.0   (22.0-30.0)  sec


 


Sodium    140  (137-145)  mmol/L


 


Potassium    5.5 H  (3.5-5.1)  mmol/L


 


Chloride    97 L  ()  mmol/L


 


Carbon Dioxide    31 H  (22-30)  mmol/L


 


Anion Gap    12  mmol/L


 


BUN    51 H  (7-17)  mg/dL


 


Creatinine    8.72 H*  (0.52-1.04)  mg/dL


 


Est GFR (CKD-EPI)AfAm    5  (>60 ml/min/1.73 sqM)  


 


Est GFR (CKD-EPI)NonAf    5  (>60 ml/min/1.73 sqM)  


 


Glucose    148 H  (74-99)  mg/dL


 


POC Glucose (mg/dL)     ()  mg/dL


 


POC Glu Operater ID     


 


Plasma Lactic Acid Stan     (0.7-2.0)  mmol/L


 


Calcium    9.9  (8.4-10.2)  mg/dL


 


Total Bilirubin    0.5  (0.2-1.3)  mg/dL


 


AST    26  (14-36)  U/L


 


ALT    20  (4-34)  U/L


 


Alkaline Phosphatase    98  ()  U/L


 


Troponin I     (0.000-0.034)  ng/mL


 


Total Protein    7.1  (6.3-8.2)  g/dL


 


Albumin    3.9  (3.5-5.0)  g/dL














  23 Range/Units





  12:06 12:06 12:13 


 


WBC     (3.8-10.6)  k/uL


 


RBC     (3.80-5.40)  m/uL


 


Hgb     (11.4-16.0)  gm/dL


 


Hct     (34.0-46.0)  %


 


MCV     (80.0-100.0)  fL


 


MCH     (25.0-35.0)  pg


 


MCHC     (31.0-37.0)  g/dL


 


RDW     (11.5-15.5)  %


 


Plt Count     (150-450)  k/uL


 


MPV     


 


Neutrophils %     %


 


Lymphocytes %     %


 


Monocytes %     %


 


Eosinophils %     %


 


Basophils %     %


 


Neutrophils #     (1.3-7.7)  k/uL


 


Lymphocytes #     (1.0-4.8)  k/uL


 


Monocytes #     (0-1.0)  k/uL


 


Eosinophils #     (0-0.7)  k/uL


 


Basophils #     (0-0.2)  k/uL


 


Macrocytosis     


 


PT     (9.0-12.0)  sec


 


INR     (<1.2)  


 


APTT     (22.0-30.0)  sec


 


Sodium     (137-145)  mmol/L


 


Potassium     (3.5-5.1)  mmol/L


 


Chloride     ()  mmol/L


 


Carbon Dioxide     (22-30)  mmol/L


 


Anion Gap     mmol/L


 


BUN     (7-17)  mg/dL


 


Creatinine     (0.52-1.04)  mg/dL


 


Est GFR (CKD-EPI)AfAm     (>60 ml/min/1.73 sqM)  


 


Est GFR (CKD-EPI)NonAf     (>60 ml/min/1.73 sqM)  


 


Glucose     (74-99)  mg/dL


 


POC Glucose (mg/dL)    118 H  ()  mg/dL


 


POC Glu Operater ID    Yaya Layton  


 


Plasma Lactic Acid Stan   1.9   (0.7-2.0)  mmol/L


 


Calcium     (8.4-10.2)  mg/dL


 


Total Bilirubin     (0.2-1.3)  mg/dL


 


AST     (14-36)  U/L


 


ALT     (4-34)  U/L


 


Alkaline Phosphatase     ()  U/L


 


Troponin I  0.016    (0.000-0.034)  ng/mL


 


Total Protein     (6.3-8.2)  g/dL


 


Albumin     (3.5-5.0)  g/dL














Disposition


Clinical Impression: 


 Fall, ESRD (end stage renal disease)





Disposition: HOME SELF-CARE


Condition: Good


Instructions (If sedation given, give patient instructions):  Fall Prevention 

for Older Adults (ED)


Additional Instructions: 


We will call the facility if urinalysis reveals any infection which is currently

 pending.  Patient to go to dialysis today as planned.  Return to the emergency 

Department if patient experiences new, concerning, or worsening symptoms.


Is patient prescribed a controlled substance at d/c from ED?: No


Referrals: 


Dharmesh Roblero MD [Primary Care Provider] - 1-2 days


[facility], [NON-STAFF] - 1-2 days

## 2023-05-22 NOTE — CT
EXAMINATION TYPE: CT brain cspine wo con

 

DATE OF EXAM: 5/22/2023

 

COMPARISON: 5/13/2023

 

HISTORY: Fall, AMS, Non verbal

 

CT DLP: 1460.2 mGycm

 

Unenhanced CT of the brain was performed.  

 

The ventricles, basal cisterns and sulci overlying the cerebral convexities demonstrate mild enlargem
ent.  

 

There is no evidence for intracranial hemorrhage or sulcal effacement.  There is decreased attenuatio
n about the periventricular white matter and deep white matter of both cerebral hemispheres, compatib
le with chronic small vessel ischemia.  

 

No mass effects are seen.  

If symptoms persist consider MRI.  

 

Osseous calvarium is intact.

 

IMPRESSION:

 

1.  Age related atrophic and chronic small vessel ischemic change without acute intracranial process 
seen at this time.

 

CT Cervical Spine:

 

Unenhanced CT of the cervical spine was performed with bone and soft tissue window settings submitted
.  Coronal and sagittal reconstruction is obtained.  

 

There is normal alignment and prevertebral soft tissues. No evidence for acute cervical fracture .   
Scattered degenerative disc disease and spondylosis. Biapical scarring.   

 

IMPRESSION:

 

1.  No evidence for acute fracture or subluxation of the cervical spine.

## 2023-05-30 ENCOUNTER — HOSPITAL ENCOUNTER (EMERGENCY)
Dept: HOSPITAL 47 - EC | Age: 53
LOS: 1 days | Discharge: HOME | End: 2023-05-31
Payer: MEDICARE

## 2023-05-30 VITALS — TEMPERATURE: 98 F

## 2023-05-30 DIAGNOSIS — E11.40: ICD-10-CM

## 2023-05-30 DIAGNOSIS — Z87.891: ICD-10-CM

## 2023-05-30 DIAGNOSIS — Z79.85: ICD-10-CM

## 2023-05-30 DIAGNOSIS — Z86.73: ICD-10-CM

## 2023-05-30 DIAGNOSIS — I10: ICD-10-CM

## 2023-05-30 DIAGNOSIS — Z79.899: ICD-10-CM

## 2023-05-30 DIAGNOSIS — N39.0: Primary | ICD-10-CM

## 2023-05-30 DIAGNOSIS — Z88.0: ICD-10-CM

## 2023-05-30 DIAGNOSIS — K21.9: ICD-10-CM

## 2023-05-30 DIAGNOSIS — G40.909: ICD-10-CM

## 2023-05-30 DIAGNOSIS — E78.5: ICD-10-CM

## 2023-05-30 DIAGNOSIS — M19.90: ICD-10-CM

## 2023-05-30 LAB
ALBUMIN SERPL-MCNC: 4.1 G/DL (ref 3.5–5)
ALP SERPL-CCNC: 88 U/L (ref 38–126)
ALT SERPL-CCNC: 18 U/L (ref 4–34)
ANION GAP SERPL CALC-SCNC: 13 MMOL/L
APTT BLD: 23.8 SEC (ref 22–30)
AST SERPL-CCNC: 41 U/L (ref 14–36)
BASOPHILS # BLD AUTO: 0 K/UL (ref 0–0.2)
BASOPHILS NFR BLD AUTO: 0 %
BUN SERPL-SCNC: 34 MG/DL (ref 7–17)
CALCIUM SPEC-MCNC: 9.2 MG/DL (ref 8.4–10.2)
CHLORIDE SERPL-SCNC: 96 MMOL/L (ref 98–107)
CO2 SERPL-SCNC: 30 MMOL/L (ref 22–30)
EOSINOPHIL # BLD AUTO: 0.1 K/UL (ref 0–0.7)
EOSINOPHIL NFR BLD AUTO: 2 %
ERYTHROCYTE [DISTWIDTH] IN BLOOD BY AUTOMATED COUNT: 3.83 M/UL (ref 3.8–5.4)
ERYTHROCYTE [DISTWIDTH] IN BLOOD: 14.2 % (ref 11.5–15.5)
GLUCOSE SERPL-MCNC: 107 MG/DL (ref 74–99)
HCT VFR BLD AUTO: 37.3 % (ref 34–46)
HGB BLD-MCNC: 12.2 GM/DL (ref 11.4–16)
HYALINE CASTS UR QL AUTO: 18 /LPF (ref 0–2)
INR PPP: 1.1 (ref ?–1.2)
LYMPHOCYTES # SPEC AUTO: 2.6 K/UL (ref 1–4.8)
LYMPHOCYTES NFR SPEC AUTO: 54 %
MCH RBC QN AUTO: 31.8 PG (ref 25–35)
MCHC RBC AUTO-ENTMCNC: 32.6 G/DL (ref 31–37)
MCV RBC AUTO: 97.3 FL (ref 80–100)
MONOCYTES # BLD AUTO: 0.4 K/UL (ref 0–1)
MONOCYTES NFR BLD AUTO: 8 %
NEUTROPHILS # BLD AUTO: 1.6 K/UL (ref 1.3–7.7)
NEUTROPHILS NFR BLD AUTO: 34 %
PH UR: 8.5 [PH] (ref 5–8)
PLATELET # BLD AUTO: 96 K/UL (ref 150–450)
POTASSIUM SERPL-SCNC: 4.8 MMOL/L (ref 3.5–5.1)
PROT SERPL-MCNC: 7.7 G/DL (ref 6.3–8.2)
PROT UR QL: (no result)
PT BLD: 11.1 SEC (ref 9–12)
RBC UR QL: 77 /HPF (ref 0–5)
SODIUM SERPL-SCNC: 139 MMOL/L (ref 137–145)
SP GR UR: 1.01 (ref 1–1.03)
UROBILINOGEN UR QL STRIP: <2 MG/DL (ref ?–2)
WBC # BLD AUTO: 4.8 K/UL (ref 3.8–10.6)
WBC # UR AUTO: >182 /HPF (ref 0–5)

## 2023-05-30 PROCEDURE — 87086 URINE CULTURE/COLONY COUNT: CPT

## 2023-05-30 PROCEDURE — 93005 ELECTROCARDIOGRAM TRACING: CPT

## 2023-05-30 PROCEDURE — 85610 PROTHROMBIN TIME: CPT

## 2023-05-30 PROCEDURE — 81001 URINALYSIS AUTO W/SCOPE: CPT

## 2023-05-30 PROCEDURE — 84443 ASSAY THYROID STIM HORMONE: CPT

## 2023-05-30 PROCEDURE — 80053 COMPREHEN METABOLIC PANEL: CPT

## 2023-05-30 PROCEDURE — 99285 EMERGENCY DEPT VISIT HI MDM: CPT

## 2023-05-30 PROCEDURE — 84484 ASSAY OF TROPONIN QUANT: CPT

## 2023-05-30 PROCEDURE — 85730 THROMBOPLASTIN TIME PARTIAL: CPT

## 2023-05-30 PROCEDURE — 70450 CT HEAD/BRAIN W/O DYE: CPT

## 2023-05-30 PROCEDURE — 85025 COMPLETE CBC W/AUTO DIFF WBC: CPT

## 2023-05-30 PROCEDURE — 96374 THER/PROPH/DIAG INJ IV PUSH: CPT

## 2023-05-30 PROCEDURE — 36415 COLL VENOUS BLD VENIPUNCTURE: CPT

## 2023-05-30 PROCEDURE — 83605 ASSAY OF LACTIC ACID: CPT

## 2023-05-30 PROCEDURE — 71046 X-RAY EXAM CHEST 2 VIEWS: CPT

## 2023-05-30 NOTE — CT
EXAMINATION TYPE: CT brain wo con

 

DATE OF EXAM: 5/30/2023

 

COMPARISON: 5/22/2020

 

INDICATION: ams

 

DLP: 1114.4 mGycm, Automated exposure control for dose reduction was used.

 

CONTRAST: None

 

CT of the brain is performed utilizing 3 mm thick sections through the posterior fossa and 3 mm thick
 sections through the remaining calvarium.  Study is performed within 24 hours of arrival to the hosp
ital. 

 

No abnormal hyperdensity is present to suggest an acute intracranial hemorrhage.

No mass lesion is evident.

 There is hypodensity adjacent to the third ventricle extending towards the left lateral. Ventricular
 white matter. This area appears stable from comparison may be some old ischemic change. Periventricu
lar white matter hypodensity is present greater on the left which can be compatible with chronic whit
e matter ischemic changes.

Ventricles and sulci are prominent for the patient age.  

Paranasal sinuses and mastoid air cells within the field of view are clear

 

IMPRESSIONS:

1.   Chronic appearing periventricular deep white matter ischemic changes appears chronic and stable 
from comparison. Follow-up MRI can be performed as clinically indicated.

## 2023-05-30 NOTE — XR
EXAMINATION TYPE: XR chest 2V

 

DATE OF EXAM: 5/30/2023

 

COMPARISON: 5/22/2023

 

INDICATION: Acute mental status changes

 

TECHNIQUE:  Frontal and lateral views of the chest are obtained.

 

FINDINGS:  

The heart size is normal.  

The pulmonary vasculature is normal.

The lungs are clear.  Pacemaker overlies left chest.

 

IMPRESSION:  

1. No acute pulmonary process.

## 2023-05-31 VITALS — HEART RATE: 70 BPM | SYSTOLIC BLOOD PRESSURE: 127 MMHG | DIASTOLIC BLOOD PRESSURE: 71 MMHG

## 2023-05-31 VITALS — RESPIRATION RATE: 18 BRPM

## 2023-06-19 ENCOUNTER — HOSPITAL ENCOUNTER (EMERGENCY)
Dept: HOSPITAL 47 - EC | Age: 53
Discharge: HOME | End: 2023-06-19
Payer: MEDICARE

## 2023-06-19 VITALS
DIASTOLIC BLOOD PRESSURE: 82 MMHG | RESPIRATION RATE: 18 BRPM | TEMPERATURE: 98.7 F | HEART RATE: 83 BPM | SYSTOLIC BLOOD PRESSURE: 160 MMHG

## 2023-06-19 DIAGNOSIS — Z88.0: ICD-10-CM

## 2023-06-19 DIAGNOSIS — E11.22: ICD-10-CM

## 2023-06-19 DIAGNOSIS — K21.9: ICD-10-CM

## 2023-06-19 DIAGNOSIS — M19.90: ICD-10-CM

## 2023-06-19 DIAGNOSIS — R41.82: Primary | ICD-10-CM

## 2023-06-19 DIAGNOSIS — Z87.891: ICD-10-CM

## 2023-06-19 DIAGNOSIS — Z79.899: ICD-10-CM

## 2023-06-19 DIAGNOSIS — E78.5: ICD-10-CM

## 2023-06-19 DIAGNOSIS — Z99.2: ICD-10-CM

## 2023-06-19 DIAGNOSIS — I12.0: ICD-10-CM

## 2023-06-19 DIAGNOSIS — E11.40: ICD-10-CM

## 2023-06-19 DIAGNOSIS — N18.6: ICD-10-CM

## 2023-06-19 LAB
ALBUMIN SERPL-MCNC: 3.6 G/DL (ref 3.5–5)
ALP SERPL-CCNC: 83 U/L (ref 38–126)
ALT SERPL-CCNC: 13 U/L (ref 4–34)
ANION GAP SERPL CALC-SCNC: 11 MMOL/L
AST SERPL-CCNC: 21 U/L (ref 14–36)
BASOPHILS # BLD AUTO: 0 K/UL (ref 0–0.2)
BASOPHILS NFR BLD AUTO: 0 %
BUN SERPL-SCNC: 49 MG/DL (ref 7–17)
CALCIUM SPEC-MCNC: 9.4 MG/DL (ref 8.4–10.2)
CHLORIDE SERPL-SCNC: 97 MMOL/L (ref 98–107)
CO2 SERPL-SCNC: 31 MMOL/L (ref 22–30)
EOSINOPHIL # BLD AUTO: 0.1 K/UL (ref 0–0.7)
EOSINOPHIL NFR BLD AUTO: 2 %
ERYTHROCYTE [DISTWIDTH] IN BLOOD BY AUTOMATED COUNT: 3.41 M/UL (ref 3.8–5.4)
ERYTHROCYTE [DISTWIDTH] IN BLOOD: 14 % (ref 11.5–15.5)
GLUCOSE SERPL-MCNC: 121 MG/DL (ref 74–99)
HCT VFR BLD AUTO: 32.9 % (ref 34–46)
HGB BLD-MCNC: 11 GM/DL (ref 11.4–16)
INR PPP: 1.1 (ref ?–1.2)
LYMPHOCYTES # SPEC AUTO: 2.2 K/UL (ref 1–4.8)
LYMPHOCYTES NFR SPEC AUTO: 38 %
MCH RBC QN AUTO: 32.2 PG (ref 25–35)
MCHC RBC AUTO-ENTMCNC: 33.3 G/DL (ref 31–37)
MCV RBC AUTO: 96.6 FL (ref 80–100)
MONOCYTES # BLD AUTO: 0.3 K/UL (ref 0–1)
MONOCYTES NFR BLD AUTO: 6 %
NEUTROPHILS # BLD AUTO: 3.1 K/UL (ref 1.3–7.7)
NEUTROPHILS NFR BLD AUTO: 53 %
PLATELET # BLD AUTO: 149 K/UL (ref 150–450)
POTASSIUM SERPL-SCNC: 4.4 MMOL/L (ref 3.5–5.1)
PROT SERPL-MCNC: 6.7 G/DL (ref 6.3–8.2)
PT BLD: 11.2 SEC (ref 9–12)
SODIUM SERPL-SCNC: 139 MMOL/L (ref 137–145)
WBC # BLD AUTO: 5.8 K/UL (ref 3.8–10.6)

## 2023-06-19 PROCEDURE — 93005 ELECTROCARDIOGRAM TRACING: CPT

## 2023-06-19 PROCEDURE — 99285 EMERGENCY DEPT VISIT HI MDM: CPT

## 2023-06-19 PROCEDURE — 85025 COMPLETE CBC W/AUTO DIFF WBC: CPT

## 2023-06-19 PROCEDURE — 36415 COLL VENOUS BLD VENIPUNCTURE: CPT

## 2023-06-19 PROCEDURE — 80053 COMPREHEN METABOLIC PANEL: CPT

## 2023-06-19 PROCEDURE — 85610 PROTHROMBIN TIME: CPT

## 2023-06-19 PROCEDURE — 70450 CT HEAD/BRAIN W/O DYE: CPT

## 2023-06-19 NOTE — ED
General Adult HPI





- General


Chief complaint: Neuro Symptoms/Deficit


Stated complaint: TIA


Time Seen by Provider: 23 11:21


Source: EMS, RN notes reviewed, old records reviewed


Mode of arrival: EMS


Limitations: altered mental status, physical limitation





- History of Present Illness


Initial comments: 





53-year-old female history of end-stage renal disease lower to person at 

baseline presenting with concern for right-sided weakness.  Patient has residual

right-sided weakness at baseline which is apparently unchanged.  Patient herself

is not able to contribute to the history.  No reported fever.  No vomiting.





- Related Data


                                Home Medications











 Medication  Instructions  Recorded  Confirmed


 


Atorvastatin [Lipitor] 40 mg PO HS@21


 


Dulaglutide [Trulicity] 3 mg SQ WE@21


 


Sevelamer [Renvela] 1,600 mg PO BID 10/07/22 05/22/23


 


Zonisamide [Zonegran] 100 mg PO TID@0600,1500,2200 23


 


levETIRAcetam [Keppra] 500 mg PO BID@0900,23


 


levETIRAcetam [Keppra] 500 mg PO MOWEFR@23


 


Divalproex Sodium [Depakote] 500 mg PO BID@0900,23


 


Divalproex Sodium [Depakote] 500 mg PO MOWEFR@23


 


Metoprolol Tartrate [Lopressor] 25 mg PO MOWEFR@23


 


Metoprolol Tartrate [Lopressor] 25 mg PO SUTUTHSA@0900,23


 


icosapent ethyL [Icosapent Ethyl] 1 gm PO BID@0900,23


 


Famotidine [Pepcid] 20 mg PO DAILY@0600 23


 


Lacosamide [Vimpat] 150 mg PO BID@0900,23


 


Ondansetron [Zofran] 4 mg PO Q8H PRN 23








                                  Previous Rx's











 Medication  Instructions  Recorded


 


Lacosamide [Vimpat] 150 mg PO MOWEFR@1600 #4 tab 23


 


cefUROXime axetiL [Ceftin] 500 mg PO BID 7 Days #14 tab 23











                                    Allergies











Allergy/AdvReac Type Severity Reaction Status Date / Time


 


Penicillins Allergy  Itching Verified 23 13:14














Review of Systems


ROS Statement: 


Those systems with pertinent positive or pertinent negative responses have been 

documented in the HPI.





ROS Other: All systems not noted in ROS Statement are negative.





Past Medical History


Past Medical History: Chest Pain / Angina, CVA/TIA, Diabetes Mellitus, 

GERD/Reflux, Hyperlipidemia, Hypertension, Memory Impairment, Osteoarthritis 

(OA), Renal Disease, Seizure Disorder, Thyroid Disorder


Additional Past Medical History / Comment(s): Last seizure approximately one 

month ago. Spouse states she used to have seizures 4X per week until she had 

vagal nerve stimulator placed, now has seizures approximately once a week to 

once a month. Dialysis MOWEFR. Neuropathy, left drop foot, only able to walk 

short distances, otherwise uses wheelchair. 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 

ago, with residual memory impairment. Never diagnosed with Sleep Apnea but 

spouse states she does stop breathing through the night and he has to shake her 

to start breathing again. Varicose veins.


History of Any Multi-Drug Resistant Organisms: None Reported


Past Surgical History:  Section, Tonsillectomy, Uterine Ablation


Additional Past Surgical History / Comment(s): Left arm fistula, loop recorder, 

vagus nerve stimulator.


Past Anesthesia/Blood Transfusion Reactions: Motion Sickness


Additional Past Anesthesia/Blood Transfusion Reaction / Comment(s): Family hx 

unknown, pt adopted.


Past Psychological History: Bipolar


Smoking Status: Former smoker


Past Alcohol Use History: None Reported


Past Drug Use History: None Reported





- Past Family History


  ** Father


Family Medical History: Unable to Obtain


Additional Family Medical History / Comment(s): Pt adopted.





General Exam


Limitations: altered mental status, physical limitation


General appearance: alert, in no apparent distress


Head exam: Present: atraumatic, normocephalic


Eye exam: Present: normal appearance, PERRL


ENT exam: Present: normal exam


Neck exam: Present: normal inspection.  Absent: tenderness, meningismus


Respiratory exam: Present: normal lung sounds bilaterally.  Absent: respiratory 

distress, wheezes


Cardiovascular Exam: Present: regular rate, normal rhythm


GI/Abdominal exam: Present: soft.  Absent: distended, tenderness, guarding


Neurological exam: Present: alert.  Absent: oriented X3


Skin exam: Present: warm, dry, intact.  Absent: cyanosis, diaphoretic





Course


                                   Vital Signs











  23





  11:18


 


Temperature 98.7 F


 


Pulse Rate 83


 


Respiratory 18





Rate 


 


Blood Pressure 160/82


 


O2 Sat by Pulse 100





Oximetry 














Medical Decision Making





- Medical Decision Making





Was pt. sent in by a medical professional or institution (, PA, NP, urgent 

care, hospital, or nursing home...) When possible be specific


@  -No


Did you speak to anyone other than the patient for history (EMS, parent, family,

 police, friend...)? What history was obtained from this source 


@  -[Paramedics


Did you review nursing and triage notes (agree or disagree)?  Why? 


@  -I reviewed and agree with nursing and triage notes


Were old charts reviewed (outside hosp., previous admission, EMS record, old 

EKG, old radiological studies, urgent care reports/EKG's, nursing home records)?

 Report findings 


@  -No old charts were reviewed


Differential Diagnosis (chest pain, altered mental status, abdominal pain women,

 abdominal pain men, vaginal bleeding, weakness, fever, dyspnea, syncope, 

headache, dizziness, GI bleed, back pain, seizure, CVA, palpatations, mental 

health, musculoskeletal)? 


@  Differential Altered Mental Status:


Hypoglycemia, DKA, hypercapnia, ETOH, overdose, CO poisoning, trauma, myxedema 

coma, HTN encephalopathy, infection, encephalitis, psychosis, intercranial 

hemorrhage, hepatic encephalopathy, meningitis, CVA, this is not meant to be an 

all-inclusive list


EKG interpreted by me (3pts min.).


@  -[EKG sinus rhythm with a rate of 86, VT interval 200, QRS duration 100, QTC 

418 no ST segment elevation.


X-rays interpreted by me (1pt min.).


@  -None done


CT interpreted by me (1pt min.).


@  -Negative for acute intracranial process


U/S interpreted by me (1pt. min.).


@  -None done


What testing was considered but not performed or refused? (CT, X-rays, U/S, 

labs)? Why?


@  -None


What meds were considered but not given or refused? Why?


@  -None


Did you discuss the management of the patient with other professionals 

(professionals i.e. , PA, NP, lab, RT, psych nurse, , , 

teacher, , )? Give summary


@  -No


Was smoking cessation discussed for >3mins.?


@  -No


Was critical care preformed (if so, how long)?


@  -No


Were there social determinants of health that impacted care today? How? 

(Homelessness, low income, unemployed, alcoholism, drug addiction, 

transportation, low edu. Level, literacy, decrease access to med. care, long-term, 

rehab)?


@  -No


Was there de-escalation of care discussed even if they declined (Discuss DNR or 

withdrawal of care, Hospice)? DNR status


@  -No


What co-morbidities impacted this encounter? (DM, HTN, Smoking, COPD, CAD, 

Cancer, CVA, ARF, Chemo, Hep., AIDS, mental health diagnosis, sleep apnea, 

morbid obesity)?


@  -Hypertension, TIA, end-stage renal disease


Was patient admitted / discharged? Hospital course, mention meds given and 

route, prescriptions, significant lab abnormalities, going to OR and other 

pertinent info.


@  23-year-old female with concern for change in mental status versus TIA.  I 

did discuss at length with nursing staff was able to confirm that this is the 

patient's baseline according to nursing home staff where she resides.  There's 

been no acute change.  Patient received laboratory testing which is consistent 

with end-stage renal disease without any acute abnormality and a brain CT which 

is negative for intracranial hemorrhage mass effect or acute findings..  Patient

 is stable for discharge back to the nursing home.


Undiagnosed new problem with uncertain prognosis?


@  -No


Drug Therapy requiring intensive monitoring for toxicity (Heparin, Nitro, 

Insulin, Cardizem)?


@  -No


Were any procedures done?


@  -No


Diagnosis/symptom?


@  -[Altered mental status, currently at baseline


Acute, or Chronic, or Acute on Chronic?


@  -[Acute


Uncomplicated (without systemic symptoms) or Complicated (systemic symptoms)?


@  -default


Side effects of treatment?


@  -No


Exacerbation, Progression, or Severe Exacerbation?


@  -No


Poses a threat to life or bodily function? How? (Chest pain, USA, MI, pneumonia,

 PE, COPD, DKA, ARF, appy, cholecystitis, CVA, Diverticulitis, Homicidal, 

Suicidal, threat to staff... and all critical care pts)


@  -No





- Lab Data


Result diagrams: 


                                 23 12:16





                                 23 12:16


                                   Lab Results











  23 Range/Units





  12:16 12:16 12:16 


 


WBC  5.8    (3.8-10.6)  k/uL


 


RBC  3.41 L    (3.80-5.40)  m/uL


 


Hgb  11.0 L    (11.4-16.0)  gm/dL


 


Hct  32.9 L    (34.0-46.0)  %


 


MCV  96.6    (80.0-100.0)  fL


 


MCH  32.2    (25.0-35.0)  pg


 


MCHC  33.3    (31.0-37.0)  g/dL


 


RDW  14.0    (11.5-15.5)  %


 


Plt Count  149 L D    (150-450)  k/uL


 


MPV  8.2    


 


Neutrophils %  53    %


 


Lymphocytes %  38    %


 


Monocytes %  6    %


 


Eosinophils %  2    %


 


Basophils %  0    %


 


Neutrophils #  3.1    (1.3-7.7)  k/uL


 


Lymphocytes #  2.2    (1.0-4.8)  k/uL


 


Monocytes #  0.3    (0-1.0)  k/uL


 


Eosinophils #  0.1    (0-0.7)  k/uL


 


Basophils #  0.0    (0-0.2)  k/uL


 


PT    11.2  (9.0-12.0)  sec


 


INR    1.1  (<1.2)  


 


Sodium   139   (137-145)  mmol/L


 


Potassium   4.4   (3.5-5.1)  mmol/L


 


Chloride   97 L   ()  mmol/L


 


Carbon Dioxide   31 H   (22-30)  mmol/L


 


Anion Gap   11   mmol/L


 


BUN   49 H   (7-17)  mg/dL


 


Creatinine   8.19 H*   (0.52-1.04)  mg/dL


 


Est GFR (CKD-EPI)AfAm   6   (>60 ml/min/1.73 sqM)  


 


Est GFR (CKD-EPI)NonAf   5   (>60 ml/min/1.73 sqM)  


 


Glucose   121 H   (74-99)  mg/dL


 


Calcium   9.4   (8.4-10.2)  mg/dL


 


Total Bilirubin   0.4   (0.2-1.3)  mg/dL


 


AST   21   (14-36)  U/L


 


ALT   13   (4-34)  U/L


 


Alkaline Phosphatase   83   ()  U/L


 


Total Protein   6.7   (6.3-8.2)  g/dL


 


Albumin   3.6   (3.5-5.0)  g/dL














Disposition


Clinical Impression: 


 End stage renal disease, Altered mental status





Disposition: HOME SELF-CARE


Condition: Fair


Instructions (If sedation given, give patient instructions):  Altered Mental 

Status (ED)


Is patient prescribed a controlled substance at d/c from ED?: No


Referrals: 


None,Stated [Primary Care Provider] - 1-2 days


Time of Disposition: 13:24

## 2023-06-19 NOTE — CT
EXAMINATION TYPE: CT brain wo con

 

DATE OF EXAM: 6/19/2023

 

COMPARISON: 5/30/2023

 

HISTORY: Neuro deficits. Not a code stroke

 

CT DLP: 1147.4 mGycm

 

Unenhanced CT of the brain was performed.  

 

The ventricles, basal cisterns and sulci overlying the cerebral convexities demonstrate mild to moder
ate enlargement. 

 

There is no evidence for intracranial hemorrhage or sulcal effacement.  

 

There is decreased attenuation about the periventricular white matter and deep white matter of both c
erebral hemispheres, compatible with chronic small vessel ischemia. Differential diagnosis does inclu
de demyelination. 

 

No mass effects are seen.No midline shift.

 

Osseous calvarium is intact.  

 

If symptoms persist consider MRI.  

 

IMPRESSION:

 

1. Age related atrophic and chronic small vessel ischemic change without acute intracranial process s
een at this time.

## 2023-06-20 ENCOUNTER — HOSPITAL ENCOUNTER (INPATIENT)
Dept: HOSPITAL 47 - EC | Age: 53
LOS: 7 days | Discharge: ANOTHER HOSPITAL | DRG: 871 | End: 2023-06-27
Attending: HOSPITALIST | Admitting: HOSPITALIST
Payer: MEDICARE

## 2023-06-20 VITALS — BODY MASS INDEX: 23.8 KG/M2

## 2023-06-20 DIAGNOSIS — J90: ICD-10-CM

## 2023-06-20 DIAGNOSIS — E86.0: ICD-10-CM

## 2023-06-20 DIAGNOSIS — Z79.899: ICD-10-CM

## 2023-06-20 DIAGNOSIS — Z66: ICD-10-CM

## 2023-06-20 DIAGNOSIS — I12.0: ICD-10-CM

## 2023-06-20 DIAGNOSIS — E78.5: ICD-10-CM

## 2023-06-20 DIAGNOSIS — K21.9: ICD-10-CM

## 2023-06-20 DIAGNOSIS — G40.419: ICD-10-CM

## 2023-06-20 DIAGNOSIS — J96.01: ICD-10-CM

## 2023-06-20 DIAGNOSIS — N18.6: ICD-10-CM

## 2023-06-20 DIAGNOSIS — A41.9: Primary | ICD-10-CM

## 2023-06-20 DIAGNOSIS — I65.22: ICD-10-CM

## 2023-06-20 DIAGNOSIS — N39.0: ICD-10-CM

## 2023-06-20 DIAGNOSIS — Z16.11: ICD-10-CM

## 2023-06-20 DIAGNOSIS — E11.22: ICD-10-CM

## 2023-06-20 DIAGNOSIS — E03.9: ICD-10-CM

## 2023-06-20 DIAGNOSIS — J18.9: ICD-10-CM

## 2023-06-20 DIAGNOSIS — B95.2: ICD-10-CM

## 2023-06-20 DIAGNOSIS — I69.354: ICD-10-CM

## 2023-06-20 DIAGNOSIS — R47.01: ICD-10-CM

## 2023-06-20 DIAGNOSIS — D63.1: ICD-10-CM

## 2023-06-20 DIAGNOSIS — Z88.0: ICD-10-CM

## 2023-06-20 DIAGNOSIS — Z79.4: ICD-10-CM

## 2023-06-20 DIAGNOSIS — Z16.21: ICD-10-CM

## 2023-06-20 DIAGNOSIS — M89.8X9: ICD-10-CM

## 2023-06-20 DIAGNOSIS — G93.41: ICD-10-CM

## 2023-06-20 DIAGNOSIS — Z99.2: ICD-10-CM

## 2023-06-20 DIAGNOSIS — Z87.891: ICD-10-CM

## 2023-06-20 DIAGNOSIS — F05: ICD-10-CM

## 2023-06-20 LAB
ALBUMIN SERPL-MCNC: 3.6 G/DL (ref 3.5–5)
ALP SERPL-CCNC: 80 U/L (ref 38–126)
ALT SERPL-CCNC: 14 U/L (ref 4–34)
ANION GAP SERPL CALC-SCNC: 15 MMOL/L
APTT BLD: 19.8 SEC (ref 22–30)
AST SERPL-CCNC: 25 U/L (ref 14–36)
BASOPHILS # BLD AUTO: 0 K/UL (ref 0–0.2)
BASOPHILS NFR BLD AUTO: 0 %
BUN SERPL-SCNC: 63 MG/DL (ref 7–17)
CALCIUM SPEC-MCNC: 9.2 MG/DL (ref 8.4–10.2)
CHLORIDE SERPL-SCNC: 99 MMOL/L (ref 98–107)
CO2 SERPL-SCNC: 24 MMOL/L (ref 22–30)
EOSINOPHIL # BLD AUTO: 0 K/UL (ref 0–0.7)
EOSINOPHIL NFR BLD AUTO: 0 %
ERYTHROCYTE [DISTWIDTH] IN BLOOD BY AUTOMATED COUNT: 3.29 M/UL (ref 3.8–5.4)
ERYTHROCYTE [DISTWIDTH] IN BLOOD: 14 % (ref 11.5–15.5)
GLUCOSE BLD-MCNC: 190 MG/DL (ref 70–110)
GLUCOSE SERPL-MCNC: 186 MG/DL (ref 74–99)
GLUCOSE UR QL: (no result)
HCT VFR BLD AUTO: 31.5 % (ref 34–46)
HGB BLD-MCNC: 10.9 GM/DL (ref 11.4–16)
INR PPP: 1 (ref ?–1.2)
LYMPHOCYTES # SPEC AUTO: 1.7 K/UL (ref 1–4.8)
LYMPHOCYTES NFR SPEC AUTO: 19 %
MAGNESIUM SPEC-SCNC: 2.3 MG/DL (ref 1.6–2.3)
MCH RBC QN AUTO: 33 PG (ref 25–35)
MCHC RBC AUTO-ENTMCNC: 34.4 G/DL (ref 31–37)
MCV RBC AUTO: 95.8 FL (ref 80–100)
MONOCYTES # BLD AUTO: 0.4 K/UL (ref 0–1)
MONOCYTES NFR BLD AUTO: 4 %
NEUTROPHILS # BLD AUTO: 6.9 K/UL (ref 1.3–7.7)
NEUTROPHILS NFR BLD AUTO: 76 %
PH UR: 7 [PH] (ref 5–8)
PLATELET # BLD AUTO: 153 K/UL (ref 150–450)
POTASSIUM SERPL-SCNC: 4.4 MMOL/L (ref 3.5–5.1)
PROT SERPL-MCNC: 6.5 G/DL (ref 6.3–8.2)
PROT UR QL: (no result)
PT BLD: 10.8 SEC (ref 9–12)
RBC UR QL: 4 /HPF (ref 0–5)
SODIUM SERPL-SCNC: 138 MMOL/L (ref 137–145)
SP GR UR: 1.01 (ref 1–1.03)
SQUAMOUS UR QL AUTO: <1 /HPF (ref 0–4)
TRANS CELLS UR QL COMP ASSIST: <1 /HPF (ref 0–1)
UROBILINOGEN UR QL STRIP: <2 MG/DL (ref ?–2)
WBC # BLD AUTO: 9.1 K/UL (ref 3.8–10.6)
WBC #/AREA URNS HPF: 7 /HPF (ref 0–5)

## 2023-06-20 PROCEDURE — 87635 SARS-COV-2 COVID-19 AMP PRB: CPT

## 2023-06-20 PROCEDURE — 84145 PROCALCITONIN (PCT): CPT

## 2023-06-20 PROCEDURE — 80048 BASIC METABOLIC PNL TOTAL CA: CPT

## 2023-06-20 PROCEDURE — 71046 X-RAY EXAM CHEST 2 VIEWS: CPT

## 2023-06-20 PROCEDURE — 70450 CT HEAD/BRAIN W/O DYE: CPT

## 2023-06-20 PROCEDURE — 74018 RADEX ABDOMEN 1 VIEW: CPT

## 2023-06-20 PROCEDURE — 87040 BLOOD CULTURE FOR BACTERIA: CPT

## 2023-06-20 PROCEDURE — 80053 COMPREHEN METABOLIC PANEL: CPT

## 2023-06-20 PROCEDURE — 90935 HEMODIALYSIS ONE EVALUATION: CPT

## 2023-06-20 PROCEDURE — 85610 PROTHROMBIN TIME: CPT

## 2023-06-20 PROCEDURE — 99291 CRITICAL CARE FIRST HOUR: CPT

## 2023-06-20 PROCEDURE — 93005 ELECTROCARDIOGRAM TRACING: CPT

## 2023-06-20 PROCEDURE — 96365 THER/PROPH/DIAG IV INF INIT: CPT

## 2023-06-20 PROCEDURE — 36600 WITHDRAWAL OF ARTERIAL BLOOD: CPT

## 2023-06-20 PROCEDURE — 87077 CULTURE AEROBIC IDENTIFY: CPT

## 2023-06-20 PROCEDURE — 80235 DRUG ASSAY LACOSAMIDE: CPT

## 2023-06-20 PROCEDURE — 96361 HYDRATE IV INFUSION ADD-ON: CPT

## 2023-06-20 PROCEDURE — 95816 EEG AWAKE AND DROWSY: CPT

## 2023-06-20 PROCEDURE — 83735 ASSAY OF MAGNESIUM: CPT

## 2023-06-20 PROCEDURE — 84484 ASSAY OF TROPONIN QUANT: CPT

## 2023-06-20 PROCEDURE — 87449 NOS EACH ORGANISM AG IA: CPT

## 2023-06-20 PROCEDURE — 84100 ASSAY OF PHOSPHORUS: CPT

## 2023-06-20 PROCEDURE — 82805 BLOOD GASES W/O2 SATURATION: CPT

## 2023-06-20 PROCEDURE — 87086 URINE CULTURE/COLONY COUNT: CPT

## 2023-06-20 PROCEDURE — 94760 N-INVAS EAR/PLS OXIMETRY 1: CPT

## 2023-06-20 PROCEDURE — 85730 THROMBOPLASTIN TIME PARTIAL: CPT

## 2023-06-20 PROCEDURE — 94640 AIRWAY INHALATION TREATMENT: CPT

## 2023-06-20 PROCEDURE — 36415 COLL VENOUS BLD VENIPUNCTURE: CPT

## 2023-06-20 PROCEDURE — 87186 SC STD MICRODIL/AGAR DIL: CPT

## 2023-06-20 PROCEDURE — 83036 HEMOGLOBIN GLYCOSYLATED A1C: CPT

## 2023-06-20 PROCEDURE — 85025 COMPLETE CBC W/AUTO DIFF WBC: CPT

## 2023-06-20 PROCEDURE — 95713 VEEG 2-12 HR CONT MNTR: CPT

## 2023-06-20 PROCEDURE — 80164 ASSAY DIPROPYLACETIC ACD TOT: CPT

## 2023-06-20 PROCEDURE — 82140 ASSAY OF AMMONIA: CPT

## 2023-06-20 PROCEDURE — 80076 HEPATIC FUNCTION PANEL: CPT

## 2023-06-20 PROCEDURE — 81001 URINALYSIS AUTO W/SCOPE: CPT

## 2023-06-20 PROCEDURE — 71045 X-RAY EXAM CHEST 1 VIEW: CPT

## 2023-06-20 PROCEDURE — 80177 DRUG SCRN QUAN LEVETIRACETAM: CPT

## 2023-06-20 PROCEDURE — 80306 DRUG TEST PRSMV INSTRMNT: CPT

## 2023-06-20 PROCEDURE — 80165 DIPROPYLACETIC ACID FREE: CPT

## 2023-06-20 RX ADMIN — IPRATROPIUM BROMIDE AND ALBUTEROL SULFATE SCH ML: .5; 3 SOLUTION RESPIRATORY (INHALATION) at 20:04

## 2023-06-20 RX ADMIN — IPRATROPIUM BROMIDE AND ALBUTEROL SULFATE SCH ML: .5; 3 SOLUTION RESPIRATORY (INHALATION) at 16:42

## 2023-06-20 RX ADMIN — LEVOFLOXACIN SCH MLS/HR: 5 INJECTION, SOLUTION INTRAVENOUS at 18:16

## 2023-06-20 NOTE — XR
EXAMINATION TYPE: XR chest 2V

 

DATE OF EXAM: 6/20/2023

 

COMPARISON: 5/30/2023

 

TECHNIQUE: PA and lateral views submitted.

 

HISTORY: Altered mental status

 

FINDINGS:

The lungs are clear and  there is no pneumothorax, pleural effusion, or focal pneumonia.  Heart size 
normal and no overt failure. There is a patellar device overlying the left hemithorax with lead exten
ding cephalad. There is a subcutaneous loop recorder. Left lower lobe infiltrate. Diffuse osteopenia.


 

IMPRESSION:

1. Left lower lobe infiltrate with small effusion.

## 2023-06-20 NOTE — HP
HISTORY AND PHYSICAL



CHIEF COMPLAINT:

Change in mental status.



HISTORY OF PRESENT ILLNESS:

This is a 53-year-old woman with a past medical history of chronic renal failure, on

hemodialysis and multiple hospital admissions.  Currently, the patient is in a nursing

facility, and the patient is complaining of change in mental status.  The patient

apparently could only complete 1 hour of hemodialysis yesterday.  The patient's blood

pressure is also borderline.  The patient is being closely monitored.  The patient is

unable to give any coherent history.  The CAT scan yesterday done was apparently

normal.  The chest x-ray done today showed possible left lower lobe infiltrate with

some effusion, but not much fluid overload is being noted.  The patient is being

closely monitored at this time.



PAST MEDICAL HISTORY:

Reviewed includes chronic renal failure.  Rest of the history and rest of the chart are

also reviewed.



HOME MEDICATIONS:

Reviewed include Zofran.  Doses and rest of the medications reviewed.



ALLERGIES:

Reviewed include penicillin.



FAMILY HISTORY:

Could not be taken because of the patient's change in mental status.



SOCIAL HISTORY:

Could not be taken because of the patient's change in mental status.



REVIEW OF SYSTEMS:

Could not be taken because of the patient's change in mental status.



PHYSICAL EXAMINATION:

VITAL SIGNS:  Pulse 89, blood pressure 165/90, respirations 18.

HEENT:  Conjunctivae are normal.

NECK:  No jugular venous distention.

CARDIOVASCULAR:  S1 and S2 muffled.

RESPIRATORY:  Few scattered rhonchi and crackles.

ABDOMEN:  Soft and nontender.

LEGS:  No edema.

NERVOUS SYSTEM:  Diffusely weak.  The patient is confused.

SKIN:  No ulcers or rashes.

JOINTS:  No active deforming arthropathy.



LABORATORY DATA:

Reviewed.



ASSESSMENT:

1. Change in mental status, metabolic encephalopathy, possibly secondary from

    inadequate hemodialysis during the last time.

2. Possible left lower lobe pneumonia, rule out sepsis.

3. Relative hypotension and dehydration.

4. History of diabetes mellitus, type 2.

5. Hypertension.

6. Hyperlipidemia.

7. Seizure disorder.

8. Multiple medical issues.



RECOMMENDATIONS AND DISCUSSION:

In this 53-year-old woman presented with multiple complex medical issues, we will

monitor the patient closely.  I would recommend Nephrology consultation and possible

urgent hemodialysis.  I would also recommend empiric antibiotics and cultures to rule

out the possibility of sepsis.  I would also recommend consultation with Dr. Franco.

Guarded prognosis.  Further recommendations to follow.  If the blood pressure is not

improving, the patient might require an ICU admission.  Otherwise, we will closely

monitor.  Once again, the prognosis is extremely guarded because of above-mentioned

multiple medical process.  Neurology also will be consulted.





ANDREW / TIM: [id number] / Job#: [id number]

## 2023-06-20 NOTE — ED
Altered Mental Status HPI





- General


Chief Complaint: Altered Mental Status


Stated Complaint: AMS


Time Seen by Provider: 23 12:18


Source: EMS, RN notes reviewed, old records reviewed


Mode of arrival: EMS


Limitations: altered mental status





- History of Present Illness


Initial Comments: 





53-year-old female history of chronic renal disease who was seen here yesterday 

for an evaluation for altered mental status also is a dialysis patient was sent 

in from her nursing facility with complaints of progressively worsening altered 

mental status and now with nausea.  She is response with the physical stimulus 

and apparently when she does started to retch she will become more responsive.  

No falls reported apparently worked up yesterday which included a CAT scan was 

unremarkable.  It is unclear if she had dialysis yesterday and she was sent in 

from a dialysis center at that time.  No other information available at this 

time


MD Complaint: altered mental status





- Related Data


                                Home Medications











 Medication  Instructions  Recorded  Confirmed


 


Atorvastatin [Lipitor] 40 mg PO HS@21


 


Dulaglutide [Trulicity] 3 mg SQ WE@0900 21


 


Sevelamer [Renvela] 1,600 mg PO BID@0900,2100 10/07/22 06/20/23


 


Zonisamide [Zonegran] 100 mg PO TID@0600,1500,2200 23


 


levETIRAcetam [Keppra] 500 mg PO BID@0900,23


 


levETIRAcetam [Keppra] 500 mg PO MOWEFR@1500 23


 


Divalproex Sodium [Depakote] 500 mg PO BID@0900,23


 


Divalproex Sodium [Depakote] 500 mg PO MOWEFR@1600 23


 


Metoprolol Tartrate [Lopressor] 25 mg PO MOWEFR@23


 


Metoprolol Tartrate [Lopressor] 25 mg PO SUTUTHSA@0900,23


 


icosapent ethyL [Icosapent Ethyl] 1 gm PO BID@0900,23


 


Famotidine [Pepcid] 20 mg PO DAILY@0600 23


 


Lacosamide [Vimpat] 150 mg PO BID@0900,2100 23


 


Ondansetron [Zofran] 4 mg PO Q8H PRN 23


 


amLODIPine [Norvasc] 5 mg PO DAILY@0900 23








                                  Previous Rx's











 Medication  Instructions  Recorded


 


Lacosamide [Vimpat] 150 mg PO MOWEFR@1600 #4 tab 23











                                    Allergies











Allergy/AdvReac Type Severity Reaction Status Date / Time


 


Penicillins Allergy  Itching Verified 23 13:00














Review of Systems


ROS Statement: 


Those systems with pertinent positive or pertinent negative responses have been 

documented in the HPI.





ROS Other: All systems not noted in ROS Statement are negative.





Past Medical History


Past Medical History: Chest Pain / Angina, CVA/TIA, Diabetes Mellitus, 

GERD/Reflux, Hyperlipidemia, Hypertension, Memory Impairment, Osteoarthritis 

(OA), Renal Disease, Seizure Disorder, Thyroid Disorder


Additional Past Medical History / Comment(s): Last seizure approximately one 

month ago. Spouse states she used to have seizures 4X per week until she had 

vagal nerve stimulator placed, now has seizures approximately once a week to 

once a month. Dialysis MOWEFR. Neuropathy, left drop foot, only able to walk 

short distances, otherwise uses wheelchair. 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 

ago, with residual memory impairment. Never diagnosed with Sleep Apnea but 

spouse states she does stop breathing through the night and he has to shake her 

to start breathing again. Varicose veins.


History of Any Multi-Drug Resistant Organisms: None Reported


Past Surgical History:  Section, Tonsillectomy, Uterine Ablation


Additional Past Surgical History / Comment(s): Left arm fistula, loop recorder, 

vagus nerve stimulator.


Past Anesthesia/Blood Transfusion Reactions: Motion Sickness


Additional Past Anesthesia/Blood Transfusion Reaction / Comment(s): Family hx 

unknown, pt adopted.


Past Psychological History: Bipolar


Smoking Status: Former smoker


Past Alcohol Use History: None Reported


Past Drug Use History: None Reported





- Past Family History


  ** Father


Family Medical History: Unable to Obtain


Additional Family Medical History / Comment(s): Pt adopted.





General Exam





- General Exam Comments


Initial Comments: 





Is a well-developed well-nourished lethargic female who does respond to physical

 stimulus


Limitations: altered mental status


General appearance: lethargic


Head exam: Present: atraumatic, normocephalic, normal inspection


Eye exam: Present: normal appearance, PERRL, EOMI.  Absent: scleral icterus, 

conjunctival injection, periorbital swelling


ENT exam: Present: normal exam, mucous membranes moist


Neck exam: Present: normal inspection, full ROM, other (No stridor JVD or 

bruits).  Absent: tenderness, meningismus, lymphadenopathy


Respiratory exam: Present: normal lung sounds bilaterally.  Absent: respiratory 

distress, wheezes, rales, rhonchi, stridor


Cardiovascular Exam: Present: regular rate, normal rhythm, normal heart sounds. 

 Absent: systolic murmur, diastolic murmur, rubs, gallop, clicks


GI/Abdominal exam: Present: soft, normal bowel sounds.  Absent: distended, 

tenderness, guarding, rebound, rigid, bruit, pulsatile mass


Extremities exam: Present: normal inspection, full ROM, normal capillary refill.

  Absent: tenderness, pedal edema, joint swelling, calf tenderness


Back exam: Present: normal inspection


Neurological exam: Present: alert, altered, CN II-XII intact


Psychiatric exam: Present: normal affect, normal mood


Skin exam: Present: warm, dry, intact, normal color.  Absent: rash





Course


                                   Vital Signs











  23





  11:57 12:14 14:55


 


Temperature 97.3 F L  


 


Pulse Rate 82  89


 


Respiratory 16  18





Rate   


 


Blood Pressure  198/103 161/90


 


O2 Sat by Pulse 99  100





Oximetry   














  23





  16:42 16:50


 


Temperature  


 


Pulse Rate 92 86


 


Respiratory 18 16





Rate  


 


Blood Pressure  


 


O2 Sat by Pulse  





Oximetry  














Medical Decision Making





- Medical Decision Making


I did discuss the findings with Dr. Sanches who did see the patient in emergency 

department patient be admitted with diagnosis of altered mental status also 

chronic renal failure in need of dialysis


Was pt. sent in by a medical professional or institution (, PA, NP, urgent 

care, hospital, or nursing home...) When possible be specific


@  -Dr. Gtz who treated the patient yesterday


Did you speak to anyone other than the patient for history (EMS, parent, family,

 police, friend...)? What history was obtained from this source 


@  -Per medics


Did you review nursing and triage notes (agree or disagree)?  Why? 


@  -I reviewed and agree with nursing and triage notes


Were old charts reviewed (outside hosp., previous admission, EMS record, old 

EKG, old radiological studies, urgent care reports/EKG's, nursing home records)?

 Report findings 


@  -Yesterday's old charts were reviewed


Differential Diagnosis (chest pain, altered mental status, abdominal pain women,

 abdominal pain men, vaginal bleeding, weakness, fever, dyspnea, syncope, 

headache, dizziness, GI bleed, back pain, seizure, CVA, palpatations, mental 

health, musculoskeletal)? 


@  -Altered mental status, UTI, uremia


EKG interpreted by me (3pts min.).


@  -EKG interpreted by me at time of service sinus rhythm 89.  Interval 186 QRS 

duration 97 QT since /426 artifact present in V5 no acute ST-T wave 

changes


X-rays interpreted by me (1pt min.).


@  -Interpreted by me no acute process


CT interpreted by me (1pt min.).


@  -None done


U/S interpreted by me (1pt. min.).


@  -None done


What testing was considered but not performed or refused? (CT, X-rays, U/S, 

labs)? Why?


@  -None


What meds were considered but not given or refused? Why?


@  -None


Did you discuss the management of the patient with other professionals 

(professionals i.e. , PA, NP, lab, RT, psych nurse, , , 

teacher, , )? Give summary


@  -2 Sanches who did see the patient in the emergency department


Was smoking cessation discussed for >3mins.?


@  -No


Was critical care preformed (if so, how long)?


@  -1 minutes


Were there social determinants of health that impacted care today? How? 

(Homelessness, low income, unemployed, alcoholism, drug addiction, 

transportation, low edu. Level, literacy, decrease access to med. care, [facility], 

rehab)?


@  -Nursing home patient


Was there de-escalation of care discussed even if they declined (Discuss DNR or 

withdrawal of care, Hospice)? DNR status


@  -No


What co-morbidities impacted this encounter? (DM, HTN, Smoking, COPD, CAD, 

Cancer, CVA, ARF, Chemo, Hep., AIDS, mental health diagnosis, sleep apnea, 

morbid obesity)?


@  -Chronic renal failure


Was patient admitted / discharged? Hospital course, mention meds given and 

route, prescriptions, significant lab abnormalities, going to OR and other 

pertinent info.


@  -Was admitted for inpatient evaluation and treatment also dialysis 


Undiagnosed new problem with uncertain prognosis?


@  -No


Drug Therapy requiring intensive monitoring for toxicity (Heparin, Nitro, 

Insulin, Cardizem)?


@  -No


Were any procedures done?


@  -No


Diagnosis/symptom?


@  -Altered mental status, dehydration, hypotensive episode, acute and chronic 

renal failure


Acute, or Chronic, or Acute on Chronic?


@  -Acute on chronic


Uncomplicated (without systemic symptoms) or Complicated (systemic symptoms)?


@  -Complicated


Side effects of treatment?


@  -No


Exacerbation, Progression, or Severe Exacerbation?


@  -No


Poses a threat to life or bodily function? How? (Chest pain, USA, MI, pneumonia,

 PE, COPD, DKA, ARF, appy, cholecystitis, CVA, Diverticulitis, Homicidal, 

Suicidal, threat to staff... and all critical care pts)


@  -Acute on chronic renal failure





- Lab Data


Result diagrams: 


                                 23 13:50





                                 23 13:50


                                   Lab Results











  23 Range/Units





  13:50 13:50 13:50 


 


WBC  9.1    (3.8-10.6)  k/uL


 


RBC  3.29 L    (3.80-5.40)  m/uL


 


Hgb  10.9 L    (11.4-16.0)  gm/dL


 


Hct  31.5 L    (34.0-46.0)  %


 


MCV  95.8    (80.0-100.0)  fL


 


MCH  33.0    (25.0-35.0)  pg


 


MCHC  34.4    (31.0-37.0)  g/dL


 


RDW  14.0    (11.5-15.5)  %


 


Plt Count  153    (150-450)  k/uL


 


MPV  8.7    


 


Neutrophils %  76    %


 


Lymphocytes %  19    %


 


Monocytes %  4    %


 


Eosinophils %  0    %


 


Basophils %  0    %


 


Neutrophils #  6.9    (1.3-7.7)  k/uL


 


Lymphocytes #  1.7    (1.0-4.8)  k/uL


 


Monocytes #  0.4    (0-1.0)  k/uL


 


Eosinophils #  0.0    (0-0.7)  k/uL


 


Basophils #  0.0    (0-0.2)  k/uL


 


PT   10.8   (9.0-12.0)  sec


 


INR   1.0   (<1.2)  


 


APTT   19.8 L   (22.0-30.0)  sec


 


Sodium    138  (137-145)  mmol/L


 


Potassium    4.4  (3.5-5.1)  mmol/L


 


Chloride    99  ()  mmol/L


 


Carbon Dioxide    24  (22-30)  mmol/L


 


Anion Gap    15  mmol/L


 


BUN    63 H  (7-17)  mg/dL


 


Creatinine    9.60 H*  (0.52-1.04)  mg/dL


 


Est GFR (CKD-EPI)AfAm    5  (>60 ml/min/1.73 sqM)  


 


Est GFR (CKD-EPI)NonAf    4  (>60 ml/min/1.73 sqM)  


 


Glucose    186 H  (74-99)  mg/dL


 


POC Glucose (mg/dL)     ()  mg/dL


 


POC Glu Operater ID     


 


Calcium    9.2  (8.4-10.2)  mg/dL


 


Magnesium    2.3  (1.6-2.3)  mg/dL


 


Total Bilirubin    0.5  (0.2-1.3)  mg/dL


 


AST    25  (14-36)  U/L


 


ALT    14  (4-34)  U/L


 


Alkaline Phosphatase    80  ()  U/L


 


Ammonia     (<30)  umol/L


 


Troponin I     (0.000-0.034)  ng/mL


 


Total Protein    6.5  (6.3-8.2)  g/dL


 


Albumin    3.6  (3.5-5.0)  g/dL


 


Urine Color     


 


Urine Appearance     (Clear)  


 


Urine pH     (5.0-8.0)  


 


Ur Specific Gravity     (1.001-1.035)  


 


Urine Protein     (Negative)  


 


Urine Glucose (UA)     (Negative)  


 


Urine Ketones     (Negative)  


 


Urine Blood     (Negative)  


 


Urine Nitrite     (Negative)  


 


Urine Bilirubin     (Negative)  


 


Urine Urobilinogen     (<2.0)  mg/dL


 


Ur Leukocyte Esterase     (Negative)  


 


Urine RBC     (0-5)  /hpf


 


Urine WBC     (0-5)  /hpf


 


Ur Squamous Epith Cells     (0-4)  /hpf


 


Ur Transition Epith Cell     (0-1)  /hpf


 


Urine Bacteria     (None)  /hpf


 


Urine Opiates Screen     (NotDetected)  


 


Ur Oxycodone Screen     (NotDetected)  


 


Urine Methadone Screen     (NotDetected)  


 


Ur Propoxyphene Screen     (NotDetected)  


 


Ur Barbiturates Screen     (NotDetected)  


 


U Tricyclic Antidepress     (NotDetected)  


 


Ur Phencyclidine Scrn     (NotDetected)  


 


Ur Amphetamines Screen     (NotDetected)  


 


U Methamphetamines Scrn     (NotDetected)  


 


U Benzodiazepines Scrn     (NotDetected)  


 


Urine Cocaine Screen     (NotDetected)  


 


U Marijuana (THC) Screen     (NotDetected)  














  23 Range/Units





  13:50 13:50 14:15 


 


WBC     (3.8-10.6)  k/uL


 


RBC     (3.80-5.40)  m/uL


 


Hgb     (11.4-16.0)  gm/dL


 


Hct     (34.0-46.0)  %


 


MCV     (80.0-100.0)  fL


 


MCH     (25.0-35.0)  pg


 


MCHC     (31.0-37.0)  g/dL


 


RDW     (11.5-15.5)  %


 


Plt Count     (150-450)  k/uL


 


MPV     


 


Neutrophils %     %


 


Lymphocytes %     %


 


Monocytes %     %


 


Eosinophils %     %


 


Basophils %     %


 


Neutrophils #     (1.3-7.7)  k/uL


 


Lymphocytes #     (1.0-4.8)  k/uL


 


Monocytes #     (0-1.0)  k/uL


 


Eosinophils #     (0-0.7)  k/uL


 


Basophils #     (0-0.2)  k/uL


 


PT     (9.0-12.0)  sec


 


INR     (<1.2)  


 


APTT     (22.0-30.0)  sec


 


Sodium     (137-145)  mmol/L


 


Potassium     (3.5-5.1)  mmol/L


 


Chloride     ()  mmol/L


 


Carbon Dioxide     (22-30)  mmol/L


 


Anion Gap     mmol/L


 


BUN     (7-17)  mg/dL


 


Creatinine     (0.52-1.04)  mg/dL


 


Est GFR (CKD-EPI)AfAm     (>60 ml/min/1.73 sqM)  


 


Est GFR (CKD-EPI)NonAf     (>60 ml/min/1.73 sqM)  


 


Glucose     (74-99)  mg/dL


 


POC Glucose (mg/dL)    190 H  ()  mg/dL


 


POC Glu Operater ID    Anais Vazquez  


 


Calcium     (8.4-10.2)  mg/dL


 


Magnesium     (1.6-2.3)  mg/dL


 


Total Bilirubin     (0.2-1.3)  mg/dL


 


AST     (14-36)  U/L


 


ALT     (4-34)  U/L


 


Alkaline Phosphatase     ()  U/L


 


Ammonia  11    (<30)  umol/L


 


Troponin I   <0.012   (0.000-0.034)  ng/mL


 


Total Protein     (6.3-8.2)  g/dL


 


Albumin     (3.5-5.0)  g/dL


 


Urine Color     


 


Urine Appearance     (Clear)  


 


Urine pH     (5.0-8.0)  


 


Ur Specific Gravity     (1.001-1.035)  


 


Urine Protein     (Negative)  


 


Urine Glucose (UA)     (Negative)  


 


Urine Ketones     (Negative)  


 


Urine Blood     (Negative)  


 


Urine Nitrite     (Negative)  


 


Urine Bilirubin     (Negative)  


 


Urine Urobilinogen     (<2.0)  mg/dL


 


Ur Leukocyte Esterase     (Negative)  


 


Urine RBC     (0-5)  /hpf


 


Urine WBC     (0-5)  /hpf


 


Ur Squamous Epith Cells     (0-4)  /hpf


 


Ur Transition Epith Cell     (0-1)  /hpf


 


Urine Bacteria     (None)  /hpf


 


Urine Opiates Screen     (NotDetected)  


 


Ur Oxycodone Screen     (NotDetected)  


 


Urine Methadone Screen     (NotDetected)  


 


Ur Propoxyphene Screen     (NotDetected)  


 


Ur Barbiturates Screen     (NotDetected)  


 


U Tricyclic Antidepress     (NotDetected)  


 


Ur Phencyclidine Scrn     (NotDetected)  


 


Ur Amphetamines Screen     (NotDetected)  


 


U Methamphetamines Scrn     (NotDetected)  


 


U Benzodiazepines Scrn     (NotDetected)  


 


Urine Cocaine Screen     (NotDetected)  


 


U Marijuana (THC) Screen     (NotDetected)  














  23 Range/Units





  15:50 


 


WBC   (3.8-10.6)  k/uL


 


RBC   (3.80-5.40)  m/uL


 


Hgb   (11.4-16.0)  gm/dL


 


Hct   (34.0-46.0)  %


 


MCV   (80.0-100.0)  fL


 


MCH   (25.0-35.0)  pg


 


MCHC   (31.0-37.0)  g/dL


 


RDW   (11.5-15.5)  %


 


Plt Count   (150-450)  k/uL


 


MPV   


 


Neutrophils %   %


 


Lymphocytes %   %


 


Monocytes %   %


 


Eosinophils %   %


 


Basophils %   %


 


Neutrophils #   (1.3-7.7)  k/uL


 


Lymphocytes #   (1.0-4.8)  k/uL


 


Monocytes #   (0-1.0)  k/uL


 


Eosinophils #   (0-0.7)  k/uL


 


Basophils #   (0-0.2)  k/uL


 


PT   (9.0-12.0)  sec


 


INR   (<1.2)  


 


APTT   (22.0-30.0)  sec


 


Sodium   (137-145)  mmol/L


 


Potassium   (3.5-5.1)  mmol/L


 


Chloride   ()  mmol/L


 


Carbon Dioxide   (22-30)  mmol/L


 


Anion Gap   mmol/L


 


BUN   (7-17)  mg/dL


 


Creatinine   (0.52-1.04)  mg/dL


 


Est GFR (CKD-EPI)AfAm   (>60 ml/min/1.73 sqM)  


 


Est GFR (CKD-EPI)NonAf   (>60 ml/min/1.73 sqM)  


 


Glucose   (74-99)  mg/dL


 


POC Glucose (mg/dL)   ()  mg/dL


 


POC Glu Operater ID   


 


Calcium   (8.4-10.2)  mg/dL


 


Magnesium   (1.6-2.3)  mg/dL


 


Total Bilirubin   (0.2-1.3)  mg/dL


 


AST   (14-36)  U/L


 


ALT   (4-34)  U/L


 


Alkaline Phosphatase   ()  U/L


 


Ammonia   (<30)  umol/L


 


Troponin I   (0.000-0.034)  ng/mL


 


Total Protein   (6.3-8.2)  g/dL


 


Albumin   (3.5-5.0)  g/dL


 


Urine Color  Yellow  


 


Urine Appearance  Clear  (Clear)  


 


Urine pH  7.0  (5.0-8.0)  


 


Ur Specific Gravity  1.014  (1.001-1.035)  


 


Urine Protein  3+ H  (Negative)  


 


Urine Glucose (UA)  1+ H  (Negative)  


 


Urine Ketones  Negative  (Negative)  


 


Urine Blood  Trace H  (Negative)  


 


Urine Nitrite  Negative  (Negative)  


 


Urine Bilirubin  Negative  (Negative)  


 


Urine Urobilinogen  <2.0  (<2.0)  mg/dL


 


Ur Leukocyte Esterase  Large H  (Negative)  


 


Urine RBC  4  (0-5)  /hpf


 


Urine WBC  7 H  (0-5)  /hpf


 


Ur Squamous Epith Cells  <1  (0-4)  /hpf


 


Ur Transition Epith Cell  <1  (0-1)  /hpf


 


Urine Bacteria  Few H  (None)  /hpf


 


Urine Opiates Screen  Not Detected  (NotDetected)  


 


Ur Oxycodone Screen  Not Detected  (NotDetected)  


 


Urine Methadone Screen  Not Detected  (NotDetected)  


 


Ur Propoxyphene Screen  Not Detected  (NotDetected)  


 


Ur Barbiturates Screen  Not Detected  (NotDetected)  


 


U Tricyclic Antidepress  Not Detected  (NotDetected)  


 


Ur Phencyclidine Scrn  Not Detected  (NotDetected)  


 


Ur Amphetamines Screen  Not Detected  (NotDetected)  


 


U Methamphetamines Scrn  Not Detected  (NotDetected)  


 


U Benzodiazepines Scrn  Not Detected  (NotDetected)  


 


Urine Cocaine Screen  Not Detected  (NotDetected)  


 


U Marijuana (THC) Screen  Not Detected  (NotDetected)  














- Radiology Data


Interpreted by me: 





Imaging interpreted by me no acute processes seen.





Critical Care Time


Critical Care Time: Yes


Total Critical Care Time: 31





Disposition


Clinical Impression: 


 Delirium due to general medical condition, Acute on chronic renal failure, 

Hypotensive episode





Disposition: ADMITTED AS IP TO THIS [facility]


Condition: Fair


Referrals: 


Dharmesh Roblero MD [Primary Care Provider] - 1-2 days


Decision Date: 23


Decision Time: 17:00

## 2023-06-21 LAB
ANION GAP SERPL CALC-SCNC: 15 MMOL/L
BASOPHILS # BLD AUTO: 0 K/UL (ref 0–0.2)
BASOPHILS NFR BLD AUTO: 0 %
BUN SERPL-SCNC: 71 MG/DL (ref 7–17)
CALCIUM SPEC-MCNC: 9.1 MG/DL (ref 8.4–10.2)
CHLORIDE SERPL-SCNC: 100 MMOL/L (ref 98–107)
CO2 SERPL-SCNC: 24 MMOL/L (ref 22–30)
EOSINOPHIL # BLD AUTO: 0.1 K/UL (ref 0–0.7)
EOSINOPHIL NFR BLD AUTO: 1 %
ERYTHROCYTE [DISTWIDTH] IN BLOOD BY AUTOMATED COUNT: 3.39 M/UL (ref 3.8–5.4)
ERYTHROCYTE [DISTWIDTH] IN BLOOD: 14 % (ref 11.5–15.5)
GLUCOSE BLD-MCNC: 110 MG/DL (ref 70–110)
GLUCOSE BLD-MCNC: 119 MG/DL (ref 70–110)
GLUCOSE BLD-MCNC: 77 MG/DL (ref 70–110)
GLUCOSE SERPL-MCNC: 112 MG/DL (ref 74–99)
HCT VFR BLD AUTO: 32.2 % (ref 34–46)
HGB BLD-MCNC: 11 GM/DL (ref 11.4–16)
LYMPHOCYTES # SPEC AUTO: 2.5 K/UL (ref 1–4.8)
LYMPHOCYTES NFR SPEC AUTO: 33 %
MCH RBC QN AUTO: 32.4 PG (ref 25–35)
MCHC RBC AUTO-ENTMCNC: 34.1 G/DL (ref 31–37)
MCV RBC AUTO: 95 FL (ref 80–100)
MONOCYTES # BLD AUTO: 0.7 K/UL (ref 0–1)
MONOCYTES NFR BLD AUTO: 9 %
NEUTROPHILS # BLD AUTO: 4.1 K/UL (ref 1.3–7.7)
NEUTROPHILS NFR BLD AUTO: 55 %
PLATELET # BLD AUTO: 122 K/UL (ref 150–450)
POTASSIUM SERPL-SCNC: 4.4 MMOL/L (ref 3.5–5.1)
SODIUM SERPL-SCNC: 139 MMOL/L (ref 137–145)
WBC # BLD AUTO: 7.4 K/UL (ref 3.8–10.6)

## 2023-06-21 PROCEDURE — 5A1D70Z PERFORMANCE OF URINARY FILTRATION, INTERMITTENT, LESS THAN 6 HOURS PER DAY: ICD-10-PCS

## 2023-06-21 RX ADMIN — ZONISAMIDE SCH MG: 100 CAPSULE ORAL at 20:40

## 2023-06-21 RX ADMIN — ZONISAMIDE SCH: 100 CAPSULE ORAL at 16:18

## 2023-06-21 RX ADMIN — IPRATROPIUM BROMIDE AND ALBUTEROL SULFATE SCH ML: .5; 3 SOLUTION RESPIRATORY (INHALATION) at 19:46

## 2023-06-21 RX ADMIN — IPRATROPIUM BROMIDE AND ALBUTEROL SULFATE SCH ML: .5; 3 SOLUTION RESPIRATORY (INHALATION) at 11:38

## 2023-06-21 RX ADMIN — DIVALPROEX SODIUM SCH MG: 500 TABLET, DELAYED RELEASE ORAL at 20:39

## 2023-06-21 RX ADMIN — ATORVASTATIN CALCIUM SCH MG: 40 TABLET, FILM COATED ORAL at 20:39

## 2023-06-21 RX ADMIN — LACOSAMIDE SCH: 150 TABLET, FILM COATED ORAL at 20:28

## 2023-06-21 RX ADMIN — IPRATROPIUM BROMIDE AND ALBUTEROL SULFATE SCH ML: .5; 3 SOLUTION RESPIRATORY (INHALATION) at 07:28

## 2023-06-21 RX ADMIN — ZONISAMIDE SCH MG: 100 CAPSULE ORAL at 11:34

## 2023-06-21 RX ADMIN — INSULIN ASPART SCH: 100 INJECTION, SOLUTION INTRAVENOUS; SUBCUTANEOUS at 20:29

## 2023-06-21 RX ADMIN — INSULIN ASPART SCH: 100 INJECTION, SOLUTION INTRAVENOUS; SUBCUTANEOUS at 16:20

## 2023-06-21 RX ADMIN — ASPIRIN SCH: 325 TABLET ORAL at 20:28

## 2023-06-21 RX ADMIN — INSULIN ASPART SCH: 100 INJECTION, SOLUTION INTRAVENOUS; SUBCUTANEOUS at 15:13

## 2023-06-21 RX ADMIN — METOPROLOL TARTRATE SCH MG: 25 TABLET, FILM COATED ORAL at 20:39

## 2023-06-21 RX ADMIN — LEVOFLOXACIN SCH: 5 INJECTION, SOLUTION INTRAVENOUS at 21:31

## 2023-06-21 RX ADMIN — DIVALPROEX SODIUM SCH MG: 500 TABLET, DELAYED RELEASE ORAL at 14:06

## 2023-06-21 RX ADMIN — SEVELAMER CARBONATE SCH MG: 800 TABLET, FILM COATED ORAL at 20:39

## 2023-06-21 RX ADMIN — DIVALPROEX SODIUM SCH: 500 TABLET, DELAYED RELEASE ORAL at 20:28

## 2023-06-21 RX ADMIN — LACOSAMIDE SCH MG: 150 TABLET, FILM COATED ORAL at 20:39

## 2023-06-21 RX ADMIN — LACOSAMIDE SCH MG: 150 TABLET, FILM COATED ORAL at 14:06

## 2023-06-21 NOTE — P.CNPUL
History of Present Illness


Consult date: 23


Requesting physician: Gabby Sanches


Reason for consult: pneumonia


Chief complaint: Altered mental status


History of present illness: 





I am seeing this patient in new consultation today 2023 in the emergency 

room possible left lower lobe pneumonia.  Patient is a 53-year-old female with 

past medical history significant for multiple comorbidities including end-stage 

renal disease, seizure disorder, CVA, diabetes mellitus, GERD, hyperlipidemia, 

hypertension, hypothyroidism.  Patient was sent in from [facility] 

yesterday afternoon for concerns about altered mental status. There was also 

concern for nausea and retching. She has had multiple prior emergency room 

visits and hospital admissions in the past for similar symptoms.  She actually 

had an ER visit on  for altered mental status.  She was discharged back

to [facility], and returned yesterday afternoon. She also has history of ventilator

dependence for status epilepticus.  No reports of seizure activity on this adm

ission.  Patient apparently continues to have seizures about once per week to 

once per month. Patient is currently sitting up in bed, alert but nonverbal, in 

no acute distress.  She does not follow commands.  She is on 2 L/m nasal cannula

and oxygenating at 100%.  Chest x-ray on arrival shows small left pleural 

effusion and possible left lower lobe infiltrate. Patient is currently on 

Levaquin for empiric antibiotics.  She is afebrile.  CBC on arrival was 

unremarkable.  BMP shows sodium 138, potassium 4.4, chloride 99, serum bicarb 

24, BUN 63, creatinine 9.6, glucose 186.  Patient reportedly does receive 

hemodialysis on Monday, Wednesday, Friday schedule.  Troponins negative 1.  

Ammonia level was low.  Urinalysis was not particularly concerning for UTI.  

Brain CT without contrast, one day prior, shows age-related atrophic and chronic

small vessel ischemia without any acute intracranial process. Patient will be 

admitted to the cardiac stepdown unit once bed available.





Review of Systems


ROS unobtainable: due to mental status





Past Medical History


Past Medical History: Chest Pain / Angina, CVA/TIA, Diabetes Mellitus, 

GERD/Reflux, Hyperlipidemia, Hypertension, Memory Impairment, Osteoarthritis 

(OA), Renal Disease, Seizure Disorder, Thyroid Disorder


Additional Past Medical History / Comment(s): Last seizure approximately one 

month ago. Spouse states she used to have seizures 4X per week until she had 

vagal nerve stimulator placed, now has seizures approximately once a week to 

once a month. Dialysis MOWEFR. Neuropathy, left drop foot, only able to walk 

short distances, otherwise uses wheelchair. 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 

ago, with residual memory impairment. Never diagnosed with Sleep Apnea but 

spouse states she does stop breathing through the night and he has to shake her 

to start breathing again. Varicose veins.


History of Any Multi-Drug Resistant Organisms: None Reported


Past Surgical History:  Section, Tonsillectomy, Uterine Ablation


Additional Past Surgical History / Comment(s): Left arm fistula, loop recorder, 

vagus nerve stimulator.


Past Anesthesia/Blood Transfusion Reactions: Motion Sickness


Additional Past Anesthesia/Blood Transfusion Reaction / Comment(s): Family hx 

unknown, pt adopted.


Past Psychological History: Bipolar


Smoking Status: Former smoker


Past Alcohol Use History: None Reported


Past Drug Use History: None Reported





- Past Family History


  ** Father


Family Medical History: Unable to Obtain


Additional Family Medical History / Comment(s): Pt adopted.





Medications and Allergies


                                Home Medications











 Medication  Instructions  Recorded  Confirmed  Type


 


Atorvastatin [Lipitor] 40 mg PO HS@21 History


 


Dulaglutide [Trulicity] 3 mg SQ WE@0900 21 History


 


Sevelamer [Renvela] 1,600 mg PO BID@0900,2100 10/07/22 06/20/23 History


 


Zonisamide [Zonegran] 100 mg PO TID@0600,1500,2200 23 History


 


levETIRAcetam [Keppra] 500 mg PO BID@0900,2000 23 History


 


levETIRAcetam [Keppra] 500 mg PO MOWEFR@1500 23 History


 


Divalproex Sodium [Depakote] 500 mg PO BID@0900,23 History


 


Divalproex Sodium [Depakote] 500 mg PO MOWEFR@1600 23 History


 


Metoprolol Tartrate [Lopressor] 25 mg PO MOWEFR@2100 23 History


 


Metoprolol Tartrate [Lopressor] 25 mg PO SUTUTHSA@0900,23 

History


 


icosapent ethyL [Icosapent Ethyl] 1 gm PO BID@0900,2100 23 

History


 


Lacosamide [Vimpat] 150 mg PO MOWEFR@1600 #4 tab 23 Rx


 


Famotidine [Pepcid] 20 mg PO DAILY@0600 23 History


 


Lacosamide [Vimpat] 150 mg PO BID@0900,2100 23 History


 


Ondansetron [Zofran] 4 mg PO Q8H PRN 23 History


 


amLODIPine [Norvasc] 5 mg PO DAILY@0900 23 History








                                    Allergies











Allergy/AdvReac Type Severity Reaction Status Date / Time


 


Penicillins Allergy  Itching Verified 23 13:00














Physical Exam


Vitals: 


                                   Vital Signs











  Temp Pulse Resp BP Pulse Ox


 


 23 21:00   103 H  19  138/78  100


 


 23 20:11   97   


 


 23 20:04   84   


 


 23 20:00   96  18  142/80  100


 


 23 19:00   90  8 L  99/72  100


 


 23 18:23  97.8 F  93  16  99/72  100


 


 23 18:00   97  14  


 


 23 17:00   90  6 L  162/91  100


 


 23 16:50   86  16  


 


 23 16:42   92  18  


 


 23 16:00   85  14  170/86  100


 


 23 15:00     181/107 


 


 23 14:55   89  18  161/90  100


 


 23 14:00   89  11 L  173/88  100


 


 23 13:00   85  8 L  204/188  100


 


 23 12:14     198/103 


 


 23 12:02      99


 


 23 11:57  97.3 F L  82  16   99








                                Intake and Output











 23





 14:59 22:59 06:59


 


Other:   


 


  Weight 72.575 kg  














GENERAL EXAM: Alert but nonverbal, 53-year-old female, comfortable in no 

apparent distress.


HEAD: Normocephalic and atraumatic


EYES: Normal reaction of pupils, equal size.


NOSE: Clear with pink turbinates.


THROAT: No erythema or exudates.


NECK: No masses, no JVD.


CHEST: No chest wall deformity.  There is a left chest implanted device


LUNGS: Equal air entry with left lower lobe inspiratory crackles.  No wheezing, 

rhonchi.  On 2 L/m nasal cannula.  No conversational dyspnea or accessory muscle

 use.. 


CVS: S1 and S2 normal with no audible murmur, regular rhythm. No extra heart 

sounds


ABDOMEN: No hepatosplenomegaly, active bowel sounds, no guarding or rigidity. 


SPINE: No scoliosis or deformity


SKIN: No rashes


CENTRAL NERVOUS SYSTEM: No focal deficits, tone is normal in all 4 extremities. 

 No seizure activity noted.


EXTREMITIES: There is no peripheral edema, clubbing, or cyanosis.  Peripheral 

pulses are intact.  There is a left arm AV fistula





Results





- Laboratory Findings


CBC and BMP: 


                                 23 13:50





                                 23 11:20


PT/INR, D-dimer











PT  10.8 sec (9.0-12.0)   23  13:50    


 


INR  1.0  (<1.2)   23  13:50    








Abnormal lab findings: 


                                  Abnormal Labs











  23





  13:50 13:50 13:50


 


RBC  3.29 L  


 


Hgb  10.9 L  


 


Hct  31.5 L  


 


APTT   19.8 L 


 


BUN    63 H


 


Creatinine    9.60 H*


 


Glucose    186 H


 


POC Glucose (mg/dL)   


 


Urine Protein   


 


Urine Glucose (UA)   


 


Urine Blood   


 


Ur Leukocyte Esterase   


 


Urine WBC   


 


Urine Bacteria   














  23





  14:15 15:50


 


RBC  


 


Hgb  


 


Hct  


 


APTT  


 


BUN  


 


Creatinine  


 


Glucose  


 


POC Glucose (mg/dL)  190 H 


 


Urine Protein   3+ H


 


Urine Glucose (UA)   1+ H


 


Urine Blood   Trace H


 


Ur Leukocyte Esterase   Large H


 


Urine WBC   7 H


 


Urine Bacteria   Few H














- Diagnostic Findings


Chest x-ray: image reviewed





Assessment and Plan


Assessment: 





Acute hypoxemic respiratory failure, currently on 2 L/m nasal cannula, possibly 

secondary to healthcare associated pneumonia.  Chest x-ray shows a left-sided 

pleural effusion and suspected left lower lobe infiltrate. 





Altered mental status, currently under investigation. Brain CT without contrast,

 one day prior, shows age-related atrophic and chronic small vessel ischemia 

without any acute intracranial process. 





History of seizure disorder





End-stage renal disease, reportedly receives hemodialysis Monday, Wednesday, 

Friday schedule. 





Diabetes mellitus type 2, non-insulin-dependent





Hyperlipidemia





Essential hypertension





History of CVA





Hypothyroidism





GERD without esophagitis








Plan:


Patient's medications, labs, chest x-ray reviewed


Continue supplemental oxygen


Continue empiric antibiotics, and infectious disease was added on


Check procalcitonin level


Continue bronchodilators


Consult neurology to manage antiepileptics.  Unsure if the patient can take oral

 medications at this point. 


Seizure precautions


Hemodialysis per nephrology


We will continue to follow








I have personally seen and examined the patient, performed the documentation and

 the assessment and plan as written.  Number of minutes spent on the visit:20














This is a joint evaluation was done along with the nurse practitioner.  The 

patient was seen and evaluated.  Obviously the patient is a very poor historian.

  She is going to undergo hemodialysis today.  There may be potentially a left 

lower lobe pulmonary infiltrate/pneumonia and for that reason the patient was 

started on broad-spectrum antibiotics.  No active signs of any respiratory 

distress at this point in time.  No fever.  Hemodynamically stable.  We'll 

continue to follow.  No other significant issues for now.  The patient is 

currently on Levaquin.  The patient is currently on 2 L of oxygen nasal cannula 

to pulse ox of 100%.  This evaluation was on a more than 30 minutes.


Time with Patient: Greater than 30

## 2023-06-21 NOTE — XR
EXAMINATION TYPE: XR chest 2V

 

DATE OF EXAM: 6/21/2023

 

COMPARISON: 6/20/2023

 

TECHNIQUE: PA and lateral views submitted.

 

HISTORY: Cough

 

FINDINGS:

The lungs are clear and  there is no pneumothorax, pleural effusion, or focal pneumonia.  Heart size 
normal and no overt failure. Osseous structures demonstrate hypertrophic and degenerative changes of 
the spine. There is a stimulator device overlying the left hemithorax. Subcutaneous loop recorder may
 also be present and partially included in the field-of-view. Linear changes left lung base most typi
chi of atelectasis diffuse osteopenia.

 

IMPRESSION: 

1. No acute process. Linear subsegmental changes are more typical of atelectasis than pneumonia.

## 2023-06-21 NOTE — P.CONS
History of Present Illness





- Reason for Consult


Consult date: 23


Sepsis


Requesting physician: Gabby Sanches





- Chief Complaint


Mental status changes x one day





- History of Present Illness


Patient is a 53-year-old  female with a past medical history 

significant for end-stage renal disease on hemodialysis through the left upper 

arm fistula and nursing home resident patient has been sent to the ER yesterday 

afternoon for evaluation of mental status changes pending the patient was 

noticed to have worsening mental status and also have some nausea but no clear 

history of any vomiting abdominal pain or any diarrhea patient on presentation 

to the hospital was afebrile and no fever has been recorded subsequently patient

did have a normal white count elevated BUN/creatinine keeping in mind her 

chronic kidney disease liver exams are normal urine positive urine drug screen 

was negative patient did have a chest x-ray left lower lobe infiltrate with 

small effusion with concern for her pneumonia patient was started on Levaquin 

because of her history of penicillin allergy infectious disease was consulted 

for further management of antibiotic therapy patient at the time of my 

evaluation elevated good historian so most information has been extension of 

from review of the chart








Review of Systems


Positive points has been mentioned in HPI complete review could not be obtained 

because of his underlying mental status








Past Medical History


Past Medical History: Chest Pain / Angina, CVA/TIA, Diabetes Mellitus, 

GERD/Reflux, Hyperlipidemia, Hypertension, Memory Impairment, Osteoarthritis 

(OA), Renal Disease, Seizure Disorder, Thyroid Disorder


Additional Past Medical History / Comment(s): Last seizure approximately one 

month ago. Spouse states she used to have seizures 4X per week until she had 

vagal nerve stimulator placed, now has seizures approximately once a week to 

once a month. Dialysis MOWEFR. Neuropathy, left drop foot, only able to walk 

short distances, otherwise uses wheelchair. 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 

ago, with residual memory impairment. Never diagnosed with Sleep Apnea but 

spouse states she does stop breathing through the night and he has to shake her 

to start breathing again. Varicose veins.


History of Any Multi-Drug Resistant Organisms: None Reported


Past Surgical History:  Section, Tonsillectomy, Uterine Ablation


Additional Past Surgical History / Comment(s): Left arm fistula, loop recorder, 

vagus nerve stimulator.


Past Anesthesia/Blood Transfusion Reactions: Motion Sickness


Additional Past Anesthesia/Blood Transfusion Reaction / Comm: Family hx unknown,

pt adopted.


Past Psychological History: Bipolar


Smoking Status: Former smoker


Past Alcohol Use History: None Reported


Past Drug Use History: None Reported





- Past Family History


  ** Father


Family Medical History: Unable to Obtain


Additional Family Medical History / Comment(s): Pt adopted.





Medications and Allergies


                                Home Medications











 Medication  Instructions  Recorded  Confirmed  Type


 


Atorvastatin [Lipitor] 40 mg PO HS@21 History


 


Dulaglutide [Trulicity] 3 mg SQ WE@0921 History


 


Sevelamer [Renvela] 1,600 mg PO BID@0900,2100 10/07/22 06/20/23 History


 


Zonisamide [Zonegran] 100 mg PO TID@0600,1500,23 History


 


levETIRAcetam [Keppra] 500 mg PO BID@0900,23 History


 


levETIRAcetam [Keppra] 500 mg PO MOWEFR@1500 23 History


 


Divalproex Sodium [Depakote] 500 mg PO BID@0900,23 History


 


Divalproex Sodium [Depakote] 500 mg PO MOWEFR@1600 23 History


 


Metoprolol Tartrate [Lopressor] 25 mg PO MOWEFR@23 History


 


Metoprolol Tartrate [Lopressor] 25 mg PO SUTUTHSA@0900,23 

History


 


icosapent ethyL [Icosapent Ethyl] 1 gm PO BID@0900,23 

History


 


Lacosamide [Vimpat] 150 mg PO MOWEFR@1600 #4 tab 23 Rx


 


Famotidine [Pepcid] 20 mg PO DAILY@23 History


 


Lacosamide [Vimpat] 150 mg PO BID@0900,23 History


 


Ondansetron [Zofran] 4 mg PO Q8H PRN 23 History


 


amLODIPine [Norvasc] 5 mg PO DAILY@0900 23 History








                                    Allergies











Allergy/AdvReac Type Severity Reaction Status Date / Time


 


Penicillins Allergy  Itching Verified 23 13:00














Physical Exam


Vitals: 


                                   Vital Signs











  Temp Pulse Resp BP Pulse Ox


 


 23 11:46   90   


 


 23 11:38   94   


 


 23 10:53   89  18  131/80  100


 


 23 07:39   92   


 


 23 07:28   88    95


 


 23 21:00   103 H  19  138/78  100


 


 23 20:11   97   


 


 23 20:04   84   


 


 23 20:00   96  18  142/80  100


 


 23 19:00   90  8 L  99/72  100


 


 23 18:23  97.8 F  93  16  99/72  100


 


 23 18:00   97  14  


 


 23 17:00   90  6 L  162/91  100


 


 23 16:50   86  16  


 


 23 16:42   92  18  


 


 23 16:00   85  14  170/86  100


 


 23 15:00     181/107 


 


 23 14:55   89  18  161/90  100


 


 23 14:00   89  11 L  173/88  100


 


 23 13:00   85  8 L  204/188  100


 


 23 12:14     198/103 











GENERAL DESCRIPTION: Middle-aged female lying in bed, no distress. No tachypnea 

or accessory muscle of respiration use.


HEENT: Shows Pallor , no scleral icterus. Oral mucous membrane is dry. No 

pharyngeal erythema or thrush


NECK: Trachea central, no thyromegaly.


LUNGS: Unlabored breathing.  Decreased breath sound at bases


HEART: S1, S2, regular rate and rhythm. No loud murmur


ABDOMEN: Soft, no tenderness ,


EXTREMITIES: No edema of feet.


SKIN: No rash, no masses palpable.


NEUROLOGICAL: The patient is lethargic orientation could not be determined











Results


CBC & Chem 7: 


                                 23 05:57





                                 23 05:57


Labs: 


                  Abnormal Lab Results - Last 24 Hours (Table)











  23 Range/Units





  13:50 13:50 13:50 


 


RBC  3.29 L    (3.80-5.40)  m/uL


 


Hgb  10.9 L    (11.4-16.0)  gm/dL


 


Hct  31.5 L    (34.0-46.0)  %


 


APTT   19.8 L   (22.0-30.0)  sec


 


BUN    63 H  (7-17)  mg/dL


 


Creatinine    9.60 H*  (0.52-1.04)  mg/dL


 


Glucose    186 H  (74-99)  mg/dL


 


POC Glucose (mg/dL)     ()  mg/dL


 


Urine Protein     (Negative)  


 


Urine Glucose (UA)     (Negative)  


 


Urine Blood     (Negative)  


 


Ur Leukocyte Esterase     (Negative)  


 


Urine WBC     (0-5)  /hpf


 


Urine Bacteria     (None)  /hpf














  23 Range/Units





  14:15 15:50 10:59 


 


RBC     (3.80-5.40)  m/uL


 


Hgb     (11.4-16.0)  gm/dL


 


Hct     (34.0-46.0)  %


 


APTT     (22.0-30.0)  sec


 


BUN     (7-17)  mg/dL


 


Creatinine     (0.52-1.04)  mg/dL


 


Glucose     (74-99)  mg/dL


 


POC Glucose (mg/dL)  190 H   119 H  ()  mg/dL


 


Urine Protein   3+ H   (Negative)  


 


Urine Glucose (UA)   1+ H   (Negative)  


 


Urine Blood   Trace H   (Negative)  


 


Ur Leukocyte Esterase   Large H   (Negative)  


 


Urine WBC   7 H   (0-5)  /hpf


 


Urine Bacteria   Few H   (None)  /hpf














Assessment and Plan


(1) Pneumonia


Status: Acute   Code(s): J18.9 - PNEUMONIA, UNSPECIFIED ORGANISM   SNOMED 

Code(s): 007552352


   


Plan: 


1patient presented to hospital with mental status changes decreased level of 

responsiveness with evidence of left lower lobe infiltrate concerning for 

possible pneumonia, patient however did not have any fever or elevated white 

count, underlying pneumonia less likely but not excluded, patient also have a 

positive UA however this patient do have a history of end-stage renal disease on

 dialysis with no clinical significance


2-penicillin allergy of limit the number of antibiotics safe to use


3-we will try to obtain a sputum check a CRP and a procalcitonin level


4-May continue Levaquin for now


We will follow on clinical condition and cultures to further adjust medication 

if needed


Thank you for this consultation we will follow the patient along with you





Time with Patient: Greater than 30

## 2023-06-21 NOTE — P.CNNES
History of Present Illness


Consult date: 23


Requesting physician: Jose L Liz


Reason for Consult: ams with hx of seizure


History of Present Illness: 


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 

her seizure seem suggestive of primary generalized epilepsy, left ICA stenosis, 

history of stroke with residual left-sided hemiparesis, diabetes mellitus, end-

stage renal disease on dialysis, hypertension who presented to our emergency 

department because altered mental status sent from her nursing facility.  

Patient is known to our neurology service.  Per the ED and seems that the 

patient has been progressively having worsening of the mentation with nausea and

isn't facility.  No falls.  


Per EMR is seems the patient is on Keppra 500 mg 1 tablet twice a day with 

additional 500 postdialysis, Vimpat 150 mg 3 times a day with additional dose 

postdialysis, Depakote 500 mg 1 tablet twice a day sonogram 100 mg a tablet 3 

times a day





Of note as stated earlier patient is known to our neurology service and she has 

history of medically refractory epilepsy.  She was last seen by our team on the 

2023 by Dr. Fischer.  Per his notes patient is on zonogran 100 mg 1 tablet 

once a day but if she has any recurrent seizures to go to twice a day, continue 

Keppra 500 mg twice a day with additional 500 postdialysis, Depakote 500 mg 1 

tablet 3 times a day and she was off of Vimpat.  Please refer to his note for 

further details.  I recommend the patient follow up with epileptologist and 

consider starting Epidiolex since has multiple hospital admission to our 

facility and other hospital facility.  Patient had multiple EEGs in our 

facility.





Some other workup during his hospital visit consisted of:


Ammonia is 11.  Her creatinine is 9.6 with a BUN 63.  His sugar has been in the 

range of 100 and tends to 190s.  Calcium magnesium sodium liver function is 

within normal limits.


Urine drug seeing is not detected.








Review of Systems


Review of systems Limited but the prone positive and negative as per HPI.








Past Medical History


Past Medical History: Chest Pain / Angina, CVA/TIA, Diabetes Mellitus, 

GERD/Reflux, Hyperlipidemia, Hypertension, Memory Impairment, Osteoarthritis 

(OA), Renal Disease, Seizure Disorder, Thyroid Disorder


Additional Past Medical History / Comment(s): Last seizure approximately one 

month ago. Spouse states she used to have seizures 4X per week until she had 

vagal nerve stimulator placed, now has seizures approximately once a week to 

once a month. Dialysis MOWEFR. Neuropathy, left drop foot, only able to walk 

short distances, otherwise uses wheelchair. 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 

ago, with residual memory impairment. Never diagnosed with Sleep Apnea but 

spouse states she does stop breathing through the night and he has to shake her 

to start breathing again. Varicose veins.


History of Any Multi-Drug Resistant Organisms: None Reported


Past Surgical History:  Section, Tonsillectomy, Uterine Ablation


Additional Past Surgical History / Comment(s): Left arm fistula, loop recorder, 

vagus nerve stimulator.


Past Anesthesia/Blood Transfusion Reactions: Motion Sickness


Additional Past Anesthesia/Blood Transfusion Reaction / Comment(s): Family hx 

unknown, pt adopted.


Past Psychological History: Bipolar


Smoking Status: Former smoker


Past Alcohol Use History: None Reported


Past Drug Use History: None Reported





- Past Family History


  ** Father


Family Medical History: Unable to Obtain


Additional Family Medical History / Comment(s): Pt adopted.





Medications and Allergies


                                Home Medications











 Medication  Instructions  Recorded  Confirmed  Type


 


Atorvastatin [Lipitor] 40 mg PO HS@21 History


 


Dulaglutide [Trulicity] 3 mg SQ WE@0900 21 History


 


Sevelamer [Renvela] 1,600 mg PO BID@0900,2100 10/07/22 06/20/23 History


 


Zonisamide [Zonegran] 100 mg PO TID@0600,1500,2200 23 History


 


levETIRAcetam [Keppra] 500 mg PO BID@0900,23 History


 


levETIRAcetam [Keppra] 500 mg PO MOWEFR@1500 23 History


 


Divalproex Sodium [Depakote] 500 mg PO BID@0900,23 History


 


Divalproex Sodium [Depakote] 500 mg PO MOWEFR@1600 23 History


 


Metoprolol Tartrate [Lopressor] 25 mg PO MOWEFR@2100 23 History


 


Metoprolol Tartrate [Lopressor] 25 mg PO SUTUTHSA@0900,2100 23 06/20/23 

History


 


icosapent ethyL [Icosapent Ethyl] 1 gm PO BID@0900,23 

History


 


Lacosamide [Vimpat] 150 mg PO MOWEFR@1600 #4 tab 23 Rx


 


Famotidine [Pepcid] 20 mg PO DAILY@0623 History


 


Lacosamide [Vimpat] 150 mg PO BID@0900,23 History


 


Ondansetron [Zofran] 4 mg PO Q8H PRN 23 History


 


amLODIPine [Norvasc] 5 mg PO DAILY@0923 History








                                    Allergies











Allergy/AdvReac Type Severity Reaction Status Date / Time


 


Penicillins Allergy  Itching Verified 23 13:00














Physical Examination





- Vital Signs


Vital Signs: 


                                   Vital Signs











  Temp Pulse Resp BP Pulse Ox


 


 23 11:46   90   


 


 23 11:38   94   


 


 23 10:53   89  18  131/80  100


 


 23 07:39   92   


 


 23 07:28   88    95


 


 23 21:00   103 H  19  138/78  100


 


 23 20:11   97   


 


 23 20:04   84   


 


 23 20:00   96  18  142/80  100


 


 23 19:00   90  8 L  99/72  100


 


 23 18:23  97.8 F  93  16  99/72  100


 


 23 18:00   97  14  


 


 23 17:00   90  6 L  162/91  100


 


 23 16:50   86  16  


 


 23 16:42   92  18  


 


 23 16:00   85  14  170/86  100


 


 23 15:00     181/107 


 


 23 14:55   89  18  161/90  100


 


 23 14:00   89  11 L  173/88  100


 


 23 13:00   85  8 L  204/188  100


 


 23 12:14     198/103 


 


 23 12:02      99


 


 23 11:57  97.3 F L  82  16   99











General: Is laying in bed and does not appear in acute distress.





Neuro:


Limited because of her condition/cooperation.


Is awake but verbalizing.  She is following very minimal commands (attempting to

 stick tongue out, wiggling toes)


Pupils are round, equal and reactive to light. Pupils are 3mm bilaterally.  EOM 

is tracking throughout the room.


Is mute


Strength is unable to assess because of cooperation.








Results





- Laboratory Findings


CBC and BMP: 


                                 23 13:50





                                 23 11:20


Abnormal Lab Findings: 


                                  Abnormal Labs











  23





  13:50 13:50 13:50


 


RBC  3.29 L  


 


Hgb  10.9 L  


 


Hct  31.5 L  


 


APTT   19.8 L 


 


BUN    63 H


 


Creatinine    9.60 H*


 


Glucose    186 H


 


POC Glucose (mg/dL)   


 


Urine Protein   


 


Urine Glucose (UA)   


 


Urine Blood   


 


Ur Leukocyte Esterase   


 


Urine WBC   


 


Urine Bacteria   














  23





  14:15 15:50 10:59


 


RBC   


 


Hgb   


 


Hct   


 


APTT   


 


BUN   


 


Creatinine   


 


Glucose   


 


POC Glucose (mg/dL)  190 H   119 H


 


Urine Protein   3+ H 


 


Urine Glucose (UA)   1+ H 


 


Urine Blood   Trace H 


 


Ur Leukocyte Esterase   Large H 


 


Urine WBC   7 H 


 


Urine Bacteria   Few H 














Assessment and Plan


Assessment: 


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

 was has multiple admission to our facility another facility for confusion with 

breakthrough seizures.





Altered mental status and suspect had breakthrough seizure.


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 

epilepsy (on four antiepileptic drugs).  She has multiple admissions to our 

facility as well as outside facility.  She had a prolonged EEG in our facility 

and was suggestive of primary generalized epilepsy


Left ICA stenosis of 75% stenosis on CTA but discrepancy on carotid duplex and 

not significant


History of stroke with residual left hemiparesis


Diabetes mellitus


End-stage renal disease on dialysis


Hypertension





Plan: 


I restarted her home antiepileptic of Keppra 500 mg 1 tablet twice a day with 

additional 500 postdialysis, Vimpat 150 mg 3 times a day with additional dose 

postdialysis, Depakote 500 mg 1 tablet twice a day sonogram 100 mg a tablet 3 

times a day. 


I ordered Keppra, Depakote and vimpat level to make sure not subtherapeutic.


Ordered routine EEG. She had multiple EEG in the past and prolonged EEG in our 

facility and was felt primary generalized epilepsy.


On seizure precaution and pads.


Placed on Ativan 1mg every 4 hours PRN for seizures.


If patient does not show any improvement will get a CT head.  She had multiple 

CT's in our facility in the past.


Recommend the patient to follow-up with epileptologist as outpatient and 

recommend EMU.   She had multiple admission to our facility and outside 

facility.  She is on 4 antieplileptic drugs and continues to have seizures.  

Consider start on Epidiolex as outpatient since had medical refractory epilepsy 

and that was recommended on prior visit.


Will defer the rest of medical management to her primary and other specialist.


Will attempt to contact her  to find out more about her neurological 

management as outpatient.





Thank you for the consultation.





UPDATE:


In the late afternoon, per nurse she was following commands and conversing but 

is slow.








Time with Patient: Greater than 30

## 2023-06-21 NOTE — PN
PROGRESS NOTE



DATE OF SERVICE:  06/21/2023



SUBJECTIVE:

This is a 53-year-old woman who was admitted with change in mental status and possible

left lower pneumonia, is being closely monitored.  The patient also had inadequate

hemodialysis also.  Multiple consultants are following the patient closely.  The

patient continues to be drowsy even though slightly better than yesterday.  The patient

is still being monitored in the ER.  Multiple consultants are following the patient

closely.



PAST MEDICAL HISTORY:

Reviewed.



REVIEW OF SYSTEMS:

Could not be obtained.



CURRENT MEDICATIONS:

Reviewed and include Levaquin, doses and rest of medications noted.



OBJECTIVE:

VITAL SIGNS:  Pulse is 89, blood pressure 131/80, respirations 18.

CHEST:  Diffuse scattered rhonchi and crackles.

ABDOMEN:  Soft, nontender.

LEGS:  No edema, no swelling.

NERVOUS SYSTEM:  Diffusely weak.



LABORATORY DATA:

Reviewed.



ASSESSMENT:

1. Change in mental status acute metabolic encephalopathy possibly from inadequate

    hemodialysis during the last time.

2. Possible left lower lobe pneumonia, rule out sepsis.

3. Relative hypotension and dehydration present on admission.

4. History of diabetes type 2.

5. Hypertension.

6. Hyperlipidemia.

7. History of seizure disorder.

8. Multiple medical issues.



RECOMMENDATIONS:

Recommended to continue current management, continue symptomatic treatment, continue

hemodialysis.  Continue empiric antibiotics.  Follow the cultures.  Closely follow with

multiple consultants.  Ensure oxygenation.  Bronchodilators, will resume the home

medications once they are confirmed.  Prognosis extremely guarded.  Further

recommendations to follow, see orders for details.





MMODL / IJN: [id number] / Job#: [id number]

## 2023-06-21 NOTE — P.NPCON
History of Present Illness





- Reason for Consult


end stage renal disease





- History of Present Illness





Reason for consultation: End-stage renal disease





History of present illness:


Patient is a 53-year-old female seen in renal consultation for end-stage renal 

disease.  Patient was seen and examined in the emergency room.  She is 

maintained on hemodialysis  schedule.  Last hemodialysis 

treatment was on Friday.  Patient went to hemodialysis on Monday and was 

subsequent he sent to the ER due to concern for stroke.  Brain CT showed no 

acute changes and she was subsequently discharged.  She returned to the ER 

yesterday after being sent from the nursing home facility.  She was sent due to 

worsening mental status and nausea.  Patient does have history of seizures but 

there is no reports of seizure activity this week.  Patient's currently resting 

in bed.  Patient has a history of aphasia and is not a reliable historian.  She 

is currently being treated for pneumonia.  Hemodynamically stable.  Afebrile.





Vital signs are stable.


General: No acute distress.


HEENT: Head exam is unremarkable. 


LUNGS: No audible rhonchi or wheezes.


HEART: Rate and Rhythm are regular. 


ABDOMEN: Nontender.


EXTREMITITES: No edema.








Past Medical History


Past Medical History: Chest Pain / Angina, CVA/TIA, Diabetes Mellitus, 

GERD/Reflux, Hyperlipidemia, Hypertension, Memory Impairment, Osteoarthritis 

(OA), Renal Disease, Seizure Disorder, Thyroid Disorder


Additional Past Medical History / Comment(s): Last seizure approximately one mo

nth ago. Spouse states she used to have seizures 4X per week until she had vagal

nerve stimulator placed, now has seizures approximately once a week to once a 

month. Dialysis MOWEFR. Neuropathy, left drop foot, only able to walk short 

distances, otherwise uses wheelchair. Hx CVAs and TIAs, starting 12 yrs ago, 

with residual memory impairment. Never diagnosed with Sleep Apnea but spouse 

states she does stop breathing through the night and he has to shake her to 

start breathing again. Varicose veins.


History of Any Multi-Drug Resistant Organisms: None Reported


Past Surgical History:  Section, Tonsillectomy, Uterine Ablation


Additional Past Surgical History / Comment(s): Left arm fistula, loop recorder, 

vagus nerve stimulator.


Past Anesthesia/Blood Transfusion Reactions: Motion Sickness


Additional Past Anesthesia/Blood Transfusion Reaction / Comment(s): Family hx 

unknown, pt adopted.


Past Psychological History: Bipolar


Smoking Status: Former smoker


Past Alcohol Use History: None Reported


Past Drug Use History: None Reported





- Past Family History


  ** Father


Family Medical History: Unable to Obtain


Additional Family Medical History / Comment(s): Pt adopted.





Medications and Allergies


                                Home Medications











 Medication  Instructions  Recorded  Confirmed  Type


 


Atorvastatin [Lipitor] 40 mg PO HS@21 History


 


Dulaglutide [Trulicity] 3 mg SQ WE@21 History


 


Sevelamer [Renvela] 1,600 mg PO BID@09,2100 10/07/22 06/20/23 History


 


Zonisamide [Zonegran] 100 mg PO TID@0600,1500,23 History


 


levETIRAcetam [Keppra] 500 mg PO BID@0900,23 History


 


levETIRAcetam [Keppra] 500 mg PO MOWEFR@1500 23 History


 


Divalproex Sodium [Depakote] 500 mg PO BID@0900,23 History


 


Divalproex Sodium [Depakote] 500 mg PO MOWEFR@1600 23 History


 


Metoprolol Tartrate [Lopressor] 25 mg PO MOWEFR@23 History


 


Metoprolol Tartrate [Lopressor] 25 mg PO SUTUTHSA@0900,23 

History


 


icosapent ethyL [Icosapent Ethyl] 1 gm PO BID@0900,23 

History


 


Lacosamide [Vimpat] 150 mg PO MOWEFR@1600 #4 tab 23 Rx


 


Famotidine [Pepcid] 20 mg PO DAILY@23 History


 


Lacosamide [Vimpat] 150 mg PO BID@0900,23 History


 


Ondansetron [Zofran] 4 mg PO Q8H PRN 23 History


 


amLODIPine [Norvasc] 5 mg PO DAILY@0923 History








                                    Allergies











Allergy/AdvReac Type Severity Reaction Status Date / Time


 


Penicillins Allergy  Itching Verified 23 13:00














Physical Exam


Vitals: 


                                   Vital Signs











  Temp Pulse Resp BP Pulse Ox


 


 23 07:39   92   


 


 23 07:28   88    95


 


 23 21:00   103 H  19  138/78  100


 


 23 20:11   97   


 


 23 20:04   84   


 


 23 20:00   96  18  142/80  100


 


 23 19:00   90  8 L  99/72  100


 


 23 18:23  97.8 F  93  16  99/72  100


 


 23 18:00   97  14  


 


 23 17:00   90  6 L  162/91  100


 


 23 16:50   86  16  


 


 23 16:42   92  18  


 


 23 16:00   85  14  170/86  100


 


 23 15:00     181/107 


 


 23 14:55   89  18  161/90  100


 


 23 14:00   89  11 L  173/88  100


 


 23 13:00   85  8 L  204/188  100


 


 23 12:14     198/103 


 


 23 12:02      99


 


 23 11:57  97.3 F L  82  16   99














Results





- Lab Results


                             Most recent lab results











Calcium  9.2 mg/dL (8.4-10.2)   23  13:50    


 


Magnesium  2.3 mg/dL (1.6-2.3)   23  13:50    














                                 23 13:50





                                 23 13:50





Assessment and Plan


Plan: 





Assessment:


1.  End-stage renal disease maintained on hemodialysis on  schedule.


2.  Altered mental status.  Questionable seizure.  Urine drug screen negative.  

Neurology consulted.


3.  History of seizures.


4.  Diabetes mellitus.


5.  Chronic kidney disease mineral bone disease.


6.  Hypertension with chronic kidney disease.  Stable.


7.  Acute hypoxic respiratory failure secondary to pneumonia.  On antibiotics.





Plan:


Hemodialysis today.


Check phosphorus level.





Thank you for the consultation.  I will continue to follow the patient with you 

during her hospital stay.

## 2023-06-22 LAB
GLUCOSE BLD-MCNC: 130 MG/DL (ref 70–110)
GLUCOSE BLD-MCNC: 136 MG/DL (ref 70–110)
GLUCOSE BLD-MCNC: 170 MG/DL (ref 70–110)
GLUCOSE BLD-MCNC: 241 MG/DL (ref 70–110)
GLUCOSE BLD-MCNC: 81 MG/DL (ref 70–110)

## 2023-06-22 RX ADMIN — LACOSAMIDE SCH MG: 150 TABLET, FILM COATED ORAL at 20:08

## 2023-06-22 RX ADMIN — INSULIN ASPART SCH: 100 INJECTION, SOLUTION INTRAVENOUS; SUBCUTANEOUS at 11:45

## 2023-06-22 RX ADMIN — INSULIN ASPART SCH: 100 INJECTION, SOLUTION INTRAVENOUS; SUBCUTANEOUS at 06:04

## 2023-06-22 RX ADMIN — ZONISAMIDE SCH MG: 100 CAPSULE ORAL at 20:28

## 2023-06-22 RX ADMIN — ASPIRIN SCH: 325 TABLET ORAL at 20:39

## 2023-06-22 RX ADMIN — ATORVASTATIN CALCIUM SCH MG: 40 TABLET, FILM COATED ORAL at 20:08

## 2023-06-22 RX ADMIN — SEVELAMER CARBONATE SCH MG: 800 TABLET, FILM COATED ORAL at 07:46

## 2023-06-22 RX ADMIN — METOPROLOL TARTRATE SCH MG: 25 TABLET, FILM COATED ORAL at 20:08

## 2023-06-22 RX ADMIN — IPRATROPIUM BROMIDE AND ALBUTEROL SULFATE SCH ML: .5; 3 SOLUTION RESPIRATORY (INHALATION) at 13:20

## 2023-06-22 RX ADMIN — METOPROLOL TARTRATE SCH MG: 25 TABLET, FILM COATED ORAL at 07:45

## 2023-06-22 RX ADMIN — ASPIRIN SCH: 325 TABLET ORAL at 07:35

## 2023-06-22 RX ADMIN — ZONISAMIDE SCH MG: 100 CAPSULE ORAL at 06:06

## 2023-06-22 RX ADMIN — INSULIN ASPART SCH UNIT: 100 INJECTION, SOLUTION INTRAVENOUS; SUBCUTANEOUS at 20:08

## 2023-06-22 RX ADMIN — DIVALPROEX SODIUM SCH MG: 500 TABLET, DELAYED RELEASE ORAL at 20:08

## 2023-06-22 RX ADMIN — IPRATROPIUM BROMIDE AND ALBUTEROL SULFATE SCH ML: .5; 3 SOLUTION RESPIRATORY (INHALATION) at 08:52

## 2023-06-22 RX ADMIN — DIVALPROEX SODIUM SCH MG: 500 TABLET, DELAYED RELEASE ORAL at 07:45

## 2023-06-22 RX ADMIN — IPRATROPIUM BROMIDE AND ALBUTEROL SULFATE SCH ML: .5; 3 SOLUTION RESPIRATORY (INHALATION) at 21:44

## 2023-06-22 RX ADMIN — SEVELAMER CARBONATE SCH MG: 800 TABLET, FILM COATED ORAL at 20:08

## 2023-06-22 RX ADMIN — INSULIN ASPART SCH UNIT: 100 INJECTION, SOLUTION INTRAVENOUS; SUBCUTANEOUS at 17:11

## 2023-06-22 RX ADMIN — ZONISAMIDE SCH MG: 100 CAPSULE ORAL at 16:13

## 2023-06-22 RX ADMIN — LACOSAMIDE SCH MG: 150 TABLET, FILM COATED ORAL at 07:45

## 2023-06-22 NOTE — P.PN
Subjective


Progress Note Date: 06/22/23


Principal diagnosis: 


Pneumonia





Patient is a 53-year-old  female with a past medical history 

significant for end-stage renal disease on hemodialysis through the left upper 

arm fistula and nursing home resident patient has been sent to the ER  for 

evaluation of mental status changes, patient just x-ray with left lower lobe 

infiltrate concerning for pneumonia.


On today's evaluation that is 06/22/2023, the patient is afebrile, the patient 

is more awake and alert appearing the chair she is breathing comfortably on 

nasal cannula oxygen but not specifically denies any chest pain occasional cough

no vomiting or diarrhea has been reported patient is not a good historian








Objective





- Vital Signs


Vital signs: 


                                   Vital Signs











Temp  97.5 F L  06/22/23 07:59


 


Pulse  88   06/22/23 09:11


 


Resp  16   06/22/23 07:59


 


BP  143/80   06/22/23 07:59


 


Pulse Ox  100   06/22/23 07:59


 


FiO2      








                                 Intake & Output











 06/21/23 06/22/23 06/22/23





 18:59 06:59 18:59


 


Intake Total  450 


 


Output Total  2900 


 


Balance  -2450 


 


Weight 72.575 kg  


 


Intake:   


 


  Oral  50 


 


  Hemodialysis  400 


 


Output:   


 


  Hemodialysis  2900 


 


Other:   


 


  Voiding Method  Diaper Diaper


 


  # Voids  0 


 


  # Bowel Movements   1














- Exam


GENERAL DESCRIPTION: Middle-age female up in the chair in no distress





RESPIRATORY SYSTEM: Unlabored breathing , decreased breath sounds at bases





HEART: S1 S2 regular rate and rhythm ,





ABDOMEN: Soft , no tenderness





EXTREMITIES: No edema feet








- Labs


CBC & Chem 7: 


                                 06/21/23 20:11





                                 06/21/23 11:20


Labs: 


                  Abnormal Lab Results - Last 24 Hours (Table)











  06/21/23 06/21/23 06/21/23 Range/Units





  10:59 11:20 11:20 


 


RBC     (3.80-5.40)  m/uL


 


Hgb     (11.4-16.0)  gm/dL


 


Hct     (34.0-46.0)  %


 


Plt Count     (150-450)  k/uL


 


BUN   71 H   (7-17)  mg/dL


 


Creatinine   11.19 H*   (0.52-1.04)  mg/dL


 


Glucose   112 H   (74-99)  mg/dL


 


POC Glucose (mg/dL)  119 H    ()  mg/dL


 


Phosphorus     (2.5-4.5)  mg/dL


 


Procalcitonin    1.44 H  (0.02-0.09)  ng/mL














  06/21/23 06/21/23 06/22/23 Range/Units





  11:20 20:11 00:13 


 


RBC   3.39 L   (3.80-5.40)  m/uL


 


Hgb   11.0 L   (11.4-16.0)  gm/dL


 


Hct   32.2 L   (34.0-46.0)  %


 


Plt Count   122 L   (150-450)  k/uL


 


BUN     (7-17)  mg/dL


 


Creatinine     (0.52-1.04)  mg/dL


 


Glucose     (74-99)  mg/dL


 


POC Glucose (mg/dL)    130 H  ()  mg/dL


 


Phosphorus  7.6 H    (2.5-4.5)  mg/dL


 


Procalcitonin     (0.02-0.09)  ng/mL








                      Microbiology - Last 24 Hours (Table)











 06/20/23 17:30 Blood Culture - Preliminary





 Blood 














Assessment and Plan


(1) Pneumonia


Current Visit: Yes   Status: Acute   Code(s): J18.9 - PNEUMONIA, UNSPECIFIED 

ORGANISM   SNOMED Code(s): 799735774


   


Plan: 


1patient presented to hospital with mental status changes decreased level of 

responsiveness with evidence of left lower lobe infiltrate concerning for possib

le pneumonia, patient however did not have any fever or elevated white count, 

underlying pneumonia less likely but not excluded, patient also have a positive 

UA however this patient do have a history of end-stage renal disease on dialysis

with no clinical significance


2-penicillin allergy of limit the number of antibiotics safe to use


3-patient did have elevated procalcitonin level of 1.44


4We will start the patient cefepime and monitor clinical course closely


Time with Patient: Less than 30

## 2023-06-22 NOTE — P.PN
Subjective


Progress Note Date: 06/22/23








I am seeing this patient in new consultation today 06/21/2023 in the emergency 

room possible left lower lobe pneumonia.  Patient is a 53-year-old female with 

past medical history significant for multiple comorbidities including end-stage 

renal disease, seizure disorder, CVA, diabetes mellitus, GERD, hyperlipidemia, 

hypertension, hypothyroidism.  Patient was sent in from [facility] 

yesterday afternoon for concerns about altered mental status. There was also 

concern for nausea and retching. She has had multiple prior emergency room 

visits and hospital admissions in the past for similar symptoms.  She actually 

had an ER visit on June 19th for altered mental status.  She was discharged back

to [facility], and returned yesterday afternoon. She also has history of ventilator

dependence for status epilepticus.  No reports of seizure activity on this 

admission.  Patient apparently continues to have seizures about once per week to

once per month. Patient is currently sitting up in bed, alert but nonverbal, in 

no acute distress.  She does not follow commands.  She is on 2 L/m nasal cannula

and oxygenating at 100%.  Chest x-ray on arrival shows small left pleural 

effusion and possible left lower lobe infiltrate. Patient is currently on 

Levaquin for empiric antibiotics.  She is afebrile.  CBC on arrival was 

unremarkable.  BMP shows sodium 138, potassium 4.4, chloride 99, serum bicarb 

24, BUN 63, creatinine 9.6, glucose 186.  Patient reportedly does receive 

hemodialysis on Monday, Wednesday, Friday schedule.  Troponins negative 1.  

Ammonia level was low.  Urinalysis was not particularly concerning for UTI.  

Brain CT without contrast, one day prior, shows age-related atrophic and chronic

small vessel ischemia without any acute intracranial process. Patient will be 

admitted to the cardiac stepdown unit once bed available.





On today's evaluation of 06/22/2023, the patient is stable.  No worsening 

shortness of breath.  In fact I do not see any signs of any respiratory distress

this patient.  No aspiration.  No cough or sputum production.  No fever or 

chills.  No seizure activity overnight.  The patient is following simple 

commands only.  Neurology is on the case regarding her underlying mental status.

 The patient remains on Levaquin.  Patient is also on cefepime.  During culture 

was positive for enterococcus group D.  The blood culture showing no growth.





Objective





- Vital Signs


Vital signs: 


                                   Vital Signs











Temp  97.5 F L  06/22/23 07:59


 


Pulse  88   06/22/23 09:11


 


Resp  16   06/22/23 07:59


 


BP  143/80   06/22/23 07:59


 


Pulse Ox  100   06/22/23 07:59


 


FiO2      








                                 Intake & Output











 06/21/23 06/22/23 06/22/23





 18:59 06:59 18:59


 


Intake Total  450 


 


Output Total  2900 


 


Balance  -2450 


 


Weight 72.575 kg  


 


Intake:   


 


  Oral  50 


 


  Hemodialysis  400 


 


Output:   


 


  Hemodialysis  2900 


 


Other:   


 


  Voiding Method  Diaper Diaper


 


  # Voids  0 


 


  # Bowel Movements   1














- Exam








GENERAL EXAM: Alert but nonverbal, 53-year-old female, comfortable in no 

apparent distress.


HEAD: Normocephalic and atraumatic


EYES: Normal reaction of pupils, equal size.


NOSE: Clear with pink turbinates.


THROAT: No erythema or exudates.


NECK: No masses, no JVD.


CHEST: No chest wall deformity.  There is a left chest implanted device


LUNGS: Equal air entry with left lower lobe inspiratory crackles.  No wheezing, 

rhonchi.  On 2 L/m nasal cannula.  No conversational dyspnea or accessory muscle

use.. 


CVS: S1 and S2 normal with no audible murmur, regular rhythm. No extra heart 

sounds


ABDOMEN: No hepatosplenomegaly, active bowel sounds, no guarding or rigidity. 


SPINE: No scoliosis or deformity


SKIN: No rashes


CENTRAL NERVOUS SYSTEM: No focal deficits, tone is normal in all 4 extremities. 

No seizure activity noted.


EXTREMITIES: There is no peripheral edema, clubbing, or cyanosis.  Peripheral 

pulses are intact.  There is a left arm AV fistula





- Labs


CBC & Chem 7: 


                                 06/21/23 20:11





                                 06/21/23 11:20


Labs: 


                  Abnormal Lab Results - Last 24 Hours (Table)











  06/21/23 06/21/23 06/21/23 Range/Units





  10:59 11:20 11:20 


 


RBC     (3.80-5.40)  m/uL


 


Hgb     (11.4-16.0)  gm/dL


 


Hct     (34.0-46.0)  %


 


Plt Count     (150-450)  k/uL


 


BUN   71 H   (7-17)  mg/dL


 


Creatinine   11.19 H*   (0.52-1.04)  mg/dL


 


Glucose   112 H   (74-99)  mg/dL


 


POC Glucose (mg/dL)  119 H    ()  mg/dL


 


Phosphorus     (2.5-4.5)  mg/dL


 


Procalcitonin    1.44 H  (0.02-0.09)  ng/mL














  06/21/23 06/21/23 06/22/23 Range/Units





  11:20 20:11 00:13 


 


RBC   3.39 L   (3.80-5.40)  m/uL


 


Hgb   11.0 L   (11.4-16.0)  gm/dL


 


Hct   32.2 L   (34.0-46.0)  %


 


Plt Count   122 L   (150-450)  k/uL


 


BUN     (7-17)  mg/dL


 


Creatinine     (0.52-1.04)  mg/dL


 


Glucose     (74-99)  mg/dL


 


POC Glucose (mg/dL)    130 H  ()  mg/dL


 


Phosphorus  7.6 H    (2.5-4.5)  mg/dL


 


Procalcitonin     (0.02-0.09)  ng/mL








                      Microbiology - Last 24 Hours (Table)











 06/20/23 17:30 Blood Culture - Preliminary





 Blood 














Assessment and Plan


Assessment: 





Acute hypoxemic respiratory failure, currently on 2 L/m nasal cannula, possibly 

secondary to healthcare associated pneumonia.  Chest x-ray shows a left-sided 

pleural effusion and suspected left lower lobe infiltrate.  The patient remained

on 2 L of oxygen by nasal cannula.  No signs of any respiratory distress and the

patient's pulse ox is 97% liters of oxygen by nasal cannula.





Altered mental status, currently under investigation. Brain CT without contrast,

one day prior, shows age-related atrophic and chronic small vessel ischemia 

without any acute intracranial process.  Because of the case





History of seizure disorder





End-stage renal disease, reportedly receives hemodialysis Monday, Wednesday, 

Friday schedule.  Undergoing scheduled hemodialysis





Diabetes mellitus type 2, non-insulin-dependent





Hyperlipidemia





Essential hypertension





History of CVA





Hypothyroidism





GERD without esophagitis





Questionable enterococcal UTI group, blood cultures are negative.D








Plan:





Overall respiratory status is stable 


Check procalcitonin level was slightly elevated at 1.44 and this could be 

related to renal failure 


Continue bronchodilators


Consult neurology to manage antiepileptics.  Unsure if the patient can take oral

medications at this point. 


Seizure precautions


Hemodialysis per nephrology


We will continue to follow

## 2023-06-22 NOTE — P.CN
Psychiatric Consult





- .


Consult date: 23


Consult:: 





23 13:55


REASON FOR CONSULT: CAPACITY FOR MEDICAL DECISION MAKING





IDENTIFYING DATA: This patient is a , unemployed, on disability, 

53-year-old  female with significant history of end-stage renal disease

who presents to the hospital 2023 for altered mental status.





HISTORY OF PRESENT ILLNESS: The patient presented to the hospital there is 

2023 for altered mental status and worsening nausea.  The patient does have

significant history of end-stage renal disease and receives dialysis.  

Psychiatry has been consulted for evaluation of the patient's capacity for 

medical decision-making.  Reportedly, the patient's  has been opting to 

discontinue dialysis treatment for this patient.  He is reportedly identified as

a medical surrogate decision maker however there is no guardianship paperwork 

presented to [place] at the time of writing this note.


Upon evaluation by this provider, patient is denying any suicidal or homicidal 

ideation, intention, and/or plan.  She is alert and oriented to person and time 

however not to the specific date or location.  Despite this, the patient does 

acknowledge that she does receive dialysis for end-stage renal disease.  The pat

ient does express explicitly to this provider that she wishes to continue 

treatment.  She also reports that if she is not to continue dialysis, she risks 

further deterioration and death.  She does not endorse any particular symptoms 

of psychosis.  She does not provide any significant history of lanette or 

hypomania.  She denies any significant history of substance abuse.





PAST PSYCHIATRIC HISTORY: As per chart review, there appears to be a previous 

diagnosis of bipolar in the past.





PAST MEDICAL HISTORY:  


Past Medical History: Chest Pain / Angina, CVA/TIA, Diabetes Mellitus, 

GERD/Reflux, Hyperlipidemia, Hypertension, Memory Impairment, Osteoarthritis 

(OA), Renal Disease, Seizure Disorder, Thyroid Disorder


Additional Past Medical History / Comment(s): Last seizure approximately one 

month ago. Spouse states she used to have seizures 4X per week until she had 

vagal nerve stimulator placed, now has seizures approximately once a week to 

once a month. Dialysis MOWEFR. Neuropathy, left drop foot, only able to walk 

short distances, otherwise uses wheelchair. 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 

ago, with residual memory impairment. Never diagnosed with Sleep Apnea but 

spouse states she does stop breathing through the night and he has to shake her 

to start breathing again. Varicose veins.


History of Any Multi-Drug Resistant Organisms: None Reported


Past Surgical History:  Section, Tonsillectomy, Uterine Ablation


Additional Past Surgical History / Comment(s): Left arm fistula, loop recorder, 

vagus nerve stimulator.


Past Anesthesia/Blood Transfusion Reactions: Motion Sickness


Additional Past Anesthesia/Blood Transfusion Reaction / Comm: Family hx unknown,

pt adopted.


Past Psychological History: Bipolar


Smoking Status: Former smoker


Past Alcohol Use History: None Reported


Past Drug Use History: None Reported





ALLERGIES: Penicillin





CHEMICAL DEPENDENCY HISTORY: Patient denies any significant substance use 

history.





FAMILY PSYCHIATRIC/SUBSTANCE USE HISTORY: Unable to assess at this time.





SOCIAL HISTORY: The patient is currently .  She is on disability.





MENTAL STATUS EXAM: 


General Appearance: Patient appears to be stated age is alert, pleasant, and 

cooperative. Patient appears to have fair hygiene and grooming wearing hospital 

gown with fair eye contact.


Behavior: Patient is calmly lying in bed without any agitated behavior.


Speech: Patient's speech is fluent and nonpressured.  Nonspontaneous, low in 

volume, some word finding difficulty.


Mood/Affect: Patient reports their mood is "doing okay", affect is congruent


Suicidality/Homicidality:  Patient reports no suicidal or homicidal ideation.


Perceptions: Patient denies any visual hallucinations and denies any auditory 

hallucinations  


Though content/process: There is no evidence of any delusional thought content 

and thought process is linear and goal-directed. 


Memory and concentration: She appears to be alert and oriented to person and 

time but not to place.  Concentration appears to be grossly intact for the 

purposes of this interview.


Judgment and insight: Fair





IMPRESSIONS: 


1.  End-stage renal disease maintained on hemodialysis on  schedule.


2.  Altered mental status.  Questionable seizure.  Urine drug screen negative. -

Multifactorial, Suspect secondary to ESRD, pneumonia 


3.  History of seizures.


4.  Diabetes mellitus.


5.  Chronic kidney disease mineral bone disease.  


6.  Hypertension with chronic kidney disease. 


7.  Acute hypoxic respiratory failure secondary to pneumonia. 





PLAN: 


-Patient DOES have decision making capacity at this time and is able to reason 

through and communicate/appreciate the risks, benefits and alternatives to 

treatment.  Although the patient is alert and oriented to person and year only, 

she is able to identify the risks of not continuing with dialysis and is 

expressing a desire to continue treatment with dialysis. Patient has a right to 

autonomy. She is not impaired at this time by any mental illness such as overt 

lanette, psychosis, or dementia. This is subject to change should patient's mental

status further deteriorate.  





-At this time patient DOES NOT meet criteria for inpatient psychiatric 

admission.





-Continue your medical management.





-Delirium precautions recommended with patient including - avoiding use of 

narcotics and CNS sedatives, limit anticholinergic medications when possible, 

frequent re-orientation, minimize use of restraints, open window shades during 

the day and close them at night





-Would recommend the following medication changes/additions: 


No medication recommendations.





-Psychiatry will sign off at this point, please contact with any questions.








23 13:56

## 2023-06-22 NOTE — PN
PROGRESS NOTE



DATE OF SERVICE:  06/22/2023



SUBJECTIVE:

This is a 53-year-old woman, who was admitted with possible pneumonia and sepsis and

change in mental status.  Apparently, the  and [facility] have been

discussing the guardianship issues. Multiple consultants are following the patient

closely.  Urine culture showed group D Enterococcus.



OBJECTIVE:

VITAL SIGNS: Pulse is 83, blood pressure 130/73, respirations 18.

CHEST:  A few scattered rhonchi.

ABDOMEN:  Soft.

NERVOUS SYSTEM: Confused.  Diffusely weak.



LABORATORY DATA:

Reviewed.



ASSESSMENT:

1. Change in mental status, possible acute metabolic encephalopathy possibly secondary

    from missed hemodialysis during dialysis and as an outpatient.

2. Possible left lower lobe pneumonia with sepsis.

3. Urinary tract infection with group D Enterococcus.

4. Relative hypotension and dehydration, present on admission.

5. Diabetes mellitus, type 2.

6. Hypertension.

7. Hyperlipidemia.

8. Multiple medical issues.



RECOMMENDATIONS:

Recommend to continue current management, continue symptomatic treatment including

antibiotics. Follow the cultures.  Closely follow with Infectious Disease, multiple

consultants. PT, OT evaluation, and possible ECF return once the patient is stabilized.



MMODL / IJN: [id number] / Job#: [id number]

## 2023-06-22 NOTE — P.PN
Subjective


Progress Note Date: 06/22/23


Patient seen at bedside in no seizures overnight or today per the nurse.  I was 

notified by esterase nurse and teres nurses doing better.  He was notified that 

she is responding following commands but slow to respond.








Objective





- Vital Signs


Vital signs: 


                                   Vital Signs











Temp  97.5 F L  06/22/23 07:59


 


Pulse  83   06/22/23 13:37


 


Resp  18   06/22/23 11:25


 


BP  133/73   06/22/23 11:25


 


Pulse Ox  97   06/22/23 11:25


 


FiO2      








                                 Intake & Output











 06/21/23 06/22/23 06/22/23





 18:59 06:59 18:59


 


Intake Total  450 


 


Output Total  2900 


 


Balance  -2450 


 


Weight 72.575 kg  


 


Intake:   


 


  Oral  50 


 


  Hemodialysis  400 


 


Output:   


 


  Hemodialysis  2900 


 


Other:   


 


  Voiding Method  Diaper Diaper


 


  # Voids  0 


 


  # Bowel Movements   1














- Exam


Neuro exam is limited


Patient is awake alert oriented to self she stated she is in the hospital 

options and she correctly stated its 2023.  She is slow responded question.  She

is following simple commands.  Language is limited.


The pupils are round equal and reactive to light.  Pupils are round 3 mm.  

Extraocular movement she's tracking throughout and no nystagmus appreciable.  

She has right lower facial weakness this mild.  No dysarthria.


She sewn thumbs up on bilateral upper extremities.





Some other workup during his hospital visit consisted of:


Ammonia is 11.  Her creatinine is 9.6 with a BUN 63.  His sugar has been in the 

range of 100 and tends to 190s.  Calcium magnesium sodium liver function is 

within normal limits.


Urine drug seeing is not detected.


Routine EEG is abnormal.  The back slowing suggestive of mild to moderate 

encephalopathy.  Epileptiform discharges over the left central parietal region 

increases risk for seizure.  Otherwise no seizure noted during the study.  Local

correlation recommended








- Labs


CBC & Chem 7: 


                                 06/21/23 20:11





                                 06/21/23 11:20


Labs: 


                  Abnormal Lab Results - Last 24 Hours (Table)











  06/21/23 06/21/23 06/22/23 Range/Units





  11:20 20:11 00:13 


 


RBC   3.39 L   (3.80-5.40)  m/uL


 


Hgb   11.0 L   (11.4-16.0)  gm/dL


 


Hct   32.2 L   (34.0-46.0)  %


 


Plt Count   122 L   (150-450)  k/uL


 


POC Glucose (mg/dL)    130 H  ()  mg/dL


 


Procalcitonin  1.44 H    (0.02-0.09)  ng/mL














  06/22/23 Range/Units





  11:41 


 


RBC   (3.80-5.40)  m/uL


 


Hgb   (11.4-16.0)  gm/dL


 


Hct   (34.0-46.0)  %


 


Plt Count   (150-450)  k/uL


 


POC Glucose (mg/dL)  136 H  ()  mg/dL


 


Procalcitonin   (0.02-0.09)  ng/mL








                      Microbiology - Last 24 Hours (Table)











 06/20/23 15:50 Urine Culture - Preliminary





 Urine,Voided    Group D Enterococcus


 


 06/20/23 17:30 Blood Culture - Preliminary





 Blood 














Assessment and Plan


Assessment: 


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

was has multiple admission to our facility another facility for confusion with 

breakthrough seizures.





Altered mental status and suspect had breakthrough seizure---improving.  No 

seizure on EEG but has discharges over left parietal/central region.


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 

epilepsy (on four antiepileptic drugs).  She has multiple admissions to our 

facility as well as outside facility.  She had a prolonged EEG in our facility 

and was suggestive of primary generalized epilepsy


Left ICA stenosis of 75% stenosis on CTA but discrepancy on carotid duplex and 

not significant


History of stroke with residual left hemiparesis


Diabetes mellitus


End-stage renal disease on dialysis


Hypertension





Plan: 


Continue home antiepileptic of Keppra 500 mg 1 tablet twice a day with additi

onal 500 postdialysis, Vimpat 150 mg 3 times a day with additional dose 

postdialysis, Depakote 500 mg 1 tablet twice a day sonogram 100 mg a tablet 3 

times a day. 


Levels: Keppra 54.6 (normal 3-60), Depakote: 39.7 (normal ).  Unsure if 

Depakote is subtherapeutic since low dose vs missed medicaiton. Pennding vimpat 

level to make sure not subtherapeutic.


Routine EEG showed left central/parietal epileptiform discharges which can 

increase risk for seizure.  No seizure noted during this study.. She had 

multiple EEG in the past and prolonged EEG in our facility and was felt primary 

generalized epilepsy.


On seizure precaution and pads.


On Ativan 1mg every 4 hours PRN for seizures.


She had multiple CT's in our facility in the past and no need for another one 

unless continues to have confusions.


Recommend the patient to follow-up with epileptologist as outpatient and 

recommend EMU.   She had multiple admission to our facility and outside 

facility.  She is on 4 antieplileptic drugs and continues to have seizures.  

Consider start on Epidiolex as outpatient since had medical refractory epilepsy 

and that was recommended on prior visit.


Will defer the rest of medical management to her primary and other specialist.





The plan is discussed with her nurse.








Time with Patient: Less than 30

## 2023-06-22 NOTE — P.PN
Subjective





Patient is seen in follow-up for end-stage renal disease.  She is maintained on 

hemodialysis on Monday Wednesday Friday schedule.  No problems with dialysis 

yesterday.  Hemodynamically stable.  Awake and alert.  Mentation better.





Vital signs are stable.


General: No acute distress.


HEENT: Head exam is unremarkable.


LUNGS: No audible rhonchi or wheezes.


HEART: Rate and Rhythm are regular.


ABDOMEN: Nontender.


EXTREMITITES: No edema.





Objective





- Vital Signs


Vital signs: 


                                   Vital Signs











Temp  97.5 F L  06/22/23 07:59


 


Pulse  88   06/22/23 09:11


 


Resp  16   06/22/23 07:59


 


BP  143/80   06/22/23 07:59


 


Pulse Ox  100   06/22/23 07:59


 


FiO2      








                                 Intake & Output











 06/21/23 06/22/23 06/22/23





 18:59 06:59 18:59


 


Intake Total  450 


 


Output Total  2900 


 


Balance  -2450 


 


Weight 72.575 kg  


 


Intake:   


 


  Oral  50 


 


  Hemodialysis  400 


 


Output:   


 


  Hemodialysis  2900 


 


Other:   


 


  Voiding Method  Diaper Diaper


 


  # Voids  0 


 


  # Bowel Movements   1














- Labs


CBC & Chem 7: 


                                 06/21/23 20:11





                                 06/21/23 11:20


Labs: 


                  Abnormal Lab Results - Last 24 Hours (Table)











  06/21/23 06/21/23 06/21/23 Range/Units





  10:59 11:20 11:20 


 


RBC     (3.80-5.40)  m/uL


 


Hgb     (11.4-16.0)  gm/dL


 


Hct     (34.0-46.0)  %


 


Plt Count     (150-450)  k/uL


 


BUN   71 H   (7-17)  mg/dL


 


Creatinine   11.19 H*   (0.52-1.04)  mg/dL


 


Glucose   112 H   (74-99)  mg/dL


 


POC Glucose (mg/dL)  119 H    ()  mg/dL


 


Phosphorus     (2.5-4.5)  mg/dL


 


Procalcitonin    1.44 H  (0.02-0.09)  ng/mL














  06/21/23 06/21/23 06/22/23 Range/Units





  11:20 20:11 00:13 


 


RBC   3.39 L   (3.80-5.40)  m/uL


 


Hgb   11.0 L   (11.4-16.0)  gm/dL


 


Hct   32.2 L   (34.0-46.0)  %


 


Plt Count   122 L   (150-450)  k/uL


 


BUN     (7-17)  mg/dL


 


Creatinine     (0.52-1.04)  mg/dL


 


Glucose     (74-99)  mg/dL


 


POC Glucose (mg/dL)    130 H  ()  mg/dL


 


Phosphorus  7.6 H    (2.5-4.5)  mg/dL


 


Procalcitonin     (0.02-0.09)  ng/mL








                      Microbiology - Last 24 Hours (Table)











 06/20/23 17:30 Blood Culture - Preliminary





 Blood 














Assessment and Plan


Plan: 





Assessment:


1.  End-stage renal disease maintained on hemodialysis on Monday Wednesday Friday schedule.


2.  Altered mental status.  Questionable seizure.  Urine drug screen negative.  

Neurology following. 


3.  History of seizures.


4.  Diabetes mellitus.


5.  Chronic kidney disease mineral bone disease.  Phosphorous 7.6 this 

admission.  On Renvela.


6.  Hypertension with chronic kidney disease.  Stable.


7.  Acute hypoxic respiratory failure secondary to pneumonia.  On antibiotics.





Plan:


Hemodialysis tomorrow.

## 2023-06-22 NOTE — EEG
ELECTROENCEPHALOGRAM REPORT



CLINICAL HISTORY:

This is a 53-year-old woman with history of epilepsy, who is on multiple medications,

who presented because of altered mental status.  The video EEG is obtained to evaluate

for seizure epileptiform activity.



RELEVANT MEDICATIONS:

1. Depakote.

2. Keppra.

3. Zonegran.

4. Vimpat.



EEG TYPE:

A routine 21-channel EEG is performed with video using the 10/20 electrode placement

system.



DESCRIPTION:

[place]ake[place] is only obtained.  The background consists of low-to-moderate voltage of 8-

9 hertz activity.  At times, predominantly most of the background consists of low-to-

moderate voltage of theta intermixed with delta activity that is nonrhythmic.



There is no focal slowing.



Interictal and ictal, there are numerous sharp/spike slow waves over the left central

parietal region slowing, so there were numerous sharp/spike slow wave over the left

central parietal region.  No seizure noted.



ACTIVATION PROCEDURE:

Photic stimulation and hyperventilation are not performed.



CLINICAL INTERPRETATION:

This is an abnormal routine EEG.  The background slowing is suggestive of mild to

moderate encephalopathy.  The epileptiform discharges are over the left central

parietal region, which increases risk for seizures.  Otherwise, no seizures noted

during this study.  Clinical correlation is recommended.



MMODL / IJN: [id number] / Job#: [id number]

## 2023-06-23 LAB
ANION GAP SERPL CALC-SCNC: 12 MMOL/L
BUN SERPL-SCNC: 43 MG/DL (ref 7–17)
CALCIUM SPEC-MCNC: 8.9 MG/DL (ref 8.4–10.2)
CHLORIDE SERPL-SCNC: 98 MMOL/L (ref 98–107)
CO2 SERPL-SCNC: 26 MMOL/L (ref 22–30)
GLUCOSE BLD-MCNC: 104 MG/DL (ref 70–110)
GLUCOSE BLD-MCNC: 148 MG/DL (ref 70–110)
GLUCOSE BLD-MCNC: 95 MG/DL (ref 70–110)
GLUCOSE BLD-MCNC: 99 MG/DL (ref 70–110)
GLUCOSE SERPL-MCNC: 137 MG/DL (ref 74–99)
POTASSIUM SERPL-SCNC: 3.9 MMOL/L (ref 3.5–5.1)
SODIUM SERPL-SCNC: 136 MMOL/L (ref 137–145)

## 2023-06-23 RX ADMIN — DIVALPROEX SODIUM SCH MG: 500 TABLET, DELAYED RELEASE ORAL at 08:23

## 2023-06-23 RX ADMIN — SEVELAMER CARBONATE SCH MG: 800 TABLET, FILM COATED ORAL at 20:47

## 2023-06-23 RX ADMIN — INSULIN ASPART SCH: 100 INJECTION, SOLUTION INTRAVENOUS; SUBCUTANEOUS at 20:39

## 2023-06-23 RX ADMIN — LACOSAMIDE SCH MG: 150 TABLET, FILM COATED ORAL at 15:27

## 2023-06-23 RX ADMIN — INSULIN ASPART SCH: 100 INJECTION, SOLUTION INTRAVENOUS; SUBCUTANEOUS at 12:06

## 2023-06-23 RX ADMIN — ATORVASTATIN CALCIUM SCH MG: 40 TABLET, FILM COATED ORAL at 20:46

## 2023-06-23 RX ADMIN — IPRATROPIUM BROMIDE AND ALBUTEROL SULFATE SCH: .5; 3 SOLUTION RESPIRATORY (INHALATION) at 13:00

## 2023-06-23 RX ADMIN — LACOSAMIDE SCH MG: 150 TABLET, FILM COATED ORAL at 08:23

## 2023-06-23 RX ADMIN — METOPROLOL TARTRATE SCH MG: 25 TABLET, FILM COATED ORAL at 20:47

## 2023-06-23 RX ADMIN — ZONISAMIDE SCH MG: 100 CAPSULE ORAL at 20:47

## 2023-06-23 RX ADMIN — DIVALPROEX SODIUM SCH MG: 500 TABLET, DELAYED RELEASE ORAL at 15:27

## 2023-06-23 RX ADMIN — HEPARIN SODIUM SCH UNIT: 5000 INJECTION INTRAVENOUS; SUBCUTANEOUS at 20:46

## 2023-06-23 RX ADMIN — FAMOTIDINE SCH MG: 10 INJECTION, SOLUTION INTRAVENOUS at 20:46

## 2023-06-23 RX ADMIN — ASPIRIN SCH: 325 TABLET ORAL at 20:39

## 2023-06-23 RX ADMIN — LACOSAMIDE SCH MG: 150 TABLET, FILM COATED ORAL at 20:47

## 2023-06-23 RX ADMIN — INSULIN ASPART SCH: 100 INJECTION, SOLUTION INTRAVENOUS; SUBCUTANEOUS at 16:50

## 2023-06-23 RX ADMIN — IPRATROPIUM BROMIDE AND ALBUTEROL SULFATE SCH ML: .5; 3 SOLUTION RESPIRATORY (INHALATION) at 08:55

## 2023-06-23 RX ADMIN — INSULIN ASPART SCH: 100 INJECTION, SOLUTION INTRAVENOUS; SUBCUTANEOUS at 06:14

## 2023-06-23 RX ADMIN — ASPIRIN SCH: 325 TABLET ORAL at 08:18

## 2023-06-23 RX ADMIN — CEFEPIME HYDROCHLORIDE SCH MLS/HR: 1 INJECTION, POWDER, FOR SOLUTION INTRAMUSCULAR; INTRAVENOUS at 08:23

## 2023-06-23 RX ADMIN — DIVALPROEX SODIUM SCH MG: 500 TABLET, DELAYED RELEASE ORAL at 20:46

## 2023-06-23 RX ADMIN — ZONISAMIDE SCH MG: 100 CAPSULE ORAL at 14:18

## 2023-06-23 RX ADMIN — IPRATROPIUM BROMIDE AND ALBUTEROL SULFATE SCH: .5; 3 SOLUTION RESPIRATORY (INHALATION) at 21:23

## 2023-06-23 RX ADMIN — SEVELAMER CARBONATE SCH MG: 800 TABLET, FILM COATED ORAL at 08:23

## 2023-06-23 RX ADMIN — ZONISAMIDE SCH MG: 100 CAPSULE ORAL at 06:14

## 2023-06-23 NOTE — P.PN
Subjective





Patient is seen in follow-up for end-stage renal disease.  She is maintained on 

hemodialysis on Monday Wednesday Friday schedule.  Scheduled for dialysis today.

  Awake and alert.  Mentation at baseline.  On antibiotics for pneumonia and 

UTI.





Vital signs are stable.


General: No acute distress.


HEENT: Head exam is unremarkable.


LUNGS: No audible rhonchi or wheezes.


HEART: Rate and Rhythm are regular.


ABDOMEN: Nontender.


EXTREMITITES: No edema.





Objective





- Vital Signs


Vital signs: 


                                   Vital Signs











Temp  97.4 F L  06/23/23 08:00


 


Pulse  88   06/23/23 09:05


 


Resp  16   06/23/23 08:00


 


BP  167/79   06/23/23 08:00


 


Pulse Ox  97   06/23/23 08:56


 


FiO2      








                                 Intake & Output











 06/22/23 06/23/23 06/23/23





 18:59 06:59 18:59


 


Other:   


 


  Voiding Method Diaper Diaper Diaper


 


  # Voids 1  


 


  # Bowel Movements 1  














- Labs


CBC & Chem 7: 


                                 06/21/23 20:11





                                 06/23/23 09:50


Labs: 


                  Abnormal Lab Results - Last 24 Hours (Table)











  06/22/23 06/22/23 06/22/23 Range/Units





  11:41 17:01 17:04 


 


Sodium     (137-145)  mmol/L


 


BUN     (7-17)  mg/dL


 


Creatinine     (0.52-1.04)  mg/dL


 


Glucose     (74-99)  mg/dL


 


POC Glucose (mg/dL)  136 H  416 H  145 H  ()  mg/dL














  06/22/23 06/22/23 06/23/23 Range/Units





  17:05 19:57 09:50 


 


Sodium    136 L  (137-145)  mmol/L


 


BUN    43 H  (7-17)  mg/dL


 


Creatinine    8.53 H*  (0.52-1.04)  mg/dL


 


Glucose    137 H  (74-99)  mg/dL


 


POC Glucose (mg/dL)  170 H  241 H   ()  mg/dL








                      Microbiology - Last 24 Hours (Table)











 06/20/23 17:30 Blood Culture - Preliminary





 Blood 


 


 06/20/23 18:15 Blood Culture - Preliminary





 Blood 


 


 06/20/23 15:50 Urine Culture - Preliminary





 Urine,Voided    Group D Enterococcus














Assessment and Plan


Plan: 





Assessment:


1.  End-stage renal disease maintained on hemodialysis on Monday Wednesday Friday schedule.


2.  Altered mental status.  Questionable seizure.  Urine drug screen negative.  

Neurology following.  Also concern for infection - Pneumonia/UTI.  Urine culture

positive for group D enterococcus.  On antibiotics.  ID following.


3.  History of seizures.


4.  Diabetes mellitus.


5.  Chronic kidney disease mineral bone disease.  Phosphorous 7.6 this 

admission.  On Renvela.


6.  Hypertension with chronic kidney disease.  Stable.


7.  Acute hypoxic respiratory failure secondary to pneumonia.  On antibiotics.





Plan:


Hemodialysis today.

## 2023-06-23 NOTE — P.PN
Subjective


Progress Note Date: 06/23/23


The patient is seen at bedside and per nurse she is slow in responding today 

compared to yesterday and having tremor. 


Upon seeing her she was getting dialysis and very slow responding.








Objective





- Vital Signs


Vital signs: 


                                   Vital Signs











Temp  97.7 F   06/23/23 13:38


 


Pulse  87   06/23/23 13:38


 


Resp  16   06/23/23 13:38


 


BP  123/65   06/23/23 13:38


 


Pulse Ox  100   06/23/23 11:10


 


FiO2      








                                 Intake & Output











 06/22/23 06/23/23 06/23/23





 18:59 06:59 18:59


 


Intake Total   400


 


Output Total   2000


 


Balance   -1600


 


Intake:   


 


  Hemodialysis   400


 


Output:   


 


  Hemodialysis   2000


 


Other:   


 


  Voiding Method Diaper Diaper Diaper


 


  # Voids 1  


 


  # Bowel Movements 1  














- Exam


Neuro exam is limited


Patient is awake but very slow responding.  She was able to follow minimal 

simple commands (sticking tongue out and wiggling toes).  Not verbalizing.  


Pupils are round, equal and reactive to light.  She is tracking.  Mild right 

lower facial weakness.  


Motor: Strength is limited in assessment but wiggled toes and showed brief 

thumbs up.





Some other workup during his hospital visit consisted of:


Ammonia is 11.  Her creatinine is 9.6 with a BUN 63.  His sugar has been in the 

range of 100 and tends to 190s.  Calcium magnesium sodium liver function is 

within normal limits.


Urine drug seeing is not detected.


Routine EEG is abnormal.  The back slowing suggestive of mild to moderate 

encephalopathy.  Epileptiform discharges over the left central parietal region 

increases risk for seizure.  Otherwise no seizure noted during the study.  Local

correlation recommended








- Labs


CBC & Chem 7: 


                                 06/21/23 20:11





                                 06/23/23 09:50


Labs: 


                  Abnormal Lab Results - Last 24 Hours (Table)











  06/22/23 06/22/23 06/22/23 Range/Units





  17:01 17:04 17:05 


 


Sodium     (137-145)  mmol/L


 


BUN     (7-17)  mg/dL


 


Creatinine     (0.52-1.04)  mg/dL


 


Glucose     (74-99)  mg/dL


 


POC Glucose (mg/dL)  416 H  145 H  170 H  ()  mg/dL














  06/22/23 06/23/23 Range/Units





  19:57 09:50 


 


Sodium   136 L  (137-145)  mmol/L


 


BUN   43 H  (7-17)  mg/dL


 


Creatinine   8.53 H*  (0.52-1.04)  mg/dL


 


Glucose   137 H  (74-99)  mg/dL


 


POC Glucose (mg/dL)  241 H   ()  mg/dL








                      Microbiology - Last 24 Hours (Table)











 06/20/23 17:30 Blood Culture - Preliminary





 Blood 


 


 06/20/23 18:15 Blood Culture - Preliminary





 Blood 


 


 06/20/23 15:50 Urine Culture - Preliminary





 Urine,Voided    Group D Enterococcus














Assessment and Plan


Assessment: 


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

was has multiple admission to our facility another facility for confusion with 

breakthrough seizures.





Altered mental status and suspect had breakthrough seizure-- No seizure on EEG 

but has discharges over left parietal/central region--today has worsening 

mentation and tremor--concern for seizure


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 

epilepsy (on four antiepileptic drugs).  She has multiple admissions to our 

facility as well as outside facility.  She had a prolonged EEG in our facility 

and was suggestive of primary generalized epilepsy


Left ICA stenosis of 75% stenosis on CTA but discrepancy on carotid duplex and 

not significant


History of stroke with residual left hemiparesis


Diabetes mellitus


End-stage renal disease on dialysis


Hypertension





Plan: 


Continue home antiepileptic of Keppra 500 mg 1 tablet twice a day with 

additional 500 postdialysis, Vimpat 150 mg 3 times a day with additional dose 

postdialysis, Depakote 500 mg 1 tablet twice a day sonogram 100 mg a tablet 3 

times a day. 


Will given one time 1mg Ativan


Levels: Keppra 54.6 (normal 3-60), Depakote: 39.7 (normal ).  Unsure if 

Depakote is subtherapeutic since low dose vs missed medicaiton. Pennding vimpat 

level to make sure not subtherapeutic.


Routine EEG showed left central/parietal epileptiform discharges which can 

increase risk for seizure.  No seizure noted during this study.. She had 

multiple EEG in the past and prolonged EEG in our facility and was felt primary 

generalized epilepsy.


Ordered 2.5 hours EEG.


On seizure precaution and pads.


On Ativan 1mg every 4 hours PRN for seizures.


She had multiple CT's in our facility in the past and no need for another one 

unless continues to have confusions.


Recommend the patient to follow-up with epileptologist as outpatient and 

recommend EMU.   She had multiple admission to our facility and outside 

facility.  She is on 4 antieplileptic drugs and continues to have seizures.  

Consider start on Epidiolex as outpatient since had medical refractory epilepsy 

and that was recommended on prior visit.


Will defer the rest of medical management to her primary and other specialist.





The plan is discussed with her nurse.





UPDATE:


Per the nurse, after 1mg ativan, her nurse stated tremors has resolved.








Time with Patient: Less than 30

## 2023-06-23 NOTE — P.PN
Subjective





This is a pleasant 52 years old male who presents with altered mental status 

secondary to metabolic encephalopathy possible breakthrough seizure, she has 

history of refractory seizure, she's been evaluated by neurologist and currently

she is continued on her home dose of 4 seizure medication including Depakote, 

Vimpat, Keppra, zonisamide, neurologist recommended the patient follow up 

outpatient.


Patient also suspected to have community acquired left lower lobe pneumonia and 

currently covered with cefepime.  Also she is on Levaquin added by internal 

medical service on a prior days, when going through this continue Levaquin as it

lowers seizure thresholds and patient is with no fever or leukocytosis.


Urine cultures, group D enterococcus


Patient herself is drowsy this morning, she knows she is in [place], she 

answers questions without difficulty and she follows simple commands, she's been

evaluated by psychiatrist and found to have Asked to make decision.


Nephrologist team for hemodialysis


Patient is still complaining of from coughing and clinical tachypnea while at 

rest


She is saturating well to 2 L/m of oxygen.


Patient is in the process of obtaining public guardian, Follow-up with court 

hearing for possible public guardian.   on the case





Objective





- Vital Signs


Vital signs: 


                                   Vital Signs











Temp  97.4 F L  06/23/23 08:00


 


Pulse  88   06/23/23 09:05


 


Resp  16   06/23/23 08:00


 


BP  167/79   06/23/23 08:00


 


Pulse Ox  97   06/23/23 08:56


 


FiO2      








                                 Intake & Output











 06/22/23 06/23/23 06/23/23





 18:59 06:59 18:59


 


Other:   


 


  Voiding Method Diaper Diaper Diaper


 


  # Voids 1  


 


  # Bowel Movements 1  














- Exam





-GENERAL: The patient is awake but drowsy, not in any acute distress. Well deve

loped, well nourished. 


HEENT: Pupils are round and equally reacting to light. EOMI. No scleral icterus.

No conjunctival pallor. Normocephalic, atraumatic. No pharyngeal erythema. No 

thyromegaly. 


CARDIOVASCULAR: S1 and S2 present. No murmurs, rubs, or gallops. 


PULMONARY: Chest is clear to auscultation, no wheezing or crackles. 


ABDOMEN: Soft, nontender, nondistended, normoactive bowel sounds. No palpable 

organomegaly. 


MUSCULOSKELETAL: No joint swelling or deformity. 


EXTREMITIES: No cyanosis, clubbing, or pedal edema. 


NEUROLOGICAL: Gross neurological examination did not reveal any focal deficits. 


SKIN: No rashes. no petechiae.





- Labs


CBC & Chem 7: 


                                 06/21/23 20:11





                                 06/21/23 11:20


Labs: 


                  Abnormal Lab Results - Last 24 Hours (Table)











  06/22/23 06/22/23 06/22/23 Range/Units





  11:41 17:01 17:04 


 


POC Glucose (mg/dL)  136 H  416 H  145 H  ()  mg/dL














  06/22/23 06/22/23 Range/Units





  17:05 19:57 


 


POC Glucose (mg/dL)  170 H  241 H  ()  mg/dL








                      Microbiology - Last 24 Hours (Table)











 06/20/23 17:30 Blood Culture - Preliminary





 Blood 


 


 06/20/23 18:15 Blood Culture - Preliminary





 Blood 


 


 06/20/23 15:50 Urine Culture - Preliminary





 Urine,Voided    Group D Enterococcus














Assessment and Plan


Assessment: 





Left lower lobe pneumonia


Altered mental status secondary to metabolic encephalopathy, possible 

breakthrough seizure


Refractory epilepsy, possible breakthrough seizure


 history of CVA and left mari-variances


End-stage renal disease on hemodialysis 


possible UTI with a group B enterococci





Plan: 





Continue with cefepime


DC Levaquin


Infectious disease and pulmonary team on the case


Continue with same seizure medication, Vimpat, Keppra, Depakote and zonisamide ,

neurologist on the case


Labs and medication were reviewed..  Continue same treatment.  Continue with 

symptomatic treatment.  Resume home medication.  Monitor labs and vitals.  DVT 

and GI prophylaxis.  Further recommendations as per clinical course of the 

patient


DVT prophylaxis: Subcutaneous heparin


GI Prophylaxis: Pepcid


PT/OT: Recommended ECF


Prognosis is guarded

## 2023-06-23 NOTE — P.PN
Subjective


Progress Note Date: 06/23/23


Principal diagnosis: 


Pneumonia





Patient is a 53-year-old  female with a past medical history 

significant for end-stage renal disease on hemodialysis through the left upper 

arm fistula and nursing home resident patient has been sent to the ER  for 

evaluation of mental status changes, patient just x-ray with left lower lobe 

infiltrate concerning for pneumonia.


On today's evaluation that is 6/23/2023 patient remains to be afebrile, the 

patient is slightly lethargic today and elevated good historian and no vomiting 

or diarrhea has been reported patient is currently on a 2 L nasal cannula oxygen








Objective





- Vital Signs


Vital signs: 


                                   Vital Signs











Temp  97.4 F L  06/23/23 08:00


 


Pulse  86   06/23/23 11:10


 


Resp  18   06/23/23 11:10


 


BP  130/72   06/23/23 11:10


 


Pulse Ox  100   06/23/23 11:10


 


FiO2      








                                 Intake & Output











 06/22/23 06/23/23 06/23/23





 18:59 06:59 18:59


 


Other:   


 


  Voiding Method Diaper Diaper Diaper


 


  # Voids 1  


 


  # Bowel Movements 1  














- Exam


GENERAL DESCRIPTION: Middle-age female up in the chair in no distress





RESPIRATORY SYSTEM: Unlabored breathing , decreased breath sounds at bases





HEART: S1 S2 regular rate and rhythm ,





ABDOMEN: Soft , no tenderness





EXTREMITIES: No edema feet








- Labs


CBC & Chem 7: 


                                 06/21/23 20:11





                                 06/23/23 09:50


Labs: 


                  Abnormal Lab Results - Last 24 Hours (Table)











  06/22/23 06/22/23 06/22/23 Range/Units





  17:01 17:04 17:05 


 


Sodium     (137-145)  mmol/L


 


BUN     (7-17)  mg/dL


 


Creatinine     (0.52-1.04)  mg/dL


 


Glucose     (74-99)  mg/dL


 


POC Glucose (mg/dL)  416 H  145 H  170 H  ()  mg/dL














  06/22/23 06/23/23 Range/Units





  19:57 09:50 


 


Sodium   136 L  (137-145)  mmol/L


 


BUN   43 H  (7-17)  mg/dL


 


Creatinine   8.53 H*  (0.52-1.04)  mg/dL


 


Glucose   137 H  (74-99)  mg/dL


 


POC Glucose (mg/dL)  241 H   ()  mg/dL








                      Microbiology - Last 24 Hours (Table)











 06/20/23 17:30 Blood Culture - Preliminary





 Blood 


 


 06/20/23 18:15 Blood Culture - Preliminary





 Blood 


 


 06/20/23 15:50 Urine Culture - Preliminary





 Urine,Voided    Group D Enterococcus














Assessment and Plan


(1) Pneumonia


Current Visit: Yes   Status: Acute   Code(s): J18.9 - PNEUMONIA, UNSPECIFIED 

ORGANISM   SNOMED Code(s): 596092524


   


Plan: 


1patient presented to hospital with mental status changes decreased level of 

responsiveness with evidence of left lower lobe infiltrate concerning for 

possible pneumonia, patient however did not have any fever or elevated white 

count, underlying pneumonia less likely but not excluded, patient also have a po

sitive UA however this patient do have a history of end-stage renal disease on 

dialysis with no clinical significance


2-penicillin allergy of limit the number of antibiotics safe to use


3-patient did have elevated procalcitonin level of 1.44


4Patient to continue with the cefepime try to obtain a sputum and monitor 

clinical course closely


Time with Patient: Less than 30

## 2023-06-23 NOTE — P.PN
Subjective


Progress Note Date: 06/23/23








I am seeing this patient in new consultation today 06/21/2023 in the emergency 

room possible left lower lobe pneumonia.  Patient is a 53-year-old female with 

past medical history significant for multiple comorbidities including end-stage 

renal disease, seizure disorder, CVA, diabetes mellitus, GERD, hyperlipidemia, 

hypertension, hypothyroidism.  Patient was sent in from [facility] 

yesterday afternoon for concerns about altered mental status. There was also 

concern for nausea and retching. She has had multiple prior emergency room 

visits and hospital admissions in the past for similar symptoms.  She actually 

had an ER visit on June 19th for altered mental status.  She was discharged back

to [facility], and returned yesterday afternoon. She also has history of ventilator

dependence for status epilepticus.  No reports of seizure activity on this 

admission.  Patient apparently continues to have seizures about once per week to

once per month. Patient is currently sitting up in bed, alert but nonverbal, in 

no acute distress.  She does not follow commands.  She is on 2 L/m nasal cannula

and oxygenating at 100%.  Chest x-ray on arrival shows small left pleural 

effusion and possible left lower lobe infiltrate. Patient is currently on 

Levaquin for empiric antibiotics.  She is afebrile.  CBC on arrival was 

unremarkable.  BMP shows sodium 138, potassium 4.4, chloride 99, serum bicarb 

24, BUN 63, creatinine 9.6, glucose 186.  Patient reportedly does receive 

hemodialysis on Monday, Wednesday, Friday schedule.  Troponins negative 1.  

Ammonia level was low.  Urinalysis was not particularly concerning for UTI.  

Brain CT without contrast, one day prior, shows age-related atrophic and chronic

small vessel ischemia without any acute intracranial process. Patient will be 

admitted to the cardiac stepdown unit once bed available.





On today's evaluation of 06/22/2023, the patient is stable.  No worsening 

shortness of breath.  In fact I do not see any signs of any respiratory distress

this patient.  No aspiration.  No cough or sputum production.  No fever or 

chills.  No seizure activity overnight.  The patient is following simple 

commands only.  Neurology is on the case regarding her underlying mental status.

 The patient remains on Levaquin.  Patient is also on cefepime.  During culture 

was positive for enterococcus group D.  The blood culture showing no growth.





On 06/20/2023, the neurologic status of the patient remains unchanged.  She 

doesn't do a lot of talking and she doesn't communicate.  She doesn't minimum 

And Neurology Is on the Case.  Suspect a Component of Anoxic Encephalopathy.  

The Patient Is Also Being Treated for Seizures and She Remains on a Combination 

of Keppra and Vimpat.  No Respiratory Distress.  No Significant Cough or Sputum 

Production.  Breathing Is Nonlabored and the Patient Is Undergoing Hemodialysis 

Today.  She Is on IV Cefepime for Now.  She Remains on Oxygen at 2 L/M Nasal 

Cannula with a Pulse Ox of 99%.  The Blood Work from Today Shows a Sodium Level 

of 136, BUN Is 43 with a Creatinine of 8.5 and a Potassium Is at 3.9.  Bicarb 

Level Is at 26.  She Was Found to Have Enterococcus Group D in Her Urine.





Objective





- Vital Signs


Vital signs: 


                                   Vital Signs











Temp  97.4 F L  06/23/23 08:00


 


Pulse  88   06/23/23 09:05


 


Resp  16   06/23/23 08:00


 


BP  167/79   06/23/23 08:00


 


Pulse Ox  97   06/23/23 08:56


 


FiO2      








                                 Intake & Output











 06/22/23 06/23/23 06/23/23





 18:59 06:59 18:59


 


Other:   


 


  Voiding Method Diaper Diaper Diaper


 


  # Voids 1  


 


  # Bowel Movements 1  














- Exam








GENERAL EXAM: Alert but nonverbal, 53-year-old female, comfortable in no 

apparent distress.


HEAD: Normocephalic and atraumatic


EYES: Normal reaction of pupils, equal size.


NOSE: Clear with pink turbinates.


THROAT: No erythema or exudates.


NECK: No masses, no JVD.


CHEST: No chest wall deformity.  There is a left chest implanted device


LUNGS: Equal air entry with left lower lobe inspiratory crackles.  No wheezing, 

rhonchi.  On 2 L/m nasal cannula.  No conversational dyspnea or accessory muscle

use.. 


CVS: S1 and S2 normal with no audible murmur, regular rhythm. No extra heart 

sounds


ABDOMEN: No hepatosplenomegaly, active bowel sounds, no guarding or rigidity. 


SPINE: No scoliosis or deformity


SKIN: No rashes


CENTRAL NERVOUS SYSTEM: No focal deficits, tone is normal in all 4 extremities. 

No seizure activity noted.


EXTREMITIES: There is no peripheral edema, clubbing, or cyanosis.  Peripheral 

pulses are intact.  There is a left arm AV fistula





- Labs


CBC & Chem 7: 


                                 06/21/23 20:11





                                 06/23/23 09:50


Labs: 


                  Abnormal Lab Results - Last 24 Hours (Table)











  06/22/23 06/22/23 06/22/23 Range/Units





  11:41 17:01 17:04 


 


Sodium     (137-145)  mmol/L


 


BUN     (7-17)  mg/dL


 


Creatinine     (0.52-1.04)  mg/dL


 


Glucose     (74-99)  mg/dL


 


POC Glucose (mg/dL)  136 H  416 H  145 H  ()  mg/dL














  06/22/23 06/22/23 06/23/23 Range/Units





  17:05 19:57 09:50 


 


Sodium    136 L  (137-145)  mmol/L


 


BUN    43 H  (7-17)  mg/dL


 


Creatinine    8.53 H*  (0.52-1.04)  mg/dL


 


Glucose    137 H  (74-99)  mg/dL


 


POC Glucose (mg/dL)  170 H  241 H   ()  mg/dL








                      Microbiology - Last 24 Hours (Table)











 06/20/23 17:30 Blood Culture - Preliminary





 Blood 


 


 06/20/23 18:15 Blood Culture - Preliminary





 Blood 


 


 06/20/23 15:50 Urine Culture - Preliminary





 Urine,Voided    Group D Enterococcus














Assessment and Plan


Assessment: 





Acute hypoxemic respiratory failure, currently on 2 L/m nasal cannula, possibly 

secondary to healthcare associated pneumonia.  Chest x-ray shows a left-sided 

pleural effusion and suspected left lower lobe infiltrate.  The patient remained

on 2 L of oxygen by nasal cannula.  No signs of any respiratory distress and the

patient's pulse ox is 97% liters of oxygen by nasal cannula.





Altered mental status, currently under investigation. Brain CT without contrast,

one day prior, shows age-related atrophic and chronic small vessel ischemia 

without any acute intracranial process.  I do suspect a component of anoxic 

encephalopathy this patient.  This could be related to his recurrent seizures.  

She remains on a combination of Vimpat and Keppra.





History of seizure disorder, currently on accommodation of Vimpat and Keppra





End-stage renal disease, reportedly receives hemodialysis Monday, Wednesday, 

Friday schedule.  Undergoing scheduled hemodialysis by nephrology and the 

patient is undergoing hemodialysis today





Diabetes mellitus type 2, non-insulin-dependent





Hyperlipidemia





Essential hypertension





History of CVA





Hypothyroidism





GERD without esophagitis





Questionable enterococcal UTI group, blood cultures are negative.D








Plan:





Overall respiratory status is stable 


Check procalcitonin level was slightly elevated at 1.44 and this could be 

related to renal failure 


Continue bronchodilators


Consult neurology to manage antiepileptics.  Suspect anoxic encephalopathy


Seizure precautions


Hemodialysis per nephrology


We will continue to follow

## 2023-06-24 LAB
CO2 BLDA-SCNC: 30 MMOL/L (ref 19–24)
GLUCOSE BLD-MCNC: 103 MG/DL (ref 70–110)
GLUCOSE BLD-MCNC: 107 MG/DL (ref 70–110)
GLUCOSE BLD-MCNC: 122 MG/DL (ref 70–110)
GLUCOSE BLD-MCNC: 153 MG/DL (ref 70–110)
GLUCOSE BLD-MCNC: 87 MG/DL (ref 70–110)
GLUCOSE BLD-MCNC: 88 MG/DL (ref 70–110)
GLUCOSE BLD-MCNC: 88 MG/DL (ref 70–110)
HCO3 BLDA-SCNC: 29 MMOL/L (ref 21–25)
PCO2 BLDA: 43 MMHG (ref 35–45)
PH BLDA: 7.44 [PH] (ref 7.35–7.45)
PO2 BLDA: 77 MMHG (ref 83–108)

## 2023-06-24 RX ADMIN — LEVETIRACETAM SCH MG: 100 INJECTION, SOLUTION, CONCENTRATE INTRAVENOUS at 21:20

## 2023-06-24 RX ADMIN — INSULIN ASPART SCH: 100 INJECTION, SOLUTION INTRAVENOUS; SUBCUTANEOUS at 17:18

## 2023-06-24 RX ADMIN — ZONISAMIDE SCH MG: 100 CAPSULE ORAL at 06:31

## 2023-06-24 RX ADMIN — LACOSAMIDE SCH MG: 150 TABLET, FILM COATED ORAL at 11:28

## 2023-06-24 RX ADMIN — VALPROATE SODIUM SCH MLS/HR: 100 INJECTION, SOLUTION INTRAVENOUS at 22:49

## 2023-06-24 RX ADMIN — HEPARIN SODIUM SCH UNIT: 5000 INJECTION INTRAVENOUS; SUBCUTANEOUS at 22:44

## 2023-06-24 RX ADMIN — ZONISAMIDE SCH: 100 CAPSULE ORAL at 22:24

## 2023-06-24 RX ADMIN — FAMOTIDINE SCH MG: 10 INJECTION, SOLUTION INTRAVENOUS at 22:43

## 2023-06-24 RX ADMIN — ATORVASTATIN CALCIUM SCH: 40 TABLET, FILM COATED ORAL at 22:09

## 2023-06-24 RX ADMIN — ASPIRIN SCH: 325 TABLET ORAL at 12:10

## 2023-06-24 RX ADMIN — INSULIN ASPART SCH: 100 INJECTION, SOLUTION INTRAVENOUS; SUBCUTANEOUS at 13:28

## 2023-06-24 RX ADMIN — HEPARIN SODIUM SCH UNIT: 5000 INJECTION INTRAVENOUS; SUBCUTANEOUS at 11:27

## 2023-06-24 RX ADMIN — LACOSAMIDE SCH: 10 INJECTION INTRAVENOUS at 22:25

## 2023-06-24 RX ADMIN — IPRATROPIUM BROMIDE AND ALBUTEROL SULFATE SCH ML: .5; 3 SOLUTION RESPIRATORY (INHALATION) at 08:28

## 2023-06-24 RX ADMIN — METOPROLOL TARTRATE SCH MG: 25 TABLET, FILM COATED ORAL at 11:28

## 2023-06-24 RX ADMIN — IPRATROPIUM BROMIDE AND ALBUTEROL SULFATE SCH ML: .5; 3 SOLUTION RESPIRATORY (INHALATION) at 15:41

## 2023-06-24 RX ADMIN — DIVALPROEX SODIUM SCH: 500 TABLET, DELAYED RELEASE ORAL at 13:23

## 2023-06-24 RX ADMIN — SEVELAMER CARBONATE SCH MG: 800 TABLET, FILM COATED ORAL at 11:28

## 2023-06-24 RX ADMIN — INSULIN ASPART SCH: 100 INJECTION, SOLUTION INTRAVENOUS; SUBCUTANEOUS at 22:24

## 2023-06-24 RX ADMIN — INSULIN ASPART SCH: 100 INJECTION, SOLUTION INTRAVENOUS; SUBCUTANEOUS at 06:20

## 2023-06-24 RX ADMIN — IPRATROPIUM BROMIDE AND ALBUTEROL SULFATE SCH: .5; 3 SOLUTION RESPIRATORY (INHALATION) at 21:07

## 2023-06-24 RX ADMIN — ZONISAMIDE SCH: 100 CAPSULE ORAL at 20:06

## 2023-06-24 RX ADMIN — FAMOTIDINE SCH MG: 10 INJECTION, SOLUTION INTRAVENOUS at 11:23

## 2023-06-24 RX ADMIN — SEVELAMER CARBONATE SCH: 800 TABLET, FILM COATED ORAL at 22:24

## 2023-06-24 RX ADMIN — METOPROLOL TARTRATE SCH: 25 TABLET, FILM COATED ORAL at 22:25

## 2023-06-24 RX ADMIN — ASPIRIN SCH: 325 TABLET ORAL at 22:25

## 2023-06-24 RX ADMIN — CEFEPIME HYDROCHLORIDE SCH MLS/HR: 1 INJECTION, POWDER, FOR SOLUTION INTRAMUSCULAR; INTRAVENOUS at 14:26

## 2023-06-24 NOTE — P.PN
Subjective


Progress Note Date: 06/24/23


The patient seen at bedside and according to the patient's nurse she's more 

confused and having tremor over the right side.








Objective





- Vital Signs


Vital signs: 


                                   Vital Signs











Temp  98.2 F   06/24/23 12:10


 


Pulse  87   06/24/23 12:10


 


Resp  14   06/24/23 12:10


 


BP  134/76   06/24/23 12:10


 


Pulse Ox  98   06/24/23 12:10


 


FiO2      








                                 Intake & Output











 06/23/23 06/24/23 06/24/23





 18:59 06:59 18:59


 


Intake Total 460  


 


Output Total 2000 600 


 


Balance -1540 -600 


 


Weight  68.5 kg 


 


Intake:   


 


  Oral 60  


 


  Hemodialysis 400  


 


Output:   


 


  Urine  600 


 


  Hemodialysis 2000  


 


Other:   


 


  Voiding Method Diaper Diaper 


 


  # Bowel Movements 1  1














- Exam


Neuro exam is limited


Patient is has her awake but extremely slow following few simple commands 

(thumbs up and attempting to sick her tongue out).  


Pupils are round, equal and reactive to light.  She is tracking.  Mild right 

lower facial weakness.  


Motor: Strength is limited in limited because of her condition.





Some other workup during his hospital visit consisted of:


Ammonia is 11.  Her creatinine is 9.6 with a BUN 63.  His sugar has been in the 

range of 100 and tends to 190s.  Calcium magnesium sodium liver function is w

ithin normal limits.


Urine drug seeing is not detected.


Routine EEG is abnormal.  The back slowing suggestive of mild to moderate 

encephalopathy.  Epileptiform discharges over the left central parietal region 

increases risk for seizure.  Otherwise no seizure noted during the study.  Local

correlation recommended








- Labs


CBC & Chem 7: 


                                 06/21/23 20:11





                                 06/23/23 09:50


Labs: 


                  Abnormal Lab Results - Last 24 Hours (Table)











  06/23/23 06/24/23 06/24/23 Range/Units





  20:31 09:22 11:48 


 


POC Glucose (mg/dL)  148 H  153 H  122 H  ()  mg/dL








                      Microbiology - Last 24 Hours (Table)











 06/20/23 17:30 Blood Culture - Preliminary





 Blood 


 


 06/20/23 18:15 Blood Culture - Preliminary





 Blood 


 


 06/20/23 15:50 Urine Culture - Preliminary





 Urine,Voided    Group D Enterococcus














Assessment and Plan


Assessment: 


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

was has multiple admission to our facility another facility for confusion with 

breakthrough seizures.





Altered mental status and suspect had breakthrough seizure-- No seizure on EEG 

but has discharges over left parietal/central region--has worsening mentation 

and tremor--concern for seizure


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 

epilepsy (on four antiepileptic drugs).  She has multiple admissions to our 

facility as well as outside facility.  She had a prolonged EEG in our facility 

and was suggestive of primary generalized epilepsy


Left ICA stenosis of 75% stenosis on CTA but discrepancy on carotid duplex and 

not significant


History of stroke with residual left hemiparesis


Diabetes mellitus


End-stage renal disease on dialysis


Hypertension





Plan: 


Continue home antiepileptic of Keppra 500 mg 1 tablet twice a day with 

additional 500 postdialysis, Vimpat 150 mg 3 times a day with additional dose 

postdialysis, Zonegran 100 mg a tablet 3 times a day. I will increase  Depakote 

500 mg 1 tablet twice a day to 1000mg bid since concerned for continued seizures

with loading dose of Depakote 1000mg.  Her Depakote level was subtherapeutic and

unsure if due to low dose.  Will also give her one time Ativan 1mg now.


Pending 2.5 hour EEG could not be performed yesterday by tech since no 

availability and likely will be done this Monday.  


Levels: Keppra 54.6 (normal 3-60), Depakote: 39.7 (normal ).  Unsure if 

Depakote is subtherapeutic since low dose vs missed medicaiton. Pennding vimpat 

level to make sure not subtherapeutic.


Routine EEG showed left central/parietal epileptiform discharges which can 

increase risk for seizure.  No seizure noted during this study.. She had 

multiple EEG in the past and prolonged EEG in our facility and was felt primary 

generalized epilepsy.


On seizure precaution and pads.


On Ativan 1mg every 4 hours PRN for seizures.


Will obtain CT head.


Recommend the patient to follow-up with epileptologist as outpatient and 

recommend EMU.   She had multiple admission to our facility and outside 

facility.  She is on 4 antieplileptic drugs and continues to have seizures.  

Consider start on Epidiolex as outpatient since had medical refractory epilepsy 

and that was recommended on prior visits.


Will defer the rest of medical management to her primary and other specialist.





The plan is discussed with her nurse.





UPDATE:


Later in the afternoon, I re-examined the patient and she was still very drowsy 

but was able to show me thumbs up.  She had tremor of the right upper extremity.


I notified the nurse to give her 2mg Ativan once stat (patient received 1mg 

earlier today in the morning).


I recommend the patient to be transferred for long term EEG since for past two 

days she has been confused and I am concerned she is seizing.


Updated her primary physician.








Time with Patient: Less than 30

## 2023-06-24 NOTE — P.PN
Subjective


Progress Note Date: 06/24/23








I am seeing this patient in new consultation today 06/21/2023 in the emergency 

room possible left lower lobe pneumonia.  Patient is a 53-year-old female with 

past medical history significant for multiple comorbidities including end-stage 

renal disease, seizure disorder, CVA, diabetes mellitus, GERD, hyperlipidemia, 

hypertension, hypothyroidism.  Patient was sent in from [facility] 

yesterday afternoon for concerns about altered mental status. There was also 

concern for nausea and retching. She has had multiple prior emergency room 

visits and hospital admissions in the past for similar symptoms.  She actually 

had an ER visit on June 19th for altered mental status.  She was discharged back

to [facility], and returned yesterday afternoon. She also has history of ventilator

dependence for status epilepticus.  No reports of seizure activity on this 

admission.  Patient apparently continues to have seizures about once per week to

once per month. Patient is currently sitting up in bed, alert but nonverbal, in 

no acute distress.  She does not follow commands.  She is on 2 L/m nasal cannula

and oxygenating at 100%.  Chest x-ray on arrival shows small left pleural 

effusion and possible left lower lobe infiltrate. Patient is currently on 

Levaquin for empiric antibiotics.  She is afebrile.  CBC on arrival was 

unremarkable.  BMP shows sodium 138, potassium 4.4, chloride 99, serum bicarb 

24, BUN 63, creatinine 9.6, glucose 186.  Patient reportedly does receive 

hemodialysis on Monday, Wednesday, Friday schedule.  Troponins negative 1.  

Ammonia level was low.  Urinalysis was not particularly concerning for UTI.  

Brain CT without contrast, one day prior, shows age-related atrophic and chronic

small vessel ischemia without any acute intracranial process. Patient will be 

admitted to the cardiac stepdown unit once bed available.





On today's evaluation of 06/22/2023, the patient is stable.  No worsening 

shortness of breath.  In fact I do not see any signs of any respiratory distress

this patient.  No aspiration.  No cough or sputum production.  No fever or 

chills.  No seizure activity overnight.  The patient is following simple 

commands only.  Neurology is on the case regarding her underlying mental status.

 The patient remains on Levaquin.  Patient is also on cefepime.  During culture 

was positive for enterococcus group D.  The blood culture showing no growth.





On 06/20/2023, the neurologic status of the patient remains unchanged.  She 

doesn't do a lot of talking and she doesn't communicate.  She doesn't minimum 

And Neurology Is on the Case.  Suspect a Component of Anoxic Encephalopathy.  

The Patient Is Also Being Treated for Seizures and She Remains on a Combination 

of Keppra and Vimpat.  No Respiratory Distress.  No Significant Cough or Sputum 

Production.  Breathing Is Nonlabored and the Patient Is Undergoing Hemodialysis 

Today.  She Is on IV Cefepime for Now.  She Remains on Oxygen at 2 L/M Nasal 

Cannula with a Pulse Ox of 99%.  The Blood Work from Today Shows a Sodium Level 

of 136, BUN Is 43 with a Creatinine of 8.5 and a Potassium Is at 3.9.  Bicarb 

Level Is at 26.  She Was Found to Have Enterococcus Group D in Her Urine.





On 06/24/2023, patient has no respiratory difficulties.  The patient remains on 

antibiotics and she is currently on IV cefepime.  No change in her neurologic 

functions for now.  She is hemodynamically stable.  She is on room air oxygen.





Objective





- Vital Signs


Vital signs: 


                                   Vital Signs











Temp  98.4 F   06/24/23 09:10


 


Pulse  88   06/24/23 09:10


 


Resp  14   06/24/23 09:10


 


BP  159/68   06/24/23 09:10


 


Pulse Ox  100   06/24/23 09:10


 


FiO2      








                                 Intake & Output











 06/23/23 06/24/23 06/24/23





 18:59 06:59 18:59


 


Intake Total 460  


 


Output Total 2000 600 


 


Balance -1540 -600 


 


Weight  68.5 kg 


 


Intake:   


 


  Oral 60  


 


  Hemodialysis 400  


 


Output:   


 


  Urine  600 


 


  Hemodialysis 2000  


 


Other:   


 


  Voiding Method Diaper Diaper 


 


  # Bowel Movements 1  1














- Exam








GENERAL EXAM: Alert but nonverbal, 53-year-old female, comfortable in no 

apparent distress.  The patient is currently on room air oxygen.


HEAD: Normocephalic and atraumatic


EYES: Normal reaction of pupils, equal size.


NOSE: Clear with pink turbinates.


THROAT: No erythema or exudates.


NECK: No masses, no JVD.


CHEST: No chest wall deformity.  There is a left chest implanted device


LUNGS: Equal air entry with left lower lobe inspiratory crackles.  No wheezing, 

rhonchi.    No conversational dyspnea or accessory muscle use.. 


CVS: S1 and S2 normal with no audible murmur, regular rhythm. No extra heart 

sounds


ABDOMEN: No hepatosplenomegaly, active bowel sounds, no guarding or rigidity. 


SPINE: No scoliosis or deformity


SKIN: No rashes


CENTRAL NERVOUS SYSTEM: No focal deficits, tone is normal in all 4 extremities. 

No seizure activity noted.


EXTREMITIES: There is no peripheral edema, clubbing, or cyanosis.  Peripheral 

pulses are intact.  There is a left arm AV fistula





- Labs


CBC & Chem 7: 


                                 06/21/23 20:11





                                 06/23/23 09:50


Labs: 


                  Abnormal Lab Results - Last 24 Hours (Table)











  06/23/23 06/24/23 Range/Units





  20:31 09:22 


 


POC Glucose (mg/dL)  148 H  153 H  ()  mg/dL








                      Microbiology - Last 24 Hours (Table)











 06/20/23 17:30 Blood Culture - Preliminary





 Blood 


 


 06/20/23 18:15 Blood Culture - Preliminary





 Blood 


 


 06/20/23 15:50 Urine Culture - Preliminary





 Urine,Voided    Group D Enterococcus














Assessment and Plan


Assessment: 





Acute hypoxemic respiratory failure, currently on 2 L/m nasal cannula, possibly 

secondary to healthcare associated pneumonia.  Chest x-ray shows a left-sided 

pleural effusion and suspected left lower lobe infiltrate.   No signs of any 

respiratory distress and the patient's is currently on room air oxygen





Altered mental status, currently under investigation. Brain CT without contrast,

one day prior, shows age-related atrophic and chronic small vessel ischemia 

without any acute intracranial process.  I do suspect a component of anoxic 

encephalopathy this patient.  This could be related to his recurrent seizures.  

She remains on a combination of Vimpat and Keppra.





History of seizure disorder, currently on accommodation of Vimpat and Keppra





End-stage renal disease, reportedly receives hemodialysis Monday, Wednesday, 

Friday schedule.  Undergoing scheduled hemodialysis by nephrology and the 

patient is undergoing hemodialysis today





Diabetes mellitus type 2, non-insulin-dependent





Hyperlipidemia





Essential hypertension





History of CVA





Hypothyroidism





GERD without esophagitis





Questionable enterococcal UTI group, blood cultures are negative.D








Plan:





Overall respiratory status is stable 


Patient is currently on room air oxygen


Check procalcitonin level was slightly elevated at 1.44 and this could be 

related to renal failure 


Continue bronchodilators


Consult neurology to manage antiepileptics.  Suspect anoxic encephalopathy


Seizure precautions


Hemodialysis per nephrology


We will continue to follow

## 2023-06-24 NOTE — CT
EXAMINATION TYPE: CT brain wo con

 

DATE OF EXAM: 6/24/2023

 

COMPARISON:

 

HISTORY: ams

 

CT DLP: 1231.4 mGycm

 

Unenhanced CT of the brain was performed.  

 

The ventricles, basal cisterns and sulci overlying the cerebral convexities demonstrate mild enlargem
ent. 

 

There is no evidence for intracranial hemorrhage or sulcal effacement.  

 

There is decreased attenuation about the periventricular white matter and deep white matter of both c
erebral hemispheres, compatible with chronic small vessel ischemia. Differential diagnosis does inclu
de demyelination. 

 

No mass effects are seen.No midline shift.

 

Osseous calvarium is intact.  

 

If symptoms persist consider MRI.  

 

IMPRESSION:

 

1. Age related atrophic and chronic small vessel ischemic change without acute intracranial process s
een at this time.

## 2023-06-24 NOTE — P.PN
Subjective





This is a pleasant 52 years old male who presents with altered mental status 

secondary to metabolic encephalopathy possible breakthrough seizure, she has 

history of refractory seizure, she's been evaluated by neurologist and currently

she is continued on her home dose of 4 seizure medication including Depakote, 

Vimpat, Keppra, zonisamide, neurologist recommended the patient follow up 

outpatient.


Patient also suspected to have community acquired left lower lobe pneumonia and 

currently covered with cefepime.  Also she is on Levaquin added by internal 

medical service on a prior days, when going through this continue Levaquin as it

lowers seizure thresholds and patient is with no fever or leukocytosis.


Urine cultures, group D enterococcus


Patient herself is drowsy this morning, she knows she is in [place], she 

answers questions without difficulty and she follows simple commands, she's been

evaluated by psychiatrist and found to have Asked to make decision.


Nephrologist team for hemodialysis


Patient is still complaining of from coughing and clinical tachypnea while at 

rest


She is saturating well to 2 L/m of oxygen.


Patient is in the process of obtaining public guardian, Follow-up with court 

hearing for possible public guardian.   on the case





06/24/2023


Patient admitted initially for left lower lobe pneumonia and looks like she is 

doing well with no significant respiratory symptoms and currently on cefepime.


Levaquin was discontinued yesterday as it lowers seizure threshold


This morning patient is a staring in the air and does not follow command and 

looks her mentation situation is worth, also some twitching on the right side 

noticed by the staff.  I discussed case with the neurologist, he increased 

seizure medication and currently she is on Depakote 1000 mg twice a day, Vimpat 

150 twice a day, Keppra 500 mg IV twice a day as well as Zanasomide.  Patient 

will need prolonged EEG per neurologist recommended to transfer the patient to 

tertiary care center,  agreeable for the transfer.  I called [facility] and they put her on the waiting list, I called [facility] 

had all their systems and they said they can accept her in main branch in 

[place] but this will happen tomorrow or the day after, I called [facility] and they don't have a bed available at the several days.  We will 

follow up with the patient closely for the transfer.


s








Objective





- Vital Signs


Vital signs: 


                                   Vital Signs











Temp  98.2 F   06/24/23 12:10


 


Pulse  87   06/24/23 12:10


 


Resp  14   06/24/23 12:10


 


BP  134/76   06/24/23 12:10


 


Pulse Ox  98   06/24/23 12:10


 


FiO2      








                                 Intake & Output











 06/23/23 06/24/23 06/24/23





 18:59 06:59 18:59


 


Intake Total 460  


 


Output Total 2000 600 


 


Balance -1540 -600 


 


Weight  68.5 kg 


 


Intake:   


 


  Oral 60  


 


  Hemodialysis 400  


 


Output:   


 


  Urine  600 


 


  Hemodialysis 2000  


 


Other:   


 


  Voiding Method Diaper Diaper 


 


  # Bowel Movements 1  1














- Exam





-GENERAL: The patient is awake but drowsy, not in any acute distress. Well 

developed, well nourished. 


HEENT: Pupils are round and equally reacting to light. EOMI. No scleral icterus.

No conjunctival pallor. Normocephalic, atraumatic. No pharyngeal erythema. No 

thyromegaly. 


CARDIOVASCULAR: S1 and S2 present. No murmurs, rubs, or gallops. 


PULMONARY: Chest is clear to auscultation, no wheezing or crackles. 


ABDOMEN: Soft, nontender, nondistended, normoactive bowel sounds. No palpable 

organomegaly. 


MUSCULOSKELETAL: No joint swelling or deformity. 


EXTREMITIES: No cyanosis, clubbing, or pedal edema. 


NEUROLOGICAL: Gross neurological examination did not reveal any focal deficits. 


SKIN: No rashes. no petechiae.





- Labs


CBC & Chem 7: 


                                 06/21/23 20:11





                                 06/23/23 09:50


Labs: 


                  Abnormal Lab Results - Last 24 Hours (Table)











  06/23/23 06/24/23 06/24/23 Range/Units





  20:31 09:22 11:48 


 


POC Glucose (mg/dL)  148 H  153 H  122 H  ()  mg/dL








                      Microbiology - Last 24 Hours (Table)











 06/20/23 17:30 Blood Culture - Preliminary





 Blood 


 


 06/20/23 18:15 Blood Culture - Preliminary





 Blood 


 


 06/20/23 15:50 Urine Culture - Preliminary





 Urine,Voided    Group D Enterococcus














Assessment and Plan


Assessment: 





Left lower lobe pneumonia


Altered mental status secondary to metabolic encephalopathy, possible breakthrou

gh seizure


Refractory epilepsy, possible breakthrough seizure


 history of CVA and left mari-variances


End-stage renal disease on hemodialysis 


possible UTI with a group B enterococci





Plan: 


Contacted several tertiary care centers like [facility] and [facility] pending availability for transfer


Continue with cefepime


DC Levaquin


Infectious disease and pulmonary team on the case


Continue with same seizure medication, Vimpat, Keppra, Depakote and zonisamide ,

neurologist on the case


Labs and medication were reviewed..  Continue same treatment.  Continue with 

symptomatic treatment.  Resume home medication.  Monitor labs and vitals.  DVT 

and GI prophylaxis.  Further recommendations as per clinical course of the 

patient


DVT prophylaxis: Subcutaneous heparin


GI Prophylaxis: Pepcid


PT/OT: Recommended ECF


Prognosis is guarded

## 2023-06-24 NOTE — P.PN
Subjective


Progress Note Date: 06/24/23


Principal diagnosis: 


Pneumonia





Patient is a 53-year-old  female with a past medical history 

significant for end-stage renal disease on hemodialysis through the left upper 

arm fistula and nursing home resident patient has been sent to the ER  for 

evaluation of mental status changes, patient just x-ray with left lower lobe 

infiltrate concerning for pneumonia.


On today's evaluation that is 6/24/2023, the patient is afebrile the patient 

seem to be slightly more awake alert however not a very good historian patient 

is currently breathing comfortably on 2 L nasal oxygen no vomiting or diarrhea 

has been reported





Objective





- Vital Signs


Vital signs: 


                                   Vital Signs











Temp  98.2 F   06/24/23 12:10


 


Pulse  87   06/24/23 12:10


 


Resp  14   06/24/23 12:10


 


BP  134/76   06/24/23 12:10


 


Pulse Ox  98   06/24/23 12:10


 


FiO2      








                                 Intake & Output











 06/23/23 06/24/23 06/24/23





 18:59 06:59 18:59


 


Intake Total 460  


 


Output Total 2000 600 


 


Balance -1540 -600 


 


Weight  68.5 kg 


 


Intake:   


 


  Oral 60  


 


  Hemodialysis 400  


 


Output:   


 


  Urine  600 


 


  Hemodialysis 2000  


 


Other:   


 


  Voiding Method Diaper Diaper 


 


  # Bowel Movements 1  1














- Exam


GENERAL DESCRIPTION: Middle-age female up in the chair in no distress





RESPIRATORY SYSTEM: Unlabored breathing , decreased breath sounds at bases





HEART: S1 S2 regular rate and rhythm ,





ABDOMEN: Soft , no tenderness





EXTREMITIES: No edema feet








- Labs


CBC & Chem 7: 


                                 06/21/23 20:11





                                 06/23/23 09:50


Labs: 


                  Abnormal Lab Results - Last 24 Hours (Table)











  06/23/23 06/24/23 06/24/23 Range/Units





  20:31 09:22 11:48 


 


POC Glucose (mg/dL)  148 H  153 H  122 H  ()  mg/dL








                      Microbiology - Last 24 Hours (Table)











 06/20/23 17:30 Blood Culture - Preliminary





 Blood 


 


 06/20/23 18:15 Blood Culture - Preliminary





 Blood 


 


 06/20/23 15:50 Urine Culture - Preliminary





 Urine,Voided    Group D Enterococcus














Assessment and Plan


(1) Pneumonia


Current Visit: Yes   Status: Acute   Code(s): J18.9 - PNEUMONIA, UNSPECIFIED 

ORGANISM   SNOMED Code(s): 067014339


   


Plan: 


1patient presented to hospital with mental status changes decreased level of 

responsiveness with evidence of left lower lobe infiltrate concerning for 

possible pneumonia, patient however did not have any fever or elevated white 

count, underlying pneumonia less likely but not excluded, patient also have a 

positive UA however this patient do have a history of end-stage renal disease on

dialysis with no clinical significance


2-penicillin allergy of limit the number of antibiotics safe to use


3-patient did have elevated procalcitonin level of 1.44


4Patient is currently afebrile respiratory status is stable blood cultures are 

so far negative, patient urine culture growing Enterococcus however the patient 

hardly makes any urine and urine was not significantly positive possible col

onization patient to continue with the cefepime and will monitor clinical course

closely

## 2023-06-24 NOTE — P.PN
Subjective


Progress Note Date: 06/24/23


Follow-up for ESRD.  Sleepy today.








Objective





- Vital Signs


Vital signs: 


                                   Vital Signs











Temp  98.2 F   06/24/23 12:10


 


Pulse  96   06/24/23 15:42


 


Resp  14   06/24/23 12:10


 


BP  134/76   06/24/23 12:10


 


Pulse Ox  98   06/24/23 12:10


 


FiO2      








                                 Intake & Output











 06/23/23 06/24/23 06/24/23





 18:59 06:59 18:59


 


Intake Total 460  


 


Output Total 2000 600 


 


Balance -1540 -600 


 


Weight  68.5 kg 


 


Intake:   


 


  Oral 60  


 


  Hemodialysis 400  


 


Output:   


 


  Urine  600 


 


  Hemodialysis 2000  


 


Other:   


 


  Voiding Method Diaper Diaper 


 


  # Bowel Movements 1  1














- Exam


No acute distress


S1-S2 heard


Decreased breath sounds


No edema








- Labs


CBC & Chem 7: 


                                 06/21/23 20:11





                                 06/23/23 09:50


Labs: 


                  Abnormal Lab Results - Last 24 Hours (Table)











  06/23/23 06/24/23 06/24/23 Range/Units





  20:31 09:22 11:48 


 


POC Glucose (mg/dL)  148 H  153 H  122 H  ()  mg/dL








                      Microbiology - Last 24 Hours (Table)











 06/20/23 17:30 Blood Culture - Preliminary





 Blood 


 


 06/20/23 18:15 Blood Culture - Preliminary





 Blood 


 


 06/20/23 15:50 Urine Culture - Preliminary





 Urine,Voided    Group D Enterococcus














Assessment and Plan


Assessment: 


#1 encephalopathy, concern for seizure disorder


#2 ESRD on hemodialysis, MWF schedule


#3 hypertension with ESRD


#4 metabolic bone disease


#5 anemia with ESRD





Plan: 


#1 hemodialysis as per outpatient schedule.  Next treatment on Monday


#2 follow-up on computed tomography scan results


#3 urology following

## 2023-06-25 LAB
ALBUMIN SERPL-MCNC: 3.2 G/DL (ref 3.5–5)
ALP SERPL-CCNC: 62 U/L (ref 38–126)
ALT SERPL-CCNC: 12 U/L (ref 4–34)
ANION GAP SERPL CALC-SCNC: 11 MMOL/L
AST SERPL-CCNC: 24 U/L (ref 14–36)
BASOPHILS # BLD AUTO: 0 K/UL (ref 0–0.2)
BASOPHILS NFR BLD AUTO: 1 %
BILIRUB INDIRECT SERPL-MCNC: 0.1 MG/DL (ref 0–1.1)
BILIRUBIN DIRECT+TOT PNL SERPL-MCNC: 0.4 MG/DL (ref 0–0.2)
BUN SERPL-SCNC: 26 MG/DL (ref 7–17)
CALCIUM SPEC-MCNC: 9.6 MG/DL (ref 8.4–10.2)
CHLORIDE SERPL-SCNC: 96 MMOL/L (ref 98–107)
CO2 SERPL-SCNC: 26 MMOL/L (ref 22–30)
EOSINOPHIL # BLD AUTO: 0.1 K/UL (ref 0–0.7)
EOSINOPHIL NFR BLD AUTO: 2 %
ERYTHROCYTE [DISTWIDTH] IN BLOOD BY AUTOMATED COUNT: 2.98 M/UL (ref 3.8–5.4)
ERYTHROCYTE [DISTWIDTH] IN BLOOD: 13.6 % (ref 11.5–15.5)
GLUCOSE BLD-MCNC: 71 MG/DL (ref 70–110)
GLUCOSE BLD-MCNC: 81 MG/DL (ref 70–110)
GLUCOSE BLD-MCNC: 89 MG/DL (ref 70–110)
GLUCOSE BLD-MCNC: 92 MG/DL (ref 70–110)
GLUCOSE SERPL-MCNC: 91 MG/DL (ref 74–99)
HCT VFR BLD AUTO: 28.5 % (ref 34–46)
HGB BLD-MCNC: 9.8 GM/DL (ref 11.4–16)
LYMPHOCYTES # SPEC AUTO: 2 K/UL (ref 1–4.8)
LYMPHOCYTES NFR SPEC AUTO: 37 %
MCH RBC QN AUTO: 32.8 PG (ref 25–35)
MCHC RBC AUTO-ENTMCNC: 34.3 G/DL (ref 31–37)
MCV RBC AUTO: 95.6 FL (ref 80–100)
MONOCYTES # BLD AUTO: 0.4 K/UL (ref 0–1)
MONOCYTES NFR BLD AUTO: 7 %
NEUTROPHILS # BLD AUTO: 2.9 K/UL (ref 1.3–7.7)
NEUTROPHILS NFR BLD AUTO: 53 %
PLATELET # BLD AUTO: 106 K/UL (ref 150–450)
POTASSIUM SERPL-SCNC: 3.6 MMOL/L (ref 3.5–5.1)
PROT SERPL-MCNC: 6 G/DL (ref 6.3–8.2)
SODIUM SERPL-SCNC: 133 MMOL/L (ref 137–145)
WBC # BLD AUTO: 5.4 K/UL (ref 3.8–10.6)

## 2023-06-25 RX ADMIN — ATORVASTATIN CALCIUM SCH: 40 TABLET, FILM COATED ORAL at 20:33

## 2023-06-25 RX ADMIN — IPRATROPIUM BROMIDE AND ALBUTEROL SULFATE SCH: .5; 3 SOLUTION RESPIRATORY (INHALATION) at 15:30

## 2023-06-25 RX ADMIN — HEPARIN SODIUM SCH UNIT: 5000 INJECTION INTRAVENOUS; SUBCUTANEOUS at 09:53

## 2023-06-25 RX ADMIN — INSULIN ASPART SCH: 100 INJECTION, SOLUTION INTRAVENOUS; SUBCUTANEOUS at 17:57

## 2023-06-25 RX ADMIN — INSULIN ASPART SCH: 100 INJECTION, SOLUTION INTRAVENOUS; SUBCUTANEOUS at 12:24

## 2023-06-25 RX ADMIN — IPRATROPIUM BROMIDE AND ALBUTEROL SULFATE SCH ML: .5; 3 SOLUTION RESPIRATORY (INHALATION) at 19:43

## 2023-06-25 RX ADMIN — INSULIN ASPART SCH: 100 INJECTION, SOLUTION INTRAVENOUS; SUBCUTANEOUS at 07:24

## 2023-06-25 RX ADMIN — IPRATROPIUM BROMIDE AND ALBUTEROL SULFATE SCH: .5; 3 SOLUTION RESPIRATORY (INHALATION) at 07:53

## 2023-06-25 RX ADMIN — LEVETIRACETAM SCH MG: 100 INJECTION, SOLUTION, CONCENTRATE INTRAVENOUS at 20:59

## 2023-06-25 RX ADMIN — LEVETIRACETAM SCH MG: 100 INJECTION, SOLUTION, CONCENTRATE INTRAVENOUS at 09:53

## 2023-06-25 RX ADMIN — VALPROATE SODIUM SCH MLS/HR: 100 INJECTION, SOLUTION INTRAVENOUS at 09:52

## 2023-06-25 RX ADMIN — ZONISAMIDE SCH: 100 CAPSULE ORAL at 07:24

## 2023-06-25 RX ADMIN — HEPARIN SODIUM SCH UNIT: 5000 INJECTION INTRAVENOUS; SUBCUTANEOUS at 20:59

## 2023-06-25 RX ADMIN — SEVELAMER CARBONATE SCH: 800 TABLET, FILM COATED ORAL at 20:33

## 2023-06-25 RX ADMIN — CEFEPIME HYDROCHLORIDE SCH MLS/HR: 1 INJECTION, POWDER, FOR SOLUTION INTRAMUSCULAR; INTRAVENOUS at 11:45

## 2023-06-25 RX ADMIN — ZONISAMIDE SCH: 100 CAPSULE ORAL at 20:33

## 2023-06-25 RX ADMIN — FAMOTIDINE SCH MG: 10 INJECTION, SOLUTION INTRAVENOUS at 20:59

## 2023-06-25 RX ADMIN — LACOSAMIDE SCH MG: 10 INJECTION INTRAVENOUS at 09:52

## 2023-06-25 RX ADMIN — SEVELAMER CARBONATE SCH: 800 TABLET, FILM COATED ORAL at 09:54

## 2023-06-25 RX ADMIN — ZONISAMIDE SCH: 100 CAPSULE ORAL at 15:33

## 2023-06-25 RX ADMIN — LACOSAMIDE SCH MG: 10 INJECTION INTRAVENOUS at 20:59

## 2023-06-25 RX ADMIN — VALPROATE SODIUM SCH MLS/HR: 100 INJECTION, SOLUTION INTRAVENOUS at 21:00

## 2023-06-25 RX ADMIN — ASPIRIN SCH: 325 TABLET ORAL at 20:33

## 2023-06-25 RX ADMIN — ASPIRIN SCH: 325 TABLET ORAL at 09:53

## 2023-06-25 RX ADMIN — FAMOTIDINE SCH MG: 10 INJECTION, SOLUTION INTRAVENOUS at 09:53

## 2023-06-25 NOTE — P.PN
Subjective





This is a pleasant 52 years old male who presents with altered mental status 

secondary to metabolic encephalopathy possible breakthrough seizure, she has 

history of refractory seizure, she's been evaluated by neurologist and currently

she is continued on her home dose of 4 seizure medication including Depakote, 

Vimpat, Keppra, zonisamide, neurologist recommended the patient follow up 

outpatient.


Patient also suspected to have community acquired left lower lobe pneumonia and 

currently covered with cefepime.  Also she is on Levaquin added by internal 

medical service on a prior days, when going through this continue Levaquin as it

lowers seizure thresholds and patient is with no fever or leukocytosis.


Urine cultures, group D enterococcus


Patient herself is drowsy this morning, she knows she is in [place], she 

answers questions without difficulty and she follows simple commands, she's been

evaluated by psychiatrist and found to have Asked to make decision.


Nephrologist team for hemodialysis


Patient is still complaining of from coughing and clinical tachypnea while at 

rest


She is saturating well to 2 L/m of oxygen.


Patient is in the process of obtaining public guardian, Follow-up with court 

hearing for possible public guardian.   on the case





06/24/2023


Patient admitted initially for left lower lobe pneumonia and looks like she is 

doing well with no significant respiratory symptoms and currently on cefepime.


Levaquin was discontinued yesterday as it lowers seizure threshold


This morning patient is a staring in the air and does not follow command and 

looks her mentation situation is worth, also some twitching on the right side 

noticed by the staff.  I discussed case with the neurologist, he increased 

seizure medication and currently she is on Depakote 1000 mg twice a day, Vimpat 

150 twice a day, Keppra 500 mg IV twice a day as well as Zanasomide.  Patient 

will need prolonged EEG per neurologist recommended to transfer the patient to 

tertiary care center,  agreeable for the transfer.  I called [facility] and they put her on the waiting list, I called [facility] which 

had all their systems and they said they can accept her in main branch in 

[place] but this will happen tomorrow or the day after, I called [facility] and they don't have a bed available at the several days.  We will 

follow up with the patient closely for the transfer.


s





06/25/202


Patient remains in the ICU, she is calm no active seizures noted.  Yesterday was

transferred to the ICU for worsening mentation and patient was kept staring in 

the ear unresponsive with some twitching on the right side noted and she 

received 1 dose of Ativan per neurologist on the case and patient was 

transferred to the ICU for further close monitoring with plan to transfer to to 

the tertiary care center yesterday I spoke with  from [facility] in [facility] and I discussed the case with him and he currently 

accepted the patient also the nursing supervisor this morning contacted the same

hospital and they confirmed she's on the transfer list pending bed availability.


This morning she is slightly and mildly better, she opens eyes to verbal stimuli

is and look at me but does not answer question or follow commands compared to 

continue with staring as of yesterday.


Patient's urine culture came back positive for VRE.  Initially patient thought 

she has pneumonia but I think patient currently complaining of from metabolic 

encephalopathy secondary to VRE UTI.  Patient is currently on cefepime but VRE 

is resistant to penicillin and vancomycin therefore going to start daptomycin 

today in follow-up with infectious disease team on the case.


Persistent seizure or recurrent seizure is suspected however I think this is 

less likely as patient currently on 4 seizure medication and received an fifth 

medication of Ativan yesterday, Ativan also may be contributing to her altered 

mental status.  However seizure cannot be entirely excluded currently and I 

think she still needs to be transferred to [facility] for prolonged EEG.  

Especially infection could  lower seizure thresholds.








Objective





- Vital Signs


Vital signs: 


                                   Vital Signs











Temp  98.6 F   06/25/23 16:00


 


Pulse  116 H  06/25/23 19:53


 


Resp  13   06/25/23 19:00


 


BP  130/70   06/25/23 19:00


 


Pulse Ox  95   06/25/23 19:00


 


FiO2      








                                 Intake & Output











 06/25/23 06/25/23 06/26/23





 06:59 18:59 06:59


 


Intake Total 50 100.0 


 


Output Total 0 0 0


 


Balance 50 100.0 0


 


Weight 69.7 kg  


 


Intake:   


 


  IV 50 100.0 


 


    Cefepime 1 gm In Sodium  50.0 





    Chloride 0.9% 50 ml @ 12.   





    5 mls/hr IVPB DAILY [facility]   





    Rx#:[id number]   


 


    Valproate Sodium 1,000 mg 50 50 





    In Sodium Chloride 0.9%   





    50 ml @ 50 mls/hr IVPB   





    BID [facility] Rx#:[id number]   


 


Output:   


 


  Urine 0 0 0


 


Other:   


 


  Voiding Method Diaper  


 


  # Bowel Movements 1 1 














- Exam





-GENERAL: The patient is awake but drowsy, not in any acute distress. Well 

developed, well nourished. 


HEENT: Pupils are round and equally reacting to light. EOMI. No scleral icterus.

No conjunctival pallor. Normocephalic, atraumatic. No pharyngeal erythema. No 

thyromegaly. 


CARDIOVASCULAR: S1 and S2 present. No murmurs, rubs, or gallops. 


PULMONARY: Chest is clear to auscultation, no wheezing or crackles. 


ABDOMEN: Soft, nontender, nondistended, normoactive bowel sounds. No palpable 

organomegaly. 


MUSCULOSKELETAL: No joint swelling or deformity. 


EXTREMITIES: No cyanosis, clubbing, or pedal edema. 


NEUROLOGICAL: Gross neurological examination did not reveal any focal deficits. 


SKIN: No rashes. no petechiae.





- Labs


CBC & Chem 7: 


                                 06/25/23 04:23





                                 06/25/23 04:23


Labs: 


                  Abnormal Lab Results - Last 24 Hours (Table)











  06/25/23 06/25/23 Range/Units





  04:23 04:23 


 


RBC  2.98 L   (3.80-5.40)  m/uL


 


Hgb  9.8 L   (11.4-16.0)  gm/dL


 


Hct  28.5 L   (34.0-46.0)  %


 


Plt Count  106 L   (150-450)  k/uL


 


Sodium   133 L  (137-145)  mmol/L


 


Chloride   96 L  ()  mmol/L


 


BUN   26 H  (7-17)  mg/dL


 


Creatinine   6.41 H  (0.52-1.04)  mg/dL


 


Delta Bilirubin   0.4 H  (0.0-0.2)  mg/dL


 


Total Protein   6.0 L  (6.3-8.2)  g/dL


 


Albumin   3.2 L  (3.5-5.0)  g/dL














Assessment and Plan


Assessment: 





VRE UTI


Altered mental status secondary to metabolic encephalopathy secondary to 

infection and metabolic abnormalities, possible breakthrough seizure


Left lower lobe pneumonia


Refractory epilepsy, possible breakthrough seizure


history of CVA and left mari-variances


End-stage renal disease on hemodialysis 


possible UTI with a group B enterococci





Plan: 


Contacted several tertiary care centers like [facility] and [facility] pending availability for transfer


Continue with cefepime, however we added daptomycin


Pending transfer to [facility] for prolonged EEG and further higher level 

of care.


Infectious disease and pulmonary team on the case


Continue with same seizure medication, Vimpat, Keppra, Depakote and zonisamide ,

neurologist on the case


Labs and medication were reviewed..  Continue same treatment.  Continue with 

symptomatic treatment.  Resume home medication.  Monitor labs and vitals.  DVT 

and GI prophylaxis.  Further recommendations as per clinical course of the 

patient


DVT prophylaxis: Subcutaneous heparin


GI Prophylaxis: Pepcid


PT/OT: Recommended ECF


Prognosis is guarded

## 2023-06-25 NOTE — P.PN
Subjective


Progress Note Date: 06/25/23


Follow-up for ESRD.  Transfer to ICU for encephalopathy.








Objective





- Vital Signs


Vital signs: 


                                   Vital Signs











Temp  98.6 F   06/25/23 08:00


 


Pulse  84   06/25/23 10:00


 


Resp  20   06/25/23 10:00


 


BP  138/75   06/25/23 10:00


 


Pulse Ox  97   06/25/23 10:00


 


FiO2      








                                 Intake & Output











 06/24/23 06/25/23 06/25/23





 18:59 06:59 18:59


 


Intake Total  50 50


 


Output Total  0 0


 


Balance  50 50


 


Weight  69.7 kg 


 


Intake:   


 


  IV  50 50


 


    Valproate Sodium 1,000 mg  50 50





    In Sodium Chloride 0.9%   





    50 ml @ 50 mls/hr IVPB   





    BID [facility] Rx#:[id number]   


 


Output:   


 


  Urine  0 0


 


Other:   


 


  Voiding Method Diaper Diaper 


 


  # Bowel Movements 1 1 














- Exam


No acute distress


S1-S2 heard


Decreased breath sounds


No edema








- Labs


CBC & Chem 7: 


                                 06/25/23 04:23





                                 06/25/23 04:23


Labs: 


                  Abnormal Lab Results - Last 24 Hours (Table)











  06/24/23 06/25/23 06/25/23 Range/Units





  21:15 04:23 04:23 


 


RBC   2.98 L   (3.80-5.40)  m/uL


 


Hgb   9.8 L   (11.4-16.0)  gm/dL


 


Hct   28.5 L   (34.0-46.0)  %


 


Plt Count   106 L   (150-450)  k/uL


 


ABG pO2  77 L    ()  mmHg


 


ABG HCO3  29 H    (21-25)  mmol/L


 


ABG Total CO2  30 H    (19-24)  mmol/L


 


Sodium    133 L  (137-145)  mmol/L


 


Chloride    96 L  ()  mmol/L


 


BUN    26 H  (7-17)  mg/dL


 


Creatinine    6.41 H  (0.52-1.04)  mg/dL


 


Delta Bilirubin    0.4 H  (0.0-0.2)  mg/dL


 


Total Protein    6.0 L  (6.3-8.2)  g/dL


 


Albumin    3.2 L  (3.5-5.0)  g/dL








                      Microbiology - Last 24 Hours (Table)











 06/20/23 18:15 Blood Culture - Preliminary





 Blood 














Assessment and Plan


Assessment: 


#1 encephalopathy, concern for seizure disorder


#2 ESRD on hemodialysis, MWF schedule


#3 hypertension with ESRD


#4 metabolic bone disease


#5 anemia with ESRD





Plan: 


#1 hemodialysis as per outpatient schedule.  Next treatment on Monday


#2 follow-up on computed tomography scan results


#3 Neurology following

## 2023-06-25 NOTE — P.PN
Subjective


Progress Note Date: 06/25/23








I am seeing this patient in new consultation today 06/21/2023 in the emergency 

room possible left lower lobe pneumonia.  Patient is a 53-year-old female with 

past medical history significant for multiple comorbidities including end-stage 

renal disease, seizure disorder, CVA, diabetes mellitus, GERD, hyperlipidemia, 

hypertension, hypothyroidism.  Patient was sent in from [facility] 

yesterday afternoon for concerns about altered mental status. There was also 

concern for nausea and retching. She has had multiple prior emergency room 

visits and hospital admissions in the past for similar symptoms.  She actually 

had an ER visit on June 19th for altered mental status.  She was discharged back

to [facility], and returned yesterday afternoon. She also has history of ventilator

dependence for status epilepticus.  No reports of seizure activity on this 

admission.  Patient apparently continues to have seizures about once per week to

once per month. Patient is currently sitting up in bed, alert but nonverbal, in 

no acute distress.  She does not follow commands.  She is on 2 L/m nasal cannula

and oxygenating at 100%.  Chest x-ray on arrival shows small left pleural 

effusion and possible left lower lobe infiltrate. Patient is currently on 

Levaquin for empiric antibiotics.  She is afebrile.  CBC on arrival was 

unremarkable.  BMP shows sodium 138, potassium 4.4, chloride 99, serum bicarb 

24, BUN 63, creatinine 9.6, glucose 186.  Patient reportedly does receive 

hemodialysis on Monday, Wednesday, Friday schedule.  Troponins negative 1.  

Ammonia level was low.  Urinalysis was not particularly concerning for UTI.  

Brain CT without contrast, one day prior, shows age-related atrophic and chronic

small vessel ischemia without any acute intracranial process. Patient will be 

admitted to the cardiac stepdown unit once bed available.





On today's evaluation of 06/22/2023, the patient is stable.  No worsening 

shortness of breath.  In fact I do not see any signs of any respiratory distress

this patient.  No aspiration.  No cough or sputum production.  No fever or 

chills.  No seizure activity overnight.  The patient is following simple 

commands only.  Neurology is on the case regarding her underlying mental status.

 The patient remains on Levaquin.  Patient is also on cefepime.  During culture 

was positive for enterococcus group D.  The blood culture showing no growth.





On 06/20/2023, the neurologic status of the patient remains unchanged.  She 

doesn't do a lot of talking and she doesn't communicate.  She doesn't minimum 

And Neurology Is on the Case.  Suspect a Component of Anoxic Encephalopathy.  

The Patient Is Also Being Treated for Seizures and She Remains on a Combination 

of Keppra and Vimpat.  No Respiratory Distress.  No Significant Cough or Sputum 

Production.  Breathing Is Nonlabored and the Patient Is Undergoing Hemodialysis 

Today.  She Is on IV Cefepime for Now.  She Remains on Oxygen at 2 L/M Nasal 

Cannula with a Pulse Ox of 99%.  The Blood Work from Today Shows a Sodium Level 

of 136, BUN Is 43 with a Creatinine of 8.5 and a Potassium Is at 3.9.  Bicarb 

Level Is at 26.  She Was Found to Have Enterococcus Group D in Her Urine.





On 06/24/2023, patient has no respiratory difficulties.  The patient remains on 

antibiotics and she is currently on IV cefepime.  No change in her neurologic 

functions for now.  She is hemodynamically stable.  She is on room air oxygen.On

today's evaluation of 06/25/2023, the patient is in the intensive care unit.  

Noted the patient had worsening in her condition.  Her baseline condition was 

being nonverbal or saying a few words without holding a conversation.  She did 

subsequently started having twitching in her right side and this was noted by 

the nursing staff.  Neurology was involved in the patient's was given higher 

doses of antiepileptic medication patient was placed on Depakote 1 g twice a 

day, Vimpat 150 mg twice a day, Keprra 500 mg IV every 12 hours  and zonisamide.

 Ranges also made to transfer this patient to another hospital for continuous 

EEG monitoring.  Meanwhile, the patient got transferred to the intensive care 

unit.  She is extremely drowsy, nonverbal, not communicating at this point in 

time, which is why close to baseline.  She underwent hemodialysis yesterday 

without any major issues.  She has a positive cough and a gag.  She was kept on 

2 L of oxygen by nasal cannula.  The blood gas was also done yesterday that 

showed a pH of 7.44 with a pCO2 43 pO2 77.  The white cycles of 5.4 with a 

hemoglobin 9.8 and a platelet count of 106.  BUN is 26 with a creatinine of 6.4 

and a sodium level is at 133.  A repeat CAT scan of the brain was done yesterday

that showed age-related atrophy with chronic small vessel ischemic changes.





Objective





- Vital Signs


Vital signs: 


                                   Vital Signs











Temp  98.2 F   06/25/23 04:00


 


Pulse  79   06/25/23 07:00


 


Resp  16   06/25/23 07:00


 


BP  142/76   06/25/23 07:00


 


Pulse Ox  99   06/25/23 07:00


 


FiO2      








                                 Intake & Output











 06/24/23 06/25/23 06/25/23





 18:59 06:59 18:59


 


Intake Total  50 


 


Output Total  0 0


 


Balance  50 0


 


Weight  69.7 kg 


 


Intake:   


 


  IV  50 


 


    Valproate Sodium 1,000 mg  50 





    In Sodium Chloride 0.9%   





    50 ml @ 50 mls/hr IVPB   





    BID [facility] Rx#:[id number]   


 


Output:   


 


  Urine  0 0


 


Other:   


 


  Voiding Method Diaper Diaper 


 


  # Bowel Movements 1 1 














- Exam








GENERAL EXAM: Alert but nonverbal, 53-year-old female, comfortable in no 

apparent distress.  The patient is currently on  2 L of oxygen by nasal cannula


HEAD: Normocephalic and atraumatic


EYES: Normal reaction of pupils, equal size.


NOSE: Clear with pink turbinates.


THROAT: No erythema or exudates.


NECK: No masses, no JVD.


CHEST: No chest wall deformity.  There is a left chest implanted device


LUNGS: Equal air entry with left lower lobe inspiratory crackles.  No wheezing, 

rhonchi.    No conversational dyspnea or accessory muscle use.. 


CVS: S1 and S2 normal with no audible murmur, regular rhythm. No extra heart 

sounds


ABDOMEN: No hepatosplenomegaly, active bowel sounds, no guarding or rigidity. 


SPINE: No scoliosis or deformity


SKIN: No rashes


CENTRAL NERVOUS SYSTEM: No focal deficits, tone is normal in all 4 extremities. 

No seizure activity noted.  Quite obtunded this point in time.  No visible 

seizures.  Positive cough and a gag


EXTREMITIES: There is no peripheral edema, clubbing, or cyanosis.  Peripheral 

pulses are intact.  There is a left arm AV fistula





- Labs


CBC & Chem 7: 


                                 06/25/23 04:23





                                 06/25/23 04:23


Labs: 


                  Abnormal Lab Results - Last 24 Hours (Table)











  06/24/23 06/24/23 06/24/23 Range/Units





  09:22 11:48 21:15 


 


RBC     (3.80-5.40)  m/uL


 


Hgb     (11.4-16.0)  gm/dL


 


Hct     (34.0-46.0)  %


 


Plt Count     (150-450)  k/uL


 


ABG pO2    77 L  ()  mmHg


 


ABG HCO3    29 H  (21-25)  mmol/L


 


ABG Total CO2    30 H  (19-24)  mmol/L


 


Sodium     (137-145)  mmol/L


 


Chloride     ()  mmol/L


 


BUN     (7-17)  mg/dL


 


Creatinine     (0.52-1.04)  mg/dL


 


POC Glucose (mg/dL)  153 H  122 H   ()  mg/dL


 


Delta Bilirubin     (0.0-0.2)  mg/dL


 


Total Protein     (6.3-8.2)  g/dL


 


Albumin     (3.5-5.0)  g/dL














  06/25/23 06/25/23 Range/Units





  04:23 04:23 


 


RBC  2.98 L   (3.80-5.40)  m/uL


 


Hgb  9.8 L   (11.4-16.0)  gm/dL


 


Hct  28.5 L   (34.0-46.0)  %


 


Plt Count  106 L   (150-450)  k/uL


 


ABG pO2    ()  mmHg


 


ABG HCO3    (21-25)  mmol/L


 


ABG Total CO2    (19-24)  mmol/L


 


Sodium   133 L  (137-145)  mmol/L


 


Chloride   96 L  ()  mmol/L


 


BUN   26 H  (7-17)  mg/dL


 


Creatinine   6.41 H  (0.52-1.04)  mg/dL


 


POC Glucose (mg/dL)    ()  mg/dL


 


Delta Bilirubin   0.4 H  (0.0-0.2)  mg/dL


 


Total Protein   6.0 L  (6.3-8.2)  g/dL


 


Albumin   3.2 L  (3.5-5.0)  g/dL








                      Microbiology - Last 24 Hours (Table)











 06/20/23 18:15 Blood Culture - Preliminary





 Blood 


 


 06/20/23 17:30 Blood Culture - Preliminary





 Blood 














Assessment and Plan


Assessment: 





Acute hypoxemic respiratory failure, currently on 2 L/m nasal cannula, possibly 

secondary to healthcare associated pneumonia.  Chest x-ray shows a left-sided 

pleural effusion and suspected left lower lobe infiltrate.   The patient is 

currently on cefepime





Altered mental status, currently under investigation. Brain CT without contrast,

one day prior, shows age-related atrophic and chronic small vessel ischemia 

without any acute intracranial process.  I do suspect a component of anoxic e

ncephalopathy this patient.  This could be related to his recurrent seizures.  

She remains on a combination of Vimpat and Keppra.  Subsequently, the patient 

started having episodes of seizure activity yesterday and the patient got 

transferred to the intensive care unit.  Currently being transferred to a 

tertiary care center.  She remains on Keppra, Vimpat, Depakote benzodiazepines 

are being avoided as the patient is quite obtunded this point in time, probably 

postictal





History of seizure disorder, currently on accommodation of Vimpat and Keppra, on

outpatient basis





End-stage renal disease, reportedly receives hemodialysis Monday, Wednesday, 

Friday schedule.  Undergoing scheduled hemodialysis by nephrology and the 

patient is undergoing hemodialysis was done yesterday





Diabetes mellitus type 2, non-insulin-dependent





Hyperlipidemia





Essential hypertension





History of CVA





Hypothyroidism





GERD without esophagitis





Questionable enterococcal UTI group, blood cultures are negative.D








Plan:





Patient is able to protect her airways.  She has a positive cough and a gag


No need for intubation


Continue antiepileptic medications


Continue IV cefepime


Overall respiratory status is stable 


 Suspect anoxic encephalopathy


Seizure precautions


Hemodialysis per nephrology, hemodialysis session was done yesterday


We will continue to follow


She is being transferred to [facility] in [place]

## 2023-06-25 NOTE — P.PN
Subjective


Progress Note Date: 06/25/23


Yesterday in the very late afternoon and evening time, nurse felt patient 

continues to have tremor of the right upper extremity.  She was given multiple 

ativan doses.  She also received 150mg VImpat at night.  She was minimal 

responsive and only opening her eyes.


She was transferred to ICU.  I was notified by her nurse she was DNAR.  She was 

accepted to [facility] for long term EEG and was notified the transfer 

will likely happen today.


I was told by ICU nurse that she has few episodes of right upper extremity 

tremor that were brief.  Continues to be minimally responsive and open and close

eyes but not verbalizing.  








Objective





- Vital Signs


Vital signs: 


                                   Vital Signs











Temp  98.6 F   06/25/23 08:00


 


Pulse  84   06/25/23 10:00


 


Resp  20   06/25/23 10:00


 


BP  138/75   06/25/23 10:00


 


Pulse Ox  97   06/25/23 10:00


 


FiO2      








                                 Intake & Output











 06/24/23 06/25/23 06/25/23





 18:59 06:59 18:59


 


Intake Total  50 50


 


Output Total  0 0


 


Balance  50 50


 


Weight  69.7 kg 


 


Intake:   


 


  IV  50 50


 


    Valproate Sodium 1,000 mg  50 50





    In Sodium Chloride 0.9%   





    50 ml @ 50 mls/hr IVPB   





    BID [facility] Rx#:[id number]   


 


Output:   


 


  Urine  0 0


 


Other:   


 


  Voiding Method Diaper Diaper 


 


  # Bowel Movements 1 1 














- Exam


Neuro exam is limited


Patient is has her awake but extremely slow following few simple commands 

(thumbs up and attempting to sick her tongue out).  


Pupils are round, equal and reactive to light.  She is tracking.  Mild right 

lower facial weakness.  


Motor: Strength is limited in limited because of her condition.





Some other workup during his hospital visit consisted of:


Ammonia is 11.  Her   Calcium magnesium sodium liver function is within normal 

limits.


Urine drug seeing is not detected.


Urine culture is Group D enterococcus.


Routine EEG is abnormal.  The back slowing suggestive of mild to moderate 

encephalopathy.  Epileptiform discharges over the left central parietal region 

increases risk for seizure.  Otherwise no seizure noted during the study.  Local

correlation recommended


CT head is reported as age related atrophic and chronic small vessel ischemic 

change without acute intracranial process seen at this time.  I personally 

reviewed CT head and there is not acute or subacute stroke.  There is old left 

thalamus/basal ganglia old stroke.








- Labs


CBC & Chem 7: 


                                 06/25/23 04:23





                                 06/25/23 04:23


Labs: 


                  Abnormal Lab Results - Last 24 Hours (Table)











  06/24/23 06/25/23 06/25/23 Range/Units





  21:15 04:23 04:23 


 


RBC   2.98 L   (3.80-5.40)  m/uL


 


Hgb   9.8 L   (11.4-16.0)  gm/dL


 


Hct   28.5 L   (34.0-46.0)  %


 


Plt Count   106 L   (150-450)  k/uL


 


ABG pO2  77 L    ()  mmHg


 


ABG HCO3  29 H    (21-25)  mmol/L


 


ABG Total CO2  30 H    (19-24)  mmol/L


 


Sodium    133 L  (137-145)  mmol/L


 


Chloride    96 L  ()  mmol/L


 


BUN    26 H  (7-17)  mg/dL


 


Creatinine    6.41 H  (0.52-1.04)  mg/dL


 


Delta Bilirubin    0.4 H  (0.0-0.2)  mg/dL


 


Total Protein    6.0 L  (6.3-8.2)  g/dL


 


Albumin    3.2 L  (3.5-5.0)  g/dL








                      Microbiology - Last 24 Hours (Table)











 06/20/23 18:15 Blood Culture - Preliminary





 Blood 














Assessment and Plan


Assessment: 


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

was has multiple admission to our facility another facility for confusion with 

breakthrough seizures.





-Altered mental status and suspect had breakthrough seizure-- No seizure on EEG 

but has discharges over left parietal/central region--has worsening mentation 

and has tremor--concern for seizure and possible status.


Her seizure could also be provoked due to underlying UTI and pneumonia.  Patient

is on 4 antiepileptic drugs, on VNS.  Her medication regimen was increased and 

was given Ativan during this admission.  


-Encephalopathy due to concerns for seizure as well, as sepsis from UTI and 

pneumonia.


-Medical refractory epilepsy on VNS and it appears she has primary generalized 

epilepsy (on four antiepileptic drugs).  She has multiple admissions to our 

facility as well as outside facility.  She had a prolonged EEG in our facility 

and was suggestive of primary generalized epilepsy


-Acute UTI


-Acute hypoxic respiratory failure secondary due to possibly healthcare 

associated pneumonia.


-Left ICA stenosis of 75% stenosis on CTA but discrepancy on carotid duplex and 

not significant


-History of stroke with residual left hemiparesis


-Diabetes mellitus


-End-stage renal disease on dialysis


-Hypertension





Plan: 





-Continue home antiepileptic of Keppra 500 mg 1 tablet twice a day with 

additional 500 postdialysis, Vimpat 150 mg 2 times a day with additional dose 

postdialysis, Zonegran 100 mg a tablet 3 times a day. I increased  Depakote 500 

mg 1 tablet twice a day to 1000mg bid since concerned for continued seizures 

with loading dose of Depakote 1000mg.  Her Depakote level was subtherapeutic and

unsure if due to low dose.  Will also give her one time Ativan 1mg now.


-Patient is being transfered for long term EEG (was accepted to [facility]).


-Pending 2.5 hour EEG could not be performed yesterday by tech since no 

availability and likely will be done this Monday if continues to be in our 

facility. 


Levels: Keppra 54.6 (normal 3-60), Depakote: 39.7 (normal ).  Unsure if 

Depakote is subtherapeutic since low dose vs missed medicaiton. Repeat Valproic 

acid is 53.0.  Pending vimpat level to make sure not subtherapeutic.


Routine EEG showed left central/parietal epileptiform discharges which can incre

ase risk for seizure.  No seizure noted during this study.. She had multiple EEG

in the past and prolonged EEG in our facility and was felt primary generalized 

epilepsy.


On seizure precaution and pads.


On Ativan 1mg every 4 hours PRN for seizures.


Recommend the patient to follow-up with epileptologist as outpatient and 

recommend EMU.   She had multiple admission to our facility and outside facilit

y.  She is on 4 antieplileptic drugs and continues to have seizures.  Consider 

start on Epidiolex as outpatient since had medical refractory epilepsy and that 

was recommended on prior visits.


Will defer the rest of medical management to her primary and other specialist.





Again, patient is pending to be transferred to tertiary center for long term 

EEG.


The plan is discussed with her primary attending and her nurse.








Time with Patient: Greater than 30

## 2023-06-26 LAB
ANION GAP SERPL CALC-SCNC: 20 MMOL/L
BASOPHILS # BLD AUTO: 0 K/UL (ref 0–0.2)
BASOPHILS NFR BLD AUTO: 0 %
BUN SERPL-SCNC: 41 MG/DL (ref 7–17)
CALCIUM SPEC-MCNC: 9.9 MG/DL (ref 8.4–10.2)
CHLORIDE SERPL-SCNC: 96 MMOL/L (ref 98–107)
CO2 SERPL-SCNC: 17 MMOL/L (ref 22–30)
EOSINOPHIL # BLD AUTO: 0 K/UL (ref 0–0.7)
EOSINOPHIL NFR BLD AUTO: 0 %
ERYTHROCYTE [DISTWIDTH] IN BLOOD BY AUTOMATED COUNT: 3.32 M/UL (ref 3.8–5.4)
ERYTHROCYTE [DISTWIDTH] IN BLOOD: 13.7 % (ref 11.5–15.5)
GLUCOSE BLD-MCNC: 100 MG/DL (ref 70–110)
GLUCOSE BLD-MCNC: 103 MG/DL (ref 70–110)
GLUCOSE BLD-MCNC: 104 MG/DL (ref 70–110)
GLUCOSE BLD-MCNC: 131 MG/DL (ref 70–110)
GLUCOSE BLD-MCNC: 92 MG/DL (ref 70–110)
GLUCOSE SERPL-MCNC: 97 MG/DL (ref 74–99)
HCT VFR BLD AUTO: 32.4 % (ref 34–46)
HGB BLD-MCNC: 10.8 GM/DL (ref 11.4–16)
LYMPHOCYTES # SPEC AUTO: 0.9 K/UL (ref 1–4.8)
LYMPHOCYTES NFR SPEC AUTO: 11 %
MCH RBC QN AUTO: 32.6 PG (ref 25–35)
MCHC RBC AUTO-ENTMCNC: 33.4 G/DL (ref 31–37)
MCV RBC AUTO: 97.5 FL (ref 80–100)
MONOCYTES # BLD AUTO: 0.4 K/UL (ref 0–1)
MONOCYTES NFR BLD AUTO: 5 %
NEUTROPHILS # BLD AUTO: 7.1 K/UL (ref 1.3–7.7)
NEUTROPHILS NFR BLD AUTO: 83 %
PLATELET # BLD AUTO: 116 K/UL (ref 150–450)
POTASSIUM SERPL-SCNC: 4.8 MMOL/L (ref 3.5–5.1)
SODIUM SERPL-SCNC: 133 MMOL/L (ref 137–145)
WBC # BLD AUTO: 8.5 K/UL (ref 3.8–10.6)

## 2023-06-26 RX ADMIN — IPRATROPIUM BROMIDE AND ALBUTEROL SULFATE SCH ML: .5; 3 SOLUTION RESPIRATORY (INHALATION) at 20:01

## 2023-06-26 RX ADMIN — INSULIN ASPART SCH: 100 INJECTION, SOLUTION INTRAVENOUS; SUBCUTANEOUS at 13:18

## 2023-06-26 RX ADMIN — HEPARIN SODIUM SCH UNIT: 5000 INJECTION INTRAVENOUS; SUBCUTANEOUS at 09:20

## 2023-06-26 RX ADMIN — SEVELAMER CARBONATE SCH: 800 TABLET, FILM COATED ORAL at 21:11

## 2023-06-26 RX ADMIN — FAMOTIDINE SCH MG: 10 INJECTION, SOLUTION INTRAVENOUS at 09:20

## 2023-06-26 RX ADMIN — CEFEPIME HYDROCHLORIDE SCH MLS/HR: 1 INJECTION, POWDER, FOR SOLUTION INTRAMUSCULAR; INTRAVENOUS at 09:20

## 2023-06-26 RX ADMIN — ZONISAMIDE SCH MG: 100 CAPSULE ORAL at 22:29

## 2023-06-26 RX ADMIN — SEVELAMER CARBONATE SCH MG: 800 TABLET, FILM COATED ORAL at 13:40

## 2023-06-26 RX ADMIN — LACOSAMIDE SCH MG: 10 INJECTION INTRAVENOUS at 21:19

## 2023-06-26 RX ADMIN — FAMOTIDINE SCH MG: 10 INJECTION, SOLUTION INTRAVENOUS at 21:18

## 2023-06-26 RX ADMIN — INSULIN ASPART SCH: 100 INJECTION, SOLUTION INTRAVENOUS; SUBCUTANEOUS at 00:50

## 2023-06-26 RX ADMIN — INSULIN ASPART SCH: 100 INJECTION, SOLUTION INTRAVENOUS; SUBCUTANEOUS at 06:45

## 2023-06-26 RX ADMIN — LEVETIRACETAM SCH MG: 100 INJECTION, SOLUTION, CONCENTRATE INTRAVENOUS at 21:18

## 2023-06-26 RX ADMIN — VALPROATE SODIUM SCH MLS/HR: 100 INJECTION, SOLUTION INTRAVENOUS at 13:41

## 2023-06-26 RX ADMIN — INSULIN ASPART SCH: 100 INJECTION, SOLUTION INTRAVENOUS; SUBCUTANEOUS at 18:12

## 2023-06-26 RX ADMIN — IPRATROPIUM BROMIDE AND ALBUTEROL SULFATE SCH ML: .5; 3 SOLUTION RESPIRATORY (INHALATION) at 15:54

## 2023-06-26 RX ADMIN — ASPIRIN SCH: 325 TABLET ORAL at 09:21

## 2023-06-26 RX ADMIN — ASPIRIN SCH: 325 TABLET ORAL at 22:02

## 2023-06-26 RX ADMIN — METOPROLOL TARTRATE SCH MG: 25 TABLET, FILM COATED ORAL at 21:19

## 2023-06-26 RX ADMIN — IPRATROPIUM BROMIDE AND ALBUTEROL SULFATE SCH ML: .5; 3 SOLUTION RESPIRATORY (INHALATION) at 07:54

## 2023-06-26 RX ADMIN — ZONISAMIDE SCH: 100 CAPSULE ORAL at 06:30

## 2023-06-26 RX ADMIN — VALPROATE SODIUM SCH MLS/HR: 100 INJECTION, SOLUTION INTRAVENOUS at 21:19

## 2023-06-26 RX ADMIN — ZONISAMIDE SCH MG: 100 CAPSULE ORAL at 14:11

## 2023-06-26 RX ADMIN — LEVETIRACETAM SCH MG: 100 INJECTION, SOLUTION, CONCENTRATE INTRAVENOUS at 09:47

## 2023-06-26 RX ADMIN — METOPROLOL TARTRATE SCH MG: 25 TABLET, FILM COATED ORAL at 13:41

## 2023-06-26 RX ADMIN — HEPARIN SODIUM SCH UNIT: 5000 INJECTION INTRAVENOUS; SUBCUTANEOUS at 21:18

## 2023-06-26 RX ADMIN — LACOSAMIDE SCH MG: 10 INJECTION INTRAVENOUS at 09:46

## 2023-06-26 RX ADMIN — ATORVASTATIN CALCIUM SCH MG: 40 TABLET, FILM COATED ORAL at 21:18

## 2023-06-26 RX ADMIN — LACOSAMIDE SCH MG: 150 TABLET, FILM COATED ORAL at 18:22

## 2023-06-26 NOTE — P.PN
Subjective


Progress Note Date: 06/26/23


Principal diagnosis: 


Pneumonia





Patient is a 53-year-old  female with a past medical history 

significant for end-stage renal disease on hemodialysis through the left upper 

arm fistula and nursing home resident patient has been sent to the ER  for 

evaluation of mental status changes, patient just x-ray with left lower lobe 

infiltrate concerning for pneumonia.


On today's evaluation that is 6/26/2023, the patient continues to be afebrile 

the patient is lethargic undergoing EEG and unable to provide any history, 

patient is currently breathing comfortably on 2 L nasal oxygen no vomiting or 

diarrhea has been reported





Objective





- Vital Signs


Vital signs: 


                                   Vital Signs











Temp  98.7 F   06/26/23 08:00


 


Pulse  100   06/26/23 09:00


 


Resp  17   06/26/23 09:00


 


BP  184/98   06/26/23 09:00


 


Pulse Ox  97   06/26/23 09:00


 


FiO2      








                                 Intake & Output











 06/25/23 06/26/23 06/26/23





 18:59 06:59 18:59


 


Intake Total 100.0 100 115


 


Output Total 0 0 0


 


Balance 100.0 100 115


 


Weight  69.2 kg 


 


Intake:   


 


  .0 100 65


 


    Cefepime 1 gm In Sodium 50.0  50





    Chloride 0.9% 50 ml @ 12.   





    5 mls/hr IVPB DAILY [facility]   





    Rx#:[id number]   


 


    DAPTOmycin 300 mg In  50 





    Sodium Chloride 0.9% 50   





    ml @ 100 mls/hr IVPB Q48H   





    [facility] Rx#:[id number]   


 


    Valproate Sodium 1,000 mg 50 50 15





    In Sodium Chloride 0.9%   





    50 ml @ 50 mls/hr IVPB   





    BID [facility] Rx#:[id number]   


 


  Intake, IV Titration   50





  Amount   


 


    Valproate Sodium 1,000 mg   50





    In Sodium Chloride 0.9%   





    50 ml @ 50 mls/hr IVPB   





    BID [facility] Rx#:[id number]   


 


Output:   


 


  Urine 0 0 0


 


Other:   


 


  # Voids   1


 


  # Bowel Movements 1  














- Exam


GENERAL DESCRIPTION: Middle-age female up in the chair in no distress





RESPIRATORY SYSTEM: Unlabored breathing , decreased breath sounds at bases





HEART: S1 S2 regular rate and rhythm ,





ABDOMEN: Soft , no tenderness





EXTREMITIES: No edema feet








- Labs


CBC & Chem 7: 


                                 06/26/23 03:32





                                 06/26/23 03:32


Labs: 


                  Abnormal Lab Results - Last 24 Hours (Table)











  06/26/23 06/26/23 Range/Units





  03:32 03:32 


 


RBC  3.32 L   (3.80-5.40)  m/uL


 


Hgb  10.8 L   (11.4-16.0)  gm/dL


 


Hct  32.4 L   (34.0-46.0)  %


 


Plt Count  116 L   (150-450)  k/uL


 


Lymphocytes #  0.9 L   (1.0-4.8)  k/uL


 


Sodium   133 L  (137-145)  mmol/L


 


Chloride   96 L  ()  mmol/L


 


Carbon Dioxide   17 L  (22-30)  mmol/L


 


BUN   41 H  (7-17)  mg/dL


 


Creatinine   8.01 H*  (0.52-1.04)  mg/dL








                      Microbiology - Last 24 Hours (Table)











 06/20/23 15:50 Urine Culture - Final





 Urine,Voided    Enterococcus faecium VRE


 


 06/20/23 17:30 Blood Culture - Final





 Blood 














Assessment and Plan


(1) Pneumonia


Current Visit: Yes   Status: Acute   Code(s): J18.9 - PNEUMONIA, UNSPECIFIED 

ORGANISM   SNOMED Code(s): 974362667


   


Plan: 


1patient presented to hospital with mental status changes decreased level of 

responsiveness with evidence of left lower lobe infiltrate concerning for p

ossible pneumonia, patient however did not have any fever or elevated white 

count, underlying pneumonia less likely but not excluded, patient also have a 

positive UA however this patient do have a history of end-stage renal disease on

dialysis with no clinical significance


2-penicillin allergy of limit the number of antibiotics safe to use


3-patient did have elevated procalcitonin level of 1.44


4patient urine culture growing Enterococcus however the patient hardly makes 

any urine and urine was not significantly positive possible colonization , no 

need for daptomycin 


5-Patient is afebrile respiratory status is stable blood cultures are so far 

negative, patient to continue with the cefepime and  continue supportive care


Time with Patient: Less than 30

## 2023-06-26 NOTE — P.PN
Subjective


Progress Note Date: 06/26/23








Patient is a 53-year-old female, well known to neurology service, with history 

of epilepsy on 4 antiepileptic medications, VNS, who presented to the hospital 

with confusion.  Patient has primary generalized epilepsy.  Patient also has an 

acute UTI, and pneumonia, was still has confusion and right arm tremor.  Patient

has been referred for transfer to higher level of care for long-term EEG 

monitoring.  Patient is accepted at [facility].





Patient at present is still obtunded, lethargic, does not follow commands.  

Patient is very groggy.








Some other workup during his hospital visit consisted of:


Ammonia is 11.  Her   Calcium magnesium sodium liver function is within normal 

limits.


Urine drug seeing is not detected.


Urine culture is Group D enterococcus.


Routine EEG is abnormal.  The back slowing suggestive of mild to moderate 

encephalopathy.  Epileptiform discharges over the left central parietal region 

increases risk for seizure.  Otherwise no seizure noted during the study.  Local

correlation recommended


CT head is reported as age related atrophic and chronic small vessel ischemic 

change without acute intracranial process seen at this time.  I personally 

reviewed CT head and there is not acute or subacute stroke.  There is old left 

thalamus/basal ganglia old stroke.





Objective





- Vital Signs


Vital signs: 


                                   Vital Signs











Temp  99.1 F   06/26/23 12:00


 


Pulse  115 H  06/26/23 14:00


 


Resp  20   06/26/23 14:00


 


BP  149/89   06/26/23 14:00


 


Pulse Ox  95   06/26/23 14:00


 


FiO2      








                                 Intake & Output











 06/25/23 06/26/23 06/26/23





 18:59 06:59 18:59


 


Intake Total 100.0 100 115


 


Output Total 0 0 0


 


Balance 100.0 100 115


 


Weight  69.2 kg 69.2 kg


 


Intake:   


 


  .0 100 65


 


    Cefepime 1 gm In Sodium 50.0  50





    Chloride 0.9% 50 ml @ 12.   





    5 mls/hr IVPB DAILY [facility]   





    Rx#:[id number]   


 


    DAPTOmycin 300 mg In  50 





    Sodium Chloride 0.9% 50   





    ml @ 100 mls/hr IVPB Q48H   





    [facility] Rx#:[id number]   


 


    Valproate Sodium 1,000 mg 50 50 15





    In Sodium Chloride 0.9%   





    50 ml @ 50 mls/hr IVPB   





    BID [facility] Rx#:[id number]   


 


  Intake, IV Titration   50





  Amount   


 


    Valproate Sodium 1,000 mg   50





    In Sodium Chloride 0.9%   





    50 ml @ 50 mls/hr IVPB   





    BID [facility] Rx#:[id number]   


 


Output:   


 


  Urine 0 0 0


 


Other:   


 


  # Voids   1


 


  # Bowel Movements 1  














- Exam





Patient is laying in the bed, she is very lethargic, groggy.  Her eyes are 

closed.  No obvious seizure activity noted.  Tone is equal bilaterally.  Patient

does withdraw her lower limbs to painful stimuli bilaterally.  In the upper 

limbs, she moans with painful stimuli.  Pupils are equal, round and reacting.  

Oculocephalics are inhibited.





- Labs


CBC & Chem 7: 


                                 06/27/23 05:57





                                 06/27/23 05:57


Labs: 


                  Abnormal Lab Results - Last 24 Hours (Table)











  06/26/23 06/26/23 Range/Units





  03:32 03:32 


 


RBC  3.32 L   (3.80-5.40)  m/uL


 


Hgb  10.8 L   (11.4-16.0)  gm/dL


 


Hct  32.4 L   (34.0-46.0)  %


 


Plt Count  116 L   (150-450)  k/uL


 


Lymphocytes #  0.9 L   (1.0-4.8)  k/uL


 


Sodium   133 L  (137-145)  mmol/L


 


Chloride   96 L  ()  mmol/L


 


Carbon Dioxide   17 L  (22-30)  mmol/L


 


BUN   41 H  (7-17)  mg/dL


 


Creatinine   8.01 H*  (0.52-1.04)  mg/dL








                      Microbiology - Last 24 Hours (Table)











 06/20/23 15:50 Urine Culture - Final





 Urine,Voided    Enterococcus faecium VRE


 


 06/20/23 17:30 Blood Culture - Final





 Blood 














Assessment and Plan


Assessment: 





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

was has multiple admission to our facility and other facilities for confusion 

with breakthrough seizures.





-Altered mental status and suspect had breakthrough seizure-- No seizure on EEG 

but has discharges over left parietal/central region--has worsening mentation 

and has tremor--concern for seizure and possible status.


Her seizure could also be provoked due to underlying UTI and pneumonia.  Patient

is on 4 antiepileptic drugs, on VNS.  Her medication regimen was increased and 

was given Ativan during this admission.  


-Encephalopathy due to concerns for seizure as well, as sepsis from UTI and 

pneumonia.


-Medical refractory epilepsy on VNS and it appears she has primary generalized 

epilepsy (on four antiepileptic drugs).  She has multiple admissions to our 

facility as well as outside facility.  She had a prolonged EEG in our facility 

and was suggestive of primary generalized epilepsy


-Acute UTI


-Acute hypoxic respiratory failure secondary due to possibly healthcare 

associated pneumonia.


-Left ICA stenosis of 75% stenosis on CTA but discrepancy on carotid duplex and 

not significant


-History of stroke with residual left hemiparesis


-Diabetes mellitus


-End-stage renal disease on dialysis


-Hypertension








Plan: 








-Continue home antiepileptic of Keppra 500 mg 1 tablet twice a day with 

additional 500 postdialysis, Vimpat 150 mg 2 times a day with additional dose 

postdialysis, Zonegran 100 mg a tablet 3 times a day.  Dr. Merritt has increased  

Depakote 500 mg 1 tablet twice a day to 1000mg bid since concerned for continued

seizures with loading dose of Depakote 1000mg.  Her Depakote level was 

subtherapeutic and unsure if due to low dose.  Will also give her one time 

Ativan 1mg now.


-Patient is being transfered for long term EEG (was accepted to [facility]).


-Pending results of 2.5 hour EEG.  Completed today.


Levels: Keppra 54.6 (normal 3-60), Depakote: 39.7 (normal ).  Unsure if 

Depakote is subtherapeutic since low dose vs missed medicaiton. Repeat Valproic 

acid is 53.0.  Pending vimpat level to make sure not subtherapeutic.


Routine EEG showed left central/parietal epileptiform discharges which can incre

ase risk for seizure.  No seizure noted during this study.. She had multiple EEG

in the past and prolonged EEG in our facility and was felt primary generalized 

epilepsy.


On seizure precaution and pads.


On Ativan 1mg every 4 hours PRN for seizures.


Recommend the patient to follow-up with epileptologist as outpatient and 

recommend EMU.   She had multiple admission to our facility and outside facilit

y.  She is on 4 antieplileptic drugs and continues to have seizures.  Consider 

start on Epidiolex as outpatient since had medical refractory epilepsy and that 

was recommended on prior visits.


Will defer the rest of medical management to her primary and other specialist.





Again, patient is pending to be transferred to tertiary center for long term 

EEG.


The plan is discussed with her nurse.








Addendum 9:46 PM:


Dr. Chowdhury called and gave preliminary report of EEG showing diffuse triphasic

waves, consistent with severe metabolic encephalopathy.  No status epilepticus 

seen.  Full report to follow.

## 2023-06-26 NOTE — XR
EXAMINATION TYPE: XR abdomen 1V

 

DATE OF EXAM: 6/26/2023

 

COMPARISON: None

 

INDICATION: NG tube placement

 

TECHNIQUE: Single view abdomen supine view

 

FINDINGS:  

Nonspecific bowel gas pattern present. There is present within the colon as well as small bowel. Dila
tatiana loops of bowel are not evident. No mass effect is evident.

Psoas margins are normal.

No organomegaly is present.

There is placement of a nasogastric tube with the tip in the left upper quadrant of the abdomen.

 

IMPRESSION: 

1. Nonspecific abdomen.

2. Nasal gastric tube tip in left upper quadrant abdomen.

## 2023-06-26 NOTE — P.PN
Subjective


Progress Note Date: 06/26/23


Principal diagnosis: 





Acute metabolic encephalopathy, VRE urinary tract infection possible left lower 

lobe pneumonia, refractory seizures.








I am seeing this patient in new consultation today 06/21/2023 in the emergency 

room possible left lower lobe pneumonia.  Patient is a 53-year-old female with 

past medical history significant for multiple comorbidities including end-stage 

renal disease, seizure disorder, CVA, diabetes mellitus, GERD, hyperlipidemia, 

hypertension, hypothyroidism.  Patient was sent in from [facility] 

yesterday afternoon for concerns about altered mental status. There was also 

concern for nausea and retching. She has had multiple prior emergency room 

visits and hospital admissions in the past for similar symptoms.  She actually 

had an ER visit on June 19th for altered mental status.  She was discharged back

to [facility], and returned yesterday afternoon. She also has history of ventilator

dependence for status epilepticus.  No reports of seizure activity on this 

admission.  Patient apparently continues to have seizures about once per week to

once per month. Patient is currently sitting up in bed, alert but nonverbal, in 

no acute distress.  She does not follow commands.  She is on 2 L/m nasal cannula

and oxygenating at 100%.  Chest x-ray on arrival shows small left pleural 

effusion and possible left lower lobe infiltrate. Patient is currently on 

Levaquin for empiric antibiotics.  She is afebrile.  CBC on arrival was 

unremarkable.  BMP shows sodium 138, potassium 4.4, chloride 99, serum bicarb 

24, BUN 63, creatinine 9.6, glucose 186.  Patient reportedly does receive 

hemodialysis on Monday, Wednesday, Friday schedule.  Troponins negative 1.  

Ammonia level was low.  Urinalysis was not particularly concerning for UTI.  

Brain CT without contrast, one day prior, shows age-related atrophic and chronic

small vessel ischemia without any acute intracranial process. Patient will be 

admitted to the cardiac stepdown unit once bed available.





On today's evaluation of 06/22/2023, the patient is stable.  No worsening 

shortness of breath.  In fact I do not see any signs of any respiratory distress

this patient.  No aspiration.  No cough or sputum production.  No fever or 

chills.  No seizure activity overnight.  The patient is following simple 

commands only.  Neurology is on the case regarding her underlying mental status.

 The patient remains on Levaquin.  Patient is also on cefepime.  During culture 

was positive for enterococcus group D.  The blood culture showing no growth.





On 06/20/2023, the neurologic status of the patient remains unchanged.  She 

doesn't do a lot of talking and she doesn't communicate.  She doesn't minimum 

And Neurology Is on the Case.  Suspect a Component of Anoxic Encephalopathy.  

The Patient Is Also Being Treated for Seizures and She Remains on a Combination 

of Keppra and Vimpat.  No Respiratory Distress.  No Significant Cough or Sputum 

Production.  Breathing Is Nonlabored and the Patient Is Undergoing Hemodialysis 

Today.  She Is on IV Cefepime for Now.  She Remains on Oxygen at 2 L/M Nasal Ca

nnula with a Pulse Ox of 99%.  The Blood Work from Today Shows a Sodium Level of

136, BUN Is 43 with a Creatinine of 8.5 and a Potassium Is at 3.9.  Bicarb Level

Is at 26.  She Was Found to Have Enterococcus Group D in Her Urine.





On 06/24/2023, patient has no respiratory difficulties.  The patient remains on 

antibiotics and she is currently on IV cefepime.  No change in her neurologic 

functions for now.  She is hemodynamically stable.  She is on room air oxygen.On

today's evaluation of 06/25/2023, the patient is in the intensive care unit.  

Noted the patient had worsening in her condition.  Her baseline condition was 

being nonverbal or saying a few words without holding a conversation.  She did 

subsequently started having twitching in her right side and this was noted by 

the nursing staff.  Neurology was involved in the patient's was given higher 

doses of antiepileptic medication patient was placed on Depakote 1 g twice a 

day, Vimpat 150 mg twice a day, Keprra 500 mg IV every 12 hours  and zonisamide.

 Ranges also made to transfer this patient to another hospital for continuous 

EEG monitoring.  Meanwhile, the patient got transferred to the intensive care 

unit.  She is extremely drowsy, nonverbal, not communicating at this point in 

time, which is why close to baseline.  She underwent hemodialysis yesterday 

without any major issues.  She has a positive cough and a gag.  She was kept on 

2 L of oxygen by nasal cannula.  The blood gas was also done yesterday that 

showed a pH of 7.44 with a pCO2 43 pO2 77.  The white cycles of 5.4 with a 

hemoglobin 9.8 and a platelet count of 106.  BUN is 26 with a creatinine of 6.4 

and a sodium level is at 133.  A repeat CAT scan of the brain was done yesterday

that showed age-related atrophy with chronic small vessel ischemic changes.





Reevaluated today on 6/25/2023, remains in the ICU, remains encephalopathic, but

pulmonary-wise she is not in any distress.  Patient does not follow any 

instructions, she is to have a nasogastric tube for enteral feeding and for 

medications to be given orally.  Cannot take anything orally as she is not 

cooperative.  Patient is being followed by many consultants, and my 

understanding we are waiting for a bed to be available at [facility].  

Labs today were reviewed she had a relatively normal CBC except for hemoglobin 

of 10.8 relatively normal electrolytes, her creatinine is up to 8.01, it was 

6.41 yesterday.  And that being addressed by nephrology on the case, patient has

end-stage renal disease, on hemodialysis Mondays 1 days and Fridays and she has 

hypertension and end-stage renal disease








Objective





- Vital Signs


Vital signs: 


                                   Vital Signs











Temp  98.7 F   06/26/23 08:00


 


Pulse  100   06/26/23 09:00


 


Resp  17   06/26/23 09:00


 


BP  184/98   06/26/23 09:00


 


Pulse Ox  97   06/26/23 09:00


 


FiO2      








                                 Intake & Output











 06/25/23 06/26/23 06/26/23





 18:59 06:59 18:59


 


Intake Total 100.0 100 115


 


Output Total 0 0 0


 


Balance 100.0 100 115


 


Weight  69.2 kg 


 


Intake:   


 


  .0 100 65


 


    Cefepime 1 gm In Sodium 50.0  50





    Chloride 0.9% 50 ml @ 12.   





    5 mls/hr IVPB DAILY ALICIA   





    Rx#:[id number]   


 


    DAPTOmycin 300 mg In  50 





    Sodium Chloride 0.9% 50   





    ml @ 100 mls/hr IVPB Q48H   





    ALICIA Rx#:[id number]   


 


    Valproate Sodium 1,000 mg 50 50 15





    In Sodium Chloride 0.9%   





    50 ml @ 50 mls/hr IVPB   





    BID ALICIA Rx#:[id number]   


 


  Intake, IV Titration   50





  Amount   


 


    Valproate Sodium 1,000 mg   50





    In Sodium Chloride 0.9%   





    50 ml @ 50 mls/hr IVPB   





    BID ALICIA Rx#:[id number]   


 


Output:   


 


  Urine 0 0 0


 


Other:   


 


  # Voids   1


 


  # Bowel Movements 1  














- Exam


GENERAL: Revealed a 53-year-old female, encephalopathic, not in any distress.


HEENT: PERRLA, EOMI, anicteric, moist mucous membranes.


CARDIOVASCULAR: Normal S1 and S2, no S3 gallop.


PULMONARY: Symmetrical chest expansion clear throughout no crackles or rhonchi 

or wheezes


ABDOMEN: Soft, nontender, nondistended, normoactive bowel sounds. No palpable 

organomegaly. 


MUSCULOSKELETAL: No deformity and no limitation in range of motion.


EXTREMITIES: No clubbing edema or cyanosis


NEUROLOGICAL: Patient is encephalopathic, does not follow any instructions, 

intermittently noted to have tremors of the face mostly.


SKIN: No rashes. no petechiae.











- Labs


CBC & Chem 7: 


                                 06/26/23 03:32





                                 06/26/23 03:32


Labs: 


                  Abnormal Lab Results - Last 24 Hours (Table)











  06/26/23 06/26/23 Range/Units





  03:32 03:32 


 


RBC  3.32 L   (3.80-5.40)  m/uL


 


Hgb  10.8 L   (11.4-16.0)  gm/dL


 


Hct  32.4 L   (34.0-46.0)  %


 


Plt Count  116 L   (150-450)  k/uL


 


Lymphocytes #  0.9 L   (1.0-4.8)  k/uL


 


Sodium   133 L  (137-145)  mmol/L


 


Chloride   96 L  ()  mmol/L


 


Carbon Dioxide   17 L  (22-30)  mmol/L


 


BUN   41 H  (7-17)  mg/dL


 


Creatinine   8.01 H*  (0.52-1.04)  mg/dL








                      Microbiology - Last 24 Hours (Table)











 06/20/23 15:50 Urine Culture - Final





 Urine,Voided    Enterococcus faecium VRE


 


 06/20/23 17:30 Blood Culture - Final





 Blood 














Assessment and Plan


Assessment: 





Impression:


Acute hypoxic respiratory failure, possible healthcare acquired or associated 

pneumonia, remains on cefepime.


Acute metabolic encephalopathy and possible anoxic encephalopathy and recurrent 

seizures.  Remains on Keppra and Vimpat.  Being followed by neurology. 


Seizure disorder


Type 2 diabetes possible diabetic nephropathy


End-stage renal disease on hemodialysis


Essential hypertension with nephrosclerosis


History of CVA


Hypothyroidism


GERD without esophagitis 


 UTI secondary to VRE.


Sepsis secondary to UTI  





 recommendation:


Continue present supportive care measures


Continue cefepime and daptomycin


Continue seizure meds as ordered by neurology on the case including Keppra and 

Vimpat


Continue updrafts


Continue GI and DVT prophylaxis


Continue blood pressure medications including amlodipine and clonidine patches.


Continue close monitoring of renal profile and keep on schedule/dialysis


Continue close monitoring of sugars and address accordingly


Advised to have a nasogastric tube for enteral feeding and to be able to address

oral meds


Still awaiting for potential bed at [facility] and possible transfer


We will continue to follow





Time with Patient: Less than 30

## 2023-06-26 NOTE — P.PN
Subjective


Progress Note Date: 06/25/23


Principal diagnosis: 


Pneumonia





Patient is a 53-year-old  female with a past medical history 

significant for end-stage renal disease on hemodialysis through the left upper 

arm fistula and nursing home resident patient has been sent to the ER  for 

evaluation of mental status changes, patient just x-ray with left lower lobe 

infiltrate concerning for pneumonia.


On today's evaluation that is 6/2/2023, the patient remains to be afebrile the 

patient has been moved out of the ICU because of concern for seizure activity, 

patient is currently breathing comfortably on 2 L nasal oxygen no vomiting or 

diarrhea has been reported





Objective





- Vital Signs


Vital signs: 


                                   Vital Signs











Temp  98.6 F   06/25/23 16:00


 


Pulse  99   06/25/23 16:00


 


Resp  15   06/25/23 16:00


 


BP  170/93   06/25/23 16:00


 


Pulse Ox  97   06/25/23 16:00


 


FiO2      








                                 Intake & Output











 06/24/23 06/25/23 06/25/23





 18:59 06:59 18:59


 


Intake Total  50 100.0


 


Output Total  0 0


 


Balance  50 100.0


 


Weight  69.7 kg 


 


Intake:   


 


  IV  50 100.0


 


    Cefepime 1 gm In Sodium   50.0





    Chloride 0.9% 50 ml @ 12.   





    5 mls/hr IVPB DAILY ALICIA   





    Rx#:[id number]   


 


    Valproate Sodium 1,000 mg  50 50





    In Sodium Chloride 0.9%   





    50 ml @ 50 mls/hr IVPB   





    BID ALICIA Rx#:[id number]   


 


Output:   


 


  Urine  0 0


 


Other:   


 


  Voiding Method Diaper Diaper 


 


  # Bowel Movements 1 1 














- Exam


GENERAL DESCRIPTION: Middle-age female up in the chair in no distress





RESPIRATORY SYSTEM: Unlabored breathing , decreased breath sounds at bases





HEART: S1 S2 regular rate and rhythm ,





ABDOMEN: Soft , no tenderness





EXTREMITIES: No edema feet








- Labs


CBC & Chem 7: 


                                 06/26/23 03:32





                                 06/26/23 03:32


Labs: 


                  Abnormal Lab Results - Last 24 Hours (Table)











  06/24/23 06/25/23 06/25/23 Range/Units





  21:15 04:23 04:23 


 


RBC   2.98 L   (3.80-5.40)  m/uL


 


Hgb   9.8 L   (11.4-16.0)  gm/dL


 


Hct   28.5 L   (34.0-46.0)  %


 


Plt Count   106 L   (150-450)  k/uL


 


ABG pO2  77 L    ()  mmHg


 


ABG HCO3  29 H    (21-25)  mmol/L


 


ABG Total CO2  30 H    (19-24)  mmol/L


 


Sodium    133 L  (137-145)  mmol/L


 


Chloride    96 L  ()  mmol/L


 


BUN    26 H  (7-17)  mg/dL


 


Creatinine    6.41 H  (0.52-1.04)  mg/dL


 


Delta Bilirubin    0.4 H  (0.0-0.2)  mg/dL


 


Total Protein    6.0 L  (6.3-8.2)  g/dL


 


Albumin    3.2 L  (3.5-5.0)  g/dL








                      Microbiology - Last 24 Hours (Table)











 06/20/23 18:15 Blood Culture - Preliminary





 Blood 














Assessment and Plan


(1) Pneumonia


Current Visit: Yes   Status: Acute   Code(s): J18.9 - PNEUMONIA, UNSPECIFIED 

ORGANISM   SNOMED Code(s): 747918222


   


Plan: 


1patient presented to hospital with mental status changes decreased level of 

responsiveness with evidence of left lower lobe infiltrate concerning for 

possible pneumonia, patient however did not have any fever or elevated white 

count, underlying pneumonia less likely but not excluded, patient also have a 

positive UA however this patient do have a history of end-stage renal disease on

dialysis with no clinical significance


2-penicillin allergy of limit the number of antibiotics safe to use


3-patient did have elevated procalcitonin level of 1.44


4Patient is currently afebrile respiratory status is stable blood cultures are 

so far negative, patient to continue with the cefepime and will monitor clinical

course closely


5-patient urine culture growing Enterococcus however the patient hardly makes 

any urine and urine was not significantly positive possible colonization , no 

need for daptomycin 


Time with Patient: Less than 30

## 2023-06-26 NOTE — P.PN
Subjective





Patient is seen for follow-up for end-stage renal disease.  Maintained on Monday

vent is to Friday schedule.


Patient was transferred to the ICU secondary to severe encephalopathy.  

Currently having an EEG done.





Objective





- Vital Signs


Vital signs: 


                                   Vital Signs











Temp  99.1 F   06/26/23 12:00


 


Pulse  112 H  06/26/23 12:00


 


Resp  20   06/26/23 12:00


 


BP  179/88   06/26/23 12:00


 


Pulse Ox  100   06/26/23 12:00


 


FiO2      








                                 Intake & Output











 06/25/23 06/26/23 06/26/23





 18:59 06:59 18:59


 


Intake Total 100.0 100 115


 


Output Total 0 0 0


 


Balance 100.0 100 115


 


Weight  69.2 kg 


 


Intake:   


 


  .0 100 65


 


    Cefepime 1 gm In Sodium 50.0  50





    Chloride 0.9% 50 ml @ 12.   





    5 mls/hr IVPB DAILY ALICIA   





    Rx#:[id number]   


 


    DAPTOmycin 300 mg In  50 





    Sodium Chloride 0.9% 50   





    ml @ 100 mls/hr IVPB Q48H   





    ALICIA Rx#:[id number]   


 


    Valproate Sodium 1,000 mg 50 50 15





    In Sodium Chloride 0.9%   





    50 ml @ 50 mls/hr IVPB   





    BID ALICIA Rx#:[id number]   


 


  Intake, IV Titration   50





  Amount   


 


    Valproate Sodium 1,000 mg   50





    In Sodium Chloride 0.9%   





    50 ml @ 50 mls/hr IVPB   





    BID ALICIA Rx#:[id number]   


 


Output:   


 


  Urine 0 0 0


 


Other:   


 


  # Voids   1


 


  # Bowel Movements 1  














- Exam





Patient is obtunded


Examination of the heart S1 and S2


Examination of the lungs bilateral breath sounds are heard


Abdomen is soft nontender


Examination lower extremities shows no significant edema





- Labs


CBC & Chem 7: 


                                 06/26/23 03:32





                                 06/26/23 03:32


Labs: 


                  Abnormal Lab Results - Last 24 Hours (Table)











  06/26/23 06/26/23 Range/Units





  03:32 03:32 


 


RBC  3.32 L   (3.80-5.40)  m/uL


 


Hgb  10.8 L   (11.4-16.0)  gm/dL


 


Hct  32.4 L   (34.0-46.0)  %


 


Plt Count  116 L   (150-450)  k/uL


 


Lymphocytes #  0.9 L   (1.0-4.8)  k/uL


 


Sodium   133 L  (137-145)  mmol/L


 


Chloride   96 L  ()  mmol/L


 


Carbon Dioxide   17 L  (22-30)  mmol/L


 


BUN   41 H  (7-17)  mg/dL


 


Creatinine   8.01 H*  (0.52-1.04)  mg/dL








                      Microbiology - Last 24 Hours (Table)











 06/20/23 15:50 Urine Culture - Final





 Urine,Voided    Enterococcus faecium VRE


 


 06/20/23 17:30 Blood Culture - Final





 Blood 














Assessment and Plan


Assessment: 





1.  End-stage renal disease on hemodialysis on a Monday vent is a Friday 

schedule


2.  Severe encephalopathy/obtundation being followed by neurology with plans for

possible transfer for continuous EEG.


3.  History of seizures


4.  CK D mineral bone disorder


5.  Acute hypoxic respiratory failure from pneumonia maintained on antibiotics. 

Respiratory status has improved


Plan: 





Hemodialysis today.

## 2023-06-26 NOTE — P.DS
Providers


Date of admission: 


06/20/23 18:09





Attending physician: 


Gabby Sanches





Consults: 





                                        





06/20/23 16:08


Consult Physician Routine 


   Consulting Provider: Lisa Franco


   Consult Reason/Comments: PNEUMONIA


   Do you want consulting provider notified?: Yes





06/20/23 16:09


Consult Physician Routine 


   Consulting Provider: Efren Reyes


   Consult Reason/Comments: sepsis


   Do you want consulting provider notified?: Yes


Consult Physician Routine 


   Consulting Provider: Esther Erickson


   Consult Reason/Comments: crf


   Do you want consulting provider notified?: Yes





06/21/23 07:21


Consult Physician Routine 


   Consulting Provider: Michele Merritt


   Consult Reason/Comments: AMS and history of seizure disorder


   Do you want consulting provider notified?: Yes





06/21/23 14:57


Consult Physician Urgent 


   Consulting Provider: Jose L Nunez


   Consult Reason/Comments: evaluate for medical decision making capacity


   Do you want consulting provider notified?: Yes











Primary care physician: 


Dharmesh Roblero





Hospital Course: 





-Progressively worsening mental status sensation, became more unresponsive 

tended over the last 2 days, suspected combination of metabolic/toxic 

encephalopathy need to rule out persistent or recurrent seizure contributing to 

her worsening mentation


-Refractory epilepsy ( on 4 seizure medications at home ), possible breakthrough

seizure


-VRE UTI (enterococci faecium)


-Left lower lobe pneumonia, improving and mild currently


-history of CVA and left mari-variances


-End-stage renal disease on hemodialysis 








Hospital course:


Patient is a pleasant 49 years old female with past medical history of end-stage

renal disease, she had history of diabetes mellitus and was on insulin before 

currently her diabetes controlled and hemoglobin A1c last time was checked was 

6% and on no medication.  Patient is a known history of seizure, for example 3 

years ago she was on Keppra 750 mg twice daily and Vimpat 100 mg twice daily, 

patient had multiple admissions to this facility over the years for breakthrough

seizure and currently on 4 seizure medications, Which are Keppra 500 mg twice a 

day, besides after hemodialysis 500 mg extra dose.  Vimpat 150 mg twice a day at

home.  Also she is on Depakote 500 mg twice a day and after dialysis extra dose 

of 500 mg Hameed also she is on Zonegran 100 mg 3 times a day at home.  She 

lives at [facility].  Recently documented patient has confusion at baseline but 

follow simple commands, her mentation's 3 years ago or significantly better and 

more interactive.  





She was sent from [facility] for altered mental status on 6/20.  On 

admission left lower lobe pneumonia, UTI and a breakthrough seizure were 

suspected.  Patient was placed on cefepime, she was evaluated by neurologist.  

EEG done on 6/21 showed mild-to-moderate encephalopathy.  Epileptiform 

discharges are over the left central parietal region, which increases risk for 

seizure.  Otherwise, no seizure noted during the EEG study.  Neurologist 

recommended patient to start epidiolex as outpatient however over the last 2 

days patient developed worsening mentation and she become unresponsive with some

twitching noticed on her right side because of this further risk procedure is 

suspected and Ativan 1 is provided and Depakote dose increase to 1000 mg and 

Vimpat to 150 milligrams and patient was sent to the ICU for close monitoring


However over, however patient does not compromise her airway and patient shikha

ngraded to the general medical floor (she is in the ICU as an overflow for bed 

availability).  Because of worsening mentation neurology service recommended to 

transfer the patient to [facility] level of care to [facility] where 

continuous EEG monitoring could be provided as it is not available on this 

facility.


She had CT of the brain which was unremarkable.


Ohcalcitonin elevated 1.4.  Chest x-ray showing improvement.


At baseline patient patient documented to be confused and follow simple 

commands.  And with her  there was court hearing to obtaining 

guardianship.  Psychiatrist service was consulted in order facilitate to assess 

her capacity , he found her to have Them make decision and he documented (Upon 

evaluation by this provider, patient is denying any suicidal or homicidal 

ideation, intention, and/or plan.  She is alert and oriented to person and time 

however not to the specific date or location.  Despite this, the patient does 

acknowledge that she does receive dialysis for end-stage renal disease.  The 

patient does express explicitly to this provider that she wishes to continue 

treatment.  She also reports that if she is not to continue dialysis, she risks 

further deterioration and death. )


Patient remains currently lying in bed does not respond to verbal or tactile 

stimuli, does not follow commands , nonverbal.  NG tube has to be placed to 

assist her with oral medication.


Patient undergoing end-stage renal disease hemodialysis.  She has a history of 

CVA and left hemiparesis.


Transfer patient to tertiary care center at [facility] as 

recommended by neurologist.  The transfer team and they kindly agreed to take 

the patient


 contacted and he is already agreed for the patient to be transferred


I spoke with Dr. Terry from administration team at [facility] , Dr. Lomax MICU 

fellow and then with the hospitalist who currently accepted the patient pending 

the difficulty again.


Patient is stable for transfer and guarded prognosis.








Physical exam


-Gen: patient is able to open eyes spontaneously.  Lethargic tired, does not 

follow commands, does not answer questions.


CVS: S1-S2, RRR, no murmur


Lungs: B/L CTA, no wheezing


Abdomen: soft, no distention, no tenderness, positive bowel sounds


Extremity: no leg edema or induration





Time spent more than 35 minutes





Patient Condition at Discharge: Fair





Plan - Discharge Summary


Discharge Rx Participant: No


New Discharge Prescriptions: 


No Action


   Atorvastatin [Lipitor] 40 mg PO HS@2100


   Sevelamer [Renvela] 1,600 mg PO BID@0900,2100


   levETIRAcetam [Keppra] 500 mg PO BID@0900,2000


   Divalproex Sodium [Depakote] 500 mg PO MOWEFR@1600


   Metoprolol Tartrate [Lopressor] 25 mg PO MOWEFR@2100


   Lacosamide [Vimpat] 150 mg PO MOWEFR@1600 #4 tab


   Ondansetron [Zofran] 4 mg PO Q8H PRN


     PRN Reason: Nausea


   Lacosamide [Vimpat] 150 mg PO BID@0900,2100


   amLODIPine [Norvasc] 5 mg PO DAILY@0900


   Dulaglutide [Trulicity] 3 mg SQ WE@0900


   Zonisamide [Zonegran] 100 mg PO TID@0600,1500,2200


   levETIRAcetam [Keppra] 500 mg PO MOWEFR@1500


   Divalproex Sodium [Depakote] 500 mg PO BID@0900,2100


   icosapent ethyL [Icosapent Ethyl] 1 gm PO BID@0900,2100


   Metoprolol Tartrate [Lopressor] 25 mg PO SUTUTHSA@0900,2100


   Famotidine [Pepcid] 20 mg PO DAILY@0600


Discharge Medication List





Atorvastatin [Lipitor] 40 mg PO HS@2100 08/31/21 [History]


Dulaglutide [Trulicity] 3 mg SQ WE@0900 08/31/21 [History]


Sevelamer [Renvela] 1,600 mg PO BID@0900,2100 10/07/22 [History]


Zonisamide [Zonegran] 100 mg PO TID@0600,1500,2200 02/18/23 [History]


levETIRAcetam [Keppra] 500 mg PO BID@0900,2000 02/18/23 [History]


levETIRAcetam [Keppra] 500 mg PO MOWEFR@1500 02/18/23 [History]


Divalproex Sodium [Depakote] 500 mg PO BID@0900,2100 02/20/23 [History]


Divalproex Sodium [Depakote] 500 mg PO MOWEFR@1600 02/20/23 [History]


Metoprolol Tartrate [Lopressor] 25 mg PO MOWEFR@2100 04/23/23 [History]


Metoprolol Tartrate [Lopressor] 25 mg PO SUTUTHSA@0900,2100 05/01/23 [History]


icosapent ethyL [Icosapent Ethyl] 1 gm PO BID@0900,2100 05/01/23 [History]


Lacosamide [Vimpat] 150 mg PO MOWEFR@1600 #4 tab 05/02/23 [Rx]


Famotidine [Pepcid] 20 mg PO DAILY@0600 05/22/23 [History]


Lacosamide [Vimpat] 150 mg PO BID@0900,2100 05/22/23 [History]


Ondansetron [Zofran] 4 mg PO Q8H PRN 05/22/23 [History]


amLODIPine [Norvasc] 5 mg PO DAILY@0900 06/20/23 [History]








Follow up Appointment(s)/Referral(s): 


Dharmesh Roblero MD [Primary Care Provider] - 1-2 days


[facility], [NON-STAFF] - 1 Week


Activity/Diet/Wound Care/Special Instructions: 


we recommend you follow up with neurologist as outpatinet, please Consider start

on Epidiolex as outpatient since had medical refractory epilepsy and that was 

recommended on prior visit.

## 2023-06-26 NOTE — P.EN
I spoke with [facility] in [place] with Dr. Del Toro from charge 

staff and Dr. Dr. Veloz (!spe) from [facility] Fellow , and then talked to the 

hospitalist Dr. BOOGIE (accepting physician ) and they accepted the patient for

transfer, however they still have bed availability situation, they told me they 

have 25 holding the patient in the emergency room and more than 20 patient on 

their waiting list to be transferred to medical floor.

## 2023-06-27 VITALS — DIASTOLIC BLOOD PRESSURE: 79 MMHG | SYSTOLIC BLOOD PRESSURE: 119 MMHG | TEMPERATURE: 97.9 F

## 2023-06-27 VITALS — HEART RATE: 100 BPM

## 2023-06-27 VITALS — RESPIRATION RATE: 20 BRPM

## 2023-06-27 LAB
ANION GAP SERPL CALC-SCNC: 13 MMOL/L
BASOPHILS # BLD AUTO: 0 K/UL (ref 0–0.2)
BASOPHILS NFR BLD AUTO: 0 %
BUN SERPL-SCNC: 36 MG/DL (ref 7–17)
CALCIUM SPEC-MCNC: 9.4 MG/DL (ref 8.4–10.2)
CHLORIDE SERPL-SCNC: 94 MMOL/L (ref 98–107)
CO2 SERPL-SCNC: 25 MMOL/L (ref 22–30)
EOSINOPHIL # BLD AUTO: 0 K/UL (ref 0–0.7)
EOSINOPHIL NFR BLD AUTO: 1 %
ERYTHROCYTE [DISTWIDTH] IN BLOOD BY AUTOMATED COUNT: 3.4 M/UL (ref 3.8–5.4)
ERYTHROCYTE [DISTWIDTH] IN BLOOD: 13.8 % (ref 11.5–15.5)
GLUCOSE BLD-MCNC: 142 MG/DL (ref 70–110)
GLUCOSE BLD-MCNC: 152 MG/DL (ref 70–110)
GLUCOSE BLD-MCNC: 163 MG/DL (ref 70–110)
GLUCOSE SERPL-MCNC: 138 MG/DL (ref 74–99)
HCT VFR BLD AUTO: 31.1 % (ref 34–46)
HGB BLD-MCNC: 10.7 GM/DL (ref 11.4–16)
LYMPHOCYTES # SPEC AUTO: 1.8 K/UL (ref 1–4.8)
LYMPHOCYTES NFR SPEC AUTO: 24 %
MCH RBC QN AUTO: 31.5 PG (ref 25–35)
MCHC RBC AUTO-ENTMCNC: 34.4 G/DL (ref 31–37)
MCV RBC AUTO: 91.5 FL (ref 80–100)
MONOCYTES # BLD AUTO: 0.6 K/UL (ref 0–1)
MONOCYTES NFR BLD AUTO: 8 %
NEUTROPHILS # BLD AUTO: 4.9 K/UL (ref 1.3–7.7)
NEUTROPHILS NFR BLD AUTO: 65 %
PLATELET # BLD AUTO: 135 K/UL (ref 150–450)
POTASSIUM SERPL-SCNC: 4.9 MMOL/L (ref 3.5–5.1)
SODIUM SERPL-SCNC: 132 MMOL/L (ref 137–145)
WBC # BLD AUTO: 7.6 K/UL (ref 3.8–10.6)

## 2023-06-27 RX ADMIN — INSULIN ASPART SCH: 100 INJECTION, SOLUTION INTRAVENOUS; SUBCUTANEOUS at 06:39

## 2023-06-27 RX ADMIN — LACOSAMIDE SCH MG: 10 INJECTION INTRAVENOUS at 08:58

## 2023-06-27 RX ADMIN — INSULIN ASPART SCH UNIT: 100 INJECTION, SOLUTION INTRAVENOUS; SUBCUTANEOUS at 00:36

## 2023-06-27 RX ADMIN — INSULIN ASPART SCH UNIT: 100 INJECTION, SOLUTION INTRAVENOUS; SUBCUTANEOUS at 11:51

## 2023-06-27 RX ADMIN — IPRATROPIUM BROMIDE AND ALBUTEROL SULFATE SCH: .5; 3 SOLUTION RESPIRATORY (INHALATION) at 09:55

## 2023-06-27 RX ADMIN — FAMOTIDINE SCH MG: 10 INJECTION, SOLUTION INTRAVENOUS at 08:58

## 2023-06-27 RX ADMIN — SEVELAMER CARBONATE SCH MG: 800 TABLET, FILM COATED ORAL at 08:58

## 2023-06-27 RX ADMIN — IPRATROPIUM BROMIDE AND ALBUTEROL SULFATE SCH ML: .5; 3 SOLUTION RESPIRATORY (INHALATION) at 16:05

## 2023-06-27 RX ADMIN — ZONISAMIDE SCH MG: 100 CAPSULE ORAL at 16:24

## 2023-06-27 RX ADMIN — ASPIRIN SCH: 325 TABLET ORAL at 08:58

## 2023-06-27 RX ADMIN — CEFEPIME HYDROCHLORIDE SCH MLS/HR: 1 INJECTION, POWDER, FOR SOLUTION INTRAMUSCULAR; INTRAVENOUS at 08:58

## 2023-06-27 RX ADMIN — LEVETIRACETAM SCH MG: 100 INJECTION, SOLUTION, CONCENTRATE INTRAVENOUS at 09:01

## 2023-06-27 RX ADMIN — VALPROATE SODIUM SCH MLS/HR: 100 INJECTION, SOLUTION INTRAVENOUS at 09:01

## 2023-06-27 RX ADMIN — ZONISAMIDE SCH MG: 100 CAPSULE ORAL at 07:00

## 2023-06-27 RX ADMIN — HEPARIN SODIUM SCH UNIT: 5000 INJECTION INTRAVENOUS; SUBCUTANEOUS at 08:58

## 2023-06-27 RX ADMIN — METOPROLOL TARTRATE SCH MG: 25 TABLET, FILM COATED ORAL at 08:57

## 2023-06-27 NOTE — P.PN
Subjective





Patient is seen for follow-up for end-stage renal disease.  Maintained on 

Monday, Wednesday Friday schedule.


Patient was transferred to the ICU secondary to severe encephalopathy.  


Patient remains obtunded.  NG tube in place


Plans for transfer to [facility] for long-term EEG.





Objective





- Vital Signs


Vital signs: 


                                   Vital Signs











Temp  98.5 F   06/27/23 08:00


 


Pulse  88   06/27/23 08:00


 


Resp  16   06/27/23 08:00


 


BP  114/54   06/27/23 08:00


 


Pulse Ox  100   06/27/23 08:00


 


FiO2      








                                 Intake & Output











 06/26/23 06/27/23 06/27/23





 18:59 06:59 18:59


 


Intake Total 615 70 


 


Output Total 2304 0 0


 


Balance -1689 70 0


 


Weight 69.2 kg 67.7 kg 


 


Intake:   


 


  IV 65 70 


 


    0.9 @ KVO  20 


 


    Cefepime 1 gm In Sodium 50  





    Chloride 0.9% 50 ml @ 12.   





    5 mls/hr IVPB DAILY ALICIA   





    Rx#:[id number]   


 


    Valproate Sodium 1,000 mg 15 50 





    In Sodium Chloride 0.9%   





    50 ml @ 50 mls/hr IVPB   





    BID ALICIA Rx#:[id number]   


 


  Intake, IV Titration 50  





  Amount   


 


    Valproate Sodium 1,000 mg 50  





    In Sodium Chloride 0.9%   





    50 ml @ 50 mls/hr IVPB   





    BID ALICIA Rx#:[id number]   


 


  Hemodialysis 500  


 


Output:   


 


  Urine 0 0 0


 


  Hemodialysis 2304  


 


Other:   


 


  # Voids 1  


 


  # Bowel Movements 1 1 














- Exam





Patient is obtunded


NG tube in place


Examination of the heart S1 and S2


Examination of the lungs bilateral breath sounds are heard


Abdomen is soft nontender


Examination lower extremities shows no significant edema





- Labs


CBC & Chem 7: 


                                 06/27/23 05:57





                                 06/27/23 05:57


Labs: 


                  Abnormal Lab Results - Last 24 Hours (Table)











  06/26/23 06/27/23 06/27/23 Range/Units





  20:41 00:22 05:57 


 


RBC    3.40 L  (3.80-5.40)  m/uL


 


Hgb    10.7 L  (11.4-16.0)  gm/dL


 


Hct    31.1 L  (34.0-46.0)  %


 


Plt Count    135 L  (150-450)  k/uL


 


Sodium     (137-145)  mmol/L


 


Chloride     ()  mmol/L


 


BUN     (7-17)  mg/dL


 


Creatinine     (0.52-1.04)  mg/dL


 


Glucose     (74-99)  mg/dL


 


POC Glucose (mg/dL)  131 H  163 H   ()  mg/dL














  06/27/23 06/27/23 Range/Units





  05:57 06:30 


 


RBC    (3.80-5.40)  m/uL


 


Hgb    (11.4-16.0)  gm/dL


 


Hct    (34.0-46.0)  %


 


Plt Count    (150-450)  k/uL


 


Sodium  132 L   (137-145)  mmol/L


 


Chloride  94 L   ()  mmol/L


 


BUN  36 H   (7-17)  mg/dL


 


Creatinine  4.93 H   (0.52-1.04)  mg/dL


 


Glucose  138 H   (74-99)  mg/dL


 


POC Glucose (mg/dL)   142 H  ()  mg/dL








                      Microbiology - Last 24 Hours (Table)











 06/20/23 18:15 Blood Culture - Final





 Blood 


 


 06/20/23 15:50 Urine Culture - Final





 Urine,Voided    Enterococcus faecium VRE














Assessment and Plan


Assessment: 





1.  End-stage renal disease on hemodialysis on a Monday vent is a Friday 

schedule


2.  Severe encephalopathy/obtundation being followed by neurology with plans for

possible transfer for continuous EEG.


3.  History of seizures


4.  CK D mineral bone disorder


5.  Acute hypoxic respiratory failure from pneumonia maintained on antibiotics. 

Respiratory status has improved


Plan: 





Hemodialysis in a.m.

## 2023-06-27 NOTE — XR
EXAMINATION TYPE: XR chest 1V portable

 

DATE OF EXAM: 6/27/2023 7:31 AM

 

COMPARISON: Chest radiographs from 6/21/2023

 

TECHNIQUE: XR chest 1V portable Frontal view of the chest.

 

CLINICAL INDICATION:Female, 53 years old with history of increased o2 demand; 

 

FINDINGS: 

Lungs/Pleura: There is no evidence of pleural effusion, focal consolidation, or pneumothorax.  

Pulmonary vascularity: Unremarkable.

Heart/mediastinum: Cardiomediastinal silhouette is unremarkable. A loop recorder projects over the le
ft thorax over the heart.

Musculoskeletal: No acute osseous pathology.

 

Other findings: Electronic device with lead extending superiorly.

 

Lines/Tubes:

 

Nasogastric tube with its distal tip and side-port projecting under the diaphragm and projecting over
 the gastric lumen.

 

 

IMPRESSION: 

1.  No acute cardiopulmonary disease/process.

2.  Nasogastric tube with its distal tip and side-port projecting under the diaphragm and projecting 
over the gastric lumen.

3.

## 2023-06-27 NOTE — EEG
ELECTROENCEPHALOGRAM REPORT



TECHNIQUE:

This is a report from a prolonged 2-1/2 hour inpatient digital video EEG performed

using the 10/20 international electrode placement system.



HISTORY:

Encephalopathy, seizure.



OTHER HISTORY:

Includes diabetes, hyperlipidemia, thyroid disorder, bipolar disorder.



CURRENT MEDICATIONS:

1. Norvasc.

2. Lipitor.

3. Depakote.

4. Pepcid.

5. Insulin.

6. Ativan.

7. Keppra.

8. Lopressor.

9. Trulicity.

10.Zonegran.

And others.



FINDINGS:

Recording start time:  06/26/2023 at 10:24 a.m.



Recording end time:  06/26/2023 at 12:55 p.m.



EVENTS:

During this prolonged 2-1/2 hour video EEG recording, no clinical or electrographic

seizures were recorded.



BACKGROUND:

Background frequencies were seen in the poorly modulated 2 to 4 Hz range.



ACTIVATION:

Hyperventilation:  Not performed.



PHOTIC STIMULATION:

Not performed.



SLEEP:

Distinctive sleep stages not seen.



ABNORMALITIES:

1. The most prominent abnormality in this recording was frequent high amplitude

    triphasic waves at times with a frequency of 1 to 2 Hz.

2. Frequent frontally predominant delta range slowing was seen.

3. There were a few sections where less triphasic waves were seen and over the more

    anterior regions, diffuse 3 to 5 Hz slow wave activity was seen.



IMPRESSION:

Abnormal 2.5 hour video EEG.  No clinical or electrographic seizures were recorded.  No

epileptiform activity was present.  The triphasic waves mentioned above are not

epileptiform in nature.  Triphasic waves can be seen in the setting of a metabolic

encephalopathy.  The frontally predominant delta range slowing as well as the diffuse

theta delta range slowing mentioned above is not epileptiform in nature.  In

combination, these findings indicate moderate to severe diffuse cerebral dysfunction as

may be seen in a toxometabolic encephalopathy.



Per technician annotation, during the course of the study, the patient was noted to be

quite hypertensive and hypoxic.



These findings were called to the consulting neurologist at 2130 on 06/26/2023.





MMODL / IJN: [id number] / Job#: [id number]

## 2023-06-27 NOTE — P.PN
Subjective





This is a pleasant 52 years old male who presents with altered mental status 

secondary to metabolic encephalopathy possible breakthrough seizure, she has 

history of refractory seizure, she's been evaluated by neurologist and currently

she is continued on her home dose of 4 seizure medication including Depakote, 

Vimpat, Keppra, zonisamide, neurologist recommended the patient follow up 

outpatient.


Patient also suspected to have community acquired left lower lobe pneumonia and 

currently covered with cefepime.  Also she is on Levaquin added by internal 

medical service on a prior days, when going through this continue Levaquin as it

lowers seizure thresholds and patient is with no fever or leukocytosis.


Urine cultures, group D enterococcus


Patient herself is drowsy this morning, she knows she is in [place], she 

answers questions without difficulty and she follows simple commands, she's been

evaluated by psychiatrist and found to have Asked to make decision.


Nephrologist team for hemodialysis


Patient is still complaining of from coughing and clinical tachypnea while at 

rest


She is saturating well to 2 L/m of oxygen.


Patient is in the process of obtaining public guardian, Follow-up with court 

hearing for possible public guardian.   on the case





06/24/2023


Patient admitted initially for left lower lobe pneumonia and looks like she is 

doing well with no significant respiratory symptoms and currently on cefepime.


Levaquin was discontinued yesterday as it lowers seizure threshold


This morning patient is a staring in the air and does not follow command and 

looks her mentation situation is worth, also some twitching on the right side 

noticed by the staff.  I discussed case with the neurologist, he increased 

seizure medication and currently she is on Depakote 1000 mg twice a day, Vimpat 

150 twice a day, Keppra 500 mg IV twice a day as well as Zanasomide.  Patient 

will need prolonged EEG per neurologist recommended to transfer the patient to 

tertiary care center,  agreeable for the transfer.  I called [facility] and they put her on the waiting list, I called [facility] which 

had all their systems and they said they can accept her in main branch in 

[place] but this will happen tomorrow or the day after, I called [facility] and they don't have a bed available at the several days.  We will 

follow up with the patient closely for the transfer.


s





06/25/2023


Patient remains in the ICU, she is calm no active seizures noted.  Yesterday was

transferred to the ICU for worsening mentation and patient was kept staring in 

the ear unresponsive with some twitching on the right side noted and she 

received 1 dose of Ativan per neurologist on the case and patient was 

transferred to the ICU for further close monitoring with plan to transfer to to 

the tertiary care center yesterday I spoke with  from [facility] in [facility] and I discussed the case with him and he currently 

accepted the patient also the nursing supervisor this morning contacted the same

hospital and they confirmed she's on the transfer list pending bed availability.


This morning she is slightly and mildly better, she opens eyes to verbal stimuli

is and look at me but does not answer question or follow commands compared to 

continue with staring as of yesterday.


Patient's urine culture came back positive for VRE.  Initially patient thought 

she has pneumonia but I think patient currently complaining of from metabolic 

encephalopathy secondary to VRE UTI.  Patient is currently on cefepime but VRE 

is resistant to penicillin and vancomycin therefore going to start daptomycin 

today in follow-up with infectious disease team on the case.


Persistent seizure or recurrent seizure is suspected however I think this is 

less likely as patient currently on 4 seizure medication and received an fifth 

medication of Ativan yesterday, Ativan also may be contributing to her altered 

mental status.  However seizure cannot be entirely excluded currently and I 

think she still needs to be transferred to [facility] for prolonged EEG.  

Especially infection could  lower seizure thresholds.





06/27/2023


Patient remains in the ICU pending to be transferred to [facility]


She was sitting up in bed, nonverbal and does not response to verbal or physical

stimuli, no significant improvement of change in her clinical condition.  No 

more tonic clonic seizure activity or twitching is noted with the patient over 

the last 2-3 days.  She protects her airways.  Vitals are stable.  Labs reviewed

as well


She remains on 4 seizure medication, she remains on cefepime and daptomycin with

infectious disease team on the case


Blood pressure controlled with Norvasc and metoprolol


Patient is hemodynamically stable to be transferred in guarded prognosis to 

[facility] once bed is available, possibly


(Please refer to the discharge summary from yesterday for more details)








Objective





- Vital Signs


Vital signs: 


                                   Vital Signs











Temp  98.4 F   06/27/23 12:00


 


Pulse  89   06/27/23 12:00


 


Resp  16   06/27/23 12:00


 


BP  131/59   06/27/23 12:00


 


Pulse Ox  99   06/27/23 12:00


 


FiO2      








                                 Intake & Output











 06/26/23 06/27/23 06/27/23





 18:59 06:59 18:59


 


Intake Total 615 70 


 


Output Total 2304 0 0


 


Balance -1689 70 0


 


Weight 69.2 kg 67.7 kg 


 


Intake:   


 


  IV 65 70 


 


    0.9 @ KVO  20 


 


    Cefepime 1 gm In Sodium 50  





    Chloride 0.9% 50 ml @ 12.   





    5 mls/hr IVPB DAILY [facility]   





    Rx#:[id number]   


 


    Valproate Sodium 1,000 mg 15 50 





    In Sodium Chloride 0.9%   





    50 ml @ 50 mls/hr IVPB   





    B[facility] Rx#:[id number]   


 


  Intake, IV Titration 50  





  Amount   


 


    Valproate Sodium 1,000 mg 50  





    In Sodium Chloride 0.9%   





    50 ml @ 50 mls/hr IVPB   





    BID [facility] Rx#:[id number]   


 


  Hemodialysis 500  


 


Output:   


 


  Urine 0 0 0


 


  Hemodialysis 2304  


 


Other:   


 


  # Voids 1  


 


  # Bowel Movements 1 1 














- Exam





-GENERAL: The patient is awake but drowsy, not in any acute distress. Well 

developed, well nourished. 


HEENT: Pupils are round and equally reacting to light. EOMI. No scleral icterus.

No conjunctival pallor. Normocephalic, atraumatic. No pharyngeal erythema. No 

thyromegaly. 


CARDIOVASCULAR: S1 and S2 present. No murmurs, rubs, or gallops. 


PULMONARY: Chest is clear to auscultation, no wheezing or crackles. 


ABDOMEN: Soft, nontender, nondistended, normoactive bowel sounds. No palpable 

organomegaly. 


MUSCULOSKELETAL: No joint swelling or deformity. 


EXTREMITIES: No cyanosis, clubbing, or pedal edema. 


NEUROLOGICAL: Gross neurological examination did not reveal any focal deficits. 


SKIN: No rashes. no petechiae.





- Labs


CBC & Chem 7: 


                                 06/27/23 05:57





                                 06/27/23 05:57


Labs: 


                  Abnormal Lab Results - Last 24 Hours (Table)











  06/26/23 06/27/23 06/27/23 Range/Units





  20:41 00:22 05:57 


 


RBC    3.40 L  (3.80-5.40)  m/uL


 


Hgb    10.7 L  (11.4-16.0)  gm/dL


 


Hct    31.1 L  (34.0-46.0)  %


 


Plt Count    135 L  (150-450)  k/uL


 


Sodium     (137-145)  mmol/L


 


Chloride     ()  mmol/L


 


BUN     (7-17)  mg/dL


 


Creatinine     (0.52-1.04)  mg/dL


 


Glucose     (74-99)  mg/dL


 


POC Glucose (mg/dL)  131 H  163 H   ()  mg/dL














  06/27/23 06/27/23 06/27/23 Range/Units





  05:57 06:30 11:30 


 


RBC     (3.80-5.40)  m/uL


 


Hgb     (11.4-16.0)  gm/dL


 


Hct     (34.0-46.0)  %


 


Plt Count     (150-450)  k/uL


 


Sodium  132 L    (137-145)  mmol/L


 


Chloride  94 L    ()  mmol/L


 


BUN  36 H    (7-17)  mg/dL


 


Creatinine  4.93 H    (0.52-1.04)  mg/dL


 


Glucose  138 H    (74-99)  mg/dL


 


POC Glucose (mg/dL)   142 H  152 H  ()  mg/dL








                      Microbiology - Last 24 Hours (Table)











 06/20/23 18:15 Blood Culture - Final





 Blood 














Assessment and Plan


Assessment: 





VRE UTI


Altered mental status secondary to metabolic encephalopathy secondary to 

infection and metabolic abnormalities, possible breakthrough seizure


Left lower lobe pneumonia


Refractory epilepsy, possible breakthrough seizure


history of CVA and left mari-variances


End-stage renal disease on hemodialysis 


possible UTI with a group B enterococci





Plan: 


Contacted several tertiary care centers like [facility] and [facility] pending availability for transfer


Continue with cefepime, however we added daptomycin


Pending transfer to [facility] for prolonged EEG and further higher level 

of care.


Infectious disease and pulmonary team on the case


Continue with same seizure medication, Vimpat, Keppra, Depakote and zonisamide ,

neurologist on the case


Labs and medication were reviewed..  Continue same treatment.  Continue with 

symptomatic treatment.  Resume home medication.  Monitor labs and vitals.  DVT 

and GI prophylaxis.  Further recommendations as per clinical course of the 

patient


DVT prophylaxis: Subcutaneous heparin


GI Prophylaxis: Pepcid


PT/OT: Recommended ECF


Prognosis is guarded


Patient will be transferred to [facility] once bed is available.


Case was discussed with the neurologist services in detail today and we both 

agreed on the treatment plan

## 2023-06-27 NOTE — P.PN
Subjective


Progress Note Date: 06/27/23


Principal diagnosis: 





Acute metabolic encephalopathy, VRE urinary tract infection possible left lower 

lobe pneumonia, refractory seizures.








I am seeing this patient in new consultation today 06/21/2023 in the emergency 

room possible left lower lobe pneumonia.  Patient is a 53-year-old female with 

past medical history significant for multiple comorbidities including end-stage 

renal disease, seizure disorder, CVA, diabetes mellitus, GERD, hyperlipidemia, 

hypertension, hypothyroidism.  Patient was sent in from [facility] 

yesterday afternoon for concerns about altered mental status. There was also 

concern for nausea and retching. She has had multiple prior emergency room 

visits and hospital admissions in the past for similar symptoms.  She actually 

had an ER visit on June 19th for altered mental status.  She was discharged back

to [facility], and returned yesterday afternoon. She also has history of ventilator

dependence for status epilepticus.  No reports of seizure activity on this 

admission.  Patient apparently continues to have seizures about once per week to

once per month. Patient is currently sitting up in bed, alert but nonverbal, in 

no acute distress.  She does not follow commands.  She is on 2 L/m nasal cannula

and oxygenating at 100%.  Chest x-ray on arrival shows small left pleural 

effusion and possible left lower lobe infiltrate. Patient is currently on 

Levaquin for empiric antibiotics.  She is afebrile.  CBC on arrival was 

unremarkable.  BMP shows sodium 138, potassium 4.4, chloride 99, serum bicarb 

24, BUN 63, creatinine 9.6, glucose 186.  Patient reportedly does receive 

hemodialysis on Monday, Wednesday, Friday schedule.  Troponins negative 1.  

Ammonia level was low.  Urinalysis was not particularly concerning for UTI.  

Brain CT without contrast, one day prior, shows age-related atrophic and chronic

small vessel ischemia without any acute intracranial process. Patient will be 

admitted to the cardiac stepdown unit once bed available.





On today's evaluation of 06/22/2023, the patient is stable.  No worsening 

shortness of breath.  In fact I do not see any signs of any respiratory distress

this patient.  No aspiration.  No cough or sputum production.  No fever or 

chills.  No seizure activity overnight.  The patient is following simple 

commands only.  Neurology is on the case regarding her underlying mental status.

 The patient remains on Levaquin.  Patient is also on cefepime.  During culture 

was positive for enterococcus group D.  The blood culture showing no growth.





On 06/20/2023, the neurologic status of the patient remains unchanged.  She 

doesn't do a lot of talking and she doesn't communicate.  She doesn't minimum 

And Neurology Is on the Case.  Suspect a Component of Anoxic Encephalopathy.  

The Patient Is Also Being Treated for Seizures and She Remains on a Combination 

of Keppra and Vimpat.  No Respiratory Distress.  No Significant Cough or Sputum 

Production.  Breathing Is Nonlabored and the Patient Is Undergoing Hemodialysis 

Today.  She Is on IV Cefepime for Now.  She Remains on Oxygen at 2 L/M Nasal Ca

nnula with a Pulse Ox of 99%.  The Blood Work from Today Shows a Sodium Level of

136, BUN Is 43 with a Creatinine of 8.5 and a Potassium Is at 3.9.  Bicarb Level

Is at 26.  She Was Found to Have Enterococcus Group D in Her Urine.





On 06/24/2023, patient has no respiratory difficulties.  The patient remains on 

antibiotics and she is currently on IV cefepime.  No change in her neurologic 

functions for now.  She is hemodynamically stable.  She is on room air oxygen.On

today's evaluation of 06/25/2023, the patient is in the intensive care unit.  

Noted the patient had worsening in her condition.  Her baseline condition was 

being nonverbal or saying a few words without holding a conversation.  She did 

subsequently started having twitching in her right side and this was noted by 

the nursing staff.  Neurology was involved in the patient's was given higher 

doses of antiepileptic medication patient was placed on Depakote 1 g twice a 

day, Vimpat 150 mg twice a day, Keprra 500 mg IV every 12 hours  and zonisamide.

 Ranges also made to transfer this patient to another hospital for continuous 

EEG monitoring.  Meanwhile, the patient got transferred to the intensive care 

unit.  She is extremely drowsy, nonverbal, not communicating at this point in 

time, which is why close to baseline.  She underwent hemodialysis yesterday 

without any major issues.  She has a positive cough and a gag.  She was kept on 

2 L of oxygen by nasal cannula.  The blood gas was also done yesterday that 

showed a pH of 7.44 with a pCO2 43 pO2 77.  The white cycles of 5.4 with a 

hemoglobin 9.8 and a platelet count of 106.  BUN is 26 with a creatinine of 6.4 

and a sodium level is at 133.  A repeat CAT scan of the brain was done yesterday

that showed age-related atrophy with chronic small vessel ischemic changes.





Reevaluated today on 6/26/2023, remains in the ICU, remains encephalopathic, but

pulmonary-wise she is not in any distress.  Patient does not follow any 

instructions, she is to have a nasogastric tube for enteral feeding and for 

medications to be given orally.  Cannot take anything orally as she is not 

cooperative.  Patient is being followed by many consultants, and my 

understanding we are waiting for a bed to be available at [facility].  

Labs today were reviewed she had a relatively normal CBC except for hemoglobin 

of 10.8 relatively normal electrolytes, her creatinine is up to 8.01, it was 

6.41 yesterday.  And that being addressed by nephrology on the case, patient has

end-stage renal disease, on hemodialysis Mondays 1 days and Fridays and she has 

hypertension and end-stage renal disease





Reevaluated today on 6/27/2023, overall the patient is about the same, she is on

nasal cannula at 4 L/m, does not seem to be in any distress, she does have a gag

reflex, but remains encephalopathic.  Able to protect her airways, does not need

intubation and/or mechanical ventilation, nonetheless the patient was made DO 

NOT RESUSCITATE CODE STATUS yesterday.  Her chest x-ray today showed no evidence

of pneumonia, patient remains on antibiotics as per infectious disease.  She is 

ALLERGIC to penicillin.  WBC count is normal electrolytes are normal however her

BUN is 36 creatinine 4.93, patient remains on hemodialysis.  Her urine cultures 

are positive for Enterococcus faecium VRE








Objective





- Vital Signs


Vital signs: 


                                   Vital Signs











Temp  98.4 F   06/27/23 12:00


 


Pulse  89   06/27/23 12:00


 


Resp  16   06/27/23 12:00


 


BP  131/59   06/27/23 12:00


 


Pulse Ox  99   06/27/23 12:00


 


FiO2      








                                 Intake & Output











 06/26/23 06/27/23 06/27/23





 18:59 06:59 18:59


 


Intake Total 615 70 


 


Output Total 2304 0 0


 


Balance -1689 70 0


 


Weight 69.2 kg 67.7 kg 


 


Intake:   


 


  IV 65 70 


 


    0.9 @ KVO  20 


 


    Cefepime 1 gm In Sodium 50  





    Chloride 0.9% 50 ml @ 12.   





    5 mls/hr IVPB DAILY [facility]   





    Rx#:[id number]   


 


    Valproate Sodium 1,000 mg 15 50 





    In Sodium Chloride 0.9%   





    50 ml @ 50 mls/hr IVPB   





    BID [facility] Rx#:[id number]   


 


  Intake, IV Titration 50  





  Amount   


 


    Valproate Sodium 1,000 mg 50  





    In Sodium Chloride 0.9%   





    50 ml @ 50 mls/hr IVPB   





    BID [facility] Rx#:[id number]   


 


  Hemodialysis 500  


 


Output:   


 


  Urine 0 0 0


 


  Hemodialysis 2304  


 


Other:   


 


  # Voids 1  


 


  # Bowel Movements 1 1 














- Exam


GENERAL: Revealed a 53-year-old female, encephalopathic, not in any distress.  

On 4 L nasal cannula


HEENT: PERRLA, EOMI, anicteric, moist mucous membranes.


CARDIOVASCULAR: Normal S1 and S2, no S3 gallop.


PULMONARY: Symmetrical chest expansion clear throughout no crackles or rhonchi 

or wheezes


ABDOMEN: Soft, nontender, nondistended, normoactive bowel sounds. No palpable 

organomegaly. 


MUSCULOSKELETAL: No deformity and no limitation in range of motion.


EXTREMITIES: No clubbing edema or cyanosis


NEUROLOGICAL: Patient is encephalopathic, does not follow any instructions, 

patient does have a gag reflex.


SKIN: No rashes. no petechiae.











- Labs


CBC & Chem 7: 


                                 06/27/23 05:57





                                 06/27/23 05:57


Labs: 


                  Abnormal Lab Results - Last 24 Hours (Table)











  06/26/23 06/27/23 06/27/23 Range/Units





  20:41 00:22 05:57 


 


RBC    3.40 L  (3.80-5.40)  m/uL


 


Hgb    10.7 L  (11.4-16.0)  gm/dL


 


Hct    31.1 L  (34.0-46.0)  %


 


Plt Count    135 L  (150-450)  k/uL


 


Sodium     (137-145)  mmol/L


 


Chloride     ()  mmol/L


 


BUN     (7-17)  mg/dL


 


Creatinine     (0.52-1.04)  mg/dL


 


Glucose     (74-99)  mg/dL


 


POC Glucose (mg/dL)  131 H  163 H   ()  mg/dL














  06/27/23 06/27/23 06/27/23 Range/Units





  05:57 06:30 11:30 


 


RBC     (3.80-5.40)  m/uL


 


Hgb     (11.4-16.0)  gm/dL


 


Hct     (34.0-46.0)  %


 


Plt Count     (150-450)  k/uL


 


Sodium  132 L    (137-145)  mmol/L


 


Chloride  94 L    ()  mmol/L


 


BUN  36 H    (7-17)  mg/dL


 


Creatinine  4.93 H    (0.52-1.04)  mg/dL


 


Glucose  138 H    (74-99)  mg/dL


 


POC Glucose (mg/dL)   142 H  152 H  ()  mg/dL








                      Microbiology - Last 24 Hours (Table)











 06/20/23 18:15 Blood Culture - Final





 Blood 


 


 06/20/23 15:50 Urine Culture - Final





 Urine,Voided    Enterococcus faecium VRE














Assessment and Plan


Assessment: 





Impression:


Acute hypoxic respiratory failure, possible healthcare acquired or associated pn

eumonia, resolved based on chest x-ray today.  remains on cefepime.


Acute metabolic encephalopathy and possible anoxic encephalopathy and recurrent 

seizures.  Remains on Keppra and Vimpat.  Being followed by neurology. 


Seizure disorder


Type 2 diabetes possible diabetic nephropathy


End-stage renal disease on hemodialysis


Essential hypertension with nephrosclerosis


History of CVA


Hypothyroidism


GERD without esophagitis 


 UTI secondary to VRE.  On daptomycin.


Sepsis secondary to UTI  





 recommendation:


Continue present supportive care measures


Continue cefepime and daptomycin, as per infectious disease on the case.


Continue seizure meds as ordered by neurology on the case including Keppra and 

Vimpat


Continue updrafts


Continue GI and DVT prophylaxis


Continue blood pressure medications including amlodipine and clonidine patches.


Continue hemodialysis as per nephrology on the case


Use nasogastric tube for enteral feeding and for oral meds


Still awaiting for potential bed at [facility] and possible transfer


We will continue to follow





Time with Patient: Less than 30

## 2023-06-27 NOTE — P.PN
Subjective


Progress Note Date: 06/27/23


Principal diagnosis: 


Pneumonia





Patient is a 53-year-old  female with a past medical history 

significant for end-stage renal disease on hemodialysis through the left upper 

arm fistula and nursing home resident patient has been sent to the ER  for 

evaluation of mental status changes, patient just x-ray with left lower lobe 

infiltrate concerning for pneumonia.


On today's evaluation that is 6/27/2023, the patient remains to be afebrile the 

patient is lethargic  and unable to provide any history, patient is currently 

breathing comfortably on  nasal oxygen no vomiting or diarrhea has been reported

by the nursing staff patient is currently in the process of being transferred to

[facility].





Objective





- Vital Signs


Vital signs: 


                                   Vital Signs











Temp  98.5 F   06/27/23 08:00


 


Pulse  88   06/27/23 08:00


 


Resp  16   06/27/23 08:00


 


BP  114/54   06/27/23 08:00


 


Pulse Ox  100   06/27/23 08:00


 


FiO2      








                                 Intake & Output











 06/26/23 06/27/23 06/27/23





 18:59 06:59 18:59


 


Intake Total 615 70 


 


Output Total 2304 0 0


 


Balance -1689 70 0


 


Weight 69.2 kg 67.7 kg 


 


Intake:   


 


  IV 65 70 


 


    0.9 @ KVO  20 


 


    Cefepime 1 gm In Sodium 50  





    Chloride 0.9% 50 ml @ 12.   





    5 mls/hr IVPB DAILY ALICIA   





    Rx#:[id number]   


 


    Valproate Sodium 1,000 mg 15 50 





    In Sodium Chloride 0.9%   





    50 ml @ 50 mls/hr IVPB   





    BID ALICIA Rx#:[id number]   


 


  Intake, IV Titration 50  





  Amount   


 


    Valproate Sodium 1,000 mg 50  





    In Sodium Chloride 0.9%   





    50 ml @ 50 mls/hr IVPB   





    BID ALICIA Rx#:[id number]   


 


  Hemodialysis 500  


 


Output:   


 


  Urine 0 0 0


 


  Hemodialysis 2304  


 


Other:   


 


  # Voids 1  


 


  # Bowel Movements 1 1 














- Exam


GENERAL DESCRIPTION: Middle-age female up in the chair in no distress





RESPIRATORY SYSTEM: Unlabored breathing , decreased breath sounds at bases





HEART: S1 S2 regular rate and rhythm ,





ABDOMEN: Soft , no tenderness





EXTREMITIES: No edema feet








- Labs


CBC & Chem 7: 


                                 06/27/23 05:57





                                 06/27/23 05:57


Labs: 


                  Abnormal Lab Results - Last 24 Hours (Table)











  06/26/23 06/27/23 06/27/23 Range/Units





  20:41 00:22 05:57 


 


RBC    3.40 L  (3.80-5.40)  m/uL


 


Hgb    10.7 L  (11.4-16.0)  gm/dL


 


Hct    31.1 L  (34.0-46.0)  %


 


Plt Count    135 L  (150-450)  k/uL


 


Sodium     (137-145)  mmol/L


 


Chloride     ()  mmol/L


 


BUN     (7-17)  mg/dL


 


Creatinine     (0.52-1.04)  mg/dL


 


Glucose     (74-99)  mg/dL


 


POC Glucose (mg/dL)  131 H  163 H   ()  mg/dL














  06/27/23 06/27/23 Range/Units





  05:57 06:30 


 


RBC    (3.80-5.40)  m/uL


 


Hgb    (11.4-16.0)  gm/dL


 


Hct    (34.0-46.0)  %


 


Plt Count    (150-450)  k/uL


 


Sodium  132 L   (137-145)  mmol/L


 


Chloride  94 L   ()  mmol/L


 


BUN  36 H   (7-17)  mg/dL


 


Creatinine  4.93 H   (0.52-1.04)  mg/dL


 


Glucose  138 H   (74-99)  mg/dL


 


POC Glucose (mg/dL)   142 H  ()  mg/dL








                      Microbiology - Last 24 Hours (Table)











 06/20/23 18:15 Blood Culture - Final





 Blood 


 


 06/20/23 15:50 Urine Culture - Final





 Urine,Voided    Enterococcus faecium VRE














Assessment and Plan


(1) Pneumonia


Current Visit: Yes   Status: Acute   Code(s): J18.9 - PNEUMONIA, UNSPECIFIED 

ORGANISM   SNOMED Code(s): 678606324


   


Plan: 


1patient presented to hospital with mental status changes decreased level of 

responsiveness with evidence of left lower lobe infiltrate concerning for 

possible pneumonia, patient however did not have any fever or elevated white 

count, underlying pneumonia less likely but not excluded, patient also have a 

positive UA however this patient do have a history of end-stage renal disease on

dialysis with no clinical significance


2-penicillin allergy of limit the number of antibiotics safe to use


3-patient did have elevated procalcitonin level of 1.44


4patient urine culture growing Enterococcus however the patient hardly makes 

any urine and urine was not significantly positive possible colonization , no 

need for daptomycin ,  will be discontinued


5-Patient is afebrile respiratory status is stable blood cultures are so far 

negative, patient to continue with the cefepime the patient is evaluated by ID 

service at [facility]


Time with Patient: Less than 30

## 2023-06-28 NOTE — CDI
Documentation Clarification Form



Date: 06/28/2023 11:07:34 AM

From: Lawanda Reed RN, CCDS

Phone: [phone]

MRN: [id number]

Admit Date: 06/20/2023 06:09:00 PM

Patient Name: Norma Thompson

Visit Number: [id number]

Discharge Date:  06/27/2023 04:54:00 PM





ATTENTION: The Clinical Documentation Specialists (CDI) and [facility] Coding Staff 
appreciate your assistance in clarifying documentation. Please respond to the 
clarification below the line at the bottom and electronically sign. The CDI & 
[facility] Coding staff will review the response and follow-up if needed. Please note: 
Queries are made part of the Legal Health Record. If you have any questions, 
please contact the author of this message via ITS.



Dr. Martinez E Lila





Sepsis is documented in the H/P and subsequent progress notes until 6/22/23 and 
not in the notes on starting on 6/23 or the discharge summary, Clarification is 
requested.



6/22 Attn: Possible left lower lobe pneumonia with sepsis. 



History/Risk Factors: Hypertension, Diabetes Mellitus, Hyperlipidemia Seizure 
disorder, ESRD on HD



Clinical Indicators: 53-year-old sent from [facility] with concerns for altered mental 
status. 

6/20 VS: (18:23) 99/72 93 16 97.8 100 2/L NC 

6/20 Labs: WBC 9.1 BUN 63, Cr 9.60 UA: Large Leukocyte Esterase 

6/20 Urine culture: Enterococcus faecium VRE

6/20 Blood culture Final: No growth after 5 days

6/21 Procalcitonin 1.44

6/20 CXR: Left lower lobe infiltrate with small effusion



Treatment:  

ICU/Telemetry Monitoring

Cefepime HCL 1 GM IVPB 6/23-6/27

Keppra 500 MG PO MWF 6/21-6/26 

Vimpat 150 MG PO TID 6/21-6/26



Please clarify if Sepsis is:



[  ] Sepsis confirmed, remains under treatment

[  ] Sepsis confirmed, resolved

[  ] Sepsis ruled out

[  ] Other condition, please specify ______

[  ] Unable to determine







(Template Last Revised: March 2021)

___________________________________________________________________________



sepsis with tachycardia and tachypnea 

MTDD

## 2024-09-16 NOTE — P.PN
Caller: [place]    Relationship: Emergency Contact    Best call back number:  [phone]        PATIENT HAS BEEN EXPERIENCING DROPPING BP  ALSO FEELING NAUSEAS     70-80/40 HR DOWN TO 40'S AT RESTING  IT WILL GO BACK TO NORMAL WHEN HE GETS UP   Subjective





Patient is seen in follow-up for end-stage renal disease.  She is maintained on 

hemodialysis on Monday Wednesday Friday schedule.  No problems with dialysis 

yesterday.  Resting in bed.  Hemodynamically stable.  





Vital signs are stable.


General: No acute distress.


HEENT: Head exam is unremarkable. 


LUNGS: Breath sounds decreased.


HEART: Rate and Rhythm are regular. 


ABDOMEN: Soft, no distention.


EXTREMITITES: No edema.





Objective





- Vital Signs


Vital signs: 


                                   Vital Signs











Temp  98.4 F   12/31/22 07:58


 


Pulse  77   12/31/22 07:58


 


Resp  16   12/31/22 07:58


 


BP  134/78   12/31/22 07:58


 


Pulse Ox  100   12/31/22 07:58


 


FiO2  35   12/25/22 18:05








                                 Intake & Output











 12/30/22 12/31/22 12/31/22





 18:59 06:59 18:59


 


Intake Total 930  


 


Output Total 2150  


 


Balance -1220  


 


Weight  71 kg 


 


Intake:   


 


  Oral 180  


 


  Hemodialysis 750  


 


Output:   


 


  Hemodialysis 2150  


 


Other:   


 


  # Voids 1 1 


 


  # Bowel Movements 1 2 








                       ABP, PAP, CO, CI - Last Documented











Arterial Blood Pressure        115/89

















- Labs


CBC & Chem 7: 


                                 12/31/22 05:16





                                 12/30/22 05:51


Labs: 


                  Abnormal Lab Results - Last 24 Hours (Table)











  12/31/22 Range/Units





  05:16 


 


RBC  3.28 L  (4.10-5.20)  X 10*6/uL


 


Hgb  9.3 L  (12.0-15.0)  g/dL


 


Hct  28.6 L  (37.2-46.3)  %


 


MPV  12.5 H  (9.5-12.2)  fL


 


Immature Gran #  0.08 H  (0.00-0.04)  X 10*3/uL














Assessment and Plan


Plan: 





Assessment:


1.  End-stage renal disease maintained on hemodialysis on Monday Wednesday Friday schedule.


2.  Seizures.  Being followed by neurology.


3.  Left ICA stenosis.  Seen by vascular surgery.


4.  Hypertension with chronic kidney disease.  Stable.


5.  Chronic kidney disease mineral bone disease maintained on PhosLo and 

Renvela.  Phosphorus 5.1 today.


6.  Anemia of chronic kidney disease.  Iron replete.  On Aranesp.





Plan:


Hemodialysis Monday.